# Patient Record
Sex: FEMALE | Race: WHITE | NOT HISPANIC OR LATINO | Employment: OTHER | URBAN - METROPOLITAN AREA
[De-identification: names, ages, dates, MRNs, and addresses within clinical notes are randomized per-mention and may not be internally consistent; named-entity substitution may affect disease eponyms.]

---

## 2017-04-05 ENCOUNTER — CONVERSION ENCOUNTER (OUTPATIENT)
Dept: MAMMOGRAPHY | Facility: HOSPITAL | Age: 82
End: 2017-04-05

## 2017-11-07 ENCOUNTER — CONVERSION ENCOUNTER (OUTPATIENT)
Dept: MAMMOGRAPHY | Facility: HOSPITAL | Age: 82
End: 2017-11-07

## 2018-01-09 ENCOUNTER — CONVERSION ENCOUNTER (OUTPATIENT)
Dept: SURGERY | Facility: CLINIC | Age: 83
End: 2018-01-09

## 2018-01-09 ENCOUNTER — OFFICE VISIT CONVERTED (OUTPATIENT)
Dept: SURGERY | Facility: CLINIC | Age: 83
End: 2018-01-09
Attending: NURSE PRACTITIONER

## 2018-04-11 ENCOUNTER — OFFICE VISIT CONVERTED (OUTPATIENT)
Dept: ORTHOPEDIC SURGERY | Facility: CLINIC | Age: 83
End: 2018-04-11
Attending: PHYSICIAN ASSISTANT

## 2018-05-09 ENCOUNTER — OFFICE VISIT CONVERTED (OUTPATIENT)
Dept: ORTHOPEDIC SURGERY | Facility: CLINIC | Age: 83
End: 2018-05-09
Attending: PHYSICIAN ASSISTANT

## 2018-06-13 ENCOUNTER — CONVERSION ENCOUNTER (OUTPATIENT)
Dept: ORTHOPEDIC SURGERY | Facility: CLINIC | Age: 83
End: 2018-06-13

## 2018-06-13 ENCOUNTER — OFFICE VISIT CONVERTED (OUTPATIENT)
Dept: ORTHOPEDIC SURGERY | Facility: CLINIC | Age: 83
End: 2018-06-13
Attending: PHYSICIAN ASSISTANT

## 2018-07-23 ENCOUNTER — CONVERSION ENCOUNTER (OUTPATIENT)
Dept: CARDIOLOGY | Facility: CLINIC | Age: 83
End: 2018-07-23
Attending: INTERNAL MEDICINE

## 2018-07-23 ENCOUNTER — CONVERSION ENCOUNTER (OUTPATIENT)
Dept: CARDIOLOGY | Facility: CLINIC | Age: 83
End: 2018-07-23

## 2018-07-24 ENCOUNTER — OFFICE VISIT CONVERTED (OUTPATIENT)
Dept: PULMONOLOGY | Facility: CLINIC | Age: 83
End: 2018-07-24
Attending: INTERNAL MEDICINE

## 2018-07-25 ENCOUNTER — OFFICE VISIT CONVERTED (OUTPATIENT)
Dept: ORTHOPEDIC SURGERY | Facility: CLINIC | Age: 83
End: 2018-07-25
Attending: PHYSICIAN ASSISTANT

## 2018-08-17 ENCOUNTER — OFFICE VISIT CONVERTED (OUTPATIENT)
Dept: ORTHOPEDIC SURGERY | Facility: CLINIC | Age: 83
End: 2018-08-17
Attending: ORTHOPAEDIC SURGERY

## 2018-09-28 ENCOUNTER — OFFICE VISIT CONVERTED (OUTPATIENT)
Dept: ORTHOPEDIC SURGERY | Facility: CLINIC | Age: 83
End: 2018-09-28
Attending: ORTHOPAEDIC SURGERY

## 2018-10-29 ENCOUNTER — OFFICE VISIT CONVERTED (OUTPATIENT)
Dept: ORTHOPEDIC SURGERY | Facility: CLINIC | Age: 83
End: 2018-10-29
Attending: ORTHOPAEDIC SURGERY

## 2018-11-29 ENCOUNTER — CONVERSION ENCOUNTER (OUTPATIENT)
Dept: MAMMOGRAPHY | Facility: HOSPITAL | Age: 83
End: 2018-11-29

## 2018-12-13 ENCOUNTER — OFFICE VISIT CONVERTED (OUTPATIENT)
Dept: PULMONOLOGY | Facility: CLINIC | Age: 83
End: 2018-12-13
Attending: INTERNAL MEDICINE

## 2018-12-19 ENCOUNTER — OFFICE VISIT CONVERTED (OUTPATIENT)
Dept: ORTHOPEDIC SURGERY | Facility: CLINIC | Age: 83
End: 2018-12-19
Attending: PHYSICIAN ASSISTANT

## 2019-01-15 ENCOUNTER — HOSPITAL ENCOUNTER (OUTPATIENT)
Dept: GENERAL RADIOLOGY | Facility: HOSPITAL | Age: 84
Discharge: HOME | End: 2019-01-15
Attending: FAMILY MEDICINE

## 2019-01-25 ENCOUNTER — OFFICE VISIT CONVERTED (OUTPATIENT)
Dept: CARDIOLOGY | Facility: CLINIC | Age: 84
End: 2019-01-25
Attending: INTERNAL MEDICINE

## 2019-02-14 ENCOUNTER — HOSPITAL ENCOUNTER (OUTPATIENT)
Dept: OTHER | Facility: HOSPITAL | Age: 84
Discharge: HOME | End: 2019-02-14
Attending: FAMILY MEDICINE

## 2019-02-14 LAB
25(OH)D3 SERPL-MCNC: 78.8 NG/ML (ref 30–100)
ALBUMIN SERPL-MCNC: 4.5 G/DL (ref 3.5–5)
ALP SERPL-CCNC: 70 U/L (ref 43–160)
ALT SERPL-CCNC: 12 U/L (ref 10–40)
ANION GAP SERPL CALC-SCNC: 19 MMOL/L (ref 8–19)
APPEARANCE UR: CLEAR
AST SERPL-CCNC: 17 U/L (ref 15–50)
BASOPHILS # BLD AUTO: 0.04 10*3/UL (ref 0–0.2)
BASOPHILS NFR BLD AUTO: 0.78 % (ref 0–3)
BILIRUB SERPL-MCNC: 0.16 MG/DL (ref 0.2–1.3)
BILIRUB UR QL: NEGATIVE
BUN SERPL-MCNC: 30 MG/DL (ref 5–25)
BUN/CREAT SERPL: 19 {RATIO} (ref 6–20)
CALCIUM SERPL-MCNC: 9.6 MG/DL (ref 8.7–10.4)
CHLORIDE SERPL-SCNC: 102 MMOL/L (ref 99–111)
CHOLEST SERPL-MCNC: 182 MG/DL (ref 107–200)
CHOLEST/HDLC SERPL: 3 {RATIO} (ref 3–6)
COLOR UR: YELLOW
CONV BACTERIA: NEGATIVE
CONV BILI, CONJUGATED: <0.2 MG/DL (ref 0–0.6)
CONV CO2: 24 MMOL/L (ref 22–32)
CONV COLLECTION SOURCE (UA): ABNORMAL
CONV TOTAL PROTEIN: 7.8 G/DL (ref 6.3–8.2)
CONV UNCONJUGATED BILIRUBIN: 0 MG/DL (ref 0–1.1)
CONV UROBILINOGEN IN URINE BY AUTOMATED TEST STRIP: 0.2 {EHRLICHU}/DL (ref 0.1–1)
CREAT UR-MCNC: 1.6 MG/DL (ref 0.5–0.9)
EOSINOPHIL # BLD AUTO: 0.1 10*3/UL (ref 0–0.7)
EOSINOPHIL # BLD AUTO: 1.86 % (ref 0–7)
ERYTHROCYTE [DISTWIDTH] IN BLOOD BY AUTOMATED COUNT: 13.1 % (ref 11.7–14.4)
EST. AVERAGE GLUCOSE BLD GHB EST-MCNC: 85 MG/DL
FERRITIN SERPL-MCNC: 79 NG/ML (ref 10–200)
FOLATE SERPL-MCNC: >20 NG/ML (ref 4.8–20)
GFR SERPLBLD BASED ON 1.73 SQ M-ARVRAT: 30 ML/MIN/{1.73_M2}
GLUCOSE SERPL-MCNC: 109 MG/DL (ref 65–99)
GLUCOSE UR QL: NEGATIVE MG/DL
HBA1C MFR BLD: 11.9 G/DL (ref 12–16)
HBA1C MFR BLD: 4.6 % (ref 3.5–5.7)
HCT VFR BLD AUTO: 35.9 % (ref 37–47)
HDLC SERPL-MCNC: 60 MG/DL (ref 40–60)
HGB UR QL STRIP: NEGATIVE
IRON SATN MFR SERPL: 11 % (ref 20–55)
IRON SERPL-MCNC: 44 UG/DL (ref 60–170)
KETONES UR QL STRIP: NEGATIVE MG/DL
LDLC SERPL CALC-MCNC: 93 MG/DL (ref 70–100)
LEUKOCYTE ESTERASE UR QL STRIP: NEGATIVE
LYMPHOCYTES # BLD AUTO: 0.87 10*3/UL (ref 1–5)
MAGNESIUM SERPL-MCNC: 2.34 MG/DL (ref 1.6–2.3)
MCH RBC QN AUTO: 31 PG (ref 27–31)
MCHC RBC AUTO-ENTMCNC: 33.1 G/DL (ref 33–37)
MCV RBC AUTO: 93.4 FL (ref 81–99)
MONOCYTES # BLD AUTO: 0.73 10*3/UL (ref 0.2–1.2)
MONOCYTES NFR BLD AUTO: 14.4 % (ref 3–10)
NEUTROPHILS # BLD AUTO: 3.35 10*3/UL (ref 2–8)
NEUTROPHILS NFR BLD AUTO: 65.9 % (ref 30–85)
NITRITE UR QL STRIP: NEGATIVE
NRBC BLD AUTO-RTO: 0 % (ref 0–0.01)
OSMOLALITY SERPL CALC.SUM OF ELEC: 299 MOSM/KG (ref 273–304)
PH UR STRIP.AUTO: 6.5 [PH] (ref 5–8)
PLATELET # BLD AUTO: 288 10*3/UL (ref 130–400)
PMV BLD AUTO: 8.1 FL (ref 9.4–12.3)
POTASSIUM SERPL-SCNC: 4.3 MMOL/L (ref 3.5–5.3)
PROT UR QL: 100 MG/DL
RBC # BLD AUTO: 3.84 10*6/UL (ref 4.2–5.4)
RBC #/AREA URNS HPF: ABNORMAL /[HPF]
SODIUM SERPL-SCNC: 141 MMOL/L (ref 135–147)
SP GR UR: 1.01 (ref 1–1.03)
T4 FREE SERPL-MCNC: 1.1 NG/DL (ref 0.9–1.8)
TIBC SERPL-MCNC: 399 UG/DL (ref 245–450)
TRANSFERRIN SERPL-MCNC: 279 MG/DL (ref 250–380)
TRIGL SERPL-MCNC: 143 MG/DL (ref 40–150)
TSH SERPL-ACNC: 4.07 M[IU]/L (ref 0.27–4.2)
VARIANT LYMPHS NFR BLD MANUAL: 17 % (ref 20–45)
VIT B12 SERPL-MCNC: 860 PG/ML (ref 211–911)
VLDLC SERPL-MCNC: 29 MG/DL (ref 5–37)
WBC # BLD AUTO: 5.08 10*3/UL (ref 4.8–10.8)
WBC #/AREA URNS HPF: ABNORMAL /[HPF]

## 2019-02-27 ENCOUNTER — OFFICE VISIT CONVERTED (OUTPATIENT)
Dept: ORTHOPEDIC SURGERY | Facility: CLINIC | Age: 84
End: 2019-02-27
Attending: PHYSICIAN ASSISTANT

## 2019-02-28 ENCOUNTER — HOSPITAL ENCOUNTER (OUTPATIENT)
Dept: CT IMAGING | Facility: HOSPITAL | Age: 84
Discharge: HOME | End: 2019-02-28
Attending: PHYSICIAN ASSISTANT

## 2019-03-06 ENCOUNTER — OFFICE VISIT CONVERTED (OUTPATIENT)
Dept: ORTHOPEDIC SURGERY | Facility: CLINIC | Age: 84
End: 2019-03-06
Attending: PHYSICIAN ASSISTANT

## 2019-04-01 ENCOUNTER — OFFICE VISIT CONVERTED (OUTPATIENT)
Dept: ORTHOPEDIC SURGERY | Facility: CLINIC | Age: 84
End: 2019-04-01
Attending: PHYSICIAN ASSISTANT

## 2019-04-04 ENCOUNTER — HOSPITAL ENCOUNTER (OUTPATIENT)
Dept: CARDIOLOGY | Facility: HOSPITAL | Age: 84
Discharge: HOME | End: 2019-04-04
Attending: INTERNAL MEDICINE

## 2019-04-26 ENCOUNTER — OFFICE VISIT CONVERTED (OUTPATIENT)
Dept: PULMONOLOGY | Facility: CLINIC | Age: 84
End: 2019-04-26
Attending: INTERNAL MEDICINE

## 2019-05-16 ENCOUNTER — HOSPITAL ENCOUNTER (OUTPATIENT)
Dept: OTHER | Facility: HOSPITAL | Age: 84
Discharge: HOME | End: 2019-05-16
Attending: FAMILY MEDICINE

## 2019-05-16 LAB
25(OH)D3 SERPL-MCNC: 75.4 NG/ML (ref 30–100)
ALBUMIN SERPL-MCNC: 3.9 G/DL (ref 3.5–5)
ALP SERPL-CCNC: 70 U/L (ref 43–160)
ALT SERPL-CCNC: 11 U/L (ref 10–40)
ANION GAP SERPL CALC-SCNC: 15 MMOL/L (ref 8–19)
APPEARANCE UR: CLEAR
AST SERPL-CCNC: 14 U/L (ref 15–50)
BASOPHILS # BLD AUTO: 0.04 10*3/UL (ref 0–0.2)
BASOPHILS NFR BLD AUTO: 0.5 % (ref 0–3)
BILIRUB SERPL-MCNC: 0.3 MG/DL (ref 0.2–1.3)
BILIRUB UR QL: NEGATIVE
BUN SERPL-MCNC: 26 MG/DL (ref 5–25)
BUN/CREAT SERPL: 17 {RATIO} (ref 6–20)
CALCIUM SERPL-MCNC: 9 MG/DL (ref 8.7–10.4)
CHLORIDE SERPL-SCNC: 96 MMOL/L (ref 99–111)
CHOLEST SERPL-MCNC: 197 MG/DL (ref 107–200)
CHOLEST/HDLC SERPL: 3.5 {RATIO} (ref 3–6)
COLOR UR: YELLOW
CONV ABS IMM GRAN: 0.04 10*3/UL (ref 0–0.2)
CONV BILI, CONJUGATED: <0.2 MG/DL (ref 0–0.6)
CONV CO2: 28 MMOL/L (ref 22–32)
CONV COLLECTION SOURCE (UA): NORMAL
CONV CREATININE URINE, RANDOM: 53.5 MG/DL (ref 10–300)
CONV IMMATURE GRAN: 0.5 % (ref 0–1.8)
CONV MICROALBUM.,U,RANDOM: 151.3 MG/L (ref 0–20)
CONV TOTAL PROTEIN: 7.4 G/DL (ref 6.3–8.2)
CONV UNCONJUGATED BILIRUBIN: 0.1 MG/DL (ref 0–1.1)
CONV UROBILINOGEN IN URINE BY AUTOMATED TEST STRIP: 0.2 {EHRLICHU}/DL (ref 0.1–1)
CREAT UR-MCNC: 1.53 MG/DL (ref 0.5–0.9)
DEPRECATED RDW RBC AUTO: 49.6 FL (ref 36.4–46.3)
EOSINOPHIL # BLD AUTO: 0.12 10*3/UL (ref 0–0.7)
EOSINOPHIL # BLD AUTO: 1.6 % (ref 0–7)
ERYTHROCYTE [DISTWIDTH] IN BLOOD BY AUTOMATED COUNT: 14.4 % (ref 11.7–14.4)
EST. AVERAGE GLUCOSE BLD GHB EST-MCNC: 103 MG/DL
FERRITIN SERPL-MCNC: 110 NG/ML (ref 10–200)
FOLATE SERPL-MCNC: >20 NG/ML (ref 4.8–20)
GFR SERPLBLD BASED ON 1.73 SQ M-ARVRAT: 31 ML/MIN/{1.73_M2}
GLUCOSE SERPL-MCNC: 93 MG/DL (ref 65–99)
GLUCOSE UR QL: NEGATIVE MG/DL
HBA1C MFR BLD: 11.5 G/DL (ref 12–16)
HBA1C MFR BLD: 5.2 % (ref 3.5–5.7)
HCT VFR BLD AUTO: 37 % (ref 37–47)
HDLC SERPL-MCNC: 56 MG/DL (ref 40–60)
HGB UR QL STRIP: NEGATIVE
IRON SATN MFR SERPL: 15 % (ref 20–55)
IRON SERPL-MCNC: 49 UG/DL (ref 60–170)
KETONES UR QL STRIP: NEGATIVE MG/DL
LDLC SERPL CALC-MCNC: 115 MG/DL (ref 70–100)
LEUKOCYTE ESTERASE UR QL STRIP: NEGATIVE
LYMPHOCYTES # BLD AUTO: 0.74 10*3/UL (ref 1–5)
MCH RBC QN AUTO: 29.5 PG (ref 27–31)
MCHC RBC AUTO-ENTMCNC: 31.1 G/DL (ref 33–37)
MCV RBC AUTO: 94.9 FL (ref 81–99)
MICROALBUMIN/CREAT UR: 282.8 MG/G{CRE} (ref 0–35)
MONOCYTES # BLD AUTO: 1.42 10*3/UL (ref 0.2–1.2)
MONOCYTES NFR BLD AUTO: 19.3 % (ref 3–10)
NEUTROPHILS # BLD AUTO: 5.01 10*3/UL (ref 2–8)
NEUTROPHILS NFR BLD AUTO: 68.1 % (ref 30–85)
NITRITE UR QL STRIP: NEGATIVE
NRBC CBCN: 0 % (ref 0–0.7)
OSMOLALITY SERPL CALC.SUM OF ELEC: 282 MOSM/KG (ref 273–304)
PH UR STRIP.AUTO: 6.5 [PH] (ref 5–8)
PLATELET # BLD AUTO: 323 10*3/UL (ref 130–400)
PMV BLD AUTO: 10.5 FL (ref 9.4–12.3)
POTASSIUM SERPL-SCNC: 4.5 MMOL/L (ref 3.5–5.3)
PROT UR QL: NORMAL MG/DL
RBC # BLD AUTO: 3.9 10*6/UL (ref 4.2–5.4)
SODIUM SERPL-SCNC: 134 MMOL/L (ref 135–147)
SP GR UR: 1.01 (ref 1–1.03)
T4 FREE SERPL-MCNC: 1.3 NG/DL (ref 0.9–1.8)
TIBC SERPL-MCNC: 326 UG/DL (ref 245–450)
TRANSFERRIN SERPL-MCNC: 228 MG/DL (ref 250–380)
TRIGL SERPL-MCNC: 129 MG/DL (ref 40–150)
TSH SERPL-ACNC: 7.85 M[IU]/L (ref 0.27–4.2)
VARIANT LYMPHS NFR BLD MANUAL: 10 % (ref 20–45)
VIT B12 SERPL-MCNC: 884 PG/ML (ref 211–911)
VLDLC SERPL-MCNC: 26 MG/DL (ref 5–37)
WBC # BLD AUTO: 7.37 10*3/UL (ref 4.8–10.8)

## 2019-05-30 ENCOUNTER — HOSPITAL ENCOUNTER (OUTPATIENT)
Dept: CT IMAGING | Facility: HOSPITAL | Age: 84
Discharge: HOME | End: 2019-05-30
Attending: FAMILY MEDICINE

## 2019-06-12 ENCOUNTER — OFFICE VISIT CONVERTED (OUTPATIENT)
Dept: ORTHOPEDIC SURGERY | Facility: CLINIC | Age: 84
End: 2019-06-12
Attending: PHYSICIAN ASSISTANT

## 2019-06-13 ENCOUNTER — HOSPITAL ENCOUNTER (OUTPATIENT)
Dept: CT IMAGING | Facility: HOSPITAL | Age: 84
Discharge: HOME | End: 2019-06-13
Attending: FAMILY MEDICINE

## 2019-07-03 ENCOUNTER — OFFICE VISIT CONVERTED (OUTPATIENT)
Dept: OTOLARYNGOLOGY | Facility: CLINIC | Age: 84
End: 2019-07-03
Attending: OTOLARYNGOLOGY

## 2019-07-03 ENCOUNTER — CONVERSION ENCOUNTER (OUTPATIENT)
Dept: OTOLARYNGOLOGY | Facility: CLINIC | Age: 84
End: 2019-07-03

## 2019-07-19 ENCOUNTER — OFFICE VISIT CONVERTED (OUTPATIENT)
Dept: OTOLARYNGOLOGY | Facility: CLINIC | Age: 84
End: 2019-07-19
Attending: OTOLARYNGOLOGY

## 2019-07-26 ENCOUNTER — CONVERSION ENCOUNTER (OUTPATIENT)
Dept: CARDIOLOGY | Facility: CLINIC | Age: 84
End: 2019-07-26
Attending: INTERNAL MEDICINE

## 2019-07-31 ENCOUNTER — OFFICE VISIT CONVERTED (OUTPATIENT)
Dept: ORTHOPEDIC SURGERY | Facility: CLINIC | Age: 84
End: 2019-07-31
Attending: PHYSICIAN ASSISTANT

## 2019-07-31 ENCOUNTER — CONVERSION ENCOUNTER (OUTPATIENT)
Dept: ORTHOPEDIC SURGERY | Facility: CLINIC | Age: 84
End: 2019-07-31

## 2019-08-07 ENCOUNTER — HOSPITAL ENCOUNTER (OUTPATIENT)
Dept: OTHER | Facility: HOSPITAL | Age: 84
Discharge: HOME | End: 2019-08-07
Attending: FAMILY MEDICINE

## 2019-08-07 LAB
25(OH)D3 SERPL-MCNC: 74.9 NG/ML (ref 30–100)
ALBUMIN SERPL-MCNC: 4.5 G/DL (ref 3.5–5)
ALBUMIN/GLOB SERPL: 1.4 {RATIO} (ref 1.4–2.6)
ALP SERPL-CCNC: 77 U/L (ref 43–160)
ALT SERPL-CCNC: 13 U/L (ref 10–40)
ANION GAP SERPL CALC-SCNC: 24 MMOL/L (ref 8–19)
APPEARANCE UR: CLEAR
AST SERPL-CCNC: 20 U/L (ref 15–50)
BASOPHILS # BLD AUTO: 0.05 10*3/UL (ref 0–0.2)
BASOPHILS NFR BLD AUTO: 1.1 % (ref 0–3)
BILIRUB SERPL-MCNC: 0.18 MG/DL (ref 0.2–1.3)
BILIRUB UR QL: NEGATIVE
BUN SERPL-MCNC: 28 MG/DL (ref 5–25)
BUN/CREAT SERPL: 18 {RATIO} (ref 6–20)
CALCIUM SERPL-MCNC: 9.8 MG/DL (ref 8.7–10.4)
CHLORIDE SERPL-SCNC: 101 MMOL/L (ref 99–111)
CHOLEST SERPL-MCNC: 168 MG/DL (ref 107–200)
CHOLEST/HDLC SERPL: 2.9 {RATIO} (ref 3–6)
COLOR UR: YELLOW
CONV ABS IMM GRAN: 0.01 10*3/UL (ref 0–0.2)
CONV BACTERIA: NEGATIVE
CONV BILI, CONJUGATED: <0.2 MG/DL (ref 0–0.6)
CONV CO2: 21 MMOL/L (ref 22–32)
CONV COLLECTION SOURCE (UA): ABNORMAL
CONV IMMATURE GRAN: 0.2 % (ref 0–1.8)
CONV TOTAL PROTEIN: 7.8 G/DL (ref 6.3–8.2)
CONV UNCONJUGATED BILIRUBIN: 0 MG/DL (ref 0–1.1)
CONV UROBILINOGEN IN URINE BY AUTOMATED TEST STRIP: 0.2 {EHRLICHU}/DL (ref 0.1–1)
CREAT UR-MCNC: 1.53 MG/DL (ref 0.5–0.9)
DEPRECATED RDW RBC AUTO: 53.3 FL (ref 36.4–46.3)
EOSINOPHIL # BLD AUTO: 0.08 10*3/UL (ref 0–0.7)
EOSINOPHIL # BLD AUTO: 1.8 % (ref 0–7)
ERYTHROCYTE [DISTWIDTH] IN BLOOD BY AUTOMATED COUNT: 15.2 % (ref 11.7–14.4)
FERRITIN SERPL-MCNC: 58 NG/ML (ref 10–200)
FOLATE SERPL-MCNC: >20 NG/ML (ref 4.8–20)
GFR SERPLBLD BASED ON 1.73 SQ M-ARVRAT: 31 ML/MIN/{1.73_M2}
GLOBULIN UR ELPH-MCNC: 3.3 G/DL (ref 2–3.5)
GLUCOSE SERPL-MCNC: 101 MG/DL (ref 65–99)
GLUCOSE UR QL: NEGATIVE MG/DL
HCT VFR BLD AUTO: 38.9 % (ref 37–47)
HDLC SERPL-MCNC: 57 MG/DL (ref 40–60)
HGB BLD-MCNC: 11.8 G/DL (ref 12–16)
HGB UR QL STRIP: NEGATIVE
IRON SATN MFR SERPL: 12 % (ref 20–55)
IRON SERPL-MCNC: 51 UG/DL (ref 60–170)
KETONES UR QL STRIP: NEGATIVE MG/DL
LDLC SERPL CALC-MCNC: 88 MG/DL (ref 70–100)
LEUKOCYTE ESTERASE UR QL STRIP: NEGATIVE
LYMPHOCYTES # BLD AUTO: 0.65 10*3/UL (ref 1–5)
LYMPHOCYTES NFR BLD AUTO: 14.7 % (ref 20–45)
MAGNESIUM SERPL-MCNC: 2.42 MG/DL (ref 1.6–2.3)
MCH RBC QN AUTO: 28.6 PG (ref 27–31)
MCHC RBC AUTO-ENTMCNC: 30.3 G/DL (ref 33–37)
MCV RBC AUTO: 94.4 FL (ref 81–99)
MONOCYTES # BLD AUTO: 0.65 10*3/UL (ref 0.2–1.2)
MONOCYTES NFR BLD AUTO: 14.7 % (ref 3–10)
NEUTROPHILS # BLD AUTO: 2.99 10*3/UL (ref 2–8)
NEUTROPHILS NFR BLD AUTO: 67.5 % (ref 30–85)
NITRITE UR QL STRIP: NEGATIVE
NRBC CBCN: 0 % (ref 0–0.7)
OSMOLALITY SERPL CALC.SUM OF ELEC: 298 MOSM/KG (ref 273–304)
PH UR STRIP.AUTO: 7 [PH] (ref 5–8)
PLATELET # BLD AUTO: 329 10*3/UL (ref 130–400)
PMV BLD AUTO: 10.6 FL (ref 9.4–12.3)
POTASSIUM SERPL-SCNC: 4.7 MMOL/L (ref 3.5–5.3)
PROT UR QL: 30 MG/DL
RBC # BLD AUTO: 4.12 10*6/UL (ref 4.2–5.4)
RBC #/AREA URNS HPF: ABNORMAL /[HPF]
SODIUM SERPL-SCNC: 141 MMOL/L (ref 135–147)
SP GR UR: 1.01 (ref 1–1.03)
T4 FREE SERPL-MCNC: 1.4 NG/DL (ref 0.9–1.8)
TIBC SERPL-MCNC: 420 UG/DL (ref 245–450)
TRANSFERRIN SERPL-MCNC: 294 MG/DL (ref 250–380)
TRIGL SERPL-MCNC: 116 MG/DL (ref 40–150)
TSH SERPL-ACNC: 3.02 M[IU]/L (ref 0.27–4.2)
VIT B12 SERPL-MCNC: 913 PG/ML (ref 211–911)
VLDLC SERPL-MCNC: 23 MG/DL (ref 5–37)
WBC # BLD AUTO: 4.43 10*3/UL (ref 4.8–10.8)
WBC #/AREA URNS HPF: ABNORMAL /[HPF]

## 2019-10-08 ENCOUNTER — HOSPITAL ENCOUNTER (OUTPATIENT)
Dept: CT IMAGING | Facility: HOSPITAL | Age: 84
Discharge: HOME | End: 2019-10-08
Attending: INTERNAL MEDICINE

## 2019-10-16 ENCOUNTER — OFFICE VISIT CONVERTED (OUTPATIENT)
Dept: PULMONOLOGY | Facility: CLINIC | Age: 84
End: 2019-10-16
Attending: INTERNAL MEDICINE

## 2019-10-18 ENCOUNTER — OFFICE VISIT CONVERTED (OUTPATIENT)
Dept: ORTHOPEDIC SURGERY | Facility: CLINIC | Age: 84
End: 2019-10-18
Attending: PHYSICIAN ASSISTANT

## 2019-11-06 ENCOUNTER — HOSPITAL ENCOUNTER (OUTPATIENT)
Dept: OTHER | Facility: HOSPITAL | Age: 84
Discharge: HOME | End: 2019-11-06
Attending: FAMILY MEDICINE

## 2019-11-06 LAB
25(OH)D3 SERPL-MCNC: 69.5 NG/ML (ref 30–100)
ALBUMIN SERPL-MCNC: 4.2 G/DL (ref 3.5–5)
ALP SERPL-CCNC: 78 U/L (ref 43–160)
ALT SERPL-CCNC: 9 U/L (ref 10–40)
ANION GAP SERPL CALC-SCNC: 19 MMOL/L (ref 8–19)
APPEARANCE UR: CLEAR
AST SERPL-CCNC: 14 U/L (ref 15–50)
BASOPHILS # BLD AUTO: 0.04 10*3/UL (ref 0–0.2)
BASOPHILS NFR BLD AUTO: 0.6 % (ref 0–3)
BILIRUB SERPL-MCNC: 0.2 MG/DL (ref 0.2–1.3)
BILIRUB UR QL: NEGATIVE
BUN SERPL-MCNC: 29 MG/DL (ref 5–25)
BUN/CREAT SERPL: 22 {RATIO} (ref 6–20)
CALCIUM SERPL-MCNC: 10.1 MG/DL (ref 8.7–10.4)
CHLORIDE SERPL-SCNC: 102 MMOL/L (ref 99–111)
CHOLEST SERPL-MCNC: 178 MG/DL (ref 107–200)
CHOLEST/HDLC SERPL: 3.2 {RATIO} (ref 3–6)
COLOR UR: YELLOW
CONV ABS IMM GRAN: 0.01 10*3/UL (ref 0–0.2)
CONV BACTERIA: NEGATIVE
CONV BILI, CONJUGATED: <0.2 MG/DL (ref 0–0.6)
CONV CO2: 22 MMOL/L (ref 22–32)
CONV COLLECTION SOURCE (UA): ABNORMAL
CONV HYALINE CASTS IN URINE MICRO: ABNORMAL /[LPF]
CONV IMMATURE GRAN: 0.2 % (ref 0–1.8)
CONV TOTAL PROTEIN: 7.2 G/DL (ref 6.3–8.2)
CONV UNCONJUGATED BILIRUBIN: 0 MG/DL (ref 0–1.1)
CONV UROBILINOGEN IN URINE BY AUTOMATED TEST STRIP: 0.2 {EHRLICHU}/DL (ref 0.1–1)
CREAT UR-MCNC: 1.32 MG/DL (ref 0.5–0.9)
DEPRECATED RDW RBC AUTO: 51.2 FL (ref 36.4–46.3)
EOSINOPHIL # BLD AUTO: 0.08 10*3/UL (ref 0–0.7)
EOSINOPHIL # BLD AUTO: 1.3 % (ref 0–7)
ERYTHROCYTE [DISTWIDTH] IN BLOOD BY AUTOMATED COUNT: 14.6 % (ref 11.7–14.4)
FERRITIN SERPL-MCNC: 76 NG/ML (ref 10–200)
FOLATE SERPL-MCNC: >20 NG/ML (ref 4.8–20)
GFR SERPLBLD BASED ON 1.73 SQ M-ARVRAT: 37 ML/MIN/{1.73_M2}
GLUCOSE SERPL-MCNC: 101 MG/DL (ref 65–99)
GLUCOSE UR QL: NEGATIVE MG/DL
HCT VFR BLD AUTO: 40.9 % (ref 37–47)
HDLC SERPL-MCNC: 56 MG/DL (ref 40–60)
HGB BLD-MCNC: 12.5 G/DL (ref 12–16)
HGB UR QL STRIP: NEGATIVE
IRON SATN MFR SERPL: 18 % (ref 20–55)
IRON SERPL-MCNC: 61 UG/DL (ref 60–170)
KETONES UR QL STRIP: NEGATIVE MG/DL
LDLC SERPL CALC-MCNC: 98 MG/DL (ref 70–100)
LEUKOCYTE ESTERASE UR QL STRIP: NEGATIVE
LYMPHOCYTES # BLD AUTO: 0.79 10*3/UL (ref 1–5)
LYMPHOCYTES NFR BLD AUTO: 12.4 % (ref 20–45)
MAGNESIUM SERPL-MCNC: 2.22 MG/DL (ref 1.6–2.3)
MCH RBC QN AUTO: 28.7 PG (ref 27–31)
MCHC RBC AUTO-ENTMCNC: 30.6 G/DL (ref 33–37)
MCV RBC AUTO: 94 FL (ref 81–99)
MONOCYTES # BLD AUTO: 0.72 10*3/UL (ref 0.2–1.2)
MONOCYTES NFR BLD AUTO: 11.3 % (ref 3–10)
NEUTROPHILS # BLD AUTO: 4.73 10*3/UL (ref 2–8)
NEUTROPHILS NFR BLD AUTO: 74.2 % (ref 30–85)
NITRITE UR QL STRIP: NEGATIVE
NRBC CBCN: 0 % (ref 0–0.7)
OSMOLALITY SERPL CALC.SUM OF ELEC: 294 MOSM/KG (ref 273–304)
PH UR STRIP.AUTO: 5.5 [PH] (ref 5–8)
PHOSPHATE SERPL-MCNC: 3.8 MG/DL (ref 2.4–4.5)
PLATELET # BLD AUTO: 268 10*3/UL (ref 130–400)
PMV BLD AUTO: 10.5 FL (ref 9.4–12.3)
POTASSIUM SERPL-SCNC: 4.4 MMOL/L (ref 3.5–5.3)
PROT UR QL: 300 MG/DL
RBC # BLD AUTO: 4.35 10*6/UL (ref 4.2–5.4)
RBC #/AREA URNS HPF: ABNORMAL /[HPF]
SODIUM SERPL-SCNC: 139 MMOL/L (ref 135–147)
SP GR UR: 1.02 (ref 1–1.03)
SQUAMOUS SPT QL MICRO: ABNORMAL /[HPF]
T4 FREE SERPL-MCNC: 1.2 NG/DL (ref 0.9–1.8)
TIBC SERPL-MCNC: 342 UG/DL (ref 245–450)
TRANSFERRIN SERPL-MCNC: 239 MG/DL (ref 250–380)
TRIGL SERPL-MCNC: 118 MG/DL (ref 40–150)
TSH SERPL-ACNC: 2.13 M[IU]/L (ref 0.27–4.2)
VIT B12 SERPL-MCNC: 786 PG/ML (ref 211–911)
VLDLC SERPL-MCNC: 24 MG/DL (ref 5–37)
WBC # BLD AUTO: 6.37 10*3/UL (ref 4.8–10.8)
WBC #/AREA URNS HPF: ABNORMAL /[HPF]

## 2019-12-03 ENCOUNTER — HOSPITAL ENCOUNTER (OUTPATIENT)
Dept: MAMMOGRAPHY | Facility: HOSPITAL | Age: 84
Discharge: HOME | End: 2019-12-03
Attending: INTERNAL MEDICINE

## 2020-01-27 ENCOUNTER — OFFICE VISIT CONVERTED (OUTPATIENT)
Dept: CARDIOLOGY | Facility: CLINIC | Age: 85
End: 2020-01-27
Attending: INTERNAL MEDICINE

## 2020-01-27 ENCOUNTER — CONVERSION ENCOUNTER (OUTPATIENT)
Dept: CARDIOLOGY | Facility: CLINIC | Age: 85
End: 2020-01-27

## 2020-02-05 ENCOUNTER — HOSPITAL ENCOUNTER (OUTPATIENT)
Dept: OTHER | Facility: HOSPITAL | Age: 85
Discharge: HOME | End: 2020-02-05
Attending: FAMILY MEDICINE

## 2020-02-05 LAB
25(OH)D3 SERPL-MCNC: 59.8 NG/ML (ref 30–100)
ALBUMIN SERPL-MCNC: 4.5 G/DL (ref 3.5–5)
ALP SERPL-CCNC: 75 U/L (ref 43–160)
ALT SERPL-CCNC: 12 U/L (ref 10–40)
ANION GAP SERPL CALC-SCNC: 19 MMOL/L (ref 8–19)
APPEARANCE UR: CLEAR
AST SERPL-CCNC: 17 U/L (ref 15–50)
BASOPHILS # BLD AUTO: 0.03 10*3/UL (ref 0–0.2)
BASOPHILS NFR BLD AUTO: 0.6 % (ref 0–3)
BILIRUB SERPL-MCNC: 0.16 MG/DL (ref 0.2–1.3)
BILIRUB UR QL: NEGATIVE
BUN SERPL-MCNC: 28 MG/DL (ref 5–25)
BUN/CREAT SERPL: 22 {RATIO} (ref 6–20)
CALCIUM SERPL-MCNC: 9.7 MG/DL (ref 8.7–10.4)
CHLORIDE SERPL-SCNC: 102 MMOL/L (ref 99–111)
CHOLEST SERPL-MCNC: 195 MG/DL (ref 107–200)
CHOLEST/HDLC SERPL: 2.9 {RATIO} (ref 3–6)
COLOR UR: YELLOW
CONV ABS IMM GRAN: 0.01 10*3/UL (ref 0–0.2)
CONV BACTERIA: NEGATIVE
CONV BILI, CONJUGATED: <0.2 MG/DL (ref 0–0.6)
CONV CO2: 25 MMOL/L (ref 22–32)
CONV COLLECTION SOURCE (UA): ABNORMAL
CONV IMMATURE GRAN: 0.2 % (ref 0–1.8)
CONV TOTAL PROTEIN: 7.6 G/DL (ref 6.3–8.2)
CONV UNCONJUGATED BILIRUBIN: 0 MG/DL (ref 0–1.1)
CONV UROBILINOGEN IN URINE BY AUTOMATED TEST STRIP: 0.2 {EHRLICHU}/DL (ref 0.1–1)
CREAT UR-MCNC: 1.28 MG/DL (ref 0.5–0.9)
DEPRECATED RDW RBC AUTO: 49.8 FL (ref 36.4–46.3)
EOSINOPHIL # BLD AUTO: 0.07 10*3/UL (ref 0–0.7)
EOSINOPHIL # BLD AUTO: 1.4 % (ref 0–7)
ERYTHROCYTE [DISTWIDTH] IN BLOOD BY AUTOMATED COUNT: 14 % (ref 11.7–14.4)
ERYTHROCYTE [SEDIMENTATION RATE] IN BLOOD: 30 MM/H (ref 0–30)
FERRITIN SERPL-MCNC: 80 NG/ML (ref 10–200)
FOLATE SERPL-MCNC: >20 NG/ML (ref 4.8–20)
GFR SERPLBLD BASED ON 1.73 SQ M-ARVRAT: 39 ML/MIN/{1.73_M2}
GLUCOSE SERPL-MCNC: 103 MG/DL (ref 65–99)
GLUCOSE UR QL: NEGATIVE MG/DL
HCT VFR BLD AUTO: 41.4 % (ref 37–47)
HDLC SERPL-MCNC: 68 MG/DL (ref 40–60)
HGB BLD-MCNC: 12.7 G/DL (ref 12–16)
HGB UR QL STRIP: NEGATIVE
IRON SATN MFR SERPL: 16 % (ref 20–55)
IRON SERPL-MCNC: 59 UG/DL (ref 60–170)
KETONES UR QL STRIP: NEGATIVE MG/DL
LDLC SERPL CALC-MCNC: 107 MG/DL (ref 70–100)
LEUKOCYTE ESTERASE UR QL STRIP: NEGATIVE
LYMPHOCYTES # BLD AUTO: 0.86 10*3/UL (ref 1–5)
LYMPHOCYTES NFR BLD AUTO: 17.2 % (ref 20–45)
MAGNESIUM SERPL-MCNC: 2.3 MG/DL (ref 1.6–2.3)
MCH RBC QN AUTO: 29.6 PG (ref 27–31)
MCHC RBC AUTO-ENTMCNC: 30.7 G/DL (ref 33–37)
MCV RBC AUTO: 96.5 FL (ref 81–99)
MONOCYTES # BLD AUTO: 0.65 10*3/UL (ref 0.2–1.2)
MONOCYTES NFR BLD AUTO: 13 % (ref 3–10)
NEUTROPHILS # BLD AUTO: 3.39 10*3/UL (ref 2–8)
NEUTROPHILS NFR BLD AUTO: 67.6 % (ref 30–85)
NITRITE UR QL STRIP: NEGATIVE
NRBC CBCN: 0 % (ref 0–0.7)
OSMOLALITY SERPL CALC.SUM OF ELEC: 300 MOSM/KG (ref 273–304)
PH UR STRIP.AUTO: 6 [PH] (ref 5–8)
PLATELET # BLD AUTO: 267 10*3/UL (ref 130–400)
PMV BLD AUTO: 10.8 FL (ref 9.4–12.3)
POTASSIUM SERPL-SCNC: 4.2 MMOL/L (ref 3.5–5.3)
PROT UR QL: 100 MG/DL
RBC # BLD AUTO: 4.29 10*6/UL (ref 4.2–5.4)
RBC #/AREA URNS HPF: ABNORMAL /[HPF]
SODIUM SERPL-SCNC: 142 MMOL/L (ref 135–147)
SP GR UR: 1.01 (ref 1–1.03)
T4 FREE SERPL-MCNC: 1.3 NG/DL (ref 0.9–1.8)
TIBC SERPL-MCNC: 362 UG/DL (ref 245–450)
TRANSFERRIN SERPL-MCNC: 253 MG/DL (ref 250–380)
TRIGL SERPL-MCNC: 99 MG/DL (ref 40–150)
TSH SERPL-ACNC: 2.51 M[IU]/L (ref 0.27–4.2)
VIT B12 SERPL-MCNC: 931 PG/ML (ref 211–911)
VLDLC SERPL-MCNC: 20 MG/DL (ref 5–37)
WBC # BLD AUTO: 5.01 10*3/UL (ref 4.8–10.8)
WBC #/AREA URNS HPF: ABNORMAL /[HPF]

## 2020-02-07 LAB
ASO AB SERPL-ACNC: 86 [IU]/ML (ref 0–200)
CONV RHEUMATOID FACTOR IGM: 10.9 [IU]/ML (ref 0–14)
CRP SERPL-MCNC: 10.3 MG/L (ref 0–5)
DSDNA AB SER-ACNC: NEGATIVE [IU]/ML
ENA AB SER IA-ACNC: POSITIVE {RATIO}
URATE SERPL-MCNC: 7.7 MG/DL (ref 2.5–7.5)

## 2020-02-08 LAB
CENTROMERE AB TITR SER IF: <0.4 [IU]/ML (ref 0–7)
ENA SCL70 AB SER QL IA: 1.5 [IU]/ML (ref 0–7)
JO-1 (WB)*: <0.3 [IU]/ML (ref 0–7)
RNP: 0.6 [IU]/ML (ref 0–5)
RNP: <0.3 [IU]/ML (ref 0–7)
SMI: <0.8 [IU]/ML (ref 0–7)
SSA (RO) (ENA) AB, IGG: 7.8 [IU]/ML (ref 0–7)
SSB (LA) ENA AB, IGG: <0.3 [IU]/ML (ref 0–7)

## 2020-04-22 ENCOUNTER — OFFICE VISIT CONVERTED (OUTPATIENT)
Dept: ORTHOPEDIC SURGERY | Facility: CLINIC | Age: 85
End: 2020-04-22
Attending: PHYSICIAN ASSISTANT

## 2020-05-04 ENCOUNTER — HOSPITAL ENCOUNTER (OUTPATIENT)
Dept: OTHER | Facility: HOSPITAL | Age: 85
Discharge: HOME | End: 2020-05-04
Attending: INTERNAL MEDICINE

## 2020-05-04 LAB
25(OH)D3 SERPL-MCNC: 48.8 NG/ML (ref 30–100)
ALBUMIN SERPL-MCNC: 4.2 G/DL (ref 3.5–5)
ALBUMIN/GLOB SERPL: 1.4 {RATIO} (ref 1.4–2.6)
ALP SERPL-CCNC: 68 U/L (ref 43–160)
ALT SERPL-CCNC: 10 U/L (ref 10–40)
ANION GAP SERPL CALC-SCNC: 15 MMOL/L (ref 8–19)
APPEARANCE UR: CLEAR
AST SERPL-CCNC: 17 U/L (ref 15–50)
BASOPHILS # BLD AUTO: 0.03 10*3/UL (ref 0–0.2)
BASOPHILS NFR BLD AUTO: 0.7 % (ref 0–3)
BILIRUB SERPL-MCNC: 0.24 MG/DL (ref 0.2–1.3)
BILIRUB UR QL: NEGATIVE
BUN SERPL-MCNC: 24 MG/DL (ref 5–25)
BUN/CREAT SERPL: 17 {RATIO} (ref 6–20)
CALCIUM SERPL-MCNC: 9.5 MG/DL (ref 8.7–10.4)
CHLORIDE SERPL-SCNC: 104 MMOL/L (ref 99–111)
CHOLEST SERPL-MCNC: 168 MG/DL (ref 107–200)
CHOLEST/HDLC SERPL: 2.9 {RATIO} (ref 3–6)
COLOR UR: YELLOW
CONV ABS IMM GRAN: 0.02 10*3/UL (ref 0–0.2)
CONV BACTERIA: NEGATIVE
CONV BILI, CONJUGATED: <0.2 MG/DL (ref 0–0.6)
CONV CO2: 26 MMOL/L (ref 22–32)
CONV COLLECTION SOURCE (UA): ABNORMAL
CONV IMMATURE GRAN: 0.5 % (ref 0–1.8)
CONV RHEUMATOID FACTOR IGM: <10 [IU]/ML (ref 0–14)
CONV TOTAL PROTEIN: 7.3 G/DL (ref 6.3–8.2)
CONV UNCONJUGATED BILIRUBIN: 0 MG/DL (ref 0–1.1)
CONV UROBILINOGEN IN URINE BY AUTOMATED TEST STRIP: 0.2 {EHRLICHU}/DL (ref 0.1–1)
CREAT UR-MCNC: 1.45 MG/DL (ref 0.5–0.9)
CRP SERPL-MCNC: 10.6 MG/L (ref 0–5)
DEPRECATED RDW RBC AUTO: 52.4 FL (ref 36.4–46.3)
EOSINOPHIL # BLD AUTO: 0.06 10*3/UL (ref 0–0.7)
EOSINOPHIL # BLD AUTO: 1.4 % (ref 0–7)
ERYTHROCYTE [DISTWIDTH] IN BLOOD BY AUTOMATED COUNT: 14.6 % (ref 11.7–14.4)
ERYTHROCYTE [SEDIMENTATION RATE] IN BLOOD: 23 MM/H (ref 0–30)
EST. AVERAGE GLUCOSE BLD GHB EST-MCNC: 103 MG/DL
FERRITIN SERPL-MCNC: 94 NG/ML (ref 10–200)
GFR SERPLBLD BASED ON 1.73 SQ M-ARVRAT: 33 ML/MIN/{1.73_M2}
GLOBULIN UR ELPH-MCNC: 3.1 G/DL (ref 2–3.5)
GLUCOSE SERPL-MCNC: 96 MG/DL (ref 65–99)
GLUCOSE UR QL: NEGATIVE MG/DL
HBA1C MFR BLD: 5.2 % (ref 3.5–5.7)
HCT VFR BLD AUTO: 39.7 % (ref 37–47)
HDLC SERPL-MCNC: 58 MG/DL (ref 40–60)
HGB BLD-MCNC: 11.9 G/DL (ref 12–16)
HGB UR QL STRIP: NEGATIVE
IRON SATN MFR SERPL: 22 % (ref 20–55)
IRON SERPL-MCNC: 83 UG/DL (ref 60–170)
KETONES UR QL STRIP: NEGATIVE MG/DL
LDLC SERPL CALC-MCNC: 91 MG/DL (ref 70–100)
LEUKOCYTE ESTERASE UR QL STRIP: ABNORMAL
LYMPHOCYTES # BLD AUTO: 0.56 10*3/UL (ref 1–5)
LYMPHOCYTES NFR BLD AUTO: 12.6 % (ref 20–45)
MAGNESIUM SERPL-MCNC: 2.33 MG/DL (ref 1.6–2.3)
MCH RBC QN AUTO: 29.2 PG (ref 27–31)
MCHC RBC AUTO-ENTMCNC: 30 G/DL (ref 33–37)
MCV RBC AUTO: 97.5 FL (ref 81–99)
MONOCYTES # BLD AUTO: 0.53 10*3/UL (ref 0.2–1.2)
MONOCYTES NFR BLD AUTO: 11.9 % (ref 3–10)
NEUTROPHILS # BLD AUTO: 3.24 10*3/UL (ref 2–8)
NEUTROPHILS NFR BLD AUTO: 72.9 % (ref 30–85)
NITRITE UR QL STRIP: NEGATIVE
NRBC CBCN: 0 % (ref 0–0.7)
OSMOLALITY SERPL CALC.SUM OF ELEC: 296 MOSM/KG (ref 273–304)
PH UR STRIP.AUTO: 7.5 [PH] (ref 5–8)
PHOSPHATE SERPL-MCNC: 3.3 MG/DL (ref 2.4–4.5)
PLATELET # BLD AUTO: 244 10*3/UL (ref 130–400)
PMV BLD AUTO: 10.3 FL (ref 9.4–12.3)
POTASSIUM SERPL-SCNC: 4.4 MMOL/L (ref 3.5–5.3)
PROT UR QL: 100 MG/DL
PTH-INTACT SERPL-MCNC: 87.4 PG/ML (ref 11.1–79.5)
RBC # BLD AUTO: 4.07 10*6/UL (ref 4.2–5.4)
RBC #/AREA URNS HPF: ABNORMAL /[HPF]
SODIUM SERPL-SCNC: 141 MMOL/L (ref 135–147)
SP GR UR: 1.01 (ref 1–1.03)
SQUAMOUS SPT QL MICRO: ABNORMAL /[HPF]
T4 FREE SERPL-MCNC: 1.2 NG/DL (ref 0.9–1.8)
TIBC SERPL-MCNC: 375 UG/DL (ref 245–450)
TRANSFERRIN SERPL-MCNC: 262 MG/DL (ref 250–380)
TRIGL SERPL-MCNC: 94 MG/DL (ref 40–150)
TSH SERPL-ACNC: 2.46 M[IU]/L (ref 0.27–4.2)
URATE SERPL-MCNC: 7.1 MG/DL (ref 2.5–7.5)
VLDLC SERPL-MCNC: 19 MG/DL (ref 5–37)
WBC # BLD AUTO: 4.44 10*3/UL (ref 4.8–10.8)
WBC #/AREA URNS HPF: ABNORMAL /[HPF]

## 2020-07-28 ENCOUNTER — OFFICE VISIT CONVERTED (OUTPATIENT)
Dept: CARDIOLOGY | Facility: CLINIC | Age: 85
End: 2020-07-28
Attending: INTERNAL MEDICINE

## 2020-07-29 ENCOUNTER — HOSPITAL ENCOUNTER (OUTPATIENT)
Dept: OTHER | Facility: HOSPITAL | Age: 85
Discharge: HOME | End: 2020-07-29
Attending: FAMILY MEDICINE

## 2020-07-29 LAB
25(OH)D3 SERPL-MCNC: 70.2 NG/ML (ref 30–100)
ALBUMIN SERPL-MCNC: 4.2 G/DL (ref 3.5–5)
ALP SERPL-CCNC: 67 U/L (ref 43–160)
ALT SERPL-CCNC: 15 U/L (ref 10–40)
ANION GAP SERPL CALC-SCNC: 20 MMOL/L (ref 8–19)
APPEARANCE UR: CLEAR
AST SERPL-CCNC: 21 U/L (ref 15–50)
BASOPHILS # BLD AUTO: 0.04 10*3/UL (ref 0–0.2)
BASOPHILS NFR BLD AUTO: 0.8 % (ref 0–3)
BILIRUB SERPL-MCNC: 0.16 MG/DL (ref 0.2–1.3)
BILIRUB UR QL: NEGATIVE
BUN SERPL-MCNC: 34 MG/DL (ref 5–25)
BUN/CREAT SERPL: 19 {RATIO} (ref 6–20)
CALCIUM SERPL-MCNC: 10.4 MG/DL (ref 8.7–10.4)
CHLORIDE SERPL-SCNC: 100 MMOL/L (ref 99–111)
CHOLEST SERPL-MCNC: 159 MG/DL (ref 107–200)
CHOLEST/HDLC SERPL: 3.2 {RATIO} (ref 3–6)
COLOR UR: YELLOW
CONV ABS IMM GRAN: 0.01 10*3/UL (ref 0–0.2)
CONV BACTERIA: NEGATIVE
CONV BILI, CONJUGATED: <0.2 MG/DL (ref 0–0.6)
CONV CO2: 22 MMOL/L (ref 22–32)
CONV COLLECTION SOURCE (UA): ABNORMAL
CONV IMMATURE GRAN: 0.2 % (ref 0–1.8)
CONV TOTAL PROTEIN: 7.8 G/DL (ref 6.3–8.2)
CONV UNCONJUGATED BILIRUBIN: 0 MG/DL (ref 0–1.1)
CONV UROBILINOGEN IN URINE BY AUTOMATED TEST STRIP: 0.2 {EHRLICHU}/DL (ref 0.1–1)
CREAT UR-MCNC: 1.76 MG/DL (ref 0.5–0.9)
CRP SERPL-MCNC: 7.6 MG/L (ref 0–5)
DEPRECATED RDW RBC AUTO: 49.1 FL (ref 36.4–46.3)
EOSINOPHIL # BLD AUTO: 0.1 10*3/UL (ref 0–0.7)
EOSINOPHIL # BLD AUTO: 1.9 % (ref 0–7)
ERYTHROCYTE [DISTWIDTH] IN BLOOD BY AUTOMATED COUNT: 13.7 % (ref 11.7–14.4)
ERYTHROCYTE [SEDIMENTATION RATE] IN BLOOD: 23 MM/H (ref 0–30)
FERRITIN SERPL-MCNC: 80 NG/ML (ref 10–200)
FOLATE SERPL-MCNC: >20 NG/ML (ref 4.8–20)
GFR SERPLBLD BASED ON 1.73 SQ M-ARVRAT: 26 ML/MIN/{1.73_M2}
GLUCOSE SERPL-MCNC: 98 MG/DL (ref 65–99)
GLUCOSE UR QL: NEGATIVE MG/DL
HCT VFR BLD AUTO: 39.2 % (ref 37–47)
HDLC SERPL-MCNC: 49 MG/DL (ref 40–60)
HGB BLD-MCNC: 12 G/DL (ref 12–16)
HGB UR QL STRIP: NEGATIVE
IRON SATN MFR SERPL: 16 % (ref 20–55)
IRON SERPL-MCNC: 68 UG/DL (ref 60–170)
KETONES UR QL STRIP: NEGATIVE MG/DL
LDLC SERPL CALC-MCNC: 76 MG/DL (ref 70–100)
LEUKOCYTE ESTERASE UR QL STRIP: ABNORMAL
LYMPHOCYTES # BLD AUTO: 0.75 10*3/UL (ref 1–5)
LYMPHOCYTES NFR BLD AUTO: 14.3 % (ref 20–45)
MAGNESIUM SERPL-MCNC: 2.35 MG/DL (ref 1.6–2.3)
MCH RBC QN AUTO: 29.9 PG (ref 27–31)
MCHC RBC AUTO-ENTMCNC: 30.6 G/DL (ref 33–37)
MCV RBC AUTO: 97.8 FL (ref 81–99)
MONOCYTES # BLD AUTO: 0.79 10*3/UL (ref 0.2–1.2)
MONOCYTES NFR BLD AUTO: 15 % (ref 3–10)
NEUTROPHILS # BLD AUTO: 3.57 10*3/UL (ref 2–8)
NEUTROPHILS NFR BLD AUTO: 67.8 % (ref 30–85)
NITRITE UR QL STRIP: NEGATIVE
NRBC CBCN: 0 % (ref 0–0.7)
OSMOLALITY SERPL CALC.SUM OF ELEC: 292 MOSM/KG (ref 273–304)
PH UR STRIP.AUTO: 6 [PH] (ref 5–8)
PLATELET # BLD AUTO: 269 10*3/UL (ref 130–400)
PMV BLD AUTO: 10.7 FL (ref 9.4–12.3)
POTASSIUM SERPL-SCNC: 4.7 MMOL/L (ref 3.5–5.3)
PROT UR QL: 100 MG/DL
RBC # BLD AUTO: 4.01 10*6/UL (ref 4.2–5.4)
RBC #/AREA URNS HPF: ABNORMAL /[HPF]
SODIUM SERPL-SCNC: 137 MMOL/L (ref 135–147)
SP GR UR: 1.01 (ref 1–1.03)
T4 FREE SERPL-MCNC: 1.2 NG/DL (ref 0.9–1.8)
TIBC SERPL-MCNC: 420 UG/DL (ref 245–450)
TRANSFERRIN SERPL-MCNC: 294 MG/DL (ref 250–380)
TRIGL SERPL-MCNC: 169 MG/DL (ref 40–150)
TSH SERPL-ACNC: 1.06 M[IU]/L (ref 0.27–4.2)
URATE SERPL-MCNC: 7.5 MG/DL (ref 2.5–7.5)
VIT B12 SERPL-MCNC: 1003 PG/ML (ref 211–911)
VLDLC SERPL-MCNC: 34 MG/DL (ref 5–37)
WBC # BLD AUTO: 5.26 10*3/UL (ref 4.8–10.8)
WBC #/AREA URNS HPF: ABNORMAL /[HPF]

## 2020-08-26 ENCOUNTER — OFFICE VISIT CONVERTED (OUTPATIENT)
Dept: ORTHOPEDIC SURGERY | Facility: CLINIC | Age: 85
End: 2020-08-26
Attending: PHYSICIAN ASSISTANT

## 2020-09-16 ENCOUNTER — OFFICE VISIT CONVERTED (OUTPATIENT)
Dept: PULMONOLOGY | Facility: CLINIC | Age: 85
End: 2020-09-16
Attending: NURSE PRACTITIONER

## 2020-10-28 ENCOUNTER — OFFICE VISIT CONVERTED (OUTPATIENT)
Dept: ORTHOPEDIC SURGERY | Facility: CLINIC | Age: 85
End: 2020-10-28
Attending: INTERNAL MEDICINE

## 2020-10-30 ENCOUNTER — HOSPITAL ENCOUNTER (OUTPATIENT)
Dept: OTHER | Facility: HOSPITAL | Age: 85
Discharge: HOME | End: 2020-10-30
Attending: INTERNAL MEDICINE

## 2020-10-30 LAB
25(OH)D3 SERPL-MCNC: 54.6 NG/ML (ref 30–100)
ALBUMIN SERPL-MCNC: 4.7 G/DL (ref 3.5–5)
ALP SERPL-CCNC: 66 U/L (ref 43–160)
ALT SERPL-CCNC: 12 U/L (ref 10–40)
ANION GAP SERPL CALC-SCNC: 18 MMOL/L (ref 8–19)
APPEARANCE UR: CLEAR
AST SERPL-CCNC: 16 U/L (ref 15–50)
BASOPHILS # BLD AUTO: 0.03 10*3/UL (ref 0–0.2)
BASOPHILS NFR BLD AUTO: 0.5 % (ref 0–3)
BILIRUB SERPL-MCNC: 0.22 MG/DL (ref 0.2–1.3)
BILIRUB UR QL: NEGATIVE
BUN SERPL-MCNC: 34 MG/DL (ref 5–25)
BUN/CREAT SERPL: 19 {RATIO} (ref 6–20)
CALCIUM SERPL-MCNC: 10.5 MG/DL (ref 8.7–10.4)
CHLORIDE SERPL-SCNC: 103 MMOL/L (ref 99–111)
CHOLEST SERPL-MCNC: 183 MG/DL (ref 107–200)
CHOLEST/HDLC SERPL: 3.2 {RATIO} (ref 3–6)
COLOR UR: YELLOW
CONV ABS IMM GRAN: 0.01 10*3/UL (ref 0–0.2)
CONV BACTERIA: NEGATIVE
CONV BILI, CONJUGATED: <0.2 MG/DL (ref 0–0.6)
CONV CO2: 25 MMOL/L (ref 22–32)
CONV COLLECTION SOURCE (UA): ABNORMAL
CONV HYALINE CASTS IN URINE MICRO: ABNORMAL /[LPF]
CONV IMMATURE GRAN: 0.2 % (ref 0–1.8)
CONV TOTAL PROTEIN: 8 G/DL (ref 6.3–8.2)
CONV UNCONJUGATED BILIRUBIN: 0 MG/DL (ref 0–1.1)
CONV UROBILINOGEN IN URINE BY AUTOMATED TEST STRIP: 0.2 {EHRLICHU}/DL (ref 0.1–1)
CREAT UR-MCNC: 1.77 MG/DL (ref 0.5–0.9)
CRP SERPL-MCNC: 3.5 MG/L (ref 0–5)
DEPRECATED RDW RBC AUTO: 51.4 FL (ref 36.4–46.3)
EOSINOPHIL # BLD AUTO: 0.11 10*3/UL (ref 0–0.7)
EOSINOPHIL # BLD AUTO: 1.7 % (ref 0–7)
ERYTHROCYTE [DISTWIDTH] IN BLOOD BY AUTOMATED COUNT: 14.5 % (ref 11.7–14.4)
ERYTHROCYTE [SEDIMENTATION RATE] IN BLOOD: 21 MM/H (ref 0–30)
FERRITIN SERPL-MCNC: 84 NG/ML (ref 10–200)
FOLATE SERPL-MCNC: >20 NG/ML (ref 4.8–20)
GFR SERPLBLD BASED ON 1.73 SQ M-ARVRAT: 26 ML/MIN/{1.73_M2}
GLUCOSE SERPL-MCNC: 99 MG/DL (ref 65–99)
GLUCOSE UR QL: NEGATIVE MG/DL
HCT VFR BLD AUTO: 38.7 % (ref 37–47)
HDLC SERPL-MCNC: 57 MG/DL (ref 40–60)
HGB BLD-MCNC: 11.9 G/DL (ref 12–16)
HGB UR QL STRIP: NEGATIVE
IRON SATN MFR SERPL: 13 % (ref 20–55)
IRON SERPL-MCNC: 60 UG/DL (ref 60–170)
KETONES UR QL STRIP: NEGATIVE MG/DL
LDLC SERPL CALC-MCNC: 98 MG/DL (ref 70–100)
LEUKOCYTE ESTERASE UR QL STRIP: ABNORMAL
LYMPHOCYTES # BLD AUTO: 0.94 10*3/UL (ref 1–5)
LYMPHOCYTES NFR BLD AUTO: 14.8 % (ref 20–45)
MAGNESIUM SERPL-MCNC: 2.53 MG/DL (ref 1.6–2.3)
MCH RBC QN AUTO: 29.8 PG (ref 27–31)
MCHC RBC AUTO-ENTMCNC: 30.7 G/DL (ref 33–37)
MCV RBC AUTO: 97 FL (ref 81–99)
MONOCYTES # BLD AUTO: 0.84 10*3/UL (ref 0.2–1.2)
MONOCYTES NFR BLD AUTO: 13.2 % (ref 3–10)
NEUTROPHILS # BLD AUTO: 4.44 10*3/UL (ref 2–8)
NEUTROPHILS NFR BLD AUTO: 69.6 % (ref 30–85)
NITRITE UR QL STRIP: NEGATIVE
NRBC CBCN: 0 % (ref 0–0.7)
OSMOLALITY SERPL CALC.SUM OF ELEC: 300 MOSM/KG (ref 273–304)
PH UR STRIP.AUTO: 6 [PH] (ref 5–8)
PHOSPHATE SERPL-MCNC: 3.4 MG/DL (ref 2.4–4.5)
PLATELET # BLD AUTO: 295 10*3/UL (ref 130–400)
PMV BLD AUTO: 10 FL (ref 9.4–12.3)
POTASSIUM SERPL-SCNC: 5.1 MMOL/L (ref 3.5–5.3)
PROT UR QL: 300 MG/DL
PTH-INTACT SERPL-MCNC: 87 PG/ML (ref 11.1–79.5)
RBC # BLD AUTO: 3.99 10*6/UL (ref 4.2–5.4)
RBC #/AREA URNS HPF: ABNORMAL /[HPF]
SODIUM SERPL-SCNC: 141 MMOL/L (ref 135–147)
SP GR UR: 1.02 (ref 1–1.03)
T4 FREE SERPL-MCNC: 1.4 NG/DL (ref 0.9–1.8)
TIBC SERPL-MCNC: 446 UG/DL (ref 245–450)
TRANSFERRIN SERPL-MCNC: 312 MG/DL (ref 250–380)
TRIGL SERPL-MCNC: 142 MG/DL (ref 40–150)
TSH SERPL-ACNC: 0.4 M[IU]/L (ref 0.27–4.2)
URATE SERPL-MCNC: 7.2 MG/DL (ref 2.5–7.5)
VIT B12 SERPL-MCNC: 1032 PG/ML (ref 211–911)
VLDLC SERPL-MCNC: 28 MG/DL (ref 5–37)
WBC # BLD AUTO: 6.37 10*3/UL (ref 4.8–10.8)
WBC #/AREA URNS HPF: ABNORMAL /[HPF]

## 2020-11-30 ENCOUNTER — OFFICE VISIT CONVERTED (OUTPATIENT)
Dept: ORTHOPEDIC SURGERY | Facility: CLINIC | Age: 85
End: 2020-11-30
Attending: PHYSICIAN ASSISTANT

## 2020-11-30 ENCOUNTER — CONVERSION ENCOUNTER (OUTPATIENT)
Dept: ORTHOPEDIC SURGERY | Facility: CLINIC | Age: 85
End: 2020-11-30

## 2020-12-04 ENCOUNTER — HOSPITAL ENCOUNTER (OUTPATIENT)
Dept: GENERAL RADIOLOGY | Facility: HOSPITAL | Age: 85
Discharge: HOME | End: 2020-12-04
Attending: PHYSICIAN ASSISTANT

## 2020-12-09 ENCOUNTER — OFFICE VISIT CONVERTED (OUTPATIENT)
Dept: ORTHOPEDIC SURGERY | Facility: CLINIC | Age: 85
End: 2020-12-09
Attending: PHYSICIAN ASSISTANT

## 2021-01-07 ENCOUNTER — HOSPITAL ENCOUNTER (OUTPATIENT)
Dept: CARDIOLOGY | Facility: HOSPITAL | Age: 86
Discharge: HOME | End: 2021-01-07
Attending: SURGERY

## 2021-01-27 ENCOUNTER — HOSPITAL ENCOUNTER (OUTPATIENT)
Dept: OTHER | Facility: HOSPITAL | Age: 86
Discharge: HOME | End: 2021-01-27
Attending: FAMILY MEDICINE

## 2021-01-27 LAB
25(OH)D3 SERPL-MCNC: 55.5 NG/ML (ref 30–100)
ALBUMIN SERPL-MCNC: 4.5 G/DL (ref 3.5–5)
ALP SERPL-CCNC: 70 U/L (ref 43–160)
ALT SERPL-CCNC: 16 U/L (ref 10–40)
ANION GAP SERPL CALC-SCNC: 17 MMOL/L (ref 8–19)
APPEARANCE UR: CLEAR
AST SERPL-CCNC: 17 U/L (ref 15–50)
BASOPHILS # BLD AUTO: 0.04 10*3/UL (ref 0–0.2)
BASOPHILS NFR BLD AUTO: 0.8 % (ref 0–3)
BILIRUB SERPL-MCNC: 0.16 MG/DL (ref 0.2–1.3)
BILIRUB UR QL: NEGATIVE
BUN SERPL-MCNC: 32 MG/DL (ref 5–25)
BUN/CREAT SERPL: 19 {RATIO} (ref 6–20)
CALCIUM SERPL-MCNC: 9.8 MG/DL (ref 8.7–10.4)
CHLORIDE SERPL-SCNC: 103 MMOL/L (ref 99–111)
CHOLEST SERPL-MCNC: 261 MG/DL (ref 107–200)
CHOLEST/HDLC SERPL: 4.4 {RATIO} (ref 3–6)
COLOR UR: YELLOW
CONV ABS IMM GRAN: 0.01 10*3/UL (ref 0–0.2)
CONV BACTERIA: NEGATIVE
CONV BILI, CONJUGATED: <0.2 MG/DL (ref 0–0.6)
CONV CO2: 25 MMOL/L (ref 22–32)
CONV COLLECTION SOURCE (UA): ABNORMAL
CONV IMMATURE GRAN: 0.2 % (ref 0–1.8)
CONV TOTAL PROTEIN: 7.5 G/DL (ref 6.3–8.2)
CONV UNCONJUGATED BILIRUBIN: 0 MG/DL (ref 0–1.1)
CONV UROBILINOGEN IN URINE BY AUTOMATED TEST STRIP: 0.2 {EHRLICHU}/DL (ref 0.1–1)
CREAT UR-MCNC: 1.67 MG/DL (ref 0.5–0.9)
CRP SERPL-MCNC: 5 MG/L (ref 0–5)
DEPRECATED RDW RBC AUTO: 51.9 FL (ref 36.4–46.3)
EOSINOPHIL # BLD AUTO: 0.06 10*3/UL (ref 0–0.7)
EOSINOPHIL # BLD AUTO: 1.1 % (ref 0–7)
ERYTHROCYTE [DISTWIDTH] IN BLOOD BY AUTOMATED COUNT: 14.4 % (ref 11.7–14.4)
ERYTHROCYTE [SEDIMENTATION RATE] IN BLOOD: 15 MM/H (ref 0–30)
FERRITIN SERPL-MCNC: 64 NG/ML (ref 10–200)
FOLATE SERPL-MCNC: >20 NG/ML (ref 4.8–20)
GFR SERPLBLD BASED ON 1.73 SQ M-ARVRAT: 28 ML/MIN/{1.73_M2}
GLUCOSE SERPL-MCNC: 89 MG/DL (ref 65–99)
GLUCOSE UR QL: NEGATIVE MG/DL
HCT VFR BLD AUTO: 40.1 % (ref 37–47)
HDLC SERPL-MCNC: 60 MG/DL (ref 40–60)
HGB BLD-MCNC: 12.5 G/DL (ref 12–16)
HGB UR QL STRIP: NEGATIVE
IRON SATN MFR SERPL: 22 % (ref 20–55)
IRON SERPL-MCNC: 93 UG/DL (ref 60–170)
KETONES UR QL STRIP: NEGATIVE MG/DL
LDLC SERPL CALC-MCNC: 173 MG/DL (ref 70–100)
LEUKOCYTE ESTERASE UR QL STRIP: ABNORMAL
LYMPHOCYTES # BLD AUTO: 0.9 10*3/UL (ref 1–5)
LYMPHOCYTES NFR BLD AUTO: 16.9 % (ref 20–45)
MAGNESIUM SERPL-MCNC: 2.35 MG/DL (ref 1.6–2.3)
MCH RBC QN AUTO: 30.6 PG (ref 27–31)
MCHC RBC AUTO-ENTMCNC: 31.2 G/DL (ref 33–37)
MCV RBC AUTO: 98 FL (ref 81–99)
MONOCYTES # BLD AUTO: 0.81 10*3/UL (ref 0.2–1.2)
MONOCYTES NFR BLD AUTO: 15.3 % (ref 3–10)
NEUTROPHILS # BLD AUTO: 3.49 10*3/UL (ref 2–8)
NEUTROPHILS NFR BLD AUTO: 65.7 % (ref 30–85)
NITRITE UR QL STRIP: NEGATIVE
NRBC CBCN: 0 % (ref 0–0.7)
OSMOLALITY SERPL CALC.SUM OF ELEC: 296 MOSM/KG (ref 273–304)
PH UR STRIP.AUTO: 5.5 [PH] (ref 5–8)
PLATELET # BLD AUTO: 268 10*3/UL (ref 130–400)
PMV BLD AUTO: 10.4 FL (ref 9.4–12.3)
POTASSIUM SERPL-SCNC: 4.6 MMOL/L (ref 3.5–5.3)
PROT UR QL: 300 MG/DL
RBC # BLD AUTO: 4.09 10*6/UL (ref 4.2–5.4)
RBC #/AREA URNS HPF: ABNORMAL /[HPF]
SODIUM SERPL-SCNC: 140 MMOL/L (ref 135–147)
SP GR UR: 1.02 (ref 1–1.03)
T4 FREE SERPL-MCNC: 1.4 NG/DL (ref 0.9–1.8)
TIBC SERPL-MCNC: 430 UG/DL (ref 245–450)
TRANSFERRIN SERPL-MCNC: 301 MG/DL (ref 250–380)
TRIGL SERPL-MCNC: 140 MG/DL (ref 40–150)
TSH SERPL-ACNC: 0.23 M[IU]/L (ref 0.27–4.2)
URATE SERPL-MCNC: 9.1 MG/DL (ref 2.5–7.5)
VIT B12 SERPL-MCNC: 1024 PG/ML (ref 211–911)
VLDLC SERPL-MCNC: 28 MG/DL (ref 5–37)
WBC # BLD AUTO: 5.31 10*3/UL (ref 4.8–10.8)
WBC #/AREA URNS HPF: ABNORMAL /[HPF]

## 2021-01-29 ENCOUNTER — OFFICE VISIT CONVERTED (OUTPATIENT)
Dept: OTOLARYNGOLOGY | Facility: CLINIC | Age: 86
End: 2021-01-29
Attending: OTOLARYNGOLOGY

## 2021-01-29 ENCOUNTER — CONVERSION ENCOUNTER (OUTPATIENT)
Dept: OTOLARYNGOLOGY | Facility: CLINIC | Age: 86
End: 2021-01-29

## 2021-03-29 ENCOUNTER — HOSPITAL ENCOUNTER (OUTPATIENT)
Dept: MAMMOGRAPHY | Facility: HOSPITAL | Age: 86
Discharge: HOME | End: 2021-03-29
Attending: INTERNAL MEDICINE

## 2021-04-16 ENCOUNTER — HOSPITAL ENCOUNTER (OUTPATIENT)
Dept: OTHER | Facility: HOSPITAL | Age: 86
Discharge: HOME | End: 2021-04-16
Attending: NURSE PRACTITIONER

## 2021-04-16 LAB
25(OH)D3 SERPL-MCNC: 47.3 NG/ML (ref 30–100)
ALBUMIN SERPL-MCNC: 4.3 G/DL (ref 3.5–5)
ALBUMIN/GLOB SERPL: 1.7 {RATIO} (ref 1.4–2.6)
ALP SERPL-CCNC: 63 U/L (ref 43–160)
ALT SERPL-CCNC: 10 U/L (ref 10–40)
ANION GAP SERPL CALC-SCNC: 15 MMOL/L (ref 8–19)
APPEARANCE UR: CLEAR
AST SERPL-CCNC: 15 U/L (ref 15–50)
BASOPHILS # BLD AUTO: 0.03 10*3/UL (ref 0–0.2)
BASOPHILS NFR BLD AUTO: 0.6 % (ref 0–3)
BILIRUB SERPL-MCNC: <0.15 MG/DL (ref 0.2–1.3)
BILIRUB SERPL-MCNC: <0.15 MG/DL (ref 0.2–1.3)
BILIRUB UR QL: NEGATIVE
BUN SERPL-MCNC: 21 MG/DL (ref 5–25)
BUN/CREAT SERPL: 14 {RATIO} (ref 6–20)
CALCIUM SERPL-MCNC: 9.3 MG/DL (ref 8.7–10.4)
CHLORIDE SERPL-SCNC: 105 MMOL/L (ref 99–111)
CHOLEST SERPL-MCNC: 116 MG/DL (ref 107–200)
CHOLEST/HDLC SERPL: 2.1 {RATIO} (ref 3–6)
COLOR UR: YELLOW
CONV ABS IMM GRAN: 0.01 10*3/UL (ref 0–0.2)
CONV BACTERIA: NEGATIVE
CONV BILI, CONJUGATED: <0.2 MG/DL (ref 0–0.6)
CONV CO2: 23 MMOL/L (ref 22–32)
CONV COLLECTION SOURCE (UA): ABNORMAL
CONV HYALINE CASTS IN URINE MICRO: ABNORMAL /[LPF]
CONV IMMATURE GRAN: 0.2 % (ref 0–1.8)
CONV TOTAL PROTEIN: 6.8 G/DL (ref 6.3–8.2)
CONV UNCONJUGATED BILIRUBIN: 0 MG/DL (ref 0–1.1)
CONV UROBILINOGEN IN URINE BY AUTOMATED TEST STRIP: 0.2 {EHRLICHU}/DL (ref 0.1–1)
CREAT UR-MCNC: 1.53 MG/DL (ref 0.5–0.9)
CRP SERPL-MCNC: 3.5 MG/L (ref 0–5)
DEPRECATED RDW RBC AUTO: 53.2 FL (ref 36.4–46.3)
EOSINOPHIL # BLD AUTO: 0.06 10*3/UL (ref 0–0.7)
EOSINOPHIL # BLD AUTO: 1.2 % (ref 0–7)
ERYTHROCYTE [DISTWIDTH] IN BLOOD BY AUTOMATED COUNT: 14.1 % (ref 11.7–14.4)
ERYTHROCYTE [SEDIMENTATION RATE] IN BLOOD: 28 MM/H (ref 0–30)
FERRITIN SERPL-MCNC: 44 NG/ML (ref 10–200)
FOLATE SERPL-MCNC: >20 NG/ML (ref 4.8–20)
GFR SERPLBLD BASED ON 1.73 SQ M-ARVRAT: 31 ML/MIN/{1.73_M2}
GLOBULIN UR ELPH-MCNC: 2.5 G/DL (ref 2–3.5)
GLUCOSE SERPL-MCNC: 94 MG/DL (ref 65–99)
GLUCOSE UR QL: NEGATIVE MG/DL
HCT VFR BLD AUTO: 30.5 % (ref 37–47)
HDLC SERPL-MCNC: 55 MG/DL (ref 40–60)
HGB BLD-MCNC: 9.2 G/DL (ref 12–16)
HGB UR QL STRIP: NEGATIVE
IRON SATN MFR SERPL: 15 % (ref 20–55)
IRON SERPL-MCNC: 64 UG/DL (ref 60–170)
KETONES UR QL STRIP: NEGATIVE MG/DL
LDLC SERPL CALC-MCNC: 41 MG/DL (ref 70–100)
LEUKOCYTE ESTERASE UR QL STRIP: ABNORMAL
LYMPHOCYTES # BLD AUTO: 0.63 10*3/UL (ref 1–5)
LYMPHOCYTES NFR BLD AUTO: 13.1 % (ref 20–45)
MAGNESIUM SERPL-MCNC: 2.4 MG/DL (ref 1.6–2.3)
MCH RBC QN AUTO: 30.9 PG (ref 27–31)
MCHC RBC AUTO-ENTMCNC: 30.2 G/DL (ref 33–37)
MCV RBC AUTO: 102.3 FL (ref 81–99)
MONOCYTES # BLD AUTO: 0.66 10*3/UL (ref 0.2–1.2)
MONOCYTES NFR BLD AUTO: 13.7 % (ref 3–10)
NEUTROPHILS # BLD AUTO: 3.42 10*3/UL (ref 2–8)
NEUTROPHILS NFR BLD AUTO: 71.2 % (ref 30–85)
NITRITE UR QL STRIP: NEGATIVE
NRBC CBCN: 0 % (ref 0–0.7)
OSMOLALITY SERPL CALC.SUM OF ELEC: 289 MOSM/KG (ref 273–304)
PH UR STRIP.AUTO: 6.5 [PH] (ref 5–8)
PLATELET # BLD AUTO: 260 10*3/UL (ref 130–400)
PMV BLD AUTO: 10.2 FL (ref 9.4–12.3)
POTASSIUM SERPL-SCNC: 4.9 MMOL/L (ref 3.5–5.3)
PROT UR QL: 100 MG/DL
RBC # BLD AUTO: 2.98 10*6/UL (ref 4.2–5.4)
RBC #/AREA URNS HPF: ABNORMAL /[HPF]
SODIUM SERPL-SCNC: 138 MMOL/L (ref 135–147)
SP GR UR: 1.02 (ref 1–1.03)
SQUAMOUS SPT QL MICRO: ABNORMAL /[HPF]
T4 FREE SERPL-MCNC: 1.1 NG/DL (ref 0.9–1.8)
TIBC SERPL-MCNC: 426 UG/DL (ref 245–450)
TRANSFERRIN SERPL-MCNC: 298 MG/DL (ref 250–380)
TRIGL SERPL-MCNC: 98 MG/DL (ref 40–150)
TSH SERPL-ACNC: 1.23 M[IU]/L (ref 0.27–4.2)
URATE SERPL-MCNC: 8.1 MG/DL (ref 2.5–7.5)
VIT B12 SERPL-MCNC: 897 PG/ML (ref 211–911)
VLDLC SERPL-MCNC: 20 MG/DL (ref 5–37)
WBC # BLD AUTO: 4.81 10*3/UL (ref 4.8–10.8)
WBC #/AREA URNS HPF: ABNORMAL /[HPF]

## 2021-04-19 ENCOUNTER — OFFICE VISIT CONVERTED (OUTPATIENT)
Dept: CARDIOLOGY | Facility: CLINIC | Age: 86
End: 2021-04-19
Attending: INTERNAL MEDICINE

## 2021-04-20 ENCOUNTER — OFFICE VISIT CONVERTED (OUTPATIENT)
Dept: PULMONOLOGY | Facility: CLINIC | Age: 86
End: 2021-04-20
Attending: INTERNAL MEDICINE

## 2021-04-26 ENCOUNTER — HOSPITAL ENCOUNTER (OUTPATIENT)
Dept: MAMMOGRAPHY | Facility: HOSPITAL | Age: 86
Discharge: HOME | End: 2021-04-26
Attending: INTERNAL MEDICINE

## 2021-04-28 ENCOUNTER — OFFICE VISIT CONVERTED (OUTPATIENT)
Dept: ORTHOPEDIC SURGERY | Facility: CLINIC | Age: 86
End: 2021-04-28
Attending: ORTHOPAEDIC SURGERY

## 2021-05-12 NOTE — PROGRESS NOTES
Progress Note      Patient Name: Yelena Sanchez   Patient ID: [id number]   Sex: Female   YOB: 1935    Primary Care Provider: Kaelyn Eugene MD   Referring Provider: Kaelyn Eugene MD    Visit Date: April 22, 2020    Provider: Luci Martin PA-C   Location: [facility]   Location Address: [address] [place] [address]  [id number]   Location Phone: [phone]          Chief Complaint  · Left knee pain      History Of Present Illness  Yelena Sanchez is a 84 year old /White female who presents today to [facility].      Patient is following up for left knee pain, left knee osteoarthritis. Patient is here to receive Synvisc injection.       Past Medical History  Arthritis; Asthma; Breast cancer; Chronic Obstructive Pulmonary Disease; Congestive heart failure; Diverticulosis; GERD; History of tobacco abuse; Hyperlipemia; Hypertension; Hyperthyroidism; Limb Swelling; Neck mass; Seasonal allergies; Shortness Of Air         Past Surgical History  Colonoscopy; endoscopy; Eye Implant; Joint Surgery; Lung Surgery; Mastectomy, Left; Metal implants; Pacemaker.         Medication List  amlodipine 5 mg oral tablet; Aspirin Low Dose 81 mg oral tablet,delayed release (DR/EC); Bystolic 10 mg oral tablet; Centrum Silver Women 8 mg iron-400 mcg-300 mcg oral tablet; chlorpheniramine maleate 4 mg oral tablet; clopidogrel 75 mg oral tablet; Dymista 137-50 mcg/spray nasal spray,non-aerosol; furosemide 20 mg oral tablet; furosemide 40 mg oral tablet; lansoprazole 30 mg oral capsule,delayed release(DR/EC); levothyroxine 100 mcg oral tablet; montelukast 10 mg oral tablet; rosuvastatin 20 mg oral tablet; spironolactone 25 mg oral tablet; Stiolto Respimat 2.5-2.5 mcg/actuation inhalation mist; Ultram 50 mg oral tablet; Vitamin D3 2,000 unit oral tablet         Allergy List  NO KNOWN DRUG ALLERGIES         Family Medical History  Lung cancer; Family history of colon cancer  "        Social History  Alcohol (Never); Caffeine (Current every day); lives with spouse; .; Recreational Drug Use (Never); Retired.; Second hand smoke exposure (Never); Tobacco (Former)         Review of Systems  · Constitutional  o Denies  o : fever, chills, weight loss  · Cardiovascular  o Denies  o : chest pain, shortness of breath  · Gastrointestinal  o Denies  o : liver disease, heartburn, nausea, blood in stools  · Genitourinary  o Denies  o : painful urination, blood in urine  · Integument  o Denies  o : rash, itching  · Neurologic  o Denies  o : headache, weakness, loss of consciousness  · Musculoskeletal  o Denies  o : painful, swollen joints  · Psychiatric  o Denies  o : drug/alcohol addiction, anxiety, depression      Vitals  Date Time BP Position Site L\R Cuff Size HR RR TEMP (F) WT  HT  BMI kg/m2 BSA m2 O2 Sat        04/22/2020 10:29 AM         160lbs 0oz 4'  11\" 32.32 1.74           Physical Examination  · Constitutional  o Appearance  o : well developed, well-nourished, no obvious deformities present  · Head and Face  o Head  o :   § Inspection  § : normocephalic  o Face  o :   § Inspection  § : no facial lesions  · Eyes  o Conjunctivae  o : conjunctivae normal  o Sclerae  o : sclerae white  · Ears, Nose, Mouth and Throat  o Ears  o :   § External Ears  § : appearance within normal limits  § Hearing  § : intact  o Nose  o :   § External Nose  § : appearance normal  · Neck  o Inspection/Palpation  o : normal appearance  o Range of Motion  o : full range of motion  · Respiratory  o Respiratory Effort  o : breathing unlabored  o Inspection of Chest  o : normal appearance  o Auscultation of Lungs  o : no audible wheezing or rales  · Cardiovascular  o Heart  o : regular rate  · Gastrointestinal  o Abdominal Examination  o : soft and non-tender  · Skin and Subcutaneous Tissue  o General Inspection  o : intact, no rashes  · Psychiatric  o General  o : Alert and oriented x3  o Judgement and " Insight  o : judgment and insight intact  o Mood and Affect  o : mood normal, affect appropriate  · Injection Note/Aspiration Note  o Site  o : left knee   o Procedure  o : Procedure: After educating the patient, patient gave consent for procedure. After using Chloraprep, the joint space was injected. The patient tolerated the procedure well.   o Medication  o : Synvisc One 48 mg   · Left Knee-Street  o Inspection  o : no limping gait, weight bearing, no swelling, no ecchymosis, no atrophy, neutral alignment  o Palpation  o : tenderness at medial joint line, tenderness at lateral joint line, no patellar tendon tenderness, no pain of MCL, no pain at LCL  o ROM  o : full extension, full flexion  o Strength  o : full extension, full flexion  o Special Tests  o : negative varus stress, negaitve valgus stress  o Neurovascular  o : Full sensation, Dorsal Pedal Pulse 2+, posteriror tibialis pulse 2+          Assessment  · Primary osteoarthritis of left knee     715.16/M17.12  · Left knee pain, unspecified chronicity     719.46/M25.562      Plan  · Orders  o Hyaluronan or derivative, Synvisc or Synvisc-One, for intra-jordan () - 715.16/M17.12 - 04/22/2020   Lot [id number] exp 08 2022 [employer] Administered by [address] PA C  o Knee Intra-articular Injection without US Guidance [facility] ([id number]) - 715.16/M17.12 - 04/22/2020  · Medications  o Medications have been Reconciled  o Transition of Care or Provider Policy  · Instructions  o Reviewed the patient's Past Medical, Social, and Family history as well as the ROS at today's visit, no changes.  o Call or return if worsening symptoms.  o Follow Up PRN.  o Electronically Identified Patient Education Materials Provided Electronically            Electronically Signed by: ROSY Jacinto-C -Author on April 22, 2020 11:16:25 AM  Electronically Co-signed by: Woo Howell MD -Reviewer on April 22, 2020 11:54:58 AM

## 2021-05-13 NOTE — PROGRESS NOTES
Progress Note      Patient Name: Yelena Sanchez   Patient ID: [id number]   Sex: Female   YOB: 1935    Primary Care Provider: Kaelyn Eugene MD   Referring Provider: Kaelyn Eugene MD    Visit Date: July 28, 2020    Provider: Simon Talbot MD   Location: [facility]   Location Address: [address] [place] [address], [facility]   [place], [place]  [id number]   Location Phone: [phone]          Chief Complaint     Followup visit for cardiomyopathy and coronary artery disease, pacemaker interrogation.       History Of Present Illness  REFERRING CARE PROVIDER: Kaelyn Eugene MD   Yelena Sanchez is an 84 year old /White female with dilated cardiomyopathy, nonobstructive coronary artery disease, hypertension, and hyperlipidemia who is here for a followup visit. Today, patient denies having any new symptoms, specifically, denies having chest pain, shortness of breath, or palpitations. No dizziness. No recent weight gain. Patient underwent ICD interrogation in the office.   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 Cardiac catheterization in 2014 showed 70% mid RCA lesion with 30% left circumflex lesion. No angioplasty was performed.   PSYCHOSOCIAL HISTORY: Previously smoked, but quit. Denies alcohol use. Denies mood changes or depression.   CURRENT MEDICATIONS: Aspirin 81 mg daily; clopidogrel 75 mg daily; furosemide 40 mg daily p.r.n.; amiodarone 5 mg daily; spironolactone 25 mg 1/2 daily; levothyroxine 100 mcg daily; rosuvastatin 20 mg daily; Bystolic 10 mg daily; Prevacid 30 mg daily; chlorpheniramine 4 mg b.i.d.; montelukast 10 mg daily; azelastine nasal spray p.r.n.;  "vitamin D3; Centrum Silver Multivitamin. No bottles.       Review of Systems  · Cardiovascular  o Denies  o : palpitations (fast, fluttering, or skipping beats), swelling (feet, ankles, hands), shortness of breath while walking or lying flat, chest pain or angina pectoris   · Respiratory  o Denies  o : chronic or frequent cough, asthma or wheezing      Vitals  Date Time BP Position Site L\R Cuff Size HR RR TEMP (F) WT  HT  BMI kg/m2 BSA m2 O2 Sat        07/28/2020 11:43 /60 Sitting    66 - R   164lbs 0oz 4'  11\" 33.12 1.76           Physical Examination  · Respiratory  o Auscultation of Lungs  o : Bilateral faint wheezing heard. No crackles.   · Cardiovascular  o Heart  o : S1, S2 normally heard. No S3. No murmur, rubs, or gallops.  · Gastrointestinal  o Abdominal Examination  o : Soft, nontender, nondistended. No free fluid. Bowel sounds heard in all four quadrants.  · Extremities  o Extremities  o : Trace pitting pedal edema, bilaterally. Distal pulses present.   · Labs  o Labs  o : On 05/04/2020, sodium 141, potassium 4.4, chloride 104, BUN 24, creatinine 1.45, calcium 9.5, bilirubin less than 0.2, AST 17, ALT 10, triglycerides 94, total cholesterol 168, LDL 91, HDL 58.  · Device Interrogation  o Device Interrogation  o : ICD interrogated in the office today. It is a [employer] device implanted on 10/07/2014. Battery longevity is 5 years. Atrial lead 45% pacing. Impedance of 707. P wave 5.2. Threshold 0.7. RV 96% pacing. Impedance 459. R wave of 6.8. LV lead pacing 96%. Impedance of 606. Threshold 1.9. There were no significant events. Few irregular short runs noted, which could be atrial fib. Programming changes include turning the bi-V trigger on for R to R regulation.           Assessment     ASSESSMENT & PLAN:    1.  Dilated cardiomyopathy, status post bi-V ICD placement.  Most recent echocardiogram showed an LV        ejection fraction of 25-30%.  No significant volume overload on physical " examination.  Continue Bystolic and        spironolactone, along with Lasix at the current dose.    2.  Hypertension, well controlled.  Continue current regimen.  3.  Single-vessel coronary artery disease, stable with no angina.  Continue aspirin, statin, and beta-blocker.  4.  Hyperlipidemia, LDL in the 90s, previously better controlled.  Counseled regarding dietary modifications and        exercise.  Will check a lipid panel in 6 months.  5.  Hypertension.  Blood pressure is well controlled.  Continue current regimen.  6.  Follow up in 6 months.        MD MARLEE Fox:vm             Electronically Signed by: Amarilis Martínez-, Other -Author on August 3, 2020 07:09:27 AM  Electronically Co-signed by: Simon Talbot MD -Reviewer on August 4, 2020 11:05:58 AM

## 2021-05-13 NOTE — PROGRESS NOTES
Progress Note      Patient Name: Yelena Sanchez   Patient ID: [id number]   Sex: Female   YOB: 1935    Primary Care Provider: Kaeyln Eugene MD   Referring Provider: Kaelyn Eugene MD    Visit Date: December 9, 2020    Provider: Luci Martin PA-C   Location: [facility]   Location Address: [address] [place] [address]  [id number]   Location Phone: [phone]          Chief Complaint  · Left hip pain   · Right shoulder pain   · Left knee pain       History Of Present Illness  Yelena Sanchez is a 85 year old /White female who presents today to [facility].      Patient is following up for left knee pain, left hip pain, right shoulder pain. Patient had a CT scan of left hip mild osteoarthritis. Patient states pain in left knee radiates to left shin. Patient states pain in right shoulder with range of motion.       Past Medical History  Arthritis; Asthma; Breast cancer; Cancer; Chronic Obstructive Pulmonary Disease; Congestive heart failure; Diverticulosis; GERD; History of tobacco abuse; Hyperlipemia; Hypertension; Hyperthyroidism; Limb Swelling; Neck mass; Seasonal allergies; Shortness Of Air; Shortness of Breath; Thyroid disorder         Past Surgical History  Colonoscopy; endoscopy; Eye Implant; Joint Surgery; Lung Surgery; Mastectomy, Left; Metal implants; Pacemaker.         Medication List  amlodipine 5 mg oral tablet; Aspirin Low Dose 81 mg oral tablet,delayed release (DR/EC); Bystolic 10 mg oral tablet; Centrum Silver Women 8 mg iron-400 mcg-300 mcg oral tablet; chlorpheniramine maleate 4 mg oral tablet; clopidogrel 75 mg oral tablet; Dymista 137-50 mcg/spray nasal spray,non-aerosol; furosemide 20 mg oral tablet; furosemide 40 mg oral tablet; lansoprazole 30 mg oral capsule,delayed release(DR/EC); levothyroxine 100 mcg oral tablet; montelukast 10 mg oral tablet; rosuvastatin 20 mg oral tablet; spironolactone 25 mg oral tablet; Stiolto  "Respimat 2.5-2.5 mcg/actuation inhalation mist; Ultram 50 mg oral tablet; Vitamin D3 2,000 unit oral tablet         Allergy List  NO KNOWN DRUG ALLERGIES         Family Medical History  Cancer, Unspecified; Lung cancer; Family history of colon cancer         Social History  Alcohol (Never); Alcohol Use (Never); Caffeine (Current every day); lives with spouse; .; Recreational Drug Use (Never); Retired.; Second hand smoke exposure (Never); Tobacco (Former)         Review of Systems  · Constitutional  o Denies  o : fever, chills, weight loss  · Cardiovascular  o Denies  o : chest pain, shortness of breath  · Gastrointestinal  o Denies  o : liver disease, heartburn, nausea, blood in stools  · Genitourinary  o Denies  o : painful urination, blood in urine  · Integument  o Denies  o : rash, itching  · Neurologic  o Denies  o : headache, weakness, loss of consciousness  · Musculoskeletal  o Denies  o : painful, swollen joints  · Psychiatric  o Denies  o : drug/alcohol addiction, anxiety, depression      Vitals  Date Time BP Position Site L\R Cuff Size HR RR TEMP (F) WT  HT  BMI kg/m2 BSA m2 O2 Sat FR L/min FiO2        12/09/2020 10:18 AM      74 - R   168lbs 0oz 4'  11\" 33.93 1.78 98 %            Physical Examination  · Constitutional  o Appearance  o : well developed, well-nourished, no obvious deformities present  · Head and Face  o Head  o :   § Inspection  § : normocephalic  o Face  o :   § Inspection  § : no facial lesions  · Eyes  o Conjunctivae  o : conjunctivae normal  o Sclerae  o : sclerae white  · Ears, Nose, Mouth and Throat  o Ears  o :   § External Ears  § : appearance within normal limits  § Hearing  § : intact  o Nose  o :   § External Nose  § : appearance normal  · Neck  o Inspection/Palpation  o : normal appearance  o Range of Motion  o : full range of motion  · Respiratory  o Respiratory Effort  o : breathing unlabored  o Inspection of Chest  o : normal appearance  o Auscultation of Lungs  o : " no audible wheezing or rales  · Cardiovascular  o Heart  o : regular rate  · Gastrointestinal  o Abdominal Examination  o : soft and non-tender  · Skin and Subcutaneous Tissue  o General Inspection  o : intact, no rashes  · Psychiatric  o General  o : Alert and oriented x3  o Judgement and Insight  o : judgment and insight intact  o Mood and Affect  o : mood normal, affect appropriate  · Injection Note/Aspiration Note  o Site  o : left knee, right shoulder  o Procedure  o : Procedure: After educating the patient, patient gave consent for procedure. After using Chloraprep, the joint space was injected. The patient tolerated the procedure well.  o Medication  o : 80 mg of DepoMedrol with 9cc of 1% Lidocaine  · Left Knee-Street  o Inspection  o : no limping gait, weight bearing, swelling present, no ecchymosis, no atrophy, varus alignment   o Palpation  o : tenderness at medial joint line, tenderness at lateral joint line, no patellar tendon tenderness, no pain of MCL, no pain at LCL  o ROM  o : full extension, full flexion  o Strength  o : full extension, full flexion  o Special Tests  o : negative varus stress, negaitve valgus stress  o Neurovascular  o : Full sensation, Dorsal Pedal Pulse 2+, posteriror tibialis pulse 2+  · Right Shoulder-Street  o Inspection  o : No swelling, no erythema, no ecchymosis, no atrophy  o Palpation  o : no tenderness at sternoclavicular joint, no tenderness at clavicle, tenderness of acromioclavicular joint, tenderness at greater tuberosity, tenderness at subacromial bursae, no tenderness at biciptal groove  o ROM  o : Full extension, full flexion, full abduction, full internal rotation, full external rotation, no winging of scapula, no scapular dyskinesis, full cervical ROM , full cross body adduction  o Strength  o : full extension, full flexion, full abduction, full external rotation, full internal rotation, weak empty can test, weak lift off test, full drop arm test  o Special  Tests  o : positive neer's test, positive Hawkin's test  o Neurovascular  o : Full sensation, radial pulse 2+, ulnar pulse 2+  · Left Hip-Street Exam  o Inspection  o : No scar  o Palpation  o : no tenderness of iliac crest, no tenderness of iliac tubercle, no tenderness of greater trochanter, no tenderness at pubic tubercle, no tenderness at PSIS, no tenderness at ASIS, no tenderness at ischial tuberosity, moderate tenderness at sacroiliac joint, no tenderness at femoral triangle nerve, no tenderness at sciatic nerve, no tenderness at hip and pelvic muscles  o ROM  o : normal extension (30), normal abduction (45-50), normal adduction, normal internal rotation, normal external rotation  o Special Tests  o : no pain with flexion, no pain with extension, no pain with rotation  o Neurologic Testing  o : Neurovascular and sensation intact          Assessment  · Primary osteoarthritis of hip     715.15/M16.10  · Primary osteoarthritis of left knee     715.16/M17.12  · Primary osteoarthritis of shoulder     715.11/M19.019  · Left Pain: Hip     719.45/M25.559  · Left knee pain, unspecified chronicity     719.46/M25.562  · Right shoulder pain, unspecified chronicity     719.41/M25.511      Plan  · Orders  o Depo-Medrol injection 80mg () - - 12/09/2020   Lot [id number] Exp 10 2021 [employer] Administered by [employer] JOSE   o Knee Intra-articular Injection without US Guidance [facility] ([id number]) - - 12/09/2020   Lot [id number] 08 01 2021 [employer] Administered by [address] JOSE   o Depo-Medrol injection 80mg () - - 12/09/2020   Lot [id number] Exp 10 2021 [employer] Administered by [address] PA-C   o Shoulder Intra-articular Injection without US Guidance [facility] ([id number]) - - 12/09/2020   Lot [id number] 08 01 2021 [employer] Administered by [address] PABuddyC   · Medications  o Medications have been Reconciled  o Transition of Care or Provider Policy  · Instructions  o Reviewed the patient's Past Medical,  Social, and Family history as well as the ROS at today's visit, no changes.  o Call or return if worsening symptoms.  o Follow Up PRN.  o Electronically Identified Patient Education Materials Provided Electronically            Electronically Signed by: ROSY Jacinto-JANETTE -Author on December 9, 2020 11:56:41 AM  Electronically Co-signed by: Woo Howell MD -Reviewer on December 10, 2020 02:00:55 PM

## 2021-05-13 NOTE — PROGRESS NOTES
Progress Note      Patient Name: Yelena Sanchez   Patient ID: [id number]   Sex: Female   YOB: 1935    Primary Care Provider: Kaelyn Eugene MD   Referring Provider: Kaelyn Eugene MD    Visit Date: August 26, 2020    Provider: Luci Martin PA-C   Location: [facility]   Location Address: [address] [place] [address]  [id number]   Location Phone: [phone]          Chief Complaint  · Left shoulder pain   · Right shoulder pain       History Of Present Illness  Yelena Sanchez is a 84 year old /White female who presents today to [facility].      Patient is following up for bilateral shoulder pain, bilateral shoulder osteoarthritis. Patient denies recent injury or trauma. Patient states increase of pain that started 1 week ago with limited range of motion in left shoulder.       Past Medical History  Arthritis; Asthma; Breast cancer; Chronic Obstructive Pulmonary Disease; Congestive heart failure; Diverticulosis; GERD; History of tobacco abuse; Hyperlipemia; Hypertension; Hyperthyroidism; Limb Swelling; Neck mass; Seasonal allergies; Shortness Of Air         Past Surgical History  Colonoscopy; endoscopy; Eye Implant; Joint Surgery; Lung Surgery; Mastectomy, Left; Metal implants; Pacemaker.         Medication List  amlodipine 5 mg oral tablet; Aspirin Low Dose 81 mg oral tablet,delayed release (DR/EC); Bystolic 10 mg oral tablet; Centrum Silver Women 8 mg iron-400 mcg-300 mcg oral tablet; chlorpheniramine maleate 4 mg oral tablet; clopidogrel 75 mg oral tablet; Dymista 137-50 mcg/spray nasal spray,non-aerosol; furosemide 20 mg oral tablet; furosemide 40 mg oral tablet; lansoprazole 30 mg oral capsule,delayed release(DR/EC); levothyroxine 100 mcg oral tablet; montelukast 10 mg oral tablet; rosuvastatin 20 mg oral tablet; spironolactone 25 mg oral tablet; Stiolto Respimat 2.5-2.5 mcg/actuation inhalation mist; Ultram 50 mg oral tablet; Vitamin D3 2,000  "unit oral tablet         Allergy List  NO KNOWN DRUG ALLERGIES         Family Medical History  Lung cancer; Family history of colon cancer         Social History  Alcohol (Never); Caffeine (Current every day); lives with spouse; .; Recreational Drug Use (Never); Retired.; Second hand smoke exposure (Never); Tobacco (Former)         Review of Systems  · Constitutional  o Denies  o : fever, chills, weight loss  · Cardiovascular  o Denies  o : chest pain, shortness of breath  · Gastrointestinal  o Denies  o : liver disease, heartburn, nausea, blood in stools  · Genitourinary  o Denies  o : painful urination, blood in urine  · Integument  o Denies  o : rash, itching  · Neurologic  o Denies  o : headache, weakness, loss of consciousness  · Musculoskeletal  o Denies  o : painful, swollen joints  · Psychiatric  o Denies  o : drug/alcohol addiction, anxiety, depression      Vitals  Date Time BP Position Site L\R Cuff Size HR RR TEMP (F) WT  HT  BMI kg/m2 BSA m2 O2 Sat        08/26/2020 10:41 AM      62 - R   164lbs 2oz 4'  11\" 33.15 1.76 95 %          Physical Examination  · Constitutional  o Appearance  o : well developed, well-nourished, no obvious deformities present  · Head and Face  o Head  o :   § Inspection  § : normocephalic  o Face  o :   § Inspection  § : no facial lesions  · Eyes  o Conjunctivae  o : conjunctivae normal  o Sclerae  o : sclerae white  · Ears, Nose, Mouth and Throat  o Ears  o :   § External Ears  § : appearance within normal limits  § Hearing  § : intact  o Nose  o :   § External Nose  § : appearance normal  · Neck  o Inspection/Palpation  o : normal appearance  o Range of Motion  o : full range of motion  · Respiratory  o Respiratory Effort  o : breathing unlabored  o Inspection of Chest  o : normal appearance  o Auscultation of Lungs  o : no audible wheezing or rales  · Cardiovascular  o Heart  o : regular rate  · Gastrointestinal  o Abdominal Examination  o : soft and " non-tender  · Skin and Subcutaneous Tissue  o General Inspection  o : intact, no rashes  · Psychiatric  o General  o : Alert and oriented x3  o Judgement and Insight  o : judgment and insight intact  o Mood and Affect  o : mood normal, affect appropriate  · Injection Note/Aspiration Note  o Site  o : bilateral shoulder  o Procedure  o : Procedure: After educating the patient, patient gave consent for procedure. After using Chloraprep, the joint space was injected. The patient tolerated the procedure well.  o Medication  o : 80 mg of DepoMedrol with 9cc of 1% Lidocaine  · In Office Procedures  o View  o : AP/LATERAL  o Site  o : Left shoulder   o Indication  o : Left shoulder pain   o Study  o : X-rays ordered, taken in the office, and reviewed today.  o Xray  o : advanced osteoarthritis  o Comparative Data  o : Comparative Data found and reviewed today   · Right Shoulder-Street  o Inspection  o : No swelling, no erythema, no ecchymosis, no atrophy  o Palpation  o : no tenderness at sternoclavicular joint, no tenderness at clavicle, tenderness of acromioclavicular joint, tenderness at greater tuberosity, tenderness at subacromial bursae, no tenderness at biciptal groove  o ROM  o : Full extension, full flexion, full abduction, full internal rotation, full external rotation, no winging of scapula, no scapular dyskinesis, full cervical ROM , full cross body adduction  o Strength  o : full extension, weak flexion, weak abduction, full external rotation, weak internal rotation  o Neurovascular  o : Full sensation, radial pulse 2+, ulnar pulse 2+  · Left Shoulder Street  o Inspection  o : No swelling, no erythema, no ecchymosis, no atrophy  o Palpation  o : no tenderness at sternoclavicular joint, no tenderness at clavicle, tenderness of acromioclavicular joint, tenderness at greater tuberosity, tenderness at subacromial bursae, no tenderness at biciptal groove  o ROM  o : Full extension, limited flexion, full abduction,  limited internal rotation , full external rotation, no winging of scapula, no scapular dyskinesis, full cervical ROM , full cross body adduction  o Strength  o : full extension, painful flexion, full abduction, full external rotation, painful internal rotation  o Neurovascular  o : Full sensation, radial pulse 2+, ulnar pulse 2+          Assessment  · Primary osteoarthritis of shoulder     715.11/M19.019  · Bilateral shoulder pain, unspecified chronicity       Pain in right shoulder     719.41/M25.511  Pain in left shoulder     719.41/M25.512      Plan  · Orders  o 2 - Depo-Medrol injection 80mg () - - 08/26/2020   Lot [id number] Exp 06 2021 [employer] Administered by EVELIN SERNA PA-C   o 2 - Shoulder Intra-articular Injection without US Guidance [facility] (27302) - - 08/26/2020   Lot [id number] Exp 08 01 2021 [employer] Administered by EVELIN SERNA PA-C   o Scapula (Left) [facility] Preferred View (33833-OF) - 719.41/M25.512 - 08/26/2020  · Medications  o Medications have been Reconciled  o Transition of Care or Provider Policy  · Instructions  o Reviewed the patient's Past Medical, Social, and Family history as well as the ROS at today's visit, no changes.  o Call or return if worsening symptoms.  o X-ray ordered, taken and reviewed at this visit.  o Follow Up PRN.  o Electronically Identified Patient Education Materials Provided Electronically     Prescribed Tramadol 50mg one po q 6 hours PRN #30             Electronically Signed by: Luci Serna PA-C -Author on August 26, 2020 11:44:05 AM  Electronically Co-signed by: Woo Hoewll MD -Reviewer on August 26, 2020 05:58:09 PM

## 2021-05-13 NOTE — PROGRESS NOTES
Progress Note      Patient Name: Yelena Sanchez   Patient ID: [id number]   Sex: Female   YOB: 1935    Primary Care Provider: Kaelyn Eugene MD   Referring Provider: Kaelyn Eugene MD    Visit Date: October 28, 2020    Provider: Luci Martin PA-C   Location: [facility]   Location Address: [address] [place] [address]  [id number]   Location Phone: [phone]          Chief Complaint  · Left knee pain      History Of Present Illness  Yelena Sanchez is a 85 year old /White female who presents today to [facility].      Patient is following up for bilateral knee pain, bilateral knee osteoarthritis. Patient is here to receive Synvisc injection for left knee, steroid injection for right knee.       Past Medical History  Arthritis; Asthma; Breast cancer; Cancer; Chronic Obstructive Pulmonary Disease; Congestive heart failure; Diverticulosis; GERD; History of tobacco abuse; Hyperlipemia; Hypertension; Hyperthyroidism; Limb Swelling; Neck mass; Seasonal allergies; Shortness Of Air; Shortness of Breath; Thyroid disorder         Past Surgical History  Colonoscopy; endoscopy; Eye Implant; Joint Surgery; Lung Surgery; Mastectomy, Left; Metal implants; Pacemaker.         Medication List  amlodipine 5 mg oral tablet; Aspirin Low Dose 81 mg oral tablet,delayed release (DR/EC); Bystolic 10 mg oral tablet; Centrum Silver Women 8 mg iron-400 mcg-300 mcg oral tablet; chlorpheniramine maleate 4 mg oral tablet; clopidogrel 75 mg oral tablet; Dymista 137-50 mcg/spray nasal spray,non-aerosol; furosemide 20 mg oral tablet; furosemide 40 mg oral tablet; lansoprazole 30 mg oral capsule,delayed release(DR/EC); levothyroxine 100 mcg oral tablet; montelukast 10 mg oral tablet; rosuvastatin 20 mg oral tablet; spironolactone 25 mg oral tablet; Stiolto Respimat 2.5-2.5 mcg/actuation inhalation mist; Ultram 50 mg oral tablet; Vitamin D3 2,000 unit oral tablet         Allergy  "List  NO KNOWN DRUG ALLERGIES         Family Medical History  Cancer, Unspecified; Lung cancer; Family history of colon cancer         Social History  Alcohol (Never); Alcohol Use (Never); Caffeine (Current every day); lives with spouse; .; Recreational Drug Use (Never); Retired.; Second hand smoke exposure (Never); Tobacco (Former)         Review of Systems  · Constitutional  o Denies  o : fever, chills, weight loss  · Cardiovascular  o Denies  o : chest pain, shortness of breath  · Gastrointestinal  o Denies  o : liver disease, heartburn, nausea, blood in stools  · Genitourinary  o Denies  o : painful urination, blood in urine  · Integument  o Denies  o : rash, itching  · Neurologic  o Denies  o : headache, weakness, loss of consciousness  · Musculoskeletal  o Denies  o : painful, swollen joints  · Psychiatric  o Denies  o : drug/alcohol addiction, anxiety, depression      Vitals  Date Time BP Position Site L\R Cuff Size HR RR TEMP (F) WT  HT  BMI kg/m2 BSA m2 O2 Sat FR L/min FiO2        10/28/2020 10:32 AM      67 - R   165lbs 0oz 5'  1\" 31.18 1.79 94 %            Physical Examination  · Constitutional  o Appearance  o : well developed, well-nourished, no obvious deformities present  · Head and Face  o Head  o :   § Inspection  § : normocephalic  o Face  o :   § Inspection  § : no facial lesions  · Eyes  o Conjunctivae  o : conjunctivae normal  o Sclerae  o : sclerae white  · Ears, Nose, Mouth and Throat  o Ears  o :   § External Ears  § : appearance within normal limits  § Hearing  § : intact  o Nose  o :   § External Nose  § : appearance normal  · Neck  o Inspection/Palpation  o : normal appearance  o Range of Motion  o : full range of motion  · Respiratory  o Respiratory Effort  o : breathing unlabored  o Inspection of Chest  o : normal appearance  o Auscultation of Lungs  o : no audible wheezing or rales  · Cardiovascular  o Heart  o : regular rate  · Gastrointestinal  o Abdominal Examination  o : " soft and non-tender  · Skin and Subcutaneous Tissue  o General Inspection  o : intact, no rashes  · Psychiatric  o General  o : Alert and oriented x3  o Judgement and Insight  o : judgment and insight intact  o Mood and Affect  o : mood normal, affect appropriate  · Injection Note/Aspiration Note  o Site  o : bilateral knees   o Procedure  o : Procedure: After educating the patient, patient gave consent for procedure. After using Chloraprep, the joint space was injected. The patient tolerated the procedure well.   o Medication  o : 80 mg of DepoMedrol with 9cc of 1% Lidocaine, Synvisc One 48 mg  · Right Knee-Street  o Inspection  o : no limping gait, weight bearing, no swelling, no ecchymosis, no atrophy, neutral alignment  o Palpation  o : tenderness at medial joint line, tenderness at lateral joint line, no patellar tendon tenderness, no pain of MCL, no pain at LCL  o ROM  o : full extension, full flexion  o Strength  o : full extension, full flexion  o Special Tests  o : negative varus stress, negaitve valgus stress  o Neurovascular  o : Full sensation, Dorsal Pedal Pulse 2+, posteriror tibialis pulse 2+  · Left Knee-Street  o Inspection  o : no limping gait, weight bearing, no swelling, no ecchymosis, no atrophy, neutral alignment  o Palpation  o : tenderness at medial joint line, tenderness at lateral joint line, no patellar tendon tenderness, no pain of MCL, no pain at LCL  o ROM  o : full extension, full flexion  o Strength  o : full extension, full flexion  o Special Tests  o : negative varus stress, negaitve valgus stress  o Neurovascular  o : Full sensation, Dorsal Pedal Pulse 2+, posteriror tibialis pulse 2+          Assessment  · Primary osteoarthritis of right knee     715.16/M17.11  · Primary osteoarthritis of left knee     715.16/M17.12  · Pain in both knees, unspecified chronicity       Pain in right knee     719.46/M25.561  Pain in left knee     719.46/M25.562      Plan  · Orders  o Depo-Medrol  injection 80mg () - 715.16/M17.11 - 10/28/2020   Lot [id number] exp 07 2021 [employer] Administered by J Street PA C  o Knee Intra-articular Injection without US Guidance [facility] (56168) - 715.16/M17.11 - 10/28/2020   Lot [id number] 08 01 2021 [employer] Administered by J Street PA C  o Hyaluronan or derivative, Synvisc or Synvisc-One, for intra-jordan () - 715.16/M17.12 - 10/28/2020   Lot [id number] exp 10 2022 Administered by J Street PA C  o Knee Intra-articular Injection without US Guidance [facility] 29055) - 715.16/M17.12 - 10/28/2020  · Medications  o Medications have been Reconciled  o Transition of Care or Provider Policy  · Instructions  o Reviewed the patient's Past Medical, Social, and Family history as well as the ROS at today's visit, no changes.  o Call or return if worsening symptoms.  o Follow Up PRN.  o Electronically Identified Patient Education Materials Provided Electronically            Electronically Signed by: ROSY Jacinto-C -Author on October 28, 2020 11:07:58 AM  Electronically Co-signed by: Woo Howell MD -Reviewer on October 28, 2020 10:27:00 PM

## 2021-05-13 NOTE — PROGRESS NOTES
Progress Note      Patient Name: Yelena Sanchez   Patient ID: [id number]   Sex: Female   YOB: 1935    Primary Care Provider: Kaelyn Eugene MD   Referring Provider: Kaelyn Eugene MD    Visit Date: November 30, 2020    Provider: Luci Martin PA-C   Location: [facility]   Location Address: [address] [place] [address]  [id number]   Location Phone: [phone]          Chief Complaint  · Left knee pain  · Left hip pain      History Of Present Illness  Yelena Sanchez is a 85 year old /White female who presents today to [facility].      Patient is here for left hip pain. Patient states pain radiates from left hip to left foot. Patient states mild pain in the groin with walking. Patient states pain on the lateral side of the hip. Patient has history of right hip fracture.       Past Medical History  Arthritis; Asthma; Breast cancer; Cancer; Chronic Obstructive Pulmonary Disease; Congestive heart failure; Diverticulosis; GERD; History of tobacco abuse; Hyperlipemia; Hypertension; Hyperthyroidism; Limb Swelling; Neck mass; Seasonal allergies; Shortness Of Air; Shortness of Breath; Thyroid disorder         Past Surgical History  Colonoscopy; endoscopy; Eye Implant; Joint Surgery; Lung Surgery; Mastectomy, Left; Metal implants; Pacemaker.         Medication List  amlodipine 5 mg oral tablet; Aspirin Low Dose 81 mg oral tablet,delayed release (DR/EC); Bystolic 10 mg oral tablet; Centrum Silver Women 8 mg iron-400 mcg-300 mcg oral tablet; chlorpheniramine maleate 4 mg oral tablet; clopidogrel 75 mg oral tablet; Dymista 137-50 mcg/spray nasal spray,non-aerosol; furosemide 20 mg oral tablet; furosemide 40 mg oral tablet; lansoprazole 30 mg oral capsule,delayed release(DR/EC); levothyroxine 100 mcg oral tablet; montelukast 10 mg oral tablet; rosuvastatin 20 mg oral tablet; spironolactone 25 mg oral tablet; Stiolto Respimat 2.5-2.5 mcg/actuation inhalation  "mist; Ultram 50 mg oral tablet; Vitamin D3 2,000 unit oral tablet         Allergy List  NO KNOWN DRUG ALLERGIES         Family Medical History  Cancer, Unspecified; Lung cancer; Family history of colon cancer         Social History  Alcohol (Never); Alcohol Use (Never); Caffeine (Current every day); lives with spouse; .; Recreational Drug Use (Never); Retired.; Second hand smoke exposure (Never); Tobacco (Former)         Review of Systems  · Constitutional  o Denies  o : fever, chills, weight loss  · Cardiovascular  o Denies  o : chest pain, shortness of breath  · Gastrointestinal  o Denies  o : liver disease, heartburn, nausea, blood in stools  · Genitourinary  o Denies  o : painful urination, blood in urine  · Integument  o Denies  o : rash, itching  · Neurologic  o Denies  o : headache, weakness, loss of consciousness  · Musculoskeletal  o Denies  o : painful, swollen joints  · Psychiatric  o Denies  o : drug/alcohol addiction, anxiety, depression      Vitals  Date Time BP Position Site L\R Cuff Size HR RR TEMP (F) WT  HT  BMI kg/m2 BSA m2 O2 Sat FR L/min FiO2        11/30/2020 01:06 PM      76 - R   168lbs 8oz 5'  1\" 31.84 1.81 92 %            Physical Examination  · Constitutional  o Appearance  o : well developed, well-nourished, no obvious deformities present  · Head and Face  o Head  o :   § Inspection  § : normocephalic  o Face  o :   § Inspection  § : no facial lesions  · Eyes  o Conjunctivae  o : conjunctivae normal  o Sclerae  o : sclerae white  · Ears, Nose, Mouth and Throat  o Ears  o :   § External Ears  § : appearance within normal limits  § Hearing  § : intact  o Nose  o :   § External Nose  § : appearance normal  · Neck  o Inspection/Palpation  o : normal appearance  o Range of Motion  o : full range of motion  · Respiratory  o Respiratory Effort  o : breathing unlabored  o Inspection of Chest  o : normal appearance  o Auscultation of Lungs  o : no audible wheezing or " rales  · Cardiovascular  o Heart  o : regular rate  · Gastrointestinal  o Abdominal Examination  o : soft and non-tender  · Skin and Subcutaneous Tissue  o General Inspection  o : intact, no rashes  · Psychiatric  o General  o : Alert and oriented x3  o Judgement and Insight  o : judgment and insight intact  o Mood and Affect  o : mood normal, affect appropriate  · In Office Procedures  o View  o : AP/LATERAL  o Site  o : left, hip  o Indication  o : Left hip pain  o Study  o : X-rays ordered, taken in the office, and reviewed today.  o Xray  o : minimal osteoarthritis  o Comparative Data  o : No comparative data found  · Left Hip-Street Exam  o Inspection  o : No scar  o Palpation  o : no tenderness of iliac crest, no tenderness of iliac tubercle, moderate tenderness of greater trochanter, no tenderness at pubic tubercle, no tenderness at PSIS, no tenderness at ASIS, no tenderness at ischial tuberosity, no tenderness at sacroiliac joint, no tenderness at femoral triangle nerve, mild tenderness at sciatic nerve, no tenderness at hip and pelvic muscles  o ROM  o : normal extension (30), normal abduction (45-50), normal adduction, normal internal rotation, normal external rotation  o Special Tests  o : abnormal heel/toe walk, no pain with flexion, no pain with extension, pain with rotation  o Neurologic Testing  o : Neurovascular and sensation intact          Assessment  · Pain: Hip     719.45/M25.559  · Left knee pain, unspecified chronicity     719.46/M25.562  · Hip pain     719.45/M25.559      Plan  · Orders  o Hip (Left) 2 or more views (includes AP Pelvis) [facility] Preferred View (08962) - 719.45/M25.559 - 11/30/2020  · Medications  o Medications have been Reconciled  o Transition of Care or Provider Policy  · Instructions  o Reviewed the patient's Past Medical, Social, and Family history as well as the ROS at today's visit, no changes.  o Call or return if worsening symptoms.  o Exercise handout given.  o X-ray  ordered, taken and reviewed at this visit.  o Electronically Identified Patient Education Materials Provided Electronically     Ordered CT Scan               Electronically Signed by: Luci Martin PA-C -Author on November 30, 2020 01:48:39 PM  Electronically Co-signed by: Woo Howell MD -Reviewer on November 30, 2020 01:56:22 PM

## 2021-05-14 VITALS
HEART RATE: 61 BPM | DIASTOLIC BLOOD PRESSURE: 58 MMHG | SYSTOLIC BLOOD PRESSURE: 158 MMHG | HEIGHT: 59 IN | BODY MASS INDEX: 33.87 KG/M2 | WEIGHT: 168 LBS

## 2021-05-14 VITALS — HEART RATE: 74 BPM | HEIGHT: 59 IN | WEIGHT: 168 LBS | BODY MASS INDEX: 33.87 KG/M2 | OXYGEN SATURATION: 98 %

## 2021-05-14 VITALS — OXYGEN SATURATION: 95 % | BODY MASS INDEX: 33.08 KG/M2 | HEART RATE: 62 BPM | WEIGHT: 164.12 LBS | HEIGHT: 59 IN

## 2021-05-14 VITALS — HEART RATE: 67 BPM | WEIGHT: 165 LBS | HEIGHT: 61 IN | OXYGEN SATURATION: 94 % | BODY MASS INDEX: 31.15 KG/M2

## 2021-05-14 VITALS — HEIGHT: 59 IN | BODY MASS INDEX: 34.92 KG/M2 | HEART RATE: 72 BPM | OXYGEN SATURATION: 93 % | WEIGHT: 173.25 LBS

## 2021-05-14 VITALS — WEIGHT: 168 LBS | HEIGHT: 59 IN | TEMPERATURE: 96 F | BODY MASS INDEX: 33.87 KG/M2

## 2021-05-14 VITALS — BODY MASS INDEX: 31.81 KG/M2 | OXYGEN SATURATION: 92 % | HEART RATE: 76 BPM | WEIGHT: 168.5 LBS | HEIGHT: 61 IN

## 2021-05-14 NOTE — PROGRESS NOTES
"   Progress Note      Patient Name: Yelena Sanchez   Patient ID: [id number]   Sex: Female   YOB: 1935    Primary Care Provider: Kaelyn Eugene MD   Referring Provider: Kaelyn Eugene MD    Visit Date: January 29, 2021    Provider: Lefty Acevedo MD   Location: [facility]   Location Address: [address] [place] [address], [address]  [id number]   Location Phone: [phone]          Chief Complaint     \"My ears itch and my nose runs all the time.\"       History Of Present Illness     Yelena Sanchez is a 85 year old /White female with past medical history significant for coronary artery disease, GERD, hypothyroidism, hyperlipidemia, and cerebrovascular disease on Plavix who returns today for follow-up of a left neck mass.  She was originally seen on 7/3/2019 at which time she reported a previous mass which was excised by Dr. Galindo in 2014. PET scan on 3/18/2014 revealed that the left neck mass had an SUV of 4.5. an image guided FNA at the time revealed scant cystic material, histiocytes, and a few inflammatory cells which was nondiagnostic. She is a former smoker who quit in 2017. A CT scan of the neck without contrast on 6/13/2019 revealed a 2.75 x 1.8 cm solid-appearing mass just inferior to the hyoid bone on the left involving the preepiglottic space and left false vocal fold. It appears to abut her left internal carotid artery.  Laryngoscopic examination that day revealed fullness to the left false vocal fold and aryepiglottic fold. Previous records from [facility] revealed that she underwent a [employer] laryngoscopy with excision of a left false vocal fold cystic lesion on 5/20/2014.  Subsequent pathology revealed a benign epithelial cyst without any evidence of malignancy.      She returns today for follow-up having last been seen on 7/19/2019.  She tells me that her bilateral ear canals itch \"all the time.\"  She has tried fluocinolone drops with " some improvement.  She does use Q-tips.  She denies any change in her hearing and is not having any issues with otorrhea, tinnitus, or vertigo.  She also reports frequent nasal drainage which is clear to white.  She has tried Dymista in the past without improvement.  She denies any nasal congestion or diminished sense of smell.  She has used her 's ipratropium bromide which seem to be helpful but she had difficulty using it consistently.  She again denies any issues with hoarseness, dysphagia, sore throat, noisy breathing, cough, hemoptysis, fever, chills, night sweats, or unintentional weight loss.                Past Medical History  Arthritis; Asthma; Breast cancer; Chronic Obstructive Pulmonary Disease; Congestive heart failure; Diverticulosis; GERD; History of tobacco abuse; Hyperlipemia; Hypertension; Hyperthyroidism; Neck mass; Seasonal allergies; Shortness Of Air         Past Surgical History  Colonoscopy; endoscopy; Eye Implant; Joint Surgery; Lung Surgery; Mastectomy, Left; Metal implants; Pacemaker.         Medication List  amlodipine 5 mg oral tablet; Aspirin Low Dose 81 mg oral tablet,delayed release (DR/EC); Bystolic 10 mg oral tablet; Centrum Silver Women 8 mg iron-400 mcg-300 mcg oral tablet; chlorpheniramine maleate 4 mg oral tablet; clopidogrel 75 mg oral tablet; Dymista 137-50 mcg/spray nasal spray,non-aerosol; furosemide 20 mg oral tablet; furosemide 40 mg oral tablet; lansoprazole 30 mg oral capsule,delayed release(DR/EC); levothyroxine 100 mcg oral tablet; montelukast 10 mg oral tablet; rosuvastatin 20 mg oral tablet; spironolactone 25 mg oral tablet; Stiolto Respimat 2.5-2.5 mcg/actuation inhalation mist; Ultram 50 mg oral tablet; Vitamin D3 2,000 unit oral tablet         Allergy List  NO KNOWN DRUG ALLERGIES       Allergies Reconciled  Family Medical History  Lung cancer; Family history of colon cancer         Social History  Alcohol (Never); Caffeine (Current every day); lives with  "spouse; .; Recreational Drug Use (Never); Retired.; Second hand smoke exposure (Never); Tobacco (Former)         Review of Systems  · Constitutional  o Denies  o : fever, night sweats, weight loss  · Eyes  o Denies  o : discharge from eye, impaired vision  · HENT  o Admits  o : *See HPI  · Cardiovascular  o Denies  o : chest pain, irregular heart beats  · Respiratory  o Admits  o : shortness of breath  o Denies  o : wheezing, coughing up blood  · Gastrointestinal  o Denies  o : heartburn, reflux, vomiting blood  · Genitourinary  o Denies  o : frequency  · Integument  o Denies  o : rash, skin dryness  · Neurologic  o Denies  o : seizures, loss of balance, loss of consciousness, dizziness  · Endocrine  o Denies  o : cold intolerance, heat intolerance  · Heme-Lymph  o Denies  o : easy bleeding, anemia      Vitals  Date Time BP Position Site L\R Cuff Size HR RR TEMP (F) WT  HT  BMI kg/m2 BSA m2 O2 Sat FR L/min FiO2 HC       01/29/2021 02:09 PM        96 168lbs 0oz 4'  11\" 33.93 1.78             Physical Examination  · Constitutional  o Appearance  o : well developed, well-nourished, alert and in no acute distress, voice clear and strong  · Head and Face  o Head  o :   § Inspection  § : no deformities or lesions  o Face  o :   § Inspection  § : No facial lesions; House-Brackmann I/VI bilaterally  § Palpation  § : No TMJ crepitus nor  muscle tenderness bilaterally  · Eyes  o Vision  o :   § Visual Fields  § : Extraocular movements are intact. No spontaneous or gaze-induced nystagmus.  o Conjunctivae  o : clear  o Sclerae  o : clear  o Pupils and Irises  o : pupils equal, round, and reactive to light.   · Ears, Nose, Mouth and Throat  o Ears  o :   § External Ears  § : appearance within normal limits, no lesions present  § Otoscopic Examination  § : Bilateral external auditory canals with dry flaky skin. Tympanic membrane appearance within normal limits bilaterally without perforations, well-aerated middle " ears  § Hearing  § : intact to conversational voice both ears  o Nose  o :   § External Nose  § : appearance normal  § Intranasal Exam  § : mucosa within normal limits, vestibules normal, no intranasal lesions present, septum midline, sinuses non tender to percussion  o Oral Cavity  o :   § Oral Mucosa  § : oral mucosa normal without pallor or cyanosis  § Lips  § : lip appearance normal  § Teeth  § : Edentulous  § Gums  § : gums pink, non-swollen, no bleeding present  § Tongue  § : tongue appearance normal; normal mobility  § Palate  § : hard palate normal, soft palate appearance normal with symmetric mobility  o Throat  o :   § Oropharynx  § : no inflammation or lesions present, tonsils within normal limits  § Hypopharynx  § : Deferred secondary to gag reflex  § Larynx  § : Deferred secondary to gag reflex  · Neck  o Inspection/Palpation  o : normal appearance, no masses or tenderness, trachea midline; thyroid size normal, nontender, no nodules or masses present on palpation  · Respiratory  o Respiratory Effort  o : breathing unlabored  o Inspection of Chest  o : normal appearance, no retractions  · Cardiovascular  o Heart  o : regular rate and rhythm  · Lymphatic  o Neck  o : no lymphadenopathy present  o Supraclavicular Nodes  o : no lymphadenopathy present  o Preauricular Nodes  o : no lymphadenopathy present  · Skin and Subcutaneous Tissue  o General Inspection  o : Regarding face and neck - there are no rashes present, no lesions present, and no areas of discoloration  · Neurologic  o Cranial Nerves  o : cranial nerves II-XII are grossly intact bilaterally  o Gait and Station  o : normal gait, able to stand without diffculty  · Psychiatric  o Judgement and Insight  o : judgment and insight intact  o Mood and Affect  o : mood normal, affect appropriate          Results     Procedure: Flexible fiberoptic nasolaryngoscopy.    Indications: Left false vocal fold/saccular cyst.    Summary: The patient's bilateral  nares were decongested and anesthetized with Afrin and lidocaine sprays respectively. After giving the medications ample time to take effect flexible fiberoptic scope was inserted in the patient's right naris revealing a normal-appearing nasal cavity and nasopharynx. The base of tongue, vallecula, epiglottis, aryepiglottic folds, and arytenoid cartilages are all normal-appearing aside from fullness to the left false vocal fold and aryepiglottic fold. The piriform sinuses were normal in appearance. The bilateral false and true vocal folds are normal in appearance and adducted and abducted normally. The subglottis was widely patent. The patient tolerated the procedure well.              Assessment  · Chronic rhinitis     472.0/J31.0  · Ear itching     698.9/L29.9  · Neck mass     784.2/R22.1      Plan  · Orders  o Laryngoscopy (Flex) [facility] ([id number]) - 784.2/R22.1 - 02/01/2021  · Medications  o hydrocortisone acetate 1 % topical cream   SIG: apply a thin layer to the affected area(s) by topical route 2 times per day for 14 days   DISP: (1) Tube with 3 refills  Prescribed on 01/29/2021     o ipratropium bromide 42 mcg (0.06 %) nasal spray,non-aerosol   SIG: spray 2 sprays in each nostril by intranasal route 4 times per day for 30 days   DISP: (1) Bottle with 11 refills  Prescribed on 01/29/2021     o Medications have been Reconciled  o Transition of Care or Provider Policy  · Instructions  o Impressions and findings were discussed Mrs. Sanchez and her  at great length. Currently, she is doing well from a saccular cyst standpoint as she is asymptomatic and her left false vocal fold fullness appears stable on examination today. In regards to her ear itching she will be placed on hydrocortisone cream and for the frequent rhinorrhea she will be prescribed ipratropium bromide as the Dymista did not seem to be helpful previously. She was given ample time to ask questions, all of which were answered to her satisfaction. She  will follow-up in 1 year or sooner if needed.            Electronically Signed by: Lefty Acevedo MD -Author on February 1, 2021 08:26:18 AM

## 2021-05-14 NOTE — PROGRESS NOTES
Progress Note      Patient Name: Yelena Sanchez   Patient ID: [id number]   Sex: Female   YOB: 1935    Primary Care Provider: Kaelyn Eugene MD   Referring Provider: Kaelyn Eugene MD    Visit Date: April 19, 2021    Provider: Simon Talbot MD   Location: [facility]   Location Address: [address] [place] [address], [facility]   [place], [place]  [id number]   Location Phone: [phone]          Chief Complaint     Followup visit for cardiomyopathy, coronary artery disease, and pacemaker interrogation.       History Of Present Illness  REFERRING CARE PROVIDER: Kaelyn Eugene MD   Yelena Sanchez is an 85 year old /White female with dilated cardiomyopathy, nonobstructive coronary artery disease, hypertension, and hyperlipidemia who is here for a followup visit. She was last seen in the office on 07/28/2020. She called our office in March because of increasing shortness of breath and swelling. She gained around 5 pounds in a month. Of note, she was not taking Lasix for a long time. We advised her to start taking Lasix 40 mg daily, which she took for a few days. After which, the swelling resolved and the shortness of breath also improved. Currently, she is not taking Lasix at all. She also reports some symptoms suggestive of diaphragmatic stimulation from the pacemaker. Overall better at this time. Denies any chest pain, tightness, or pressure. Currently, no palpitations. Today, patient underwent an ICD interrogation in the office.   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 Cardiac catheterization in 2014 showed 70% mid RCA lesion with 30% left circumflex lesion. No angioplasty was  "performed.   PSYCHOSOCIAL HISTORY: Previously smoked, but quit. Denies alcohol use.   CURRENT MEDICATIONS: Clopidogrel 75 mg daily; furosemide 40 mg daily and 20 mg in the evening as needed; amiodarone 5 mg q. a.m.; spironolactone 25 mg daily; aspirin 81 mg daily; levothyroxine 100 mcg q. a.m.; Bystolic 10 mg daily; chlorpheniramine 4 mg b.i.d.; montelukast 10 mg daily; Prevacid 30 mg; azelastine nasal spray; vitamin D; multivitamin.      ALLERGIES:  No known drug allergies.       Review of Systems  · Cardiovascular  o Admits  o : shortness of breath while walking or lying flat  o Denies  o : palpitations (fast, fluttering, or skipping beats), swelling (feet, ankles, hands), chest pain or angina pectoris   · Respiratory  o Denies  o : chronic or frequent cough      Vitals  Date Time BP Position Site L\R Cuff Size HR RR TEMP (F) WT  HT  BMI kg/m2 BSA m2 O2 Sat FR L/min FiO2 HC       04/19/2021 09:36 /58 Sitting    61 - R   168lbs 0oz 4'  11\" 33.93 1.78       04/19/2021 09:36 /60 Sitting                       Physical Examination  · Respiratory  o Auscultation of Lungs  o : Clear to auscultation bilaterally. No crackles or rhonchi.  · Cardiovascular  o Heart  o : S1, S2 is normally heard. No S3. No murmur, rubs, or gallops.  · Gastrointestinal  o Abdominal Examination  o : Soft, nontender, nondistended. No free fluid. Bowel sounds heard in all four quadrants.  · Extremities  o Extremities  o : Trace pitting pedal edema bilaterally. Distal pulses present.  · Labs  o Labs  o : Labs on 04/16/2021 show a BUN of 21, creatinine 1.53, sodium 138, potassium 4.9, chloride 105, bicarb 23, total protein 6.8, albumin 4.3, globulin 2.5, AST 15, ALT 10, triglycerides 98, total cholesterol 116, HDL 55, LDL 41, bilirubin less than 0.05.   · Device Interrogation  o Device Interrogation  o : ICD was interrogated in the office today. Battery longevity is 4 years. RV and LV leads pacing more than 90%. Some atrial tachycardia " episodes were noted of short duration. Changed the offset to -10 and increased the LV amplitude to 3 volts. Programming changes as mentioned above.           Assessment     ASSESSMENT & PLAN:    1.  Dilated cardiomyopathy.  LV EF 30% status post bi-V ICD placement.  Recently had episodes of rapid weight        gain, increasing shortness of breath, and pedal edema, likely related to dietary and medication        noncompliance.  Patient admits taking high-salt diet recently and was not taking Lasix.  Encouraged to take        Lasix 40 mg p.o. three times a week instead of taking it as needed.  Continue 25 mg of spironolactone.         Continue Bystolic at the current dose.  Not on an ARB because of renal failure.    2.  Hypertension.  Reasonably well controlled.  Continue current regimen.  3.  Hyperlipidemia.  LDL at goal.  4.  Nonobstructive coronary artery disease.  Stable with no angina.  Continue aspirin, statin, and beta-blocker.  5.  Follow up in 6 months.             Electronically Signed by: Amarilis Martínez-, Other -Author on April 24, 2021 06:33:22 AM  Electronically Co-signed by: Simon Talbot MD -Reviewer on April 25, 2021 06:21:42 PM

## 2021-05-14 NOTE — PROGRESS NOTES
Progress Note      Patient Name: Yelena Sanchez   Patient ID: [id number]   Sex: Female   YOB: 1935    Primary Care Provider: Kaelyn Eugene MD   Referring Provider: Kaelyn Eugene MD    Visit Date: April 28, 2021    Provider: Woo Howell MD   Location: [facility]   Location Address: [address] [place] [address]  [id number]   Location Phone: [phone]          Chief Complaint  · Left knee pain       History Of Present Illness  Yelena Sanchez is a 85 year old /White female who presents today to [facility].      Patient presents today for a follow-up of left knee. Patient has a history of left knee osteoarthritis that she has been treating conservatively. She presents today to receive a left knee Synvisc injection.       Past Medical History  Arthritis; Asthma; Breast cancer; Chronic Obstructive Pulmonary Disease; Congestive heart failure; Diverticulosis; GERD; History of tobacco abuse; Hyperlipemia; Hypertension; Hyperthyroidism; Neck mass; Seasonal allergies; Shortness Of Air         Past Surgical History  Colonoscopy; endoscopy; Eye Implant; Joint Surgery; Lung Surgery; Mastectomy, Left; Metal implants; Pacemaker.         Medication List  amlodipine 5 mg oral tablet; Aspirin Low Dose 81 mg oral tablet,delayed release (DR/EC); Bystolic 10 mg oral tablet; Centrum Silver Women 8 mg iron-400 mcg-300 mcg oral tablet; chlorpheniramine maleate 4 mg oral tablet; clopidogrel 75 mg oral tablet; Dymista 137-50 mcg/spray nasal spray,non-aerosol; furosemide 20 mg oral tablet; furosemide 40 mg oral tablet; hydrocortisone acetate 1 % topical cream; ipratropium bromide 42 mcg (0.06 %) nasal spray,non-aerosol; lansoprazole 30 mg oral capsule,delayed release(DR/EC); levothyroxine 100 mcg oral tablet; montelukast 10 mg oral tablet; rosuvastatin 20 mg oral tablet; spironolactone 25 mg oral tablet; Stiolto Respimat 2.5-2.5 mcg/actuation inhalation mist; Ultram 50  "mg oral tablet; Vitamin D3 2,000 unit oral tablet; Zetia 10 mg oral tablet         Allergy List  NO KNOWN DRUG ALLERGIES       Allergies Reconciled  Family Medical History  Lung cancer; Family history of colon cancer         Social History  Alcohol (Never); Caffeine (Current every day); lives with spouse; .; Recreational Drug Use (Never); Retired.; Second hand smoke exposure (Never); Tobacco (Former)         Review of Systems  · Constitutional  o Denies  o : fever, chills, weight loss  · Cardiovascular  o Denies  o : chest pain, shortness of breath  · Gastrointestinal  o Denies  o : liver disease, heartburn, nausea, blood in stools  · Genitourinary  o Denies  o : painful urination, blood in urine  · Integument  o Denies  o : rash, itching  · Neurologic  o Denies  o : headache, weakness, loss of consciousness  · Musculoskeletal  o Denies  o : painful, swollen joints  · Psychiatric  o Denies  o : drug/alcohol addiction, anxiety, depression      Vitals  Date Time BP Position Site L\R Cuff Size HR RR TEMP (F) WT  HT  BMI kg/m2 BSA m2 O2 Sat FR L/min FiO2        04/28/2021 10:43 AM      72 - R   173lbs 4oz 4'  11\" 34.99 1.81 93 %            Physical Examination  · Constitutional  o Appearance  o : well developed, well-nourished, no obvious deformities present  · Head and Face  o Head  o :   § Inspection  § : normocephalic  o Face  o :   § Inspection  § : no facial lesions  · Eyes  o Conjunctivae  o : conjunctivae normal  o Sclerae  o : sclerae white  · Ears, Nose, Mouth and Throat  o Ears  o :   § External Ears  § : appearance within normal limits  § Hearing  § : intact  o Nose  o :   § External Nose  § : appearance normal  · Neck  o Inspection/Palpation  o : normal appearance  o Range of Motion  o : full range of motion  · Respiratory  o Respiratory Effort  o : breathing unlabored  o Inspection of Chest  o : normal appearance  o Auscultation of Lungs  o : no audible wheezing or " rales  · Cardiovascular  o Heart  o : regular rate  · Gastrointestinal  o Abdominal Examination  o : soft and non-tender  · Skin and Subcutaneous Tissue  o General Inspection  o : intact, no rashes  · Psychiatric  o General  o : Alert and oriented x3  o Judgement and Insight  o : judgment and insight intact  o Mood and Affect  o : mood normal, affect appropriate  · Left Knee  o Inspection  o : Tender medial and lateral joint line. Moderate swelling. No skin discoloration. Weight bearing. Non-antalgic giat. Full flexion and extension. Skin intact. Sensation grossly intact. Negative Lachman. Negative posterior sag. Stable to valgus/varus stress. Good strength in quadriceps, hamstrings, dorsiflexors, and plantar flexors.  · Injection Note/Aspiration Note  o Site  o : left knee   o Procedure  o : Procedure: After educating the patient, patient gave consent for procedure. After using Chloraprep, the joint space was injected. The patient tolerated the procedure well.   o Medication  o : Synvisc One 48 mg           Assessment  · Primary osteoarthritis of left knee     715.16/M17.12      Plan  · Orders  o Hyaluronan or derivative, Synvisc or Synvisc-One, for intra-jordan () - 715.16/M17.12 - 04/28/2021   Lot [id number] Exp 12 31 2023 [employer] Administered by YASEMIN Howell MD  o Knee Intra-articular Injection without US Guidance [facility] ([address]) - 715.16/M17.12 - 04/28/2021  · Medications  o Medications have been Reconciled  o Transition of Care or Provider Policy  · Instructions  o Dr. Howell saw and examined the patient and agrees with plan.   o Reviewed the patient's Past Medical, Social, and Family history as well as the ROS at today's visit, no changes.  o Call or return if worsening symptoms.  o Follow Up PRN.  o Discussed treatment plans with the patient. Patient received a left knee Synvisc One injection and tolerated this well.  o The above service was scribed by Nohemi Coppola on my behalf and I attest to the accuracy of the  note. vonnie            Electronically Signed by: Nohemi Coppola-, Other -Author on April 29, 2021 09:27:25 AM  Electronically Co-signed by: Woo Howell MD -Reviewer on April 29, 2021 05:13:43 PM

## 2021-05-15 VITALS
HEIGHT: 59 IN | SYSTOLIC BLOOD PRESSURE: 130 MMHG | HEART RATE: 66 BPM | DIASTOLIC BLOOD PRESSURE: 60 MMHG | WEIGHT: 164 LBS | BODY MASS INDEX: 33.06 KG/M2

## 2021-05-15 VITALS
SYSTOLIC BLOOD PRESSURE: 158 MMHG | HEIGHT: 59 IN | DIASTOLIC BLOOD PRESSURE: 88 MMHG | WEIGHT: 163 LBS | BODY MASS INDEX: 32.86 KG/M2 | HEART RATE: 63 BPM

## 2021-05-15 VITALS — WEIGHT: 161.5 LBS | HEIGHT: 59 IN | BODY MASS INDEX: 32.56 KG/M2 | OXYGEN SATURATION: 95 % | HEART RATE: 68 BPM

## 2021-05-15 VITALS — OXYGEN SATURATION: 98 % | HEART RATE: 73 BPM | BODY MASS INDEX: 32.66 KG/M2 | HEIGHT: 59 IN | WEIGHT: 162 LBS

## 2021-05-15 VITALS
HEART RATE: 64 BPM | BODY MASS INDEX: 32.66 KG/M2 | SYSTOLIC BLOOD PRESSURE: 148 MMHG | HEIGHT: 59 IN | OXYGEN SATURATION: 95 % | WEIGHT: 162 LBS | DIASTOLIC BLOOD PRESSURE: 64 MMHG | RESPIRATION RATE: 15 BRPM | TEMPERATURE: 98.3 F

## 2021-05-15 VITALS
WEIGHT: 162 LBS | TEMPERATURE: 98.3 F | DIASTOLIC BLOOD PRESSURE: 71 MMHG | HEIGHT: 59 IN | BODY MASS INDEX: 32.66 KG/M2 | OXYGEN SATURATION: 96 % | RESPIRATION RATE: 16 BRPM | SYSTOLIC BLOOD PRESSURE: 133 MMHG | HEART RATE: 65 BPM

## 2021-05-15 VITALS
DIASTOLIC BLOOD PRESSURE: 86 MMHG | SYSTOLIC BLOOD PRESSURE: 174 MMHG | HEART RATE: 60 BPM | HEIGHT: 59 IN | WEIGHT: 163 LBS | BODY MASS INDEX: 32.86 KG/M2

## 2021-05-15 VITALS — HEIGHT: 59 IN | HEART RATE: 62 BPM | OXYGEN SATURATION: 95 % | BODY MASS INDEX: 32.93 KG/M2 | WEIGHT: 163.37 LBS

## 2021-05-15 VITALS — BODY MASS INDEX: 33.67 KG/M2 | WEIGHT: 167 LBS | OXYGEN SATURATION: 97 % | HEART RATE: 68 BPM | HEIGHT: 59 IN

## 2021-05-15 VITALS — BODY MASS INDEX: 32.25 KG/M2 | WEIGHT: 160 LBS | HEIGHT: 59 IN

## 2021-05-16 VITALS — BODY MASS INDEX: 33.67 KG/M2 | WEIGHT: 167 LBS | OXYGEN SATURATION: 97 % | HEIGHT: 59 IN | HEART RATE: 67 BPM

## 2021-05-16 VITALS — HEART RATE: 63 BPM | OXYGEN SATURATION: 94 % | HEIGHT: 59 IN | BODY MASS INDEX: 33.67 KG/M2 | WEIGHT: 167 LBS

## 2021-05-16 VITALS — BODY MASS INDEX: 33.06 KG/M2 | WEIGHT: 164 LBS | HEIGHT: 59 IN | HEART RATE: 63 BPM | OXYGEN SATURATION: 93 %

## 2021-05-16 VITALS — WEIGHT: 164 LBS | OXYGEN SATURATION: 97 % | HEART RATE: 66 BPM | HEIGHT: 59 IN | BODY MASS INDEX: 33.06 KG/M2

## 2021-05-16 VITALS — OXYGEN SATURATION: 96 % | BODY MASS INDEX: 28.53 KG/M2 | HEIGHT: 63 IN | HEART RATE: 59 BPM | WEIGHT: 161 LBS

## 2021-05-16 VITALS — OXYGEN SATURATION: 98 % | BODY MASS INDEX: 32.25 KG/M2 | HEART RATE: 67 BPM | WEIGHT: 160 LBS | HEIGHT: 59 IN

## 2021-05-16 VITALS
BODY MASS INDEX: 33.26 KG/M2 | DIASTOLIC BLOOD PRESSURE: 88 MMHG | WEIGHT: 165 LBS | SYSTOLIC BLOOD PRESSURE: 180 MMHG | HEART RATE: 60 BPM | HEIGHT: 59 IN

## 2021-05-16 VITALS — BODY MASS INDEX: 32.46 KG/M2 | OXYGEN SATURATION: 98 % | WEIGHT: 161 LBS | HEIGHT: 59 IN | HEART RATE: 67 BPM

## 2021-05-16 VITALS — BODY MASS INDEX: 33.06 KG/M2 | HEIGHT: 59 IN | WEIGHT: 164 LBS | HEART RATE: 63 BPM | OXYGEN SATURATION: 97 %

## 2021-05-16 VITALS
HEIGHT: 59 IN | HEART RATE: 62 BPM | DIASTOLIC BLOOD PRESSURE: 80 MMHG | WEIGHT: 163 LBS | BODY MASS INDEX: 32.86 KG/M2 | SYSTOLIC BLOOD PRESSURE: 144 MMHG

## 2021-05-16 VITALS — WEIGHT: 162 LBS | HEART RATE: 75 BPM | HEIGHT: 59 IN | OXYGEN SATURATION: 97 % | BODY MASS INDEX: 32.66 KG/M2

## 2021-05-16 VITALS — RESPIRATION RATE: 16 BRPM | HEIGHT: 59 IN | BODY MASS INDEX: 33.06 KG/M2 | WEIGHT: 164 LBS

## 2021-05-16 VITALS — WEIGHT: 16 LBS | OXYGEN SATURATION: 96 % | HEART RATE: 72 BPM | HEIGHT: 59 IN | BODY MASS INDEX: 3.23 KG/M2

## 2021-05-28 VITALS
HEART RATE: 63 BPM | WEIGHT: 162.19 LBS | TEMPERATURE: 98.2 F | DIASTOLIC BLOOD PRESSURE: 80 MMHG | HEIGHT: 59 IN | SYSTOLIC BLOOD PRESSURE: 150 MMHG | OXYGEN SATURATION: 98 % | SYSTOLIC BLOOD PRESSURE: 134 MMHG | BODY MASS INDEX: 19.85 KG/M2 | RESPIRATION RATE: 16 BRPM | DIASTOLIC BLOOD PRESSURE: 52 MMHG | OXYGEN SATURATION: 91 % | SYSTOLIC BLOOD PRESSURE: 114 MMHG | OXYGEN SATURATION: 98 % | RESPIRATION RATE: 16 BRPM | DIASTOLIC BLOOD PRESSURE: 74 MMHG | HEIGHT: 59 IN | SYSTOLIC BLOOD PRESSURE: 150 MMHG | RESPIRATION RATE: 16 BRPM | HEIGHT: 69 IN | BODY MASS INDEX: 32.7 KG/M2 | HEIGHT: 59 IN | WEIGHT: 160 LBS | WEIGHT: 134 LBS | HEART RATE: 96 BPM | DIASTOLIC BLOOD PRESSURE: 74 MMHG | BODY MASS INDEX: 32.25 KG/M2 | TEMPERATURE: 98.3 F | BODY MASS INDEX: 32.66 KG/M2 | HEART RATE: 66 BPM | OXYGEN SATURATION: 97 % | TEMPERATURE: 98.2 F | TEMPERATURE: 98.3 F | HEART RATE: 64 BPM | RESPIRATION RATE: 16 BRPM | WEIGHT: 162 LBS

## 2021-05-28 VITALS
HEIGHT: 59 IN | WEIGHT: 164 LBS | BODY MASS INDEX: 33.06 KG/M2 | HEART RATE: 65 BPM | TEMPERATURE: 97 F | SYSTOLIC BLOOD PRESSURE: 130 MMHG | DIASTOLIC BLOOD PRESSURE: 70 MMHG | OXYGEN SATURATION: 94 % | RESPIRATION RATE: 14 BRPM

## 2021-05-28 VITALS
OXYGEN SATURATION: 96 % | HEART RATE: 62 BPM | DIASTOLIC BLOOD PRESSURE: 61 MMHG | BODY MASS INDEX: 33.26 KG/M2 | HEIGHT: 59 IN | TEMPERATURE: 97.6 F | WEIGHT: 165 LBS | SYSTOLIC BLOOD PRESSURE: 140 MMHG | RESPIRATION RATE: 14 BRPM

## 2021-05-28 NOTE — PROGRESS NOTES
Patient: TANVI ARROYO     Acct: [id number]     Report: #[id number]  UNIT #: [id number]     : 1935    Encounter Date:2021  PRIMARY CARE: ORTIZ NGUYEN  ***Signed***  --------------------------------------------------------------------------------------------------------------------  Chief Complaint      Encounter Date      2021            Primary Care Provider      ORTIZ NGUYEN            Patient Complaint      Patient is complaining of      Patient is here today for a 6 month follow up            VITALS      Height 4 ft 11 in / 149.86 cm      Weight 165 lbs 0 oz / 74.057335 kg      BSA 1.70 m2      BMI 33.3 kg/m2      Temperature 97.6 F / 36.44 C - Tympanic      Pulse 62      Respirations 14      Blood Pressure 140/61 Sitting, Right Arm      Pulse Oximetry 96%, room air            HPI      The patient is a 85 year old female with history of lung cancer status post left    lower lobectomy in , breast cancer status post mastectomy in ,     moderately severe chronic obstructive pulmonary disease, nocturnal hypoxia,     seasonal allergies and allergic rhinitis, tobacco abuse of cigarettes in     remission. She is here for follow up today.             She is not using Singulair but is using  ipratropium nasal spray. She has no     significant changes in weight or appetite, no coughing up blood, no fever or     chills. She continues to see Dr. Noel for her breast and lung cancer and     overall her surveillance scans and imaging has been okay. She is not willing to     get a COVID-19 vaccine. She is not taking any maintenance inhalers at this time     and does not want it.            ROS      Constitutional:  Denies: Fatigue, Fever, Weight gain, Weight loss, Chills,     Insomnia, Other      Respiratory/Breathing:  Complains of: Cough; Denies: Shortness of air, Wheezing,    Hemoptysis, Pleuritic pain, Other      Endocrine:  Denies: Polydipsia, Polyuria, Heat/cold  intolerance, Abnorml     menstrual pattern, Diabetes, Other      Eyes:  Denies: Blurred vision, Vision Changes, Other      Ears, nose, mouth, throat:  Denies: Congestion, Dysphagia, Hearing Changes, Nose    Bleeding, Nasal Discharge, Throat pain, Tinnitus, Other      Cardiovascular:  Denies: Chest Pain, Exertional dyspnea, Peripheral Edema,     Palpitations, Syncope, Wake up Gasping for air, Orthopnea, Tachycardia, Other      Gastrointestinal:  Denies: Abdominal pain/cramping, Bloody stools, Constipation,    Diarrhea, Melena, Nausea, Vomiting, Other      Genitourinary:  Denies: Dysuria, Urinary frequency, Incontinence, Hematuria,     Urgency, Other      Musculoskeletal:  Denies: Joint Pain, Joint Stiffness, Joint Swelling, Myalgias,    Other      Hematologic/lymphatic:  DENIES: Lymphadenopathy, Bruising, Bleeding tendencies,     Other      Neurologic:  Denies: Headache, Numbness, Weakness, Seizures, Other      Psychiatric:  Denies: Anxiety, Appropriate Effect, Depression, Other      Sleep:  No: Excessive daytime sleep, Morning Headache?, Snoring, Insomnia?, Stop    breathing at sleep?, Other      Integumentary:  Denies: Rash, Dry skin, Skin Warm to Touch, Other            FAMILY/SOCIAL/MEDICAL HX      Surgical History:  Yes: Bowel Surgery (COLONOSCOPY; EGD), Head Surgery (CAROTID     STENT 4/14), Orthopedic Surgery (RIGHT KNEE SURG 2004), Other Surgeries (lower     lobe of left lung removed ); No: AAA Repair, Abdominal Surgery, Angioplasty,     Appendectomy, Back Surgery, Bladder Surgery, Breast Surgery, CABG, Carotid     Stenosis, Cholecystectomy, Ear Surgery, Eye Surgery, Hernia Surgery, Kidney     Surgery, Nose Surgery, Oral Surgery, Prostatectomy, Rectal Surgery, Spinal     Surgery, Testicular Surgery, Throat Surgery, Valve Replacement, Vascular Surgery       Family History:  Yes      Social History:  No Tobacco Use, No Alcohol Use, No Recreational Drug use      Smoking status:  Former smoker       Anticoagulation Therapy:  No      Antibiotic Prophylaxis:  No      Medical History:  Yes: Arthritis (HANDS), Blood Disease (ANEMIA),     Chemotherapy/Cancer (LEFT BREAST CA, LEFT LUNG CA.), Hemorrhoids/Rectal Prob     (HX. ULCERS , COLON POLYPS, GERD), High Blood Pressure (CONTROLLED WITH MEDS),     Kidney Stones (1970), Shortness Of Breath (LEFT LOWER LOBECTOMY DUE TO CA),     Thyroid Problem (THYROID OVERACTIVE AND REMOVED (NONCANCEROUS)....TAKES     SYNTHROID); No: Anemia, Asthma, Broken Bones, Cataracts, Chemical Dependency,     Chronic Bronchitis/COPD, Emphysema, Chronic Liver Disease, Colon Trouble,     Colitis, Diverticulitis, Congestive Heart Failu, Deafness or Ringing Ears,     Depression, Anxiety, Bipolar Disorder, Diabetes, Epilepsy, Seizures, Glaucoma,     Gall Stones, Gout, Head Injury, Heart Attack (DENIES CHEST PAIN), Heart Murmur,     Hepatitis, Hiatal Hernia, HIV (Do not ask - volu, Kidney or Bladder Disease,     Migrane Headaches, Mitral Valve Prolapse, Psychiatric Care, Reflux Disease,     Rheumatic Fever, Sexually Transmitted Dis, Sinus Trouble, Skin     Disease/Psoriais/Ecz, Stroke, Tuberculosis or Pos TB Te, Miscellaneous Medical/    oth      Psychiatric History      none            PREVENTION      Hx Influenza Vaccination:  Yes      Date Influenza Vaccine Given:  Sep 1, 2020      Influenza Vaccine Declined:  No      2 or More Falls in Past Year?:  No      Fall Past Year with Injury?:  No      Hx Pneumococcal Vaccination:  Yes      Encouraged to follow-up with:  PCP regarding preventative exams.      Chart initiated by      An Foreman MA            ALLERGIES/MEDICATIONS      Allergies:        Coded Allergies:             NO KNOWN ALLERGIES (Unverified , 9/16/20)      Medications    Last Reconciled on 4/20/21 14:16 by THEODORE MARQUEZ MD      Ipratropium Bromide Nasal (Atrovent 0.06% Nasal) 15 Ml [place]      2 PUFFS NARE EACH QID, #15 ML 3 Refills         Prov: Theodore Marquez         4/20/21        Azelastine Hcl (Azelastine Nasal) 137 Mcg/0.137 Ml Spray.pump      2 PUFFS NARE EACH QDAY, #1 BOTTLE 4 Refills         Prov: Theodore Marquez         10/16/19       Rosuvastatin Calcium (Crestor*) 20 Mg Tablet      20 MG PO HS, #30 TAB 0 Refills         Reported         7/24/18       Senna (Senokot) 8.6 Mg Tablet      8.6 MG PO BID for 30 Days, #60 TAB         Prov: ELIAS CARSON         4/4/18       guaiFENesin LA (Humibid La) 600 Mg Tab.er.12h      600 MG PO BID PRN for CONGESTION, TAB         Reported         2/23/18       Azelastine/Fluticasone (Dymista Nasal Spray) 23 Gm Spray.pump      1 SPRAYS NARE EACH BID, #3 BOTTLE 3 Refills         Prov: Theodore Marquez         9/13/17       Tiotropium Br/Olodaterol HCl (Stiolto Respimat Inhal Spray) 4 Gm Mist.inhal      2 PUFFS INH RTQDAY, #1 INH 0 Refills         Reported         7/19/17       Nebivolol HCl (BYSTOLIC) 10 Mg Tablet      10 MG PO QDAY, #30 TAB         Reported         7/19/17       Chlorpheniramine Maleate (CHLORPHENIRAMINE MALEATE) 4 Mg Tablet      4 MG PO Q4-6H PRN for ALLERGIES, TAB         Reported         7/19/17       Lansoprazole (Prevacid) 30 Mg Tab.rap.dr      30 MG PO QDAY, TAB         Reported         7/19/17       Anastrozole (Anastrozole) 1 Mg Tablet      1 MG PO QDAY, #30 TAB         Reported         7/19/17       Aspirin Chew (Aspirin Baby) 81 Mg Tab.chew      81 MG PO QDAY, TAB.CHEW 0 Refills         Reported         4/24/16       Multivitamin/Iron/Folic Acid (CENTRUM COMPLETE MULTIVIT TAB) 1 Tab Tablet      1 TAB PO QDAY, TAB 0 Refills         Reported         11/17/14       Cholecalciferol (Vitamin D3) (Vitamin D3) 2,000 U Tablet      2000 UNITS PO QDAY, TAB 0 Refills         Reported         11/17/14       Clopidogrel Bisulfate (Plavix) 75 Mg Tablet      75 MG PO QDAY, TAB 0 Refills         Reported         11/17/14       Levothyroxine (Synthroid) 0.088 Mg Tablet      100 MCG PO QAM, TAB         Reported         6/30/11      Current Medications       Current Medications Reviewed 4/20/21            EXAM      CONSTITUTIONAL: Pleasant female  in no acute distress,  normal conversant.       EYES : Pink conjunctive, no ptosis, PERRL.       ENMT :Mallampati classification III.  Nose and ears appear normal, normal     dentition, mild posterior pharyngeal wall erythema. left sided sinus tenderness     present, no oral thrush.        Neck: Nontender, no masses, no thyromegaly, no nodules.      Resp : Bilateral diminished breath sounds, resonant to percussion bilaterally,     no wheezing, crackles or rhonchi.      CVS  : No carotid bruits, s1s2 nl, RRR, no murmur, rubs or gallop, no peripheral    edema       Chest wall: Normal rise with inspiration, nontender on palpation. Increased AP     diameter.       GI   : Abdomen soft, with no masses, no hepatosplenomegaly, no hernias, BS+      MSK  : Normal gait and station, no digital cyanosis or clubbing       Skin : No rashes, ulcerations or lesions, normal turgor and temperature      Neuro: CN II - XII intact, no sensory deficits, DTRs intact and symmetrical, no     motor weakness      Psych: Appropriate affect, A   Vitals      Vitals:             Height 4 ft 11 in / 149.86 cm           Weight 165 lbs 0 oz / 74.373286 kg           BSA 1.70 m2           BMI 33.3 kg/m2           Temperature 97.6 F / 36.44 C - Tympanic           Pulse 62           Respirations 14           Blood Pressure 140/61 Sitting, Right Arm           Pulse Oximetry 96%, room air            REVIEW      Results Reviewed      PCCS Results Reviewed?:  Yes Prev Lab Results, Yes Prev Radiology Results, Yes     Previous Mecial Records      Radiographic Results               [facility]                PACS RADIOLOGY REPORT            Patient: TANVI ARROYO   Acct: #[id number]   Report: #[id number]            UNIT #: [id number]    DOS: 10/08/19 1646      INSURANCE:MEDICARE PART A   LOCATION:CT      : 1935            PROVIDERS      ADMITTING:     ATTENDING: Rachael Noel      FAMILY:  REBECABuddyFABIOLAORTIZ   ORDERING:  Rachael Noel         OTHER:    DICTATING:  MARISEL HINES MD            REQ #:[id number]   EXAM:CTAPCWOC - CT ABD PEL CHEST wo      REASON FOR EXAM:  LUNG CA      REASON FOR VISIT:  LUNG CA            *******Signed******         PROCEDURE:   CT ABDOMEN PELVIS CHEST WITHOUT             COMPARISON:   [facility], CT, CT CHEST ABD PEL WO CONTRAST,     10/05/2018, 17:52.             INDICATIONS:   LEFT LUNG CA             TECHNIQUE:   CT images were obtained without the administration of intravenous     contrast material.             PROTOCOL:     Standard imaging protocol performed                RADIATION:     DLP: 989mGy*cm          Automated exposure control was utilized to minimize radiation dose.              FINDINGS:         Chest:  Emphysema.  Previous left lower lobectomy.  No suspicious pulmonary     nodule.  Previously       described ground-glass nodule in the right apex is not seen.  No adenopathy in     the chest.  Previous       left mastectomy.  No aggressive appearing bone change.             Abdomen:  Liver, spleen, pancreas, and gallbladder have an unremarkable     unenhanced appearance.        Stable right renal cyst.  Stable low-attenuation nodularity in the left adrenal     gland, likely       benign adenomas.  Bowel loops are nondilated.             Pelvis:  No pelvic mass or fluid.  3 cm left adnexal cyst measures simple fluid     attenuation.  This       cyst previously measured 2.4 cm.             No aggressive appearing bone change.               CONCLUSION:   Previously demonstrated ground-glass nodule in the right apex is     not seen.             No convincing evidence for metastatic disease in the chest, abdomen, or pelvis.             Slight interval increase in size of a simple fluid attenuation cyst in the left     adnexa.  Recommend        attention on yearly pelvic ultrasound follow-up.              MARISEL HINES MD             Electronically Signed and Approved By: MARISEL HINES MD on 10/09/2019 at 8:16                        Until signed, this is an unconfirmed preliminary report that may contain      errors and is subject to change.                                              SONG:      D:10/09/19 0816      PFT Results      Patient: TANVI SANCHEZ   Acct: #[id number]   Report: #[id number]            UNIT #: [id number]    DOS:       LOCATION:[facility]     : 1935            PROVIDERS      ADMITTING:     FAMILY:  ORTIZ NGUYEN         OTHER:       DICTATING:  Theodore Marquez MD            REASON FOR VISIT:  COPD            *******Signed******                                    [facility]                          [employer] Information Management Services                            [place], [place]  [address]               __________________________________________________________________________             Patient Name:                   Attending Physician:      Tanvi Sanchez M.D.             Patient Visit # MR #            Admit Date  Disch Date     Location      [id number]    [id number]      2019                 [facility]- -             Date of Birth      1935      __________________________________________________________________________      0821 - DIAGNOSTIC REPORT             PULMONARY FUNCTION TEST:             DATE OF STUDY:      2019             SPIROMETRY:      Spirometry is normal. FEV1/FVC is 68.  FEV1 is 1.14 L, 81% of predicted.  FVC      is 1.68 L, 87% of predicted.             BRONCHODILATOR RESPONSE:      There is no significant response to bronchodilator administration.             LUNG VOLUMES:      Lung volumes show air trapping. Total lung capacity is 4.85 L, 112% of      predicted. Residual volume is 3.07 L, 140% of predicted.             DIFFUSION  CAPACITY:      Diffusion capacity is moderately decreased, 48% of predicted.             FLOW VOLUME LOOP:      Flow volume loop is compatible with obstructive process.             COMPARISON:      Compared to pulmonary function test from 11/04/2016, FEV1 has decreased from      1.37 L to 1.14 L, 17% decrease. FVC has decreased from 1.94 L to 1.68 L, 14%      decrease. Diffusion capacity has remained stable. Total lung capacity has      decreased from 8.72 L to 4.85 L.  Residual volume has decreased from 6.73 L      to 3.07 L, 55% decrease.             CONCLUSION:      Normal FEV1/FVC with borderline low FEV1, normal FVC, air trapping and low      diffusion capacity with flow volume loop parameter, as well.  It suggests      mild to moderate obstructive airway disease, likely emphysema.  Diffusion      capacity is disproportionately low.  Lung function parameters have some      decrease in FEV1 and FVC parameters over time.  Please correlate clinically.             To be electronically signed in [employer]      [id number] THEODORE MARQUEZ M.D.             NK:kristine      D:  04/09/2019 17:14      T:  04/09/2019 22:41      #[id number]             R:  04/10/2019 02:25 pm             Until signed, this is an unconfirmed preliminary report that may contain      errors and is subject to change.                   Until signed, this is an unconfirmed preliminary report that may contain      errors and is subject to change.                     <Electronically signed by Theodore Marquez MD>                04/15/19 6650               Theodore Marquez MD:RJT      D:04/09/19 9374            Assessment      Notes      New Medications      * Ipratropium Bromide Nasal (Atrovent 0.06% Nasal) 15 ML SPRAY: 2 PUFFS NARE       EACH QID #15      Discontinued Medications      * Montelukast Sodium (Singulair*) 10 MG TAB: 10 MG PO HS #90       PLAN:      The patient is a 85 year old female with  history of lung cancer status post left    lower lobectomy in 2004, history of breast cancer status post mastectomy  in     2010, moderately severe chronic obstructive pulmonary disease, nocturnal     hypoxia, seasonal allergies and allergic rhinitis and tobacco abuse of ciga    rettes in remission.             1. Overall her pulmonary status is stable. She has not needed a rescue inhaler     at all.       2. Persistent sinusitis. Continue with  ipratropium nasal spray 2-4 times a day     and keep herself as active as possible. She is not using Singulair anymore.       3. Surveillance scans and imaging per Dr. STEPHANIE Noel.       4. Follow up in 9-12 months, earlier if needed.            Patient Education      Education resources provided:  Yes      Patient Education Provided:  Acute Bronchitis            Electronically signed by Theodore Marquez  05/01/2021 19:10       Disclaimer: Converted document may not contain table formatting or lab diagrams. Please see [employer] for the authenticated document.

## 2021-05-28 NOTE — PROGRESS NOTES
Patient: TANVI ARROYO     Acct: [id number]     Report: #[id number]  UNIT #: [id number]     : 1935    Encounter Date:2019  PRIMARY CARE: ORTIZ NGUYEN  ***Signed***  --------------------------------------------------------------------------------------------------------------------  Chief Complaint      Encounter Date      2019            Primary Care Provider      Guanako Zavala [facility]            Referring Provider      MERE BARNHART            Patient Complaint      Patient is complaining of      4 month follow up/soa            VITALS      Height 4 ft 11 in / 149.86 cm      Weight 162 lbs 0 oz / 73.349289 kg      BSA 1.69 m2      BMI 32.7 kg/m2      Temperature 98.2 F / 36.78 C - Oral      Pulse 63      Respirations 16      Blood Pressure 114/74 Sitting, Right Arm      Pulse Oximetry 97%, room air            HPI      The patient is an 83 year old female with a history of lung cancer, status post     left lower lobectomy in , history of breast cancer status post mastectomy in     and a history of chronic sinusitis and postnasal drip.  She is here for     follow up.  Since her last office visit, she was seen by Dr. Noel and she had    stopped her anastrozole.  Since she stopped it two months ago, her breathing is     better. She has Stiolto and albuterol at home, but does not use regularly.  She     has shortness of breath with activities and has had bronchitis at times.  She     did do the lung function test and six minute walk test which showed a stable     pulmonary function test.  Six minute walk test showed desaturation, but not to     the point where she would need oxygen.  She continues to follow up with Dr. Noel for her heart issues. She does not have significant weight gain or     weight loss. She has no fever, chills, nausea or vomiting.  She has cough     intermittently and has exertional dyspnea.  I discussed with her regarding     possible need of  oxygen with sleep. She says she likely does not drop oxygen.      We reviewed the pulmonary function test and six minute walk test today.            ROS      Constitutional:  Complains of: Fatigue; Denies: Fever, Weight gain, Weight loss,    Chills, Insomnia, Other      Respiratory/Breathing:  Complains of: Shortness of air, Wheezing; Denies: Cough,    Hemoptysis, Pleuritic pain, Other      Endocrine:  Denies: Polydipsia, Polyuria, Heat/cold intolerance, Abnorml     menstrual pattern, Diabetes, Other      Eyes:  Denies: Blurred vision, Vision Changes, Other      Ears, nose, mouth, throat:  Denies: Mouth lesions, Thrush, Throat pain,     Hoarseness, Allergies/Hay Fever, Post Nasal Drip, Headaches, Recent Head Injury,    Nose Bleeding, Neck Stiffness, Thyroid Mass, Hearing Loss, Ear Fullness, Dry     Mouth, Nasal or Sinus Pain, Dry Lips, Nasal discharge, Nasal congestion, Other      Cardiovascular:  Denies: Palpitations, Syncope, Claudication, Chest Pain, Wake     up Gasping for air, Leg Swelling, Irregular Heart Rate, Cyanosis, Dyspnea on     Exertion, Other      Gastrointestinal:  Denies: Nausea, Constipation, Diarrhea, Abdominal pain,     Vomiting, Difficulty Swallowing, Reflux/Heartburn, Dysphagia, Jaundice,     Bloating, Melena, Bloody stools, Other      Genitourinary:  Denies: Urinary frequency, Incontinence, Hematuria, Urgency,     Nocturia, Dysuria, Testicular problems, Other      Musculoskeletal:  Denies: Joint Pain, Joint Stiffness, Joint Swelling, Myalgias,    Other      Hematologic/lymphatic:  DENIES: Lymphadenopathy, Bruising, Bleeding tendencies,     Other      Neurological:  Denies: Headache, Numbness, Weakness, Seizures, Other      Psychiatric:  Denies: Anxiety, Appropriate Effect, Depression, Other      Sleep:  No: Excessive daytime sleep, Morning Headache?, Snoring, Insomnia?, Stop    breathing at sleep?, Other      Integumentary:  Denies: Rash, Dry skin, Skin Warm to Touch, Other       Immunologic/Allergic:  Denies: Latex allergy, Seasonal allergies, Asthma,     Urticaria, Eczema, Other      Immunization status:  No: Up to date            FAMILY/SOCIAL/MEDICAL HX      Surgical History:  Yes: Bowel Surgery (COLONOSCOPY; EGD), Head Surgery (CAROTID     STENT 4/14), Orthopedic Surgery (RIGHT KNEE SURG 2004), Other Surgeries (lower     lobe of left lung removed ); No: AAA Repair, Abdominal Surgery, Angioplasty,     Appendectomy, Back Surgery, Bladder Surgery, Breast Surgery, CABG, Carotid     Stenosis, Cholecystectomy, Ear Surgery, Eye Surgery, Hernia Surgery, Kidney     Surgery, Nose Surgery, Oral Surgery, Prostatectomy, Rectal Surgery, Spinal     Surgery, Testicular Surgery, Throat Surgery, Valve Replacement, Vascular Surgery      Is Father Still Living?:  No      Is Mother Still Living?:  No       Family History:  Yes      Social History:  No Tobacco Use, No Alcohol Use, No Recreational Drug use      Smoking status:  Former smoker (1 ppd for 40 years quit 2017)      Anticoagulation Therapy:  Yes      Antibiotic Prophylaxis:  No      Medical History:  Yes: Arthritis (HANDS), Blood Disease (ANEMIA),     Chemotherapy/Cancer (LEFT BREAST CA, LEFT LUNG CA.), Hemorrhoids/Rectal Prob     (HX. ULCERS , COLON POLYPS, GERD), High Blood Pressure (CONTROLLED WITH MEDS),     Kidney Stones (1970), Shortness Of Breath (LEFT LOWER LOBECTOMY DUE TO CA),     Thyroid Problem (THYROID OVERACTIVE AND REMOVED (NONCANCEROUS)....TAKES     SYNTHROID); No: Alcoholism, Allergies, Anemia, Asthma, Broken Bones, Cataracts,     Chemical Dependency, Chronic Bronchitis/COPD, Emphysema, Chronic Liver Disease,     Colon Trouble, Colitis, Diverticulitis, Congestive Heart Failu, Deafness or     Ringing Ears, Convulsions, Depression, Anxiety, Bipolar Disorder, Diabetes,     Epilepsy, Seizures, Forgetfullness, Glaucoma, Gall Stones, Gout, Head Injury,     Heart Attack (DENIES CHEST PAIN), Heart Murmur, Hepatitis, Hiatal Hernia, High      Cholesterol, HIV (Do not ask - volu, Jaundice, Kidney or Bladder Disease,     Migrane Headaches, Mitral Valve Prolapse, Night sweats, Phlebitis, Psychiatric     Care, Reflux Disease, Rheumatic Fever, Sexually Transmitted Dis, Sinus Trouble,     Skin Disease/Psoriais/Ecz, Stroke, Tuberculosis or Pos TB Te, Miscellaneous     Medical/oth      Psychiatric History      none            PREVENTION      Hx Influenza Vaccination:  Yes      Date Influenza Vaccine Given:  Sep 1, 2018      Influenza Vaccine Declined:  No      2 or More Falls Past Year?:  No      Fall Past Year with Injury?:  No      Hx Pneumococcal Vaccination:  Yes      Encouraged to follow-up with:  PCP regarding preventative exams.      Chart initiated by      gladys robledo ma            ALLERGIES/MEDICATIONS      Allergies:        Coded Allergies:             NO KNOWN ALLERGIES (Unverified , 4/26/19)      Medications    Last Reconciled on 4/26/19 12:06 by THEODORE MARQUEZ MD      Montelukast Sodium (Singulair*) 10 Mg Tab      10 MG PO HS, #90 TAB 3 Refills         Prov: Theodore Marquez         7/24/18       Rosuvastatin Calcium (Crestor*) 20 Mg Tablet      20 MG PO HS, #30 TAB 0 Refills         Reported         7/24/18       Polyethylene Glycol (Miralax*) 17 Gm Packet      17 GM PO QDAY for 30 Days, #30 PACKET         Prov: ELIAS CARSON         4/4/18       Senna (Senokot) 8.6 Mg Tablet      8.6 MG PO BID for 30 Days, #60 TAB         Prov: ELIAS CARSON         4/4/18       oxyCODONE HCl (oxyCODONE IR) 5 Mg Tablet      10 MG PO Q4H PRN for SEVERE PAIN (PAIN LEVEL 7-10), #10 TAB         Prov: ELIAS CARSON         4/4/18       Enoxaparin (Lovenox) 40 Mg/0.4 Ml Syringe      40 MG SUBQ QDAY for 21 Days, #21 SYRINGE         Prov: ELIAS CARSON         4/4/18       Guaifenesin (Humibid La*) 600 Mg Tab.er.12h      600 MG PO BID PRN for CONGESTION, TAB         Reported         2/23/18       Montelukast Sodium (Singulair*) 10 Mg Tab      10 MG PO HS, #90 TAB 3 Refills          Prov: Theodore Marquez         9/13/17       Azelastine/Fluticasone (Dymista Nasal Spray) 23 Gm Spray.pump      1 SPRAYS NARE EACH BID, #3 BOTTLE 3 Refills         Prov: Theodore Marquez         9/13/17       Tiotropium Br/Olodaterol HCl (Stiolto Respimat Inhal Spray) 4 Gm Mist.inhal      2 PUFFS INH RTQDAY, #1 INH 0 Refills         Reported         7/19/17       Nebivolol HCl (BYSTOLIC) 10 Mg Tablet      10 MG PO QDAY, #30 TAB         Reported         7/19/17       Chlorpheniramine Maleate (CHLORPHENIRAMINE MALEATE) 4 Mg Tablet      4 MG PO Q4-6H PRN for ALLERGIES, TAB         Reported         7/19/17       Lansoprazole (Prevacid) 30 Mg Tab.rap.dr      30 MG PO QDAY, TAB         Reported         7/19/17       Furosemide* (Lasix*) 40 Mg Tablet      40 MG PO QDAY, #30 TAB 0 Refills         Reported         7/19/17       Anastrozole (Anastrozole) 1 Mg Tablet      1 MG PO QDAY, #30 TAB         Reported         7/19/17       Spironolactone (Aldactone) 25 Mg Tablet      25 MG PO QDAY, #30 TAB 5 Refills         Prov: PIEDAD RODRIGUEZ         4/27/16       Aspirin Chew (Aspirin Baby) 81 Mg Tab.chew      81 MG PO QDAY, TAB.CHEW 0 Refills         Reported         4/24/16       Multivitamin/Iron/Folic Acid (CENTRUM COMPLETE MULTIVIT TAB) 1 Tab Tablet      1 TAB PO QDAY, TAB 0 Refills         Reported         11/17/14       Cholecalciferol (Vitamin D3*) 2,000 U Tablet      2000 UNITS PO QDAY, TAB 0 Refills         Reported         11/17/14       Clopidogrel Bisulfate (Plavix) 75 Mg Tablet      75 MG PO QDAY, TAB 0 Refills         Reported         11/17/14       Levothyroxine (Synthroid) 0.088 Mg Tablet      100 MCG PO QAM, TAB         Reported         6/30/11      Current Medications      Current Medications Reviewed 4/26/19            EXAM      CONSTITUTIONAL: Pleasant female  in no acute distress,  normal conversant.       EYES : Pink conjunctive, no ptosis, PERRL.       ENMT :Mallampati classification III.  Nose and ears appear normal,  normal     dentition, mild posterior pharyngeal wall erythema. left sided sinus tenderness     present, no oral thrush.        Neck: Nontender, no masses, no thyromegaly, no nodules.      Resp : Bilateral diminished breath sounds, resonant to percussion bilaterally,     no wheezing, crackles or rhonchi.      CVS  : No carotid bruits, s1s2 nl, RRR, no murmur, rubs or gallop, no peripheral    edema       Chest wall: Normal rise with inspiration, nontender on palpation. Increased AP     diameter.       GI   : Abdomen soft, with no masses, no hepatosplenomegaly, no hernias, BS+      MSK  : Normal gait and station, no digital cyanosis or clubbing       Skin : No rashes, ulcerations or lesions, normal turgor and temperature      Neuro: CN II - XII intact, no sensory deficits, DTRs intact and symmetrical, no     motor weakness      Psych: Appropriate affect, A   Vtials      Vitals:             Height 4 ft 11 in / 149.86 cm           Weight 162 lbs 0 oz / 73.229108 kg           BSA 1.69 m2           BMI 32.7 kg/m2           Temperature 98.2 F / 36.78 C - Oral           Pulse 63           Respirations 16           Blood Pressure 114/74 Sitting, Right Arm           Pulse Oximetry 97%, room air            REVIEW      Results Reviewed      PCCS Results Reviewed?:  Yes Prev Lab Results, Yes Prev Radiology Results, Yes     Previous [facility] Records      Lab Results      The patient's six minute walk distance was 960 feet with desaturation from 99     too 89% within four minutes, but it did drop below 99%.      Radiographic Results               [facility]                PACS RADIOLOGY REPORT            Patient: TANVI ARROYO   Acct: #[id number]   Report: #[id number]            UNIT #: [id number]    DOS: 18 1008      INSURANCE:MEDICARE PART A   LOCATION:[facility]     : 1935            PROVIDERS      ADMITTING:     ATTENDING: ORTIZ NGUYEN      FAMILY:   ORTIZ NGUYEN   ORDERING:  ORTIZ NGUYEN         OTHER:    DICTATING:  EVERARDO ZAVALETA MD            REQ #:[id number]   EXAM:CXR2 - CHEST 2V AP PA LAT      REASON FOR EXAM:  I50.9/PEDAL EDEMA/      REASON FOR VISIT:  I50.9/PEDAL EDEMA/            *******Signed******         PROCEDURE:   CHEST AP/PA AND LATERAL             COMPARISON:   [facility], CR, CHEST AP/PA 1 VIEW, 3/28/2018,     12:00.  [facility], CT, CT CHEST ABD PEL WO CONTRAST, 10/05/2018, 17:52.             INDICATIONS:   CHF; CHRONIC EPISODES OF SHORTNESS OF AIR; PEDAL EDEMA             FINDINGS:         There is a left-sided AICD noted.  Stable mild cardiomegaly.  No focal     consolidation, pneumothorax,       or large effusion.  Multilevel degenerative findings in the thoracic spine.      Thoracic aortic       atherosclerotic calcifications noted.             CONCLUSION:         1. Stable cardiomegaly.      2. Clear lungs.              EVERARDO ZAVALETA MD             Electronically Signed and Approved By: EVERARDO ZAVALETA MD on 2018 at 10:54                        Until signed, this is an unconfirmed preliminary report that may contain      errors and is subject to change.                                              EVERARDOM:      D:18 1055      PFT Results      Patient: TANVI ARROYO   Acct: #[id number]   Report: #[id number]            UNIT #: [id number]    DOS:       LOC[facility]     : 1935            PROVIDERS      ADMITTING:     FAMILY:  ADELETERRI PATRICK         OTHER:       DICTATING:  Theoodre Marquez MD            REASON FOR VISIT:  COPD            *******Signed******                                    [facility]                          [employer] Services                            [place], [place]  [address]               __________________________________________________________________________             Patient Name:                    Attending Physician:      Yelena Sanchez M.D.             Patient Visit # MR #            Admit Date  Disch Date     Location      [id number]    [id number]      04/04/2019                 [facility]- -             Date of Birth      1935      __________________________________________________________________________      0821 - DIAGNOSTIC REPORT             PULMONARY FUNCTION TEST:             DATE OF STUDY:      04/04/2019             SPIROMETRY:      Spirometry is normal. FEV1/FVC is 68.  FEV1 is 1.14 L, 81% of predicted.  FVC      is 1.68 L, 87% of predicted.             BRONCHODILATOR RESPONSE:      There is no significant response to bronchodilator administration.             LUNG VOLUMES:      Lung volumes show air trapping. Total lung capacity is 4.85 L, 112% of      predicted. Residual volume is 3.07 L, 140% of predicted.             DIFFUSION CAPACITY:      Diffusion capacity is moderately decreased, 48% of predicted.             FLOW VOLUME LOOP:      Flow volume loop is compatible with obstructive process.             COMPARISON:      Compared to pulmonary function test from 11/04/2016, FEV1 has decreased from      1.37 L to 1.14 L, 17% decrease. FVC has decreased from 1.94 L to 1.68 L, 14%      decrease. Diffusion capacity has remained stable. Total lung capacity has      decreased from 8.72 L to 4.85 L.  Residual volume has decreased from 6.73 L      to 3.07 L, 55% decrease.             CONCLUSION:      Normal FEV1/FVC with borderline low FEV1, normal FVC, air trapping and low      diffusion capacity with flow volume loop parameter, as well.  It suggests      mild to moderate obstructive airway disease, likely emphysema.  Diffusion      capacity is disproportionately low.  Lung function parameters have some      decrease in FEV1 and FVC parameters over time.  Please correlate clinically.             To be electronically signed in [employer]      [id number] CHUY WHITFIELD M.D.              NK:cor      D:  04/09/2019 17:14      T:  04/09/2019 22:41      #[id number]             R:  04/10/2019 02:25 pm             Until signed, this is an unconfirmed preliminary report that may contain      errors and is subject to change.                   Until signed, this is an unconfirmed preliminary report that may contain      errors and is subject to change.                     <Electronically signed by Theodore Marquez MD>                04/15/19 9030               Theodore Marquez MD:RJCECILIO      D:04/09/19 3054            Assessment      Chronic respiratory failure with hypoxia - J96.11            Notes      New Diagnostics      * Overnight Oximetry, Routine         Dx: Chronic respiratory failure with hypoxia - J96.11      PLAN:      The patient is a 83 year old female with history of lung cancer status post left    lower lobectomy in 2004, history of breast cancer status post mastectomy in     2010. She has chronic sinusitis and postnasal drip.             1. Moderately severe chronic obstructive pulmonary disease. Continue with     Stiolto and albuterol.  She is currently not using any.  I advised her to go     back on it if she starts having worsening shortness of breath.      2.  I will check overnight oximetry now.      3.  She is up-to-date on vaccinations.        4. Continue Lasix by Dr. Talbot.        5. Continue Dymista and Singulair if needed.  She is currently using any nasal     sprays.        6. We will stop Stiolto if she remains stable over the next six months.      7. I will check overnight oximetry follow up in six months, earlier if needed.            Patient Education      Education resources provided:  Yes      Patient Education Provided:  Acute Bronchitis                 Disclaimer: Converted document may not contain table formatting or lab diagrams. Please see [employer] for the authenticated document.

## 2021-05-28 NOTE — PROGRESS NOTES
Patient: TANVI ARROYO     Acct: [id number]     Report: #[id number]  UNIT #: [id number]     : 1935    Encounter Date:2018  PRIMARY CARE: ORTIZ NGUYEN  ***Signed***  --------------------------------------------------------------------------------------------------------------------  Chief Complaint      Encounter Date      Dec 13, 2018            Primary Care Provider      A            Referring Provider      MERE BARNHART            Patient Complaint      Patient is complaining of      5 month follow up            VITALS      Height 4 ft 11 in / 149.86 cm      Weight 162 lbs 3 oz / 73.704819 kg      BSA 1.69 m2      BMI 32.8 kg/m2      Temperature 98.2 F / 36.78 C - Oral      Pulse 96      Respirations 16      Blood Pressure 150/74 Sitting, Right Arm      Pulse Oximetry 98%, room air            HPI      The patient is a 83 year old female with history of lung cancer status post left    upper lobe in , history of breast cancer status post mastectomy in . She    has chronic sinusitis and postnasal drip.             Since her last office visit she has been on Stiolto and albuterol as needed. She    does not smoke any more. She has some shortness of breath with activities but     does not take Stiolto every day. She does say that when she uses it she feels     some improvement but does not know if it helps a lot. She is willing to stop it     if needed. She has kidney failure and sees Dr. Bryant and sees Dr. Talbot for her    heart and uses Lasix. She uses nasal spray at times but she does not like the     taste of Dymista in the back of her throat. She continues to take Singulair     daily and chlorpheniramine which does help.            ROS      Constitutional:  Complains of: Fatigue; Denies: Fever, Weight gain, Weight loss,    Chills, Insomnia, Other      Respiratory/Breathing:  Complains of: Shortness of air; Denies: Wheezing, Cough,    Hemoptysis, Pleuritic pain, Other       Endocrine:  Denies: Polydipsia, Polyuria, Heat/cold intolerance, Abnorml menstr    ual pattern, Diabetes, Other      Eyes:  Denies: Blurred vision, Vision Changes, Other      Ears, nose, mouth, throat:  Denies: Mouth lesions, Thrush, Throat pain,     Hoarseness, Allergies/Hay Fever, Post Nasal Drip, Headaches, Recent Head Injury,    Nose Bleeding, Neck Stiffness, Thyroid Mass, Hearing Loss, Ear Fullness, Dry     Mouth, Nasal or Sinus Pain, Dry Lips, Nasal discharge, Nasal congestion, Other      Cardiovascular:  Denies: Palpitations, Syncope, Claudication, Chest Pain, Wake     up Gasping for air, Leg Swelling, Irregular Heart Rate, Cyanosis, Dyspnea on     Exertion, Other      Gastrointestinal:  Denies: Nausea, Constipation, Diarrhea, Abdominal pain,     Vomiting, Difficulty Swallowing, Reflux/Heartburn, Dysphagia, Jaundice,     Bloating, Melena, Bloody stools, Other      Genitourinary:  Denies: Urinary frequency, Incontinence, Hematuria, Urgency,     Nocturia, Dysuria, Testicular problems, Other      Musculoskeletal:  Denies: Joint Pain, Joint Stiffness, Joint Swelling, Myalgias,    Other      Hematologic/lymphatic:  DENIES: Lymphadenopathy, Bruising, Bleeding tendencies,     Other      Neurological:  Denies: Headache, Numbness, Weakness, Seizures, Other      Psychiatric:  Denies: Anxiety, Appropriate Effect, Depression, Other      Sleep:  No: Excessive daytime sleep, Morning Headache?, Snoring, Insomnia?, Stop    breathing at sleep?, Other      Integumentary:  Denies: Rash, Dry skin, Skin Warm to Touch, Other      Immunologic/Allergic:  Denies: Latex allergy, Seasonal allergies, Asthma,     Urticaria, Eczema, Other      Immunization status:  No: Up to date            FAMILY/SOCIAL/MEDICAL HX      Surgical History:  Yes: Bowel Surgery (COLONOSCOPY; EGD), Head Surgery (CAROTID     STENT 4/14), Orthopedic Surgery (RIGHT KNEE SURG 2004), Other Surgeries (lower     lobe of left lung removed ); No: AAA Repair,  Abdominal Surgery, Angioplasty,     Appendectomy, Back Surgery, Bladder Surgery, Breast Surgery, CABG, Carotid     Stenosis, Cholecystectomy, Ear Surgery, Eye Surgery, Hernia Surgery, Kidney     Surgery, Nose Surgery, Oral Surgery, Prostatectomy, Rectal Surgery, Spinal     Surgery, Testicular Surgery, Throat Surgery, Valve Replacement, Vascular Surgery      Is Father Still Living?:  No      Is Mother Still Living?:  No       Family History:  Yes      Social History:  No Tobacco Use, No Alcohol Use, No Recreational Drug use      Smoking status:  Former smoker      Anticoagulation Therapy:  Yes      Antibiotic Prophylaxis:  No      Medical History:  Yes: Arthritis (HANDS), Blood Disease (ANEMIA),     Chemotherapy/Cancer (LEFT BREAST CA, LEFT LUNG CA.), Hemorrhoids/Rectal Prob     (HX. ULCERS , COLON POLYPS, GERD), High Blood Pressure (CONTROLLED WITH MEDS),     Kidney Stones (1970), Shortness Of Breath (LEFT LOWER LOBECTOMY DUE TO CA),     Thyroid Problem (THYROID OVERACTIVE AND REMOVED (NONCANCEROUS)....TAKES     SYNTHROID); No: Alcoholism, Allergies, Anemia, Asthma, Broken Bones, Cataracts,     Chemical Dependency, Chronic Bronchitis/COPD, Emphysema, Chronic Liver Disease,     Colon Trouble, Colitis, Diverticulitis, Congestive Heart Failu, Deafness or     Ringing Ears, Convulsions, Depression, Anxiety, Bipolar Disorder, Diabetes,     Epilepsy, Seizures, Forgetfullness, Glaucoma, Gall Stones, Gout, Head Injury,     Heart Attack (DENIES CHEST PAIN), Heart Murmur, Hepatitis, Hiatal Hernia, High     Cholesterol, HIV (Do not ask - volu, Jaundice, Kidney or Bladder Disease,     Migrane Headaches, Mitral Valve Prolapse, Night sweats, Phlebitis, Psychiatric     Care, Reflux Disease, Rheumatic Fever, Sexually Transmitted Dis, Sinus Trouble,     Skin Disease/Psoriais/Ecz, Stroke, Tuberculosis or Pos TB Te, Miscellaneous     Medical/oth      Psychiatric History      none            PREVENTION      Hx Influenza Vaccination:   Yes      Date Influenza Vaccine Given:  Sep 1, 2018      Influenza Vaccine Declined:  No      2 or More Falls Past Year?:  No      Fall Past Year with Injury?:  No      Hx Pneumococcal Vaccination:  Yes      Encouraged to follow-up with:  PCP regarding preventative exams.      Chart initiated by      Veronique Poe ma            ALLERGIES/MEDICATIONS      Allergies:        Coded Allergies:             NO KNOWN ALLERGIES (Unverified , 12/13/18)      Medications    Last Reconciled on 12/13/18 11:19 by THEODORE MARQUEZ MD      Montelukast Sodium (Singulair*) 10 Mg Tab      10 MG PO HS, #90 TAB 3 Refills         Prov: Theodore Marquez         7/24/18       Rosuvastatin Calcium (Crestor*) 20 Mg Tablet      20 MG PO HS, #30 TAB 0 Refills         Reported         7/24/18       Polyethylene Glycol (Miralax*) 17 Gm Packet      17 GM PO QDAY for 30 Days, #30 PACKET         Prov: ELIAS CARSON         4/4/18       Senna (Senokot) 8.6 Mg Tablet      8.6 MG PO BID for 30 Days, #60 TAB         Prov: ELIAS CARSON         4/4/18       oxyCODONE HCl (oxyCODONE IR) 5 Mg Tablet      10 MG PO Q4H PRN for SEVERE PAIN (PAIN LEVEL 7-10), #10 TAB         Prov: ELIAS CARSON         4/4/18       Enoxaparin (Lovenox) 40 Mg/0.4 Ml Syringe      40 MG SUBQ QDAY for 21 Days, #21 SYRINGE         Prov: ELIAS CARSON         4/4/18       Guaifenesin (Humibid La*) 600 Mg Tab.er.12h      600 MG PO BID PRN for CONGESTION, TAB         Reported         2/23/18       Montelukast Sodium (Singulair*) 10 Mg Tab      10 MG PO HS, #90 TAB 3 Refills         Prov: Theodore Marquez         9/13/17       Azelastine/Fluticasone (Dymista Nasal Spray) 23 Gm Spray.pump      1 SPRAYS NARE EACH BID, #3 BOTTLE 3 Refills         Prov: Theodore Marquez         9/13/17       Tiotropium Br/Olodaterol HCl (Stiolto Respimat Inhal Spray) 4 Gm Mist.inhal      2 PUFFS INH RTQDAY, #1 INH 0 Refills         Reported         7/19/17       Nebivolol HCl (BYSTOLIC) 10 Mg Tablet      10 MG PO QDAY, #30 TAB          Reported         7/19/17       Chlorpheniramine Maleate (CHLORPHENIRAMINE MALEATE) 4 Mg Tablet      4 MG PO Q4-6H PRN for ALLERGIES, TAB         Reported         7/19/17       Lansoprazole (Prevacid*) 30 Mg Tab.rap.dr      30 MG PO QDAY, TAB         Reported         7/19/17       Furosemide* (Lasix*) 40 Mg Tablet      40 MG PO QDAY, #30 TAB 0 Refills         Reported         7/19/17       Anastrozole (Anastrozole) 1 Mg Tablet      1 MG PO QDAY, #30 TAB         Reported         7/19/17       Spironolactone (Aldactone) 25 Mg Tablet      25 MG PO QDAY, #30 TAB 5 Refills         Prov: PIEDAD RODRIGUEZ         4/27/16       Aspirin Chew (Aspirin Baby) 81 Mg Tab.chew      81 MG PO QDAY, TAB.CHEW 0 Refills         Reported         4/24/16       Multivitamin/Iron/Folic Acid (CENTRUM COMPLETE MULTIVIT TAB) 1 Tab Tablet      1 TAB PO QDAY, TAB 0 Refills         Reported         11/17/14       Cholecalciferol (Vitamin D3*) 2,000 U Tablet      2000 UNITS PO QDAY, TAB 0 Refills         Reported         11/17/14       Clopidogrel Bisulfate (Plavix) 75 Mg Tablet      75 MG PO QDAY, TAB 0 Refills         Reported         11/17/14       Levothyroxine (Synthroid) 0.088 Mg Tablet      100 MCG PO QAM, TAB         Reported         6/30/11      Current Medications      Current Medications Reviewed 12/13/18            EXAM      CONSTITUTIONAL: Pleasant female  in no acute distress,  normal conversant.       EYES : Pink conjunctive, no ptosis, PERRL.       ENMT :Mallampati classification III.  Nose and ears appear normal, normal     dentition, mild posterior pharyngeal wall erythema. left sided sinus tenderness     present, no oral thrush.        Neck: Nontender, no masses, no thyromegaly, no nodules.      Resp : Bilateral diminished breath sounds, resonant to percussion bilaterally,     no wheezing, crackles or rhonchi.      CVS  : No carotid bruits, s1s2 nl, RRR, no murmur, rubs or gallop, no peripheral    edema       Chest wall: Normal rise  with inspiration, nontender on palpation. Increased AP     diameter.       GI   : Abdomen soft, with no masses, no hepatosplenomegaly, no hernias, BS+      MSK  : Normal gait and station, no digital cyanosis or clubbing       Skin : No rashes, ulcerations or lesions, normal turgor and temperature      Neuro: CN II - XII intact, no sensory deficits, DTRs intact and symmetrical, no     motor weakness      Psych: Appropriate affect, A   Vtials      Vitals:             Height 4 ft 11 in / 149.86 cm           Weight 162 lbs 3 oz / 73.396958 kg           BSA 1.69 m2           BMI 32.8 kg/m2           Temperature 98.2 F / 36.78 C - Oral           Pulse 96           Respirations 16           Blood Pressure 150/74 Sitting, Right Arm           Pulse Oximetry 98%, room air            REVIEW      Results Reviewed      PCCS Results Reviewed?:  Yes Prev Lab Results, Yes Prev Radiology Results, Yes     Previous Mecial Records      Lab Results      The patient's blood work from 10/29/18 showed anemia at 11.2, normal white blood    count and normal platelet count. Serum creatinine was 1.7, otherwise normal     electrolytes and normal renal function.      Radiographic Results               [facility]                PACS RADIOLOGY REPORT            Patient: TANVI ARROYO   Acct: #[id number]   Report: #[id number]            UNIT #: [id number]    DOS: 10/05/18       INSURANCE:MEDICARE PART A   LOCATION:CT     : 1935            PROVIDERS      ADMITTING:     ATTENDING: Rachael Noel      FAMILY:  ORTIZ NGUYEN   ORDERING:  Rachael Noel         OTHER:    DICTATING:  Harjit Booker MD, JR            REQ #:[id number]   EXAM:CTAPCWOC - CT ABD PEL CHEST wo      REASON FOR EXAM:  LUNG CA, BREAST CA      REASON FOR VISIT:  LUNG CA, BREAST CA            *******Signed******         PROCEDURE:   CT ABDOMEN PELVIS CHEST WITHOUT             COMPARISONS:   [facility]  [facility], PET, SKULL BASE TO MID THIGH SUBSEQ,     3/17/2014, 11:10.           [facility], CT, CHEST W/O CONTRAST, 12/08/2016, 10:01.           [facility], CT, CHEST W/O CONTRAST, 6/08/2017, 9:48.             INDICATIONS:   H/O LUNG CANCER AND BREAST CANCER; LLQ ABDOMINAL PAIN, LOWER     PELVIC PAIN.             TECHNIQUE:   Multiplanar CT images were obtained without the administration of     intravenous contrast       material.  The chest CT exam is limited in that coronal and sagittal 2D     reformations are not       provided for review at the time of interpretation.             PROTOCOL:     Standard imaging protocol performed                RADIATION:     DLP: 1174 mGy*cm          Automated exposure control was utilized to minimize radiation dose.              FINDINGS:                CHEST CT:  There is mild to moderate cardiomegaly.  Extensive atherosclerosis is    seen, including       involvement of the coronary arteries.  There is a left-sided cardiac implantable    electronic device       (CIED) in place, as before.  No pleural effusion is seen.  No pericardial     effusion is identified.        The pulmonary arteries are prominent.  Pulmonary arterial hypertension cannot be    excluded.  Again,       there are postoperative changes of the inferior left thorax with chronic volume     loss.  These       findings were seen previously.  Probably no nonenhanced CT evidence of enlarged     mediastinal or       hilar lymph nodes.  There is chronic pleural-parenchymal scarring in the     inferior left thorax.  No       acute infiltrate is seen.  There is a new vague nodular opacity within the     anterior right pulmonary       apex, as seen on image 17 of series 6, which measures approximately 8 mm in     size.  This finding is       new since the prior 6/8/2017 CT exam.  There may be other tiny subpleural     noncalcified pulmonary       nodules, as well, measuring 5 mm or less in  size.  No pneumothorax is     appreciated.  There are       degenerative changes of the imaged spine.  The patient has undergone left     mastectomy.  No enlarged       periclavicular or axillary lymph nodes are appreciated.             ABDOMEN CT:  There is stable nodularity of the bilateral adrenal glands.  Oral     contrast agent was       administered for the study with associated streak artifact.  A tiny hiatal     hernia is suspected.        Atherosclerotic change is noted.  No acute cholecystitis or gallstones are seen.     No acute       pancreatitis.  No renal stones or hydronephrosis or obstructive uropathy is     seen.    There are       suspected renal cysts, such as involving the posterior aspect of the upper pole     of the right       kidney, estimated at 2 cm in greatest size; it has a CT number of 4 Hounsfield     units or less.  It       was seen on the prior 12/8/2016 CT study and has not significantly changed.  No     bowel obstruction.        There are colonic diverticula without acute diverticulitis.  There is a small     umbilical hernia.  It       does not contain bowel.             PELVIS CT:  The appendix is not clearly identified.  Please correlate with the     surgical history.        There are scattered colonic diverticula without acute diverticulitis.  No acute     colitis is seen.        No urinary bladder calculi or urinary bladder wall thickening is seen.  No     definite uterine or       right adnexal mass is suggested.  There is a left adnexal cyst, which measures     about 2.2 cm in       greatest size.  It is new since the prior PET-CT scan from 3/17/2014 and is     nonspecific.  Consider       imaging followup of the finding if clinically warranted.  For instance, pelvis     ultrasound       examination may be helpful if clinically indicated.  There is internal fixation     hardware within the       right femur, which is new since the 2014 PET-CT study with associated streak      artifact.  There are       degenerative changes of the sacroiliac joints and the imaged spine.  No obvious     osseous metastatic       disease is suspected.  Enthesopathic changes also involve the bilateral pelvis.     There is a       prominent Schmorl's node involving the superior endplate at L3, which was seen     on the prior       3/17/2014 PET-CT scan.  Similar findings may be seen at L1 to a lesser degree.      There is chronic       anterolisthesis at L3-4, estimated at less than or equal to 4 mm.  No urinary     bladder wall       thickening or urinary bladder calculi are seen.  No ascites or pneumoperitoneum     is seen.  No       aneurysmal enlargement of the abdominal aorta is suspected.  Its maximum     infrarenal diameter is 2.5       cm.  There is extensive atherosclerosis.             CONCLUSION:     (CONCLUSION)             1. There is a new vague somewhat ground-glass attenuation noncalcified pulmonary    nodule identified       in the anterior apex of the right upper lobe, measuring 8 mm in greatest axial     dimension.  Consider       imaging followup of this finding to ensure a benign progression (such as with     chest CT examination       in 3 months or sooner) and to exclude an underlying malignant process.  It is     new since the       6/8/2017 CT study.               2. There is a new 2.2 cm left adnexal cyst since the 03/17/2014 PET-CT scan.      Ultrasound       examination of the pelvis may be helpful in further imaging assessment of this     finding if       clinically indicated.             3. There is no convincing nonenhanced CT evidence for residual or recurrent ma    lignancy in the       chest, abdomen, or pelvis or otherwise.              4. No acute findings are seen by nonenhanced CT.  Specifically, no acute colitis    or diverticulitis       is suspected.  No renal stones or hydronephrosis or obstructive uropathy is     seen.             5. The chest CT study is limited in  that sagittal and coronal two-dimensional     reformations are not       provided at the time of interpretation.             6.  Please see above comments for further detail.               ADDENDUM:  Note is made that upon request, the coronal and sagittal 2D     reformations were provided       for review.  In the differential diagnosis for the aforementioned nodular     ground-glass opacity in       the superior right upper lobe would be a small focal infiltrate.  Again,     consider imaging follow-up       of this finding to ensure a benign progression.             TIMMY HUMPHREY JR, MD             Electronically Signed and Approved By: TIMMY HUMPHREY JR, MD on 10/06/2018 at     2:44                        Until signed, this is an unconfirmed preliminary report that may contain      errors and is subject to change.                                              CARJI:      D:10/05/18 2313      PFT Results      [id number]  [id number]                                       [facility]                          [employer]                            [place], [place]  [address]               __________________________________________________________________________             Patient Name:                   Attending Physician:      Yelena Sanchez M.D.             Patient Visit # MR #            Admit Date  Disch Date     Location      [id number]    [id number]      11/04/2016                 [facility] -             Date of Birth      1935      __________________________________________________________________________      821 - DIAGNOSTIC REPORT             PULMONARY FUNCTION TEST             DATE OF SERVICE:      11/4/16.             PRIMARY CARE PHYSICIAN:      Kaelyn Eugene M.D.             SPIROMETRY:      Spirometry is normal.      FEV1/FVC ratio 71%.      FEV1 is 1.37 L, 91% of predicted.      FVC is 1.94 L, 96%  of predicted.      Bronchodilator response:  There is no significant response to bronchodilator      administration.             LUNG VOLUMES:      Lung volumes show hyperinflation and airtrapping.      Total lung capacity is 0.72 L, 202% of predicted.      Residual volume is 6.73 L, 314% of predicted.             DIFFUSION:      Diffusion capacity is moderately decreased, 46% of predicted.             FLOW VOLUME LOOP:      Flow volume loop is normal.             CONCLUSION:        1. Normal spirometry with airtrapping, hyperinflation and low diffusion           capacity.  It could suggest early obstructive airway disease, likely           emphysema, but there is significant decrease in diffusion capacity,           which is disproportionately low compared to other parameters in           pulmonary function test.        2. Please correlate clinically.             To be electronically signed in [employer]      [id number] THEODORE MARQUEZ M.D.             NK:nova      D:  11/18/2016 16:45      T:  11/18/2016 19:58      #[id number]             Until signed, this is an unconfirmed preliminary report that may contain      errors and is subject to change.                   11/20/16 2059  <Electronically signed by Theodore Marquez MD>            Assessment      COPD (chronic obstructive pulmonary disease)         Centrilobular emphysema - J43.2         Emphysema type: centrilobular            Notes      Changed Medications      * Levothyroxine (Synthroid) 0.088 MG TABLET: 100 MCG PO QAM         Instructions: 1 TAB      * Furosemide* (Lasix*) 40 MG TABLET: 40 MG PO QDAY #30         Instructions: extra 40 mg every other day      New Diagnostics      * PFT-Comp, PrePost,DLCO,BodyBox, 4 Months         Dx: COPD (chronic obstructive pulmonary disease) - J44.9      * 6 Min Walk With Pulse Ox, 3 Months         Dx: Centrilobular emphysema - J43.2      PLAN:      The patient is a 83 year old female with history of lung cancer status post left    lower  lobectomy in 2004, history of breast cancer status post mastectomy in     2010. She has chronic sinusitis and postnasal drip.             1. Moderately severe chronic obstructive pulmonary disease. Continue with     Stiolto daily and albuterol as needed.       2.  She is up to date on her vaccinations and had her flu vaccine this year.      3. I will repeat pulmonary function tests prior to her next office visit.       4. Congestive heart failure. Plan per Dr. Talbot.  Continue with Lasix.         5. Postnasal drip/sinusitis. Continue with Dymista and Singulair and continue     Chlorpheniramine.       6. She does not smoke anymore.       7. I will follow up with her in 4-5 months, earlier if needed.  If her lung     function test is stable and improved diffusion capacity we will consider     stopping her maintenance inhaler.            Patient Education      Education resources provided:  Yes      Patient Education Provided:  Acute Bronchitis            Patient Education:        Chronic Obstructive Pulmonary Disease                 Disclaimer: Converted document may not contain table formatting or lab diagrams. Please see "[employer] System for the authenticated document.

## 2021-05-28 NOTE — PROGRESS NOTES
Patient: TANVI ARROYO     Acct: [id number]     Report: #[id number]  UNIT #: [id number]     : 1935    Encounter Date:2018  PRIMARY CARE: ORTIZ NGUYEN  ***Signed***  --------------------------------------------------------------------------------------------------------------------  Chief Complaint      Encounter Date      2018            Referring Provider      MERE BARNHART            Patient Complaint      Patient is complaining of      missed shilo. copd            VITALS      Height 59 in / 149.86 cm      Weight 160 lbs 0 oz / 72.069038 kg      BSA 1.77 m2      BMI 32.3 kg/m2      Temperature 98.3 F / 36.83 C - Oral      Pulse 64      Respirations 16      Blood Pressure 134/52 Sitting, Right Arm      Pulse Oximetry 91%, room air            HPI      The patient is a 82 year old female with history of moderately severe chronic     obstructive pulmonary disease with some hypersensitivity component. She has     history of lung cancer status post left lower lobectomy in  and status post     breast cancer with mastectomy in .  She has chronic sinusitis and postnasal     drip. Since her last office visit she had a right hip fracture and was in rehab     for 3-4 months. She is here for follow up.             She currently takes Stiolto once daily regularly. She is on Dymista but does     not use it all the time. She takes Singulair once a day. She sees Dr. Talbot     for cardiology. She has no need of antibiotics or steroids since her last     office visit. She has no fevers or chills, no nausea and vomiting. She had     Prevnar vaccine in the past and she is willing to have Pneumovax today. She has     no changes in her weight or appetite.  She sees Dr. Rachael Noel for her     breast cancer and has had recent blood work but no recent imaging. Overall she     is doing well.            ROS      Constitutional:  Complains of: Fatigue      Respiratory/Breathing:   Complains of: Shortness of air, Cough, Denies: Wheezing    , Hemoptysis, Pleuritic pain, Other      Endocrine:  Denies: Polydipsia, Polyuria, Heat/cold intolerance, Abnorml     menstrual pattern, Diabetes, Other      Eyes:  Denies: Blurred vision, Vision Changes, Other      Ears, nose, mouth, throat:  Denies: Congestion, Dysphagia, Hearing Changes,     Nose Bleeding, Nasal Discharge, Throat pain, Tinnitus, Other      Cardiovascular:  Denies: Chest Pain, Exertional dyspnea, Peripheral Edema,     Palpitations, Syncope, Wake up Gasping for air, Orthopnea, Tachycardia, Other      Gastrointestinal:  Denies: Abdominal pain/cramping, Bloody stools, Constipation    , Diarrhea, Melena, Nausea, Vomiting, Other      Genitourinary:  Denies: Dysuria, Urinary frequency, Incontinence, Hematuria,     Urgency, Other      Musculoskeletal:  Denies: Joint Pain, Joint Stiffness, Joint Swelling, Myalgias    , Other      Hematologic/lymphatic:  DENIES: Lymphadenopathy, Bruising, Bleeding tendencies,     Other      Neurologic:  Denies: Headache, Numbness, Weakness, Seizures, Other      Psychiatric:  Denies: Anxiety, Appropriate Effect, Depression, Other      Sleep:  No: Excessive daytime sleep, Morning Headache?, Snoring, Insomnia?,     Stop breathing at sleep?, Other      Integumentary:  Denies: Rash, Dry skin, Skin Warm to Touch, Other            FAMILY/SOCIAL/MEDICAL HX      Surgical History:  Yes: Bowel Surgery (COLONOSCOPY; EGD), Head Surgery (CAROTID     STENT 4/14), Orthopedic Surgery (RIGHT KNEE SURG 2004), Other Surgeries (lower     lobe of left lung removed ), No: AAA Repair, Abdominal Surgery, Angioplasty,     Appendectomy, Back Surgery, Bladder Surgery, Breast Surgery, CABG, Carotid     Stenosis, Cholecystectomy, Ear Surgery, Eye Surgery, Hernia Surgery, Kidney     Surgery, Nose Surgery, Oral Surgery, Prostatectomy, Rectal Surgery, Spinal     Surgery, Testicular Surgery, Throat Surgery, Valve Replacement, Vascular Surgery       Is Father Still Living?:  No      Is Mother Still Living?:  No       Family History:  Yes      Social History:  No Tobacco Use, No Alcohol Use, No Recreational Drug use      Smoking status:  Former smoker      Anticoagulation Therapy:  Yes      Antibiotic Prophylaxis:  No      Medical History:  Yes: Arthritis (HANDS), Blood Disease (ANEMIA), Chemotherapy/    Cancer (LEFT BREAST CA, LEFT LUNG CA.), Hemorrhoids/Rectal Prob (HX. ULCERS ,     COLON POLYPS, GERD), High Blood Pressure (CONTROLLED WITH MEDS), Kidney Stones (    1970), Shortness Of Breath (LEFT LOWER LOBECTOMY DUE TO CA), Thyroid Problem (    THYROID OVERACTIVE AND REMOVED (NONCANCEROUS)....TAKES SYNTHROID), No:     Alcoholism, Allergies, Anemia, Asthma, Broken Bones, Cataracts, Chemical     Dependency, Chronic Bronchitis/COPD, Emphysema, Chronic Liver Disease, Colon     Trouble, Colitis, Diverticulitis, Congestive Heart Failu, Deafness or Ringing     Ears, Convulsions, Depression, Anxiety, Bipolar Disorder, Diabetes, Epilepsy,     Seizures, Forgetfullness, Glaucoma, Gall Stones, Gout, Head Injury, Heart     Attack (DENIES CHEST PAIN), Heart Murmur, Hepatitis, Hiatal Hernia, High     Cholesterol, HIV (Do not ask - volu, Jaundice, Kidney or Bladder Disease,     Migrane Headaches, Mitral Valve Prolapse, Night sweats, Phlebitis, Psychiatric     Care, Reflux Disease, Rheumatic Fever, Sexually Transmitted Dis, Sinus Trouble,     Skin Disease/Psoriais/Ecz, Stroke, Tuberculosis or Pos TB Te, Miscellaneous     Medical/oth      Psychiatric History      none            PREVENTION      Hx Influenza Vaccination:  Yes      Date Influenza Vaccine Given:  Sep 1, 2017      Influenza Vaccine Declined:  No      2 or More Falls Past Year?:  No      Fall Past Year with Injury?:  No      Hx Pneumococcal Vaccination:  Yes      Encouraged to follow-up with:  PCP regarding preventative exams.      Chart initiated by      Veronique Poe ma            ALLERGIES/MEDICATIONS       Allergies:        Coded Allergies:             NO KNOWN ALLERGIES (Unverified , 7/24/18)      Medications    Last Reconciled on 7/24/18 11:41 by THEODORE MARQUEZ MD      Montelukast Sodium (Singulair*) 10 Mg Tab      10 MG PO HS, #90 TAB 3 Refills         Prov: Theodore Marquez         7/24/18       Rosuvastatin Calcium (Crestor*) 20 Mg Tablet      20 MG PO HS, #30 TAB 0 Refills         Reported         7/24/18       Polyethylene Glycol (Miralax*) 17 Gm Packet      17 GM PO QDAY for 30 Days, #30 PACKET         Prov: ELIAS CARSON         4/4/18       Senna (Senokot) 8.6 Mg Tablet      8.6 MG PO BID for 30 Days, #60 TAB         Prov: ELIAS CARSON         4/4/18       oxyCODONE HCl (oxyCODONE IR) 5 Mg Tablet      10 MG PO Q4H Y for SEVERE PAIN (PAIN LEVEL 7-10), #10 TAB         Prov: ELIAS CARSON         4/4/18       Enoxaparin (Lovenox) 40 Mg/0.4 Ml Syringe      40 MG SUBQ QDAY for 21 Days, #21 SYRINGE         Prov: ELIAS CARSON         4/4/18       Guaifenesin (Humibid La*) 600 Mg Tab.er.12h      600 MG PO BID Y for CONGESTION, TAB         Reported         2/23/18       Montelukast Sodium (Singulair*) 10 Mg Tab      10 MG PO HS, #90 TAB 3 Refills         Prov: Theodore Marquez         9/13/17       Azelastine/Fluticasone (Dymista Nasal Spray) 23 Gm Spray.pump      1 SPRAYS NARE EACH BID, #3 BOTTLE 3 Refills         Prov: Theodore Marquez         9/13/17       Tiotropium Br/Olodaterol HCl (Stiolto Respimat Inhal Spray) 4 Gm Mist.inhal      2 PUFFS INH RTQDAY, #1 INH 0 Refills         Reported         7/19/17       Nebivolol HCl (Bystolic*) 10 Mg Tablet      10 MG PO QDAY, #30 TAB         Reported         7/19/17       Chlorpheniramine Maleate (Chlorpheniramine*) 4 Mg Tablet      4 MG PO Q4-6H Y for ALLERGIES, TAB         Reported         7/19/17       Lansoprazole (Prevacid*) 30 Mg Tab.rap.dr      30 MG PO QDAY, TAB         Reported         7/19/17       Furosemide* (Lasix*) 40 Mg Tablet      40 MG PO Q2D, #30 TAB 0 Refills         Reported          7/19/17       Anastrozole (Anastrozole) 1 Mg Tablet      1 MG PO QDAY, #30 TAB         Reported         7/19/17       Spironolactone (Aldactone) 25 Mg Tablet      25 MG PO QDAY, #30 TAB 5 Refills         Prov: PIEDAD RODRIGUEZ         4/27/16       Aspirin (Aspirin Baby *) 81 Mg Tab.chew      81 MG PO QDAY, TAB.CHEW 0 Refills         Reported         4/24/16       Multivitamin/Iron/Folic Acid (Centrum Complete Multivitamin*) 1 Tab Tablet      1 TAB PO QDAY, TAB 0 Refills         Reported         11/17/14       Cholecalciferol (Vitamin D3*) 2,000 U Tablet      2000 UNITS PO QDAY, TAB 0 Refills         Reported         11/17/14       Clopidogrel Bisulfate (Plavix) 75 Mg Tablet      75 MG PO QDAY, TAB 0 Refills         Reported         11/17/14       Levothyroxine (Synthroid) 0.088 Mg Tablet      88 MCG PO QAM, TAB         Reported         6/30/11      Current Medications      Current Medications Reviewed 7/24/18            EXAM      CONSTITUTIONAL: Pleasant female  in no acute distress,  normal conversant.       EYES : Pink conjunctive, no ptosis, PERRL.       ENMT :Mallampati classification III.  Nose and ears appear normal, normal     dentition, mild posterior pharyngeal wall erythema. left sided sinus tenderness     present, no oral thrush.        Neck: Nontender, no masses, no thyromegaly, no nodules.      Resp : Bilateral diminished breath sounds, resonant to percussion bilaterally,     no wheezing, crackles or rhonchi.      CVS  : No carotid bruits, s1s2 nl, RRR, no murmur, rubs or gallop, no     peripheral edema       Chest wall: Normal rise with inspiration, nontender on palpation. Increased AP     diameter.       GI   : Abdomen soft, with no masses, no hepatosplenomegaly, no hernias, BS+      MSK  : Normal gait and station, no digital cyanosis or clubbing       Skin : No rashes, ulcerations or lesions, normal turgor and temperature      Neuro: CN II - XII intact, no sensory deficits, DTRs intact and  symmetrical, no     motor weakness      Psych: Appropriate affect, A   Vitals      Vitals:             Height 59 in / 149.86 cm           Weight 160 lbs 0 oz / 72.551199 kg           BSA 1.77 m2           BMI 32.3 kg/m2           Temperature 98.3 F / 36.83 C - Oral           Pulse 64           Respirations 16           Blood Pressure 134/52 Sitting, Right Arm           Pulse Oximetry 91%, room air            REVIEW      Results Reviewed      PCCS Results Reviewed?:  Yes Prev Lab Results, Yes Prev Radiology Results, Yes     Previous Mecial Records      Lab Results      The patient's recent blood work from 18 showed normal CBC with normal     electrolytes and slightly high serum creatinine.      Radiographic Results               [facility]                PACS RADIOLOGY REPORT            Patient: TANVI ARROYO   Acct: #[id number]   Report: #[id number]            UNIT #: [id number]    DOS: 18 1204      INSURANCE:MEDICARE PART A   LOCATION:ER     : 1935            PROVIDERS      ADMITTING:     ATTENDING:       FAMILY:  ORTIZ NGUYEN   ORDERING:  LAILA KAUFMAN         OTHER:    DICTATING:  Christopher Zapien MD            REQ #:[id number]   EXAM:CXR1 - CHEST 1 View AP PA      REASON FOR EXAM:  Trauma Fall      REASON FOR VISIT:  POSS BROKEN HIP--MCCALL            *******Signed******         PROCEDURE:   CHEST AP/PA SINGLE VIEW             COMPARISON:   [facility], CR, CHEST PA/AP          INDICATIONS:   FALL TODAY.             FINDINGS:         3 lead AICD pacemaker is unchanged in position.  Cardiomegaly is unchanged.      Mediastinal contour is       also unchanged.  Prominent aortic vascular calcifications are identified.      Lungs are clear       bilaterally.  No evidence of pneumothorax.  No displaced rib fractures are     identified.             CONCLUSION:         1. Cardiomegaly with clear lungs.              CHRISTOPHER ZAPIEN MD              Electronically Signed and Approved By: ALISHA ALCANTARA MD on 3/28/2018 at 13:00                        Until signed, this is an unconfirmed preliminary report that may contain      errors and is subject to change.                                              DUNS1:      D:03/28/18 1300      PFT Results      [id number]  [id number]                                       [facility] [employer] [facility]                            [place], [place]  [address]               __________________________________________________________________________             Patient Name:                   Attending Physician:      Yelena Sanchez M.D.             Patient Visit # MR #            Admit Date  Disch Date     Location      [id number]    [id number]      11/04/2016                 [facility] -             Date of Birth      1935      __________________________________________________________________________      821 - DIAGNOSTIC REPORT             PULMONARY FUNCTION TEST             DATE OF SERVICE:      11/4/16.             PRIMARY CARE PHYSICIAN:      Kaelyn Eugene M.D.             SPIROMETRY:      Spirometry is normal.      FEV1/FVC ratio 71%.      FEV1 is 1.37 L, 91% of predicted.      FVC is 1.94 L, 96% of predicted.      Bronchodilator response:  There is no significant response to bronchodilator      administration.             LUNG VOLUMES:      Lung volumes show hyperinflation and airtrapping.      Total lung capacity is 0.72 L, 202% of predicted.      Residual volume is 6.73 L, 314% of predicted.             DIFFUSION:      Diffusion capacity is moderately decreased, 46% of predicted.             FLOW VOLUME LOOP:      Flow volume loop is normal.             CONCLUSION:        1. Normal spirometry with airtrapping, hyperinflation and low diffusion           capacity.  It could suggest early  obstructive airway disease, likely           emphysema, but there is significant decrease in diffusion capacity,           which is disproportionately low compared to other parameters in           pulmonary function test.        2. Please correlate clinically.             To be electronically signed in [employer]      [id number] THEODORE MARQUEZ M.D.             NK:nova      D:  11/18/2016 16:45      T:  11/18/2016 19:58      #[id number]             Until signed, this is an unconfirmed preliminary report that may contain      errors and is subject to change.                   11/20/16 2059  <Electronically signed by Theodore Marquez MD>            Assessment      Notes      New Medications      * Rosuvastatin Calcium (Crestor*) 20 MG TABLET: 20 MG PO HS #30      * Montelukast Sodium (Singulair*) 10 MG TAB: 10 MG PO HS #90      New Office Procedures      * Pneumo Polysaccharide Vaccine, As Soon As Possible       Pneumococcal Vaccine Polyvalen (Pneumovax-23) 25 MCG/0.5 ML VIAL: 25 MICROGRAM     INTRAMUSCULARLY Qty 25 MCG      PLAN:      The patient is a 82 year old female with history of lung cancer status post     left lower lobectomy in 2004, history of breast cancer status post mastectomy     in 2010. She has chronic sinusitis and postnasal drip.             1. Moderately severe chronic obstructive pulmonary disease. Continue with     Stiolto and as needed albuterol. She does not smoke anymore. Continue with     Xopenex as needed. She is up to date on Prevnar vaccine. We will give Pneumovax     today. I advised her to take the flu vaccine once it is available. She will     come get it in our clinic when it is available.             2. Congestive heart failure. Plan per Dr. Talbot.  Continue with Lasix per     him.             3. Postnasal drip/sinusitis. Continue with Dymista nasal spray twice daily, she     is currently using it as needed. Continue with Singulair daily as well.              4. She does not smoke anymore.              5. I will follow up with her in 4-6 months, earlier if needed.            Patient Education      Education resources provided:  Yes      Patient Education Provided:  Acute Bronchitis                 Disclaimer: Converted document may not contain table formatting or lab diagrams. Please see [employer] [facility] for the authenticated document.

## 2021-05-28 NOTE — PROGRESS NOTES
Patient: TANVI ARROYO     Acct: [id number]     Report: #[id number]  UNIT #: [id number]     : 1935    Encounter Date:10/16/2019  PRIMARY CARE: ORTZI NGUYEN  ***Signed***  --------------------------------------------------------------------------------------------------------------------  Chief Complaint      Encounter Date      Oct 16, 2019            Primary Care Provider      [facility]            Referring Provider      MERE BARNHART            Patient Complaint      Patient is complaining of      soa follow up            VITALS      Height 5 ft 9 in / 175.26 cm      Weight 134 lbs 0 oz / 60.361543 kg      BSA 1.74 m2      BMI 19.8 kg/m2      Temperature 98.3 F / 36.83 C - Oral      Pulse 66      Respirations 16      Blood Pressure 150/80 Sitting, Right Arm      Pulse Oximetry 98%, room air            HPI      The patient is an 84 year old female with a history of lung cancer, status post     left lower lobectomy in , history of breast cancer, status post mastectomy     in . She has chronic sinusitis and postnasal drip.  She also has nocturnal     hypoxia.  She has a history of coronary artery disease and sees Dr. Talbot.  He    had AICD placement done.  She has been doing okay overall.  She had overnight     oximetry since her last office visit which showed significant desaturation to     less than 88% oxygen for more than 5.5 hours.  I reviewed the result with her     today. She does not have feel any difference with her breathing and does not     feel like she has any issues with oxygen and does not want to use oxygen with     sleep at all.  She also had CT scan of the abdomen and pelvis through Dr. Rachael Noel which was reviewed with her today. She has no changes in weight or     appetite.  No coughing up blood, nausea, vomiting.  She still has significant     postnasal drip and has lots of drainage through the nose.            ROS      Constitutional:   Complains of: Fatigue; Denies: Fever, Weight gain, Weight loss,    Chills, Insomnia, Other      Respiratory/Breathing:  Complains of: Shortness of air, Wheezing, Cough; Denies:    Hemoptysis, Pleuritic pain, Other      Endocrine:  Denies: Polydipsia, Polyuria, Heat/cold intolerance, Abnorml     menstrual pattern, Diabetes, Other      Eyes:  Denies: Blurred vision, Vision Changes, Other      Ears, nose, mouth, throat:  Denies: Mouth lesions, Thrush, Throat pain,     Hoarseness, Allergies/Hay Fever, Post Nasal Drip, Headaches, Recent Head Injury,    Nose Bleeding, Neck Stiffness, Thyroid Mass, Hearing Loss, Ear Fullness, Dry     Mouth, Nasal or Sinus Pain, Dry Lips, Nasal discharge, Nasal congestion, Other      Cardiovascular:  Denies: Palpitations, Syncope, Claudication, Chest Pain, Wake     up Gasping for air, Leg Swelling, Irregular Heart Rate, Cyanosis, Dyspnea on     Exertion, Other      Gastrointestinal:  Denies: Nausea, Constipation, Diarrhea, Abdominal pain,     Vomiting, Difficulty Swallowing, Reflux/Heartburn, Dysphagia, Jaundice,     Bloating, Melena, Bloody stools, Other      Genitourinary:  Denies: Urinary frequency, Incontinence, Hematuria, Urgency,     Nocturia, Dysuria, Testicular problems, Other      Musculoskeletal:  Denies: Joint Pain, Joint Stiffness, Joint Swelling, Myalgias,    Other      Hematologic/lymphatic:  DENIES: Lymphadenopathy, Bruising, Bleeding tendencies,     Other      Neurological:  Denies: Headache, Numbness, Weakness, Seizures, Other      Psychiatric:  Denies: Anxiety, Appropriate Effect, Depression, Other      Sleep:  No: Excessive daytime sleep, Morning Headache?, Snoring, Insomnia?, Stop    breathing at sleep?, Other      Integumentary:  Denies: Rash, Dry skin, Skin Warm to Touch, Other      Immunologic/Allergic:  Denies: Latex allergy, Seasonal allergies, Asthma,     Urticaria, Eczema, Other      Immunization status:  No: Up to date            FAMILY/SOCIAL/MEDICAL HX       Surgical History:  Yes: Bowel Surgery (COLONOSCOPY; EGD), Head Surgery (CAROTID     STENT 4/14), Orthopedic Surgery (RIGHT KNEE SURG 2004), Other Surgeries (lower     lobe of left lung removed ); No: AAA Repair, Abdominal Surgery, Angioplasty,     Appendectomy, Back Surgery, Bladder Surgery, Breast Surgery, CABG, Carotid     Stenosis, Cholecystectomy, Ear Surgery, Eye Surgery, Hernia Surgery, Kidney     Surgery, Nose Surgery, Oral Surgery, Prostatectomy, Rectal Surgery, Spinal     Surgery, Testicular Surgery, Throat Surgery, Valve Replacement, Vascular Surgery      Is Father Still Living?:  No      Is Mother Still Living?:  No       Family History:  Yes      Social History:  No Tobacco Use, No Alcohol Use, No Recreational Drug use      Smoking status:  Former smoker (1 ppd for 40 years quit 2017)      Anticoagulation Therapy:  Yes      Antibiotic Prophylaxis:  No      Medical History:  Yes: Arthritis (HANDS), Blood Disease (ANEMIA),     Chemotherapy/Cancer (LEFT BREAST CA, LEFT LUNG CA.), Hemorrhoids/Rectal Prob     (HX. ULCERS , COLON POLYPS, GERD), High Blood Pressure (CONTROLLED WITH MEDS),     Kidney Stones (1970), Shortness Of Breath (LEFT LOWER LOBECTOMY DUE TO CA),     Thyroid Problem (THYROID OVERACTIVE AND REMOVED (NONCANCEROUS)....TAKES     SYNTHROID); No: Alcoholism, Allergies, Anemia, Asthma, Broken Bones, Cataracts,     Chemical Dependency, Chronic Bronchitis/COPD, Emphysema, Chronic Liver Disease,     Colon Trouble, Colitis, Diverticulitis, Congestive Heart Failu, Deafness or     Ringing Ears, Convulsions, Depression, Anxiety, Bipolar Disorder, Diabetes,     Epilepsy, Seizures, Forgetfullness, Glaucoma, Gall Stones, Gout, Head Injury,     Heart Attack (DENIES CHEST PAIN), Heart Murmur, Hepatitis, Hiatal Hernia, High     Cholesterol, HIV (Do not ask - volu, Jaundice, Kidney or Bladder Disease,     Migrane Headaches, Mitral Valve Prolapse, Night sweats, Phlebitis, Psychiatric     Care, Reflux Disease,  Rheumatic Fever, Sexually Transmitted Dis, Sinus Trouble,     Skin Disease/Psoriais/Ecz, Stroke, Tuberculosis or Pos TB Te, Miscellaneous     Medical/oth      Psychiatric History      none            PREVENTION      Hx Influenza Vaccination:  Yes      Date Influenza Vaccine Given:  Sep 1, 2019      Influenza Vaccine Declined:  No      2 or More Falls Past Year?:  No      Fall Past Year with Injury?:  No      Hx Pneumococcal Vaccination:  Yes      Encouraged to follow-up with:  PCP regarding preventative exams.      Chart initiated by      gladys robledo ma            ALLERGIES/MEDICATIONS      Allergies:        Coded Allergies:             NO KNOWN ALLERGIES (Unverified , 10/16/19)      Medications    Last Reconciled on 10/16/19 17:31 by THEODORE MARQUEZ MD      Azelastine Hcl (Azelastine Nasal) 137 Mcg/0.137 Ml Spray.pump      2 PUFFS NARE EACH QDAY, #1 BOTTLE 4 Refills         Prov: Theodore Marquez         10/16/19       Montelukast Sodium (Singulair*) 10 Mg Tab      10 MG PO HS, #90 TAB 3 Refills         Prov: Theodore Marquez         7/24/18       Rosuvastatin Calcium (Crestor*) 20 Mg Tablet      20 MG PO HS, #30 TAB 0 Refills         Reported         7/24/18       Polyethylene Glycol (Miralax*) 17 Gm Packet      17 GM PO QDAY for 30 Days, #30 PACKET         Prov: ELIAS CARSON         4/4/18       Senna (Senokot) 8.6 Mg Tablet      8.6 MG PO BID for 30 Days, #60 TAB         Prov: ELIAS CARSON         4/4/18       oxyCODONE HCl (oxyCODONE IR) 5 Mg Tablet      10 MG PO Q4H PRN for SEVERE PAIN (PAIN LEVEL 7-10), #10 TAB         Prov: ELIAS CARSON         4/4/18       Enoxaparin (Lovenox) 40 Mg/0.4 Ml Syringe      40 MG SUBQ QDAY for 21 Days, #21 SYRINGE         Prov: ELIAS CARSON         4/4/18       Guaifenesin (Humibid La*) 600 Mg Tab.er.12h      600 MG PO BID PRN for CONGESTION, TAB         Reported         2/23/18       Montelukast Sodium (Singulair*) 10 Mg Tab      10 MG PO HS, #90 TAB 3 Refills         Prov: Theodore Marquez          9/13/17       Azelastine/Fluticasone (Dymista Nasal Spray) 23 Gm Spray.pump      1 SPRAYS NARE EACH BID, #3 BOTTLE 3 Refills         Prov: Theodore Marquez         9/13/17       Tiotropium Br/Olodaterol HCl (Stiolto Respimat Inhal Spray) 4 Gm Mist.inhal      2 PUFFS INH RTQDAY, #1 INH 0 Refills         Reported         7/19/17       Nebivolol HCl (BYSTOLIC) 10 Mg Tablet      10 MG PO QDAY, #30 TAB         Reported         7/19/17       Chlorpheniramine Maleate (CHLORPHENIRAMINE MALEATE) 4 Mg Tablet      4 MG PO Q4-6H PRN for ALLERGIES, TAB         Reported         7/19/17       Lansoprazole (Prevacid) 30 Mg Tab.rap.dr      30 MG PO QDAY, TAB         Reported         7/19/17       Furosemide* (Lasix*) 40 Mg Tablet      40 MG PO QDAY, #30 TAB 0 Refills         Reported         7/19/17       Anastrozole (Anastrozole) 1 Mg Tablet      1 MG PO QDAY, #30 TAB         Reported         7/19/17       Spironolactone (Aldactone) 25 Mg Tablet      25 MG PO QDAY, #30 TAB 5 Refills         Prov: RODRIGUEZ,AMAR         4/27/16       Aspirin Chew (Aspirin Baby) 81 Mg Tab.chew      81 MG PO QDAY, TAB.CHEW 0 Refills         Reported         4/24/16       Multivitamin/Iron/Folic Acid (CENTRUM COMPLETE MULTIVIT TAB) 1 Tab Tablet      1 TAB PO QDAY, TAB 0 Refills         Reported         11/17/14       Cholecalciferol (Vitamin D3*) 2,000 U Tablet      2000 UNITS PO QDAY, TAB 0 Refills         Reported         11/17/14       Clopidogrel Bisulfate (Plavix) 75 Mg Tablet      75 MG PO QDAY, TAB 0 Refills         Reported         11/17/14       Levothyroxine (Synthroid) 0.088 Mg Tablet      100 MCG PO QAM, TAB         Reported         6/30/11      Current Medications      Current Medications Reviewed 10/16/19            EXAM      CONSTITUTIONAL: Pleasant female  in no acute distress,  normal conversant.       EYES : Pink conjunctive, no ptosis, PERRL.       ENMT :Mallampati classification III.  Nose and ears appear normal, normal     dentition, mild  posterior pharyngeal wall erythema. left sided sinus tenderness     present, no oral thrush.        Neck: Nontender, no masses, no thyromegaly, no nodules.      Resp : Bilateral diminished breath sounds, resonant to percussion bilaterally,     no wheezing, crackles or rhonchi.      CVS  : No carotid bruits, s1s2 nl, RRR, no murmur, rubs or gallop, no peripheral    edema       Chest wall: Normal rise with inspiration, nontender on palpation. Increased AP d    iameter.       GI   : Abdomen soft, with no masses, no hepatosplenomegaly, no hernias, BS+      MSK  : Normal gait and station, no digital cyanosis or clubbing       Skin : No rashes, ulcerations or lesions, normal turgor and temperature      Neuro: CN II - XII intact, no sensory deficits, DTRs intact and symmetrical, no     motor weakness      Psych: Appropriate affect, A   Vtials      Vitals:             Height 5 ft 9 in / 175.26 cm           Weight 134 lbs 0 oz / 60.847729 kg           BSA 1.74 m2           BMI 19.8 kg/m2           Temperature 98.3 F / 36.83 C - Oral           Pulse 66           Respirations 16           Blood Pressure 150/80 Sitting, Right Arm           Pulse Oximetry 98%, room air            REVIEW      Results Reviewed      PCCS Results Reviewed?:  Yes Prev Lab Results, Yes Prev Radiology Results, Yes     Previous Mecial Records      Radiographic Results               [facility]                PACS RADIOLOGY REPORT            Patient: TANVI ARROYO   Acct: #[id number]   Report: #[id number]            UNIT #: [id number]    DOS: 10/08/19 1646      INSURANCE:MEDICARE PART A   LOCATION:CT     : 1935            PROVIDERS      ADMITTING:     ATTENDING: Rachael Noel      FAMILY:  ORTIZ NGUYEN   ORDERING:  Rachael Noel         OTHER:    DICTATING:  MARISEL HINES MD            REQ #:[id number]   EXAM:CTAPCWOC - CT ABD PEL CHEST wo      REASON FOR EXAM:  LUNG CA       REASON FOR VISIT:  LUNG CA            *******Signed******         PROCEDURE:   CT ABDOMEN PELVIS CHEST WITHOUT             COMPARISON:   [facility], CT, CT CHEST ABD PEL WO CONTRAST,     10/05/2018, 17:52.             INDICATIONS:   LEFT LUNG CA             TECHNIQUE:   CT images were obtained without the administration of intravenous     contrast material.             PROTOCOL:     Standard imaging protocol performed                RADIATION:     DLP: 989mGy*cm          Automated exposure control was utilized to minimize radiation dose.              FINDINGS:         Chest:  Emphysema.  Previous left lower lobectomy.  No suspicious pulmonary     nodule.  Previously       described ground-glass nodule in the right apex is not seen.  No adenopathy in     the chest.  Previous       left mastectomy.  No aggressive appearing bone change.             Abdomen:  Liver, spleen, pancreas, and gallbladder have an unremarkable     unenhanced appearance.        Stable right renal cyst.  Stable low-attenuation nodularity in the left adrenal     gland, likely       benign adenomas.  Bowel loops are nondilated.             Pelvis:  No pelvic mass or fluid.  3 cm left adnexal cyst measures simple fluid     attenuation.  This       cyst previously measured 2.4 cm.             No aggressive appearing bone change.               CONCLUSION:   Previously demonstrated ground-glass nodule in the right apex is     not seen.             No convincing evidence for metastatic disease in the chest, abdomen, or pelvis.             Slight interval increase in size of a simple fluid attenuation cyst in the left     adnexa.  Recommend       attention on yearly pelvic ultrasound follow-up.              MARISEL HINES MD             Electronically Signed and Approved By: MARISEL HINES MD on 10/09/2019 at 8:16                        Until signed, this is an unconfirmed preliminary report that may contain      errors and is subject to  change.                                              DOWER:      D:10/09/19 0816      PFT Results      Patient: YELENA SANCHEZ   Acct: #[id number]   Report: #[id number]            UNIT #: [id number]    DOS:       LOCATION:[facility]     : 1935            PROVIDERS      ADMITTING:     FAMILY:  ORTIZ NGUYEN         OTHER:       DICTATING:  Theodore Marquez MD            REASON FOR VISIT:  COPD            *******Signed******                                    [facility]                          [employer]                            [place], [place]  [address]               __________________________________________________________________________             Patient Name:                   Attending Physician:      Yelena Sanchez M.D.             Patient Visit # MR #            Admit Date  Disch Date     Location      [id number]    [id number]      2019                 [facility]- -             Date of Birth      1935      __________________________________________________________________________      0821 - DIAGNOSTIC REPORT             PULMONARY FUNCTION TEST:             DATE OF STUDY:      2019             SPIROMETRY:      Spirometry is normal. FEV1/FVC is 68.  FEV1 is 1.14 L, 81% of predicted.  FVC      is 1.68 L, 87% of predicted.             BRONCHODILATOR RESPONSE:      There is no significant response to bronchodilator administration.             LUNG VOLUMES:      Lung volumes show air trapping. Total lung capacity is 4.85 L, 112% of      predicted. Residual volume is 3.07 L, 140% of predicted.             DIFFUSION CAPACITY:      Diffusion capacity is moderately decreased, 48% of predicted.             FLOW VOLUME LOOP:      Flow volume loop is compatible with obstructive process.             COMPARISON:      Compared to pulmonary function test from 2016, FEV1 has decreased from      1.37 L to 1.14 L,  17% decrease. FVC has decreased from 1.94 L to 1.68 L, 14%      decrease. Diffusion capacity has remained stable. Total lung capacity has      decreased from 8.72 L to 4.85 L.  Residual volume has decreased from 6.73 L      to 3.07 L, 55% decrease.             CONCLUSION:      Normal FEV1/FVC with borderline low FEV1, normal FVC, air trapping and low      diffusion capacity with flow volume loop parameter, as well.  It suggests      mild to moderate obstructive airway disease, likely emphysema.  Diffusion      capacity is disproportionately low.  Lung function parameters have some      decrease in FEV1 and FVC parameters over time.  Please correlate clinically.             To be electronically signed in [facility]      [id number] THEODORE MARQUEZ M.D.             NK:kristine      D:  04/09/2019 17:14      T:  04/09/2019 22:41      #[id number]             R:  04/10/2019 02:25 pm             Until signed, this is an unconfirmed preliminary report that may contain      errors and is subject to change.                   Until signed, this is an unconfirmed preliminary report that may contain      errors and is subject to change.                     <Electronically signed by Theodore Marquez MD>                04/15/19 1330               Theodore Marquez MD:RJCECILIO      D:04/09/19 1134            Assessment      Notes      New Medications      * AZELASTINE HCL (Azelastine Nasal) 137 MCG/0.137 ML SPRAY.PUMP: 2 PUFFS NARE       EACH QDAY #1      PLAN:      The patient is an 84 year old female with history of lung cancer status post     left lower lobectomy in 2004, history of breast cancer status post mastectomy in    2010. She has chronic sinusitis and postnasal drip.             1. Moderately severe chronic obstructive pulmonary disease. Continue with     Stiolto and albuterol as needed.  She is currently not using Stiolto and wants     to use only a rescue inhaler at this time.         2.  Lasix per Dr. Talbot.        3. I offered nocturnal oxygen, but she refused.        4.  Continue with Singulair.  She is currently using Nasonex that her      has.  I will put her on azelastine nasal spray two puffs each nose twice a day.     If she has issues with azelastine nasal spray, we will give her Atrovent nasal     spray.        5.  I will follow up with her in 6-9 months, earlier if needed.            Patient Education      Education resources provided:  Yes      Patient Education Provided:  Acute Bronchitis            Electronically signed by Theodore Marquez  10/30/2019 11:37       Disclaimer: Converted document may not contain table formatting or lab diagrams. Please see [employer] for the authenticated document.

## 2021-07-22 RX ORDER — NEBIVOLOL HYDROCHLORIDE 10 MG/1
TABLET ORAL
Qty: 90 TABLET | Refills: 3 | Status: SHIPPED | OUTPATIENT
Start: 2021-07-22 | End: 2022-06-14 | Stop reason: SDUPTHER

## 2021-07-30 ENCOUNTER — LAB (OUTPATIENT)
Dept: LAB | Facility: HOSPITAL | Age: 86
End: 2021-07-30

## 2021-07-30 ENCOUNTER — TRANSCRIBE ORDERS (OUTPATIENT)
Dept: LAB | Facility: HOSPITAL | Age: 86
End: 2021-07-30

## 2021-07-30 DIAGNOSIS — J44.9 CHRONIC OBSTRUCTIVE PULMONARY DISEASE, UNSPECIFIED COPD TYPE (HCC): ICD-10-CM

## 2021-07-30 DIAGNOSIS — E78.5 HYPERLIPIDEMIA, UNSPECIFIED HYPERLIPIDEMIA TYPE: ICD-10-CM

## 2021-07-30 DIAGNOSIS — Z79.899 ENCOUNTER FOR LONG-TERM (CURRENT) USE OF OTHER MEDICATIONS: ICD-10-CM

## 2021-07-30 DIAGNOSIS — D64.9 ANEMIA, UNSPECIFIED TYPE: ICD-10-CM

## 2021-07-30 DIAGNOSIS — D64.9 ANEMIA, UNSPECIFIED TYPE: Primary | ICD-10-CM

## 2021-07-30 DIAGNOSIS — E55.9 VITAMIN D DEFICIENCY: ICD-10-CM

## 2021-07-30 DIAGNOSIS — E61.1 IRON DEFICIENCY: ICD-10-CM

## 2021-07-30 DIAGNOSIS — E03.9 HYPOTHYROIDISM, UNSPECIFIED TYPE: ICD-10-CM

## 2021-07-30 DIAGNOSIS — I50.43 CHF (CONGESTIVE HEART FAILURE), NYHA CLASS I, ACUTE ON CHRONIC, COMBINED (HCC): Primary | ICD-10-CM

## 2021-07-30 DIAGNOSIS — I50.43 CHF (CONGESTIVE HEART FAILURE), NYHA CLASS I, ACUTE ON CHRONIC, COMBINED (HCC): ICD-10-CM

## 2021-07-30 LAB
25(OH)D3 SERPL-MCNC: 60.6 NG/ML
ALBUMIN SERPL-MCNC: 4.2 G/DL (ref 3.5–5.2)
ALBUMIN/GLOB SERPL: 1.8 G/DL
ALP SERPL-CCNC: 58 U/L (ref 39–117)
ALT SERPL W P-5'-P-CCNC: 13 U/L (ref 1–33)
ANION GAP SERPL CALCULATED.3IONS-SCNC: 12 MMOL/L (ref 5–15)
AST SERPL-CCNC: 16 U/L (ref 1–32)
BACTERIA UR QL AUTO: NORMAL /HPF
BASOPHILS # BLD AUTO: 0.03 10*3/MM3 (ref 0–0.2)
BASOPHILS NFR BLD AUTO: 0.6 % (ref 0–1.5)
BILIRUB CONJ SERPL-MCNC: <0.2 MG/DL (ref 0–0.3)
BILIRUB SERPL-MCNC: <0.2 MG/DL (ref 0–1.2)
BILIRUB UR QL STRIP: NEGATIVE
BUN SERPL-MCNC: 30 MG/DL (ref 8–23)
BUN/CREAT SERPL: 16 (ref 7–25)
CALCIUM SPEC-SCNC: 9.8 MG/DL (ref 8.6–10.5)
CHLORIDE SERPL-SCNC: 106 MMOL/L (ref 98–107)
CHOLEST SERPL-MCNC: 159 MG/DL (ref 0–200)
CLARITY UR: CLEAR
CO2 SERPL-SCNC: 24 MMOL/L (ref 22–29)
COLOR UR: YELLOW
CREAT SERPL-MCNC: 1.87 MG/DL (ref 0.57–1)
CRP SERPL-MCNC: 0.19 MG/DL (ref 0.01–0.5)
DEPRECATED RDW RBC AUTO: 48.5 FL (ref 37–54)
EOSINOPHIL # BLD AUTO: 0.1 10*3/MM3 (ref 0–0.4)
EOSINOPHIL NFR BLD AUTO: 2.1 % (ref 0.3–6.2)
ERYTHROCYTE [DISTWIDTH] IN BLOOD BY AUTOMATED COUNT: 12.7 % (ref 12.3–15.4)
ERYTHROCYTE [SEDIMENTATION RATE] IN BLOOD: 12 MM/HR (ref 0–30)
FERRITIN SERPL-MCNC: 45.4 NG/ML (ref 13–150)
FOLATE SERPL-MCNC: >20 NG/ML (ref 4.78–24.2)
GFR SERPL CREATININE-BSD FRML MDRD: 26 ML/MIN/1.73
GLOBULIN UR ELPH-MCNC: 2.4 GM/DL
GLUCOSE SERPL-MCNC: 91 MG/DL (ref 65–99)
GLUCOSE UR STRIP-MCNC: NEGATIVE MG/DL
HCT VFR BLD AUTO: 31.5 % (ref 34–46.6)
HDLC SERPL-MCNC: 53 MG/DL (ref 40–60)
HGB BLD-MCNC: 9.6 G/DL (ref 12–15.9)
HGB UR QL STRIP.AUTO: NEGATIVE
HYALINE CASTS UR QL AUTO: NORMAL /LPF
IMM GRANULOCYTES # BLD AUTO: 0.02 10*3/MM3 (ref 0–0.05)
IMM GRANULOCYTES NFR BLD AUTO: 0.4 % (ref 0–0.5)
IRON 24H UR-MRATE: 42 MCG/DL (ref 37–145)
IRON SATN MFR SERPL: 9 % (ref 20–50)
KETONES UR QL STRIP: NEGATIVE
LDLC SERPL CALC-MCNC: 83 MG/DL (ref 0–100)
LDLC/HDLC SERPL: 1.52 {RATIO}
LEUKOCYTE ESTERASE UR QL STRIP.AUTO: NEGATIVE
LYMPHOCYTES # BLD AUTO: 0.68 10*3/MM3 (ref 0.7–3.1)
LYMPHOCYTES NFR BLD AUTO: 14.3 % (ref 19.6–45.3)
MAGNESIUM SERPL-MCNC: 2.6 MG/DL (ref 1.6–2.4)
MCH RBC QN AUTO: 31.9 PG (ref 26.6–33)
MCHC RBC AUTO-ENTMCNC: 30.5 G/DL (ref 31.5–35.7)
MCV RBC AUTO: 104.7 FL (ref 79–97)
MONOCYTES # BLD AUTO: 0.72 10*3/MM3 (ref 0.1–0.9)
MONOCYTES NFR BLD AUTO: 15.2 % (ref 5–12)
NEUTROPHILS NFR BLD AUTO: 3.2 10*3/MM3 (ref 1.7–7)
NEUTROPHILS NFR BLD AUTO: 67.4 % (ref 42.7–76)
NITRITE UR QL STRIP: NEGATIVE
NRBC BLD AUTO-RTO: 0.2 /100 WBC (ref 0–0.2)
PH UR STRIP.AUTO: 7 [PH] (ref 5–8)
PLATELET # BLD AUTO: 261 10*3/MM3 (ref 140–450)
PMV BLD AUTO: 10.7 FL (ref 6–12)
POTASSIUM SERPL-SCNC: 4.7 MMOL/L (ref 3.5–5.2)
PROT SERPL-MCNC: 6.6 G/DL (ref 6–8.5)
PROT UR QL STRIP: ABNORMAL
RBC # BLD AUTO: 3.01 10*6/MM3 (ref 3.77–5.28)
RBC # UR: NORMAL /HPF
REF LAB TEST METHOD: NORMAL
SODIUM SERPL-SCNC: 142 MMOL/L (ref 136–145)
SP GR UR STRIP: 1.02 (ref 1–1.03)
SQUAMOUS #/AREA URNS HPF: NORMAL /HPF
T4 FREE SERPL-MCNC: 0.98 NG/DL (ref 0.93–1.7)
TIBC SERPL-MCNC: 468 MCG/DL (ref 298–536)
TRANSFERRIN SERPL-MCNC: 314 MG/DL (ref 200–360)
TRIGL SERPL-MCNC: 128 MG/DL (ref 0–150)
TSH SERPL DL<=0.05 MIU/L-ACNC: 6.79 UIU/ML (ref 0.27–4.2)
URATE SERPL-MCNC: 6.6 MG/DL (ref 2.4–5.7)
UROBILINOGEN UR QL STRIP: ABNORMAL
VIT B12 BLD-MCNC: 1004 PG/ML (ref 211–946)
VLDLC SERPL-MCNC: 23 MG/DL (ref 5–40)
WBC # BLD AUTO: 4.75 10*3/MM3 (ref 3.4–10.8)
WBC UR QL AUTO: NORMAL /HPF

## 2021-07-30 PROCEDURE — 86141 C-REACTIVE PROTEIN HS: CPT

## 2021-07-30 PROCEDURE — 81001 URINALYSIS AUTO W/SCOPE: CPT

## 2021-07-30 PROCEDURE — 80061 LIPID PANEL: CPT

## 2021-07-30 PROCEDURE — 84550 ASSAY OF BLOOD/URIC ACID: CPT

## 2021-07-30 PROCEDURE — 82607 VITAMIN B-12: CPT

## 2021-07-30 PROCEDURE — 80053 COMPREHEN METABOLIC PANEL: CPT

## 2021-07-30 PROCEDURE — 85652 RBC SED RATE AUTOMATED: CPT

## 2021-07-30 PROCEDURE — 82248 BILIRUBIN DIRECT: CPT

## 2021-07-30 PROCEDURE — 83735 ASSAY OF MAGNESIUM: CPT

## 2021-07-30 PROCEDURE — 85025 COMPLETE CBC W/AUTO DIFF WBC: CPT

## 2021-07-30 PROCEDURE — 82746 ASSAY OF FOLIC ACID SERUM: CPT

## 2021-07-30 PROCEDURE — 84439 ASSAY OF FREE THYROXINE: CPT

## 2021-07-30 PROCEDURE — 82306 VITAMIN D 25 HYDROXY: CPT

## 2021-07-30 PROCEDURE — 84443 ASSAY THYROID STIM HORMONE: CPT

## 2021-07-30 PROCEDURE — 84466 ASSAY OF TRANSFERRIN: CPT

## 2021-07-30 PROCEDURE — 36415 COLL VENOUS BLD VENIPUNCTURE: CPT

## 2021-07-30 PROCEDURE — 82728 ASSAY OF FERRITIN: CPT

## 2021-07-30 PROCEDURE — 83540 ASSAY OF IRON: CPT

## 2021-08-04 ENCOUNTER — TELEPHONE (OUTPATIENT)
Dept: CARDIOLOGY | Facility: CLINIC | Age: 86
End: 2021-08-04

## 2021-08-04 NOTE — TELEPHONE ENCOUNTER
----- Message from Simon Talbot MD sent at 8/4/2021  8:15 AM EDT -----  Labs reviewed.  There is mild worsening of kidney functions but otherwise labs are stable and unremarkable.    Recommend to continue current medications.

## 2021-08-19 ENCOUNTER — TRANSCRIBE ORDERS (OUTPATIENT)
Dept: ADMINISTRATIVE | Facility: HOSPITAL | Age: 86
End: 2021-08-19

## 2021-08-19 DIAGNOSIS — D50.9 IRON DEFICIENCY ANEMIA, UNSPECIFIED IRON DEFICIENCY ANEMIA TYPE: Primary | ICD-10-CM

## 2021-08-23 PROBLEM — D50.9 IRON DEFICIENCY ANEMIA: Status: ACTIVE | Noted: 2021-08-23

## 2021-08-23 RX ORDER — SODIUM CHLORIDE 9 MG/ML
250 INJECTION, SOLUTION INTRAVENOUS ONCE
Status: CANCELLED | OUTPATIENT
Start: 2021-08-24

## 2021-08-24 ENCOUNTER — HOSPITAL ENCOUNTER (OUTPATIENT)
Dept: INFUSION THERAPY | Facility: HOSPITAL | Age: 86
Discharge: HOME | End: 2021-08-24
Admitting: INTERNAL MEDICINE

## 2021-08-24 VITALS
HEART RATE: 62 BPM | WEIGHT: 170.86 LBS | DIASTOLIC BLOOD PRESSURE: 64 MMHG | BODY MASS INDEX: 34.44 KG/M2 | RESPIRATION RATE: 20 BRPM | TEMPERATURE: 98.7 F | SYSTOLIC BLOOD PRESSURE: 137 MMHG | OXYGEN SATURATION: 95 % | HEIGHT: 59 IN

## 2021-08-24 DIAGNOSIS — D50.9 IRON DEFICIENCY ANEMIA, UNSPECIFIED IRON DEFICIENCY ANEMIA TYPE: Primary | ICD-10-CM

## 2021-08-24 PROCEDURE — 96374 THER/PROPH/DIAG INJ IV PUSH: CPT

## 2021-08-24 PROCEDURE — 25010000002 FERUMOXYTOL 510 MG/17ML SOLUTION 510 MG VIAL: Performed by: INTERNAL MEDICINE

## 2021-08-24 RX ORDER — SODIUM CHLORIDE 9 MG/ML
250 INJECTION, SOLUTION INTRAVENOUS ONCE
Status: CANCELLED | OUTPATIENT
Start: 2021-08-31

## 2021-08-24 RX ADMIN — FERUMOXYTOL 510 MG: 510 INJECTION INTRAVENOUS at 10:45

## 2021-08-31 ENCOUNTER — HOSPITAL ENCOUNTER (OUTPATIENT)
Dept: INFUSION THERAPY | Facility: HOSPITAL | Age: 86
Setting detail: INFUSION SERIES
Discharge: HOME | End: 2021-08-31

## 2021-08-31 VITALS
HEIGHT: 59 IN | SYSTOLIC BLOOD PRESSURE: 163 MMHG | HEART RATE: 61 BPM | WEIGHT: 172.62 LBS | DIASTOLIC BLOOD PRESSURE: 64 MMHG | TEMPERATURE: 97.7 F | RESPIRATION RATE: 18 BRPM | BODY MASS INDEX: 34.8 KG/M2 | OXYGEN SATURATION: 98 %

## 2021-08-31 DIAGNOSIS — D50.9 IRON DEFICIENCY ANEMIA, UNSPECIFIED IRON DEFICIENCY ANEMIA TYPE: Primary | ICD-10-CM

## 2021-08-31 PROCEDURE — 25010000002 FERUMOXYTOL 510 MG/17ML SOLUTION 510 MG VIAL: Performed by: INTERNAL MEDICINE

## 2021-08-31 PROCEDURE — 96374 THER/PROPH/DIAG INJ IV PUSH: CPT

## 2021-08-31 RX ORDER — SODIUM CHLORIDE 9 MG/ML
250 INJECTION, SOLUTION INTRAVENOUS ONCE
OUTPATIENT
Start: 2021-09-07

## 2021-08-31 RX ADMIN — FERUMOXYTOL 510 MG: 510 INJECTION INTRAVENOUS at 10:25

## 2021-09-07 RX ORDER — FUROSEMIDE 40 MG/1
TABLET ORAL
Qty: 90 TABLET | Refills: 1 | Status: SHIPPED | OUTPATIENT
Start: 2021-09-07 | End: 2021-09-09 | Stop reason: SDUPTHER

## 2021-09-09 RX ORDER — FUROSEMIDE 40 MG/1
40 TABLET ORAL DAILY
Qty: 90 TABLET | Refills: 1 | Status: SHIPPED | OUTPATIENT
Start: 2021-09-09 | End: 2021-11-30 | Stop reason: SDUPTHER

## 2021-09-09 NOTE — TELEPHONE ENCOUNTER
called states that she takes the 40mg qd and 20 mg as needed. There was a mix up with the dictation. New rx sent to pharmacy with correct directions

## 2021-09-23 RX ORDER — ROSUVASTATIN CALCIUM 20 MG/1
20 TABLET, COATED ORAL NIGHTLY
Qty: 90 TABLET | Refills: 2 | Status: SHIPPED | OUTPATIENT
Start: 2021-09-23 | End: 2021-11-30 | Stop reason: SDUPTHER

## 2021-10-13 ENCOUNTER — OFFICE VISIT (OUTPATIENT)
Dept: OTOLARYNGOLOGY | Facility: CLINIC | Age: 86
End: 2021-10-13

## 2021-10-13 VITALS — HEIGHT: 59 IN | BODY MASS INDEX: 34.92 KG/M2 | TEMPERATURE: 97.8 F | WEIGHT: 173.2 LBS

## 2021-10-13 DIAGNOSIS — L08.9 SKIN PUSTULE: ICD-10-CM

## 2021-10-13 DIAGNOSIS — H10.31 ACUTE CONJUNCTIVITIS OF RIGHT EYE, UNSPECIFIED ACUTE CONJUNCTIVITIS TYPE: ICD-10-CM

## 2021-10-13 DIAGNOSIS — J31.0 CHRONIC RHINITIS: Primary | ICD-10-CM

## 2021-10-13 PROCEDURE — 99213 OFFICE O/P EST LOW 20 MIN: CPT | Performed by: OTOLARYNGOLOGY

## 2021-10-13 RX ORDER — OLOPATADINE HYDROCHLORIDE 1 MG/ML
1 SOLUTION/ DROPS OPHTHALMIC 2 TIMES DAILY
Qty: 5 ML | Refills: 3 | Status: ON HOLD | OUTPATIENT
Start: 2021-10-13 | End: 2023-01-02

## 2021-10-13 RX ORDER — CEPHALEXIN 500 MG/1
500 CAPSULE ORAL 3 TIMES DAILY
Qty: 21 CAPSULE | Refills: 0 | Status: SHIPPED | OUTPATIENT
Start: 2021-10-13 | End: 2021-10-20

## 2021-10-13 RX ORDER — CIPROFLOXACIN HYDROCHLORIDE 3.5 MG/ML
2 SOLUTION/ DROPS TOPICAL EVERY 6 HOURS
Qty: 2.5 ML | Refills: 1 | Status: SHIPPED | OUTPATIENT
Start: 2021-10-13 | End: 2021-10-20

## 2021-10-13 RX ORDER — CEPHALEXIN 500 MG/1
500 CAPSULE ORAL 3 TIMES DAILY
Qty: 21 CAPSULE | Refills: 0 | Status: SHIPPED | OUTPATIENT
Start: 2021-10-13 | End: 2021-10-13

## 2021-10-13 RX ORDER — OLOPATADINE HYDROCHLORIDE 665 UG/1
2 SPRAY NASAL 2 TIMES DAILY
Qty: 30.5 G | Refills: 6 | Status: SHIPPED | OUTPATIENT
Start: 2021-10-13 | End: 2021-11-30 | Stop reason: ALTCHOICE

## 2021-10-13 NOTE — PROGRESS NOTES
Patient Name: Yelena Sanchez   Visit Date: 10/13/2021   Patient ID: [id number]  Provider: Lefty Acevedo MD    Sex: female  [facility]: [facility]   YOB: 1935  Location Address: [address] [place] [address], [address],?[place]?[id number]    Primary Care Provider Kaelyn Eugene MD  Location Phone: [phone]    Referring Provider: Self Referring        Chief Complaint  Sinus Problem and Ear Problem (itching)    History of Present Illness  Yelena Sanchez is a 86 y.o. female  with past medical history significant for coronary artery disease, GERD, hypothyroidism, hyperlipidemia, and cerebrovascular disease on Plavix who returns today for follow-up of a left neck mass.  She was originally seen on 7/3/2019 at which time she reported a previous mass which was excised by Dr. Galindo in 2014. PET scan on 3/18/2014 revealed that the left neck mass had an SUV of 4.5. an image guided FNA at the time revealed scant cystic material, histiocytes, and a few inflammatory cells which was nondiagnostic. She is a former smoker who quit in 2017. A CT scan of the neck without contrast on 6/13/2019 revealed a 2.75 x 1.8 cm solid-appearing mass just inferior to the hyoid bone on the left involving the preepiglottic space and left false vocal fold. It appears to abut her left internal carotid artery.  Laryngoscopic examination that day revealed fullness to the left false vocal fold and aryepiglottic fold. Previous records from [facility] revealed that she underwent a [employer] laryngoscopy with excision of a left false vocal fold cystic lesion on 5/20/2014.  Subsequent pathology revealed a benign epithelial cyst without any evidence of malignancy.      She returns today for follow-up having last been seen on 1/29/2021 at which time her left vocal fold fullness appeared stable and she was asymptomatic from a saccular cyst standpoint.   Hoarseness or dysphagia.  Her right eye feels like there is a  foreign body in it over the past week and she reports epiphora. Ipratropium bromide did not help for her rhinorrhea.  She is currently off of all nasal sprays.  She also tells me she noticed a spot on the back of her scalp which has been painful and swollen.  .       Past Medical History:   Diagnosis Date   • Arthritis    • Asthma    • Breast cancer (HCC)    • Congestive heart failure (HCC)    • COPD (chronic obstructive pulmonary disease) (HCC)    • Diverticulosis 11/26/2014   • GERD (gastroesophageal reflux disease)    • History of tobacco abuse    • HTN (hypertension)    • Hyperlipemia    • Hyperthyroidism    • Neck mass    • Seasonal allergies    • SOB (shortness of breath)        Past Surgical History:   Procedure Laterality Date   • COLONOSCOPY  2014   • ENDOSCOPY  2014   • EYE SURGERY      Implant; yes    • LUNG SURGERY     • MASTECTOMY      Left    • OTHER SURGICAL HISTORY      Joint Surgery   • PACEMAKER IMPLANTATION           Current Outpatient Medications:   •  allopurinol (ZYLOPRIM) 100 MG tablet, , Disp: , Rfl:   •  amLODIPine (NORVASC) 5 MG tablet, amlodipine 5 mg oral tablet TAKE 1 TABLET DAILY 4/19/2021  Active, Disp: , Rfl:   •  aspirin (aspirin) 81 MG EC tablet, Aspirin Low Dose 81 mg oral tablet,delayed release (DR/EC) take 1 tablet (81 mg) by oral route once daily   Active, Disp: , Rfl:   •  Bystolic 10 MG tablet, TAKE 1 TABLET DAILY, Disp: 90 tablet, Rfl: 3  •  chlorpheniramine (CHLOR-TRIMETON) 4 MG tablet, chlorpheniramine maleate 4 mg oral tablet take 1 tablet by oral route 2 times a day   Active, Disp: , Rfl:   •  cholecalciferol (VITAMIN D3) 25 MCG (1000 UT) tablet, , Disp: , Rfl:   •  Cholecalciferol 50 MCG (2000 UT) tablet, Vitamin D3 2,000 unit oral tablet take 1 tablet by oral route daily   Active, Disp: , Rfl:   •  clopidogrel (PLAVIX) 75 MG tablet, clopidogrel 75 mg oral tablet take 1 tablet (75 mg) by oral route once daily 4/19/2021  Active, Disp: , Rfl:   •  furosemide (LASIX) 20 MG  tablet, furosemide 20 mg oral tablet take 1 tablet by oral route once a day (in the evening) as needed   Active, Disp: , Rfl:   •  furosemide (LASIX) 40 MG tablet, Take 1 tablet by mouth Daily., Disp: 90 tablet, Rfl: 1  •  HYDROcodone-acetaminophen (NORCO) 5-325 MG per tablet, , Disp: , Rfl:   •  ipratropium (ATROVENT) 0.02 % nebulizer solution, , Disp: , Rfl:   •  lansoprazole (PREVACID) 30 MG capsule, lansoprazole 30 mg oral capsule,delayed release(DR/EC) take 1 capsule (30 mg) by oral route once daily before a meal   Active, Disp: , Rfl:   •  levothyroxine (SYNTHROID, LEVOTHROID) 100 MCG tablet, levothyroxine 100 mcg oral tablet take 1 tablet (100 mcg) by oral route once daily   Active, Disp: , Rfl:   •  multivitamin with minerals (Centrum Silver 50+Women) tablet tablet, Take  by mouth., Disp: , Rfl:   •  PEG 3350 17 GM/SCOOP powder, , Disp: , Rfl:   •  rosuvastatin (Crestor) 20 MG tablet, Take 1 tablet by mouth Every Night., Disp: 90 tablet, Rfl: 2  •  spironolactone (ALDACTONE) 25 MG tablet, , Disp: , Rfl:   •  Synthroid 125 MCG tablet, , Disp: , Rfl:   •  anastrozole (ARIMIDEX) 1 MG tablet, anastrozole 1 mg oral tablet take 1 tablet (1 mg) by oral route once daily   Suspended, Disp: , Rfl:   •  cephalexin (Keflex) 500 MG capsule, Take 1 capsule by mouth 3 (Three) Times a Day for 7 days., Disp: 21 capsule, Rfl: 0  •  ciprofloxacin (CILOXAN) 0.3 % ophthalmic solution, Administer 2 drops to the right eye Every 6 (Six) Hours for 7 days., Disp: 2.5 mL, Rfl: 1  •  ezetimibe (Zetia) 10 MG tablet, Zetia 10 mg oral tablet take 1 tablet (10 mg) by oral route once daily 2/17/2021  Active, Disp: , Rfl:   •  Flaxseed, Linseed, (Flax Seed Oil) 1000 MG capsule, , Disp: , Rfl:   •  gabapentin (NEURONTIN) 300 MG capsule, Take 300 mg by mouth 3 (Three) Times a Day., Disp: , Rfl:   •  Glucosamine Sulfate 500 MG tablet, , Disp: , Rfl:   •  Glucosamine Sulfate--400 MG capsule, Take 1 capsule by mouth., Disp: , Rfl:   •   "montelukast (SINGULAIR) 10 MG tablet, montelukast 10 mg oral tablet take 1 tablet (10 mg) by oral route once daily in the evening   Active, Disp: , Rfl:   •  olopatadine (PATANASE) 0.6 % solution nasal solution, 2 sprays by Each Nare route 2 (Two) Times a Day. 2 puff in each nostril twice a day, Disp: 30.5 g, Rfl: 6  •  olopatadine (PATANOL) 0.1 % ophthalmic solution, Administer 1 drop to both eyes 2 (Two) Times a Day., Disp: 5 mL, Rfl: 3  •  tiotropium bromide-olodaterol (Stiolto Respimat) 2.5-2.5 MCG/ACT aerosol solution inhaler, Stiolto Respimat 2.5-2.5 mcg/actuation inhalation mist inhale 2 puffs by inhalation route once daily at the same time each day   Active, Disp: , Rfl:      No Known Allergies    Social History     Tobacco Use   • Smoking status: Former Smoker     Quit date: 2017     Years since quittin.7   • Smokeless tobacco: Not on file   Substance Use Topics   • Alcohol use: Never   • Drug use: Never        Objective     Vital Signs:   Temp 97.8 °F (36.6 °C) (Temporal)   Ht 149.9 cm (59\")   Wt 78.6 kg (173 lb 3.2 oz)   BMI 34.98 kg/m²       Physical Exam    General: Well developed, well nourished patient of stated age in no acute distress. Voice is strong and clear.   Head: Normocephalic and atraumatic.  Small pustule involving the occipital scalp.  Face: No lesions.  Bilateral parotid and submandibular glands are unremarkable.  Stensen's and Warthin's ducts are productive of clear saliva bilaterally.  House-Brackmann I/VI     bilaterally.   muscles and temporomandibular joint nontender to palpation.  No TMJ crepitus.  Eyes: PERRLA, sclerae anicteric, no conjunctival injection. Extra ocular movements are intact and full. No nystagmus.   Ears: Auricles are normal in appearance. Bilateral external auditory canals are unremarkable. Bilateral tympanic membranes are clear and without effusion. Hearing normal to conversational voice.   Nose: External nose is normal in appearance. Bilateral " nares are patent with normal appearing mucosa. Septum midline. Turbinates are unremarkable. No lesions.   Oral Cavity: Lips are normal in appearance. Oral mucosa is unremarkable. Gingiva is unremarkable.  Edentulous. Tongue is unremarkable with good movement. Hard palate is unremarkable.   Oropharynx: Soft palate is unremarkable with full movement. Uvula is unremarkable. Bilateral tonsils are unremarkable. Posterior oropharynx is unremarkable.    Larynx and hypopharynx: Deferred secondary to gag reflex.  Neck: Supple.  No mass.  Nontender to palpation.  Trachea midline. Thyroid normal size and without nodules to palpation.   Lymphatic: No lymphadenopathy upon palpation.  Respiratory: Clear to auscultation bilaterally, nonlabored respirations    Cardiovascular: RRR, no murmurs, rubs, or gallops,   Psychiatric: Appropriate affect, cooperative   Neurologic: Oriented x 3, strength symmetric in all extremities, Cranial Nerves II-XII are grossly intact to confrontation   Skin: Warm and dry. No rashes.    Procedures           Result Review :               Assessment and Plan    Diagnoses and all orders for this visit:    1. Chronic rhinitis (Primary)  -     olopatadine (PATANOL) 0.1 % ophthalmic solution; Administer 1 drop to both eyes 2 (Two) Times a Day.  Dispense: 5 mL; Refill: 3  -     olopatadine (PATANASE) 0.6 % solution nasal solution; 2 sprays by Each Nare route 2 (Two) Times a Day. 2 puff in each nostril twice a day  Dispense: 30.5 g; Refill: 6    2. Skin pustule  -     Discontinue: cephalexin (Keflex) 500 MG capsule; Take 1 capsule by mouth 3 (Three) Times a Day for 7 days.  Dispense: 21 capsule; Refill: 0  -     cephalexin (Keflex) 500 MG capsule; Take 1 capsule by mouth 3 (Three) Times a Day for 7 days.  Dispense: 21 capsule; Refill: 0    3. Acute conjunctivitis of right eye, unspecified acute conjunctivitis type  -     ciprofloxacin (CILOXAN) 0.3 % ophthalmic solution; Administer 2 drops to the right eye Every  6 (Six) Hours for 7 days.  Dispense: 2.5 mL; Refill: 1      Impressions and findings were discussed.  Currently, she continues to do well from a saccular cyst standpoint and we will defer laryngoscopic examination today.  In regards to her rhinorrhea we discussed that there may not be anything else worth pursuing but may try her on a different medication within the same category.  Therefore, she will be tried on a course of olopatadine.  She will be prescribed a short course of Keflex for her skin pustule and ciprofloxacin ophthalmic for her conjunctivitis.      Follow Up   No follow-ups on file.  Patient was given instructions and counseling regarding her condition or for health maintenance advice. Please see specific information pulled into the AVS if appropriate.

## 2021-10-27 ENCOUNTER — TRANSCRIBE ORDERS (OUTPATIENT)
Dept: LAB | Facility: HOSPITAL | Age: 86
End: 2021-10-27

## 2021-10-27 ENCOUNTER — LAB (OUTPATIENT)
Dept: LAB | Facility: HOSPITAL | Age: 86
End: 2021-10-27

## 2021-10-27 DIAGNOSIS — D64.9 ANEMIA, UNSPECIFIED TYPE: Primary | ICD-10-CM

## 2021-10-27 DIAGNOSIS — Z79.899 ENCOUNTER FOR LONG-TERM (CURRENT) USE OF OTHER MEDICATIONS: ICD-10-CM

## 2021-10-27 DIAGNOSIS — E03.9 HYPOTHYROIDISM, UNSPECIFIED TYPE: ICD-10-CM

## 2021-10-27 DIAGNOSIS — E78.5 HYPERLIPIDEMIA, UNSPECIFIED HYPERLIPIDEMIA TYPE: ICD-10-CM

## 2021-10-27 DIAGNOSIS — I10 ESSENTIAL HYPERTENSION, MALIGNANT: ICD-10-CM

## 2021-10-27 DIAGNOSIS — E61.1 IRON DEFICIENCY: ICD-10-CM

## 2021-10-27 DIAGNOSIS — D64.9 ANEMIA, UNSPECIFIED TYPE: ICD-10-CM

## 2021-10-27 LAB
25(OH)D3 SERPL-MCNC: 71.6 NG/ML
ALBUMIN SERPL-MCNC: 4.4 G/DL (ref 3.5–5.2)
ALP SERPL-CCNC: 74 U/L (ref 39–117)
ALT SERPL W P-5'-P-CCNC: 12 U/L (ref 1–33)
ANION GAP SERPL CALCULATED.3IONS-SCNC: 9.2 MMOL/L (ref 5–15)
AST SERPL-CCNC: 18 U/L (ref 1–32)
BACTERIA UR QL AUTO: NORMAL /HPF
BASOPHILS # BLD AUTO: 0.04 10*3/MM3 (ref 0–0.2)
BASOPHILS NFR BLD AUTO: 0.8 % (ref 0–1.5)
BILIRUB CONJ SERPL-MCNC: <0.2 MG/DL (ref 0–0.3)
BILIRUB INDIRECT SERPL-MCNC: NORMAL MG/DL
BILIRUB SERPL-MCNC: <0.2 MG/DL (ref 0–1.2)
BILIRUB UR QL STRIP: NEGATIVE
BUN SERPL-MCNC: 42 MG/DL (ref 8–23)
BUN/CREAT SERPL: 23.2 (ref 7–25)
CALCIUM SPEC-SCNC: 9.6 MG/DL (ref 8.6–10.5)
CHLORIDE SERPL-SCNC: 104 MMOL/L (ref 98–107)
CHOLEST SERPL-MCNC: 205 MG/DL (ref 0–200)
CLARITY UR: CLEAR
CO2 SERPL-SCNC: 25.8 MMOL/L (ref 22–29)
COLOR UR: YELLOW
CREAT SERPL-MCNC: 1.81 MG/DL (ref 0.57–1)
CRP SERPL-MCNC: 0.45 MG/DL (ref 0–0.5)
DEPRECATED RDW RBC AUTO: 46.5 FL (ref 37–54)
EOSINOPHIL # BLD AUTO: 0.07 10*3/MM3 (ref 0–0.4)
EOSINOPHIL NFR BLD AUTO: 1.5 % (ref 0.3–6.2)
ERYTHROCYTE [DISTWIDTH] IN BLOOD BY AUTOMATED COUNT: 12.9 % (ref 12.3–15.4)
ERYTHROCYTE [SEDIMENTATION RATE] IN BLOOD: 17 MM/HR (ref 0–30)
FOLATE SERPL-MCNC: >20 NG/ML (ref 4.78–24.2)
GFR SERPL CREATININE-BSD FRML MDRD: 27 ML/MIN/1.73
GLUCOSE SERPL-MCNC: 100 MG/DL (ref 65–99)
GLUCOSE UR STRIP-MCNC: NEGATIVE MG/DL
HCT VFR BLD AUTO: 34.9 % (ref 34–46.6)
HDLC SERPL-MCNC: 48 MG/DL (ref 40–60)
HGB BLD-MCNC: 11 G/DL (ref 12–15.9)
HGB UR QL STRIP.AUTO: NEGATIVE
HYALINE CASTS UR QL AUTO: NORMAL /LPF
IMM GRANULOCYTES # BLD AUTO: 0.01 10*3/MM3 (ref 0–0.05)
IMM GRANULOCYTES NFR BLD AUTO: 0.2 % (ref 0–0.5)
IRON 24H UR-MRATE: 118 MCG/DL (ref 37–145)
IRON SATN MFR SERPL: 29 % (ref 20–50)
KETONES UR QL STRIP: NEGATIVE
LDLC SERPL CALC-MCNC: 129 MG/DL (ref 0–100)
LDLC/HDLC SERPL: 2.61 {RATIO}
LEUKOCYTE ESTERASE UR QL STRIP.AUTO: NEGATIVE
LYMPHOCYTES # BLD AUTO: 0.73 10*3/MM3 (ref 0.7–3.1)
LYMPHOCYTES NFR BLD AUTO: 15.2 % (ref 19.6–45.3)
MAGNESIUM SERPL-MCNC: 2.6 MG/DL (ref 1.6–2.4)
MCH RBC QN AUTO: 31.3 PG (ref 26.6–33)
MCHC RBC AUTO-ENTMCNC: 31.5 G/DL (ref 31.5–35.7)
MCV RBC AUTO: 99.1 FL (ref 79–97)
MONOCYTES # BLD AUTO: 0.71 10*3/MM3 (ref 0.1–0.9)
MONOCYTES NFR BLD AUTO: 14.8 % (ref 5–12)
NEUTROPHILS NFR BLD AUTO: 3.23 10*3/MM3 (ref 1.7–7)
NEUTROPHILS NFR BLD AUTO: 67.5 % (ref 42.7–76)
NITRITE UR QL STRIP: NEGATIVE
NRBC BLD AUTO-RTO: 0 /100 WBC (ref 0–0.2)
PH UR STRIP.AUTO: 6.5 [PH] (ref 5–8)
PLATELET # BLD AUTO: 284 10*3/MM3 (ref 140–450)
PMV BLD AUTO: 11.2 FL (ref 6–12)
POTASSIUM SERPL-SCNC: 4.3 MMOL/L (ref 3.5–5.2)
PROT SERPL-MCNC: 7 G/DL (ref 6–8.5)
PROT UR QL STRIP: ABNORMAL
RBC # BLD AUTO: 3.52 10*6/MM3 (ref 3.77–5.28)
RBC # UR: NORMAL /HPF
REF LAB TEST METHOD: NORMAL
SODIUM SERPL-SCNC: 139 MMOL/L (ref 136–145)
SP GR UR STRIP: 1.02 (ref 1–1.03)
SQUAMOUS #/AREA URNS HPF: NORMAL /HPF
T4 FREE SERPL-MCNC: 1.05 NG/DL (ref 0.93–1.7)
TIBC SERPL-MCNC: 402 MCG/DL (ref 298–536)
TRANSFERRIN SERPL-MCNC: 270 MG/DL (ref 200–360)
TRIGL SERPL-MCNC: 158 MG/DL (ref 0–150)
TSH SERPL DL<=0.05 MIU/L-ACNC: 3.67 UIU/ML (ref 0.27–4.2)
UROBILINOGEN UR QL STRIP: ABNORMAL
VIT B12 BLD-MCNC: 995 PG/ML (ref 211–946)
VLDLC SERPL-MCNC: 28 MG/DL (ref 5–40)
WBC # BLD AUTO: 4.79 10*3/MM3 (ref 3.4–10.8)
WBC UR QL AUTO: NORMAL /HPF

## 2021-10-27 PROCEDURE — 82607 VITAMIN B-12: CPT

## 2021-10-27 PROCEDURE — 82746 ASSAY OF FOLIC ACID SERUM: CPT

## 2021-10-27 PROCEDURE — 80076 HEPATIC FUNCTION PANEL: CPT

## 2021-10-27 PROCEDURE — 83735 ASSAY OF MAGNESIUM: CPT

## 2021-10-27 PROCEDURE — 80061 LIPID PANEL: CPT

## 2021-10-27 PROCEDURE — 82306 VITAMIN D 25 HYDROXY: CPT

## 2021-10-27 PROCEDURE — 83540 ASSAY OF IRON: CPT

## 2021-10-27 PROCEDURE — 84466 ASSAY OF TRANSFERRIN: CPT

## 2021-10-27 PROCEDURE — 86140 C-REACTIVE PROTEIN: CPT

## 2021-10-27 PROCEDURE — 84443 ASSAY THYROID STIM HORMONE: CPT

## 2021-10-27 PROCEDURE — 36415 COLL VENOUS BLD VENIPUNCTURE: CPT

## 2021-10-27 PROCEDURE — 85025 COMPLETE CBC W/AUTO DIFF WBC: CPT

## 2021-10-27 PROCEDURE — 81001 URINALYSIS AUTO W/SCOPE: CPT

## 2021-10-27 PROCEDURE — 84439 ASSAY OF FREE THYROXINE: CPT

## 2021-10-27 PROCEDURE — 85652 RBC SED RATE AUTOMATED: CPT

## 2021-10-27 PROCEDURE — 80048 BASIC METABOLIC PNL TOTAL CA: CPT

## 2021-10-29 ENCOUNTER — OFFICE VISIT (OUTPATIENT)
Dept: ORTHOPEDIC SURGERY | Facility: CLINIC | Age: 86
End: 2021-10-29

## 2021-10-29 VITALS — BODY MASS INDEX: 34.88 KG/M2 | WEIGHT: 173 LBS | HEIGHT: 59 IN

## 2021-10-29 DIAGNOSIS — M17.12 PRIMARY OSTEOARTHRITIS OF LEFT KNEE: Primary | ICD-10-CM

## 2021-10-29 PROCEDURE — 20610 DRAIN/INJ JOINT/BURSA W/O US: CPT | Performed by: PHYSICIAN ASSISTANT

## 2021-10-29 NOTE — PROGRESS NOTES
"Chief Complaint  Follow-up of the Left Knee    Subjective          Yelena Sanchez presents to [facility] for follow-up of left knee pain, left knee osteoarthritis.  Patient conservatively manages her knee osteoarthritis with intermittent injections.  She has been receiving Synvisc injections in the past with good outcome.  She presents today to receive left knee Synvisc injection.    Objective   No Known Allergies    Vital Signs:   Ht 149.9 cm (59\")   Wt 78.5 kg (173 lb)   BMI 34.94 kg/m²       Physical Exam  Constitutional:       Appearance: Normal appearance. Patient is well-developed and normal weight.   HENT:      Head: Normocephalic.      Right Ear: Hearing and external ear normal.      Left Ear: Hearing and external ear normal.      Nose: Nose normal.   Eyes:      Conjunctiva/sclera: Conjunctivae normal.   Cardiovascular:      Rate and Rhythm: Normal rate.   Pulmonary:      Effort: Pulmonary effort is normal.      Breath sounds: No wheezing or rales.   Abdominal:      Palpations: Abdomen is soft.      Tenderness: There is no abdominal tenderness.   Musculoskeletal:      Cervical back: Normal range of motion.   Skin:     Findings: No rash.   Neurological:      Mental Status: Patient is alert and oriented to person, place, and time.   Psychiatric:         Mood and Affect: Mood and affect normal.         Judgment: Judgment normal.     Ortho Exam  Left knee: No discoloration, swelling, atrophy or deformity.  Tenderness of the medial joint line.  Sensation is intact, neurovascular intact, calf is soft and nontender, posterior tibialis pulse 2+.  AROM is 0 to 115 degrees of flexion.  Good muscle tone of the quadriceps and hamstrings muscles.  Stable to varus and valgus stress.  Full weightbearing, nonantalgic gait.    Result Review :   The following data was reviewed by: ROSY Longoria on 10/29/2021:         Imaging Results (Most Recent)     None         Large Joint " Arthrocentesis: L knee  Date/Time: 10/29/2021 2:00 PM  Consent given by: patient  Site marked: site marked  Timeout: Immediately prior to procedure a time out was called to verify the correct patient, procedure, equipment, support staff and site/side marked as required   Supporting Documentation  Indications: pain   Procedure Details  Location: knee - L knee  Preparation: Patient was prepped and draped in the usual sterile fashion  Needle gauge: 21g.  Medications administered: 48 mg hylan 48 MG/6ML  Patient tolerance: patient tolerated the procedure well with no immediate complications            Assessment and Plan    Problem List Items Addressed This Visit        Musculoskeletal and Injuries    Primary osteoarthritis of left knee - Primary    Relevant Orders    Large Joint Arthrocentesis: L knee          Follow Up   Return if symptoms worsen or fail to improve.  Patient Instructions   Left knee Synvisc injection given today.     Patient invited to follow up on an as needed basis.   Call with any changes or concerns.    Patient was given instructions and counseling regarding her condition or for health maintenance advice. Please see specific information pulled into the AVS if appropriate.

## 2021-10-29 NOTE — PATIENT INSTRUCTIONS
Left knee Synvisc injection given today.     Patient invited to follow up on an as needed basis.   Call with any changes or concerns.

## 2021-11-16 ENCOUNTER — TRANSCRIBE ORDERS (OUTPATIENT)
Dept: ADMINISTRATIVE | Facility: HOSPITAL | Age: 86
End: 2021-11-16

## 2021-11-16 DIAGNOSIS — R92.2 INCONCLUSIVE MAMMOGRAM: ICD-10-CM

## 2021-11-16 DIAGNOSIS — N63.10 BREAST MASS, RIGHT: Primary | ICD-10-CM

## 2021-11-30 ENCOUNTER — OFFICE VISIT (OUTPATIENT)
Dept: CARDIOLOGY | Facility: CLINIC | Age: 86
End: 2021-11-30

## 2021-11-30 VITALS
HEIGHT: 59 IN | SYSTOLIC BLOOD PRESSURE: 114 MMHG | BODY MASS INDEX: 33.67 KG/M2 | HEART RATE: 61 BPM | WEIGHT: 167 LBS | DIASTOLIC BLOOD PRESSURE: 82 MMHG

## 2021-11-30 DIAGNOSIS — I50.42 CHRONIC COMBINED SYSTOLIC AND DIASTOLIC CONGESTIVE HEART FAILURE (HCC): Primary | ICD-10-CM

## 2021-11-30 DIAGNOSIS — I77.9 CAROTID ARTERY DISEASE, UNSPECIFIED LATERALITY, UNSPECIFIED TYPE (HCC): ICD-10-CM

## 2021-11-30 DIAGNOSIS — I25.10 CORONARY ARTERY DISEASE INVOLVING NATIVE CORONARY ARTERY OF NATIVE HEART WITHOUT ANGINA PECTORIS: ICD-10-CM

## 2021-11-30 DIAGNOSIS — I10 ESSENTIAL HYPERTENSION: ICD-10-CM

## 2021-11-30 DIAGNOSIS — E78.2 MIXED HYPERLIPIDEMIA: ICD-10-CM

## 2021-11-30 DIAGNOSIS — Z95.810 PRESENCE OF BIVENTRICULAR IMPLANTABLE CARDIOVERTER-DEFIBRILLATOR (ICD): ICD-10-CM

## 2021-11-30 PROCEDURE — 99214 OFFICE O/P EST MOD 30 MIN: CPT | Performed by: INTERNAL MEDICINE

## 2021-11-30 RX ORDER — ROSUVASTATIN CALCIUM 20 MG/1
20 TABLET, COATED ORAL NIGHTLY
Qty: 90 TABLET | Refills: 2 | Status: SHIPPED | OUTPATIENT
Start: 2021-11-30 | End: 2022-06-14 | Stop reason: SDUPTHER

## 2021-11-30 RX ORDER — CARBOXYMETHYLCELLULOSE SODIUM 0.5 G/100ML
SOLUTION/ DROPS OPHTHALMIC
Status: ON HOLD | COMMUNITY
Start: 2021-10-29 | End: 2023-01-02

## 2021-11-30 RX ORDER — AMLODIPINE BESYLATE 5 MG/1
5 TABLET ORAL DAILY
Qty: 90 TABLET | Refills: 2 | Status: SHIPPED | OUTPATIENT
Start: 2021-11-30 | End: 2022-06-14 | Stop reason: SDUPTHER

## 2021-11-30 RX ORDER — FUROSEMIDE 40 MG/1
40 TABLET ORAL DAILY
Qty: 90 TABLET | Refills: 2 | Status: SHIPPED | OUTPATIENT
Start: 2021-11-30 | End: 2022-05-12 | Stop reason: SDUPTHER

## 2021-11-30 RX ORDER — NEBIVOLOL 10 MG/1
10 TABLET ORAL DAILY
Qty: 90 TABLET | Refills: 3 | Status: CANCELLED | OUTPATIENT
Start: 2021-11-30

## 2021-11-30 RX ORDER — SPIRONOLACTONE 25 MG/1
12.5 TABLET ORAL DAILY
Qty: 45 TABLET | Refills: 2 | Status: SHIPPED | OUTPATIENT
Start: 2021-11-30 | End: 2022-06-14 | Stop reason: SDUPTHER

## 2021-11-30 RX ORDER — AMLODIPINE BESYLATE 5 MG/1
5 TABLET ORAL DAILY
Qty: 90 TABLET | Refills: 2 | Status: SHIPPED | OUTPATIENT
Start: 2021-11-30 | End: 2021-11-30 | Stop reason: SDUPTHER

## 2021-11-30 RX ORDER — CLOPIDOGREL BISULFATE 75 MG/1
75 TABLET ORAL DAILY
Qty: 90 TABLET | Refills: 2 | Status: SHIPPED | OUTPATIENT
Start: 2021-11-30 | End: 2022-06-14 | Stop reason: SDUPTHER

## 2021-11-30 NOTE — PROGRESS NOTES
CARDIOLOGY FOLLOW-UP PROGRESS NOTE        Chief Complaint  Cardiomyopathy (Follow-up) and Coronary Artery Disease    Subjective            Yelena Sanchez presents to [facility]  History of Present Illness    This is an 86-year-old female with a dilated cardiomyopathy, status post BiV ICD placement, nonobstructive coronary disease, hypertension, hyperlipidemia is here for follow-up visit. During last visit did not April, Lasix was restarted every other day due to progressively worsening shortness of breath, pedal edema and weight gain. She is currently taking Lasix every day to keep the fluid off. She reports feeling better pedal edema has completely subsided. She does not have any orthopnea now. However she still has shortness of breath on mild-to-moderate exertion. She denies having any chest pain or palpitations. Recently she was noted to have anemia with low iron and received IV iron infusions.       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 Cardiac catheterization in 2014 showed 70% mid RCA lesion with 30% left circumflex lesion. No angioplasty was performed. 4) chronic kidney disease 6) iron deficiency anemia    Medical History:  Past Medical History:   Diagnosis Date   • Arthritis    • Asthma    • Breast cancer (HCC)    • Congestive heart failure (HCC)    • COPD (chronic obstructive pulmonary disease) (HCC)    • Diverticulosis 11/26/2014   • GERD (gastroesophageal reflux disease)    • History of tobacco abuse    • HTN (hypertension)    • Hyperlipemia    • Hyperthyroidism    • Neck mass    • Seasonal allergies    • SOB (shortness of breath)        Surgical History: has a past surgical history that  includes Colonoscopy (2014); Esophagogastroduodenoscopy (2014); Eye surgery; Other surgical history; Lung surgery; Mastectomy; and Pacemaker Implantation.     Family History: family history includes Colon cancer in her mother; Lung cancer in her sister.     Social History: reports that she quit smoking about 4 years ago. She has never used smokeless tobacco. She reports that she does not drink alcohol and does not use drugs.    Allergies: Patient has no known allergies.    Current Outpatient Medications on File Prior to Visit   Medication Sig   • allopurinol (ZYLOPRIM) 100 MG tablet    • aspirin (aspirin) 81 MG EC tablet Aspirin Low Dose 81 mg oral tablet,delayed release (DR/EC) take 1 tablet (81 mg) by oral route once daily   Active   • Bystolic 10 MG tablet TAKE 1 TABLET DAILY   • chlorpheniramine (CHLOR-TRIMETON) 4 MG tablet chlorpheniramine maleate 4 mg oral tablet take 1 tablet by oral route 2 times a day   Active   • cholecalciferol (VITAMIN D3) 25 MCG (1000 UT) tablet    • Cholecalciferol 50 MCG (2000 UT) tablet Vitamin D3 2,000 unit oral tablet take 1 tablet by oral route daily   Active   • HYDROcodone-acetaminophen (NORCO) 5-325 MG per tablet    • ipratropium (ATROVENT) 0.02 % nebulizer solution    • lansoprazole (PREVACID) 30 MG capsule lansoprazole 30 mg oral capsule,delayed release(DR/EC) take 1 capsule (30 mg) by oral route once daily before a meal   Active   • levothyroxine (SYNTHROID, LEVOTHROID) 100 MCG tablet levothyroxine 100 mcg oral tablet take 1 tablet (100 mcg) by oral route once daily   Active   • Lubricating Plus Eye Drops 0.5 % solution    • montelukast (SINGULAIR) 10 MG tablet montelukast 10 mg oral tablet take 1 tablet (10 mg) by oral route once daily in the evening   Active   • multivitamin with minerals (Centrum Silver 50+Women) tablet tablet Take  by mouth.   • olopatadine (PATANOL) 0.1 % ophthalmic solution Administer 1 drop to both eyes 2 (Two) Times a Day.   • tiotropium  "bromide-olodaterol (Stiolto Respimat) 2.5-2.5 MCG/ACT aerosol solution inhaler Stiolto Respimat 2.5-2.5 mcg/actuation inhalation mist inhale 2 puffs by inhalation route once daily at the same time each day   Active   • [DISCONTINUED] amLODIPine (NORVASC) 5 MG tablet amlodipine 5 mg oral tablet TAKE 1 TABLET DAILY 4/19/2021  Active   • [DISCONTINUED] clopidogrel (PLAVIX) 75 MG tablet clopidogrel 75 mg oral tablet take 1 tablet (75 mg) by oral route once daily 4/19/2021  Active   • [DISCONTINUED] furosemide (LASIX) 40 MG tablet Take 1 tablet by mouth Daily.   • [DISCONTINUED] rosuvastatin (Crestor) 20 MG tablet Take 1 tablet by mouth Every Night.   Spironolactone 25 mg half tablet p.o. daily         Review of Systems   Constitutional: Positive for fatigue.   Respiratory: Positive for shortness of breath. Negative for cough and wheezing.    Cardiovascular: Negative for chest pain, palpitations and leg swelling.   Gastrointestinal: Negative for nausea and vomiting.   Neurological: Negative for dizziness and syncope.        Objective     /82   Pulse 61   Ht 149.9 cm (59\")   Wt 75.8 kg (167 lb)   BMI 33.73 kg/m²       Physical Exam    General : Alert, awake, no acute distress  Neck : Supple, no carotid bruit, no jugular venous distention  CVS : Regular rate and rhythm, no murmur, rubs or gallops  Lungs: Clear to auscultation bilaterally, no crackles or rhonchi  Abdomen: Soft, nontender, bowel sounds heard in all 4 quadrants  Extremities: Warm, well-perfused, no pedal edema, engorged veins present bilaterally    Result Review :     The following data was reviewed by: Simon Talbot MD on 11/30/2021:    CMP    CMP 4/16/21 4/16/21 7/30/21 10/27/21 10/27/21    0804 0804  0720 0720   Glucose  94 91 100 (A)    BUN  21 30 (A) 42 (A)    Creatinine  1.53 (A) 1.87 (A) 1.81 (A)    eGFR Non African Am   26 (A) 27 (A)    Sodium  138 142 139    Potassium  4.9 4.7 4.3    Chloride  105 106 104    Calcium  9.3 9.8 9.6  "   Albumin  4.3 4.20  4.40   Total Bilirubin <0.15 (A) <0.15 (A) <0.2  <0.2   Alkaline Phosphatase  63 58  74   AST (SGOT)  15 16  18   ALT (SGPT)  10 13  12   (A) Abnormal value            CBC    CBC 4/16/21 7/30/21 10/27/21   WBC 4.81 4.75 4.79   RBC 2.98 (A) 3.01 (A) 3.52 (A)   Hemoglobin 9.2 (A) 9.6 (A) 11.0 (A)   Hematocrit 30.5 (A) 31.5 (A) 34.9   .3 (A) 104.7 (A) 99.1 (A)   MCH 30.9 31.9 31.3   MCHC 30.2 (A) 30.5 (A) 31.5   RDW 14.1 12.7 12.9   Platelets 260 261 284   (A) Abnormal value            TSH    TSH 4/16/21 7/30/21 10/27/21   TSH 1.230 6.790 (A) 3.670   (A) Abnormal value            Lipid Panel    Lipid Panel 4/16/21 7/30/21 10/27/21   Total Cholesterol  159 205 (A)   Total Cholesterol 116     Triglycerides 98 128 158 (A)   HDL Cholesterol 55 53 48   VLDL Cholesterol 20 23 28   LDL Cholesterol  41 (A) 83 129 (A)   LDL/HDL Ratio  1.52 2.61   (A) Abnormal value       Comments are available for some flowsheets but are not being displayed.                Data reviewed: Cardiology studies      Echocardiogram done on January 27, 2020 showed    1.  Severe global hypokinesis of the left ventricle with estimated LV ejection fraction of 25 to 30%.  2.  Mild concentric left ventricular hypertrophy.  3.  Mildly dilated left ventricular cavity.  4.  Grade 1 diastolic dysfunction of the left ventricle.   5.  Mild mitral regurgitation.  6.  Trace aortic insufficiency.  7.  Pacemaker lead noted.                   Assessment and Plan        Diagnoses and all orders for this visit:    1. Chronic combined systolic and diastolic congestive heart failure (HCC) (Primary)  Assessment & Plan:  She is clinically not volume overloaded. Currently taking Lasix every day. Recent labs showed creatinine 1.8 which is close to her baseline. Recommend to continue Lasix at the current dose along with spironolactone 12 point milligrams daily. She will continue Bystolic as well. Unable to add ARB/ACE inhibitor due to chronic  kidney disease. 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    Orders:  -     furosemide (LASIX) 40 MG tablet; Take 1 tablet by mouth Daily.  Dispense: 90 tablet; Refill: 2  -     spironolactone (ALDACTONE) 25 MG tablet; Take 0.5 tablets by mouth Daily.  Dispense: 45 tablet; Refill: 2    2. Presence of biventricular implantable cardioverter-defibrillator (ICD)  Assessment & Plan:  Home remote interrogation done in September of this year showed normally functioning device. Battery longevity is 3-1/2 years. Continue home remote monitoring and will do office interrogation during 6-month follow-up visit.      3. Coronary artery disease involving native coronary artery of native heart without angina pectoris  Assessment & Plan:  Currently stable with no angina. Continue statins, beta-blockers.    Orders:  -     rosuvastatin (Crestor) 20 MG tablet; Take 1 tablet by mouth Every Night.  Dispense: 90 tablet; Refill: 2    4. Mixed hyperlipidemia  Assessment & Plan:  Most recent LDL is 129, previously well controlled and was in forties. Counseled regarding compliance about rosuvastatin. New prescriptions given for refill today. Will check lipid panel before next visit.    Orders:  -     rosuvastatin (Crestor) 20 MG tablet; Take 1 tablet by mouth Every Night.  Dispense: 90 tablet; Refill: 2    5. Essential hypertension  Assessment & Plan:  Reasonably well-controlled. Continue current regimen.    Orders:  -     amLODIPine (NORVASC) 5 MG tablet; Take 1 tablet by mouth Daily.  Dispense: 90 tablet; Refill: 2    6. Carotid artery disease, unspecified laterality, unspecified type (HCC)  -     clopidogrel (PLAVIX) 75 MG tablet; Take 1 tablet by mouth Daily.  Dispense: 90 tablet; Refill: 2                Follow Up     Return in about 6 months (around 5/30/2022) for Next scheduled follow up, Recheck, Device check.    Patient was given instructions and counseling regarding her condition or for health maintenance advice. Please see specific  information pulled into the AVS if appropriate.

## 2021-11-30 NOTE — ASSESSMENT & PLAN NOTE
Home remote interrogation done in September of this year showed normally functioning device. Battery longevity is 3-1/2 years. Continue home remote monitoring and will do office interrogation during 6-month follow-up visit.

## 2021-11-30 NOTE — ASSESSMENT & PLAN NOTE
Most recent LDL is 129, previously well controlled and was in forties. Counseled regarding compliance about rosuvastatin. New prescriptions given for refill today. Will check lipid panel before next visit.

## 2021-11-30 NOTE — ASSESSMENT & PLAN NOTE
She is clinically not volume overloaded. Currently taking Lasix every day. Recent labs showed creatinine 1.8 which is close to her baseline. Recommend to continue Lasix at the current dose along with spironolactone 12 point milligrams daily. She will continue Bystolic as well. Unable to add ARB/ACE inhibitor due to chronic kidney disease. 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

## 2021-12-14 ENCOUNTER — HOSPITAL ENCOUNTER (OUTPATIENT)
Dept: MAMMOGRAPHY | Facility: HOSPITAL | Age: 86
Discharge: HOME | End: 2021-12-14

## 2021-12-14 ENCOUNTER — HOSPITAL ENCOUNTER (OUTPATIENT)
Dept: ULTRASOUND IMAGING | Facility: HOSPITAL | Age: 86
Discharge: HOME | End: 2021-12-14

## 2021-12-14 DIAGNOSIS — R92.2 INCONCLUSIVE MAMMOGRAM: ICD-10-CM

## 2021-12-14 DIAGNOSIS — N63.10 BREAST MASS, RIGHT: ICD-10-CM

## 2021-12-14 PROCEDURE — 76642 ULTRASOUND BREAST LIMITED: CPT

## 2021-12-17 ENCOUNTER — OFFICE VISIT (OUTPATIENT)
Dept: ORTHOPEDIC SURGERY | Facility: CLINIC | Age: 86
End: 2021-12-17

## 2021-12-17 VITALS — HEIGHT: 59 IN | BODY MASS INDEX: 33.06 KG/M2 | HEART RATE: 71 BPM | OXYGEN SATURATION: 95 % | WEIGHT: 164 LBS

## 2021-12-17 DIAGNOSIS — M25.562 LEFT KNEE PAIN, UNSPECIFIED CHRONICITY: Primary | ICD-10-CM

## 2021-12-17 DIAGNOSIS — M70.61 TROCHANTERIC BURSITIS OF RIGHT HIP: ICD-10-CM

## 2021-12-17 DIAGNOSIS — M17.12 PRIMARY OSTEOARTHRITIS OF LEFT KNEE: ICD-10-CM

## 2021-12-17 DIAGNOSIS — M25.551 RIGHT HIP PAIN: ICD-10-CM

## 2021-12-17 PROCEDURE — 20610 DRAIN/INJ JOINT/BURSA W/O US: CPT | Performed by: ORTHOPAEDIC SURGERY

## 2021-12-17 PROCEDURE — 99213 OFFICE O/P EST LOW 20 MIN: CPT | Performed by: ORTHOPAEDIC SURGERY

## 2021-12-17 RX ORDER — TRIAMCINOLONE ACETONIDE 40 MG/ML
40 INJECTION, SUSPENSION INTRA-ARTICULAR; INTRAMUSCULAR
Status: COMPLETED | OUTPATIENT
Start: 2021-12-17 | End: 2021-12-17

## 2021-12-17 RX ORDER — LIDOCAINE HYDROCHLORIDE 10 MG/ML
9 INJECTION, SOLUTION EPIDURAL; INFILTRATION; INTRACAUDAL; PERINEURAL
Status: COMPLETED | OUTPATIENT
Start: 2021-12-17 | End: 2021-12-17

## 2021-12-17 RX ADMIN — LIDOCAINE HYDROCHLORIDE 9 ML: 10 INJECTION, SOLUTION EPIDURAL; INFILTRATION; INTRACAUDAL; PERINEURAL at 15:40

## 2021-12-17 RX ADMIN — TRIAMCINOLONE ACETONIDE 40 MG: 40 INJECTION, SUSPENSION INTRA-ARTICULAR; INTRAMUSCULAR at 15:40

## 2021-12-17 NOTE — PROGRESS NOTES
"Chief Complaint  Pain of the Right Hip and Pain of the Left Knee     Subjective      Yelena Sanchez presents to [facility] for an evaluation of right hip and left knee. Patient has a history of left knee osteoarthritis that she treats conservatively with intermittent injections. She was last seen in office for 10/29/21 where she received a left knee Synvisc injection. She states she has been having lateral sided right hip pain, she denies any groin pain.     No Known Allergies     Social History     Socioeconomic History   • Marital status:    Tobacco Use   • Smoking status: Former Smoker     Quit date:      Years since quittin.9   • Smokeless tobacco: Never Used   Vaping Use   • Vaping Use: Never used   Substance and Sexual Activity   • Alcohol use: Never   • Drug use: Never   • Sexual activity: Defer        Review of Systems     Objective   Vital Signs:   Pulse 71   Ht 149.9 cm (59\")   Wt 74.4 kg (164 lb)   SpO2 95%   BMI 33.12 kg/m²       Physical Exam  Constitutional:       Appearance: Normal appearance. Patient is well-developed and normal weight.   HENT:      Head: Normocephalic.      Right Ear: Hearing and external ear normal.      Left Ear: Hearing and external ear normal.      Nose: Nose normal.   Eyes:      Conjunctiva/sclera: Conjunctivae normal.   Cardiovascular:      Rate and Rhythm: Normal rate.   Pulmonary:      Effort: Pulmonary effort is normal.      Breath sounds: No wheezing or rales.   Abdominal:      Palpations: Abdomen is soft.      Tenderness: There is no abdominal tenderness.   Musculoskeletal:      Cervical back: Normal range of motion.   Skin:     Findings: No rash.   Neurological:      Mental Status: Patient  is alert and oriented to person, place, and time.   Psychiatric:         Mood and Affect: Mood and affect normal.         Judgment: Judgment normal.       Ortho Exam      LEFT KNEE: Non-antalgic gait. Sensation grossly intact. Tender " medial joint line. Tender lateral joint line. ROM from 0-115 degrees. Calf supple, non-tender, no signs of DVT. Dorsal Pedal Pulse 2+, posterior tibialis pulse 2+. Sensation grossly intact. Neurovascular intact.  Negative Lachman. Antalgic gait. Proctor legged deformity.     RIGHT HIP: Good tone of hip flexors, hip extensors, hip adductor, hip abductors. Antalgic gait. Dorsal Pedal Pulse 2+, posterior tibialis pulse 2+. Calf supple, non-tender, no signs of DVT. Tender lateral hip. Non-tender hip and pelvic muscles. No groin pain with hip range of motion.       Large Joint Arthrocentesis: L knee  Date/Time: 12/17/2021 3:40 PM  Consent given by: patient  Site marked: site marked  Timeout: Immediately prior to procedure a time out was called to verify the correct patient, procedure, equipment, support staff and site/side marked as required   Supporting Documentation  Indications: pain   Procedure Details  Location: knee - L knee  Needle size: 22 G  Medications administered: 9 mL lidocaine PF 1% 1 %; 40 mg triamcinolone acetonide 40 MG/ML  Patient tolerance: patient tolerated the procedure well with no immediate complications              Imaging Results (Most Recent)     Procedure Component Value Units Date/Time    XR Knee 3 View Left [[id number]] Resulted: 12/17/21 1500     Updated: 12/17/21 1504           Result Review :     X-Ray Report:  Left knee(s) X-Ray  Indication: Evaluation of left knee pain   AP, Lateral and Standing view(s)  Findings: advanced degenerative changes of the left knee, consistent with bone on bone arthritis.   Prior studies available for comparison: no     X-Ray Report:  Right hip(s) X-Ray  Indication: Evaluation of right hip pain   AP and Lateral view(s)  Findings: No degenerative changes present. Intact fixation with no evidence of loosening.   Prior studies available for comparison: no        Assessment and Plan     DX: Left knee osteoarthritis  Right trochanteric bursitis     Patient given a  left knee steroid injection and tolerated this well. She has a limping gait causing right hip pain on the lateral aspect. She will do stretches for this.     Call or return if worsening symptoms.    Follow Up     PRN.       Patient was given instructions and counseling regarding her condition or for health maintenance advice. Please see specific information pulled into the AVS if appropriate.     Scribed for Woo Howell MD by Nohemi Coppola.  12/17/21   15:06 EST    I have personally performed the services described in this document as scribed by the above individual and it is both accurate and complete. Woo Howell MD 12/17/21

## 2021-12-20 ENCOUNTER — TELEPHONE (OUTPATIENT)
Dept: ORTHOPEDIC SURGERY | Facility: CLINIC | Age: 86
End: 2021-12-20

## 2021-12-20 NOTE — TELEPHONE ENCOUNTER
Caller: MICAELA CARMEN     Relationship to patient: [facility]SE     Best call back number:   [phone]    Chief complaint: RIGHT HIP PAIN     Type of visit: FOLLOW UP INJECTION

## 2021-12-27 ENCOUNTER — OFFICE VISIT (OUTPATIENT)
Dept: ORTHOPEDIC SURGERY | Facility: CLINIC | Age: 86
End: 2021-12-27

## 2021-12-27 VITALS — WEIGHT: 164 LBS | BODY MASS INDEX: 33.06 KG/M2 | HEIGHT: 59 IN

## 2021-12-27 DIAGNOSIS — M70.61 TROCHANTERIC BURSITIS OF RIGHT HIP: Primary | ICD-10-CM

## 2021-12-27 DIAGNOSIS — M54.31 SCIATICA OF RIGHT SIDE: ICD-10-CM

## 2021-12-27 PROCEDURE — 96372 THER/PROPH/DIAG INJ SC/IM: CPT | Performed by: PHYSICIAN ASSISTANT

## 2021-12-27 PROCEDURE — 99213 OFFICE O/P EST LOW 20 MIN: CPT | Performed by: PHYSICIAN ASSISTANT

## 2021-12-27 RX ORDER — DEXAMETHASONE SODIUM PHOSPHATE 4 MG/ML
8 INJECTION, SOLUTION INTRA-ARTICULAR; INTRALESIONAL; INTRAMUSCULAR; INTRAVENOUS; SOFT TISSUE ONCE
Status: COMPLETED | OUTPATIENT
Start: 2021-12-27 | End: 2021-12-27

## 2021-12-27 RX ADMIN — DEXAMETHASONE SODIUM PHOSPHATE 8 MG: 4 INJECTION, SOLUTION INTRA-ARTICULAR; INTRALESIONAL; INTRAMUSCULAR; INTRAVENOUS; SOFT TISSUE at 11:32

## 2021-12-27 NOTE — PROGRESS NOTES
"Chief Complaint  Pain of the Right Hip    Subjective          Yelena Sanchez presents to [facility] for follow-up of right hip pain.  Patient has history of right hip ORIF around 3 years ago by Dr. Campos.  Patient complains of pain along her right buttock.  She denies any radiation of the pain to her foot.  She denies any numbness or tingling.  Patient was given prescription for physical therapy from her PCP as she has not yet attended.  She would like to try this as well today.  She denies any recent falls or injuries.     Objective   No Known Allergies    Vital Signs:   Ht 149.9 cm (59\")   Wt 74.4 kg (164 lb)   BMI 33.12 kg/m²       Physical Exam  Constitutional:       Appearance: Normal appearance. Patient is well-developed and normal weight.   HENT:      Head: Normocephalic.      Right Ear: Hearing and external ear normal.      Left Ear: Hearing and external ear normal.      Nose: Nose normal.   Eyes:      Conjunctiva/sclera: Conjunctivae normal.   Cardiovascular:      Rate and Rhythm: Normal rate.   Pulmonary:      Effort: Pulmonary effort is normal.      Breath sounds: No wheezing or rales.   Abdominal:      Palpations: Abdomen is soft.      Tenderness: There is no abdominal tenderness.   Musculoskeletal:      Cervical back: Normal range of motion.   Skin:     Findings: No rash.   Neurological:      Mental Status: Patient is alert and oriented to person, place, and time.   Psychiatric:         Mood and Affect: Mood and affect normal.         Judgment: Judgment normal.     Ortho Exam  Right hip: Positive SLR.  Good muscle tone of the hip flexors, extensors, abductors and adductors.  Full passive motion of the hip.  Mild groin pain elicited with motion.  Full knee flexion and extension.  Full plantar dorsiflexion of the ankle.  Fully weightbearing, gait is mildly antalgic.  Sensation intact, neurovascular intact, calf is soft and nontender, posterior tibialis pulse 2+.    Result " Review :   The following data was reviewed by: ROSY Longoria on 12/27/2021:         Imaging Results (Most Recent)     None                Assessment and Plan    Problem List Items Addressed This Visit        Musculoskeletal and Injuries    Trochanteric bursitis of right hip - Primary    Relevant Medications    dexamethasone (DECADRON) injection 8 mg (Completed) (Start on 12/27/2021 12:30 PM)    Other Relevant Orders    Ambulatory Referral to Physical Therapy Evaluate and treat (Completed)    Sciatica of right side    Relevant Medications    dexamethasone (DECADRON) injection 8 mg (Completed) (Start on 12/27/2021 12:30 PM)    Other Relevant Orders    Ambulatory Referral to Physical Therapy Evaluate and treat (Completed)        Patient given a new prescription today for physical therapy to help with traction exercises for LBP.  She will continue with physical therapy at the beginning of the year for stretching to improve trochanteric bursitis as well.  Patient advised to follow-up on as-needed basis and to call with any changes or concerns.  She was given IM steroid injection today and tolerated well.      Follow Up   Return if symptoms worsen or fail to improve.  Patient Instructions   IM steroid injection given today.  Patient given new prescription for PT today for LBP and trochanteric bursitis.     Follow up as needed. Call with any changes or concerns.     Patient was given instructions and counseling regarding her condition or for health maintenance advice. Please see specific information pulled into the AVS if appropriate.

## 2021-12-27 NOTE — PATIENT INSTRUCTIONS
IM steroid injection given today.  Patient given new prescription for PT today for LBP and trochanteric bursitis.     Follow up as needed. Call with any changes or concerns.

## 2022-01-22 ENCOUNTER — LAB (OUTPATIENT)
Dept: LAB | Facility: HOSPITAL | Age: 87
End: 2022-01-22

## 2022-01-22 ENCOUNTER — TRANSCRIBE ORDERS (OUTPATIENT)
Dept: ADMINISTRATIVE | Facility: HOSPITAL | Age: 87
End: 2022-01-22

## 2022-01-22 DIAGNOSIS — E03.9 MYXEDEMA HEART DISEASE: ICD-10-CM

## 2022-01-22 DIAGNOSIS — E61.1 DIETARY IRON DEFICIENCY: ICD-10-CM

## 2022-01-22 DIAGNOSIS — I10 ESSENTIAL HYPERTENSION, MALIGNANT: ICD-10-CM

## 2022-01-22 DIAGNOSIS — E78.5 HYPERLIPIDEMIA, UNSPECIFIED HYPERLIPIDEMIA TYPE: ICD-10-CM

## 2022-01-22 DIAGNOSIS — I51.9 MYXEDEMA HEART DISEASE: ICD-10-CM

## 2022-01-22 DIAGNOSIS — D64.9 ANEMIA, UNSPECIFIED TYPE: Primary | ICD-10-CM

## 2022-01-22 DIAGNOSIS — D64.9 ANEMIA, UNSPECIFIED TYPE: ICD-10-CM

## 2022-01-22 DIAGNOSIS — Z79.899 ENCOUNTER FOR LONG-TERM (CURRENT) USE OF OTHER MEDICATIONS: ICD-10-CM

## 2022-01-22 LAB
25(OH)D3 SERPL-MCNC: 70 NG/ML (ref 30–100)
ALBUMIN SERPL-MCNC: 4.2 G/DL (ref 3.5–5.2)
ALBUMIN UR-MCNC: 26.3 MG/DL
ALP SERPL-CCNC: 60 U/L (ref 39–117)
ALT SERPL W P-5'-P-CCNC: 13 U/L (ref 1–33)
ANION GAP SERPL CALCULATED.3IONS-SCNC: 13.2 MMOL/L (ref 5–15)
AST SERPL-CCNC: 17 U/L (ref 1–32)
BACTERIA UR QL AUTO: ABNORMAL /HPF
BASOPHILS # BLD AUTO: 0.03 10*3/MM3 (ref 0–0.2)
BASOPHILS NFR BLD AUTO: 0.5 % (ref 0–1.5)
BILIRUB CONJ SERPL-MCNC: <0.2 MG/DL (ref 0–0.3)
BILIRUB INDIRECT SERPL-MCNC: NORMAL MG/DL
BILIRUB SERPL-MCNC: <0.2 MG/DL (ref 0–1.2)
BILIRUB UR QL STRIP: NEGATIVE
BUN SERPL-MCNC: 34 MG/DL (ref 8–23)
BUN/CREAT SERPL: 16.3 (ref 7–25)
CALCIUM SPEC-SCNC: 9.6 MG/DL (ref 8.6–10.5)
CHLORIDE SERPL-SCNC: 100 MMOL/L (ref 98–107)
CHOLEST SERPL-MCNC: 120 MG/DL (ref 0–200)
CLARITY UR: CLEAR
CO2 SERPL-SCNC: 21.8 MMOL/L (ref 22–29)
COLOR UR: YELLOW
CREAT SERPL-MCNC: 2.09 MG/DL (ref 0.57–1)
CREAT UR-MCNC: 104.4 MG/DL
CRP SERPL-MCNC: <0.3 MG/DL (ref 0–0.5)
DEPRECATED RDW RBC AUTO: 49.1 FL (ref 37–54)
EOSINOPHIL # BLD AUTO: 0.06 10*3/MM3 (ref 0–0.4)
EOSINOPHIL NFR BLD AUTO: 0.9 % (ref 0.3–6.2)
ERYTHROCYTE [DISTWIDTH] IN BLOOD BY AUTOMATED COUNT: 13.2 % (ref 12.3–15.4)
ERYTHROCYTE [SEDIMENTATION RATE] IN BLOOD: 9 MM/HR (ref 0–30)
FERRITIN SERPL-MCNC: 62.4 NG/ML (ref 13–150)
GFR SERPL CREATININE-BSD FRML MDRD: 23 ML/MIN/1.73
GLUCOSE SERPL-MCNC: 90 MG/DL (ref 65–99)
GLUCOSE UR STRIP-MCNC: NEGATIVE MG/DL
HBA1C MFR BLD: 4.4 % (ref 4.8–5.6)
HCT VFR BLD AUTO: 26.5 % (ref 34–46.6)
HDLC SERPL-MCNC: 53 MG/DL (ref 40–60)
HGB BLD-MCNC: 8.3 G/DL (ref 12–15.9)
HGB UR QL STRIP.AUTO: NEGATIVE
HYALINE CASTS UR QL AUTO: ABNORMAL /LPF
IMM GRANULOCYTES # BLD AUTO: 0.03 10*3/MM3 (ref 0–0.05)
IMM GRANULOCYTES NFR BLD AUTO: 0.5 % (ref 0–0.5)
IRON 24H UR-MRATE: 48 MCG/DL (ref 37–145)
IRON SATN MFR SERPL: 10 % (ref 20–50)
KETONES UR QL STRIP: NEGATIVE
LDLC SERPL CALC-MCNC: 48 MG/DL (ref 0–100)
LDLC/HDLC SERPL: 0.89 {RATIO}
LEUKOCYTE ESTERASE UR QL STRIP.AUTO: ABNORMAL
LYMPHOCYTES # BLD AUTO: 0.65 10*3/MM3 (ref 0.7–3.1)
LYMPHOCYTES NFR BLD AUTO: 9.8 % (ref 19.6–45.3)
MAGNESIUM SERPL-MCNC: 2.2 MG/DL (ref 1.6–2.4)
MCH RBC QN AUTO: 32 PG (ref 26.6–33)
MCHC RBC AUTO-ENTMCNC: 31.3 G/DL (ref 31.5–35.7)
MCV RBC AUTO: 102.3 FL (ref 79–97)
MICROALBUMIN/CREAT UR: 251.9 MG/G
MONOCYTES # BLD AUTO: 0.84 10*3/MM3 (ref 0.1–0.9)
MONOCYTES NFR BLD AUTO: 12.6 % (ref 5–12)
NEUTROPHILS NFR BLD AUTO: 5.05 10*3/MM3 (ref 1.7–7)
NEUTROPHILS NFR BLD AUTO: 75.7 % (ref 42.7–76)
NITRITE UR QL STRIP: NEGATIVE
NRBC BLD AUTO-RTO: 0.2 /100 WBC (ref 0–0.2)
PH UR STRIP.AUTO: 6 [PH] (ref 5–8)
PLATELET # BLD AUTO: 286 10*3/MM3 (ref 140–450)
PMV BLD AUTO: 10.6 FL (ref 6–12)
POTASSIUM SERPL-SCNC: 4.7 MMOL/L (ref 3.5–5.2)
PROT SERPL-MCNC: 6.4 G/DL (ref 6–8.5)
PROT UR QL STRIP: ABNORMAL
RBC # BLD AUTO: 2.59 10*6/MM3 (ref 3.77–5.28)
RBC # UR STRIP: ABNORMAL /HPF
REF LAB TEST METHOD: ABNORMAL
SODIUM SERPL-SCNC: 135 MMOL/L (ref 136–145)
SP GR UR STRIP: 1.01 (ref 1–1.03)
SQUAMOUS #/AREA URNS HPF: ABNORMAL /HPF
T4 FREE SERPL-MCNC: 1.37 NG/DL (ref 0.93–1.7)
TIBC SERPL-MCNC: 474 MCG/DL (ref 298–536)
TRANSFERRIN SERPL-MCNC: 318 MG/DL (ref 200–360)
TRIGL SERPL-MCNC: 100 MG/DL (ref 0–150)
TSH SERPL DL<=0.05 MIU/L-ACNC: 1.43 UIU/ML (ref 0.27–4.2)
UROBILINOGEN UR QL STRIP: ABNORMAL
VIT B12 BLD-MCNC: 1022 PG/ML (ref 211–946)
VLDLC SERPL-MCNC: 19 MG/DL (ref 5–40)
WBC # UR STRIP: ABNORMAL /HPF
WBC NRBC COR # BLD: 6.66 10*3/MM3 (ref 3.4–10.8)

## 2022-01-22 PROCEDURE — 36415 COLL VENOUS BLD VENIPUNCTURE: CPT

## 2022-01-22 PROCEDURE — 82570 ASSAY OF URINE CREATININE: CPT

## 2022-01-22 PROCEDURE — 82043 UR ALBUMIN QUANTITATIVE: CPT

## 2022-01-22 PROCEDURE — 80061 LIPID PANEL: CPT

## 2022-01-22 PROCEDURE — 86140 C-REACTIVE PROTEIN: CPT

## 2022-01-22 PROCEDURE — 82306 VITAMIN D 25 HYDROXY: CPT

## 2022-01-22 PROCEDURE — 84443 ASSAY THYROID STIM HORMONE: CPT

## 2022-01-22 PROCEDURE — 84466 ASSAY OF TRANSFERRIN: CPT

## 2022-01-22 PROCEDURE — 82728 ASSAY OF FERRITIN: CPT

## 2022-01-22 PROCEDURE — 84439 ASSAY OF FREE THYROXINE: CPT

## 2022-01-22 PROCEDURE — 83036 HEMOGLOBIN GLYCOSYLATED A1C: CPT

## 2022-01-22 PROCEDURE — 83540 ASSAY OF IRON: CPT

## 2022-01-22 PROCEDURE — 85652 RBC SED RATE AUTOMATED: CPT

## 2022-01-22 PROCEDURE — 80076 HEPATIC FUNCTION PANEL: CPT

## 2022-01-22 PROCEDURE — 85025 COMPLETE CBC W/AUTO DIFF WBC: CPT

## 2022-01-22 PROCEDURE — 81001 URINALYSIS AUTO W/SCOPE: CPT

## 2022-01-22 PROCEDURE — 83735 ASSAY OF MAGNESIUM: CPT

## 2022-01-22 PROCEDURE — 82607 VITAMIN B-12: CPT

## 2022-01-22 PROCEDURE — 80048 BASIC METABOLIC PNL TOTAL CA: CPT

## 2022-02-18 ENCOUNTER — TRANSCRIBE ORDERS (OUTPATIENT)
Dept: ADMINISTRATIVE | Facility: HOSPITAL | Age: 87
End: 2022-02-18

## 2022-02-18 DIAGNOSIS — I65.23 BILATERAL CAROTID ARTERY STENOSIS: Primary | ICD-10-CM

## 2022-02-28 ENCOUNTER — HOSPITAL ENCOUNTER (EMERGENCY)
Facility: HOSPITAL | Age: 87
Discharge: HOME | End: 2022-03-01
Attending: EMERGENCY MEDICINE | Admitting: EMERGENCY MEDICINE

## 2022-02-28 DIAGNOSIS — D64.9 CHRONIC ANEMIA: Primary | ICD-10-CM

## 2022-02-28 DIAGNOSIS — N39.0 URINARY TRACT INFECTION WITHOUT HEMATURIA, SITE UNSPECIFIED: ICD-10-CM

## 2022-02-28 LAB
ALBUMIN SERPL-MCNC: 4.2 G/DL (ref 3.5–5.2)
ALBUMIN/GLOB SERPL: 1.8 G/DL
ALP SERPL-CCNC: 69 U/L (ref 39–117)
ALT SERPL W P-5'-P-CCNC: 12 U/L (ref 1–33)
ANION GAP SERPL CALCULATED.3IONS-SCNC: 14.4 MMOL/L (ref 5–15)
ANISOCYTOSIS BLD QL: NORMAL
AST SERPL-CCNC: 18 U/L (ref 1–32)
BACTERIA UR QL AUTO: ABNORMAL /HPF
BASOPHILS # BLD AUTO: 0.03 10*3/MM3 (ref 0–0.2)
BASOPHILS NFR BLD AUTO: 0.3 % (ref 0–1.5)
BILIRUB SERPL-MCNC: <0.2 MG/DL (ref 0–1.2)
BILIRUB UR QL STRIP: NEGATIVE
BUN SERPL-MCNC: 39 MG/DL (ref 8–23)
BUN/CREAT SERPL: 22 (ref 7–25)
CALCIUM SPEC-SCNC: 9.5 MG/DL (ref 8.6–10.5)
CHLORIDE SERPL-SCNC: 98 MMOL/L (ref 98–107)
CLARITY UR: ABNORMAL
CO2 SERPL-SCNC: 24.6 MMOL/L (ref 22–29)
COLOR UR: YELLOW
CREAT SERPL-MCNC: 1.77 MG/DL (ref 0.57–1)
DEPRECATED RDW RBC AUTO: 71.5 FL (ref 37–54)
EGFRCR SERPLBLD CKD-EPI 2021: 27.9 ML/MIN/1.73
EOSINOPHIL # BLD AUTO: 0.02 10*3/MM3 (ref 0–0.4)
EOSINOPHIL NFR BLD AUTO: 0.2 % (ref 0.3–6.2)
ERYTHROCYTE [DISTWIDTH] IN BLOOD BY AUTOMATED COUNT: 16.8 % (ref 12.3–15.4)
GLOBULIN UR ELPH-MCNC: 2.3 GM/DL
GLUCOSE SERPL-MCNC: 104 MG/DL (ref 65–99)
GLUCOSE UR STRIP-MCNC: NEGATIVE MG/DL
HCT VFR BLD AUTO: 24.6 % (ref 34–46.6)
HGB BLD-MCNC: 7.5 G/DL (ref 12–15.9)
HGB UR QL STRIP.AUTO: NEGATIVE
HOLD SPECIMEN: NORMAL
HOLD SPECIMEN: NORMAL
HYALINE CASTS UR QL AUTO: ABNORMAL /LPF
IMM GRANULOCYTES # BLD AUTO: 0.03 10*3/MM3 (ref 0–0.05)
IMM GRANULOCYTES NFR BLD AUTO: 0.3 % (ref 0–0.5)
KETONES UR QL STRIP: NEGATIVE
LEUKOCYTE ESTERASE UR QL STRIP.AUTO: ABNORMAL
LIPASE SERPL-CCNC: 50 U/L (ref 13–60)
LYMPHOCYTES # BLD AUTO: 0.4 10*3/MM3 (ref 0.7–3.1)
LYMPHOCYTES NFR BLD AUTO: 3.8 % (ref 19.6–45.3)
MACROCYTES BLD QL SMEAR: NORMAL
MCH RBC QN AUTO: 35 PG (ref 26.6–33)
MCHC RBC AUTO-ENTMCNC: 30.5 G/DL (ref 31.5–35.7)
MCV RBC AUTO: 115 FL (ref 79–97)
MONOCYTES # BLD AUTO: 1.19 10*3/MM3 (ref 0.1–0.9)
MONOCYTES NFR BLD AUTO: 11.2 % (ref 5–12)
NEUTROPHILS NFR BLD AUTO: 8.99 10*3/MM3 (ref 1.7–7)
NEUTROPHILS NFR BLD AUTO: 84.2 % (ref 42.7–76)
NITRITE UR QL STRIP: NEGATIVE
NRBC BLD AUTO-RTO: 0 /100 WBC (ref 0–0.2)
PH UR STRIP.AUTO: <=5 [PH] (ref 5–8)
PLATELET # BLD AUTO: 282 10*3/MM3 (ref 140–450)
PMV BLD AUTO: 10.2 FL (ref 6–12)
POIKILOCYTOSIS BLD QL SMEAR: NORMAL
POLYCHROMASIA BLD QL SMEAR: NORMAL
POTASSIUM SERPL-SCNC: 4.9 MMOL/L (ref 3.5–5.2)
PROT SERPL-MCNC: 6.5 G/DL (ref 6–8.5)
PROT UR QL STRIP: ABNORMAL
RBC # BLD AUTO: 2.14 10*6/MM3 (ref 3.77–5.28)
RBC # UR STRIP: ABNORMAL /HPF
REF LAB TEST METHOD: ABNORMAL
SMALL PLATELETS BLD QL SMEAR: ADEQUATE
SODIUM SERPL-SCNC: 137 MMOL/L (ref 136–145)
SP GR UR STRIP: 1.01 (ref 1–1.03)
SQUAMOUS #/AREA URNS HPF: ABNORMAL /HPF
STOMATOCYTES BLD QL SMEAR: NORMAL
UROBILINOGEN UR QL STRIP: ABNORMAL
WBC # UR STRIP: ABNORMAL /HPF
WBC MORPH BLD: NORMAL
WBC NRBC COR # BLD: 10.66 10*3/MM3 (ref 3.4–10.8)
WHOLE BLOOD HOLD SPECIMEN: NORMAL
WHOLE BLOOD HOLD SPECIMEN: NORMAL

## 2022-02-28 PROCEDURE — 96365 THER/PROPH/DIAG IV INF INIT: CPT

## 2022-02-28 PROCEDURE — 99283 EMERGENCY DEPT VISIT LOW MDM: CPT

## 2022-02-28 PROCEDURE — 83690 ASSAY OF LIPASE: CPT

## 2022-02-28 PROCEDURE — 80053 COMPREHEN METABOLIC PANEL: CPT

## 2022-02-28 PROCEDURE — 85007 BL SMEAR W/DIFF WBC COUNT: CPT

## 2022-02-28 PROCEDURE — 85025 COMPLETE CBC W/AUTO DIFF WBC: CPT

## 2022-02-28 PROCEDURE — 36415 COLL VENOUS BLD VENIPUNCTURE: CPT

## 2022-02-28 PROCEDURE — 81001 URINALYSIS AUTO W/SCOPE: CPT

## 2022-02-28 RX ORDER — SODIUM CHLORIDE 0.9 % (FLUSH) 0.9 %
10 SYRINGE (ML) INJECTION AS NEEDED
Status: DISCONTINUED | OUTPATIENT
Start: 2022-02-28 | End: 2022-03-01 | Stop reason: HOSPADM

## 2022-02-28 RX ADMIN — SODIUM CHLORIDE 1 G: 900 INJECTION INTRAVENOUS at 23:51

## 2022-03-01 VITALS
OXYGEN SATURATION: 96 % | TEMPERATURE: 98.4 F | HEIGHT: 59 IN | WEIGHT: 168.87 LBS | RESPIRATION RATE: 18 BRPM | BODY MASS INDEX: 34.04 KG/M2 | HEART RATE: 68 BPM | SYSTOLIC BLOOD PRESSURE: 154 MMHG | DIASTOLIC BLOOD PRESSURE: 55 MMHG

## 2022-03-01 PROCEDURE — 96365 THER/PROPH/DIAG IV INF INIT: CPT

## 2022-03-01 PROCEDURE — 25010000002 CEFTRIAXONE PER 250 MG: Performed by: EMERGENCY MEDICINE

## 2022-03-01 RX ORDER — ONDANSETRON 4 MG/1
4 TABLET, ORALLY DISINTEGRATING ORAL EVERY 6 HOURS PRN
Qty: 15 TABLET | Refills: 0 | Status: SHIPPED | OUTPATIENT
Start: 2022-03-01 | End: 2022-06-14 | Stop reason: ALTCHOICE

## 2022-03-01 RX ORDER — CEPHALEXIN 500 MG/1
500 CAPSULE ORAL 2 TIMES DAILY
Qty: 20 CAPSULE | Refills: 0 | Status: SHIPPED | OUTPATIENT
Start: 2022-03-01 | End: 2022-03-11

## 2022-03-01 NOTE — ED PROVIDER NOTES
Time: 12:04 AM EST  Arrived by: private car  Chief Complaint: Dysuria, pelvic pain  History provided by: Patient and   History is limited by: N/A     History of Present Illness:  Patient is a 86 y.o. year old female who presents to the emergency department with 2-week history of dysuria and pelvic pain.  Patient presents thinking she likely has urinary tract infection.  Per  she has run a temperature in the last 2 days between 99.7 and 100.4.  Nausea but no vomiting.  Complains of chronic constipation.  Right back pain greater than left.  No aggravating or alleviating factors.  Dark urine.    HPI    Similar Symptoms Previously: Yes  Recently seen: No      Patient Care Team  Primary Care Provider: Rachael Noel MD    Past Medical History:     No Known Allergies  Past Medical History:   Diagnosis Date   • Arthritis    • Asthma    • Breast cancer (HCC)    • Congestive heart failure (HCC)    • COPD (chronic obstructive pulmonary disease) (HCC)    • Diverticulosis 11/26/2014   • GERD (gastroesophageal reflux disease)    • History of tobacco abuse    • HTN (hypertension)    • Hyperlipemia    • Hyperthyroidism    • Neck mass    • Seasonal allergies    • SOB (shortness of breath)      Past Surgical History:   Procedure Laterality Date   • COLONOSCOPY  2014   • ENDOSCOPY  2014   • EYE SURGERY      Implant; yes    • LUNG SURGERY     • MASTECTOMY      Left    • OTHER SURGICAL HISTORY      Joint Surgery   • PACEMAKER IMPLANTATION       Family History   Problem Relation Age of Onset   • Colon cancer Mother         60s   • Lung cancer Sister        Home Medications:  Prior to Admission medications    Medication Sig Start Date End Date Taking? Authorizing Provider   allopurinol (ZYLOPRIM) 100 MG tablet  8/4/21   Provider, MD Sorin   amLODIPine (NORVASC) 5 MG tablet Take 1 tablet by mouth Daily. 11/30/21   Simon Talbot MD   aspirin (aspirin) 81 MG EC tablet Aspirin Low Dose 81 mg oral tablet,delayed  release (/EC) take 1 tablet (81 mg) by oral route once daily   Active    Sorin Younger MD   Bystolic 10 MG tablet TAKE 1 TABLET DAILY 7/22/21   Kylie Medina APRN   cephalexin (KEFLEX) 500 MG capsule Take 1 capsule by mouth 2 (Two) Times a Day for 10 days. 3/1/22 3/11/22  Cma Cruz MD   chlorpheniramine (CHLOR-TRIMETON) 4 MG tablet chlorpheniramine maleate 4 mg oral tablet take 1 tablet by oral route 2 times a day   Active    Sorin Younger MD   cholecalciferol (VITAMIN D3) 25 MCG (1000 UT) tablet  8/4/21   Sorin Younger MD   Cholecalciferol 50 MCG (2000 UT) tablet Vitamin D3 2,000 unit oral tablet take 1 tablet by oral route daily   Active    Sorin Younger MD   clopidogrel (PLAVIX) 75 MG tablet Take 1 tablet by mouth Daily. 11/30/21   Smion Talbot MD   furosemide (LASIX) 40 MG tablet Take 1 tablet by mouth Daily. 11/30/21   Simon Talbot MD   HYDROcodone-acetaminophen (NORCO) 5-325 MG per tablet  8/4/21   Sorin Younger MD   ipratropium (ATROVENT) 0.02 % nebulizer solution     Sorin Younger MD   lansoprazole (PREVACID) 30 MG capsule lansoprazole 30 mg oral capsule,delayed release(DR/EC) take 1 capsule (30 mg) by oral route once daily before a meal   Active    Sorin Younger MD   levothyroxine (SYNTHROID, LEVOTHROID) 100 MCG tablet levothyroxine 100 mcg oral tablet take 1 tablet (100 mcg) by oral route once daily   Active    Sorin Younger MD   Lubricating Plus Eye Drops 0.5 % solution  10/29/21   Sorin Younger MD   magnesium citrate solution Take 296 mL by mouth 1 (One) Time for 1 dose. Take 1 full bottle daily for the next 3 days or until you have a normal bowel movement 3/1/22 3/1/22  Cam Cruz MD   montelukast (SINGULAIR) 10 MG tablet montelukast 10 mg oral tablet take 1 tablet (10 mg) by oral route once daily in the evening   Active    Sorin Younger MD   multivitamin with minerals (Centrum Silver  50+Women) tablet tablet Take  by mouth.    Provider, MD Sorin   olopatadine (PATANOL) 0.1 % ophthalmic solution Administer 1 drop to both eyes 2 (Two) Times a Day. 10/13/21   Lefty Acevedo MD   ondansetron ODT (ZOFRAN-ODT) 4 MG disintegrating tablet Place 1 tablet on the tongue Every 6 (Six) Hours As Needed for Nausea or Vomiting. 3/1/22   Cam Cruz MD   rosuvastatin (Crestor) 20 MG tablet Take 1 tablet by mouth Every Night. 21   Simon Talbot MD   spironolactone (ALDACTONE) 25 MG tablet Take 0.5 tablets by mouth Daily. 21   Simon Talbot MD   tiotropium bromide-olodaterol (Stiolto Respimat) 2.5-2.5 MCG/ACT aerosol solution inhaler Stiolto Respimat 2.5-2.5 mcg/actuation inhalation mist inhale 2 puffs by inhalation route once daily at the same time each day   Active    Provider, MD Sorin        Social History:   Social History     Tobacco Use   • Smoking status: Former Smoker     Quit date: 2017     Years since quittin.1   • Smokeless tobacco: Never Used   Vaping Use   • Vaping Use: Never used   Substance Use Topics   • Alcohol use: Never   • Drug use: Never     Recent travel: no     Review of Systems:  Review of Systems   Constitutional: Positive for fever. Negative for chills.   HENT: Negative for congestion, rhinorrhea and sore throat.    Eyes: Negative for pain and visual disturbance.   Respiratory: Negative for apnea, cough, chest tightness and shortness of breath.    Cardiovascular: Negative for chest pain and palpitations.   Gastrointestinal: Positive for abdominal pain, constipation and nausea. Negative for anal bleeding, blood in stool, diarrhea and vomiting.   Genitourinary: Positive for difficulty urinating, dysuria and flank pain.   Musculoskeletal: Negative for joint swelling and myalgias.   Skin: Negative for color change.   Neurological: Negative for seizures and headaches.   Psychiatric/Behavioral: Negative.    All other systems reviewed and  "are negative.       Physical Exam:  /55   Pulse 68   Temp 98.4 °F (36.9 °C) (Oral)   Resp 18   Ht 149.9 cm (59\")   Wt 76.6 kg (168 lb 14 oz)   SpO2 96%   BMI 34.11 kg/m²     Physical Exam  Vitals and nursing note reviewed.   Constitutional:       Appearance: Normal appearance.   HENT:      Head: Normocephalic and atraumatic.      Nose: Nose normal.      Mouth/Throat:      Mouth: Mucous membranes are dry.   Eyes:      Extraocular Movements: Extraocular movements intact.      Pupils: Pupils are equal, round, and reactive to light.   Cardiovascular:      Rate and Rhythm: Normal rate and regular rhythm.      Heart sounds: Normal heart sounds.   Pulmonary:      Effort: Pulmonary effort is normal.      Breath sounds: Normal breath sounds.   Abdominal:      General: Bowel sounds are normal.      Palpations: Abdomen is soft.      Tenderness: There is abdominal tenderness. There is right CVA tenderness.   Musculoskeletal:         General: No swelling. Normal range of motion.      Cervical back: Normal range of motion and neck supple.   Skin:     General: Skin is warm and dry.      Coloration: Skin is not jaundiced.   Neurological:      General: No focal deficit present.      Mental Status: She is alert and oriented to person, place, and time. Mental status is at baseline.   Psychiatric:         Mood and Affect: Mood normal.         Behavior: Behavior normal.         Judgment: Judgment normal.                Medications in the Emergency Department:  Medications   sodium chloride 0.9 % flush 10 mL (has no administration in time range)   cefTRIAXone (ROCEPHIN) 1 g/100 mL 0.9% NS (MBP) (0 g Intravenous Stopped 3/1/22 0025)        Labs  Lab Results (last 24 hours)     Procedure Component Value Units Date/Time    CBC & Differential [[id number]]  (Abnormal) Collected: 02/28/22 1932    Specimen: Blood Updated: 02/28/22 2029    Narrative:      The following orders were created for panel order CBC & " Differential.  Procedure                               Abnormality         Status                     ---------                               -----------         ------                     CBC Auto Differential[[id number]]        Abnormal            Final result               Scan Slide[[id number]]                                       Final result                 Please view results for these tests on the individual orders.    Comprehensive Metabolic Panel [[id number]]  (Abnormal) Collected: 02/28/22 1932    Specimen: Blood Updated: 02/28/22 2029     Glucose 104 mg/dL      BUN 39 mg/dL      Creatinine 1.77 mg/dL      Sodium 137 mmol/L      Potassium 4.9 mmol/L      Chloride 98 mmol/L      CO2 24.6 mmol/L      Calcium 9.5 mg/dL      Total Protein 6.5 g/dL      Albumin 4.20 g/dL      ALT (SGPT) 12 U/L      AST (SGOT) 18 U/L      Alkaline Phosphatase 69 U/L      Total Bilirubin <0.2 mg/dL      Globulin 2.3 gm/dL      A/G Ratio 1.8 g/dL      BUN/Creatinine Ratio 22.0     Anion Gap 14.4 mmol/L      eGFR 27.9 mL/min/1.73      Comment: National Kidney Foundation and American Society of Nephrology (ASN) Task Force recommended calculation based on the Chronic Kidney Disease Epidemiology Collaboration (CKD-EPI) equation refit without adjustment for race.       Narrative:      GFR Normal >60  Chronic Kidney Disease <60  Kidney Failure <15      Lipase [[id number]]  (Normal) Collected: 02/28/22 1932    Specimen: Blood Updated: 02/28/22 2029     Lipase 50 U/L     CBC Auto Differential [[id number]]  (Abnormal) Collected: 02/28/22 1932    Specimen: Blood Updated: 02/28/22 2029     WBC 10.66 10*3/mm3      RBC 2.14 10*6/mm3      Hemoglobin 7.5 g/dL      Hematocrit 24.6 %      .0 fL      MCH 35.0 pg      MCHC 30.5 g/dL      RDW 16.8 %      RDW-SD 71.5 fl      MPV 10.2 fL      Platelets 282 10*3/mm3      Neutrophil % 84.2 %      Lymphocyte % 3.8 %      Monocyte % 11.2 %      Eosinophil % 0.2 %      Basophil % 0.3 %      Immature  Grans % 0.3 %      Neutrophils, Absolute 8.99 10*3/mm3      Lymphocytes, Absolute 0.40 10*3/mm3      Monocytes, Absolute 1.19 10*3/mm3      Eosinophils, Absolute 0.02 10*3/mm3      Basophils, Absolute 0.03 10*3/mm3      Immature Grans, Absolute 0.03 10*3/mm3      nRBC 0.0 /100 WBC     Scan Slide [[id number]] Collected: 02/28/22 1932    Specimen: Blood Updated: 02/28/22 2029     Anisocytosis Slight/1+     Macrocytes Mod/2+     Poikilocytes Slight/1+     Polychromasia Slight/1+     Stomatocytes Slight/1+     WBC Morphology Normal     Platelet Estimate Adequate    Urinalysis With Microscopic If Indicated (No Culture) - Urine, Clean Catch [[id number]]  (Abnormal) Collected: 02/28/22 1952    Specimen: Urine, Clean Catch Updated: 02/28/22 2011     Color, UA Yellow     Appearance, UA Cloudy     pH, UA <=5.0     Specific Gravity, UA 1.015     Glucose, UA Negative     Ketones, UA Negative     Bilirubin, UA Negative     Blood, UA Negative     Protein, UA 30 mg/dL (1+)     Leuk Esterase, UA Large (3+)     Nitrite, UA Negative     Urobilinogen, UA 0.2 E.U./dL    Urinalysis, Microscopic Only - Urine, Clean Catch [[id number]]  (Abnormal) Collected: 02/28/22 1952    Specimen: Urine, Clean Catch Updated: 02/28/22 2011     RBC, UA 3-5 /HPF      WBC, UA Too Numerous to Count /HPF      Bacteria, UA None Seen /HPF      Squamous Epithelial Cells, UA 0-2 /HPF      Hyaline Casts, UA 7-12 /LPF      Methodology Automated Microscopy           Imaging:  No Radiology Exams Resulted Within Past 24 Hours    Procedures:  Procedures    Progress                            Medical Decision Making:  MDM  Number of Diagnoses or Management Options  Chronic anemia: established and worsening  Urinary tract infection without hematuria, site unspecified: new and requires workup     Amount and/or Complexity of Data Reviewed  Decide to obtain previous medical records or to obtain history from someone other than the patient: yes    Risk of Complications,  Morbidity, and/or Mortality  Presenting problems: moderate  Management options: low    Patient Progress  Patient progress: stable       Final diagnoses:   Chronic anemia   Urinary tract infection without hematuria, site unspecified        Disposition:  ED Disposition     ED Disposition Condition Comment    Discharge Stable           This medical record created using voice recognition software and may contain unintended errors.           Cam Cruz MD  03/01/22 0201

## 2022-03-01 NOTE — DISCHARGE INSTRUCTIONS
Stay well-hydrated.  Return to the emergency department immediately for dizziness, passing out, uncontrolled fever, uncontrolled vomiting, uncontrolled pain.

## 2022-03-03 ENCOUNTER — APPOINTMENT (OUTPATIENT)
Dept: CARDIOLOGY | Facility: HOSPITAL | Age: 87
End: 2022-03-03

## 2022-03-09 ENCOUNTER — TRANSCRIBE ORDERS (OUTPATIENT)
Dept: ADMINISTRATIVE | Facility: HOSPITAL | Age: 87
End: 2022-03-09

## 2022-03-09 DIAGNOSIS — D50.9 IRON DEFICIENCY ANEMIA, UNSPECIFIED IRON DEFICIENCY ANEMIA TYPE: Primary | ICD-10-CM

## 2022-03-10 ENCOUNTER — HOSPITAL ENCOUNTER (OUTPATIENT)
Dept: INFUSION THERAPY | Facility: HOSPITAL | Age: 87
Discharge: HOME | End: 2022-03-10
Attending: INTERNAL MEDICINE | Admitting: INTERNAL MEDICINE

## 2022-03-10 VITALS
TEMPERATURE: 98.2 F | RESPIRATION RATE: 18 BRPM | DIASTOLIC BLOOD PRESSURE: 70 MMHG | OXYGEN SATURATION: 94 % | SYSTOLIC BLOOD PRESSURE: 168 MMHG | HEART RATE: 66 BPM

## 2022-03-10 LAB
ABO GROUP BLD: NORMAL
ABO GROUP BLD: NORMAL
BLD GP AB SCN SERPL QL: NEGATIVE
HCT VFR BLD AUTO: 21.7 % (ref 34–46.6)
HGB BLD-MCNC: 6.5 G/DL (ref 12–15.9)
RH BLD: POSITIVE
RH BLD: POSITIVE
T&S EXPIRATION DATE: NORMAL

## 2022-03-10 PROCEDURE — 85014 HEMATOCRIT: CPT | Performed by: INTERNAL MEDICINE

## 2022-03-10 PROCEDURE — 86900 BLOOD TYPING SEROLOGIC ABO: CPT

## 2022-03-10 PROCEDURE — 86850 RBC ANTIBODY SCREEN: CPT | Performed by: INTERNAL MEDICINE

## 2022-03-10 PROCEDURE — 86900 BLOOD TYPING SEROLOGIC ABO: CPT | Performed by: INTERNAL MEDICINE

## 2022-03-10 PROCEDURE — 86923 COMPATIBILITY TEST ELECTRIC: CPT

## 2022-03-10 PROCEDURE — P9016 RBC LEUKOCYTES REDUCED: HCPCS

## 2022-03-10 PROCEDURE — 36430 TRANSFUSION BLD/BLD COMPNT: CPT

## 2022-03-10 PROCEDURE — 86901 BLOOD TYPING SEROLOGIC RH(D): CPT | Performed by: INTERNAL MEDICINE

## 2022-03-10 PROCEDURE — 85018 HEMOGLOBIN: CPT | Performed by: INTERNAL MEDICINE

## 2022-03-10 PROCEDURE — 86901 BLOOD TYPING SEROLOGIC RH(D): CPT

## 2022-03-10 RX ORDER — LEVOTHYROXINE SODIUM 125 MCG
1 TABLET ORAL DAILY
COMMUNITY
Start: 2022-01-27 | End: 2022-12-19 | Stop reason: SDUPTHER

## 2022-03-12 LAB
BH BB BLOOD EXPIRATION DATE: NORMAL
BH BB BLOOD EXPIRATION DATE: NORMAL
BH BB BLOOD TYPE BARCODE: 8400
BH BB BLOOD TYPE BARCODE: 8400
BH BB DISPENSE STATUS: NORMAL
BH BB DISPENSE STATUS: NORMAL
BH BB PRODUCT CODE: NORMAL
BH BB PRODUCT CODE: NORMAL
BH BB UNIT NUMBER: NORMAL
BH BB UNIT NUMBER: NORMAL
CROSSMATCH INTERPRETATION: NORMAL
CROSSMATCH INTERPRETATION: NORMAL
UNIT  ABO: NORMAL
UNIT  ABO: NORMAL
UNIT  RH: NORMAL
UNIT  RH: NORMAL

## 2022-03-15 ENCOUNTER — OFFICE VISIT (OUTPATIENT)
Dept: ORTHOPEDIC SURGERY | Facility: CLINIC | Age: 87
End: 2022-03-15

## 2022-03-15 VITALS — BODY MASS INDEX: 33.87 KG/M2 | HEIGHT: 59 IN | WEIGHT: 168 LBS

## 2022-03-15 DIAGNOSIS — M19.011 PRIMARY OSTEOARTHRITIS OF RIGHT SHOULDER: Primary | ICD-10-CM

## 2022-03-15 DIAGNOSIS — M25.511 RIGHT SHOULDER PAIN, UNSPECIFIED CHRONICITY: ICD-10-CM

## 2022-03-15 PROCEDURE — 20610 DRAIN/INJ JOINT/BURSA W/O US: CPT | Performed by: ORTHOPAEDIC SURGERY

## 2022-03-15 RX ORDER — TRIAMCINOLONE ACETONIDE 40 MG/ML
40 INJECTION, SUSPENSION INTRA-ARTICULAR; INTRAMUSCULAR
Status: COMPLETED | OUTPATIENT
Start: 2022-03-15 | End: 2022-03-15

## 2022-03-15 RX ORDER — BUPIVACAINE HYDROCHLORIDE 2.5 MG/ML
5 INJECTION, SOLUTION INFILTRATION; PERINEURAL
Status: COMPLETED | OUTPATIENT
Start: 2022-03-15 | End: 2022-03-15

## 2022-03-15 RX ADMIN — BUPIVACAINE HYDROCHLORIDE 5 ML: 2.5 INJECTION, SOLUTION INFILTRATION; PERINEURAL at 11:13

## 2022-03-15 RX ADMIN — TRIAMCINOLONE ACETONIDE 40 MG: 40 INJECTION, SUSPENSION INTRA-ARTICULAR; INTRAMUSCULAR at 11:13

## 2022-03-15 NOTE — PROGRESS NOTES
"Chief Complaint  Follow-up of the Right Shoulder     Subjective      Yelena Sanchez presents to [facility] for a follow-up of right shoulder. Patient has right shoulder osteoarthritis, she has treated this conservatively thus far. She has been having increasing pain in the right shoulder with no new injuries. She has clicking and popping in the right shoulder. She has had an injection in the past that gave her significant relief.     No Known Allergies     Social History     Socioeconomic History   • Marital status:    Tobacco Use   • Smoking status: Former Smoker     Quit date:      Years since quittin.2   • Smokeless tobacco: Never Used   Vaping Use   • Vaping Use: Never used   Substance and Sexual Activity   • Alcohol use: Never   • Drug use: Never   • Sexual activity: Defer        Review of Systems     Objective   Vital Signs:   Ht 149.9 cm (59\")   Wt 76.2 kg (168 lb)   BMI 33.93 kg/m²       Physical Exam  Constitutional:       Appearance: Normal appearance. Patient is well-developed and normal weight.   HENT:      Head: Normocephalic.      Right Ear: Hearing and external ear normal.      Left Ear: Hearing and external ear normal.      Nose: Nose normal.   Eyes:      Conjunctiva/sclera: Conjunctivae normal.   Cardiovascular:      Rate and Rhythm: Normal rate.   Pulmonary:      Effort: Pulmonary effort is normal.      Breath sounds: No wheezing or rales.   Abdominal:      Palpations: Abdomen is soft.      Tenderness: There is no abdominal tenderness.   Musculoskeletal:      Cervical back: Normal range of motion.   Skin:     Findings: No rash.   Neurological:      Mental Status: Patient is alert and oriented to person, place, and time.   Psychiatric:         Mood and Affect: Mood and affect normal.         Judgment: Judgment normal.       Ortho Exam      RIGHT SHOULDER: Crepitus with motion. Tender subacromial bursa. Tender greater tuberosity. Sensation grossly " intact. Neurovascular intact.  Radial pulse 2+, ulnar pulse 2+. No swelling. Forward elevation to 140 degrees. Abduction to 90 degrees.       Large Joint Arthrocentesis: R subacromial bursa  Date/Time: 3/15/2022 11:13 AM  Consent given by: patient  Site marked: site marked  Timeout: Immediately prior to procedure a time out was called to verify the correct patient, procedure, equipment, support staff and site/side marked as required   Supporting Documentation  Indications: pain   Procedure Details  Location: shoulder - R subacromial bursa  Needle gauge: 21G.  Medications administered: 5 mL bupivacaine 0.25 %; 40 mg triamcinolone acetonide 40 MG/ML  Patient tolerance: patient tolerated the procedure well with no immediate complications            Imaging Results (Most Recent)     Procedure Component Value Units Date/Time    XR Scapula Right [[id number]] Resulted: 03/15/22 1036     Updated: 03/15/22 1039           Result Review :     X-Ray Report:  Right shoulder(s) X-Ray  Indication: Evaluation of right shoulder pain   AP and Lateral view(s)  Findings: Advancing degenerative changes. No fracture.   Prior studies available for comparison: no     Assessment and Plan     DX: Right shoulder osteoarthritis     Discussed treatment plans and diagnosis with the patient. Patient opted for home therapy. She states she has difficulty getting out and going to therapy. Patient given a right shoulder injection, tolerated this well.     Call or return if worsening symptoms.    Follow Up     4-6 weeks.       Patient was given instructions and counseling regarding her condition or for health maintenance advice. Please see specific information pulled into the AVS if appropriate.     Scribed for August Sparrow MD by Nohemi Coppola.  03/15/22   10:49 EDT    I have personally performed the services described in this document as scribed by the above individual and it is both accurate and complete. August Sparrow MD 03/15/22

## 2022-04-19 ENCOUNTER — LAB (OUTPATIENT)
Dept: LAB | Facility: HOSPITAL | Age: 87
End: 2022-04-19

## 2022-04-19 ENCOUNTER — TRANSCRIBE ORDERS (OUTPATIENT)
Dept: LAB | Facility: HOSPITAL | Age: 87
End: 2022-04-19

## 2022-04-19 DIAGNOSIS — N18.32 STAGE 3B CHRONIC KIDNEY DISEASE: Primary | ICD-10-CM

## 2022-04-19 DIAGNOSIS — N18.32 STAGE 3B CHRONIC KIDNEY DISEASE: ICD-10-CM

## 2022-04-19 LAB
ANION GAP SERPL CALCULATED.3IONS-SCNC: 10.4 MMOL/L (ref 5–15)
BUN SERPL-MCNC: 33 MG/DL (ref 8–23)
BUN/CREAT SERPL: 20.1 (ref 7–25)
CALCIUM SPEC-SCNC: 10.4 MG/DL (ref 8.6–10.5)
CHLORIDE SERPL-SCNC: 105 MMOL/L (ref 98–107)
CO2 SERPL-SCNC: 25.6 MMOL/L (ref 22–29)
CREAT SERPL-MCNC: 1.64 MG/DL (ref 0.57–1)
EGFRCR SERPLBLD CKD-EPI 2021: 30.6 ML/MIN/1.73
GLUCOSE SERPL-MCNC: 92 MG/DL (ref 65–99)
POTASSIUM SERPL-SCNC: 5 MMOL/L (ref 3.5–5.2)
SODIUM SERPL-SCNC: 141 MMOL/L (ref 136–145)

## 2022-04-19 PROCEDURE — 36415 COLL VENOUS BLD VENIPUNCTURE: CPT

## 2022-04-19 PROCEDURE — 80048 BASIC METABOLIC PNL TOTAL CA: CPT

## 2022-04-27 ENCOUNTER — TRANSCRIBE ORDERS (OUTPATIENT)
Dept: ADMINISTRATIVE | Facility: HOSPITAL | Age: 87
End: 2022-04-27

## 2022-04-27 ENCOUNTER — LAB (OUTPATIENT)
Dept: LAB | Facility: HOSPITAL | Age: 87
End: 2022-04-27

## 2022-04-27 DIAGNOSIS — E03.9 PRIMARY HYPOTHYROIDISM: ICD-10-CM

## 2022-04-27 DIAGNOSIS — N18.9 CHRONIC KIDNEY DISEASE, UNSPECIFIED CKD STAGE: ICD-10-CM

## 2022-04-27 DIAGNOSIS — E55.9 VITAMIN D DEFICIENCY: ICD-10-CM

## 2022-04-27 DIAGNOSIS — I10 ESSENTIAL HYPERTENSION, MALIGNANT: ICD-10-CM

## 2022-04-27 DIAGNOSIS — E61.1 IRON DEFICIENCY: ICD-10-CM

## 2022-04-27 DIAGNOSIS — M06.9 RHEUMATOID ARTHRITIS, INVOLVING UNSPECIFIED SITE, UNSPECIFIED WHETHER RHEUMATOID FACTOR PRESENT: ICD-10-CM

## 2022-04-27 DIAGNOSIS — D64.9 ANEMIA, UNSPECIFIED TYPE: ICD-10-CM

## 2022-04-27 DIAGNOSIS — Z79.899 ENCOUNTER FOR LONG-TERM (CURRENT) USE OF OTHER MEDICATIONS: ICD-10-CM

## 2022-04-27 DIAGNOSIS — J43.9 PULMONARY EMPHYSEMA, UNSPECIFIED EMPHYSEMA TYPE: ICD-10-CM

## 2022-04-27 DIAGNOSIS — D64.9 ANEMIA, UNSPECIFIED TYPE: Primary | ICD-10-CM

## 2022-04-27 DIAGNOSIS — E78.5 HYPERLIPIDEMIA, UNSPECIFIED HYPERLIPIDEMIA TYPE: ICD-10-CM

## 2022-04-27 LAB
25(OH)D3 SERPL-MCNC: 73.6 NG/ML (ref 30–100)
ALBUMIN SERPL-MCNC: 4 G/DL (ref 3.5–5.2)
ALBUMIN UR-MCNC: 30.8 MG/DL
ALP SERPL-CCNC: 70 U/L (ref 39–117)
ALT SERPL W P-5'-P-CCNC: 13 U/L (ref 1–33)
ANION GAP SERPL CALCULATED.3IONS-SCNC: 12.9 MMOL/L (ref 5–15)
AST SERPL-CCNC: 20 U/L (ref 1–32)
BACTERIA UR QL AUTO: ABNORMAL /HPF
BASOPHILS # BLD AUTO: 0.03 10*3/MM3 (ref 0–0.2)
BASOPHILS NFR BLD AUTO: 0.6 % (ref 0–1.5)
BILIRUB CONJ SERPL-MCNC: <0.2 MG/DL (ref 0–0.3)
BILIRUB INDIRECT SERPL-MCNC: NORMAL MG/DL
BILIRUB SERPL-MCNC: 0.2 MG/DL (ref 0–1.2)
BILIRUB UR QL STRIP: NEGATIVE
BUN SERPL-MCNC: 32 MG/DL (ref 8–23)
BUN/CREAT SERPL: 19 (ref 7–25)
CALCIUM SPEC-SCNC: 9.6 MG/DL (ref 8.6–10.5)
CHLORIDE SERPL-SCNC: 107 MMOL/L (ref 98–107)
CHOLEST SERPL-MCNC: 128 MG/DL (ref 0–200)
CLARITY UR: CLEAR
CO2 SERPL-SCNC: 22.1 MMOL/L (ref 22–29)
COLOR UR: YELLOW
CREAT SERPL-MCNC: 1.68 MG/DL (ref 0.57–1)
CRP SERPL-MCNC: 0.46 MG/DL (ref 0–0.5)
DEPRECATED RDW RBC AUTO: 49.6 FL (ref 37–54)
EGFRCR SERPLBLD CKD-EPI 2021: 29.7 ML/MIN/1.73
EOSINOPHIL # BLD AUTO: 0.1 10*3/MM3 (ref 0–0.4)
EOSINOPHIL NFR BLD AUTO: 2 % (ref 0.3–6.2)
ERYTHROCYTE [DISTWIDTH] IN BLOOD BY AUTOMATED COUNT: 13.2 % (ref 12.3–15.4)
FERRITIN SERPL-MCNC: 73.4 NG/ML (ref 13–150)
FOLATE SERPL-MCNC: >20 NG/ML (ref 4.78–24.2)
GLUCOSE SERPL-MCNC: 93 MG/DL (ref 65–99)
GLUCOSE UR STRIP-MCNC: NEGATIVE MG/DL
HCT VFR BLD AUTO: 36.6 % (ref 34–46.6)
HDLC SERPL-MCNC: 46 MG/DL (ref 40–60)
HGB BLD-MCNC: 11.1 G/DL (ref 12–15.9)
HGB UR QL STRIP.AUTO: NEGATIVE
HYALINE CASTS UR QL AUTO: ABNORMAL /LPF
IMM GRANULOCYTES # BLD AUTO: 0.01 10*3/MM3 (ref 0–0.05)
IMM GRANULOCYTES NFR BLD AUTO: 0.2 % (ref 0–0.5)
IRON 24H UR-MRATE: 28 MCG/DL (ref 37–145)
IRON SATN MFR SERPL: 7 % (ref 20–50)
KETONES UR QL STRIP: NEGATIVE
LDLC SERPL CALC-MCNC: 67 MG/DL (ref 0–100)
LDLC/HDLC SERPL: 1.45 {RATIO}
LEUKOCYTE ESTERASE UR QL STRIP.AUTO: ABNORMAL
LYMPHOCYTES # BLD AUTO: 0.55 10*3/MM3 (ref 0.7–3.1)
LYMPHOCYTES NFR BLD AUTO: 11.1 % (ref 19.6–45.3)
MAGNESIUM SERPL-MCNC: 2.4 MG/DL (ref 1.6–2.4)
MCH RBC QN AUTO: 31.3 PG (ref 26.6–33)
MCHC RBC AUTO-ENTMCNC: 30.3 G/DL (ref 31.5–35.7)
MCV RBC AUTO: 103.1 FL (ref 79–97)
MONOCYTES # BLD AUTO: 0.96 10*3/MM3 (ref 0.1–0.9)
MONOCYTES NFR BLD AUTO: 19.3 % (ref 5–12)
NEUTROPHILS NFR BLD AUTO: 3.32 10*3/MM3 (ref 1.7–7)
NEUTROPHILS NFR BLD AUTO: 66.8 % (ref 42.7–76)
NITRITE UR QL STRIP: NEGATIVE
NRBC BLD AUTO-RTO: 0.4 /100 WBC (ref 0–0.2)
PH UR STRIP.AUTO: 5.5 [PH] (ref 5–8)
PLATELET # BLD AUTO: 299 10*3/MM3 (ref 140–450)
PMV BLD AUTO: 10.5 FL (ref 6–12)
POTASSIUM SERPL-SCNC: 4.1 MMOL/L (ref 3.5–5.2)
PROT SERPL-MCNC: 6.6 G/DL (ref 6–8.5)
PROT UR QL STRIP: ABNORMAL
RBC # BLD AUTO: 3.55 10*6/MM3 (ref 3.77–5.28)
RBC # UR STRIP: ABNORMAL /HPF
REF LAB TEST METHOD: ABNORMAL
SODIUM SERPL-SCNC: 142 MMOL/L (ref 136–145)
SP GR UR STRIP: 1.02 (ref 1–1.03)
SQUAMOUS #/AREA URNS HPF: ABNORMAL /HPF
T4 FREE SERPL-MCNC: 1.53 NG/DL (ref 0.93–1.7)
TIBC SERPL-MCNC: 411 MCG/DL (ref 298–536)
TRANSFERRIN SERPL-MCNC: 276 MG/DL (ref 200–360)
TRIGL SERPL-MCNC: 76 MG/DL (ref 0–150)
TSH SERPL DL<=0.05 MIU/L-ACNC: 0.66 UIU/ML (ref 0.27–4.2)
UROBILINOGEN UR QL STRIP: ABNORMAL
VIT B12 BLD-MCNC: 1181 PG/ML (ref 211–946)
VLDLC SERPL-MCNC: 15 MG/DL (ref 5–40)
WBC # UR STRIP: ABNORMAL /HPF
WBC NRBC COR # BLD: 4.97 10*3/MM3 (ref 3.4–10.8)

## 2022-04-27 PROCEDURE — 80076 HEPATIC FUNCTION PANEL: CPT

## 2022-04-27 PROCEDURE — 85025 COMPLETE CBC W/AUTO DIFF WBC: CPT

## 2022-04-27 PROCEDURE — 82607 VITAMIN B-12: CPT

## 2022-04-27 PROCEDURE — 82306 VITAMIN D 25 HYDROXY: CPT

## 2022-04-27 PROCEDURE — 84439 ASSAY OF FREE THYROXINE: CPT

## 2022-04-27 PROCEDURE — 86140 C-REACTIVE PROTEIN: CPT

## 2022-04-27 PROCEDURE — 82746 ASSAY OF FOLIC ACID SERUM: CPT

## 2022-04-27 PROCEDURE — 83540 ASSAY OF IRON: CPT

## 2022-04-27 PROCEDURE — 82043 UR ALBUMIN QUANTITATIVE: CPT

## 2022-04-27 PROCEDURE — 81001 URINALYSIS AUTO W/SCOPE: CPT

## 2022-04-27 PROCEDURE — 36415 COLL VENOUS BLD VENIPUNCTURE: CPT

## 2022-04-27 PROCEDURE — 82728 ASSAY OF FERRITIN: CPT

## 2022-04-27 PROCEDURE — 84466 ASSAY OF TRANSFERRIN: CPT

## 2022-04-27 PROCEDURE — 83036 HEMOGLOBIN GLYCOSYLATED A1C: CPT

## 2022-04-27 PROCEDURE — 83735 ASSAY OF MAGNESIUM: CPT

## 2022-04-27 PROCEDURE — 80048 BASIC METABOLIC PNL TOTAL CA: CPT

## 2022-04-27 PROCEDURE — 84443 ASSAY THYROID STIM HORMONE: CPT

## 2022-04-27 PROCEDURE — 80061 LIPID PANEL: CPT

## 2022-04-28 LAB — HBA1C MFR BLD: <4.3 % (ref 4.8–5.6)

## 2022-05-03 ENCOUNTER — TELEPHONE (OUTPATIENT)
Dept: ORTHOPEDIC SURGERY | Facility: CLINIC | Age: 87
End: 2022-05-03

## 2022-05-03 DIAGNOSIS — M19.011 PRIMARY OSTEOARTHRITIS OF RIGHT SHOULDER: Primary | ICD-10-CM

## 2022-05-03 NOTE — TELEPHONE ENCOUNTER
Caller: HUMERA     Relationship: SELF     Best call back number: [phone]     What orders are you requesting (i.e. lab or imaging): HOME HEALTH     In what timeframe would the patient need to come in: PT ORDER ASAP     Additional notes: HOME JANICE NEED VERBAL ORDERS FOR PATIENT PT

## 2022-05-11 ENCOUNTER — TELEPHONE (OUTPATIENT)
Dept: ORTHOPEDIC SURGERY | Facility: CLINIC | Age: 87
End: 2022-05-11

## 2022-05-11 NOTE — TELEPHONE ENCOUNTER
Caller: MICAELA ARROYO    Relationship to patient:     Best call back number: [phone]    Chief complaint: LEFT KNEE PAIN    Type of visit: GEL INJECTION    Requested date: AFTER 6.17.2022    If rescheduling, when is the original appointment: NA    Additional notes: PT LAST HAD 12.17.2021

## 2022-05-12 DIAGNOSIS — I50.42 CHRONIC COMBINED SYSTOLIC AND DIASTOLIC CONGESTIVE HEART FAILURE: ICD-10-CM

## 2022-05-12 RX ORDER — FUROSEMIDE 40 MG/1
40 TABLET ORAL DAILY
Qty: 90 TABLET | Refills: 2 | Status: SHIPPED | OUTPATIENT
Start: 2022-05-12 | End: 2022-06-14

## 2022-05-15 ENCOUNTER — HOSPITAL ENCOUNTER (EMERGENCY)
Facility: HOSPITAL | Age: 87
Discharge: HOME | End: 2022-05-15
Attending: EMERGENCY MEDICINE | Admitting: EMERGENCY MEDICINE

## 2022-05-15 ENCOUNTER — APPOINTMENT (OUTPATIENT)
Dept: GENERAL RADIOLOGY | Facility: HOSPITAL | Age: 87
End: 2022-05-15

## 2022-05-15 VITALS
BODY MASS INDEX: 33.56 KG/M2 | WEIGHT: 166.45 LBS | OXYGEN SATURATION: 97 % | SYSTOLIC BLOOD PRESSURE: 167 MMHG | DIASTOLIC BLOOD PRESSURE: 85 MMHG | TEMPERATURE: 98.5 F | RESPIRATION RATE: 20 BRPM | HEIGHT: 59 IN | HEART RATE: 78 BPM

## 2022-05-15 DIAGNOSIS — D64.9 ANEMIA, UNSPECIFIED TYPE: Primary | ICD-10-CM

## 2022-05-15 DIAGNOSIS — J81.0 ACUTE PULMONARY EDEMA: ICD-10-CM

## 2022-05-15 LAB
ABO GROUP BLD: NORMAL
ALBUMIN SERPL-MCNC: 4.1 G/DL (ref 3.5–5.2)
ALBUMIN/GLOB SERPL: 2 G/DL
ALP SERPL-CCNC: 63 U/L (ref 39–117)
ALT SERPL W P-5'-P-CCNC: 13 U/L (ref 1–33)
ANION GAP SERPL CALCULATED.3IONS-SCNC: 12.6 MMOL/L (ref 5–15)
ANISOCYTOSIS BLD QL: NORMAL
ARTERIAL PATENCY WRIST A: POSITIVE
AST SERPL-CCNC: 16 U/L (ref 1–32)
BASE EXCESS BLDA CALC-SCNC: -2.4 MMOL/L (ref -2–2)
BASOPHILS # BLD AUTO: 0.04 10*3/MM3 (ref 0–0.2)
BASOPHILS NFR BLD AUTO: 0.5 % (ref 0–1.5)
BDY SITE: ABNORMAL
BILIRUB SERPL-MCNC: <0.2 MG/DL (ref 0–1.2)
BLD GP AB SCN SERPL QL: NEGATIVE
BUN SERPL-MCNC: 51 MG/DL (ref 8–23)
BUN/CREAT SERPL: 31.5 (ref 7–25)
CALCIUM SPEC-SCNC: 10 MG/DL (ref 8.6–10.5)
CHLORIDE SERPL-SCNC: 106 MMOL/L (ref 98–107)
CO2 SERPL-SCNC: 26.4 MMOL/L (ref 22–29)
COHGB MFR BLD: 1.4 % (ref 0–1.5)
CREAT SERPL-MCNC: 1.62 MG/DL (ref 0.57–1)
DEPRECATED RDW RBC AUTO: 55.6 FL (ref 37–54)
EGFRCR SERPLBLD CKD-EPI 2021: 31 ML/MIN/1.73
EOSINOPHIL # BLD AUTO: 0.1 10*3/MM3 (ref 0–0.4)
EOSINOPHIL NFR BLD AUTO: 1.3 % (ref 0.3–6.2)
ERYTHROCYTE [DISTWIDTH] IN BLOOD BY AUTOMATED COUNT: 15 % (ref 12.3–15.4)
FHHB: 6.2 % (ref 0–5)
GAS FLOW AIRWAY: 2 LPM
GLOBULIN UR ELPH-MCNC: 2.1 GM/DL
GLUCOSE SERPL-MCNC: 141 MG/DL (ref 65–99)
HCO3 BLDA-SCNC: 23.1 MMOL/L (ref 22–26)
HCT VFR BLD AUTO: 25 % (ref 34–46.6)
HGB BLD-MCNC: 7.3 G/DL (ref 12–15.9)
HGB BLDA-MCNC: 7.8 G/DL (ref 11.7–14.6)
HOLD SPECIMEN: NORMAL
HOLD SPECIMEN: NORMAL
IMM GRANULOCYTES # BLD AUTO: 0.08 10*3/MM3 (ref 0–0.05)
IMM GRANULOCYTES NFR BLD AUTO: 1 % (ref 0–0.5)
INHALED O2 CONCENTRATION: 28 %
LACTATE BLDA-SCNC: ABNORMAL MMOL/L
LYMPHOCYTES # BLD AUTO: 0.64 10*3/MM3 (ref 0.7–3.1)
LYMPHOCYTES NFR BLD AUTO: 8.1 % (ref 19.6–45.3)
MACROCYTES BLD QL SMEAR: NORMAL
MCH RBC QN AUTO: 30.8 PG (ref 26.6–33)
MCHC RBC AUTO-ENTMCNC: 29.2 G/DL (ref 31.5–35.7)
MCV RBC AUTO: 105.5 FL (ref 79–97)
METHGB BLD QL: 0.4 % (ref 0–1.5)
MODALITY: ABNORMAL
MONOCYTES # BLD AUTO: 0.83 10*3/MM3 (ref 0.1–0.9)
MONOCYTES NFR BLD AUTO: 10.6 % (ref 5–12)
NEUTROPHILS NFR BLD AUTO: 6.17 10*3/MM3 (ref 1.7–7)
NEUTROPHILS NFR BLD AUTO: 78.5 % (ref 42.7–76)
NRBC BLD AUTO-RTO: 0.5 /100 WBC (ref 0–0.2)
NT-PROBNP SERPL-MCNC: 2157 PG/ML (ref 0–1800)
OVALOCYTES BLD QL SMEAR: NORMAL
OXYHGB MFR BLDV: 92 % (ref 94–99)
PCO2 BLDA: 42.9 MM HG (ref 35–45)
PH BLDA: 7.35 PH UNITS (ref 7.35–7.45)
PLAT MORPH BLD: NORMAL
PLATELET # BLD AUTO: 311 10*3/MM3 (ref 140–450)
PMV BLD AUTO: 10.4 FL (ref 6–12)
PO2 BLD: 269 MM[HG] (ref 0–500)
PO2 BLDA: 75.3 MM HG (ref 80–100)
POLYCHROMASIA BLD QL SMEAR: NORMAL
POTASSIUM SERPL-SCNC: 3.7 MMOL/L (ref 3.5–5.2)
PROT SERPL-MCNC: 6.2 G/DL (ref 6–8.5)
RBC # BLD AUTO: 2.37 10*6/MM3 (ref 3.77–5.28)
RH BLD: POSITIVE
SAO2 % BLDCOA: 93.7 % (ref 95–99)
SODIUM SERPL-SCNC: 145 MMOL/L (ref 136–145)
T&S EXPIRATION DATE: NORMAL
TROPONIN T SERPL-MCNC: 0.02 NG/ML (ref 0–0.03)
WBC MORPH BLD: NORMAL
WBC NRBC COR # BLD: 7.86 10*3/MM3 (ref 3.4–10.8)
WHOLE BLOOD HOLD COAG: NORMAL
WHOLE BLOOD HOLD SPECIMEN: NORMAL

## 2022-05-15 PROCEDURE — 25010000002 FUROSEMIDE PER 20 MG: Performed by: EMERGENCY MEDICINE

## 2022-05-15 PROCEDURE — P9016 RBC LEUKOCYTES REDUCED: HCPCS

## 2022-05-15 PROCEDURE — 25010000002 METHYLPREDNISOLONE PER 125 MG: Performed by: EMERGENCY MEDICINE

## 2022-05-15 PROCEDURE — 71045 X-RAY EXAM CHEST 1 VIEW: CPT

## 2022-05-15 PROCEDURE — 85025 COMPLETE CBC W/AUTO DIFF WBC: CPT | Performed by: EMERGENCY MEDICINE

## 2022-05-15 PROCEDURE — 83050 HGB METHEMOGLOBIN QUAN: CPT | Performed by: EMERGENCY MEDICINE

## 2022-05-15 PROCEDURE — 86900 BLOOD TYPING SEROLOGIC ABO: CPT | Performed by: EMERGENCY MEDICINE

## 2022-05-15 PROCEDURE — 86923 COMPATIBILITY TEST ELECTRIC: CPT

## 2022-05-15 PROCEDURE — 84484 ASSAY OF TROPONIN QUANT: CPT | Performed by: EMERGENCY MEDICINE

## 2022-05-15 PROCEDURE — 36600 WITHDRAWAL OF ARTERIAL BLOOD: CPT | Performed by: EMERGENCY MEDICINE

## 2022-05-15 PROCEDURE — 36430 TRANSFUSION BLD/BLD COMPNT: CPT

## 2022-05-15 PROCEDURE — 82805 BLOOD GASES W/O2 SATURATION: CPT | Performed by: EMERGENCY MEDICINE

## 2022-05-15 PROCEDURE — 86901 BLOOD TYPING SEROLOGIC RH(D): CPT | Performed by: EMERGENCY MEDICINE

## 2022-05-15 PROCEDURE — 86850 RBC ANTIBODY SCREEN: CPT | Performed by: EMERGENCY MEDICINE

## 2022-05-15 PROCEDURE — 82375 ASSAY CARBOXYHB QUANT: CPT | Performed by: EMERGENCY MEDICINE

## 2022-05-15 PROCEDURE — 93005 ELECTROCARDIOGRAM TRACING: CPT | Performed by: EMERGENCY MEDICINE

## 2022-05-15 PROCEDURE — 83880 ASSAY OF NATRIURETIC PEPTIDE: CPT | Performed by: EMERGENCY MEDICINE

## 2022-05-15 PROCEDURE — 86900 BLOOD TYPING SEROLOGIC ABO: CPT

## 2022-05-15 PROCEDURE — 99284 EMERGENCY DEPT VISIT MOD MDM: CPT

## 2022-05-15 PROCEDURE — 93010 ELECTROCARDIOGRAM REPORT: CPT | Performed by: INTERNAL MEDICINE

## 2022-05-15 PROCEDURE — 80053 COMPREHEN METABOLIC PANEL: CPT | Performed by: EMERGENCY MEDICINE

## 2022-05-15 PROCEDURE — 96375 TX/PRO/DX INJ NEW DRUG ADDON: CPT

## 2022-05-15 PROCEDURE — 85007 BL SMEAR W/DIFF WBC COUNT: CPT | Performed by: EMERGENCY MEDICINE

## 2022-05-15 PROCEDURE — 96374 THER/PROPH/DIAG INJ IV PUSH: CPT

## 2022-05-15 RX ORDER — LEVOTHYROXINE SODIUM 0.12 MG/1
125 TABLET ORAL ONCE
Status: COMPLETED | OUTPATIENT
Start: 2022-05-15 | End: 2022-05-15

## 2022-05-15 RX ORDER — SODIUM CHLORIDE 0.9 % (FLUSH) 0.9 %
10 SYRINGE (ML) INJECTION AS NEEDED
Status: DISCONTINUED | OUTPATIENT
Start: 2022-05-15 | End: 2022-05-15 | Stop reason: HOSPADM

## 2022-05-15 RX ORDER — FUROSEMIDE 10 MG/ML
40 INJECTION INTRAMUSCULAR; INTRAVENOUS ONCE
Status: COMPLETED | OUTPATIENT
Start: 2022-05-15 | End: 2022-05-15

## 2022-05-15 RX ORDER — METHYLPREDNISOLONE SODIUM SUCCINATE 125 MG/2ML
125 INJECTION, POWDER, LYOPHILIZED, FOR SOLUTION INTRAMUSCULAR; INTRAVENOUS ONCE
Status: COMPLETED | OUTPATIENT
Start: 2022-05-15 | End: 2022-05-15

## 2022-05-15 RX ADMIN — LEVOTHYROXINE SODIUM 125 MCG: 125 TABLET ORAL at 09:56

## 2022-05-15 RX ADMIN — FUROSEMIDE 40 MG: 10 INJECTION, SOLUTION INTRAMUSCULAR; INTRAVENOUS at 05:47

## 2022-05-15 RX ADMIN — METHYLPREDNISOLONE SODIUM SUCCINATE 125 MG: 125 INJECTION, POWDER, FOR SOLUTION INTRAMUSCULAR; INTRAVENOUS at 04:55

## 2022-05-15 NOTE — ED PROVIDER NOTES
Time: 4:05 AM EDT  Arrived by: ambulance  Chief Complaint: short of breath  History provided by: pt  History is limited by: N/A     History of Present Illness:  Patient is a 86 y.o. year old female that presents to the emergency department with shortness of breath.    HPI    Similar Symptoms Previously: yes  Recently seen: Inpatient on 3/10/22      Patient Care Team  Primary Care Provider: Rachael Noel MD    Past Medical History:     No Known Allergies  Past Medical History:   Diagnosis Date   • Arthritis    • Asthma    • Breast cancer (HCC)    • Congestive heart failure (HCC)    • COPD (chronic obstructive pulmonary disease) (HCC)    • Diverticulosis 11/26/2014   • GERD (gastroesophageal reflux disease)    • History of tobacco abuse    • HTN (hypertension)    • Hyperlipemia    • Hyperthyroidism    • Neck mass    • Seasonal allergies    • SOB (shortness of breath)      Past Surgical History:   Procedure Laterality Date   • COLONOSCOPY  2014   • ENDOSCOPY  2014   • EYE SURGERY      Implant; yes    • LUNG SURGERY     • MASTECTOMY      Left    • OTHER SURGICAL HISTORY      Joint Surgery   • PACEMAKER IMPLANTATION       Family History   Problem Relation Age of Onset   • Colon cancer Mother         60s   • Lung cancer Sister        Home Medications:  Prior to Admission medications    Medication Sig Start Date End Date Taking? Authorizing Provider   allopurinol (ZYLOPRIM) 100 MG tablet  8/4/21   Sorin Younger MD   amLODIPine (NORVASC) 5 MG tablet Take 1 tablet by mouth Daily. 11/30/21   Simon Talbot MD   aspirin 81 MG EC tablet Take 81 mg by mouth Daily.    Sorin Younger MD   Bystolic 10 MG tablet TAKE 1 TABLET DAILY 7/22/21   Kylie Medina APRN   chlorpheniramine (CHLOR-TRIMETON) 4 MG tablet Take 4 mg by mouth 3 (Three) Times a Day As Needed.    Sorin Younger MD   cholecalciferol (VITAMIN D3) 25 MCG (1000 UT) tablet Take 1,000 Units by mouth Daily. 8/4/21   Sorin Younger  MD   clopidogrel (PLAVIX) 75 MG tablet Take 1 tablet by mouth Daily. 21   Simon Talbot MD   furosemide (LASIX) 40 MG tablet Take 1 tablet by mouth Daily. 22   Simon Talbot MD   HYDROcodone-acetaminophen (NORCO) 5-325 MG per tablet  21   Sorin Younger MD   lansoprazole (PREVACID) 30 MG capsule Take 30 mg by mouth Daily.    Sorin Younger MD   Lubricating Plus Eye Drops 0.5 % solution  10/29/21   Sorin Younger MD   montelukast (SINGULAIR) 10 MG tablet Take 10 mg by mouth Every Night.    Sorin Younger MD   multivitamin with minerals tablet tablet Take  by mouth.    Sorin Younger MD   olopatadine (PATANOL) 0.1 % ophthalmic solution Administer 1 drop to both eyes 2 (Two) Times a Day. 10/13/21   Lefty Acevedo MD   ondansetron ODT (ZOFRAN-ODT) 4 MG disintegrating tablet Place 1 tablet on the tongue Every 6 (Six) Hours As Needed for Nausea or Vomiting. 3/1/22   Cam Cruz MD   rosuvastatin (Crestor) 20 MG tablet Take 1 tablet by mouth Every Night. 21   Simon Talbot MD   spironolactone (ALDACTONE) 25 MG tablet Take 0.5 tablets by mouth Daily. 21   Simon Talbot MD   Synthroid 125 MCG tablet Take 1 tablet by mouth Daily. 22   Sorin Younger MD   tiotropium bromide-olodaterol (Stiolto Respimat) 2.5-2.5 MCG/ACT aerosol solution inhaler Stiolto Respimat 2.5-2.5 mcg/actuation inhalation mist inhale 2 puffs by inhalation route once daily at the same time each day   Active    Sorin Younger MD        Social History:   Social History     Tobacco Use   • Smoking status: Former Smoker     Quit date: 2017     Years since quittin.3   • Smokeless tobacco: Never Used   Vaping Use   • Vaping Use: Never used   Substance Use Topics   • Alcohol use: Never   • Drug use: Never       Review of Systems:  Review of Systems   Constitutional: Negative.    HENT: Negative.    Eyes: Negative.    Respiratory: Positive for  "shortness of breath.    Cardiovascular: Negative.    Gastrointestinal: Negative.    Endocrine: Negative.    Genitourinary: Negative.    Musculoskeletal: Negative.    Skin: Negative.    Allergic/Immunologic: Negative.    Neurological: Negative.    Hematological: Negative.    Psychiatric/Behavioral: Negative.         Physical Exam:  /85   Pulse 78   Temp 98.5 °F (36.9 °C)   Resp 20   Ht 149.9 cm (59\")   Wt 75.5 kg (166 lb 7.2 oz)   SpO2 97%   BMI 33.62 kg/m²     Physical Exam  Vitals and nursing note reviewed.   Constitutional:       Appearance: Normal appearance.   HENT:      Head: Normocephalic.   Cardiovascular:      Rate and Rhythm: Normal rate and regular rhythm.      Heart sounds: Normal heart sounds.   Pulmonary:      Effort: Pulmonary effort is normal.      Comments: Decreased breath sounds bilaterally  Abdominal:      Palpations: Abdomen is soft.   Musculoskeletal:         General: Normal range of motion.      Cervical back: Normal range of motion and neck supple.   Skin:     General: Skin is warm and dry.   Neurological:      General: No focal deficit present.      Mental Status: She is alert and oriented to person, place, and time.   Psychiatric:         Mood and Affect: Mood normal.                Medications in the Emergency Department:  Medications   methylPREDNISolone sodium succinate (SOLU-Medrol) injection 125 mg (125 mg Intravenous Given 5/15/22 8453)   furosemide (LASIX) injection 40 mg (40 mg Intravenous Given 5/15/22 8898)   levothyroxine (SYNTHROID, LEVOTHROID) tablet 125 mcg (125 mcg Oral Given 5/15/22 1937)        Labs  Lab Results (last 24 hours)     ** No results found for the last 24 hours. **           Imaging:  No Radiology Exams Resulted Within Past 24 Hours    Procedures:  Procedures    Progress                            Medical Decision Making:  MDM    Patient is anemic, Hemoglobib 7.3. Discussed with Patient's physician, Dr. Collins. She would like the patient to be " transfused 2 units PRBC. Patient transfused and stable for discharge.      Final diagnoses:   Anemia, unspecified type   Acute pulmonary edema (HCC)        Disposition:  ED Disposition     ED Disposition   Discharge    Condition   Stable    Comment   --             Documentation assistance provided by Robert Fuller DO acting as scribe for Robert Fuller DO. Information recorded by the scribe was done at my direction and has been verified and validated by me.        Robert Fuller DO  05/21/22 5080

## 2022-05-17 LAB — QT INTERVAL: 478 MS

## 2022-05-18 LAB
BH BB BLOOD EXPIRATION DATE: NORMAL
BH BB BLOOD TYPE BARCODE: 6200
BH BB DISPENSE STATUS: NORMAL
BH BB PRODUCT CODE: NORMAL
BH BB UNIT NUMBER: NORMAL
CROSSMATCH INTERPRETATION: NORMAL
UNIT  ABO: NORMAL
UNIT  RH: NORMAL

## 2022-06-03 ENCOUNTER — TRANSCRIBE ORDERS (OUTPATIENT)
Dept: ADMINISTRATIVE | Facility: HOSPITAL | Age: 87
End: 2022-06-03

## 2022-06-03 DIAGNOSIS — I65.29 EXTERNAL CAROTID ARTERY STENOSIS: Primary | ICD-10-CM

## 2022-06-14 ENCOUNTER — OFFICE VISIT (OUTPATIENT)
Dept: CARDIOLOGY | Facility: CLINIC | Age: 87
End: 2022-06-14

## 2022-06-14 VITALS
WEIGHT: 163 LBS | HEIGHT: 59 IN | HEART RATE: 97 BPM | SYSTOLIC BLOOD PRESSURE: 150 MMHG | DIASTOLIC BLOOD PRESSURE: 62 MMHG | BODY MASS INDEX: 32.86 KG/M2

## 2022-06-14 DIAGNOSIS — E78.2 MIXED HYPERLIPIDEMIA: ICD-10-CM

## 2022-06-14 DIAGNOSIS — I25.10 CORONARY ARTERY DISEASE INVOLVING NATIVE CORONARY ARTERY OF NATIVE HEART WITHOUT ANGINA PECTORIS: ICD-10-CM

## 2022-06-14 DIAGNOSIS — Z95.810 PRESENCE OF BIVENTRICULAR IMPLANTABLE CARDIOVERTER-DEFIBRILLATOR (ICD): ICD-10-CM

## 2022-06-14 DIAGNOSIS — I10 ESSENTIAL HYPERTENSION: ICD-10-CM

## 2022-06-14 DIAGNOSIS — I50.42 CHRONIC COMBINED SYSTOLIC AND DIASTOLIC CONGESTIVE HEART FAILURE: Primary | ICD-10-CM

## 2022-06-14 DIAGNOSIS — I77.9 CAROTID ARTERY DISEASE, UNSPECIFIED LATERALITY, UNSPECIFIED TYPE: ICD-10-CM

## 2022-06-14 PROCEDURE — 99214 OFFICE O/P EST MOD 30 MIN: CPT | Performed by: INTERNAL MEDICINE

## 2022-06-14 RX ORDER — NEBIVOLOL 10 MG/1
10 TABLET ORAL DAILY
Qty: 90 TABLET | Refills: 3 | Status: SHIPPED | OUTPATIENT
Start: 2022-06-14 | End: 2022-07-25 | Stop reason: SDUPTHER

## 2022-06-14 RX ORDER — CLOPIDOGREL BISULFATE 75 MG/1
75 TABLET ORAL DAILY
Qty: 90 TABLET | Refills: 2 | Status: SHIPPED | OUTPATIENT
Start: 2022-06-14 | End: 2022-12-19 | Stop reason: SDUPTHER

## 2022-06-14 RX ORDER — FUROSEMIDE 40 MG/1
40 TABLET ORAL 2 TIMES DAILY
Qty: 180 TABLET | Refills: 2 | Status: SHIPPED | OUTPATIENT
Start: 2022-06-14 | End: 2022-06-14 | Stop reason: SDUPTHER

## 2022-06-14 RX ORDER — ROSUVASTATIN CALCIUM 20 MG/1
20 TABLET, COATED ORAL NIGHTLY
Qty: 90 TABLET | Refills: 2 | Status: SHIPPED | OUTPATIENT
Start: 2022-06-14 | End: 2022-12-19 | Stop reason: SDUPTHER

## 2022-06-14 RX ORDER — FUROSEMIDE 40 MG/1
40 TABLET ORAL 2 TIMES DAILY
Qty: 180 TABLET | Refills: 2 | Status: SHIPPED | OUTPATIENT
Start: 2022-06-14 | End: 2022-12-19 | Stop reason: SDUPTHER

## 2022-06-14 RX ORDER — AMLODIPINE BESYLATE 5 MG/1
5 TABLET ORAL DAILY
Qty: 90 TABLET | Refills: 2 | Status: SHIPPED | OUTPATIENT
Start: 2022-06-14 | End: 2022-12-19 | Stop reason: SDUPTHER

## 2022-06-14 RX ORDER — SPIRONOLACTONE 25 MG/1
12.5 TABLET ORAL DAILY
Qty: 45 TABLET | Refills: 2 | Status: SHIPPED | OUTPATIENT
Start: 2022-06-14

## 2022-06-15 PROBLEM — I77.9 CAROTID ARTERY DISEASE: Status: ACTIVE | Noted: 2022-06-15

## 2022-06-15 NOTE — ASSESSMENT & PLAN NOTE
She currently denies any angina-like symptoms.  Continue aspirin, statin beta-blocker at the current dose.

## 2022-06-15 NOTE — ASSESSMENT & PLAN NOTE
LVEF is 30% per most recent echocardiogram done in 2020.  She recently had increasing shortness of breath and swelling.  Currently taking Lasix 40 mg twice daily on most of the days.  Recent labs showed a creatinine of 1.6 which is at her baseline.  Continue Bystolic, spironolactone, and Lasix at the current dose.  Unable to add ARB due to chronic kidney disease

## 2022-06-15 NOTE — ASSESSMENT & PLAN NOTE
Battery longevity is 25 years per home remote interrogation done earlier this month.  99% LV pacing and 95% RV pacing.  We will continue to do remote monitoring.  We will check the device in the office during next office visit

## 2022-06-15 NOTE — ASSESSMENT & PLAN NOTE
She follows up with Dr. Nunez, recently noted to have some plaque buildup and needs a CT angiogram.  She is also dealing with significant anemia likely from GI source.  She is on Plavix for the past several years.  She was advised to continue Plavix for now by the vascular surgeons until the current testing is completed.  Recommend to cut down aspirin to every other day to minimize bleeding risk.

## 2022-06-28 ENCOUNTER — HOSPITAL ENCOUNTER (OUTPATIENT)
Dept: CT IMAGING | Facility: HOSPITAL | Age: 87
Discharge: HOME | End: 2022-06-28
Admitting: SURGERY

## 2022-06-28 DIAGNOSIS — I65.29 EXTERNAL CAROTID ARTERY STENOSIS: ICD-10-CM

## 2022-06-28 LAB
CREAT BLDA-MCNC: 1.6 MG/DL
EGFRCR SERPLBLD CKD-EPI 2021: 31.5 ML/MIN/1.73

## 2022-06-28 PROCEDURE — 70498 CT ANGIOGRAPHY NECK: CPT

## 2022-06-28 PROCEDURE — 0 IOPAMIDOL PER 1 ML: Performed by: SURGERY

## 2022-06-28 PROCEDURE — 82565 ASSAY OF CREATININE: CPT

## 2022-06-28 RX ADMIN — IOPAMIDOL 100 ML: 755 INJECTION, SOLUTION INTRAVENOUS at 08:55

## 2022-07-23 ENCOUNTER — LAB (OUTPATIENT)
Dept: LAB | Facility: HOSPITAL | Age: 87
End: 2022-07-23

## 2022-07-23 ENCOUNTER — TRANSCRIBE ORDERS (OUTPATIENT)
Dept: LAB | Facility: HOSPITAL | Age: 87
End: 2022-07-23

## 2022-07-23 DIAGNOSIS — N18.30 STAGE 3 CHRONIC KIDNEY DISEASE, UNSPECIFIED WHETHER STAGE 3A OR 3B CKD: ICD-10-CM

## 2022-07-23 DIAGNOSIS — Z79.899 ENCOUNTER FOR LONG-TERM (CURRENT) USE OF OTHER MEDICATIONS: ICD-10-CM

## 2022-07-23 DIAGNOSIS — J43.9 EMPHYSEMATOUS BLEB: ICD-10-CM

## 2022-07-23 DIAGNOSIS — E03.9 MYXEDEMA HEART DISEASE: ICD-10-CM

## 2022-07-23 DIAGNOSIS — E78.5 HYPERLIPIDEMIA, UNSPECIFIED HYPERLIPIDEMIA TYPE: ICD-10-CM

## 2022-07-23 DIAGNOSIS — N18.9 ANEMIA DUE TO CHRONIC KIDNEY DISEASE, UNSPECIFIED CKD STAGE: ICD-10-CM

## 2022-07-23 DIAGNOSIS — I51.9 MYXEDEMA HEART DISEASE: ICD-10-CM

## 2022-07-23 DIAGNOSIS — E61.1 IRON DEFICIENCY: ICD-10-CM

## 2022-07-23 DIAGNOSIS — M06.9 RHEUMATOID ARTHRITIS OF FOOT, UNSPECIFIED LATERALITY, UNSPECIFIED WHETHER RHEUMATOID FACTOR PRESENT: ICD-10-CM

## 2022-07-23 DIAGNOSIS — D63.1 ANEMIA DUE TO CHRONIC KIDNEY DISEASE, UNSPECIFIED CKD STAGE: ICD-10-CM

## 2022-07-23 DIAGNOSIS — I10 ESSENTIAL HYPERTENSION, MALIGNANT: ICD-10-CM

## 2022-07-23 DIAGNOSIS — E78.5 HYPERLIPIDEMIA, UNSPECIFIED HYPERLIPIDEMIA TYPE: Primary | ICD-10-CM

## 2022-07-23 LAB
25(OH)D3 SERPL-MCNC: 66.1 NG/ML (ref 30–100)
ALBUMIN SERPL-MCNC: 4.2 G/DL (ref 3.5–5.2)
ALP SERPL-CCNC: 79 U/L (ref 39–117)
ALT SERPL W P-5'-P-CCNC: 9 U/L (ref 1–33)
ANION GAP SERPL CALCULATED.3IONS-SCNC: 11 MMOL/L (ref 5–15)
AST SERPL-CCNC: 13 U/L (ref 1–32)
BACTERIA UR QL AUTO: ABNORMAL /HPF
BASOPHILS # BLD MANUAL: 0.04 10*3/MM3 (ref 0–0.2)
BASOPHILS NFR BLD MANUAL: 1 % (ref 0–1.5)
BILIRUB CONJ SERPL-MCNC: <0.2 MG/DL (ref 0–0.3)
BILIRUB INDIRECT SERPL-MCNC: NORMAL MG/DL
BILIRUB SERPL-MCNC: <0.2 MG/DL (ref 0–1.2)
BILIRUB UR QL STRIP: NEGATIVE
BUN SERPL-MCNC: 33 MG/DL (ref 8–23)
BUN/CREAT SERPL: 21.7 (ref 7–25)
CALCIUM SPEC-SCNC: 9.5 MG/DL (ref 8.6–10.5)
CHLORIDE SERPL-SCNC: 107 MMOL/L (ref 98–107)
CHOLEST SERPL-MCNC: 117 MG/DL (ref 0–200)
CLARITY UR: CLEAR
CO2 SERPL-SCNC: 23 MMOL/L (ref 22–29)
COLOR UR: YELLOW
CREAT SERPL-MCNC: 1.52 MG/DL (ref 0.57–1)
CRP SERPL-MCNC: 0.78 MG/DL (ref 0–0.5)
DEPRECATED RDW RBC AUTO: 48.4 FL (ref 37–54)
EGFRCR SERPLBLD CKD-EPI 2021: 33.5 ML/MIN/1.73
EOSINOPHIL # BLD MANUAL: 0.17 10*3/MM3 (ref 0–0.4)
EOSINOPHIL NFR BLD MANUAL: 4 % (ref 0.3–6.2)
ERYTHROCYTE [DISTWIDTH] IN BLOOD BY AUTOMATED COUNT: 13.8 % (ref 12.3–15.4)
FERRITIN SERPL-MCNC: 42.8 NG/ML (ref 13–150)
FOLATE SERPL-MCNC: >20 NG/ML (ref 4.78–24.2)
GLUCOSE SERPL-MCNC: 89 MG/DL (ref 65–99)
GLUCOSE UR STRIP-MCNC: NEGATIVE MG/DL
HCT VFR BLD AUTO: 31.3 % (ref 34–46.6)
HDLC SERPL-MCNC: 38 MG/DL (ref 40–60)
HGB BLD-MCNC: 9.6 G/DL (ref 12–15.9)
HGB UR QL STRIP.AUTO: NEGATIVE
HYALINE CASTS UR QL AUTO: ABNORMAL /LPF
IRON 24H UR-MRATE: 28 MCG/DL (ref 37–145)
IRON SATN MFR SERPL: 7 % (ref 20–50)
KETONES UR QL STRIP: NEGATIVE
LDLC SERPL CALC-MCNC: 58 MG/DL (ref 0–100)
LDLC/HDLC SERPL: 1.47 {RATIO}
LEUKOCYTE ESTERASE UR QL STRIP.AUTO: ABNORMAL
LYMPHOCYTES # BLD MANUAL: 0.43 10*3/MM3 (ref 0.7–3.1)
LYMPHOCYTES NFR BLD MANUAL: 14.1 % (ref 5–12)
MAGNESIUM SERPL-MCNC: 2.8 MG/DL (ref 1.6–2.4)
MCH RBC QN AUTO: 30 PG (ref 26.6–33)
MCHC RBC AUTO-ENTMCNC: 30.7 G/DL (ref 31.5–35.7)
MCV RBC AUTO: 97.8 FL (ref 79–97)
MONOCYTES # BLD: 0.61 10*3/MM3 (ref 0.1–0.9)
NEUTROPHILS # BLD AUTO: 3.04 10*3/MM3 (ref 1.7–7)
NEUTROPHILS NFR BLD MANUAL: 70.7 % (ref 42.7–76)
NITRITE UR QL STRIP: NEGATIVE
PH UR STRIP.AUTO: 6 [PH] (ref 5–8)
PLAT MORPH BLD: NORMAL
PLATELET # BLD AUTO: 262 10*3/MM3 (ref 140–450)
PMV BLD AUTO: 10.5 FL (ref 6–12)
POTASSIUM SERPL-SCNC: 4.2 MMOL/L (ref 3.5–5.2)
PROT SERPL-MCNC: 6.1 G/DL (ref 6–8.5)
PROT UR QL STRIP: ABNORMAL
RBC # BLD AUTO: 3.2 10*6/MM3 (ref 3.77–5.28)
RBC # UR STRIP: ABNORMAL /HPF
RBC MORPH BLD: NORMAL
REF LAB TEST METHOD: ABNORMAL
SODIUM SERPL-SCNC: 141 MMOL/L (ref 136–145)
SP GR UR STRIP: 1.02 (ref 1–1.03)
SQUAMOUS #/AREA URNS HPF: ABNORMAL /HPF
T4 SERPL-MCNC: 7.93 MCG/DL (ref 4.5–11.7)
TIBC SERPL-MCNC: 407 MCG/DL (ref 298–536)
TRANSFERRIN SERPL-MCNC: 273 MG/DL (ref 200–360)
TRIGL SERPL-MCNC: 115 MG/DL (ref 0–150)
TSH SERPL DL<=0.05 MIU/L-ACNC: 0.82 UIU/ML (ref 0.27–4.2)
URATE SERPL-MCNC: 1.5 MG/DL (ref 2.4–5.7)
UROBILINOGEN UR QL STRIP: ABNORMAL
VARIANT LYMPHS NFR BLD MANUAL: 10.1 % (ref 19.6–45.3)
VIT B12 BLD-MCNC: 726 PG/ML (ref 211–946)
VLDLC SERPL-MCNC: 21 MG/DL (ref 5–40)
WBC # UR STRIP: ABNORMAL /HPF
WBC MORPH BLD: NORMAL
WBC NRBC COR # BLD: 4.3 10*3/MM3 (ref 3.4–10.8)

## 2022-07-23 PROCEDURE — 84466 ASSAY OF TRANSFERRIN: CPT

## 2022-07-23 PROCEDURE — 86140 C-REACTIVE PROTEIN: CPT

## 2022-07-23 PROCEDURE — 85007 BL SMEAR W/DIFF WBC COUNT: CPT

## 2022-07-23 PROCEDURE — 81001 URINALYSIS AUTO W/SCOPE: CPT

## 2022-07-23 PROCEDURE — 80076 HEPATIC FUNCTION PANEL: CPT

## 2022-07-23 PROCEDURE — 83735 ASSAY OF MAGNESIUM: CPT

## 2022-07-23 PROCEDURE — 84436 ASSAY OF TOTAL THYROXINE: CPT

## 2022-07-23 PROCEDURE — 80061 LIPID PANEL: CPT

## 2022-07-23 PROCEDURE — 85025 COMPLETE CBC W/AUTO DIFF WBC: CPT

## 2022-07-23 PROCEDURE — 82746 ASSAY OF FOLIC ACID SERUM: CPT

## 2022-07-23 PROCEDURE — 36415 COLL VENOUS BLD VENIPUNCTURE: CPT

## 2022-07-23 PROCEDURE — 80048 BASIC METABOLIC PNL TOTAL CA: CPT

## 2022-07-23 PROCEDURE — 82306 VITAMIN D 25 HYDROXY: CPT

## 2022-07-23 PROCEDURE — 84443 ASSAY THYROID STIM HORMONE: CPT

## 2022-07-23 PROCEDURE — 84550 ASSAY OF BLOOD/URIC ACID: CPT

## 2022-07-23 PROCEDURE — 83540 ASSAY OF IRON: CPT

## 2022-07-23 PROCEDURE — 82728 ASSAY OF FERRITIN: CPT

## 2022-07-23 PROCEDURE — 82607 VITAMIN B-12: CPT

## 2022-07-25 DIAGNOSIS — I10 ESSENTIAL HYPERTENSION: ICD-10-CM

## 2022-07-25 DIAGNOSIS — I50.42 CHRONIC COMBINED SYSTOLIC AND DIASTOLIC CONGESTIVE HEART FAILURE: ICD-10-CM

## 2022-07-25 RX ORDER — NEBIVOLOL 10 MG/1
10 TABLET ORAL DAILY
Qty: 90 TABLET | Refills: 3 | Status: SHIPPED | OUTPATIENT
Start: 2022-07-25 | End: 2022-10-27 | Stop reason: SDUPTHER

## 2022-10-11 ENCOUNTER — OFFICE VISIT (OUTPATIENT)
Dept: ORTHOPEDIC SURGERY | Facility: CLINIC | Age: 87
End: 2022-10-11

## 2022-10-11 VITALS — BODY MASS INDEX: 32.86 KG/M2 | HEIGHT: 59 IN | WEIGHT: 163 LBS | HEART RATE: 65 BPM | OXYGEN SATURATION: 96 %

## 2022-10-11 DIAGNOSIS — M17.12 PRIMARY OSTEOARTHRITIS OF LEFT KNEE: ICD-10-CM

## 2022-10-11 DIAGNOSIS — M19.011 PRIMARY OSTEOARTHRITIS OF RIGHT SHOULDER: Primary | ICD-10-CM

## 2022-10-11 PROCEDURE — 20610 DRAIN/INJ JOINT/BURSA W/O US: CPT | Performed by: ORTHOPAEDIC SURGERY

## 2022-10-11 RX ORDER — TRIAMCINOLONE ACETONIDE 40 MG/ML
40 INJECTION, SUSPENSION INTRA-ARTICULAR; INTRAMUSCULAR
Status: COMPLETED | OUTPATIENT
Start: 2022-10-11 | End: 2022-10-11

## 2022-10-11 RX ORDER — LIDOCAINE HYDROCHLORIDE 10 MG/ML
5 INJECTION, SOLUTION INFILTRATION; PERINEURAL
Status: COMPLETED | OUTPATIENT
Start: 2022-10-11 | End: 2022-10-11

## 2022-10-11 RX ADMIN — LIDOCAINE HYDROCHLORIDE 5 ML: 10 INJECTION, SOLUTION INFILTRATION; PERINEURAL at 10:19

## 2022-10-11 RX ADMIN — TRIAMCINOLONE ACETONIDE 40 MG: 40 INJECTION, SUSPENSION INTRA-ARTICULAR; INTRAMUSCULAR at 10:19

## 2022-10-11 NOTE — PROGRESS NOTES
"Chief Complaint  Follow-up and Pain of the Right Shoulder     Subjective      Yelena Sanchez presents to [facility] for a follow-up of right shoulder. Patient has a history of right shoulder osteoarthritis. On 3/15/22, she was given a steroid injection and she opted for home therapy. She doesn't recall if the injection provided her much relief. She states that with certain motions, it feels like her shoulder will pop out of place. She denies instability but popping in the shoulder.       Patient has a history of left knee osteoarthritis, she has tried steroid and gel injections in the past. She states Synvisc one injection in the past gave relief.     No Known Allergies     Social History     Socioeconomic History   • Marital status:    Tobacco Use   • Smoking status: Former     Types: Cigarettes     Quit date:      Years since quittin.7   • Smokeless tobacco: Never   Vaping Use   • Vaping Use: Never used   Substance and Sexual Activity   • Alcohol use: Never   • Drug use: Never   • Sexual activity: Defer        Review of Systems     Objective   Vital Signs:   Pulse 65   Ht 149.9 cm (59\")   Wt 73.9 kg (163 lb)   SpO2 96%   BMI 32.92 kg/m²       Physical Exam  Constitutional:       Appearance: Normal appearance. Patient is well-developed and normal weight.   HENT:      Head: Normocephalic.      Right Ear: Hearing and external ear normal.      Left Ear: Hearing and external ear normal.      Nose: Nose normal.   Eyes:      Conjunctiva/sclera: Conjunctivae normal.   Cardiovascular:      Rate and Rhythm: Normal rate.   Pulmonary:      Effort: Pulmonary effort is normal.      Breath sounds: No wheezing or rales.   Abdominal:      Palpations: Abdomen is soft.      Tenderness: There is no abdominal tenderness.   Musculoskeletal:      Cervical back: Normal range of motion.   Skin:     Findings: No rash.   Neurological:      Mental Status: Patient is alert and oriented to " person, place, and time.   Psychiatric:         Mood and Affect: Mood and affect normal.         Judgment: Judgment normal.       Ortho Exam      RIGHT SHOULDER: Forward elevation to 100 degrees actively. Abduction to 80 degrees. Sensation grossly intact. Neurovascular intact.  Full elbow flexion and extension. Radial pulse 2+, ulnar pulse 2+.     LEFT KNEE: Tender medial and lateral joint line. Moderate swelling. No skin discoloration. Weight bearing. Non-antalgic giat. Full flexion and extension. Skin intact. Sensation grossly intact. Negative Lachman. Negative posterior sag.     Large Joint Arthrocentesis: Right Shoulder  Date/Time: 10/11/2022 10:19 AM  Consent given by: patient  Site marked: site marked  Timeout: Immediately prior to procedure a time out was called to verify the correct patient, procedure, equipment, support staff and site/side marked as required   Supporting Documentation  Indications: pain   Procedure Details  Location: shoulder (right shoulder ) -   Needle gauge: 21G.  Medications administered: 5 mL lidocaine 1 %; 40 mg triamcinolone acetonide 40 MG/ML  Patient tolerance: patient tolerated the procedure well with no immediate complications    Large Joint Arthrocentesis: L knee  Date/Time: 10/11/2022 10:19 AM  Consent given by: patient  Site marked: site marked  Timeout: Immediately prior to procedure a time out was called to verify the correct patient, procedure, equipment, support staff and site/side marked as required   Supporting Documentation  Indications: pain   Procedure Details  Location: knee - L knee  Needle gauge: 21G.  Medications administered: 48 mg hylan 48 MG/6ML  Patient tolerance: patient tolerated the procedure well with no immediate complications            Imaging Results (Most Recent)     None           Result Review :         No results found.           Assessment and Plan     Diagnoses and all orders for this visit:    1. Primary osteoarthritis of right shoulder  (Primary)    2. Primary osteoarthritis of left knee        Plan for right shoulder steroid injection.   Right shoulder steroid injection administered, she tolerated this well.   Left knee Synvisc One injection administered, she tolerated this well.    Call or return if worsening symptoms.    Follow Up     PRN.     Patient was given instructions and counseling regarding her condition or for health maintenance advice. Please see specific information pulled into the AVS if appropriate.     Scribed for August Sparrow MD by Nohemi Coppola.  10/11/22   10:05 EDT    I have personally performed the services described in this document as scribed by the above individual and it is both accurate and complete. August Sparrow MD 10/11/22

## 2022-10-18 ENCOUNTER — LAB (OUTPATIENT)
Dept: LAB | Facility: HOSPITAL | Age: 87
End: 2022-10-18

## 2022-10-18 ENCOUNTER — TRANSCRIBE ORDERS (OUTPATIENT)
Dept: ADMINISTRATIVE | Facility: HOSPITAL | Age: 87
End: 2022-10-18

## 2022-10-18 DIAGNOSIS — N18.9 CHRONIC KIDNEY DISEASE, UNSPECIFIED CKD STAGE: ICD-10-CM

## 2022-10-18 DIAGNOSIS — M10.00 IDIOPATHIC GOUT, UNSPECIFIED CHRONICITY, UNSPECIFIED SITE: ICD-10-CM

## 2022-10-18 DIAGNOSIS — N18.32 CHRONIC KIDNEY DISEASE (CKD) STAGE G3B/A1, MODERATELY DECREASED GLOMERULAR FILTRATION RATE (GFR) BETWEEN 30-44 ML/MIN/1.73 SQUARE METER AND ALBUMINURIA CREATININE RATIO LESS THAN 30 MG/G (CMS/H*: Primary | ICD-10-CM

## 2022-10-18 DIAGNOSIS — E61.1 IRON DEFICIENCY: ICD-10-CM

## 2022-10-18 DIAGNOSIS — E78.5 HYPERLIPIDEMIA, UNSPECIFIED HYPERLIPIDEMIA TYPE: ICD-10-CM

## 2022-10-18 DIAGNOSIS — Z79.899 ENCOUNTER FOR LONG-TERM (CURRENT) USE OF OTHER MEDICATIONS: ICD-10-CM

## 2022-10-18 DIAGNOSIS — E03.9 PRIMARY HYPOTHYROIDISM: ICD-10-CM

## 2022-10-18 DIAGNOSIS — I10 ESSENTIAL HYPERTENSION, MALIGNANT: ICD-10-CM

## 2022-10-18 DIAGNOSIS — M06.9 RHEUMATOID ARTHRITIS, INVOLVING UNSPECIFIED SITE, UNSPECIFIED WHETHER RHEUMATOID FACTOR PRESENT: ICD-10-CM

## 2022-10-18 DIAGNOSIS — E55.9 VITAMIN D DEFICIENCY: ICD-10-CM

## 2022-10-18 DIAGNOSIS — J43.9 PULMONARY EMPHYSEMA, UNSPECIFIED EMPHYSEMA TYPE: ICD-10-CM

## 2022-10-18 DIAGNOSIS — D64.9 ANEMIA, UNSPECIFIED TYPE: ICD-10-CM

## 2022-10-18 DIAGNOSIS — N18.32 CHRONIC KIDNEY DISEASE (CKD) STAGE G3B/A1, MODERATELY DECREASED GLOMERULAR FILTRATION RATE (GFR) BETWEEN 30-44 ML/MIN/1.73 SQUARE METER AND ALBUMINURIA CREATININE RATIO LESS THAN 30 MG/G (CMS/H*: ICD-10-CM

## 2022-10-18 DIAGNOSIS — E55.9 VITAMIN D DEFICIENCY: Primary | ICD-10-CM

## 2022-10-18 LAB
25(OH)D3 SERPL-MCNC: 54.4 NG/ML (ref 30–100)
ALBUMIN SERPL-MCNC: 4.5 G/DL (ref 3.5–5.2)
ALP SERPL-CCNC: 91 U/L (ref 39–117)
ALT SERPL W P-5'-P-CCNC: 14 U/L (ref 1–33)
ANION GAP SERPL CALCULATED.3IONS-SCNC: 10 MMOL/L (ref 5–15)
AST SERPL-CCNC: 18 U/L (ref 1–32)
BACTERIA UR QL AUTO: NORMAL /HPF
BASOPHILS # BLD AUTO: 0.02 10*3/MM3 (ref 0–0.2)
BASOPHILS NFR BLD AUTO: 0.3 % (ref 0–1.5)
BILIRUB CONJ SERPL-MCNC: <0.2 MG/DL (ref 0–0.3)
BILIRUB INDIRECT SERPL-MCNC: NORMAL MG/DL
BILIRUB SERPL-MCNC: <0.2 MG/DL (ref 0–1.2)
BILIRUB UR QL STRIP: NEGATIVE
BUN SERPL-MCNC: 35 MG/DL (ref 8–23)
BUN/CREAT SERPL: 20.2 (ref 7–25)
CALCIUM SPEC-SCNC: 9.6 MG/DL (ref 8.6–10.5)
CHLORIDE SERPL-SCNC: 101 MMOL/L (ref 98–107)
CHOLEST SERPL-MCNC: 272 MG/DL (ref 0–200)
CLARITY UR: CLEAR
CO2 SERPL-SCNC: 29 MMOL/L (ref 22–29)
COLOR UR: YELLOW
CREAT SERPL-MCNC: 1.73 MG/DL (ref 0.57–1)
CREAT UR-MCNC: 76.5 MG/DL
CRP SERPL-MCNC: 2.06 MG/DL (ref 0–0.5)
DEPRECATED RDW RBC AUTO: 48.6 FL (ref 37–54)
EGFRCR SERPLBLD CKD-EPI 2021: 28.5 ML/MIN/1.73
EOSINOPHIL # BLD AUTO: 0.08 10*3/MM3 (ref 0–0.4)
EOSINOPHIL NFR BLD AUTO: 1.2 % (ref 0.3–6.2)
ERYTHROCYTE [DISTWIDTH] IN BLOOD BY AUTOMATED COUNT: 14.2 % (ref 12.3–15.4)
FERRITIN SERPL-MCNC: 97.4 NG/ML (ref 13–150)
FOLATE SERPL-MCNC: 10.7 NG/ML (ref 4.78–24.2)
GLUCOSE SERPL-MCNC: 84 MG/DL (ref 65–99)
GLUCOSE UR STRIP-MCNC: NEGATIVE MG/DL
HCT VFR BLD AUTO: 35.9 % (ref 34–46.6)
HDLC SERPL-MCNC: 56 MG/DL (ref 40–60)
HGB BLD-MCNC: 11.6 G/DL (ref 12–15.9)
HGB UR QL STRIP.AUTO: NEGATIVE
HYALINE CASTS UR QL AUTO: NORMAL /LPF
IMM GRANULOCYTES # BLD AUTO: 0.03 10*3/MM3 (ref 0–0.05)
IMM GRANULOCYTES NFR BLD AUTO: 0.4 % (ref 0–0.5)
IRON 24H UR-MRATE: 30 MCG/DL (ref 37–145)
IRON SATN MFR SERPL: 9 % (ref 20–50)
KETONES UR QL STRIP: NEGATIVE
LDLC SERPL CALC-MCNC: 191 MG/DL (ref 0–100)
LDLC/HDLC SERPL: 3.37 {RATIO}
LEUKOCYTE ESTERASE UR QL STRIP.AUTO: ABNORMAL
LYMPHOCYTES # BLD AUTO: 0.54 10*3/MM3 (ref 0.7–3.1)
LYMPHOCYTES NFR BLD AUTO: 8.1 % (ref 19.6–45.3)
MAGNESIUM SERPL-MCNC: 2.7 MG/DL (ref 1.6–2.4)
MCH RBC QN AUTO: 30.4 PG (ref 26.6–33)
MCHC RBC AUTO-ENTMCNC: 32.3 G/DL (ref 31.5–35.7)
MCV RBC AUTO: 94.2 FL (ref 79–97)
MONOCYTES # BLD AUTO: 1.2 10*3/MM3 (ref 0.1–0.9)
MONOCYTES NFR BLD AUTO: 17.9 % (ref 5–12)
NEUTROPHILS NFR BLD AUTO: 4.83 10*3/MM3 (ref 1.7–7)
NEUTROPHILS NFR BLD AUTO: 72.1 % (ref 42.7–76)
NITRITE UR QL STRIP: NEGATIVE
NRBC BLD AUTO-RTO: 0.1 /100 WBC (ref 0–0.2)
PH UR STRIP.AUTO: 6 [PH] (ref 5–8)
PHOSPHATE SERPL-MCNC: 4.7 MG/DL (ref 2.5–4.5)
PLATELET # BLD AUTO: 294 10*3/MM3 (ref 140–450)
PMV BLD AUTO: 10.3 FL (ref 6–12)
POTASSIUM SERPL-SCNC: 4.3 MMOL/L (ref 3.5–5.2)
PROT ?TM UR-MCNC: 35.7 MG/DL
PROT SERPL-MCNC: 6.9 G/DL (ref 6–8.5)
PROT UR QL STRIP: ABNORMAL
PROT/CREAT UR: 0.47 MG/G{CREAT}
PTH-INTACT SERPL-MCNC: 105 PG/ML (ref 15–65)
RBC # BLD AUTO: 3.81 10*6/MM3 (ref 3.77–5.28)
RBC # UR STRIP: NORMAL /HPF
REF LAB TEST METHOD: NORMAL
SODIUM SERPL-SCNC: 140 MMOL/L (ref 136–145)
SP GR UR STRIP: 1.02 (ref 1–1.03)
SQUAMOUS #/AREA URNS HPF: NORMAL /HPF
T4 FREE SERPL-MCNC: 1.16 NG/DL (ref 0.93–1.7)
TIBC SERPL-MCNC: 347 MCG/DL (ref 298–536)
TRANSFERRIN SERPL-MCNC: 233 MG/DL (ref 200–360)
TRIGL SERPL-MCNC: 136 MG/DL (ref 0–150)
TSH SERPL DL<=0.05 MIU/L-ACNC: 3.6 UIU/ML (ref 0.27–4.2)
URATE SERPL-MCNC: 7 MG/DL (ref 2.4–5.7)
UROBILINOGEN UR QL STRIP: ABNORMAL
VIT B12 BLD-MCNC: 908 PG/ML (ref 211–946)
VLDLC SERPL-MCNC: 25 MG/DL (ref 5–40)
WBC # UR STRIP: NORMAL /HPF
WBC NRBC COR # BLD: 6.7 10*3/MM3 (ref 3.4–10.8)

## 2022-10-18 PROCEDURE — 84550 ASSAY OF BLOOD/URIC ACID: CPT

## 2022-10-18 PROCEDURE — 80076 HEPATIC FUNCTION PANEL: CPT

## 2022-10-18 PROCEDURE — 80061 LIPID PANEL: CPT

## 2022-10-18 PROCEDURE — 84443 ASSAY THYROID STIM HORMONE: CPT

## 2022-10-18 PROCEDURE — 82570 ASSAY OF URINE CREATININE: CPT

## 2022-10-18 PROCEDURE — 36415 COLL VENOUS BLD VENIPUNCTURE: CPT

## 2022-10-18 PROCEDURE — 83735 ASSAY OF MAGNESIUM: CPT

## 2022-10-18 PROCEDURE — 82728 ASSAY OF FERRITIN: CPT

## 2022-10-18 PROCEDURE — 83540 ASSAY OF IRON: CPT

## 2022-10-18 PROCEDURE — 84439 ASSAY OF FREE THYROXINE: CPT

## 2022-10-18 PROCEDURE — 83970 ASSAY OF PARATHORMONE: CPT

## 2022-10-18 PROCEDURE — 82306 VITAMIN D 25 HYDROXY: CPT

## 2022-10-18 PROCEDURE — 82607 VITAMIN B-12: CPT

## 2022-10-18 PROCEDURE — 80048 BASIC METABOLIC PNL TOTAL CA: CPT

## 2022-10-18 PROCEDURE — 82746 ASSAY OF FOLIC ACID SERUM: CPT

## 2022-10-18 PROCEDURE — 86140 C-REACTIVE PROTEIN: CPT

## 2022-10-18 PROCEDURE — 84100 ASSAY OF PHOSPHORUS: CPT

## 2022-10-18 PROCEDURE — 85025 COMPLETE CBC W/AUTO DIFF WBC: CPT

## 2022-10-18 PROCEDURE — 84466 ASSAY OF TRANSFERRIN: CPT

## 2022-10-18 PROCEDURE — 81001 URINALYSIS AUTO W/SCOPE: CPT

## 2022-10-18 PROCEDURE — 84156 ASSAY OF PROTEIN URINE: CPT

## 2022-10-19 ENCOUNTER — TELEPHONE (OUTPATIENT)
Dept: CARDIOLOGY | Facility: CLINIC | Age: 87
End: 2022-10-19

## 2022-10-19 NOTE — TELEPHONE ENCOUNTER
Attempted to call patient regarding LDL increased 191 vs 2 months ago 58. Left VM with call back number.

## 2022-10-19 NOTE — TELEPHONE ENCOUNTER
----- Message from LUZ MARIA Cerna sent at 10/19/2022 11:17 AM EDT -----  Please contact the patient, she has had a significant increase in her lipid levels, and see if she is still taking her cholesterol medication?

## 2022-10-21 NOTE — TELEPHONE ENCOUNTER
WALTER patient regarding her cholesterol levels. Patient states after her last test results she stopped taking her Crestor since the levels were good. Patient went to PCP yesterday and was informed to restart taking Crestor- patient restarted taking it last night 10/21/22

## 2022-10-24 NOTE — PROGRESS NOTES
Primary Care Provider  Kaelyn Eugene MD   Referring Provider  No ref. provider found    Patient Complaint  Follow-up, COPD, and Lung Cancer      SUBJECTIVE    History of Presenting Illness  Yelena Sanchez is a pleasant 87 y.o. female who presents to [facility] for yearly follow-up.  Patient is here with her spouse today.  Patient's last visit here was in 4/20/2021.  Patient has a diagnosis of COPD nocturnal hypoxia history of lung cancer with a left lower lobectomy 2004, coronary artery disease and anemia.  In addition patient did have breast cancer with mastectomy in 2010. She continues to be under care of Dr. Noel for her followup on breast and lung CA. At present time she does not have any dyspnea, coughing, wheezing, headaches, chest pain, weight loss or hemoptysis.  She continues to use Xopenex for shortness of air.  In addition she does have nocturnal oxygen for hypoxia.      She had an emergency room visit May 15, 2022 for shortness of air where it was discovered she was very anemic and required transfusion.  Patient takes Eliquis and her  states they think that she was losing blood secondary to the Eliquis.      Denies fevers, chills and night sweats. Yelena Sanchez is able to perform ADLs without difficulties and denies any swollen glands/lymph nodes in the head or neck.    I have personally reviewed the review of systems, past family, social, medical and surgical histories; and agree with their findings.    Review of Systems   Constitutional: Negative.  Negative for chills, diaphoresis, fatigue, fever and unexpected weight change.   HENT: Negative.    Eyes: Negative.    Respiratory: Negative.  Negative for cough, shortness of breath, wheezing and stridor.    Cardiovascular: Negative.  Negative for chest pain, palpitations and leg swelling.   Musculoskeletal: Negative.    Skin: Negative.         Family History   Problem Relation Age  of Onset   • Colon cancer Mother         60s   • Lung cancer Sister         Social History     Socioeconomic History   • Marital status:    Tobacco Use   • Smoking status: Former     Types: Cigarettes     Quit date:      Years since quittin.8   • Smokeless tobacco: Never   Vaping Use   • Vaping Use: Never used   Substance and Sexual Activity   • Alcohol use: Never   • Drug use: Never   • Sexual activity: Defer        Past Medical History:   Diagnosis Date   • Arthritis    • Asthma    • Breast cancer (HCC)    • Congestive heart failure (HCC)    • COPD (chronic obstructive pulmonary disease) (HCC)    • Diverticulosis 2014   • GERD (gastroesophageal reflux disease)    • History of tobacco abuse    • HTN (hypertension)    • Hyperlipemia    • Hyperthyroidism    • Neck mass    • Seasonal allergies    • SOB (shortness of breath)         Immunization History   Administered Date(s) Administered   • Fluzone High Dose =>65 Years ([employer] ONLY) 2020   • Influenza Split Preservative Free ID 2020       No Known Allergies       Current Outpatient Medications:   •  allopurinol (ZYLOPRIM) 100 MG tablet, , Disp: , Rfl:   •  amLODIPine (NORVASC) 5 MG tablet, Take 1 tablet by mouth Daily., Disp: 90 tablet, Rfl: 2  •  aspirin 81 MG EC tablet, Take 81 mg by mouth Daily., Disp: , Rfl:   •  cetirizine (zyrTEC) 10 MG tablet, Take 1 tablet by mouth Daily., Disp: , Rfl:   •  chlorpheniramine (CHLOR-TRIMETON) 4 MG tablet, Take 4 mg by mouth 3 (Three) Times a Day As Needed., Disp: , Rfl:   •  cholecalciferol (VITAMIN D3) 25 MCG (1000 UT) tablet, Take 1,000 Units by mouth Daily., Disp: , Rfl:   •  clobetasol (TEMOVATE) 0.05 % cream, Apply 1 application topically to the appropriate area as directed As Needed., Disp: , Rfl:   •  clopidogrel (PLAVIX) 75 MG tablet, Take 1 tablet by mouth Daily., Disp: 90 tablet, Rfl: 2  •  furosemide (LASIX) 40 MG tablet, Take 1 tablet by mouth 2 (Two) Times a Day., Disp: 180 tablet,  Rfl: 2  •  HYDROcodone-acetaminophen (NORCO) 5-325 MG per tablet, , Disp: , Rfl:   •  lansoprazole (PREVACID) 30 MG capsule, Take 30 mg by mouth Daily., Disp: , Rfl:   •  levothyroxine (SYNTHROID, LEVOTHROID) 100 MCG tablet, Take 1 tablet by mouth Daily., Disp: , Rfl:   •  Lotemax SM 0.38 % gel, Administer 0.38 Act to both eyes 2 (Two) Times a Day., Disp: , Rfl:   •  Lubricating Plus Eye Drops 0.5 % solution, , Disp: , Rfl:   •  multivitamin with minerals tablet tablet, Take  by mouth., Disp: , Rfl:   •  nebivolol (Bystolic) 10 MG tablet, Take 1 tablet by mouth Daily., Disp: 90 tablet, Rfl: 3  •  phenazopyridine (PYRIDIUM) 100 MG tablet, Take 1 tablet by mouth As Needed., Disp: , Rfl:   •  polyethylene glycol (MIRALAX) 17 GM/SCOOP powder, Take 3,350 bottles by mouth As Needed., Disp: , Rfl:   •  Procrit 66545 UNIT/ML injection, Inject 1 mL under the skin into the appropriate area as directed As Needed., Disp: , Rfl:   •  rosuvastatin (Crestor) 20 MG tablet, Take 1 tablet by mouth Every Night., Disp: 90 tablet, Rfl: 2  •  spironolactone (ALDACTONE) 25 MG tablet, Take 0.5 tablets by mouth Daily., Disp: 45 tablet, Rfl: 2  •  tiotropium bromide-olodaterol (Stiolto Respimat) 2.5-2.5 MCG/ACT aerosol solution inhaler, Stiolto Respimat 2.5-2.5 mcg/actuation inhalation mist inhale 2 puffs by inhalation route once daily at the same time each day   Active, Disp: , Rfl:   •  ketoconazole (NIZORAL) 2 % shampoo, Apply 2 application topically to the appropriate area as directed As Needed., Disp: , Rfl:   •  montelukast (SINGULAIR) 10 MG tablet, Take 10 mg by mouth Every Night., Disp: , Rfl:   •  olopatadine (PATANOL) 0.1 % ophthalmic solution, Administer 1 drop to both eyes 2 (Two) Times a Day., Disp: 5 mL, Rfl: 3  •  Synthroid 125 MCG tablet, Take 1 tablet by mouth Daily., Disp: , Rfl:        OBJECTIVE    Vital Signs   /63 (BP Location: Right arm, Patient Position: Sitting, Cuff Size: Adult)   Pulse 64   Temp 98 °F (36.7  "°C) (Tympanic)   Resp 18   Ht 149.9 cm (59\")   Wt 74.8 kg (165 lb)   SpO2 98% Comment: room air  BMI 33.33 kg/m²     Physical Exam  Vitals reviewed.   Constitutional:       General: She is not in acute distress.     Appearance: Normal appearance. She is obese. She is not ill-appearing.   HENT:      Head: Normocephalic and atraumatic.      Nose: Nose normal.      Mouth/Throat:      Mouth: Mucous membranes are moist.      Pharynx: Oropharynx is clear.   Eyes:      Extraocular Movements: Extraocular movements intact.      Conjunctiva/sclera: Conjunctivae normal.      Pupils: Pupils are equal, round, and reactive to light.   Cardiovascular:      Rate and Rhythm: Normal rate and regular rhythm.      Pulses: Normal pulses.      Heart sounds: Normal heart sounds.   Pulmonary:      Effort: Pulmonary effort is normal. No respiratory distress.      Breath sounds: Normal breath sounds. No stridor. No wheezing, rhonchi or rales.   Abdominal:      General: Bowel sounds are normal.   Musculoskeletal:         General: Normal range of motion.      Cervical back: Normal range of motion and neck supple.      Right lower leg: No edema.      Left lower leg: No edema.   Lymphadenopathy:      Cervical: No cervical adenopathy.   Skin:     General: Skin is warm and dry.   Neurological:      General: No focal deficit present.      Mental Status: She is alert and oriented to person, place, and time.   Psychiatric:         Mood and Affect: Mood normal.         Behavior: Behavior normal.          Results Review  I have personally reviewed the ED visit for 5/15/2022, Dr. Marquez's last office note from 4/20/2021.      ASSESSMENT & PLAN    Patient Active Problem List   Diagnosis   • Iron deficiency anemia   • Primary osteoarthritis of left knee   • Chronic combined systolic and diastolic congestive heart failure (HCC)   • Presence of biventricular implantable cardioverter-defibrillator (ICD)   • Coronary artery disease involving native " coronary artery of native heart without angina pectoris   • Mixed hyperlipidemia   • Essential hypertension   • Trochanteric bursitis of right hip   • Sciatica of right side   • Carotid artery disease (HCC)       Diagnoses and all orders for this visit:    1. Chronic obstructive pulmonary disease, unspecified COPD type (HCC) (Primary)    2. Nocturnal hypoxia           Plan:  Discussed with patient her shortness of air and improvement after having diagnosis of anemia with transfusion and iron supplement.  Discussed with patient she is not any diagnostics done on her lungs.  I discussed with patient that I would recommend a CT scan pulmonary function test and echo.  Patient wants to hold off on getting any diagnostics done at this time.  Recommend patient to follow-up in 6 months or sooner if needed.  Recommend to revisit plan for diagnostics at her follow-up.  Patient to continue with her Xopenex as needed for shortness of air or wheeze.  Patient also recommend to continue with her O2 at night for nocturnal hypoxia.    Smoking status:former  Vaccination status: Declines COVID-vaccine  Medications personally reviewed    Follow Up  Return in about 6 months (around 4/25/2023).    Patient was given instructions and counseling regarding her condition or for health maintenance advice. Please see specific information pulled into the AVS if appropriate.

## 2022-10-25 ENCOUNTER — OFFICE VISIT (OUTPATIENT)
Dept: PULMONOLOGY | Facility: CLINIC | Age: 87
End: 2022-10-25

## 2022-10-25 VITALS
RESPIRATION RATE: 18 BRPM | SYSTOLIC BLOOD PRESSURE: 150 MMHG | TEMPERATURE: 98 F | DIASTOLIC BLOOD PRESSURE: 63 MMHG | OXYGEN SATURATION: 98 % | BODY MASS INDEX: 33.26 KG/M2 | WEIGHT: 165 LBS | HEIGHT: 59 IN | HEART RATE: 64 BPM

## 2022-10-25 DIAGNOSIS — J44.9 CHRONIC OBSTRUCTIVE PULMONARY DISEASE, UNSPECIFIED COPD TYPE: Primary | ICD-10-CM

## 2022-10-25 DIAGNOSIS — G47.34 NOCTURNAL HYPOXIA: ICD-10-CM

## 2022-10-25 PROCEDURE — 99213 OFFICE O/P EST LOW 20 MIN: CPT | Performed by: NURSE PRACTITIONER

## 2022-10-25 RX ORDER — POLYETHYLENE GLYCOL 3350 17 G/17G
3350 POWDER, FOR SOLUTION ORAL AS NEEDED
Status: ON HOLD | COMMUNITY
Start: 2022-10-20 | End: 2023-01-02

## 2022-10-25 RX ORDER — LEVOTHYROXINE SODIUM 0.1 MG/1
1 TABLET ORAL DAILY
COMMUNITY
End: 2023-04-05 | Stop reason: DRUGHIGH

## 2022-10-25 RX ORDER — LOTEPREDNOL ETABONATE 3.8 MG/G
0.38 GEL OPHTHALMIC 2 TIMES DAILY
Status: ON HOLD | COMMUNITY
Start: 2022-08-23 | End: 2023-01-02

## 2022-10-25 RX ORDER — KETOCONAZOLE 20 MG/ML
2 SHAMPOO TOPICAL AS NEEDED
Status: ON HOLD | COMMUNITY
Start: 2022-08-15 | End: 2023-01-02

## 2022-10-25 RX ORDER — PHENAZOPYRIDINE HYDROCHLORIDE 100 MG/1
100 TABLET, FILM COATED ORAL AS NEEDED
COMMUNITY
Start: 2022-08-26 | End: 2022-12-07 | Stop reason: SDUPTHER

## 2022-10-25 RX ORDER — CLOBETASOL PROPIONATE 0.5 MG/G
0.05 CREAM TOPICAL AS NEEDED
Status: ON HOLD | COMMUNITY
Start: 2022-10-20 | End: 2023-01-02

## 2022-10-25 RX ORDER — ERYTHROPOIETIN 40000 [IU]/ML
40000 INJECTION, SOLUTION INTRAVENOUS; SUBCUTANEOUS AS NEEDED
Status: ON HOLD | COMMUNITY
Start: 2022-08-10 | End: 2023-01-02

## 2022-10-25 RX ORDER — CETIRIZINE HYDROCHLORIDE 10 MG/1
10 TABLET ORAL DAILY
Status: ON HOLD | COMMUNITY
End: 2023-01-02

## 2022-10-27 DIAGNOSIS — I50.42 CHRONIC COMBINED SYSTOLIC AND DIASTOLIC CONGESTIVE HEART FAILURE: ICD-10-CM

## 2022-10-27 DIAGNOSIS — I10 ESSENTIAL HYPERTENSION: ICD-10-CM

## 2022-10-27 RX ORDER — NEBIVOLOL 10 MG/1
10 TABLET ORAL DAILY
Qty: 90 TABLET | Refills: 3 | Status: SHIPPED | OUTPATIENT
Start: 2022-10-27

## 2022-12-07 ENCOUNTER — OFFICE VISIT (OUTPATIENT)
Dept: OTOLARYNGOLOGY | Facility: CLINIC | Age: 87
End: 2022-12-07

## 2022-12-07 DIAGNOSIS — H61.893 EAR CANAL DRYNESS, BILATERAL: ICD-10-CM

## 2022-12-07 DIAGNOSIS — J31.0 CHRONIC RHINITIS: Primary | ICD-10-CM

## 2022-12-07 PROCEDURE — 99214 OFFICE O/P EST MOD 30 MIN: CPT | Performed by: OTOLARYNGOLOGY

## 2022-12-07 RX ORDER — CIPROFLOXACIN 500 MG/1
500 TABLET, FILM COATED ORAL 2 TIMES DAILY
COMMUNITY
Start: 2022-12-02 | End: 2022-12-07 | Stop reason: SDUPTHER

## 2022-12-07 RX ORDER — MINERAL OIL, PETROLATUM 425; 568 MG/G; MG/G
OINTMENT OPHTHALMIC
Status: ON HOLD | COMMUNITY
Start: 2022-11-23 | End: 2023-01-02

## 2022-12-07 RX ORDER — IPRATROPIUM BROMIDE 42 UG/1
2 SPRAY, METERED NASAL 3 TIMES DAILY
Qty: 15 ML | Refills: 11 | Status: SHIPPED | OUTPATIENT
Start: 2022-12-07

## 2022-12-07 RX ORDER — CIPROFLOXACIN 500 MG/1
500 TABLET, FILM COATED ORAL 2 TIMES DAILY
Qty: 14 TABLET | Refills: 0 | Status: SHIPPED | OUTPATIENT
Start: 2022-12-07 | End: 2022-12-14

## 2022-12-07 RX ORDER — PHENAZOPYRIDINE HYDROCHLORIDE 200 MG/1
TABLET, FILM COATED ORAL
Status: ON HOLD | COMMUNITY
Start: 2022-12-02 | End: 2023-01-02

## 2022-12-07 NOTE — PROGRESS NOTES
Patient Name: Yelena Sanchez   Visit Date: 12/07/2022   Patient ID: [id number]  Provider: Lefty Acevedo MD    Sex: female  [place] [facility]   YOB: 1935  Location Address: [address] [place] [address], [address],?[place]?[id number]    Primary Care Provider Kaelyn Eugene MD  Location Phone: [phone]    Referring Provider: No ref. provider found        Chief Complaint  Constant sinus drainage    History of Present Illness  Yelena Sanchez is a 87 y.o. female  with past medical history significant for coronary artery disease, GERD, hypothyroidism, hyperlipidemia, and cerebrovascular disease on Plavix who returns today for follow-up.  She was originally seen on 7/3/2019 at which time she reported a previous mass which was excised by Dr. Galindo in 2014. PET scan on 3/18/2014 revealed that the left neck mass had an SUV of 4.5. an image guided FNA at the time revealed scant cystic material, histiocytes, and a few inflammatory cells which was nondiagnostic. She is a former smoker who quit in 2017. A CT scan of the neck without contrast on 6/13/2019 revealed a 2.75 x 1.8 cm solid-appearing mass just inferior to the hyoid bone on the left involving the preepiglottic space and left false vocal fold. It appears to abut her left internal carotid artery.  Laryngoscopic examination that day revealed fullness to the left false vocal fold and aryepiglottic fold. Previous records from [facility] revealed that she underwent a [employer] laryngoscopy with excision of a left false vocal fold cystic lesion on 5/20/2014.  Subsequent pathology revealed a benign epithelial cyst without any evidence of malignancy.      She returns today for follow-up having last been seen on 10/13/2021. She tells me that recently she has experienced some otalgia and it feels as though there is a foreign body in her your canals bilaterally. She reports continued clear, watery rhinorrhea which is quite  bothersome.  She has been on fluocinolone drops for her ears in the past with some improvement.  She has also tried Dymista and ipratropium bromide nasal spray in the past.  She does not think they were very helpful.  She reports that she was recently placed on a course of Cipro for bladder infection which seems to have helped her nasal symptoms but feels as though her urinary symptoms remain present.      Past Medical History:   Diagnosis Date   • Arthritis    • Asthma    • Breast cancer (HCC)    • Congestive heart failure (HCC)    • COPD (chronic obstructive pulmonary disease) (HCC)    • Diverticulosis 11/26/2014   • GERD (gastroesophageal reflux disease)    • History of tobacco abuse    • HTN (hypertension)    • Hyperlipemia    • Hyperthyroidism    • Neck mass    • Seasonal allergies    • SOB (shortness of breath)        Past Surgical History:   Procedure Laterality Date   • COLONOSCOPY  2014   • ENDOSCOPY  2014   • EYE SURGERY      Implant; yes    • LUNG SURGERY     • MASTECTOMY      Left    • OTHER SURGICAL HISTORY      Joint Surgery   • PACEMAKER IMPLANTATION           Current Outpatient Medications:   •  allopurinol (ZYLOPRIM) 100 MG tablet, , Disp: , Rfl:   •  amLODIPine (NORVASC) 5 MG tablet, Take 1 tablet by mouth Daily., Disp: 90 tablet, Rfl: 2  •  artificial tears (REFRESH LACRI-LUBE) ointment ophthalmic ointment, apply 0.25 INCH TO eye(s) daily AS NEEDED, Disp: , Rfl:   •  aspirin 81 MG EC tablet, Take 81 mg by mouth Daily., Disp: , Rfl:   •  cetirizine (zyrTEC) 10 MG tablet, Take 1 tablet by mouth Daily., Disp: , Rfl:   •  chlorpheniramine (CHLOR-TRIMETON) 4 MG tablet, Take 4 mg by mouth 3 (Three) Times a Day As Needed., Disp: , Rfl:   •  cholecalciferol (VITAMIN D3) 25 MCG (1000 UT) tablet, Take 1,000 Units by mouth Daily., Disp: , Rfl:   •  ciprofloxacin (CIPRO) 500 MG tablet, Take 1 tablet by mouth 2 (Two) Times a Day for 7 days. for 7 days, Disp: 14 tablet, Rfl: 0  •  clobetasol (TEMOVATE) 0.05 %  cream, Apply 1 application topically to the appropriate area as directed As Needed., Disp: , Rfl:   •  clopidogrel (PLAVIX) 75 MG tablet, Take 1 tablet by mouth Daily., Disp: 90 tablet, Rfl: 2  •  furosemide (LASIX) 40 MG tablet, Take 1 tablet by mouth 2 (Two) Times a Day., Disp: 180 tablet, Rfl: 2  •  HYDROcodone-acetaminophen (NORCO) 5-325 MG per tablet, , Disp: , Rfl:   •  ketoconazole (NIZORAL) 2 % shampoo, Apply 2 application topically to the appropriate area as directed As Needed., Disp: , Rfl:   •  lansoprazole (PREVACID) 30 MG capsule, Take 30 mg by mouth Daily., Disp: , Rfl:   •  levothyroxine (SYNTHROID, LEVOTHROID) 100 MCG tablet, Take 1 tablet by mouth Daily., Disp: , Rfl:   •  Lotemax SM 0.38 % gel, Administer 0.38 Act to both eyes 2 (Two) Times a Day., Disp: , Rfl:   •  Lubricating Plus Eye Drops 0.5 % solution, , Disp: , Rfl:   •  montelukast (SINGULAIR) 10 MG tablet, Take 10 mg by mouth Every Night., Disp: , Rfl:   •  multivitamin with minerals tablet tablet, Take  by mouth., Disp: , Rfl:   •  nebivolol (Bystolic) 10 MG tablet, Take 1 tablet by mouth Daily., Disp: 90 tablet, Rfl: 3  •  olopatadine (PATANOL) 0.1 % ophthalmic solution, Administer 1 drop to both eyes 2 (Two) Times a Day., Disp: 5 mL, Rfl: 3  •  phenazopyridine (PYRIDIUM) 200 MG tablet, TAKE 1 TABLET BY MOUTH THREE TIMES DAILY FOR 3 DAYS for burning WITH urination, Disp: , Rfl:   •  polyethylene glycol (MIRALAX) 17 GM/SCOOP powder, Take 3,350 bottles by mouth As Needed., Disp: , Rfl:   •  Procrit 34505 UNIT/ML injection, Inject 1 mL under the skin into the appropriate area as directed As Needed., Disp: , Rfl:   •  rosuvastatin (Crestor) 20 MG tablet, Take 1 tablet by mouth Every Night., Disp: 90 tablet, Rfl: 2  •  spironolactone (ALDACTONE) 25 MG tablet, Take 0.5 tablets by mouth Daily., Disp: 45 tablet, Rfl: 2  •  Synthroid 125 MCG tablet, Take 1 tablet by mouth Daily., Disp: , Rfl:   •  tiotropium bromide-olodaterol (Stiolto Respimat)  2.5-2.5 MCG/ACT aerosol solution inhaler, Stiolto Respimat 2.5-2.5 mcg/actuation inhalation mist inhale 2 puffs by inhalation route once daily at the same time each day   Active, Disp: , Rfl:   •  ipratropium (ATROVENT) 0.06 % nasal spray, 2 sprays into the nostril(s) as directed by provider 3 (Three) Times a Day., Disp: 15 mL, Rfl: 11     No Known Allergies    Social History     Tobacco Use   • Smoking status: Former     Types: Cigarettes     Quit date:      Years since quittin.9   • Smokeless tobacco: Never   Vaping Use   • Vaping Use: Never used   Substance Use Topics   • Alcohol use: Never   • Drug use: Never        Objective     Vital Signs:   There were no vitals taken for this visit.      Physical Exam    General: Well developed, well nourished patient of stated age in no acute distress. Voice is strong and clear.   Head: Normocephalic and atraumatic.  Small pustule involving the occipital scalp.  Face: No lesions.  Bilateral parotid and submandibular glands are unremarkable.  Stensen's and Warthin's ducts are productive of clear saliva bilaterally.  House-Brackmann I/VI     bilaterally.   muscles and temporomandibular joint nontender to palpation.  No TMJ crepitus.  Eyes: PERRLA, sclerae anicteric, no conjunctival injection. Extra ocular movements are intact and full. No nystagmus.   Ears: Auricles are normal in appearance. Bilateral external auditory canals demonstrate desiccated skin. Bilateral tympanic membranes are clear and without effusion. Hearing normal to conversational voice.   Nose: External nose is normal in appearance. Bilateral nares are patent with normal appearing mucosa. Septum midline. Turbinates are unremarkable. No lesions.   Oral Cavity: Lips are normal in appearance. Oral mucosa is unremarkable. Gingiva is unremarkable.  Edentulous. Tongue is unremarkable with good movement. Hard palate is unremarkable.   Oropharynx: Soft palate is unremarkable with full movement.  Uvula is unremarkable. Bilateral tonsils are unremarkable. Posterior oropharynx is unremarkable.    Larynx and hypopharynx: Deferred secondary to gag reflex.  Neck: Supple.  No mass.  Nontender to palpation.  Trachea midline. Thyroid normal size and without nodules to palpation.   Lymphatic: No lymphadenopathy upon palpation.  Respiratory: Clear to auscultation bilaterally, nonlabored respirations    Cardiovascular: RRR, no murmurs, rubs, or gallops,   Psychiatric: Appropriate affect, cooperative   Neurologic: Oriented x 3, strength symmetric in all extremities, Cranial Nerves II-XII are grossly intact to confrontation   Skin: Warm and dry. No rashes.    Procedures           Result Review :               Assessment and Plan    Diagnoses and all orders for this visit:    1. Chronic rhinitis (Primary)  -     ipratropium (ATROVENT) 0.06 % nasal spray; 2 sprays into the nostril(s) as directed by provider 3 (Three) Times a Day.  Dispense: 15 mL; Refill: 11    2. Ear canal dryness, bilateral    Other orders  -     ciprofloxacin (CIPRO) 500 MG tablet; Take 1 tablet by mouth 2 (Two) Times a Day for 7 days. for 7 days  Dispense: 14 tablet; Refill: 0      Impressions and findings were discussed.  Unfortunately, she continues to be bothered by frequent rhinorrhea and a foreign body sensation in her external auditory canals.  She has tried multiple medications for both issues in the past without any memorable improvement.  She is concerned about her urinary symptoms and I do not have any records regarding this issue but she has asked to be continued on the Cipro as she felt as though this has been helpful but not totally taken care of the problem.  We again discussed the differential for chronic rhinitis and ear canal foreign body sensation.  Options for management were discussed and she will again be tried on a course of ipratropium bromide nasal spray.    Follow Up   No follow-ups on file.  Patient was given instructions and  counseling regarding her condition or for health maintenance advice. Please see specific information pulled into the AVS if appropriate.

## 2022-12-19 ENCOUNTER — OFFICE VISIT (OUTPATIENT)
Dept: CARDIOLOGY | Facility: CLINIC | Age: 87
End: 2022-12-19

## 2022-12-19 ENCOUNTER — CLINICAL SUPPORT NO REQUIREMENTS (OUTPATIENT)
Dept: CARDIOLOGY | Facility: CLINIC | Age: 87
End: 2022-12-19

## 2022-12-19 VITALS
SYSTOLIC BLOOD PRESSURE: 148 MMHG | DIASTOLIC BLOOD PRESSURE: 68 MMHG | WEIGHT: 160 LBS | BODY MASS INDEX: 32.25 KG/M2 | HEART RATE: 60 BPM | HEIGHT: 59 IN

## 2022-12-19 DIAGNOSIS — E78.2 MIXED HYPERLIPIDEMIA: ICD-10-CM

## 2022-12-19 DIAGNOSIS — I25.10 CORONARY ARTERY DISEASE INVOLVING NATIVE CORONARY ARTERY OF NATIVE HEART WITHOUT ANGINA PECTORIS: ICD-10-CM

## 2022-12-19 DIAGNOSIS — I77.9 CAROTID ARTERY DISEASE, UNSPECIFIED LATERALITY, UNSPECIFIED TYPE: ICD-10-CM

## 2022-12-19 DIAGNOSIS — I50.42 CHRONIC COMBINED SYSTOLIC AND DIASTOLIC CONGESTIVE HEART FAILURE: ICD-10-CM

## 2022-12-19 DIAGNOSIS — I10 ESSENTIAL HYPERTENSION: ICD-10-CM

## 2022-12-19 DIAGNOSIS — Z95.810 PRESENCE OF BIVENTRICULAR IMPLANTABLE CARDIOVERTER-DEFIBRILLATOR (ICD): Primary | ICD-10-CM

## 2022-12-19 DIAGNOSIS — Z95.810 PRESENCE OF BIVENTRICULAR IMPLANTABLE CARDIOVERTER-DEFIBRILLATOR (ICD): ICD-10-CM

## 2022-12-19 DIAGNOSIS — I50.42 CHRONIC COMBINED SYSTOLIC AND DIASTOLIC CONGESTIVE HEART FAILURE: Primary | ICD-10-CM

## 2022-12-19 PROCEDURE — 99214 OFFICE O/P EST MOD 30 MIN: CPT | Performed by: INTERNAL MEDICINE

## 2022-12-19 PROCEDURE — 93284 PRGRMG EVAL IMPLANTABLE DFB: CPT | Performed by: INTERNAL MEDICINE

## 2022-12-19 RX ORDER — EZETIMIBE 10 MG/1
10 TABLET ORAL DAILY
Qty: 90 TABLET | Refills: 3 | Status: SHIPPED | OUTPATIENT
Start: 2022-12-19

## 2022-12-19 RX ORDER — FUROSEMIDE 40 MG/1
40 TABLET ORAL 2 TIMES DAILY
Qty: 180 TABLET | Refills: 3 | Status: SHIPPED | OUTPATIENT
Start: 2022-12-19

## 2022-12-19 RX ORDER — ROSUVASTATIN CALCIUM 20 MG/1
20 TABLET, COATED ORAL NIGHTLY
Qty: 90 TABLET | Refills: 3 | Status: SHIPPED | OUTPATIENT
Start: 2022-12-19

## 2022-12-19 RX ORDER — EZETIMIBE 10 MG/1
10 TABLET ORAL DAILY
COMMUNITY
End: 2022-12-19 | Stop reason: SDUPTHER

## 2022-12-19 RX ORDER — AMLODIPINE BESYLATE 5 MG/1
5 TABLET ORAL DAILY
Qty: 90 TABLET | Refills: 3 | Status: SHIPPED | OUTPATIENT
Start: 2022-12-19

## 2022-12-19 RX ORDER — CLOPIDOGREL BISULFATE 75 MG/1
75 TABLET ORAL DAILY
Qty: 90 TABLET | Refills: 3 | Status: SHIPPED | OUTPATIENT
Start: 2022-12-19

## 2022-12-19 NOTE — ASSESSMENT & PLAN NOTE
Stable with no angina.  Continue aspirin, statin and beta-blocker.  She is taking Plavix is advised to vascular surgeons.  Currently on aspirin every other day to minimize bleeding risk.  Recent labs with hemoglobin of 11.7, improved from before

## 2022-12-19 NOTE — PROGRESS NOTES
Normal BI-V ICD Device Interrogation and Device Testing.  Normal evaluation of device function and lead measurements.  No optimization was needed of parameters or maximization of device longevity.  Patient is on automated Home Remote Monitoring. Remaining battery is 2 years, the whole unit of her device is NOT MRI compatible.

## 2022-12-19 NOTE — ASSESSMENT & PLAN NOTE
BiV ICD was interrogated in the office today.  Normally functioning device with a battery longevity of 2-1/2 years.  There were no events or ICD shocks.  Lead parameters appropriate.  We will continue home remote monitoring.

## 2022-12-19 NOTE — ASSESSMENT & PLAN NOTE
We will renew the prescription for Plavix as advised by vascular surgeons.  She follows up with Dr. Nunez

## 2022-12-19 NOTE — ASSESSMENT & PLAN NOTE
She is clinically near euvolemic.  Recent labs showed a creatinine of 1.7 which is close to her baseline.  We will continue Lasix 40 mg once daily, along with spironolactone and Bystolic.  Unable to add ACE inhibitor/ARB due to chronic kidney disease.  She will need repeat echocardiogram next year.

## 2022-12-19 NOTE — ASSESSMENT & PLAN NOTE
Recent labs showed LDL of 195, significantly elevated from previous 58 !.  She stopped taking statins and Zetia for several weeks, since she thought that her cholesterol levels were within normal limits.  Counseled extensively regarding medication compliance and the need to have LDL as low as we can due to presence of coronary artery disease and also carotid artery disease.  Patient verbalized understanding.  She is already back on rosuvastatin and Zetia which will be continued.  Refills sent for both prescriptions.  Need a repeat lipid panel in the next 3 to 6 months.

## 2022-12-19 NOTE — PROGRESS NOTES
"  CARDIOLOGY FOLLOW-UP PROGRESS NOTE        Chief Complaint  Follow-up, Congestive Heart Failure, and Pacemaker Check    Subjective            Yelena Sanchez presents to [facility] CARDIOLOGY  History of Present Illness      Ms. Mcginnis is here for routine 6-month follow-up visit.  Overall, she feels fine at her baseline.  Pedal edema improved.  She is currently taking Lasix 40 mg once daily.  Denies any recent episodes of chest pain.  Denies any palpitations or dizziness.  She recently had a respiratory infection which was treated with antibiotics.  Patient reports some feeling of pacemaker \" acting up\".  Today, she underwent pacemaker interrogation in the office.      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 Cardiac catheterization in 2014 showed 70% mid RCA lesion with 30% left circumflex lesion. No angioplasty was performed. 4) chronic kidney disease 6) iron deficiency anemia    Medical History:  Past Medical History:   Diagnosis Date   • Arthritis    • Asthma    • Breast cancer (HCC)    • Congestive heart failure (HCC)    • COPD (chronic obstructive pulmonary disease) (HCC)    • Diverticulosis 11/26/2014   • GERD (gastroesophageal reflux disease)    • History of tobacco abuse    • HTN (hypertension)    • Hyperlipemia    • Hyperthyroidism    • Neck mass    • Seasonal allergies    • SOB (shortness of breath)        Surgical History: has a past surgical history that includes Colonoscopy (2014); Esophagogastroduodenoscopy (2014); Eye surgery; Other surgical history; Lung surgery; Mastectomy; and Pacemaker Implantation.     Family History: family history includes Colon cancer in her mother; Lung cancer in her sister.     Social History: reports that she quit smoking about 5 years ago. " Her smoking use included cigarettes. She has never used smokeless tobacco. She reports that she does not drink alcohol and does not use drugs.    Allergies: Patient has no known allergies.    Current Outpatient Medications on File Prior to Visit   Medication Sig   • allopurinol (ZYLOPRIM) 100 MG tablet    • artificial tears (REFRESH LACRI-LUBE) ointment ophthalmic ointment apply 0.25 INCH TO eye(s) daily AS NEEDED   • aspirin 81 MG EC tablet Take 81 mg by mouth Daily.   • cetirizine (zyrTEC) 10 MG tablet Take 1 tablet by mouth Daily.   • chlorpheniramine (CHLOR-TRIMETON) 4 MG tablet Take 4 mg by mouth 3 (Three) Times a Day As Needed.   • cholecalciferol (VITAMIN D3) 25 MCG (1000 UT) tablet Take 1,000 Units by mouth Daily.   • clobetasol (TEMOVATE) 0.05 % cream Apply 1 application topically to the appropriate area as directed As Needed.   • HYDROcodone-acetaminophen (NORCO) 5-325 MG per tablet    • ipratropium (ATROVENT) 0.06 % nasal spray 2 sprays into the nostril(s) as directed by provider 3 (Three) Times a Day.   • ketoconazole (NIZORAL) 2 % shampoo Apply 2 application topically to the appropriate area as directed As Needed.   • lansoprazole (PREVACID) 30 MG capsule Take 30 mg by mouth Daily.   • levothyroxine (SYNTHROID, LEVOTHROID) 100 MCG tablet Take 1 tablet by mouth Daily.   • Lotemax SM 0.38 % gel Administer 0.38 Act to both eyes 2 (Two) Times a Day.   • Lubricating Plus Eye Drops 0.5 % solution    • montelukast (SINGULAIR) 10 MG tablet Take 10 mg by mouth Every Night.   • multivitamin with minerals tablet tablet Take  by mouth.   • nebivolol (Bystolic) 10 MG tablet Take 1 tablet by mouth Daily.   • olopatadine (PATANOL) 0.1 % ophthalmic solution Administer 1 drop to both eyes 2 (Two) Times a Day.   • phenazopyridine (PYRIDIUM) 200 MG tablet TAKE 1 TABLET BY MOUTH THREE TIMES DAILY FOR 3 DAYS for burning WITH urination   • polyethylene glycol (MIRALAX) 17 GM/SCOOP powder Take 3,350 bottles by mouth As  "Needed.   • spironolactone (ALDACTONE) 25 MG tablet Take 0.5 tablets by mouth Daily.   • tiotropium bromide-olodaterol (Stiolto Respimat) 2.5-2.5 MCG/ACT aerosol solution inhaler Stiolto Respimat 2.5-2.5 mcg/actuation inhalation mist inhale 2 puffs by inhalation route once daily at the same time each day   Active   •  amLODIPine (NORVASC) 5 MG tablet Take 1 tablet by mouth Daily.   •  clopidogrel (PLAVIX) 75 MG tablet Take 1 tablet by mouth Daily.   • ezetimibe (ZETIA) 10 MG tablet Take 10 mg by mouth Daily.   •  furosemide (LASIX) 40 MG tablet Take 1 tablet by mouth 2 (Two) Times a Day.   •  rosuvastatin (Crestor) 20 MG tablet Take 1 tablet by mouth Every Night.   • Procrit 34328 UNIT/ML injection Inject 1 mL under the skin into the appropriate area as directed As Needed.       Review of Systems   Constitutional: Positive for fatigue.   Respiratory: Negative for cough, shortness of breath and wheezing.    Cardiovascular: Positive for palpitations. Negative for chest pain and leg swelling.   Gastrointestinal: Negative for nausea and vomiting.   Neurological: Negative for dizziness and syncope.        Objective     /68   Pulse 60   Ht 149.9 cm (59\")   Wt 72.6 kg (160 lb)   BMI 32.32 kg/m²       Physical Exam    General : Alert, awake, no acute distress  Neck : Supple, no carotid bruit, no jugular venous distention  CVS : Regular rate and rhythm, no murmur, rubs or gallops  Lungs: Clear to auscultation bilaterally, no crackles or rhonchi  Abdomen: Soft, nontender, bowel sounds heard in all 4 quadrants  Extremities: Warm, well-perfused, trace edema bilaterally    Result Review :     The following data was reviewed by: Simon Talbot MD on 12/19/2022:    CMP    CMP 6/28/22 7/23/22 7/23/22 10/18/22 10/18/22     0724 0724 0733 0733   Glucose  89   84   BUN  33 (A)   35 (A)   Creatinine 1.60 1.52 (A)   1.73 (A)   Sodium  141   140   Potassium  4.2   4.3   Chloride  107   101   Calcium  9.5   9.6   Albumin   " 4.20 4.50    Total Bilirubin   <0.2 <0.2    Alkaline Phosphatase   79 91    AST (SGOT)   13 18    ALT (SGPT)   9 14    (A) Abnormal value       Comments are available for some flowsheets but are not being displayed.           CBC    CBC 5/15/22 7/23/22 10/18/22   WBC 7.86 4.30 6.70   RBC 2.37 (A) 3.20 (A) 3.81   Hemoglobin 7.3 (A) 9.6 (A) 11.6 (A)   Hematocrit 25.0 (A) 31.3 (A) 35.9   .5 (A) 97.8 (A) 94.2   MCH 30.8 30.0 30.4   MCHC 29.2 (A) 30.7 (A) 32.3   RDW 15.0 13.8 14.2   Platelets 311 262 294   (A) Abnormal value            TSH    TSH 4/27/22 7/23/22 10/18/22   TSH 0.660 0.817 3.600           Lipid Panel    Lipid Panel 4/27/22 7/23/22 10/18/22   Total Cholesterol 128 117 272 (A)   Triglycerides 76 115 136   HDL Cholesterol 46 38 (A) 56   VLDL Cholesterol 15 21 25   LDL Cholesterol  67 58 191 (A)   LDL/HDL Ratio 1.45 1.47 3.37   (A) Abnormal value                 Data reviewed: Cardiology studies        Echocardiogram done on 1/27/2020 showed    1.  Severe global hypokinesis of the left ventricle with estimated LV ejection fraction of 25 to 30%.  2.  Mild concentric left ventricular hypertrophy.  3.  Mildly dilated left ventricular cavity.  4.  Grade 1 diastolic dysfunction of the left ventricle.   5.  Mild mitral regurgitation.  6.  Trace aortic insufficiency.  7.  Pacemaker lead noted.                  Assessment and Plan        Diagnoses and all orders for this visit:    1. Chronic combined systolic and diastolic congestive heart failure (HCC) (Primary)  Assessment & Plan:  She is clinically near euvolemic.  Recent labs showed a creatinine of 1.7 which is close to her baseline.  We will continue Lasix 40 mg once daily, along with spironolactone and Bystolic.  Unable to add ACE inhibitor/ARB due to chronic kidney disease.  She will need repeat echocardiogram next year.    Orders:  -     furosemide (LASIX) 40 MG tablet; Take 1 tablet by mouth 2 (Two) Times a Day.  Dispense: 180 tablet; Refill: 3    2.  Essential hypertension  Assessment & Plan:  Blood pressure reasonably well controlled.  Continue current regimen.    Orders:  -     amLODIPine (NORVASC) 5 MG tablet; Take 1 tablet by mouth Daily.  Dispense: 90 tablet; Refill: 3    3. Carotid artery disease, unspecified laterality, unspecified type (HCC)  Assessment & Plan:  We will renew the prescription for Plavix as advised by vascular surgeons.  She follows up with Dr. Nunez     Orders:  -     clopidogrel (PLAVIX) 75 MG tablet; Take 1 tablet by mouth Daily.  Dispense: 90 tablet; Refill: 3    4. Coronary artery disease involving native coronary artery of native heart without angina pectoris  Assessment & Plan:  Stable with no angina.  Continue aspirin, statin and beta-blocker.  She is taking Plavix is advised to vascular surgeons.  Currently on aspirin every other day to minimize bleeding risk.  Recent labs with hemoglobin of 11.7, improved from before    Orders:  -     rosuvastatin (Crestor) 20 MG tablet; Take 1 tablet by mouth Every Night.  Dispense: 90 tablet; Refill: 3    5. Mixed hyperlipidemia  Assessment & Plan:  Recent labs showed LDL of 195, significantly elevated from previous 58 !.  She stopped taking statins and Zetia for several weeks, since she thought that her cholesterol levels were within normal limits.  Counseled extensively regarding medication compliance and the need to have LDL as low as we can due to presence of coronary artery disease and also carotid artery disease.  Patient verbalized understanding.  She is already back on rosuvastatin and Zetia which will be continued.  Refills sent for both prescriptions.  Need a repeat lipid panel in the next 3 to 6 months.    Orders:  -     ezetimibe (ZETIA) 10 MG tablet; Take 1 tablet by mouth Daily.  Dispense: 90 tablet; Refill: 3  -     rosuvastatin (Crestor) 20 MG tablet; Take 1 tablet by mouth Every Night.  Dispense: 90 tablet; Refill: 3    6. Presence of biventricular implantable  cardioverter-defibrillator (ICD)  Assessment & Plan:  BiV ICD was interrogated in the office today.  Normally functioning device with a battery longevity of 2-1/2 years.  There were no events or ICD shocks.  Lead parameters appropriate.  We will continue home remote monitoring.              Follow Up     Return in about 6 months (around 6/19/2023) for Next scheduled follow up, Ok to double book at the top of the hour .    Patient was given instructions and counseling regarding her condition or for health maintenance advice. Please see specific information pulled into the AVS if appropriate.

## 2023-01-02 ENCOUNTER — APPOINTMENT (OUTPATIENT)
Dept: GENERAL RADIOLOGY | Facility: HOSPITAL | Age: 88
DRG: 291 | End: 2023-01-02
Payer: MEDICARE

## 2023-01-02 ENCOUNTER — HOSPITAL ENCOUNTER (INPATIENT)
Facility: HOSPITAL | Age: 88
LOS: 8 days | Discharge: HOME HEALTH | DRG: 291 | End: 2023-01-10
Attending: EMERGENCY MEDICINE | Admitting: INTERNAL MEDICINE
Payer: MEDICARE

## 2023-01-02 DIAGNOSIS — R06.00 DYSPNEA, UNSPECIFIED TYPE: ICD-10-CM

## 2023-01-02 DIAGNOSIS — I10 ESSENTIAL HYPERTENSION: ICD-10-CM

## 2023-01-02 DIAGNOSIS — I50.23 ACUTE ON CHRONIC SYSTOLIC HEART FAILURE: ICD-10-CM

## 2023-01-02 DIAGNOSIS — J96.01 ACUTE RESPIRATORY FAILURE WITH HYPOXIA: ICD-10-CM

## 2023-01-02 DIAGNOSIS — R26.2 DIFFICULTY WALKING: ICD-10-CM

## 2023-01-02 DIAGNOSIS — J81.0 ACUTE PULMONARY EDEMA: ICD-10-CM

## 2023-01-02 DIAGNOSIS — I50.9 ACUTE CONGESTIVE HEART FAILURE, UNSPECIFIED HEART FAILURE TYPE: Primary | ICD-10-CM

## 2023-01-02 DIAGNOSIS — J44.1 COPD EXACERBATION: ICD-10-CM

## 2023-01-02 DIAGNOSIS — I50.42 CHRONIC COMBINED SYSTOLIC AND DIASTOLIC CONGESTIVE HEART FAILURE: ICD-10-CM

## 2023-01-02 LAB
ALBUMIN SERPL-MCNC: 4.6 G/DL (ref 3.5–5.2)
ALBUMIN/GLOB SERPL: 1.5 G/DL
ALP SERPL-CCNC: 99 U/L (ref 39–117)
ALT SERPL W P-5'-P-CCNC: 11 U/L (ref 1–33)
ANION GAP SERPL CALCULATED.3IONS-SCNC: 13.4 MMOL/L (ref 5–15)
AST SERPL-CCNC: 23 U/L (ref 1–32)
BASOPHILS # BLD AUTO: 0.05 10*3/MM3 (ref 0–0.2)
BASOPHILS NFR BLD AUTO: 0.4 % (ref 0–1.5)
BILIRUB SERPL-MCNC: 0.2 MG/DL (ref 0–1.2)
BUN SERPL-MCNC: 33 MG/DL (ref 8–23)
BUN/CREAT SERPL: 19.9 (ref 7–25)
CALCIUM SPEC-SCNC: 9.5 MG/DL (ref 8.6–10.5)
CHLORIDE SERPL-SCNC: 103 MMOL/L (ref 98–107)
CO2 SERPL-SCNC: 24.6 MMOL/L (ref 22–29)
CREAT SERPL-MCNC: 1.66 MG/DL (ref 0.57–1)
D-LACTATE SERPL-SCNC: 1.3 MMOL/L (ref 0.5–2)
DEPRECATED RDW RBC AUTO: 57.4 FL (ref 37–54)
EGFRCR SERPLBLD CKD-EPI 2021: 29.9 ML/MIN/1.73
EOSINOPHIL # BLD AUTO: 0.15 10*3/MM3 (ref 0–0.4)
EOSINOPHIL NFR BLD AUTO: 1.1 % (ref 0.3–6.2)
ERYTHROCYTE [DISTWIDTH] IN BLOOD BY AUTOMATED COUNT: 15.8 % (ref 12.3–15.4)
GLOBULIN UR ELPH-MCNC: 3 GM/DL
GLUCOSE BLDC GLUCOMTR-MCNC: 108 MG/DL (ref 70–99)
GLUCOSE BLDC GLUCOMTR-MCNC: 138 MG/DL (ref 70–99)
GLUCOSE BLDC GLUCOMTR-MCNC: 80 MG/DL (ref 70–99)
GLUCOSE BLDC GLUCOMTR-MCNC: 96 MG/DL (ref 70–99)
GLUCOSE SERPL-MCNC: 206 MG/DL (ref 65–99)
HBA1C MFR BLD: 4.6 % (ref 4.8–5.6)
HCT VFR BLD AUTO: 38.6 % (ref 34–46.6)
HGB BLD-MCNC: 11.4 G/DL (ref 12–15.9)
HOLD SPECIMEN: NORMAL
HOLD SPECIMEN: NORMAL
IMM GRANULOCYTES # BLD AUTO: 0.06 10*3/MM3 (ref 0–0.05)
IMM GRANULOCYTES NFR BLD AUTO: 0.4 % (ref 0–0.5)
LYMPHOCYTES # BLD AUTO: 1.3 10*3/MM3 (ref 0.7–3.1)
LYMPHOCYTES NFR BLD AUTO: 9.4 % (ref 19.6–45.3)
MCH RBC QN AUTO: 29.5 PG (ref 26.6–33)
MCHC RBC AUTO-ENTMCNC: 29.5 G/DL (ref 31.5–35.7)
MCV RBC AUTO: 99.7 FL (ref 79–97)
MONOCYTES # BLD AUTO: 2.18 10*3/MM3 (ref 0.1–0.9)
MONOCYTES NFR BLD AUTO: 15.7 % (ref 5–12)
NEUTROPHILS NFR BLD AUTO: 10.16 10*3/MM3 (ref 1.7–7)
NEUTROPHILS NFR BLD AUTO: 73 % (ref 42.7–76)
NRBC BLD AUTO-RTO: 0 /100 WBC (ref 0–0.2)
NT-PROBNP SERPL-MCNC: 7868 PG/ML (ref 0–1800)
PLATELET # BLD AUTO: 337 10*3/MM3 (ref 140–450)
PMV BLD AUTO: 10.6 FL (ref 6–12)
POTASSIUM SERPL-SCNC: 4.6 MMOL/L (ref 3.5–5.2)
PROT SERPL-MCNC: 7.6 G/DL (ref 6–8.5)
QT INTERVAL: 369 MS
RBC # BLD AUTO: 3.87 10*6/MM3 (ref 3.77–5.28)
SODIUM SERPL-SCNC: 141 MMOL/L (ref 136–145)
TROPONIN T SERPL-MCNC: <0.01 NG/ML (ref 0–0.03)
WBC NRBC COR # BLD: 13.9 10*3/MM3 (ref 3.4–10.8)
WHOLE BLOOD HOLD COAG: NORMAL
WHOLE BLOOD HOLD SPECIMEN: NORMAL

## 2023-01-02 PROCEDURE — 87040 BLOOD CULTURE FOR BACTERIA: CPT | Performed by: EMERGENCY MEDICINE

## 2023-01-02 PROCEDURE — 25010000002 HEPARIN (PORCINE) PER 1000 UNITS: Performed by: INTERNAL MEDICINE

## 2023-01-02 PROCEDURE — 94799 UNLISTED PULMONARY SVC/PX: CPT

## 2023-01-02 PROCEDURE — 93010 ELECTROCARDIOGRAM REPORT: CPT | Performed by: SPECIALIST

## 2023-01-02 PROCEDURE — 83036 HEMOGLOBIN GLYCOSYLATED A1C: CPT | Performed by: INTERNAL MEDICINE

## 2023-01-02 PROCEDURE — 85025 COMPLETE CBC W/AUTO DIFF WBC: CPT | Performed by: EMERGENCY MEDICINE

## 2023-01-02 PROCEDURE — 71045 X-RAY EXAM CHEST 1 VIEW: CPT

## 2023-01-02 PROCEDURE — 80053 COMPREHEN METABOLIC PANEL: CPT | Performed by: EMERGENCY MEDICINE

## 2023-01-02 PROCEDURE — 94660 CPAP INITIATION&MGMT: CPT

## 2023-01-02 PROCEDURE — 99223 1ST HOSP IP/OBS HIGH 75: CPT | Performed by: INTERNAL MEDICINE

## 2023-01-02 PROCEDURE — 25010000002 FUROSEMIDE PER 20 MG: Performed by: EMERGENCY MEDICINE

## 2023-01-02 PROCEDURE — 93005 ELECTROCARDIOGRAM TRACING: CPT | Performed by: EMERGENCY MEDICINE

## 2023-01-02 PROCEDURE — 99285 EMERGENCY DEPT VISIT HI MDM: CPT

## 2023-01-02 PROCEDURE — 83880 ASSAY OF NATRIURETIC PEPTIDE: CPT | Performed by: EMERGENCY MEDICINE

## 2023-01-02 PROCEDURE — 87147 CULTURE TYPE IMMUNOLOGIC: CPT | Performed by: EMERGENCY MEDICINE

## 2023-01-02 PROCEDURE — 94760 N-INVAS EAR/PLS OXIMETRY 1: CPT

## 2023-01-02 PROCEDURE — 82962 GLUCOSE BLOOD TEST: CPT

## 2023-01-02 PROCEDURE — 94640 AIRWAY INHALATION TREATMENT: CPT

## 2023-01-02 PROCEDURE — 25010000002 FUROSEMIDE PER 20 MG: Performed by: INTERNAL MEDICINE

## 2023-01-02 PROCEDURE — 88312 SPECIAL STAINS GROUP 1: CPT | Performed by: EMERGENCY MEDICINE

## 2023-01-02 PROCEDURE — 87150 DNA/RNA AMPLIFIED PROBE: CPT | Performed by: EMERGENCY MEDICINE

## 2023-01-02 PROCEDURE — 94664 DEMO&/EVAL PT USE INHALER: CPT

## 2023-01-02 PROCEDURE — 83605 ASSAY OF LACTIC ACID: CPT | Performed by: EMERGENCY MEDICINE

## 2023-01-02 PROCEDURE — 84484 ASSAY OF TROPONIN QUANT: CPT | Performed by: EMERGENCY MEDICINE

## 2023-01-02 PROCEDURE — 87040 BLOOD CULTURE FOR BACTERIA: CPT | Performed by: PHYSICIAN ASSISTANT

## 2023-01-02 RX ORDER — IPRATROPIUM BROMIDE AND ALBUTEROL SULFATE 2.5; .5 MG/3ML; MG/3ML
SOLUTION RESPIRATORY (INHALATION)
Status: DISPENSED
Start: 2023-01-02 | End: 2023-01-02

## 2023-01-02 RX ORDER — DEXTROSE MONOHYDRATE 25 G/50ML
25 INJECTION, SOLUTION INTRAVENOUS
Status: DISCONTINUED | OUTPATIENT
Start: 2023-01-02 | End: 2023-01-10 | Stop reason: HOSPADM

## 2023-01-02 RX ORDER — DEXTROSE MONOHYDRATE 25 G/50ML
25 INJECTION, SOLUTION INTRAVENOUS
Status: DISCONTINUED | OUTPATIENT
Start: 2023-01-02 | End: 2023-01-02

## 2023-01-02 RX ORDER — ACETAMINOPHEN 325 MG/1
650 TABLET ORAL EVERY 4 HOURS PRN
Status: DISCONTINUED | OUTPATIENT
Start: 2023-01-02 | End: 2023-01-10 | Stop reason: HOSPADM

## 2023-01-02 RX ORDER — FUROSEMIDE 10 MG/ML
40 INJECTION INTRAMUSCULAR; INTRAVENOUS EVERY 12 HOURS
Status: DISCONTINUED | OUTPATIENT
Start: 2023-01-02 | End: 2023-01-06

## 2023-01-02 RX ORDER — LEVOTHYROXINE SODIUM 0.1 MG/1
100 TABLET ORAL ONCE
Status: DISCONTINUED | OUTPATIENT
Start: 2023-01-02 | End: 2023-01-10 | Stop reason: HOSPADM

## 2023-01-02 RX ORDER — INSULIN LISPRO 100 [IU]/ML
2-7 INJECTION, SOLUTION INTRAVENOUS; SUBCUTANEOUS
Status: DISCONTINUED | OUTPATIENT
Start: 2023-01-02 | End: 2023-01-10 | Stop reason: HOSPADM

## 2023-01-02 RX ORDER — SODIUM CHLORIDE 0.9 % (FLUSH) 0.9 %
10 SYRINGE (ML) INJECTION EVERY 12 HOURS SCHEDULED
Status: DISCONTINUED | OUTPATIENT
Start: 2023-01-02 | End: 2023-01-10 | Stop reason: HOSPADM

## 2023-01-02 RX ORDER — ROSUVASTATIN CALCIUM 20 MG/1
20 TABLET, COATED ORAL NIGHTLY
Status: DISCONTINUED | OUTPATIENT
Start: 2023-01-02 | End: 2023-01-10 | Stop reason: HOSPADM

## 2023-01-02 RX ORDER — SODIUM CHLORIDE 0.9 % (FLUSH) 0.9 %
10 SYRINGE (ML) INJECTION AS NEEDED
Status: DISCONTINUED | OUTPATIENT
Start: 2023-01-02 | End: 2023-01-10 | Stop reason: HOSPADM

## 2023-01-02 RX ORDER — FUROSEMIDE 10 MG/ML
80 INJECTION INTRAMUSCULAR; INTRAVENOUS ONCE
Status: COMPLETED | OUTPATIENT
Start: 2023-01-02 | End: 2023-01-02

## 2023-01-02 RX ORDER — NITROGLYCERIN 20 MG/100ML
5-400 INJECTION INTRAVENOUS
Status: DISCONTINUED | OUTPATIENT
Start: 2023-01-02 | End: 2023-01-10

## 2023-01-02 RX ORDER — NITROGLYCERIN 20 MG/100ML
INJECTION INTRAVENOUS
Status: COMPLETED
Start: 2023-01-02 | End: 2023-01-02

## 2023-01-02 RX ORDER — NITROGLYCERIN 0.4 MG/1
0.4 TABLET SUBLINGUAL
Status: DISCONTINUED | OUTPATIENT
Start: 2023-01-02 | End: 2023-01-10 | Stop reason: HOSPADM

## 2023-01-02 RX ORDER — IPRATROPIUM BROMIDE AND ALBUTEROL SULFATE 2.5; .5 MG/3ML; MG/3ML
3 SOLUTION RESPIRATORY (INHALATION)
Status: COMPLETED | OUTPATIENT
Start: 2023-01-02 | End: 2023-01-02

## 2023-01-02 RX ORDER — NICOTINE POLACRILEX 4 MG
15 LOZENGE BUCCAL
Status: DISCONTINUED | OUTPATIENT
Start: 2023-01-02 | End: 2023-01-02

## 2023-01-02 RX ORDER — SODIUM CHLORIDE 9 MG/ML
40 INJECTION, SOLUTION INTRAVENOUS AS NEEDED
Status: DISCONTINUED | OUTPATIENT
Start: 2023-01-02 | End: 2023-01-10 | Stop reason: HOSPADM

## 2023-01-02 RX ORDER — NICOTINE POLACRILEX 4 MG
15 LOZENGE BUCCAL
Status: DISCONTINUED | OUTPATIENT
Start: 2023-01-02 | End: 2023-01-10 | Stop reason: HOSPADM

## 2023-01-02 RX ORDER — LEVOTHYROXINE SODIUM 0.1 MG/1
100 TABLET ORAL
Status: DISCONTINUED | OUTPATIENT
Start: 2023-01-03 | End: 2023-01-10 | Stop reason: HOSPADM

## 2023-01-02 RX ORDER — AZELASTINE 1 MG/ML
2 SPRAY, METERED NASAL 2 TIMES DAILY
COMMUNITY

## 2023-01-02 RX ORDER — INSULIN LISPRO 100 [IU]/ML
2-7 INJECTION, SOLUTION INTRAVENOUS; SUBCUTANEOUS
Status: DISCONTINUED | OUTPATIENT
Start: 2023-01-02 | End: 2023-01-02

## 2023-01-02 RX ORDER — FUROSEMIDE 20 MG/1
20 TABLET ORAL EVERY EVENING
COMMUNITY
End: 2023-01-10 | Stop reason: HOSPADM

## 2023-01-02 RX ORDER — NEBIVOLOL 10 MG/1
10 TABLET ORAL DAILY
Status: DISCONTINUED | OUTPATIENT
Start: 2023-01-02 | End: 2023-01-10 | Stop reason: HOSPADM

## 2023-01-02 RX ORDER — AMLODIPINE BESYLATE 5 MG/1
5 TABLET ORAL DAILY
Status: DISCONTINUED | OUTPATIENT
Start: 2023-01-02 | End: 2023-01-10 | Stop reason: HOSPADM

## 2023-01-02 RX ORDER — HEPARIN SODIUM 5000 [USP'U]/ML
5000 INJECTION, SOLUTION INTRAVENOUS; SUBCUTANEOUS EVERY 12 HOURS SCHEDULED
Status: DISCONTINUED | OUTPATIENT
Start: 2023-01-02 | End: 2023-01-10 | Stop reason: HOSPADM

## 2023-01-02 RX ORDER — LEVOTHYROXINE SODIUM 0.1 MG/1
100 TABLET ORAL
Status: DISCONTINUED | OUTPATIENT
Start: 2023-01-02 | End: 2023-01-02

## 2023-01-02 RX ADMIN — IPRATROPIUM BROMIDE AND ALBUTEROL SULFATE 3 ML: 2.5; .5 SOLUTION RESPIRATORY (INHALATION) at 19:19

## 2023-01-02 RX ADMIN — LEVOTHYROXINE SODIUM 100 MCG: 0.1 TABLET ORAL at 15:26

## 2023-01-02 RX ADMIN — IPRATROPIUM BROMIDE AND ALBUTEROL SULFATE 3 ML: 2.5; .5 SOLUTION RESPIRATORY (INHALATION) at 02:25

## 2023-01-02 RX ADMIN — ROSUVASTATIN 20 MG: 20 TABLET, FILM COATED ORAL at 21:23

## 2023-01-02 RX ADMIN — IPRATROPIUM BROMIDE AND ALBUTEROL SULFATE 3 ML: 2.5; .5 SOLUTION RESPIRATORY (INHALATION) at 09:36

## 2023-01-02 RX ADMIN — ACETAMINOPHEN 650 MG: 325 TABLET ORAL at 12:44

## 2023-01-02 RX ADMIN — HEPARIN SODIUM 5000 UNITS: 5000 INJECTION INTRAVENOUS; SUBCUTANEOUS at 10:17

## 2023-01-02 RX ADMIN — IPRATROPIUM BROMIDE AND ALBUTEROL SULFATE 3 ML: 2.5; .5 SOLUTION RESPIRATORY (INHALATION) at 02:20

## 2023-01-02 RX ADMIN — NITROGLYCERIN 400 MCG/MIN: 20 INJECTION INTRAVENOUS at 02:16

## 2023-01-02 RX ADMIN — HEPARIN SODIUM 5000 UNITS: 5000 INJECTION INTRAVENOUS; SUBCUTANEOUS at 21:23

## 2023-01-02 RX ADMIN — Medication 10 ML: at 10:32

## 2023-01-02 RX ADMIN — FUROSEMIDE 80 MG: 10 INJECTION, SOLUTION INTRAMUSCULAR; INTRAVENOUS at 02:20

## 2023-01-02 RX ADMIN — FUROSEMIDE 40 MG: 10 INJECTION, SOLUTION INTRAMUSCULAR; INTRAVENOUS at 21:22

## 2023-01-02 RX ADMIN — IPRATROPIUM BROMIDE AND ALBUTEROL SULFATE 3 ML: 2.5; .5 SOLUTION RESPIRATORY (INHALATION) at 02:15

## 2023-01-02 RX ADMIN — Medication 10 ML: at 21:23

## 2023-01-02 RX ADMIN — IPRATROPIUM BROMIDE AND ALBUTEROL SULFATE 3 ML: 2.5; .5 SOLUTION RESPIRATORY (INHALATION) at 11:55

## 2023-01-02 NOTE — PROGRESS NOTES
Patient seen and evaluated, chart is reviewed.  Patient continues to complain of shortness of breath especially with exertion.   On exam patient has diffuse rhonchorous breath sounds with occasional expiratory wheezes noted.  We will continue IV diuresis with Lasix 40 every 12 hours.  Also initiate nebulizer treatments with duo nebs scheduled as well.  Follow-up BMP and magnesium in the a.m.  2D echo is ordered and will be followed up as well.

## 2023-01-02 NOTE — H&P
[facility]   HISTORY AND PHYSICAL    Patient Name: Yelena Sanchez  : 1935  MRN: [id number]  Primary Care Physician: Kaelyn uEgene MD  Date of admission: 2023    Subjective   Subjective     Chief Complaint: Shortness of breath    History of Present Illness  87-year-old obese female former smoker with history of breast cancer, CAD, CHF (PPM in place), COPD, GERD, HTN, HLD, and hypothyroidism presents with shortness of breath.  She reports having difficulty lying on her back.  Per report, when EMS arrived she was satting in the 70s.  Her O2 sats improved with oxygen support.    In the ED:  She was tachycardic and tachypneic with O2 sats needing BiPAP for support.    Notable labs:  BNP 7900    WBC 13.9    CXR:  1. There may be interval increase in the degree of opacification involving the right lung base.   The findings may represent pulmonary edema.  Pneumonia cannot be excluded.    2. A small right pleural effusion is possible.    3. Probably no acute infiltrate on the left.    4. There is mild gaseous distension of the stomach.     Review of Systems   Review of systems limited by respiratory distress  Personal History     Past Medical History:   Diagnosis Date   • Arthritis    • Asthma    • Breast cancer (HCC)    • Congestive heart failure (HCC)    • COPD (chronic obstructive pulmonary disease) (HCC)    • Diverticulosis 2014   • GERD (gastroesophageal reflux disease)    • History of tobacco abuse    • HTN (hypertension)    • Hyperlipemia    • Hyperthyroidism    • Neck mass    • Seasonal allergies    • SOB (shortness of breath)        Past Surgical History:   Procedure Laterality Date   • COLONOSCOPY     • ENDOSCOPY     • EYE SURGERY      Implant; yes    • LUNG LOBECTOMY Left     Left lower lobe   • LUNG SURGERY     • MASTECTOMY      Left    • OTHER SURGICAL HISTORY      Joint Surgery   • PACEMAKER IMPLANTATION         Family History: family history includes Colon cancer in  her mother; Lung cancer in her sister. Otherwise pertinent FHx was reviewed and not pertinent to current issue.    Social History:  reports that she quit smoking about 6 years ago. Her smoking use included cigarettes. She has never used smokeless tobacco. She reports that she does not drink alcohol and does not use drugs.    Home Medications:  HYDROcodone-acetaminophen, Loteprednol Etabonate, allopurinol, amLODIPine, aspirin, carboxymethylcellulose, chlorpheniramine, cholecalciferol, clopidogrel, epoetin jenise, ezetimibe, furosemide, ipratropium, ketoconazole, lansoprazole, levothyroxine, montelukast, multivitamin with minerals, nebivolol, olopatadine, phenazopyridine, polyethylene glycol, rosuvastatin, spironolactone, and tiotropium bromide-olodaterol      Allergies:  No Known Allergies    Objective    Objective     Vitals:  Temp:  [98.3 °F (36.8 °C)] 98.3 °F (36.8 °C)  Heart Rate:  [] 71  Resp:  [24-28] 24  BP: (124-178)/() 172/57    Physical Exam  Constitutional:       General: She is in distress from shortness of breath.     Appearance: Normal appearance. She is diaphoretic. She is not toxic-appearing.   HENT:      Head: Normocephalic and atraumatic.   Eyes:      General: Lids are normal.      Extraocular Movements: Extraocular movements intact.      Conjunctiva/sclera: Conjunctivae normal.      Pupils: Pupils are equal, round, and reactive to light.   Cardiovascular:      Rate and Rhythm: Regular rhythm. Tachycardia present.      Pulses: Normal pulses.      Heart sounds: Normal heart sounds.   Pulmonary:      Effort: Tachypnea, accessory muscle usage and respiratory distress present.      Breath sounds: Diffuse crackles     Comments: Decreased air entry BL  Abdominal:      General: Abdomen is flat.      Palpations: Abdomen is soft.      Tenderness: There is no abdominal tenderness. There is no guarding or rebound.   Musculoskeletal:         General: Normal range of motion.      Cervical back: Normal  range of motion and neck supple.      Right lower leg: Edema present.      Left lower leg: Edema present.   Skin:     General: Skin is warm.   Neurological:      Mental Status: She is alert and oriented to person, place, and time. Mental status is at baseline.   Psychiatric:         Mood and Affect: Mood normal.      Result Review    Result Review:  I have personally reviewed the results from the time of this admission to 1/2/2023 07:05 EST and agree with these findings:  [x]  Laboratory list / accordion  []  Microbiology  [x]  Radiology  [x]  EKG/Telemetry   []  Cardiology/Vascular   []  Pathology  []  Old records  []  Other:  Most notable findings include: BNP and CXR consistent with pulmonary edema        Assessment & Plan   Assessment / Plan     Brief Patient Summary:  87-year-old obese female former smoker with history of breast cancer, CAD, CHF (PPM in place), COPD, GERD, HTN, HLD, and hypothyroidism presents with shortness of breath.  She reports having difficulty lying on her back.  Appears to be in CHF exacerbation.    Active Hospital Problems:  Active Hospital Problems    Diagnosis    • **Acute congestive heart failure, unspecified heart failure type (HCC)        Plan:   Hypoxic respiratory failure  CHF exacerbation  -Received significant diuresis in ED  [] Reassess and dose diuresis as needed  [] Follow-up echocardiogram  [] Consider cardiology consult as needed    Chronic issues:  [] Once reconciled, reinstate home meds as appropriate      DVT prophylaxis: Subcutaneous heparin    CODE STATUS:    Code Status and Medical Interventions:   Ordered at: 01/02/23 0421     Code Status (Patient has no pulse and is not breathing):    CPR (Attempt to Resuscitate)     Medical Interventions (Patient has pulse or is breathing):    Full Support       Admission Status:  I believe this patient meets inpatient status.    Arvind Echeverria MD,

## 2023-01-02 NOTE — ED PROVIDER NOTES
Time: 2:01 AM EST  Date of encounter:  1/2/2023  Independent Historian/Clinical History and Information was obtained by:   EMS  Chief Complaint: shortness of breath    History is limited by: Severe respiratory distress    History of Present Illness:  Patient is a 87 y.o. year old female who presents to the emergency department for evaluation of shortness of breath. EMS reports the pt woke up tonight laying on her back gurgling with frothy sputum in her mouth. They state the pt's  attempted to give her an albuterol inhaler with no relief and then called EMS. They report the pt was struggling to breathe when they arrived and was sating in the 70's. They state the pt's O2 sats improved after CPAP.     reports the pt has been coughing up phlegm and having difficulty breathing prior to tonight.      HPI    Patient Care Team  Primary Care Provider: Kaelyn Eugene MD    Past Medical History:     No Known Allergies  Past Medical History:   Diagnosis Date   • Arthritis    • Asthma    • Breast cancer (HCC)    • Congestive heart failure (HCC)    • COPD (chronic obstructive pulmonary disease) (HCC)    • Diverticulosis 11/26/2014   • GERD (gastroesophageal reflux disease)    • History of tobacco abuse    • HTN (hypertension)    • Hyperlipemia    • Hyperthyroidism    • Neck mass    • Seasonal allergies    • SOB (shortness of breath)      Past Surgical History:   Procedure Laterality Date   • COLONOSCOPY  2014   • ENDOSCOPY  2014   • EYE SURGERY      Implant; yes    • LUNG LOBECTOMY Left     Left lower lobe   • LUNG SURGERY     • MASTECTOMY      Left    • OTHER SURGICAL HISTORY      Joint Surgery   • PACEMAKER IMPLANTATION       Family History   Problem Relation Age of Onset   • Colon cancer Mother         60s   • Lung cancer Sister        Home Medications:  Prior to Admission medications    Medication Sig Start Date End Date Taking? Authorizing Provider   allopurinol (ZYLOPRIM) 100 MG tablet  8/4/21    Sorin Younger MD   amLODIPine (NORVASC) 5 MG tablet Take 1 tablet by mouth Daily. 12/19/22   Simon Talbot MD   artificial tears (REFRESH LACRI-LUBE) ointment ophthalmic ointment apply 0.25 INCH TO eye(s) daily AS NEEDED 11/23/22   Sorin Younger MD   aspirin 81 MG EC tablet Take 81 mg by mouth Daily.    Sorin Younger MD   cetirizine (zyrTEC) 10 MG tablet Take 1 tablet by mouth Daily.    Sorin Younger MD   chlorpheniramine (CHLOR-TRIMETON) 4 MG tablet Take 4 mg by mouth 3 (Three) Times a Day As Needed.    Sorin Younger MD   cholecalciferol (VITAMIN D3) 25 MCG (1000 UT) tablet Take 1,000 Units by mouth Daily. 8/4/21   Sorin Younger MD   clobetasol (TEMOVATE) 0.05 % cream Apply 1 application topically to the appropriate area as directed As Needed. 10/20/22   Sorin Younger MD   clopidogrel (PLAVIX) 75 MG tablet Take 1 tablet by mouth Daily. 12/19/22   Simon Talbot MD   ezetimibe (ZETIA) 10 MG tablet Take 1 tablet by mouth Daily. 12/19/22   Simon Talbot MD   furosemide (LASIX) 40 MG tablet Take 1 tablet by mouth 2 (Two) Times a Day. 12/19/22   Simon Talbot MD   HYDROcodone-acetaminophen (NORCO) 5-325 MG per tablet  8/4/21   Sorin Younger MD   ipratropium (ATROVENT) 0.06 % nasal spray 2 sprays into the nostril(s) as directed by provider 3 (Three) Times a Day. 12/7/22   Lefty Acevedo MD   ketoconazole (NIZORAL) 2 % shampoo Apply 2 application topically to the appropriate area as directed As Needed. 8/15/22   Sorin Younger MD   lansoprazole (PREVACID) 30 MG capsule Take 30 mg by mouth Daily.    Sorin Younger MD   levothyroxine (SYNTHROID, LEVOTHROID) 100 MCG tablet Take 1 tablet by mouth Daily.    Sorin Younger MD   Lotemax SM 0.38 % gel Administer 0.38 Act to both eyes 2 (Two) Times a Day. 8/23/22   Carisa Sorin, MD   Lubricating Plus Eye Drops 0.5 % solution  10/29/21   Provider, Sorin,  "MD   montelukast (SINGULAIR) 10 MG tablet Take 10 mg by mouth Every Night.    Sorin Younger MD   multivitamin with minerals tablet tablet Take  by mouth.    Sorin Younger MD   nebivolol (Bystolic) 10 MG tablet Take 1 tablet by mouth Daily. 10/27/22   Simon Talbot MD   olopatadine (PATANOL) 0.1 % ophthalmic solution Administer 1 drop to both eyes 2 (Two) Times a Day. 10/13/21   Lefty Acevedo MD   phenazopyridine (PYRIDIUM) 200 MG tablet TAKE 1 TABLET BY MOUTH THREE TIMES DAILY FOR 3 DAYS for burning WITH urination 22   Sorin Younger MD   polyethylene glycol (MIRALAX) 17 GM/SCOOP powder Take 3,350 bottles by mouth As Needed. 10/20/22   Sorin Younger MD   Procrit 35376 UNIT/ML injection Inject 1 mL under the skin into the appropriate area as directed As Needed. 8/10/22   Sorin Younger MD   rosuvastatin (Crestor) 20 MG tablet Take 1 tablet by mouth Every Night. 22   Simon Talbot MD   spironolactone (ALDACTONE) 25 MG tablet Take 0.5 tablets by mouth Daily. 22   Simon Talbot MD   tiotropium bromide-olodaterol (Stiolto Respimat) 2.5-2.5 MCG/ACT aerosol solution inhaler Stiolto Respimat 2.5-2.5 mcg/actuation inhalation mist inhale 2 puffs by inhalation route once daily at the same time each day   Active    ProviderSorin MD        Social History:   Social History     Tobacco Use   • Smoking status: Former     Types: Cigarettes     Quit date: 2017     Years since quittin.0   • Smokeless tobacco: Never   Vaping Use   • Vaping Use: Never used   Substance Use Topics   • Alcohol use: Never   • Drug use: Never         Review of Systems:  Review of Systems   Unable to perform ROS: Severe respiratory distress        Physical Exam:  /75 (BP Location: Right arm, Patient Position: Lying)   Pulse 85   Temp 99.5 °F (37.5 °C) (Oral)   Resp 20   Ht 149.9 cm (59\")   Wt 76.2 kg (167 lb 15.9 oz)   SpO2 95%   BMI 33.93 kg/m² "     Physical Exam  Vitals and nursing note reviewed.   Constitutional:       General: She is not in acute distress.     Appearance: Normal appearance. She is diaphoretic. She is not toxic-appearing.   HENT:      Head: Normocephalic and atraumatic.      Jaw: There is normal jaw occlusion.   Eyes:      General: Lids are normal.      Extraocular Movements: Extraocular movements intact.      Conjunctiva/sclera: Conjunctivae normal.      Pupils: Pupils are equal, round, and reactive to light.   Cardiovascular:      Rate and Rhythm: Regular rhythm. Tachycardia present.      Pulses: Normal pulses.      Heart sounds: Normal heart sounds.   Pulmonary:      Effort: Tachypnea, accessory muscle usage and respiratory distress present.      Breath sounds: Examination of the right-upper field reveals rhonchi. Examination of the left-upper field reveals rhonchi. Examination of the right-middle field reveals rhonchi. Examination of the left-middle field reveals rhonchi. Examination of the right-lower field reveals rhonchi. Examination of the left-lower field reveals rhonchi. Rhonchi present. No wheezing.      Comments: Decreased air entry BL  Abdominal:      General: Abdomen is flat.      Palpations: Abdomen is soft.      Tenderness: There is no abdominal tenderness. There is no guarding or rebound.   Musculoskeletal:         General: Normal range of motion.      Cervical back: Normal range of motion and neck supple.      Right lower leg: Edema present.      Left lower leg: Edema present.   Skin:     General: Skin is warm.   Neurological:      Mental Status: She is alert and oriented to person, place, and time. Mental status is at baseline.   Psychiatric:         Mood and Affect: Mood normal.                  Procedures:  Procedures      Medical Decision Making:      Comorbidities that affect care:    Asthma, Cancer, Congestive Heart Failure, COPD, Hypertension, Smoking, Thyroid Disease    External Notes reviewed:    Previous ED  Note, Previous Radiological Studies, Previous EKG and Previous Labs      The following orders were placed and all results were independently analyzed by me:  Orders Placed This Encounter   Procedures   • Blood Culture - Blood,   • Blood Culture - Blood,   • Blood Culture ID, PCR - Blood,   • Blood Culture - Blood,   • Blood Culture - Blood,   • XR Chest 1 View   • Lamont Draw   • Comprehensive Metabolic Panel   • BNP   • Troponin   • CBC Auto Differential   • Lactic Acid, Plasma   • Potassium   • Magnesium   • Troponin   • Blood Gas, Arterial -With Co-Ox Panel: Yes   • Hemoglobin A1c   • Magnesium   • Basic Metabolic Panel   • CBC Auto Differential   • Diet: Cardiac Diets; Healthy Heart (2-3 Na+); Texture: Regular Texture (IDDSI 7); Fluid Consistency: Thin (IDDSI 0)   • Undress & Gown   • Vital Signs   • Undress & Gown   • Vital Signs   • Vital Signs   • Continuous Cardiac Monitoring   • Notify Provider if ACLS Protocol Activated   • Intake & Output   • Weigh Patient   • Oral Care   • Saline Lock & Maintain IV Access   • Pulse Oximetry, Continuous   • Strict Intake & Output   • Daily Weights   • Code Status and Medical Interventions:   • Hospitalist (on-call MD unless specified)   • Oxygen Therapy- Nasal Cannula; Titrate for SPO2: 90% - 95%   • POC Glucose 4x Daily AC & at Bedtime   • POC Glucose Once   • POC Glucose Once   • POC Glucose Once   • POC Glucose Once   • ECG 12 Lead ED Triage Standing Order; SOA   • ECG 12 Lead Chest Pain   • Adult Transthoracic Echo Complete w/ Color, Spectral and Contrast if necessary per protocol   • Insert Peripheral IV   • Insert Peripheral IV   • Insert Peripheral IV   • Inpatient Admission   • CBC & Differential   • Green Top (Gel)   • Lavender Top   • Gold Top - SST   • Light Blue Top   • CBC & Differential       Medications Given in the Emergency Department:  Medications   sodium chloride 0.9 % flush 10 mL (has no administration in time range)   nitroglycerin (TRIDIL) 200  mcg/ml infusion (100 mcg/min Intravenous Rate/Dose Change 1/2/23 0228)   sodium chloride 0.9 % flush 10 mL (has no administration in time range)   ipratropium-albuterol (DUO-NEB) 0.5-2.5 mg/3 ml nebulizer solution  - ADS Override Pull (has no administration in time range)   nitroglycerin (NITROSTAT) SL tablet 0.4 mg (has no administration in time range)   sodium chloride 0.9 % flush 10 mL (has no administration in time range)   sodium chloride 0.9 % flush 10 mL (10 mL Intravenous Given 1/2/23 2123)   sodium chloride 0.9 % infusion 40 mL (has no administration in time range)   heparin (porcine) 5000 UNIT/ML injection 5,000 Units (5,000 Units Subcutaneous Given 1/2/23 2123)   acetaminophen (TYLENOL) tablet 650 mg (650 mg Oral Incomplete 1/2/23 1828)   dextrose (GLUTOSE) oral gel 15 g (has no administration in time range)   dextrose (D50W) (25 g/50 mL) IV injection 25 g (has no administration in time range)   glucagon (GLUCAGEN) injection 1 mg (has no administration in time range)   Insulin Lispro (humaLOG) injection 2-7 Units (2 Units Subcutaneous Not Given 1/2/23 2124)   amLODIPine (NORVASC) tablet 5 mg (5 mg Oral Not Given 1/2/23 1647)   levothyroxine (SYNTHROID, LEVOTHROID) tablet 100 mcg (100 mcg Oral Given 1/3/23 0601)   nebivolol (BYSTOLIC) tablet 10 mg (10 mg Oral Not Given 1/2/23 1804)   rosuvastatin (CRESTOR) tablet 20 mg (20 mg Oral Given 1/2/23 2123)   levothyroxine (SYNTHROID, LEVOTHROID) tablet 100 mcg (0 mcg Oral Hold 1/2/23 1638)   furosemide (LASIX) injection 40 mg (40 mg Intravenous Given 1/2/23 2122)   furosemide (LASIX) injection 80 mg (80 mg Intravenous Given 1/2/23 0220)   ipratropium-albuterol (DUO-NEB) nebulizer solution 3 mL (3 mL Nebulization Given 1/2/23 0225)   ipratropium-albuterol (DUO-NEB) nebulizer solution 3 mL (3 mL Nebulization Given 1/2/23 1919)        ED Course:         Labs:    Lab Results (last 24 hours)     Procedure Component Value Units Date/Time    POC Glucose Once [[id number]]   (Abnormal) Collected: 01/02/23 0807    Specimen: Blood Updated: 01/02/23 0814     Glucose 108 mg/dL      Comment: Serial Number: [id number]:  [id number]       POC Glucose Once [[id number]]  (Normal) Collected: 01/02/23 1238    Specimen: Blood Updated: 01/02/23 1240     Glucose 96 mg/dL      Comment: Serial Number: [id number]:  [id number]       POC Glucose Once [[id number]]  (Normal) Collected: 01/02/23 1727    Specimen: Blood Updated: 01/02/23 1728     Glucose 80 mg/dL      Comment: Serial Number: [id number]:  [id number]       POC Glucose Once [[id number]]  (Abnormal) Collected: 01/02/23 2122    Specimen: Blood Updated: 01/02/23 2210     Glucose 138 mg/dL      Comment: Serial Number: [id number]:  [id number]       Blood Culture - Blood, Arm, Left [[id number]] Collected: 01/02/23 2305    Specimen: Blood from Arm, Left Updated: 01/02/23 2316    Blood Culture - Blood, Arm, Left [[id number]] Collected: 01/02/23 2305    Specimen: Blood from Arm, Left Updated: 01/02/23 2316    CBC & Differential [[id number]]  (Abnormal) Collected: 01/03/23 0440    Specimen: Blood Updated: 01/03/23 0600    Narrative:      The following orders were created for panel order CBC & Differential.  Procedure                               Abnormality         Status                     ---------                               -----------         ------                     CBC Auto Differential[[id number]]        Abnormal            Final result                 Please view results for these tests on the individual orders.    Magnesium [[id number]]  (Normal) Collected: 01/03/23 0440    Specimen: Blood Updated: 01/03/23 0622     Magnesium 2.1 mg/dL     Basic Metabolic Panel [[id number]]  (Abnormal) Collected: 01/03/23 0440    Specimen: Blood Updated: 01/03/23 0622     Glucose 98 mg/dL      BUN 38 mg/dL      Creatinine 1.55 mg/dL      Sodium 140 mmol/L      Potassium 3.6 mmol/L      Chloride 103 mmol/L      CO2 27.2 mmol/L      Calcium  9.4 mg/dL      BUN/Creatinine Ratio 24.5     Anion Gap 9.8 mmol/L      eGFR 32.5 mL/min/1.73      Comment: National Kidney Foundation and American Society of Nephrology (ASN) Task Force recommended calculation based on the Chronic Kidney Disease Epidemiology Collaboration (CKD-EPI) equation refit without adjustment for race.       Narrative:      GFR Normal >60  Chronic Kidney Disease <60  Kidney Failure <15    The GFR formula is only valid for adults with stable renal function between ages 18 and 70.    CBC Auto Differential [[id number]]  (Abnormal) Collected: 01/03/23 0440    Specimen: Blood Updated: 01/03/23 0600     WBC 11.65 10*3/mm3      RBC 3.25 10*6/mm3      Hemoglobin 9.6 g/dL      Hematocrit 31.7 %      MCV 97.5 fL      MCH 29.5 pg      MCHC 30.3 g/dL      RDW 15.9 %      RDW-SD 56.1 fl      MPV 10.6 fL      Platelets 223 10*3/mm3      Neutrophil % 78.7 %      Lymphocyte % 3.5 %      Monocyte % 17.1 %      Eosinophil % 0.3 %      Basophil % 0.1 %      Immature Grans % 0.3 %      Neutrophils, Absolute 9.18 10*3/mm3      Lymphocytes, Absolute 0.41 10*3/mm3      Monocytes, Absolute 1.99 10*3/mm3      Eosinophils, Absolute 0.03 10*3/mm3      Basophils, Absolute 0.01 10*3/mm3      Immature Grans, Absolute 0.03 10*3/mm3      nRBC 0.0 /100 WBC            Imaging:    No Radiology Exams Resulted Within Past 24 Hours      Differential Diagnosis and Discussion:    Chest Pain:  Based on the patient's signs and symptoms, I considered aortic dissection, myocardial infaction, pulmonary embolism, cardiac tamponade, pericarditis, pneumothorax, musculoskeletal chest pain and other differential diagnosis as an etiology of the patient's chest pain.   Dyspnea: Differential diagnosis includes but is not limited to metabolic acidosis, neurological disorders, psychogenic, asthma, pneumothorax, upper airway obstruction, COPD, pneumonia, noncardiogenic pulmonary edema, interstitial lung disease, anemia, congestive heart failure, and  pulmonary embolism    All labs were reviewed and analyzed by me.  All X-rays were independently reviewed by me.  EKG was interpreted by me.    [facility]     Critical Care Note: Total Critical Care time of 35 minutes. Total critical care time documented does not include time spent on separately billed procedures for services of nurses or physician assistants. I personally saw and examined the patient. I have reviewed all diagnostic interpretations and treatment plans as written. I was present for the key portions of any procedures performed and the inclusive time noted in any critical care statement. Critical care time includes patient management by me, time spent at the patients bedside,  time to review lab and imaging results, discussing patient care, documentation in the medical record, and time spent with family or caregiver.    Patient Care Considerations:    None      Consultants/Shared Management Plan:    Hospitalist: I have discussed the case with 35 who agrees to accept the patient for admission.    Social Determinants of Health:    Patient has presented with family members who are responsible, reliable and will ensure follow up care.      Disposition and Care Coordination:    Admit:   Through independent evaluation of the patient's history, physical, and imperical data, the patient meets criteria for observation/admission to the hospital.        Final diagnoses:   Acute congestive heart failure, unspecified heart failure type (HCC)   Acute pulmonary edema (HCC)   Dyspnea, unspecified type   Acute respiratory failure with hypoxia (HCC)        ED Disposition     ED Disposition   Decision to Admit    Condition   --    Comment   Level of Care: Telemetry [5]   Diagnosis: Acute congestive heart failure, unspecified heart failure type (HCC) [[id number]]   Admitting Physician: SEBASTIÁN KELLER [[id number]]   Attending Physician: SEBASTIÁN KELLER [[id number]]   Certification: I Certify That Inpatient Hospital Services  Are Medically Necessary For Greater Than 2 Midnights               This medical record created using voice recognition software.        Documentation assistance provided by Ilia Benites acting as scribe for Simon Colorado MD. Information recorded by the scribe was done at my direction and has been verified and validated by me.       Ilia Benites  01/02/23 0227       Simon Colorado MD  01/03/23 0649

## 2023-01-03 ENCOUNTER — APPOINTMENT (OUTPATIENT)
Dept: CARDIOLOGY | Facility: HOSPITAL | Age: 88
DRG: 291 | End: 2023-01-03
Payer: MEDICARE

## 2023-01-03 LAB
ANION GAP SERPL CALCULATED.3IONS-SCNC: 9.8 MMOL/L (ref 5–15)
BACTERIA BLD CULT: ABNORMAL
BASOPHILS # BLD AUTO: 0.01 10*3/MM3 (ref 0–0.2)
BASOPHILS NFR BLD AUTO: 0.1 % (ref 0–1.5)
BUN SERPL-MCNC: 38 MG/DL (ref 8–23)
BUN/CREAT SERPL: 24.5 (ref 7–25)
CALCIUM SPEC-SCNC: 9.4 MG/DL (ref 8.6–10.5)
CHLORIDE SERPL-SCNC: 103 MMOL/L (ref 98–107)
CO2 SERPL-SCNC: 27.2 MMOL/L (ref 22–29)
CREAT SERPL-MCNC: 1.55 MG/DL (ref 0.57–1)
DEPRECATED RDW RBC AUTO: 56.1 FL (ref 37–54)
EGFRCR SERPLBLD CKD-EPI 2021: 32.5 ML/MIN/1.73
EOSINOPHIL # BLD AUTO: 0.03 10*3/MM3 (ref 0–0.4)
EOSINOPHIL NFR BLD AUTO: 0.3 % (ref 0.3–6.2)
ERYTHROCYTE [DISTWIDTH] IN BLOOD BY AUTOMATED COUNT: 15.9 % (ref 12.3–15.4)
GLUCOSE BLDC GLUCOMTR-MCNC: 102 MG/DL (ref 70–99)
GLUCOSE BLDC GLUCOMTR-MCNC: 105 MG/DL (ref 70–99)
GLUCOSE BLDC GLUCOMTR-MCNC: 108 MG/DL (ref 70–99)
GLUCOSE BLDC GLUCOMTR-MCNC: 158 MG/DL (ref 70–99)
GLUCOSE SERPL-MCNC: 98 MG/DL (ref 65–99)
HCT VFR BLD AUTO: 31.7 % (ref 34–46.6)
HGB BLD-MCNC: 9.6 G/DL (ref 12–15.9)
IMM GRANULOCYTES # BLD AUTO: 0.03 10*3/MM3 (ref 0–0.05)
IMM GRANULOCYTES NFR BLD AUTO: 0.3 % (ref 0–0.5)
LYMPHOCYTES # BLD AUTO: 0.41 10*3/MM3 (ref 0.7–3.1)
LYMPHOCYTES NFR BLD AUTO: 3.5 % (ref 19.6–45.3)
MAGNESIUM SERPL-MCNC: 2.1 MG/DL (ref 1.6–2.4)
MCH RBC QN AUTO: 29.5 PG (ref 26.6–33)
MCHC RBC AUTO-ENTMCNC: 30.3 G/DL (ref 31.5–35.7)
MCV RBC AUTO: 97.5 FL (ref 79–97)
MONOCYTES # BLD AUTO: 1.99 10*3/MM3 (ref 0.1–0.9)
MONOCYTES NFR BLD AUTO: 17.1 % (ref 5–12)
NEUTROPHILS NFR BLD AUTO: 78.7 % (ref 42.7–76)
NEUTROPHILS NFR BLD AUTO: 9.18 10*3/MM3 (ref 1.7–7)
NRBC BLD AUTO-RTO: 0 /100 WBC (ref 0–0.2)
PLATELET # BLD AUTO: 223 10*3/MM3 (ref 140–450)
PMV BLD AUTO: 10.6 FL (ref 6–12)
POTASSIUM SERPL-SCNC: 3.6 MMOL/L (ref 3.5–5.2)
PROCALCITONIN SERPL-MCNC: 0.32 NG/ML (ref 0–0.25)
RBC # BLD AUTO: 3.25 10*6/MM3 (ref 3.77–5.28)
SODIUM SERPL-SCNC: 140 MMOL/L (ref 136–145)
WBC NRBC COR # BLD: 11.65 10*3/MM3 (ref 3.4–10.8)

## 2023-01-03 PROCEDURE — 80048 BASIC METABOLIC PNL TOTAL CA: CPT | Performed by: INTERNAL MEDICINE

## 2023-01-03 PROCEDURE — 94761 N-INVAS EAR/PLS OXIMETRY MLT: CPT

## 2023-01-03 PROCEDURE — 84145 PROCALCITONIN (PCT): CPT | Performed by: STUDENT IN AN ORGANIZED HEALTH CARE EDUCATION/TRAINING PROGRAM

## 2023-01-03 PROCEDURE — 93306 TTE W/DOPPLER COMPLETE: CPT | Performed by: INTERNAL MEDICINE

## 2023-01-03 PROCEDURE — 25010000002 FUROSEMIDE PER 20 MG: Performed by: INTERNAL MEDICINE

## 2023-01-03 PROCEDURE — 82962 GLUCOSE BLOOD TEST: CPT

## 2023-01-03 PROCEDURE — 25010000002 HEPARIN (PORCINE) PER 1000 UNITS: Performed by: INTERNAL MEDICINE

## 2023-01-03 PROCEDURE — 63710000001 INSULIN LISPRO (HUMAN) PER 5 UNITS: Performed by: INTERNAL MEDICINE

## 2023-01-03 PROCEDURE — 25010000002 NA FERRIC GLUC CPLX PER 12.5 MG: Performed by: STUDENT IN AN ORGANIZED HEALTH CARE EDUCATION/TRAINING PROGRAM

## 2023-01-03 PROCEDURE — 83735 ASSAY OF MAGNESIUM: CPT | Performed by: INTERNAL MEDICINE

## 2023-01-03 PROCEDURE — 25010000002 CEFTRIAXONE PER 250 MG: Performed by: STUDENT IN AN ORGANIZED HEALTH CARE EDUCATION/TRAINING PROGRAM

## 2023-01-03 PROCEDURE — 94799 UNLISTED PULMONARY SVC/PX: CPT

## 2023-01-03 PROCEDURE — 93306 TTE W/DOPPLER COMPLETE: CPT

## 2023-01-03 PROCEDURE — 25010000002 AZITHROMYCIN PER 500 MG: Performed by: STUDENT IN AN ORGANIZED HEALTH CARE EDUCATION/TRAINING PROGRAM

## 2023-01-03 PROCEDURE — 99233 SBSQ HOSP IP/OBS HIGH 50: CPT | Performed by: STUDENT IN AN ORGANIZED HEALTH CARE EDUCATION/TRAINING PROGRAM

## 2023-01-03 PROCEDURE — 25010000002 SULFUR HEXAFLUORIDE MICROSPH 60.7-25 MG RECONSTITUTED SUSPENSION: Performed by: STUDENT IN AN ORGANIZED HEALTH CARE EDUCATION/TRAINING PROGRAM

## 2023-01-03 PROCEDURE — 85025 COMPLETE CBC W/AUTO DIFF WBC: CPT | Performed by: INTERNAL MEDICINE

## 2023-01-03 RX ORDER — PANTOPRAZOLE SODIUM 40 MG/1
40 TABLET, DELAYED RELEASE ORAL EVERY MORNING
Status: DISCONTINUED | OUTPATIENT
Start: 2023-01-03 | End: 2023-01-10 | Stop reason: HOSPADM

## 2023-01-03 RX ORDER — CEFTRIAXONE SODIUM 1 G/50ML
1 INJECTION, SOLUTION INTRAVENOUS EVERY 24 HOURS
Status: DISCONTINUED | OUTPATIENT
Start: 2023-01-03 | End: 2023-01-05

## 2023-01-03 RX ORDER — SPIRONOLACTONE 25 MG/1
12.5 TABLET ORAL DAILY
Status: DISCONTINUED | OUTPATIENT
Start: 2023-01-03 | End: 2023-01-10 | Stop reason: HOSPADM

## 2023-01-03 RX ORDER — MONTELUKAST SODIUM 10 MG/1
10 TABLET ORAL NIGHTLY
Status: DISCONTINUED | OUTPATIENT
Start: 2023-01-03 | End: 2023-01-10 | Stop reason: HOSPADM

## 2023-01-03 RX ORDER — CLOPIDOGREL BISULFATE 75 MG/1
75 TABLET ORAL DAILY
Status: DISCONTINUED | OUTPATIENT
Start: 2023-01-03 | End: 2023-01-10 | Stop reason: HOSPADM

## 2023-01-03 RX ORDER — AZELASTINE 1 MG/ML
2 SPRAY, METERED NASAL 2 TIMES DAILY
Status: DISCONTINUED | OUTPATIENT
Start: 2023-01-03 | End: 2023-01-10 | Stop reason: HOSPADM

## 2023-01-03 RX ORDER — ALLOPURINOL 100 MG/1
100 TABLET ORAL DAILY
Status: DISCONTINUED | OUTPATIENT
Start: 2023-01-03 | End: 2023-01-10 | Stop reason: HOSPADM

## 2023-01-03 RX ORDER — ASPIRIN 81 MG/1
81 TABLET ORAL DAILY
Status: DISCONTINUED | OUTPATIENT
Start: 2023-01-03 | End: 2023-01-10 | Stop reason: HOSPADM

## 2023-01-03 RX ADMIN — AZELASTINE HYDROCHLORIDE 2 SPRAY: 137 SPRAY, METERED NASAL at 20:45

## 2023-01-03 RX ADMIN — AMLODIPINE BESYLATE 5 MG: 5 TABLET ORAL at 10:57

## 2023-01-03 RX ADMIN — ACETAMINOPHEN 650 MG: 325 TABLET ORAL at 17:29

## 2023-01-03 RX ADMIN — AZELASTINE HYDROCHLORIDE 2 SPRAY: 137 SPRAY, METERED NASAL at 10:56

## 2023-01-03 RX ADMIN — CLOPIDOGREL BISULFATE 75 MG: 75 TABLET ORAL at 10:57

## 2023-01-03 RX ADMIN — ASPIRIN 81 MG: 81 TABLET, COATED ORAL at 10:57

## 2023-01-03 RX ADMIN — LEVOTHYROXINE SODIUM 100 MCG: 0.1 TABLET ORAL at 06:01

## 2023-01-03 RX ADMIN — MONTELUKAST 10 MG: 10 TABLET, FILM COATED ORAL at 20:45

## 2023-01-03 RX ADMIN — INSULIN LISPRO 2 UNITS: 100 INJECTION, SOLUTION INTRAVENOUS; SUBCUTANEOUS at 22:46

## 2023-01-03 RX ADMIN — SULFUR HEXAFLUORIDE 2 ML: KIT at 19:48

## 2023-01-03 RX ADMIN — FUROSEMIDE 40 MG: 10 INJECTION, SOLUTION INTRAMUSCULAR; INTRAVENOUS at 20:44

## 2023-01-03 RX ADMIN — ACETAMINOPHEN 650 MG: 325 TABLET ORAL at 07:20

## 2023-01-03 RX ADMIN — SODIUM CHLORIDE 125 MG: 9 INJECTION, SOLUTION INTRAVENOUS at 21:42

## 2023-01-03 RX ADMIN — ROSUVASTATIN 20 MG: 20 TABLET, FILM COATED ORAL at 21:42

## 2023-01-03 RX ADMIN — PANTOPRAZOLE SODIUM 40 MG: 40 TABLET, DELAYED RELEASE ORAL at 10:57

## 2023-01-03 RX ADMIN — ALLOPURINOL 100 MG: 100 TABLET ORAL at 10:58

## 2023-01-03 RX ADMIN — FUROSEMIDE 40 MG: 10 INJECTION, SOLUTION INTRAMUSCULAR; INTRAVENOUS at 10:56

## 2023-01-03 RX ADMIN — HEPARIN SODIUM 5000 UNITS: 5000 INJECTION INTRAVENOUS; SUBCUTANEOUS at 20:44

## 2023-01-03 RX ADMIN — CEFTRIAXONE SODIUM 1 G: 1 INJECTION, SOLUTION INTRAVENOUS at 22:00

## 2023-01-03 RX ADMIN — Medication 10 ML: at 11:03

## 2023-01-03 RX ADMIN — SPIRONOLACTONE 12.5 MG: 25 TABLET ORAL at 11:02

## 2023-01-03 RX ADMIN — HEPARIN SODIUM 5000 UNITS: 5000 INJECTION INTRAVENOUS; SUBCUTANEOUS at 10:56

## 2023-01-03 RX ADMIN — NEBIVOLOL 10 MG: 10 TABLET ORAL at 10:58

## 2023-01-03 RX ADMIN — Medication 10 ML: at 20:45

## 2023-01-03 NOTE — PLAN OF CARE
Goal Outcome Evaluation:  POC Reviewed with: Patient  Status: Progressing    Patient continues with SOA with exertion, but is able to tolerate ambulating to the bathroom with extra time allowed for recovery. Productive cough noted, with clear sputum. No reports of pain or nausea this shift. Remains on continuous pulse ox and O2 at 2l per NC.

## 2023-01-03 NOTE — PLAN OF CARE
Problem: Adult Inpatient Plan of Care  Goal: Plan of Care Review  Outcome: Ongoing, Progressing  Goal: Patient-Specific Goal (Individualized)  Outcome: Ongoing, Progressing  Goal: Absence of Hospital-Acquired Illness or Injury  Outcome: Ongoing, Progressing  Intervention: Identify and Manage Fall Risk  Recent Flowsheet Documentation  Taken 1/3/2023 0830 by Binh Krishnan RN  Safety Promotion/Fall Prevention: safety round/check completed  Intervention: Prevent Skin Injury  Recent Flowsheet Documentation  Taken 1/3/2023 0830 by Binh Krishnan RN  Body Position: position changed independently  Intervention: Prevent and Manage VTE (Venous Thromboembolism) Risk  Recent Flowsheet Documentation  Taken 1/3/2023 0830 by Binh Krishnan RN  Activity Management:   activity encouraged   ambulated in room   ambulated to bathroom   back to bed  VTE Prevention/Management:   sequential compression devices off   sequential compression devices on  Range of Motion: active ROM (range of motion) encouraged  Intervention: Prevent Infection  Recent Flowsheet Documentation  Taken 1/3/2023 0830 by Binh Krishnan RN  Infection Prevention:   hand hygiene promoted   single patient room provided  Goal: Optimal Comfort and Wellbeing  Outcome: Ongoing, Progressing  Intervention: Provide Person-Centered Care  Recent Flowsheet Documentation  Taken 1/3/2023 0830 by Binh Krishnan RN  Trust Relationship/Rapport:   care explained   choices provided  Goal: Readiness for Transition of Care  Outcome: Ongoing, Progressing     Problem: Fall Injury Risk  Goal: Absence of Fall and Fall-Related Injury  Outcome: Ongoing, Progressing  Intervention: Promote Injury-Free Environment  Recent Flowsheet Documentation  Taken 1/3/2023 0830 by Binh Krishnan RN  Safety Promotion/Fall Prevention: safety round/check completed     Problem: Skin Injury Risk Increased  Goal: Skin Health and Integrity  Outcome: Ongoing,  Progressing  Intervention: Optimize Skin Protection  Recent Flowsheet Documentation  Taken 1/3/2023 0830 by Binh Krishnan, RN  Pressure Reduction Techniques: frequent weight shift encouraged  Pressure Reduction Devices: pressure-redistributing mattress utilized     Problem: Asthma Comorbidity  Goal: Maintenance of Asthma Control  Outcome: Ongoing, Progressing     Problem: COPD (Chronic Obstructive Pulmonary Disease) Comorbidity  Goal: Maintenance of COPD Symptom Control  Outcome: Ongoing, Progressing     Problem: Diabetes Comorbidity  Goal: Blood Glucose Level Within Targeted Range  Outcome: Ongoing, Progressing  Intervention: Monitor and Manage Glycemia  Recent Flowsheet Documentation  Taken 1/3/2023 0830 by Binh Krishnan, RN  Glycemic Management: blood glucose monitored     Problem: Heart Failure Comorbidity  Goal: Maintenance of Heart Failure Symptom Control  Outcome: Ongoing, Progressing     Problem: Hypertension Comorbidity  Goal: Blood Pressure in Desired Range  Outcome: Ongoing, Progressing   Goal Outcome Evaluation:

## 2023-01-03 NOTE — PLAN OF CARE
Problem: Adult Inpatient Plan of Care  Goal: Plan of Care Review  Outcome: Ongoing, Progressing  Goal: Patient-Specific Goal (Individualized)  Outcome: Ongoing, Progressing  Goal: Absence of Hospital-Acquired Illness or Injury  Outcome: Ongoing, Progressing  Goal: Optimal Comfort and Wellbeing  Outcome: Ongoing, Progressing  Goal: Readiness for Transition of Care  Outcome: Ongoing, Progressing     Problem: Fall Injury Risk  Goal: Absence of Fall and Fall-Related Injury  Outcome: Ongoing, Progressing     Problem: Skin Injury Risk Increased  Goal: Skin Health and Integrity  Outcome: Ongoing, Progressing   Goal Outcome Evaluation:

## 2023-01-03 NOTE — PROGRESS NOTES
[facility]   Hospitalist Progress Note  Date: 1/3/2023  Patient Name: Yelena Sanchez  : 1935  MRN: [id number]  Date of admission: 2023      Subjective   Subjective     Chief Complaint: Shortness of breath    Summary: 87-year-old female presented with shortness of breath [place] being treated for acute on chronic heart failure with preserved ejection fraction exacerbation.  Cardiology has been consulted.    Interval Followup: No events overnight.  Patient extremely hard of hearing.  She states that her swelling is improving.  She still remains short of breath.  She has not any fevers or chills.  No complaints of chest pain.    Review of Systems  All systems reviewed and are negative except as above in HPI.    Objective   Objective     Vitals:   Temp:  [99 °F (37.2 °C)-100.7 °F (38.2 °C)] 99.2 °F (37.3 °C)  Heart Rate:  [69-85] 75  Resp:  [18-20] 20  BP: (145-169)/() 165/52  Flow (L/min):  [2-4] 2  Physical Exam   Constitutional: Alert, awake, no acute distress  HEENT:  PERRLA, EOMI; No Scleral icterus  Neck:  Supple; No Mass, No Lymphadenopathy  Cardiovascular:  No Rubs, No Edema, No JVD  Respiratory: Clear bilaterally, no wheezing, bibasilar crackles, diminished breath sounds  Abdomen:  Normal BS all 4 Quadrants; No Guarding, No Tenderness  Musculoskeletal:  Pulses Positive all 4 Ext; No Cyanosis, lower extremity edema  Neurological:  Alert+Ox3, Mental status WNL; No Dysarthria  Skin:  No Rash, No Cellulitis, No Erythema    Result Review    Result Review:  I have personally reviewed the results from the time of this admission to 1/3/2023 09:12 EST and agree with these findings:  [x]  Laboratory  [x]  Microbiology  [x]  Radiology  [x]  EKG/Telemetry   []  Cardiology/Vascular   []  Pathology  [x]  Old records  []  Other:    Assessment & Plan   Assessment / Plan     Assessment:  Acute on chronic diastolic heart failure with preserved ejection fraction exacerbation  Chronic kidney disease stage  IIIb  Community-acquired pneumonia  Staph bacteremia, suspect this is a contaminant, only 1 of 2 bottles  Essential hypertension  Coronary artery disease  Status post ICD, interrogation on 12/19/2022    Plan:  Continue to monitor as inpatient  Patient diuresing well, continue with Lasix 40 mg IV twice daily  Monitor I's and O's, daily weights  Continue to monitor renal function, currently remained stable, recheck CMP in a.m.  Continue with aspirin, Plavix, statin, beta-blocker therapy given coronary disease  Continue with Protonix daily  Patient did have elevated temperature reading and procalcitonin was elevated today on labs, will add azithromycin Rocephin for pneumonia  PT/OT on board  Added Chloraseptic Spray  Check iron panel, ferritin in a.m.  PT added, appreciate assistance  Case management assistance appreciated  Labs reviewed, imaging reviewed, medications reviewed, vital signs reviewed  Recommendations pending clinical course        Discussed plan with RN.    DVT prophylaxis:  Medical DVT prophylaxis orders are present.    CODE STATUS:   Code Status (Patient has no pulse and is not breathing): CPR (Attempt to Resuscitate)  Medical Interventions (Patient has pulse or is breathing): Full Support        Electronically signed by Gumaro Cole DO, 01/03/23, 9:12 AM EST.

## 2023-01-04 ENCOUNTER — APPOINTMENT (OUTPATIENT)
Dept: CT IMAGING | Facility: HOSPITAL | Age: 88
DRG: 291 | End: 2023-01-04
Payer: MEDICARE

## 2023-01-04 LAB
ALBUMIN SERPL-MCNC: 3.5 G/DL (ref 3.5–5.2)
ALBUMIN/GLOB SERPL: 1.3 G/DL
ALP SERPL-CCNC: 82 U/L (ref 39–117)
ALT SERPL W P-5'-P-CCNC: 8 U/L (ref 1–33)
ANION GAP SERPL CALCULATED.3IONS-SCNC: 12.3 MMOL/L (ref 5–15)
AST SERPL-CCNC: 12 U/L (ref 1–32)
BACTERIA SPEC AEROBE CULT: ABNORMAL
BASOPHILS # BLD AUTO: 0.01 10*3/MM3 (ref 0–0.2)
BASOPHILS NFR BLD AUTO: 0.1 % (ref 0–1.5)
BH CV ECHO MEAS - AO ROOT DIAM: 2.9 CM
BH CV ECHO MEAS - EDV(MOD-SP2): 172 ML
BH CV ECHO MEAS - EDV(MOD-SP4): 138 ML
BH CV ECHO MEAS - EF(MOD-BP): 40 %
BH CV ECHO MEAS - ESV(MOD-SP2): 102 ML
BH CV ECHO MEAS - ESV(MOD-SP4): 82.4 ML
BH CV ECHO MEAS - IVSD: 1 CM
BH CV ECHO MEAS - LA A2CS (ATRIAL LENGTH): 6 CM
BH CV ECHO MEAS - LA A4C LENGTH: 6 CM
BH CV ECHO MEAS - LA DIMENSION: 2.9 CM
BH CV ECHO MEAS - LVIDD: 5.6 CM
BH CV ECHO MEAS - LVIDS: 4.5 CM
BH CV ECHO MEAS - LVPWD: 1 CM
BH CV ECHO MEAS - MV A MAX VEL: 91.8 CM/SEC
BH CV ECHO MEAS - MV DEC TIME: 255 MSEC
BH CV ECHO MEAS - MV E MAX VEL: 66.7 CM/SEC
BH CV ECHO MEAS - MV E/A: 0.7
BH CV ECHO MEAS - RAP SYSTOLE: 3 MMHG
BH CV ECHO MEAS - RVDD: 2.9 CM
BH CV ECHO MEAS - RVSP: 23 MMHG
BH CV ECHO MEAS - TR MAX PG: 20 MMHG
BH CV ECHO MEAS - TR MAX VEL: 224 CM/SEC
BILIRUB SERPL-MCNC: <0.2 MG/DL (ref 0–1.2)
BUN SERPL-MCNC: 40 MG/DL (ref 8–23)
BUN/CREAT SERPL: 24.7 (ref 7–25)
CALCIUM SPEC-SCNC: 8.8 MG/DL (ref 8.6–10.5)
CHLORIDE SERPL-SCNC: 102 MMOL/L (ref 98–107)
CO2 SERPL-SCNC: 26.7 MMOL/L (ref 22–29)
CREAT SERPL-MCNC: 1.62 MG/DL (ref 0.57–1)
DEPRECATED RDW RBC AUTO: 55.7 FL (ref 37–54)
EGFRCR SERPLBLD CKD-EPI 2021: 30.8 ML/MIN/1.73
EOSINOPHIL # BLD AUTO: 0.06 10*3/MM3 (ref 0–0.4)
EOSINOPHIL NFR BLD AUTO: 0.5 % (ref 0.3–6.2)
ERYTHROCYTE [DISTWIDTH] IN BLOOD BY AUTOMATED COUNT: 15.7 % (ref 12.3–15.4)
FERRITIN SERPL-MCNC: 80.63 NG/ML (ref 13–150)
GLOBULIN UR ELPH-MCNC: 2.6 GM/DL
GLUCOSE BLDC GLUCOMTR-MCNC: 108 MG/DL (ref 70–99)
GLUCOSE BLDC GLUCOMTR-MCNC: 116 MG/DL (ref 70–99)
GLUCOSE BLDC GLUCOMTR-MCNC: 91 MG/DL (ref 70–99)
GLUCOSE BLDC GLUCOMTR-MCNC: 94 MG/DL (ref 70–99)
GLUCOSE SERPL-MCNC: 97 MG/DL (ref 65–99)
GRAM STN SPEC: ABNORMAL
HCT VFR BLD AUTO: 31.1 % (ref 34–46.6)
HGB BLD-MCNC: 9.4 G/DL (ref 12–15.9)
IMM GRANULOCYTES # BLD AUTO: 0.03 10*3/MM3 (ref 0–0.05)
IMM GRANULOCYTES NFR BLD AUTO: 0.3 % (ref 0–0.5)
IRON 24H UR-MRATE: 114 MCG/DL (ref 37–145)
IRON SATN MFR SERPL: 37 % (ref 20–50)
ISOLATED FROM: ABNORMAL
IVRT: 56 MSEC
LEFT ATRIUM VOLUME INDEX: 53.3 ML/M2
LEFT ATRIUM VOLUME: 91.2 ML
LYMPHOCYTES # BLD AUTO: 0.39 10*3/MM3 (ref 0.7–3.1)
LYMPHOCYTES NFR BLD AUTO: 3.5 % (ref 19.6–45.3)
MAGNESIUM SERPL-MCNC: 2.1 MG/DL (ref 1.6–2.4)
MAXIMAL PREDICTED HEART RATE: 134 BPM
MCH RBC QN AUTO: 29.4 PG (ref 26.6–33)
MCHC RBC AUTO-ENTMCNC: 30.2 G/DL (ref 31.5–35.7)
MCV RBC AUTO: 97.2 FL (ref 79–97)
MONOCYTES # BLD AUTO: 1.76 10*3/MM3 (ref 0.1–0.9)
MONOCYTES NFR BLD AUTO: 15.6 % (ref 5–12)
NEUTROPHILS NFR BLD AUTO: 80 % (ref 42.7–76)
NEUTROPHILS NFR BLD AUTO: 9.05 10*3/MM3 (ref 1.7–7)
NRBC BLD AUTO-RTO: 0 /100 WBC (ref 0–0.2)
PHOSPHATE SERPL-MCNC: 3.1 MG/DL (ref 2.5–4.5)
PLATELET # BLD AUTO: 214 10*3/MM3 (ref 140–450)
PMV BLD AUTO: 11 FL (ref 6–12)
POTASSIUM SERPL-SCNC: 3.8 MMOL/L (ref 3.5–5.2)
PROT SERPL-MCNC: 6.1 G/DL (ref 6–8.5)
RBC # BLD AUTO: 3.2 10*6/MM3 (ref 3.77–5.28)
SODIUM SERPL-SCNC: 141 MMOL/L (ref 136–145)
STRESS TARGET HR: 114 BPM
TIBC SERPL-MCNC: 308 MCG/DL (ref 298–536)
TRANSFERRIN SERPL-MCNC: 207 MG/DL (ref 200–360)
WBC NRBC COR # BLD: 11.3 10*3/MM3 (ref 3.4–10.8)

## 2023-01-04 PROCEDURE — 80053 COMPREHEN METABOLIC PANEL: CPT | Performed by: STUDENT IN AN ORGANIZED HEALTH CARE EDUCATION/TRAINING PROGRAM

## 2023-01-04 PROCEDURE — 25010000002 HEPARIN (PORCINE) PER 1000 UNITS: Performed by: INTERNAL MEDICINE

## 2023-01-04 PROCEDURE — 94761 N-INVAS EAR/PLS OXIMETRY MLT: CPT

## 2023-01-04 PROCEDURE — 97161 PT EVAL LOW COMPLEX 20 MIN: CPT

## 2023-01-04 PROCEDURE — 25010000002 CEFTRIAXONE PER 250 MG: Performed by: STUDENT IN AN ORGANIZED HEALTH CARE EDUCATION/TRAINING PROGRAM

## 2023-01-04 PROCEDURE — 99233 SBSQ HOSP IP/OBS HIGH 50: CPT | Performed by: STUDENT IN AN ORGANIZED HEALTH CARE EDUCATION/TRAINING PROGRAM

## 2023-01-04 PROCEDURE — 83540 ASSAY OF IRON: CPT | Performed by: STUDENT IN AN ORGANIZED HEALTH CARE EDUCATION/TRAINING PROGRAM

## 2023-01-04 PROCEDURE — 82962 GLUCOSE BLOOD TEST: CPT

## 2023-01-04 PROCEDURE — 25010000002 FUROSEMIDE PER 20 MG: Performed by: INTERNAL MEDICINE

## 2023-01-04 PROCEDURE — 70490 CT SOFT TISSUE NECK W/O DYE: CPT

## 2023-01-04 PROCEDURE — 94664 DEMO&/EVAL PT USE INHALER: CPT

## 2023-01-04 PROCEDURE — 84100 ASSAY OF PHOSPHORUS: CPT | Performed by: STUDENT IN AN ORGANIZED HEALTH CARE EDUCATION/TRAINING PROGRAM

## 2023-01-04 PROCEDURE — 94799 UNLISTED PULMONARY SVC/PX: CPT

## 2023-01-04 PROCEDURE — 84466 ASSAY OF TRANSFERRIN: CPT | Performed by: STUDENT IN AN ORGANIZED HEALTH CARE EDUCATION/TRAINING PROGRAM

## 2023-01-04 PROCEDURE — 85025 COMPLETE CBC W/AUTO DIFF WBC: CPT | Performed by: STUDENT IN AN ORGANIZED HEALTH CARE EDUCATION/TRAINING PROGRAM

## 2023-01-04 PROCEDURE — 82728 ASSAY OF FERRITIN: CPT | Performed by: STUDENT IN AN ORGANIZED HEALTH CARE EDUCATION/TRAINING PROGRAM

## 2023-01-04 PROCEDURE — 83735 ASSAY OF MAGNESIUM: CPT | Performed by: STUDENT IN AN ORGANIZED HEALTH CARE EDUCATION/TRAINING PROGRAM

## 2023-01-04 RX ORDER — CHOLECALCIFEROL (VITAMIN D3) 125 MCG
5 CAPSULE ORAL NIGHTLY PRN
Status: DISCONTINUED | OUTPATIENT
Start: 2023-01-04 | End: 2023-01-10 | Stop reason: HOSPADM

## 2023-01-04 RX ORDER — AZITHROMYCIN 250 MG/1
500 TABLET, FILM COATED ORAL EVERY 24 HOURS
Status: DISPENSED | OUTPATIENT
Start: 2023-01-04 | End: 2023-01-06

## 2023-01-04 RX ORDER — IPRATROPIUM BROMIDE AND ALBUTEROL SULFATE 2.5; .5 MG/3ML; MG/3ML
3 SOLUTION RESPIRATORY (INHALATION) EVERY 4 HOURS PRN
Status: DISCONTINUED | OUTPATIENT
Start: 2023-01-04 | End: 2023-01-05

## 2023-01-04 RX ADMIN — HEPARIN SODIUM 5000 UNITS: 5000 INJECTION INTRAVENOUS; SUBCUTANEOUS at 21:45

## 2023-01-04 RX ADMIN — NEBIVOLOL 10 MG: 10 TABLET ORAL at 09:04

## 2023-01-04 RX ADMIN — FUROSEMIDE 40 MG: 10 INJECTION, SOLUTION INTRAMUSCULAR; INTRAVENOUS at 09:04

## 2023-01-04 RX ADMIN — AMLODIPINE BESYLATE 5 MG: 5 TABLET ORAL at 09:04

## 2023-01-04 RX ADMIN — MONTELUKAST 10 MG: 10 TABLET, FILM COATED ORAL at 21:45

## 2023-01-04 RX ADMIN — CEFTRIAXONE SODIUM 1 G: 1 INJECTION, SOLUTION INTRAVENOUS at 17:28

## 2023-01-04 RX ADMIN — SPIRONOLACTONE 12.5 MG: 25 TABLET ORAL at 09:04

## 2023-01-04 RX ADMIN — IPRATROPIUM BROMIDE AND ALBUTEROL SULFATE 3 ML: 2.5; .5 SOLUTION RESPIRATORY (INHALATION) at 07:02

## 2023-01-04 RX ADMIN — Medication 10 ML: at 21:47

## 2023-01-04 RX ADMIN — ASPIRIN 81 MG: 81 TABLET, COATED ORAL at 09:04

## 2023-01-04 RX ADMIN — LEVOTHYROXINE SODIUM 100 MCG: 0.1 TABLET ORAL at 06:12

## 2023-01-04 RX ADMIN — FUROSEMIDE 40 MG: 10 INJECTION, SOLUTION INTRAMUSCULAR; INTRAVENOUS at 21:45

## 2023-01-04 RX ADMIN — AZITHROMYCIN MONOHYDRATE 500 MG: 250 TABLET ORAL at 18:03

## 2023-01-04 RX ADMIN — Medication 10 ML: at 09:05

## 2023-01-04 RX ADMIN — CLOPIDOGREL BISULFATE 75 MG: 75 TABLET ORAL at 09:04

## 2023-01-04 RX ADMIN — AZELASTINE HYDROCHLORIDE 2 SPRAY: 137 SPRAY, METERED NASAL at 21:45

## 2023-01-04 RX ADMIN — HEPARIN SODIUM 5000 UNITS: 5000 INJECTION INTRAVENOUS; SUBCUTANEOUS at 09:03

## 2023-01-04 RX ADMIN — ALLOPURINOL 100 MG: 100 TABLET ORAL at 09:04

## 2023-01-04 RX ADMIN — ROSUVASTATIN 20 MG: 20 TABLET, FILM COATED ORAL at 21:45

## 2023-01-04 RX ADMIN — PANTOPRAZOLE SODIUM 40 MG: 40 TABLET, DELAYED RELEASE ORAL at 06:11

## 2023-01-04 RX ADMIN — AZELASTINE HYDROCHLORIDE 2 SPRAY: 137 SPRAY, METERED NASAL at 09:05

## 2023-01-04 NOTE — PLAN OF CARE
Goal Outcome Evaluation:  POC Reviewed with: Patient  Status: Progressing    Patient noted with frequent productive cough overnight, with clear thin sputum noted. Complained of sore throat, relieved with ordered Chloraseptic. Patient ambulated to bathroom without oxygen in place x 1 overnight, O2 sat noted as 72% and SOA. Oxygen reapplied at 2L,NC, and saturation recovered to 97% within 1 minute. Reminded patient to keep O2 in place and instructed patient on deep breathing and coughing to help with respiratory toilet. No complaints of pain or nausea overnight and remained afebrile.

## 2023-01-04 NOTE — PROGRESS NOTES
[facility]   Hospitalist Progress Note  Date: 2023  Patient Name: Yelena Sanchez  : 1935  MRN: [id number]  Date of admission: 2023      Subjective   Subjective     Chief Complaint: Shortness of breath    Summary: 87-year-old female presented with shortness of breath S[place] being treated for acute on chronic heart failure with preserved ejection fraction exacerbation.  Cardiology has been consulted.    Interval Followup: Patient remains on some oxygen.  She has no current chest pain or chest pressure.  She still is having some difficulty breathing.  She sounds like she has garbled speech today.  She does tell me that she is having some difficulty with swallowing and breathing.  On review of records patient did have a soft tissue mass previously noted back on imaging and 2022.  Discussed we will repeat imaging today.  Patient has been seen by ENT in the past.   at bedside updated on plan of care.      Review of Systems  All systems reviewed and are negative except as above in HPI.    Objective   Objective     Vitals:   Temp:  [97.4 °F (36.3 °C)-99.3 °F (37.4 °C)] 97.4 °F (36.3 °C)  Heart Rate:  [61-68] 61  Resp:  [16-20] 18  BP: (107-155)/(44-69) 145/46  Flow (L/min):  [2] 2  Physical Exam   Constitutional: Alert, awake, up in bedside chair  HEENT:  PERRLA, EOMI; No Scleral icterus  Neck:  Supple; No Mass, No Lymphadenopathy  Cardiovascular:  No Rubs, No Edema, No JVD  Respiratory: Still with wheezing, rhonchi, crackles, diminished breath sounds, on nasal cannula  Abdomen:  Normal BS all 4 Quadrants; No Guarding, No Tenderness  Musculoskeletal:  Pulses Positive all 4 Ext; No Cyanosis, lower extremity edema  Neurological:  Alert+Ox3, Mental status WNL; No Dysarthria  Skin:  No Rash, No Cellulitis, No Erythema    Result Review    Result Review:  I have personally reviewed the results from the time of this admission to 2023 09:08 EST and agree with these findings:  [x]   Laboratory  [x]  Microbiology  [x]  Radiology  [x]  EKG/Telemetry   []  Cardiology/Vascular   []  Pathology  [x]  Old records  []  Other:    Assessment & Plan   Assessment / Plan     Assessment:  Acute on chronic diastolic heart failure with preserved ejection fraction exacerbation  Chronic kidney disease stage IIIb  Community-acquired pneumonia  Staph bacteremia, suspect this is a contaminant, only 1 of 2 bottles  Essential hypertension  Coronary artery disease  Status post ICD, interrogation on 12/19/2022    Plan:  CT soft tissue head and neck today  Consider ENT consult pending results  Continue to monitor as inpatient  Continue with Lasix 40 mg IV twice daily  Monitor I's and O's, daily weights  Continue to monitor renal function, currently remained stable  Continue with aspirin, Plavix, statin, beta-blocker therapy given coronary artery disease  Continue with Protonix daily  Continue with azithromycin and Rocephin for community-acquired pneumonia  PT/OT on board  Continue with Chloraseptic Spray  That is post Ferrlecit on 1/3/2023  PT assistance appreciated  Case management assistance appreciated  Labs reviewed, imaging reviewed, medications reviewed, vital signs reviewed  Further recommendations pending clinical course        Discussed plan with RN.    DVT prophylaxis:  Medical DVT prophylaxis orders are present.    CODE STATUS:   Code Status (Patient has no pulse and is not breathing): CPR (Attempt to Resuscitate)  Medical Interventions (Patient has pulse or is breathing): Full Support        Electronically signed by Gumaro Cole DO, 01/04/23, 9:08 AM EST.

## 2023-01-04 NOTE — THERAPY EVALUATION
Acute Care - Physical Therapy Initial Evaluation   [facility]     Patient Name: Yelena Sanchez  : 1935  MRN: [id number]  Today's Date: 2023      Visit Dx:     ICD-10-CM ICD-9-CM   1. Acute congestive heart failure, unspecified heart failure type (HCC)  I50.9 428.0   2. Acute pulmonary edema (HCC)  J81.0 518.4   3. Dyspnea, unspecified type  R06.00 786.09   4. Acute respiratory failure with hypoxia (HCC)  J96.01 518.81   5. Difficulty walking  R26.2 719.7     Patient Active Problem List   Diagnosis   • Iron deficiency anemia   • Primary osteoarthritis of left knee   • Chronic combined systolic and diastolic congestive heart failure (HCC)   • Presence of biventricular implantable cardioverter-defibrillator (ICD)   • Coronary artery disease involving native coronary artery of native heart without angina pectoris   • Mixed hyperlipidemia   • Essential hypertension   • Trochanteric bursitis of right hip   • Sciatica of right side   • Carotid artery disease (HCC)   • Acute congestive heart failure, unspecified heart failure type (HCC)     Past Medical History:   Diagnosis Date   • Arthritis    • Asthma    • Breast cancer (HCC)    • Congestive heart failure (HCC)    • COPD (chronic obstructive pulmonary disease) (HCC)    • Diverticulosis 2014   • GERD (gastroesophageal reflux disease)    • History of tobacco abuse    • HTN (hypertension)    • Hyperlipemia    • Hyperthyroidism    • Neck mass    • Seasonal allergies    • SOB (shortness of breath)      Past Surgical History:   Procedure Laterality Date   • COLONOSCOPY     • ENDOSCOPY     • EYE SURGERY      Implant; yes    • LUNG LOBECTOMY Left     Left lower lobe   • LUNG SURGERY     • MASTECTOMY      Left    • OTHER SURGICAL HISTORY      Joint Surgery   • PACEMAKER IMPLANTATION       PT Assessment (last 12 hours)     PT Evaluation and Treatment     Row Name 23 1500          Physical Therapy Time and Intention    Document Type evaluation  -AV      Mode of Treatment individual therapy;physical therapy  -AV     Row Name 01/04/23 1500          General Information    Patient Profile Reviewed yes  -AV     Patient Observations alert;cooperative  -AV     Prior Level of Function independent:;all household mobility;gait;transfer;ADL's  Ambulated without an assistive device. No home O2.  -AV     Equipment Currently Used at Home none  -AV     Existing Precautions/Restrictions oxygen therapy device and L/min  -AV     Row Name 01/04/23 1500          Living Environment    Current Living Arrangements home  -AV     Home Accessibility stairs to enter home;stairs within home  -AV     People in Home spouse  -AV     Row Name 01/04/23 1500          Home Main Entrance    Number of Stairs, Main Entrance one;two  -AV     Row Name 01/04/23 1500          Stairs Within Home, Primary    Stairs, Within Home, Primary Flight to basement  -AV     Row Name 01/04/23 1500          Range of Motion (ROM)    Range of Motion bilateral lower extremities;ROM is WFL  -AV     Row Name 01/04/23 1500          Strength (Manual Muscle Testing)    Strength (Manual Muscle Testing) bilateral lower extremities;strength is WFL  -AV     Row Name 01/04/23 1500          Bed Mobility    Bed Mobility supine-sit  -AV     Supine-Sit [place] (Bed Mobility) standby assist  -AV     Row Name 01/04/23 1500          Transfers    Transfers sit-stand transfer;stand-sit transfer  -AV     Row Name 01/04/23 1500          Sit-Stand Transfer    Sit-Stand [place] (Transfers) contact guard  -AV     Assistive Device (Sit-Stand Transfers) --  Bed rail  -AV     Row Name 01/04/23 1500          Stand-Sit Transfer    Stand-Sit [place] (Transfers) contact guard  -AV     Row Name 01/04/23 1500          Gait/Stairs (Locomotion)    Gait/Stairs Locomotion gait/ambulation independence;gait/ambulation assistive device;distance ambulated  -AV     [place] Level (Gait) contact guard  -AV     Assistive Device (Gait) --  HHA   -AV     Distance in Feet (Gait) 20  -AV     Pattern (Gait) step-through  -AV     Row Name 01/04/23 1500          Safety Issues, Functional Mobility    Impairments Affecting Function (Mobility) balance;endurance/activity tolerance;shortness of breath  -AV     Row Name 01/04/23 1500          Balance    Balance Assessment standing dynamic balance  -AV     Dynamic Standing Balance contact guard  -AV     Position/Device Used, Standing Balance supported  HHA  -AV     Row Name 01/04/23 1500          Plan of Care Review    Plan of Care Reviewed With patient  -AV     Progress no change  -AV     Outcome Evaluation Patient presents with deficits in balance, endurance, transfers, and ambulation. Patient will benefit from skilled PT services to address these mobility deficits and decrease risk of falls. Recommend home health following hospital discharge.  -AV     Row Name 01/04/23 1500          Therapy Assessment/Plan (PT)    Rehab Potential (PT) good, to achieve stated therapy goals  -AV     Criteria for Skilled Interventions Met (PT) yes;meets criteria  -AV     Therapy Frequency (PT) daily  -AV     Problem List (PT) problems related to;balance;mobility  -AV     Activity Limitations Related to Problem List (PT) unable to ambulate safely;unable to transfer safely  -AV     Row Name 01/04/23 1500          PT Evaluation Complexity    History, PT Evaluation Complexity no personal factors and/or comorbidities  -AV     Examination of Body Systems (PT Eval Complexity) total of 4 or more elements  -AV     Clinical Presentation (PT Evaluation Complexity) stable  -AV     Clinical Decision Making (PT Evaluation Complexity) low complexity  -AV     Overall Complexity (PT Evaluation Complexity) low complexity  -AV     Row Name 01/04/23 1500          Therapy Plan Review/Discharge Plan (PT)    Therapy Plan Review (PT) evaluation/treatment results reviewed;patient  -AV     Row Name 01/04/23 1500          Physical Therapy Goals    Transfer Goal  Selection (PT) transfer, PT goal 1  -AV     Gait Training Goal Selection (PT) gait training, PT goal 1  -AV     Row Name 01/04/23 1500          Transfer Goal 1 (PT)    Activity/Assistive Device (Transfer Goal 1, PT) transfers, all  -AV     [place] Level/Cues Needed (Transfer Goal 1, PT) independent  -AV     Time Frame (Transfer Goal 1, PT) 10 days  -AV     Row Name 01/04/23 1500          Gait Training Goal 1 (PT)    Activity/Assistive Device (Gait Training Goal 1, PT) gait (walking locomotion)  -AV     [place] Level (Gait Training Goal 1, PT) independent  -AV     Distance (Gait Training Goal 1, PT) 200  -AV     Time Frame (Gait Training Goal 1, PT) 10 days  -AV           User Key  (r) = Recorded By, (t) = Taken By, (c) = Cosigned By    Initials Name Provider Type    AV Arun Lockwood, PT Physical Therapist                Physical Therapy Education     Title: PT OT SLP Therapies (In Progress)     Topic: Physical Therapy (In Progress)     Point: Mobility training (Done)     Learning Progress Summary           Patient Acceptance, E,TB, VU by AV at 1/4/2023 1546                   Point: Home exercise program (Not Started)     Learner Progress:  Not documented in this visit.          Point: Body mechanics (Done)     Learning Progress Summary           Patient Acceptance, E,TB, VU by AV at 1/4/2023 1546                   Point: Precautions (Done)     Learning Progress Summary           Patient Acceptance, E,TB, VU by AV at 1/4/2023 1546                               User Key     Initials Effective Dates Name Provider Type Discipline     06/11/21 -  Arun Lockwood, PT Physical Therapist PT              PT Recommendation and Plan  Anticipated Discharge Disposition (PT): home with home health  Planned Therapy Interventions (PT): balance training, bed mobility training, gait training, neuromuscular re-education, strengthening, transfer training  Therapy Frequency (PT): daily  Plan of Care Reviewed With:  patient  Progress: no change  Outcome Evaluation: Patient presents with deficits in balance, endurance, transfers, and ambulation. Patient will benefit from skilled PT services to address these mobility deficits and decrease risk of falls. Recommend home health following hospital discharge.   Outcome Measures     Row Name 01/04/23 1500             How much help from another person do you currently need...    Turning from your back to your side while in flat bed without using bedrails? 4  -AV      Moving from lying on back to sitting on the side of a flat bed without bedrails? 3  -AV      Moving to and from a bed to a chair (including a wheelchair)? 3  -AV      Standing up from a chair using your arms (e.g., wheelchair, bedside chair)? 3  -AV      Climbing 3-5 steps with a railing? 3  -AV      To walk in hospital room? 3  -AV      AM-PAC 6 Clicks Score (PT) 19  -AV         Functional Assessment    Outcome Measure Options AM-PAC 6 Clicks Basic Mobility (PT)  -AV            User Key  (r) = Recorded By, (t) = Taken By, (c) = Cosigned By    Initials Name Provider Type    Arun Pan, PT Physical Therapist                 Time Calculation:    PT Charges     Row Name 01/04/23 1545             Time Calculation    PT Received On 01/04/23  -AV      PT Goal Re-Cert Due Date 01/13/23  -AV         Untimed Charges    PT Eval/Re-eval Minutes 33  -AV         Total Minutes    Untimed Charges Total Minutes 33  -AV       Total Minutes 33  -AV            User Key  (r) = Recorded By, (t) = Taken By, (c) = Cosigned By    Initials Name Provider Type    Arun Pan, PT Physical Therapist              Therapy Charges for Today     Code Description Service Date Service Provider Modifiers Qty    [id number] HC PT EVAL LOW COMPLEXITY 3 1/4/2023 Arun Lockwood, PT GP 1          PT G-Codes  Outcome Measure Options: AM-PAC 6 Clicks Basic Mobility (PT)  AM-PAC 6 Clicks Score (PT): 19    Arun Lockwood PT  1/4/2023

## 2023-01-05 LAB
ALBUMIN SERPL-MCNC: 3.5 G/DL (ref 3.5–5.2)
ALBUMIN/GLOB SERPL: 1.2 G/DL
ALP SERPL-CCNC: 83 U/L (ref 39–117)
ALT SERPL W P-5'-P-CCNC: 9 U/L (ref 1–33)
ANION GAP SERPL CALCULATED.3IONS-SCNC: 10.2 MMOL/L (ref 5–15)
AST SERPL-CCNC: 13 U/L (ref 1–32)
BASOPHILS # BLD AUTO: 0.03 10*3/MM3 (ref 0–0.2)
BASOPHILS NFR BLD AUTO: 0.4 % (ref 0–1.5)
BILIRUB SERPL-MCNC: <0.2 MG/DL (ref 0–1.2)
BUN SERPL-MCNC: 43 MG/DL (ref 8–23)
BUN/CREAT SERPL: 27.7 (ref 7–25)
CALCIUM SPEC-SCNC: 9.3 MG/DL (ref 8.6–10.5)
CHLORIDE SERPL-SCNC: 99 MMOL/L (ref 98–107)
CO2 SERPL-SCNC: 27.8 MMOL/L (ref 22–29)
CREAT SERPL-MCNC: 1.55 MG/DL (ref 0.57–1)
DEPRECATED RDW RBC AUTO: 52.9 FL (ref 37–54)
EGFRCR SERPLBLD CKD-EPI 2021: 32.5 ML/MIN/1.73
EOSINOPHIL # BLD AUTO: 0.12 10*3/MM3 (ref 0–0.4)
EOSINOPHIL NFR BLD AUTO: 1.5 % (ref 0.3–6.2)
ERYTHROCYTE [DISTWIDTH] IN BLOOD BY AUTOMATED COUNT: 15 % (ref 12.3–15.4)
GLOBULIN UR ELPH-MCNC: 3 GM/DL
GLUCOSE BLDC GLUCOMTR-MCNC: 103 MG/DL (ref 70–99)
GLUCOSE BLDC GLUCOMTR-MCNC: 131 MG/DL (ref 70–99)
GLUCOSE BLDC GLUCOMTR-MCNC: 273 MG/DL (ref 70–99)
GLUCOSE SERPL-MCNC: 102 MG/DL (ref 65–99)
HCT VFR BLD AUTO: 30.7 % (ref 34–46.6)
HGB BLD-MCNC: 9.3 G/DL (ref 12–15.9)
IMM GRANULOCYTES # BLD AUTO: 0.02 10*3/MM3 (ref 0–0.05)
IMM GRANULOCYTES NFR BLD AUTO: 0.2 % (ref 0–0.5)
LYMPHOCYTES # BLD AUTO: 0.48 10*3/MM3 (ref 0.7–3.1)
LYMPHOCYTES NFR BLD AUTO: 5.8 % (ref 19.6–45.3)
MAGNESIUM SERPL-MCNC: 2.3 MG/DL (ref 1.6–2.4)
MCH RBC QN AUTO: 29.1 PG (ref 26.6–33)
MCHC RBC AUTO-ENTMCNC: 30.3 G/DL (ref 31.5–35.7)
MCV RBC AUTO: 95.9 FL (ref 79–97)
MONOCYTES # BLD AUTO: 1.22 10*3/MM3 (ref 0.1–0.9)
MONOCYTES NFR BLD AUTO: 14.8 % (ref 5–12)
NEUTROPHILS NFR BLD AUTO: 6.37 10*3/MM3 (ref 1.7–7)
NEUTROPHILS NFR BLD AUTO: 77.3 % (ref 42.7–76)
NRBC BLD AUTO-RTO: 0 /100 WBC (ref 0–0.2)
NT-PROBNP SERPL-MCNC: 3046 PG/ML (ref 0–1800)
PHOSPHATE SERPL-MCNC: 4.2 MG/DL (ref 2.5–4.5)
PLATELET # BLD AUTO: 225 10*3/MM3 (ref 140–450)
PMV BLD AUTO: 10.7 FL (ref 6–12)
POTASSIUM SERPL-SCNC: 3.7 MMOL/L (ref 3.5–5.2)
PROT SERPL-MCNC: 6.5 G/DL (ref 6–8.5)
RBC # BLD AUTO: 3.2 10*6/MM3 (ref 3.77–5.28)
SODIUM SERPL-SCNC: 137 MMOL/L (ref 136–145)
WBC NRBC COR # BLD: 8.24 10*3/MM3 (ref 3.4–10.8)

## 2023-01-05 PROCEDURE — 25010000002 HEPARIN (PORCINE) PER 1000 UNITS: Performed by: INTERNAL MEDICINE

## 2023-01-05 PROCEDURE — 83735 ASSAY OF MAGNESIUM: CPT | Performed by: STUDENT IN AN ORGANIZED HEALTH CARE EDUCATION/TRAINING PROGRAM

## 2023-01-05 PROCEDURE — 80053 COMPREHEN METABOLIC PANEL: CPT | Performed by: STUDENT IN AN ORGANIZED HEALTH CARE EDUCATION/TRAINING PROGRAM

## 2023-01-05 PROCEDURE — 94761 N-INVAS EAR/PLS OXIMETRY MLT: CPT

## 2023-01-05 PROCEDURE — 94799 UNLISTED PULMONARY SVC/PX: CPT

## 2023-01-05 PROCEDURE — 99233 SBSQ HOSP IP/OBS HIGH 50: CPT | Performed by: STUDENT IN AN ORGANIZED HEALTH CARE EDUCATION/TRAINING PROGRAM

## 2023-01-05 PROCEDURE — 25010000002 FUROSEMIDE PER 20 MG: Performed by: INTERNAL MEDICINE

## 2023-01-05 PROCEDURE — 84100 ASSAY OF PHOSPHORUS: CPT | Performed by: STUDENT IN AN ORGANIZED HEALTH CARE EDUCATION/TRAINING PROGRAM

## 2023-01-05 PROCEDURE — 83880 ASSAY OF NATRIURETIC PEPTIDE: CPT | Performed by: STUDENT IN AN ORGANIZED HEALTH CARE EDUCATION/TRAINING PROGRAM

## 2023-01-05 PROCEDURE — 25010000002 METHYLPREDNISOLONE PER 125 MG: Performed by: STUDENT IN AN ORGANIZED HEALTH CARE EDUCATION/TRAINING PROGRAM

## 2023-01-05 PROCEDURE — 63710000001 INSULIN LISPRO (HUMAN) PER 5 UNITS: Performed by: INTERNAL MEDICINE

## 2023-01-05 PROCEDURE — 82962 GLUCOSE BLOOD TEST: CPT

## 2023-01-05 PROCEDURE — 25010000002 AMPICILLIN-SULBACTAM PER 1.5 G: Performed by: STUDENT IN AN ORGANIZED HEALTH CARE EDUCATION/TRAINING PROGRAM

## 2023-01-05 PROCEDURE — 85025 COMPLETE CBC W/AUTO DIFF WBC: CPT | Performed by: STUDENT IN AN ORGANIZED HEALTH CARE EDUCATION/TRAINING PROGRAM

## 2023-01-05 RX ORDER — MULTIPLE VITAMINS W/ MINERALS TAB 9MG-400MCG
1 TAB ORAL DAILY
Status: DISCONTINUED | OUTPATIENT
Start: 2023-01-05 | End: 2023-01-10 | Stop reason: HOSPADM

## 2023-01-05 RX ORDER — PREDNISONE 20 MG/1
40 TABLET ORAL
Status: COMPLETED | OUTPATIENT
Start: 2023-01-06 | End: 2023-01-10

## 2023-01-05 RX ORDER — METHYLPREDNISOLONE SODIUM SUCCINATE 125 MG/2ML
125 INJECTION, POWDER, LYOPHILIZED, FOR SOLUTION INTRAMUSCULAR; INTRAVENOUS ONCE
Status: COMPLETED | OUTPATIENT
Start: 2023-01-05 | End: 2023-01-05

## 2023-01-05 RX ORDER — GUAIFENESIN 600 MG/1
600 TABLET, EXTENDED RELEASE ORAL EVERY 12 HOURS SCHEDULED
Status: DISCONTINUED | OUTPATIENT
Start: 2023-01-05 | End: 2023-01-10 | Stop reason: HOSPADM

## 2023-01-05 RX ORDER — MELATONIN
1000 DAILY
Status: DISCONTINUED | OUTPATIENT
Start: 2023-01-05 | End: 2023-01-10 | Stop reason: HOSPADM

## 2023-01-05 RX ORDER — ARFORMOTEROL TARTRATE 15 UG/2ML
15 SOLUTION RESPIRATORY (INHALATION)
Status: DISCONTINUED | OUTPATIENT
Start: 2023-01-05 | End: 2023-01-10 | Stop reason: HOSPADM

## 2023-01-05 RX ORDER — BUDESONIDE 0.5 MG/2ML
0.5 INHALANT ORAL
Status: DISCONTINUED | OUTPATIENT
Start: 2023-01-05 | End: 2023-01-10 | Stop reason: HOSPADM

## 2023-01-05 RX ORDER — FERROUS SULFATE 325(65) MG
325 TABLET ORAL
Status: DISCONTINUED | OUTPATIENT
Start: 2023-01-05 | End: 2023-01-10 | Stop reason: HOSPADM

## 2023-01-05 RX ORDER — IPRATROPIUM BROMIDE AND ALBUTEROL SULFATE 2.5; .5 MG/3ML; MG/3ML
3 SOLUTION RESPIRATORY (INHALATION)
Status: DISCONTINUED | OUTPATIENT
Start: 2023-01-05 | End: 2023-01-06

## 2023-01-05 RX ADMIN — AZELASTINE HYDROCHLORIDE 2 SPRAY: 137 SPRAY, METERED NASAL at 21:07

## 2023-01-05 RX ADMIN — CLOPIDOGREL BISULFATE 75 MG: 75 TABLET ORAL at 08:25

## 2023-01-05 RX ADMIN — AMPICILLIN SODIUM AND SULBACTAM SODIUM 3 G: 2; 1 INJECTION, POWDER, FOR SOLUTION INTRAMUSCULAR; INTRAVENOUS at 21:06

## 2023-01-05 RX ADMIN — LEVOTHYROXINE SODIUM 100 MCG: 0.1 TABLET ORAL at 07:51

## 2023-01-05 RX ADMIN — FUROSEMIDE 40 MG: 10 INJECTION, SOLUTION INTRAMUSCULAR; INTRAVENOUS at 22:32

## 2023-01-05 RX ADMIN — BUDESONIDE 0.5 MG: 0.5 INHALANT ORAL at 19:10

## 2023-01-05 RX ADMIN — Medication 1 TABLET: at 14:55

## 2023-01-05 RX ADMIN — IPRATROPIUM BROMIDE AND ALBUTEROL SULFATE 3 ML: 2.5; .5 SOLUTION RESPIRATORY (INHALATION) at 19:09

## 2023-01-05 RX ADMIN — IPRATROPIUM BROMIDE AND ALBUTEROL SULFATE 3 ML: 2.5; .5 SOLUTION RESPIRATORY (INHALATION) at 12:07

## 2023-01-05 RX ADMIN — ARFORMOTEROL TARTRATE INHALATION 15 MCG: 15 SOLUTION RESPIRATORY (INHALATION) at 12:07

## 2023-01-05 RX ADMIN — Medication 10 ML: at 08:26

## 2023-01-05 RX ADMIN — Medication 10 ML: at 21:06

## 2023-01-05 RX ADMIN — IPRATROPIUM BROMIDE AND ALBUTEROL SULFATE 3 ML: 2.5; .5 SOLUTION RESPIRATORY (INHALATION) at 16:24

## 2023-01-05 RX ADMIN — ASPIRIN 81 MG: 81 TABLET, COATED ORAL at 08:25

## 2023-01-05 RX ADMIN — Medication 1000 UNITS: at 14:55

## 2023-01-05 RX ADMIN — HEPARIN SODIUM 5000 UNITS: 5000 INJECTION INTRAVENOUS; SUBCUTANEOUS at 08:25

## 2023-01-05 RX ADMIN — ARFORMOTEROL TARTRATE INHALATION 15 MCG: 15 SOLUTION RESPIRATORY (INHALATION) at 19:10

## 2023-01-05 RX ADMIN — MONTELUKAST 10 MG: 10 TABLET, FILM COATED ORAL at 21:07

## 2023-01-05 RX ADMIN — GUAIFENESIN 600 MG: 600 TABLET ORAL at 12:18

## 2023-01-05 RX ADMIN — SPIRONOLACTONE 12.5 MG: 25 TABLET ORAL at 08:26

## 2023-01-05 RX ADMIN — GUAIFENESIN 600 MG: 600 TABLET ORAL at 21:07

## 2023-01-05 RX ADMIN — ACETAMINOPHEN 650 MG: 325 TABLET ORAL at 21:08

## 2023-01-05 RX ADMIN — ROSUVASTATIN 20 MG: 20 TABLET, FILM COATED ORAL at 21:08

## 2023-01-05 RX ADMIN — Medication 5 MG: at 21:07

## 2023-01-05 RX ADMIN — AZELASTINE HYDROCHLORIDE 2 SPRAY: 137 SPRAY, METERED NASAL at 08:26

## 2023-01-05 RX ADMIN — INSULIN LISPRO 4 UNITS: 100 INJECTION, SOLUTION INTRAVENOUS; SUBCUTANEOUS at 21:07

## 2023-01-05 RX ADMIN — HEPARIN SODIUM 5000 UNITS: 5000 INJECTION INTRAVENOUS; SUBCUTANEOUS at 21:07

## 2023-01-05 RX ADMIN — ALLOPURINOL 100 MG: 100 TABLET ORAL at 08:25

## 2023-01-05 RX ADMIN — Medication 5 MG: at 00:37

## 2023-01-05 RX ADMIN — METHYLPREDNISOLONE SODIUM SUCCINATE 125 MG: 125 INJECTION, POWDER, FOR SOLUTION INTRAMUSCULAR; INTRAVENOUS at 12:18

## 2023-01-05 RX ADMIN — PANTOPRAZOLE SODIUM 40 MG: 40 TABLET, DELAYED RELEASE ORAL at 08:25

## 2023-01-05 RX ADMIN — BUDESONIDE 0.5 MG: 0.5 INHALANT ORAL at 12:07

## 2023-01-05 RX ADMIN — AMLODIPINE BESYLATE 5 MG: 5 TABLET ORAL at 08:25

## 2023-01-05 RX ADMIN — NEBIVOLOL 10 MG: 10 TABLET ORAL at 08:26

## 2023-01-05 RX ADMIN — FERROUS SULFATE TAB 325 MG (65 MG ELEMENTAL FE) 325 MG: 325 (65 FE) TAB at 14:55

## 2023-01-05 RX ADMIN — FUROSEMIDE 40 MG: 10 INJECTION, SOLUTION INTRAMUSCULAR; INTRAVENOUS at 08:25

## 2023-01-05 NOTE — PLAN OF CARE
Goal Outcome Evaluation:  Plan of Care Reviewed With: patient        Progress: no change  Outcome Evaluation: Pt remained A&Ox4 this shift. Also, remained on 2L via nasal cannula maintaining stable oxygen saturation. Pt denied pain this shift. Pt underwent CT of shoft tissue of neck without contrast. Pt tolerated well. Blood glucose readinings were 94, 108, and 91. Sliding scale insulin not administed, as order parameters were not met. All other vital signs remained stable.

## 2023-01-05 NOTE — PLAN OF CARE
Goal Outcome Evaluation:           Progress: no change  Outcome Evaluation: Patient stable this shift since arriving on PCU2, no SOA noted, no concerns or complaints at this time, will continue to monitor.

## 2023-01-05 NOTE — CASE MANAGEMENT/SOCIAL WORK
HF cm reviewed chart.    Pt admitted for CHF. BNP >7800 EF 31-35% Pt sees Dr Talbot OP. Updated on AVS. Pt was last seen in the office 12/19. No upcoming appt noted in next month. Cardiology currently not consulted.     Pt prescribed norvasc, lasix, bystolic, aldactone for management of heart failure.     Per round md note, pt is having increased work of breathing today and having hard time engaging in conversation. Pt currently on 2L nc.     Will continue to follow for heart failure needs.

## 2023-01-05 NOTE — PROGRESS NOTES
[facility]   Hospitalist Progress Note  Date: 2023  Patient Name: Yelena Sanchez  : 1935  MRN: [id number]  Date of admission: 2023      Subjective   Subjective     Chief Complaint: Shortness of breath    Summary: 87-year-old female presented with shortness of breath [place] being treated for acute on chronic heart failure with preserved ejection fraction exacerbation.  Cardiology has been consulted.    Interval Followup: Patient remains dyspneic today.  She has increased work of breathing on my exam.  She has difficulty carrying on a conversation.  She does still complain of some discomfort in her neck.  I discussed with her that I talked to ENT and she will follow-up as outpatient for further evaluation.  She remains afebrile.      Review of Systems  All systems reviewed and are negative except as above in HPI.    Objective   Objective     Vitals:   Temp:  [97.6 °F (36.4 °C)-98.4 °F (36.9 °C)] 98.4 °F (36.9 °C)  Heart Rate:  [60-84] 84  Resp:  [16-20] 16  BP: (110-157)/(37-75) 138/53  Flow (L/min):  [2] 2  Physical Exam   Constitutional: Alert, awake, laying in bed  HEENT:  PERRLA, EOMI; No Scleral icterus, tongue appears edematous  Neck:  Supple; No Mass, No Lymphadenopathy  Cardiovascular:  No Rubs, No Edema, No JVD  Respiratory: Still with wheezing, rhonchi, crackles, diminished breath sounds, on nasal cannula  Abdomen:  Normal BS all 4 Quadrants; No Guarding, No Tenderness  Musculoskeletal:  Pulses Positive all 4 Ext; No Cyanosis, lower extremity edema  Neurological:  Alert+Ox3, Mental status WNL; No Dysarthria  Skin:  No Rash, No Cellulitis, No Erythema    Result Review    Result Review:  I have personally reviewed the results from the time of this admission to 2023 08:50 EST and agree with these findings:  [x]  Laboratory  [x]  Microbiology  [x]  Radiology  [x]  EKG/Telemetry   []  Cardiology/Vascular   []  Pathology  [x]  Old records  []  Other:    Assessment & Plan   Assessment /  Plan     Assessment:  Acute on chronic diastolic heart failure with preserved ejection fraction exacerbation  Chronic kidney disease stage IIIb  Community-acquired pneumonia  Suspect COPD exacerbation  Staph bacteremia, suspect this is a contaminant, only 1 of 2 bottles  Essential hypertension  Coronary artery disease  Status post ICD, interrogation on 12/19/2022    Plan:  Continue Sublinox and, wean as tolerated, maintain O2 saturation greater 90%  Continue Brovana, Pulmicort, DuoNebs, bronchodilator protocol  CT soft tissue head and neck showing no mass, discussed with ENT and no urgent need for evaluation, patient to follow-up outpatient for possible scope upon discharge  Continue to monitor as inpatient  Continue with Lasix 40 mg IV twice daily  Renal function elevated, will continue to monitor, recheck CMP in a.m.  Monitor I's and O's, daily weights  We will give methylprednisolone 125 mg once and start prednisone 40 mg daily for 5 days  Continue with aspirin, Plavix, statin, beta-blocker therapy given coronary artery disease  Continue with Protonix daily  Change azithromycin Rocephin to Unasyn for Community-acquired pneumonia  PT/OT following  Continue with Chloraseptic Spray  Status post Ferrlecit on 1/3/2023, restart ferrous sulfate 325 mg daily  PT assistance appreciated  Case management assistance appreciated  Labs reviewed, imaging reviewed, medications reviewed, vital signs reviewed  Further recommendations pending clinical course        Discussed plan with RN.    DVT prophylaxis:  Medical DVT prophylaxis orders are present.    CODE STATUS:   Code Status (Patient has no pulse and is not breathing): CPR (Attempt to Resuscitate)  Medical Interventions (Patient has pulse or is breathing): Full Support        Electronically signed by Gumaro Cole DO, 01/05/23, 8:50 AM EST.

## 2023-01-06 LAB
ALBUMIN SERPL-MCNC: 3.6 G/DL (ref 3.5–5.2)
ALBUMIN/GLOB SERPL: 1.2 G/DL
ALP SERPL-CCNC: 79 U/L (ref 39–117)
ALT SERPL W P-5'-P-CCNC: 10 U/L (ref 1–33)
ANION GAP SERPL CALCULATED.3IONS-SCNC: 11.7 MMOL/L (ref 5–15)
AST SERPL-CCNC: 14 U/L (ref 1–32)
BASOPHILS # BLD AUTO: 0 10*3/MM3 (ref 0–0.2)
BASOPHILS NFR BLD AUTO: 0 % (ref 0–1.5)
BILIRUB SERPL-MCNC: <0.2 MG/DL (ref 0–1.2)
BUN SERPL-MCNC: 53 MG/DL (ref 8–23)
BUN/CREAT SERPL: 28.5 (ref 7–25)
CALCIUM SPEC-SCNC: 9.6 MG/DL (ref 8.6–10.5)
CHLORIDE SERPL-SCNC: 101 MMOL/L (ref 98–107)
CO2 SERPL-SCNC: 28.3 MMOL/L (ref 22–29)
CREAT SERPL-MCNC: 1.86 MG/DL (ref 0.57–1)
DEPRECATED RDW RBC AUTO: 50.4 FL (ref 37–54)
EGFRCR SERPLBLD CKD-EPI 2021: 26.1 ML/MIN/1.73
EOSINOPHIL # BLD AUTO: 0 10*3/MM3 (ref 0–0.4)
EOSINOPHIL NFR BLD AUTO: 0 % (ref 0.3–6.2)
ERYTHROCYTE [DISTWIDTH] IN BLOOD BY AUTOMATED COUNT: 14.8 % (ref 12.3–15.4)
GLOBULIN UR ELPH-MCNC: 3.1 GM/DL
GLUCOSE BLDC GLUCOMTR-MCNC: 130 MG/DL (ref 70–99)
GLUCOSE BLDC GLUCOMTR-MCNC: 130 MG/DL (ref 70–99)
GLUCOSE BLDC GLUCOMTR-MCNC: 142 MG/DL (ref 70–99)
GLUCOSE BLDC GLUCOMTR-MCNC: 169 MG/DL (ref 70–99)
GLUCOSE SERPL-MCNC: 175 MG/DL (ref 65–99)
HCT VFR BLD AUTO: 28.6 % (ref 34–46.6)
HGB BLD-MCNC: 9 G/DL (ref 12–15.9)
IMM GRANULOCYTES # BLD AUTO: 0.02 10*3/MM3 (ref 0–0.05)
IMM GRANULOCYTES NFR BLD AUTO: 0.3 % (ref 0–0.5)
LYMPHOCYTES # BLD AUTO: 0.16 10*3/MM3 (ref 0.7–3.1)
LYMPHOCYTES NFR BLD AUTO: 2.5 % (ref 19.6–45.3)
MAGNESIUM SERPL-MCNC: 2.5 MG/DL (ref 1.6–2.4)
MCH RBC QN AUTO: 29.6 PG (ref 26.6–33)
MCHC RBC AUTO-ENTMCNC: 31.5 G/DL (ref 31.5–35.7)
MCV RBC AUTO: 94.1 FL (ref 79–97)
MONOCYTES # BLD AUTO: 0.16 10*3/MM3 (ref 0.1–0.9)
MONOCYTES NFR BLD AUTO: 2.5 % (ref 5–12)
NEUTROPHILS NFR BLD AUTO: 6.06 10*3/MM3 (ref 1.7–7)
NEUTROPHILS NFR BLD AUTO: 94.7 % (ref 42.7–76)
NRBC BLD AUTO-RTO: 0 /100 WBC (ref 0–0.2)
PHOSPHATE SERPL-MCNC: 4.9 MG/DL (ref 2.5–4.5)
PLATELET # BLD AUTO: 217 10*3/MM3 (ref 140–450)
PMV BLD AUTO: 10.4 FL (ref 6–12)
POTASSIUM SERPL-SCNC: 4.2 MMOL/L (ref 3.5–5.2)
PROT SERPL-MCNC: 6.7 G/DL (ref 6–8.5)
RBC # BLD AUTO: 3.04 10*6/MM3 (ref 3.77–5.28)
SODIUM SERPL-SCNC: 141 MMOL/L (ref 136–145)
WBC NRBC COR # BLD: 6.4 10*3/MM3 (ref 3.4–10.8)

## 2023-01-06 PROCEDURE — 63710000001 PREDNISONE PER 1 MG: Performed by: STUDENT IN AN ORGANIZED HEALTH CARE EDUCATION/TRAINING PROGRAM

## 2023-01-06 PROCEDURE — 85025 COMPLETE CBC W/AUTO DIFF WBC: CPT | Performed by: STUDENT IN AN ORGANIZED HEALTH CARE EDUCATION/TRAINING PROGRAM

## 2023-01-06 PROCEDURE — 99233 SBSQ HOSP IP/OBS HIGH 50: CPT | Performed by: STUDENT IN AN ORGANIZED HEALTH CARE EDUCATION/TRAINING PROGRAM

## 2023-01-06 PROCEDURE — 83735 ASSAY OF MAGNESIUM: CPT | Performed by: STUDENT IN AN ORGANIZED HEALTH CARE EDUCATION/TRAINING PROGRAM

## 2023-01-06 PROCEDURE — 25010000002 HEPARIN (PORCINE) PER 1000 UNITS: Performed by: INTERNAL MEDICINE

## 2023-01-06 PROCEDURE — 97116 GAIT TRAINING THERAPY: CPT

## 2023-01-06 PROCEDURE — 63710000001 INSULIN LISPRO (HUMAN) PER 5 UNITS: Performed by: INTERNAL MEDICINE

## 2023-01-06 PROCEDURE — 94799 UNLISTED PULMONARY SVC/PX: CPT

## 2023-01-06 PROCEDURE — 80053 COMPREHEN METABOLIC PANEL: CPT | Performed by: STUDENT IN AN ORGANIZED HEALTH CARE EDUCATION/TRAINING PROGRAM

## 2023-01-06 PROCEDURE — 97530 THERAPEUTIC ACTIVITIES: CPT

## 2023-01-06 PROCEDURE — 25010000002 AMPICILLIN-SULBACTAM PER 1.5 G: Performed by: STUDENT IN AN ORGANIZED HEALTH CARE EDUCATION/TRAINING PROGRAM

## 2023-01-06 PROCEDURE — 84100 ASSAY OF PHOSPHORUS: CPT | Performed by: STUDENT IN AN ORGANIZED HEALTH CARE EDUCATION/TRAINING PROGRAM

## 2023-01-06 PROCEDURE — 82962 GLUCOSE BLOOD TEST: CPT

## 2023-01-06 RX ORDER — IPRATROPIUM BROMIDE AND ALBUTEROL SULFATE 2.5; .5 MG/3ML; MG/3ML
3 SOLUTION RESPIRATORY (INHALATION) EVERY 4 HOURS PRN
Status: DISCONTINUED | OUTPATIENT
Start: 2023-01-06 | End: 2023-01-06

## 2023-01-06 RX ORDER — FUROSEMIDE 40 MG/1
40 TABLET ORAL DAILY
Status: DISCONTINUED | OUTPATIENT
Start: 2023-01-06 | End: 2023-01-10 | Stop reason: HOSPADM

## 2023-01-06 RX ORDER — CETIRIZINE HYDROCHLORIDE 10 MG/1
10 TABLET ORAL DAILY
Status: DISCONTINUED | OUTPATIENT
Start: 2023-01-06 | End: 2023-01-10 | Stop reason: HOSPADM

## 2023-01-06 RX ORDER — ALBUTEROL SULFATE 2.5 MG/3ML
2.5 SOLUTION RESPIRATORY (INHALATION) EVERY 4 HOURS PRN
Status: DISCONTINUED | OUTPATIENT
Start: 2023-01-06 | End: 2023-01-10 | Stop reason: HOSPADM

## 2023-01-06 RX ADMIN — BUDESONIDE 0.5 MG: 0.5 INHALANT ORAL at 20:19

## 2023-01-06 RX ADMIN — ACETAMINOPHEN 650 MG: 325 TABLET ORAL at 21:26

## 2023-01-06 RX ADMIN — ARFORMOTEROL TARTRATE INHALATION 15 MCG: 15 SOLUTION RESPIRATORY (INHALATION) at 06:41

## 2023-01-06 RX ADMIN — FERROUS SULFATE TAB 325 MG (65 MG ELEMENTAL FE) 325 MG: 325 (65 FE) TAB at 08:38

## 2023-01-06 RX ADMIN — AMLODIPINE BESYLATE 5 MG: 5 TABLET ORAL at 08:38

## 2023-01-06 RX ADMIN — MONTELUKAST 10 MG: 10 TABLET, FILM COATED ORAL at 20:48

## 2023-01-06 RX ADMIN — PREDNISONE 40 MG: 20 TABLET ORAL at 10:32

## 2023-01-06 RX ADMIN — FUROSEMIDE 40 MG: 40 TABLET ORAL at 08:38

## 2023-01-06 RX ADMIN — SPIRONOLACTONE 12.5 MG: 25 TABLET ORAL at 08:38

## 2023-01-06 RX ADMIN — Medication 1000 UNITS: at 08:38

## 2023-01-06 RX ADMIN — IPRATROPIUM BROMIDE AND ALBUTEROL SULFATE 3 ML: 2.5; .5 SOLUTION RESPIRATORY (INHALATION) at 03:59

## 2023-01-06 RX ADMIN — ACETAMINOPHEN 650 MG: 325 TABLET ORAL at 17:09

## 2023-01-06 RX ADMIN — BUDESONIDE 0.5 MG: 0.5 INHALANT ORAL at 06:41

## 2023-01-06 RX ADMIN — GUAIFENESIN 600 MG: 600 TABLET ORAL at 20:48

## 2023-01-06 RX ADMIN — ALLOPURINOL 100 MG: 100 TABLET ORAL at 10:32

## 2023-01-06 RX ADMIN — Medication 10 ML: at 20:49

## 2023-01-06 RX ADMIN — HEPARIN SODIUM 5000 UNITS: 5000 INJECTION INTRAVENOUS; SUBCUTANEOUS at 20:47

## 2023-01-06 RX ADMIN — NEBIVOLOL 10 MG: 10 TABLET ORAL at 10:33

## 2023-01-06 RX ADMIN — AMPICILLIN SODIUM AND SULBACTAM SODIUM 3 G: 2; 1 INJECTION, POWDER, FOR SOLUTION INTRAMUSCULAR; INTRAVENOUS at 20:49

## 2023-01-06 RX ADMIN — CLOPIDOGREL BISULFATE 75 MG: 75 TABLET ORAL at 08:38

## 2023-01-06 RX ADMIN — CETIRIZINE HYDROCHLORIDE 10 MG: 10 TABLET ORAL at 14:18

## 2023-01-06 RX ADMIN — IPRATROPIUM BROMIDE AND ALBUTEROL SULFATE 3 ML: 2.5; .5 SOLUTION RESPIRATORY (INHALATION) at 00:54

## 2023-01-06 RX ADMIN — Medication 1 TABLET: at 08:38

## 2023-01-06 RX ADMIN — HEPARIN SODIUM 5000 UNITS: 5000 INJECTION INTRAVENOUS; SUBCUTANEOUS at 08:38

## 2023-01-06 RX ADMIN — ASPIRIN 81 MG: 81 TABLET, COATED ORAL at 08:38

## 2023-01-06 RX ADMIN — IPRATROPIUM BROMIDE AND ALBUTEROL SULFATE 3 ML: 2.5; .5 SOLUTION RESPIRATORY (INHALATION) at 06:41

## 2023-01-06 RX ADMIN — PANTOPRAZOLE SODIUM 40 MG: 40 TABLET, DELAYED RELEASE ORAL at 08:38

## 2023-01-06 RX ADMIN — AMPICILLIN SODIUM AND SULBACTAM SODIUM 3 G: 2; 1 INJECTION, POWDER, FOR SOLUTION INTRAMUSCULAR; INTRAVENOUS at 08:48

## 2023-01-06 RX ADMIN — Medication 10 ML: at 08:40

## 2023-01-06 RX ADMIN — INSULIN LISPRO 2 UNITS: 100 INJECTION, SOLUTION INTRAVENOUS; SUBCUTANEOUS at 20:46

## 2023-01-06 RX ADMIN — LEVOTHYROXINE SODIUM 100 MCG: 0.1 TABLET ORAL at 06:50

## 2023-01-06 RX ADMIN — ARFORMOTEROL TARTRATE INHALATION 15 MCG: 15 SOLUTION RESPIRATORY (INHALATION) at 20:19

## 2023-01-06 RX ADMIN — AZELASTINE HYDROCHLORIDE 2 SPRAY: 137 SPRAY, METERED NASAL at 08:36

## 2023-01-06 RX ADMIN — GUAIFENESIN 600 MG: 600 TABLET ORAL at 08:38

## 2023-01-06 RX ADMIN — ROSUVASTATIN 20 MG: 20 TABLET, FILM COATED ORAL at 20:48

## 2023-01-06 RX ADMIN — Medication 5 MG: at 23:54

## 2023-01-06 NOTE — PROGRESS NOTES
[facility]   Hospitalist Progress Note  Date: 2023  Patient Name: Yelena Sanchez  : 1935  MRN: [id number]  Date of admission: 2023      Subjective   Subjective     Chief Complaint: Shortness of breath    Summary: 87-year-old female presented with shortness of breath [place] being treated for acute on chronic heart failure with preserved ejection fraction exacerbation.  Cardiology has been consulted.    Interval Followup: Patient tongue swelling improved today.  She is talking better.  She still complains of some coughing.  Nursing states that patient is having difficulty clearing her airways.  Added flutter valve and will also add Zyrtec as she is already on Mucinex.  She denies any chest pain, chest pressure.  She still becomes dyspneic with minimal exertion.  He has remained afebrile.      Review of Systems  All systems reviewed and are negative except as above in HPI.    Objective   Objective     Vitals:   Temp:  [97 °F (36.1 °C)-98.6 °F (37 °C)] 98.4 °F (36.9 °C)  Heart Rate:  [] 73  Resp:  [16-22] 20  BP: (115-151)/(47-71) 151/47  Flow (L/min):  [2] 2  Physical Exam   Constitutional: Alert, awake, still weak, laying in bed  HEENT:  PERRLA, EOMI; No Scleral icterus, tongue edema still present however improving  Neck:  Supple; No Mass, No Lymphadenopathy  Cardiovascular:  No Rubs, No Edema, No JVD  Respiratory: Still with wheezing, rhonchi, crackles, diminished breath sounds, on nasal cannula  Abdomen:  Normal BS all 4 Quadrants; No Guarding, No Tenderness  Musculoskeletal:  Pulses Positive all 4 Ext; No Cyanosis, lower extremity edema  Neurological:  Alert+Ox3, Mental status WNL; No Dysarthria  Skin:  No Rash, No Cellulitis, No Erythema    Result Review    Result Review:  I have personally reviewed the results from the time of this admission to 2023 06:37 EST and agree with these findings:  [x]  Laboratory  [x]  Microbiology  [x]  Radiology  [x]  EKG/Telemetry   []   Cardiology/Vascular   []  Pathology  [x]  Old records  []  Other:    Assessment & Plan   Assessment / Plan     Assessment:  Acute on chronic diastolic heart failure with preserved ejection fraction exacerbation  Chronic kidney disease stage IIIb  Community-acquired pneumonia  Suspect COPD exacerbation  Glossitis  Staph bacteremia, suspect this is a contaminant, only 1 of 2 bottles  Essential hypertension  Coronary artery disease  Status post ICD, interrogation on 12/19/2022    Plan:  Continue supplemental oxygen and, wean as tolerated, maintain O2 saturation greater 90%  Continue Brovana, Pulmicort, DuoNebs, bronchodilator protocol  CT soft tissue head and neck showing no mass, discussed with ENT and no urgent need for evaluation, patient to follow-up outpatient for possible scope upon discharge  Continue to monitor as inpatient   Renal function creeping up, discontinue IV Lasix and restart patient's 40 mg daily as she takes at home  Monitor I's and O's, daily weights, added fluid restriction  Continue with prednisone 40 mg daily for 5 days  Added flutter valve  Continue with aspirin, Plavix, statin, beta-blocker therapy given coronary artery disease  Continue with Protonix daily  Continue with Unasyn for Community-acquired pneumonia  PT/OT following  Continue with Chloraseptic Spray  Status post Ferrlecit on 1/3/2023, continue with ferrous sulfate 325 mg daily  PT assistance appreciated  Case management assistance appreciated  Continue with Mucinex, cetirizine  Labs reviewed, imaging reviewed, medications reviewed, vital signs reviewed  Further recommendations pending clinical course         Discussed plan with RN, social work.    DVT prophylaxis:  Medical DVT prophylaxis orders are present.    CODE STATUS:   Code Status (Patient has no pulse and is not breathing): CPR (Attempt to Resuscitate)  Medical Interventions (Patient has pulse or is breathing): Full Support        Electronically signed by Gumaro Cole  DO, 01/06/23, 6:37 AM EST.

## 2023-01-06 NOTE — PLAN OF CARE
Goal Outcome Evaluation:              Outcome Evaluation: Patient stable this shift, currently on 2 L oxygen per nasal cannula. Patient is able to verbalize all needs and concerns to staff as needed, will continue to monitor.

## 2023-01-06 NOTE — THERAPY TREATMENT NOTE
Acute Care - Physical Therapy Treatment Note  SHAYNA Brenner     Patient Name: Yelena Sanchez  : 1935  MRN: [id number]  Today's Date: 2023      Visit Dx:     ICD-10-CM ICD-9-CM   1. Acute congestive heart failure, unspecified heart failure type (HCC)  I50.9 428.0   2. Acute pulmonary edema (HCC)  J81.0 518.4   3. Dyspnea, unspecified type  R06.00 786.09   4. Acute respiratory failure with hypoxia (HCC)  J96.01 518.81   5. Difficulty walking  R26.2 719.7     Patient Active Problem List   Diagnosis   • Iron deficiency anemia   • Primary osteoarthritis of left knee   • Chronic combined systolic and diastolic congestive heart failure (HCC)   • Presence of biventricular implantable cardioverter-defibrillator (ICD)   • Coronary artery disease involving native coronary artery of native heart without angina pectoris   • Mixed hyperlipidemia   • Essential hypertension   • Trochanteric bursitis of right hip   • Sciatica of right side   • Carotid artery disease (HCC)   • Acute congestive heart failure, unspecified heart failure type (HCC)     Past Medical History:   Diagnosis Date   • Arthritis    • Asthma    • Breast cancer (HCC)    • Congestive heart failure (HCC)    • COPD (chronic obstructive pulmonary disease) (HCC)    • Diverticulosis 2014   • GERD (gastroesophageal reflux disease)    • History of tobacco abuse    • HTN (hypertension)    • Hyperlipemia    • Hyperthyroidism    • Neck mass    • Seasonal allergies    • SOB (shortness of breath)      Past Surgical History:   Procedure Laterality Date   • COLONOSCOPY     • ENDOSCOPY     • EYE SURGERY      Implant; yes    • LUNG LOBECTOMY Left     Left lower lobe   • LUNG SURGERY     • MASTECTOMY      Left    • OTHER SURGICAL HISTORY      Joint Surgery   • PACEMAKER IMPLANTATION       PT Assessment (last 12 hours)     PT Evaluation and Treatment     Row Name 23 1302          Physical Therapy Time and Intention    Subjective Information no complaints   -CW     Document Type therapy note (daily note)  -CW     Mode of Treatment individual therapy;physical therapy  -CW     Patient Effort good  -CW     Symptoms Noted During/After Treatment none  -CW     Row Name 01/06/23 1302          General Information    Patient Profile Reviewed yes  -CW     Patient Observations alert;cooperative;agree to therapy  -CW     Row Name 01/06/23 1302          Bed Mobility    Bed Mobility supine-sit;sit-supine  -CW     Supine-Sit [place] (Bed Mobility) standby assist  -CW     Sit-Supine [place] (Bed Mobility) standby assist  -CW     Row Name 01/06/23 1302          Transfers    Transfers sit-stand transfer;stand-sit transfer;toilet transfer  -CW     Row Name 01/06/23 1302          Sit-Stand Transfer    Sit-Stand [place] (Transfers) contact guard  -CW     Assistive Device (Sit-Stand Transfers) other (see comments)  no AD  -CW     Row Name 01/06/23 1302          Stand-Sit Transfer    Stand-Sit [place] (Transfers) contact guard  -CW     Assistive Device (Stand-Sit Transfers) other (see comments)  no AD  -CW     Row Name 01/06/23 1302          Toilet Transfer    Type (Toilet Transfer) sit-stand;stand-sit  -CW     [place] Level (Toilet Transfer) contact guard  -CW     Assistive Device (Toilet Transfer) grab bars/safety frame  -CW     Row Name 01/06/23 1302          Gait/Stairs (Locomotion)    Gait/Stairs Locomotion gait/ambulation independence;gait/ambulation assistive device;distance ambulated  -CW     [place] Level (Gait) contact guard  -CW     Assistive Device (Gait) walker, front-wheeled  -CW     Distance in Feet (Gait) 110'  -CW     Pattern (Gait) 4-point;step-through  -CW     Deviations/Abnormal Patterns (Gait) gait speed decreased;stride length decreased  -CW     Bilateral Gait Deviations forward flexed posture  -CW     Comment, (Gait/Stairs) room air  -CW     Row Name 01/06/23 1302          Balance    Balance Assessment standing dynamic balance  -CW      Dynamic Standing Balance contact guard  -CW     Position/Device Used, Standing Balance supported;walker, front-wheeled  -CW     Row Name 01/06/23 1302          Positioning and Restraints    Pre-Treatment Position in bed  -CW     Post Treatment Position bed  -CW     Row Name 01/06/23 1302          Progress Summary (PT)    Progress Toward Functional Goals (PT) progress toward functional goals is good  -CW     Daily Progress Summary (PT) Pt is progressing well. Pt was able to ambulate an increased distance this date without c/o of SOA. Continue with current POC.  -CW           User Key  (r) = Recorded By, (t) = Taken By, (c) = Cosigned By    Initials Name Provider Type    CW Jeremy Junior PT Physical Therapist                Physical Therapy Education     Title: PT OT SLP Therapies (In Progress)     Topic: Physical Therapy (In Progress)     Point: Mobility training (Done)     Learning Progress Summary           Patient Acceptance, E,TB, VU by AV at 1/4/2023 1546                   Point: Home exercise program (Not Started)     Learner Progress:  Not documented in this visit.          Point: Body mechanics (Done)     Learning Progress Summary           Patient Acceptance, E,TB, VU by AV at 1/4/2023 1546                   Point: Precautions (Done)     Learning Progress Summary           Patient Acceptance, E,TB, VU by AV at 1/4/2023 1546                               User Key     Initials Effective Dates Name Provider Type Discipline     06/11/21 -  Arun Lockwood, PT Physical Therapist PT              PT Recommendation and Plan     Progress Summary (PT)  Progress Toward Functional Goals (PT): progress toward functional goals is good  Daily Progress Summary (PT): Pt is progressing well. Pt was able to ambulate an increased distance this date without c/o of SOA. Continue with current POC.   Outcome Measures     Row Name 01/06/23 1305 01/04/23 1500          How much help from another person do you currently need...     Turning from your back to your side while in flat bed without using bedrails? 4  -CW 4  -AV     Moving from lying on back to sitting on the side of a flat bed without bedrails? 4  -CW 3  -AV     Moving to and from a bed to a chair (including a wheelchair)? 3  -CW 3  -AV     Standing up from a chair using your arms (e.g., wheelchair, bedside chair)? 3  -CW 3  -AV     Climbing 3-5 steps with a railing? 3  -CW 3  -AV     To walk in hospital room? 3  -CW 3  -AV     AM-PAC 6 Clicks Score (PT) 20  -CW 19  -AV        Functional Assessment    Outcome Measure Options AM-PAC 6 Clicks Basic Mobility (PT)  -CW AM-PAC 6 Clicks Basic Mobility (PT)  -AV           User Key  (r) = Recorded By, (t) = Taken By, (c) = Cosigned By    Initials Name Provider Type    AV Arun Lockwood, PT Physical Therapist    CW Jeremy Junior, PT Physical Therapist                 Time Calculation:    PT Charges     Row Name 01/06/23 1306             Time Calculation    PT Received On 01/06/23  -CW         Timed Charges    32572 - Gait Training Minutes  15  -CW      71752 - PT Therapeutic Activity Minutes 10  -CW         Total Minutes    Timed Charges Total Minutes 25  -CW       Total Minutes 25  -CW            User Key  (r) = Recorded By, (t) = Taken By, (c) = Cosigned By    Initials Name Provider Type    Jeremy Kirk, BIBI Physical Therapist              Therapy Charges for Today     Code Description Service Date Service Provider Modifiers Qty    40077829780 HC GAIT TRAINING EA 15 MIN 1/6/2023 Jeremy Junior, PT GP 1    09970782091 HC PT THERAPEUTIC ACT EA 15 MIN 1/6/2023 Jeremy Juinor, PT GP 1          PT G-Codes  Outcome Measure Options: AM-PAC 6 Clicks Basic Mobility (PT)  AM-PAC 6 Clicks Score (PT): 20    Jeremy Junior PT  1/6/2023

## 2023-01-07 LAB
ALBUMIN SERPL-MCNC: 3.7 G/DL (ref 3.5–5.2)
ALBUMIN/GLOB SERPL: 1.4 G/DL
ALP SERPL-CCNC: 73 U/L (ref 39–117)
ALT SERPL W P-5'-P-CCNC: 11 U/L (ref 1–33)
ANION GAP SERPL CALCULATED.3IONS-SCNC: 8.7 MMOL/L (ref 5–15)
AST SERPL-CCNC: 11 U/L (ref 1–32)
BACTERIA SPEC AEROBE CULT: NORMAL
BASOPHILS # BLD AUTO: 0.01 10*3/MM3 (ref 0–0.2)
BASOPHILS NFR BLD AUTO: 0.1 % (ref 0–1.5)
BILIRUB SERPL-MCNC: <0.2 MG/DL (ref 0–1.2)
BUN SERPL-MCNC: 55 MG/DL (ref 8–23)
BUN/CREAT SERPL: 35 (ref 7–25)
CALCIUM SPEC-SCNC: 9.7 MG/DL (ref 8.6–10.5)
CHLORIDE SERPL-SCNC: 102 MMOL/L (ref 98–107)
CO2 SERPL-SCNC: 29.3 MMOL/L (ref 22–29)
CREAT SERPL-MCNC: 1.57 MG/DL (ref 0.57–1)
DEPRECATED RDW RBC AUTO: 53 FL (ref 37–54)
EGFRCR SERPLBLD CKD-EPI 2021: 32 ML/MIN/1.73
EOSINOPHIL # BLD AUTO: 0 10*3/MM3 (ref 0–0.4)
EOSINOPHIL NFR BLD AUTO: 0 % (ref 0.3–6.2)
ERYTHROCYTE [DISTWIDTH] IN BLOOD BY AUTOMATED COUNT: 15.2 % (ref 12.3–15.4)
FOLATE SERPL-MCNC: 9.83 NG/ML (ref 4.78–24.2)
GLOBULIN UR ELPH-MCNC: 2.7 GM/DL
GLUCOSE BLDC GLUCOMTR-MCNC: 100 MG/DL (ref 70–99)
GLUCOSE BLDC GLUCOMTR-MCNC: 117 MG/DL (ref 70–99)
GLUCOSE BLDC GLUCOMTR-MCNC: 140 MG/DL (ref 70–99)
GLUCOSE BLDC GLUCOMTR-MCNC: 144 MG/DL (ref 70–99)
GLUCOSE SERPL-MCNC: 127 MG/DL (ref 65–99)
HCT VFR BLD AUTO: 27.5 % (ref 34–46.6)
HGB BLD-MCNC: 8.4 G/DL (ref 12–15.9)
IMM GRANULOCYTES # BLD AUTO: 0.04 10*3/MM3 (ref 0–0.05)
IMM GRANULOCYTES NFR BLD AUTO: 0.3 % (ref 0–0.5)
LYMPHOCYTES # BLD AUTO: 0.28 10*3/MM3 (ref 0.7–3.1)
LYMPHOCYTES NFR BLD AUTO: 2.4 % (ref 19.6–45.3)
MAGNESIUM SERPL-MCNC: 2.7 MG/DL (ref 1.6–2.4)
MCH RBC QN AUTO: 29.2 PG (ref 26.6–33)
MCHC RBC AUTO-ENTMCNC: 30.5 G/DL (ref 31.5–35.7)
MCV RBC AUTO: 95.5 FL (ref 79–97)
MONOCYTES # BLD AUTO: 0.6 10*3/MM3 (ref 0.1–0.9)
MONOCYTES NFR BLD AUTO: 5.2 % (ref 5–12)
NEUTROPHILS NFR BLD AUTO: 10.61 10*3/MM3 (ref 1.7–7)
NEUTROPHILS NFR BLD AUTO: 92 % (ref 42.7–76)
NRBC BLD AUTO-RTO: 0 /100 WBC (ref 0–0.2)
PHOSPHATE SERPL-MCNC: 4.2 MG/DL (ref 2.5–4.5)
PLATELET # BLD AUTO: 249 10*3/MM3 (ref 140–450)
PMV BLD AUTO: 10.7 FL (ref 6–12)
POTASSIUM SERPL-SCNC: 4.5 MMOL/L (ref 3.5–5.2)
PROCALCITONIN SERPL-MCNC: 0.12 NG/ML (ref 0–0.25)
PROT SERPL-MCNC: 6.4 G/DL (ref 6–8.5)
RBC # BLD AUTO: 2.88 10*6/MM3 (ref 3.77–5.28)
SODIUM SERPL-SCNC: 140 MMOL/L (ref 136–145)
VIT B12 BLD-MCNC: 823 PG/ML (ref 211–946)
WBC NRBC COR # BLD: 11.54 10*3/MM3 (ref 3.4–10.8)

## 2023-01-07 PROCEDURE — 99233 SBSQ HOSP IP/OBS HIGH 50: CPT | Performed by: STUDENT IN AN ORGANIZED HEALTH CARE EDUCATION/TRAINING PROGRAM

## 2023-01-07 PROCEDURE — 82746 ASSAY OF FOLIC ACID SERUM: CPT | Performed by: STUDENT IN AN ORGANIZED HEALTH CARE EDUCATION/TRAINING PROGRAM

## 2023-01-07 PROCEDURE — 84145 PROCALCITONIN (PCT): CPT | Performed by: STUDENT IN AN ORGANIZED HEALTH CARE EDUCATION/TRAINING PROGRAM

## 2023-01-07 PROCEDURE — 84100 ASSAY OF PHOSPHORUS: CPT | Performed by: STUDENT IN AN ORGANIZED HEALTH CARE EDUCATION/TRAINING PROGRAM

## 2023-01-07 PROCEDURE — 80053 COMPREHEN METABOLIC PANEL: CPT | Performed by: STUDENT IN AN ORGANIZED HEALTH CARE EDUCATION/TRAINING PROGRAM

## 2023-01-07 PROCEDURE — 25010000002 HEPARIN (PORCINE) PER 1000 UNITS: Performed by: INTERNAL MEDICINE

## 2023-01-07 PROCEDURE — 63710000001 PREDNISONE PER 1 MG: Performed by: STUDENT IN AN ORGANIZED HEALTH CARE EDUCATION/TRAINING PROGRAM

## 2023-01-07 PROCEDURE — 85025 COMPLETE CBC W/AUTO DIFF WBC: CPT | Performed by: STUDENT IN AN ORGANIZED HEALTH CARE EDUCATION/TRAINING PROGRAM

## 2023-01-07 PROCEDURE — 94799 UNLISTED PULMONARY SVC/PX: CPT

## 2023-01-07 PROCEDURE — 82962 GLUCOSE BLOOD TEST: CPT

## 2023-01-07 PROCEDURE — 82607 VITAMIN B-12: CPT | Performed by: STUDENT IN AN ORGANIZED HEALTH CARE EDUCATION/TRAINING PROGRAM

## 2023-01-07 PROCEDURE — 83735 ASSAY OF MAGNESIUM: CPT | Performed by: STUDENT IN AN ORGANIZED HEALTH CARE EDUCATION/TRAINING PROGRAM

## 2023-01-07 PROCEDURE — 25010000002 AMPICILLIN-SULBACTAM PER 1.5 G: Performed by: STUDENT IN AN ORGANIZED HEALTH CARE EDUCATION/TRAINING PROGRAM

## 2023-01-07 RX ORDER — BISACODYL 5 MG/1
5 TABLET, DELAYED RELEASE ORAL DAILY PRN
Status: DISCONTINUED | OUTPATIENT
Start: 2023-01-07 | End: 2023-01-10 | Stop reason: HOSPADM

## 2023-01-07 RX ORDER — BISACODYL 10 MG
10 SUPPOSITORY, RECTAL RECTAL DAILY PRN
Status: DISCONTINUED | OUTPATIENT
Start: 2023-01-07 | End: 2023-01-10 | Stop reason: HOSPADM

## 2023-01-07 RX ORDER — POLYETHYLENE GLYCOL 3350 17 G/17G
17 POWDER, FOR SOLUTION ORAL DAILY PRN
Status: DISCONTINUED | OUTPATIENT
Start: 2023-01-07 | End: 2023-01-10 | Stop reason: HOSPADM

## 2023-01-07 RX ORDER — AMOXICILLIN 250 MG
2 CAPSULE ORAL 2 TIMES DAILY
Status: DISCONTINUED | OUTPATIENT
Start: 2023-01-07 | End: 2023-01-10 | Stop reason: HOSPADM

## 2023-01-07 RX ADMIN — ASPIRIN 81 MG: 81 TABLET, COATED ORAL at 08:45

## 2023-01-07 RX ADMIN — FUROSEMIDE 40 MG: 40 TABLET ORAL at 08:43

## 2023-01-07 RX ADMIN — BUDESONIDE 0.5 MG: 0.5 INHALANT ORAL at 19:39

## 2023-01-07 RX ADMIN — AMPICILLIN SODIUM AND SULBACTAM SODIUM 3 G: 2; 1 INJECTION, POWDER, FOR SOLUTION INTRAMUSCULAR; INTRAVENOUS at 08:47

## 2023-01-07 RX ADMIN — PREDNISONE 40 MG: 20 TABLET ORAL at 08:46

## 2023-01-07 RX ADMIN — BUDESONIDE 0.5 MG: 0.5 INHALANT ORAL at 06:45

## 2023-01-07 RX ADMIN — AMLODIPINE BESYLATE 5 MG: 5 TABLET ORAL at 08:43

## 2023-01-07 RX ADMIN — HEPARIN SODIUM 5000 UNITS: 5000 INJECTION INTRAVENOUS; SUBCUTANEOUS at 20:06

## 2023-01-07 RX ADMIN — Medication 10 ML: at 08:49

## 2023-01-07 RX ADMIN — AZELASTINE HYDROCHLORIDE 2 SPRAY: 137 SPRAY, METERED NASAL at 08:47

## 2023-01-07 RX ADMIN — SPIRONOLACTONE 12.5 MG: 25 TABLET ORAL at 08:43

## 2023-01-07 RX ADMIN — ALLOPURINOL 100 MG: 100 TABLET ORAL at 08:45

## 2023-01-07 RX ADMIN — GUAIFENESIN 600 MG: 600 TABLET ORAL at 20:06

## 2023-01-07 RX ADMIN — FERROUS SULFATE TAB 325 MG (65 MG ELEMENTAL FE) 325 MG: 325 (65 FE) TAB at 08:43

## 2023-01-07 RX ADMIN — LEVOTHYROXINE SODIUM 100 MCG: 0.1 TABLET ORAL at 06:19

## 2023-01-07 RX ADMIN — ARFORMOTEROL TARTRATE INHALATION 15 MCG: 15 SOLUTION RESPIRATORY (INHALATION) at 06:45

## 2023-01-07 RX ADMIN — ARFORMOTEROL TARTRATE INHALATION 15 MCG: 15 SOLUTION RESPIRATORY (INHALATION) at 19:39

## 2023-01-07 RX ADMIN — AMPICILLIN SODIUM AND SULBACTAM SODIUM 3 G: 2; 1 INJECTION, POWDER, FOR SOLUTION INTRAMUSCULAR; INTRAVENOUS at 20:07

## 2023-01-07 RX ADMIN — ALBUTEROL SULFATE 2.5 MG: 2.5 SOLUTION RESPIRATORY (INHALATION) at 16:43

## 2023-01-07 RX ADMIN — Medication 10 ML: at 20:07

## 2023-01-07 RX ADMIN — GUAIFENESIN 600 MG: 600 TABLET ORAL at 08:45

## 2023-01-07 RX ADMIN — NEBIVOLOL 10 MG: 10 TABLET ORAL at 08:45

## 2023-01-07 RX ADMIN — MONTELUKAST 10 MG: 10 TABLET, FILM COATED ORAL at 20:06

## 2023-01-07 RX ADMIN — Medication 1000 UNITS: at 08:43

## 2023-01-07 RX ADMIN — AZELASTINE HYDROCHLORIDE 2 SPRAY: 137 SPRAY, METERED NASAL at 20:06

## 2023-01-07 RX ADMIN — CLOPIDOGREL BISULFATE 75 MG: 75 TABLET ORAL at 08:43

## 2023-01-07 RX ADMIN — CETIRIZINE HYDROCHLORIDE 10 MG: 10 TABLET ORAL at 08:43

## 2023-01-07 RX ADMIN — PANTOPRAZOLE SODIUM 40 MG: 40 TABLET, DELAYED RELEASE ORAL at 06:19

## 2023-01-07 RX ADMIN — ROSUVASTATIN 20 MG: 20 TABLET, FILM COATED ORAL at 20:06

## 2023-01-07 RX ADMIN — POLYETHYLENE GLYCOL 3350 17 G: 17 POWDER, FOR SOLUTION ORAL at 13:45

## 2023-01-07 RX ADMIN — Medication 1 TABLET: at 08:43

## 2023-01-07 RX ADMIN — HEPARIN SODIUM 5000 UNITS: 5000 INJECTION INTRAVENOUS; SUBCUTANEOUS at 08:46

## 2023-01-07 NOTE — PLAN OF CARE
Goal Outcome Evaluation:   Patient awake, alert, oriented. 2L nasal cannula. Will continue to monitor.

## 2023-01-07 NOTE — PLAN OF CARE
Goal Outcome Evaluation:  Plan of Care Reviewed With: patient        Progress: improving  Outcome Evaluation: Patient stable this shift, complained of mouth and jaw pain earlier this shift, tylenol given, no concerns or complaints at this time, will continue to monitor.

## 2023-01-07 NOTE — PROGRESS NOTES
[facility]   Hospitalist Progress Note  Date: 2023  Patient Name: Yelena Sanchez  : 1935  MRN: [id number]  Date of admission: 2023      Subjective   Subjective     Chief Complaint: Shortness of breath    Summary: 87-year-old female presented with shortness of breath [place] being treated for acute on chronic heart failure with preserved ejection fraction exacerbation.  Cardiology has been consulted.    Interval Followup: No events overnight.  Patient remains on nasal cannula.  Her tongue edema continues to improve.  She still complains of pain on the left side of her neck.  Bowel regimen in place as patient has not had a bowel movement.  No function stable.  No family at bedside      Review of Systems  All systems reviewed and are negative except as above in HPI.    Objective   Objective     Vitals:   Temp:  [97.2 °F (36.2 °C)-98.8 °F (37.1 °C)] 97.9 °F (36.6 °C)  Heart Rate:  [64-81] 64  Resp:  [18-20] 20  BP: (134-168)/(50-70) 151/53  Flow (L/min):  [2] 2  Physical Exam   Constitutional: Alert, awake, still weak, no acute distress  HEENT:  PERRLA, EOMI; No Scleral icterus, tongue edema still present however improving  Neck:  Supple; No Mass, No Lymphadenopathy  Cardiovascular:  No Rubs, No Edema, No JVD  Respiratory: Still with wheezing, rhonchi, crackles, diminished breath sounds, on nasal cannula  Abdomen:  Normal BS all 4 Quadrants; No Guarding, No Tenderness  Musculoskeletal:  Pulses Positive all 4 Ext; No Cyanosis, lower extremity edema  Neurological:  Alert+Ox3, Mental status WNL; No Dysarthria  Skin:  No Rash, No Cellulitis, No Erythema    Result Review    Result Review:  I have personally reviewed the results from the time of this admission to 2023 08:31 EST and agree with these findings:  [x]  Laboratory  [x]  Microbiology  [x]  Radiology  [x]  EKG/Telemetry   []  Cardiology/Vascular   []  Pathology  [x]  Old records  []  Other:    Assessment & Plan   Assessment / Plan      Assessment:  Acute on chronic diastolic heart failure with preserved ejection fraction exacerbation  Chronic kidney disease stage IIIb  Community-acquired pneumonia  Suspect COPD exacerbation  Glossitis  Staph bacteremia, suspect this is a contaminant, only 1 of 2 bottles  Essential hypertension  Coronary artery disease  Status post ICD, interrogation on 12/19/2022    Plan:  Continue supplemental oxygen and, wean as tolerated, maintain O2 saturation greater 90%  Continue Brovana, Pulmicort, DuoNebs, bronchodilator protocol  CT soft tissue head and neck showing no mass, discussed with ENT and no urgent need for evaluation, patient to follow-up outpatient for possible scope upon discharge  Continue to monitor as inpatient   Do Lasix 40 mg daily, renal function is stable  Monitor I's and O's, daily weights, continue with fluid restriction  Continue with prednisone 40 mg daily for 5 days  Encourage use of flutter valve  Continue with aspirin, Plavix, statin, beta-blocker therapy given coronary artery disease  Continue with Protonix daily  Continue with Unasyn for Community-acquired pneumonia  PT/OT following  Continue with Chloraseptic Spray  Status post Ferrlecit on 1/3/2023, continue with ferrous sulfate 325 mg daily  PT assistance appreciated  Case management assistance appreciated  Continue with Mucinex, cetirizine  Labs reviewed, imaging reviewed, medications reviewed, vital signs reviewed  Further recommendations pending clinical course         Discussed plan with RN, social work.    DVT prophylaxis:  Medical DVT prophylaxis orders are present.    CODE STATUS:   Code Status (Patient has no pulse and is not breathing): CPR (Attempt to Resuscitate)  Medical Interventions (Patient has pulse or is breathing): Full Support        Electronically signed by Gumaro Cole DO, 01/07/23, 8:31 AM EST.

## 2023-01-08 ENCOUNTER — APPOINTMENT (OUTPATIENT)
Dept: ULTRASOUND IMAGING | Facility: HOSPITAL | Age: 88
DRG: 291 | End: 2023-01-08
Payer: MEDICARE

## 2023-01-08 LAB
ALBUMIN SERPL-MCNC: 3.6 G/DL (ref 3.5–5.2)
ALBUMIN/GLOB SERPL: 1.5 G/DL
ALP SERPL-CCNC: 64 U/L (ref 39–117)
ALT SERPL W P-5'-P-CCNC: 19 U/L (ref 1–33)
ANION GAP SERPL CALCULATED.3IONS-SCNC: 7.4 MMOL/L (ref 5–15)
AST SERPL-CCNC: 18 U/L (ref 1–32)
BASOPHILS # BLD AUTO: 0 10*3/MM3 (ref 0–0.2)
BASOPHILS NFR BLD AUTO: 0 % (ref 0–1.5)
BILIRUB SERPL-MCNC: <0.2 MG/DL (ref 0–1.2)
BUN SERPL-MCNC: 61 MG/DL (ref 8–23)
BUN/CREAT SERPL: 47.7 (ref 7–25)
CALCIUM SPEC-SCNC: 9.6 MG/DL (ref 8.6–10.5)
CHLORIDE SERPL-SCNC: 104 MMOL/L (ref 98–107)
CO2 SERPL-SCNC: 28.6 MMOL/L (ref 22–29)
CREAT SERPL-MCNC: 1.28 MG/DL (ref 0.57–1)
DEPRECATED RDW RBC AUTO: 53.1 FL (ref 37–54)
EGFRCR SERPLBLD CKD-EPI 2021: 40.9 ML/MIN/1.73
EOSINOPHIL # BLD AUTO: 0.01 10*3/MM3 (ref 0–0.4)
EOSINOPHIL NFR BLD AUTO: 0.1 % (ref 0.3–6.2)
ERYTHROCYTE [DISTWIDTH] IN BLOOD BY AUTOMATED COUNT: 15 % (ref 12.3–15.4)
GLOBULIN UR ELPH-MCNC: 2.4 GM/DL
GLUCOSE BLDC GLUCOMTR-MCNC: 123 MG/DL (ref 70–99)
GLUCOSE BLDC GLUCOMTR-MCNC: 127 MG/DL (ref 70–99)
GLUCOSE BLDC GLUCOMTR-MCNC: 127 MG/DL (ref 70–99)
GLUCOSE BLDC GLUCOMTR-MCNC: 88 MG/DL (ref 70–99)
GLUCOSE SERPL-MCNC: 119 MG/DL (ref 65–99)
HCT VFR BLD AUTO: 27.2 % (ref 34–46.6)
HGB BLD-MCNC: 8.3 G/DL (ref 12–15.9)
IMM GRANULOCYTES # BLD AUTO: 0.16 10*3/MM3 (ref 0–0.05)
IMM GRANULOCYTES NFR BLD AUTO: 1.7 % (ref 0–0.5)
LYMPHOCYTES # BLD AUTO: 0.41 10*3/MM3 (ref 0.7–3.1)
LYMPHOCYTES NFR BLD AUTO: 4.4 % (ref 19.6–45.3)
MAGNESIUM SERPL-MCNC: 2.7 MG/DL (ref 1.6–2.4)
MCH RBC QN AUTO: 29.5 PG (ref 26.6–33)
MCHC RBC AUTO-ENTMCNC: 30.5 G/DL (ref 31.5–35.7)
MCV RBC AUTO: 96.8 FL (ref 79–97)
MONOCYTES # BLD AUTO: 0.67 10*3/MM3 (ref 0.1–0.9)
MONOCYTES NFR BLD AUTO: 7.3 % (ref 5–12)
NEUTROPHILS NFR BLD AUTO: 7.98 10*3/MM3 (ref 1.7–7)
NEUTROPHILS NFR BLD AUTO: 86.5 % (ref 42.7–76)
NRBC BLD AUTO-RTO: 0 /100 WBC (ref 0–0.2)
PHOSPHATE SERPL-MCNC: 3 MG/DL (ref 2.5–4.5)
PLATELET # BLD AUTO: 258 10*3/MM3 (ref 140–450)
PMV BLD AUTO: 10.7 FL (ref 6–12)
POTASSIUM SERPL-SCNC: 4.6 MMOL/L (ref 3.5–5.2)
PROT SERPL-MCNC: 6 G/DL (ref 6–8.5)
RBC # BLD AUTO: 2.81 10*6/MM3 (ref 3.77–5.28)
SODIUM SERPL-SCNC: 140 MMOL/L (ref 136–145)
WBC NRBC COR # BLD: 9.23 10*3/MM3 (ref 3.4–10.8)

## 2023-01-08 PROCEDURE — 85025 COMPLETE CBC W/AUTO DIFF WBC: CPT | Performed by: STUDENT IN AN ORGANIZED HEALTH CARE EDUCATION/TRAINING PROGRAM

## 2023-01-08 PROCEDURE — 25010000002 HEPARIN (PORCINE) PER 1000 UNITS: Performed by: INTERNAL MEDICINE

## 2023-01-08 PROCEDURE — 82962 GLUCOSE BLOOD TEST: CPT

## 2023-01-08 PROCEDURE — 99233 SBSQ HOSP IP/OBS HIGH 50: CPT | Performed by: STUDENT IN AN ORGANIZED HEALTH CARE EDUCATION/TRAINING PROGRAM

## 2023-01-08 PROCEDURE — 80053 COMPREHEN METABOLIC PANEL: CPT | Performed by: STUDENT IN AN ORGANIZED HEALTH CARE EDUCATION/TRAINING PROGRAM

## 2023-01-08 PROCEDURE — 94799 UNLISTED PULMONARY SVC/PX: CPT

## 2023-01-08 PROCEDURE — 83735 ASSAY OF MAGNESIUM: CPT | Performed by: STUDENT IN AN ORGANIZED HEALTH CARE EDUCATION/TRAINING PROGRAM

## 2023-01-08 PROCEDURE — 94760 N-INVAS EAR/PLS OXIMETRY 1: CPT

## 2023-01-08 PROCEDURE — 84100 ASSAY OF PHOSPHORUS: CPT | Performed by: STUDENT IN AN ORGANIZED HEALTH CARE EDUCATION/TRAINING PROGRAM

## 2023-01-08 PROCEDURE — 76536 US EXAM OF HEAD AND NECK: CPT

## 2023-01-08 PROCEDURE — 63710000001 PREDNISONE PER 1 MG: Performed by: STUDENT IN AN ORGANIZED HEALTH CARE EDUCATION/TRAINING PROGRAM

## 2023-01-08 PROCEDURE — 25010000002 AMPICILLIN-SULBACTAM PER 1.5 G: Performed by: STUDENT IN AN ORGANIZED HEALTH CARE EDUCATION/TRAINING PROGRAM

## 2023-01-08 RX ADMIN — CLOPIDOGREL BISULFATE 75 MG: 75 TABLET ORAL at 08:16

## 2023-01-08 RX ADMIN — FERROUS SULFATE TAB 325 MG (65 MG ELEMENTAL FE) 325 MG: 325 (65 FE) TAB at 08:16

## 2023-01-08 RX ADMIN — NEBIVOLOL 10 MG: 10 TABLET ORAL at 08:15

## 2023-01-08 RX ADMIN — ROSUVASTATIN 20 MG: 20 TABLET, FILM COATED ORAL at 21:19

## 2023-01-08 RX ADMIN — FUROSEMIDE 40 MG: 40 TABLET ORAL at 08:16

## 2023-01-08 RX ADMIN — Medication 10 ML: at 21:19

## 2023-01-08 RX ADMIN — AZELASTINE HYDROCHLORIDE 2 SPRAY: 137 SPRAY, METERED NASAL at 21:19

## 2023-01-08 RX ADMIN — SPIRONOLACTONE 12.5 MG: 25 TABLET ORAL at 08:15

## 2023-01-08 RX ADMIN — BUDESONIDE 0.5 MG: 0.5 INHALANT ORAL at 08:56

## 2023-01-08 RX ADMIN — CETIRIZINE HYDROCHLORIDE 10 MG: 10 TABLET ORAL at 08:17

## 2023-01-08 RX ADMIN — ACETAMINOPHEN 650 MG: 325 TABLET ORAL at 21:41

## 2023-01-08 RX ADMIN — ALLOPURINOL 100 MG: 100 TABLET ORAL at 08:17

## 2023-01-08 RX ADMIN — PREDNISONE 40 MG: 20 TABLET ORAL at 08:15

## 2023-01-08 RX ADMIN — GUAIFENESIN 600 MG: 600 TABLET ORAL at 08:16

## 2023-01-08 RX ADMIN — AMPICILLIN SODIUM AND SULBACTAM SODIUM 3 G: 2; 1 INJECTION, POWDER, FOR SOLUTION INTRAMUSCULAR; INTRAVENOUS at 08:15

## 2023-01-08 RX ADMIN — Medication 1 TABLET: at 08:15

## 2023-01-08 RX ADMIN — PANTOPRAZOLE SODIUM 40 MG: 40 TABLET, DELAYED RELEASE ORAL at 05:24

## 2023-01-08 RX ADMIN — ARFORMOTEROL TARTRATE INHALATION 15 MCG: 15 SOLUTION RESPIRATORY (INHALATION) at 20:22

## 2023-01-08 RX ADMIN — GUAIFENESIN 600 MG: 600 TABLET ORAL at 21:19

## 2023-01-08 RX ADMIN — Medication 5 MG: at 21:41

## 2023-01-08 RX ADMIN — Medication 1000 UNITS: at 08:16

## 2023-01-08 RX ADMIN — LEVOTHYROXINE SODIUM 100 MCG: 0.1 TABLET ORAL at 05:24

## 2023-01-08 RX ADMIN — HEPARIN SODIUM 5000 UNITS: 5000 INJECTION INTRAVENOUS; SUBCUTANEOUS at 08:15

## 2023-01-08 RX ADMIN — ARFORMOTEROL TARTRATE INHALATION 15 MCG: 15 SOLUTION RESPIRATORY (INHALATION) at 08:56

## 2023-01-08 RX ADMIN — BUDESONIDE 0.5 MG: 0.5 INHALANT ORAL at 20:22

## 2023-01-08 RX ADMIN — ASPIRIN 81 MG: 81 TABLET, COATED ORAL at 08:15

## 2023-01-08 RX ADMIN — HEPARIN SODIUM 5000 UNITS: 5000 INJECTION INTRAVENOUS; SUBCUTANEOUS at 21:19

## 2023-01-08 RX ADMIN — Medication 10 ML: at 08:15

## 2023-01-08 RX ADMIN — AMLODIPINE BESYLATE 5 MG: 5 TABLET ORAL at 08:18

## 2023-01-08 RX ADMIN — AZELASTINE HYDROCHLORIDE 2 SPRAY: 137 SPRAY, METERED NASAL at 08:20

## 2023-01-08 RX ADMIN — MONTELUKAST 10 MG: 10 TABLET, FILM COATED ORAL at 21:19

## 2023-01-08 NOTE — PROGRESS NOTES
[facility]   Hospitalist Progress Note  Date: 2023  Patient Name: Yelena Sanchez  : 1935  MRN: [id number]  Date of admission: 2023      Subjective   Subjective     Chief Complaint: Shortness of breath    Summary: 87-year-old female presented with shortness of breath [place] being treated for acute on chronic heart failure with preserved ejection fraction exacerbation.  Cardiology has been consulted.    Interval Followup: Patient still complaining of left-sided neck pain.  She states she feels like she is full in her cheeks.  She has not had any episodes of nausea or emesis.  She has not any fevers or chills.      Review of Systems  All systems reviewed and are negative except as above in HPI.    Objective   Objective     Vitals:   Temp:  [97.5 °F (36.4 °C)-98.8 °F (37.1 °C)] 98.2 °F (36.8 °C)  Heart Rate:  [59-89] 62  Resp:  [16-20] 18  BP: (144-171)/(56-65) 165/62  Flow (L/min):  [2] 2  Physical Exam   Constitutional: Alert, awake, still weak, no acute distress  HEENT:  PERRLA, EOMI; No Scleral icterus, tongue edema continues to improve  Neck:  Supple; No Mass, No Lymphadenopathy  Cardiovascular:  No Rubs, No Edema, No JVD  Respiratory: Improved respiratory effort, on nasal cannula, faint rhonchi, no wheezing  Abdomen:  Normal BS all 4 Quadrants; No Guarding, No Tenderness  Musculoskeletal:  Pulses Positive all 4 Ext; No Cyanosis, lower extremity edema  Neurological:  Alert+Ox3, Mental status WNL; No Dysarthria  Skin:  No Rash, No Cellulitis, No Erythema    Result Review    Result Review:  I have personally reviewed the results from the time of this admission to 2023 09:04 EST and agree with these findings:  [x]  Laboratory  [x]  Microbiology  [x]  Radiology  [x]  EKG/Telemetry   []  Cardiology/Vascular   []  Pathology  [x]  Old records  []  Other:    Assessment & Plan   Assessment / Plan     Assessment:  Acute on chronic diastolic heart failure with preserved ejection fraction  exacerbation  Chronic kidney disease stage IIIb  Community-acquired pneumonia  Suspect COPD exacerbation  Glossitis  Staph bacteremia, suspect this is a contaminant, only 1 of 2 bottles  Essential hypertension  Coronary artery disease  Status post ICD, interrogation on 12/19/2022    Plan:  Continue supplemental oxygen and, wean as tolerated, maintain O2 saturation greater 90%  Continue Brovana, Pulmicort, DuoNebs, bronchodilator protocol  CT soft tissue head and neck showing no mass, discussed with ENT and no urgent need for evaluation, patient to follow-up outpatient for possible scope upon discharge  Obtain ultrasound of the head neck given patient's persistent complaint of left-sided neck irritation  Continue to monitor as inpatient   Continue with Lasix 40 mg daily, renal function is stable  Monitor I's and O's, daily weights, continue with fluid restriction  Continue with prednisone 40 mg daily for 5 days  Encourage use of flutter valve  Continue with aspirin, Plavix, statin, beta-blocker therapy given coronary artery disease  Continue with Protonix daily  Continue with Unasyn for Community-acquired pneumonia  PT/OT following  Continue with Chloraseptic Spray  Status post Ferrlecit on 1/3/2023, continue with ferrous sulfate 325 mg daily  PT assistance appreciated  Case management assistance appreciated  Continue with Mucinex, cetirizine  Labs reviewed, imaging reviewed, medications reviewed, vital signs reviewed  Further recommendations pending clinical course         Discussed plan with RN, social work.    DVT prophylaxis:  Medical DVT prophylaxis orders are present.    CODE STATUS:   Code Status (Patient has no pulse and is not breathing): CPR (Attempt to Resuscitate)  Medical Interventions (Patient has pulse or is breathing): Full Support        Electronically signed by Gumaro Cole DO, 01/08/23, 9:04 AM EST.

## 2023-01-08 NOTE — PLAN OF CARE
Goal Outcome Evaluation:   VSS. 2LNC. Up to bedside for all meals. Spouse at bedside all day. No other changes. Will continue to monitor.

## 2023-01-09 LAB
ALBUMIN SERPL-MCNC: 3.3 G/DL (ref 3.5–5.2)
ALBUMIN/GLOB SERPL: 1.4 G/DL
ALP SERPL-CCNC: 61 U/L (ref 39–117)
ALT SERPL W P-5'-P-CCNC: 16 U/L (ref 1–33)
ANION GAP SERPL CALCULATED.3IONS-SCNC: 7.8 MMOL/L (ref 5–15)
AST SERPL-CCNC: 15 U/L (ref 1–32)
BASOPHILS # BLD AUTO: 0.01 10*3/MM3 (ref 0–0.2)
BASOPHILS NFR BLD AUTO: 0.1 % (ref 0–1.5)
BILIRUB SERPL-MCNC: <0.2 MG/DL (ref 0–1.2)
BUN SERPL-MCNC: 77 MG/DL (ref 8–23)
BUN/CREAT SERPL: 50.7 (ref 7–25)
CALCIUM SPEC-SCNC: 9.8 MG/DL (ref 8.6–10.5)
CHLORIDE SERPL-SCNC: 104 MMOL/L (ref 98–107)
CO2 SERPL-SCNC: 30.2 MMOL/L (ref 22–29)
CREAT SERPL-MCNC: 1.52 MG/DL (ref 0.57–1)
DEPRECATED RDW RBC AUTO: 52.5 FL (ref 37–54)
EGFRCR SERPLBLD CKD-EPI 2021: 33.3 ML/MIN/1.73
EOSINOPHIL # BLD AUTO: 0 10*3/MM3 (ref 0–0.4)
EOSINOPHIL NFR BLD AUTO: 0 % (ref 0.3–6.2)
ERYTHROCYTE [DISTWIDTH] IN BLOOD BY AUTOMATED COUNT: 15 % (ref 12.3–15.4)
GLOBULIN UR ELPH-MCNC: 2.4 GM/DL
GLUCOSE BLDC GLUCOMTR-MCNC: 125 MG/DL (ref 70–99)
GLUCOSE BLDC GLUCOMTR-MCNC: 157 MG/DL (ref 70–99)
GLUCOSE BLDC GLUCOMTR-MCNC: 218 MG/DL (ref 70–99)
GLUCOSE BLDC GLUCOMTR-MCNC: 99 MG/DL (ref 70–99)
GLUCOSE SERPL-MCNC: 117 MG/DL (ref 65–99)
HCT VFR BLD AUTO: 24.7 % (ref 34–46.6)
HGB BLD-MCNC: 7.7 G/DL (ref 12–15.9)
IMM GRANULOCYTES # BLD AUTO: 0.28 10*3/MM3 (ref 0–0.05)
IMM GRANULOCYTES NFR BLD AUTO: 2.6 % (ref 0–0.5)
LYMPHOCYTES # BLD AUTO: 0.88 10*3/MM3 (ref 0.7–3.1)
LYMPHOCYTES NFR BLD AUTO: 8.2 % (ref 19.6–45.3)
MAGNESIUM SERPL-MCNC: 2.7 MG/DL (ref 1.6–2.4)
MCH RBC QN AUTO: 29.7 PG (ref 26.6–33)
MCHC RBC AUTO-ENTMCNC: 31.2 G/DL (ref 31.5–35.7)
MCV RBC AUTO: 95.4 FL (ref 79–97)
MONOCYTES # BLD AUTO: 0.97 10*3/MM3 (ref 0.1–0.9)
MONOCYTES NFR BLD AUTO: 9.1 % (ref 5–12)
NEUTROPHILS NFR BLD AUTO: 8.54 10*3/MM3 (ref 1.7–7)
NEUTROPHILS NFR BLD AUTO: 80 % (ref 42.7–76)
NRBC BLD AUTO-RTO: 0.2 /100 WBC (ref 0–0.2)
PHOSPHATE SERPL-MCNC: 3.1 MG/DL (ref 2.5–4.5)
PLATELET # BLD AUTO: 251 10*3/MM3 (ref 140–450)
PMV BLD AUTO: 11 FL (ref 6–12)
POTASSIUM SERPL-SCNC: 4.6 MMOL/L (ref 3.5–5.2)
PROT SERPL-MCNC: 5.7 G/DL (ref 6–8.5)
RBC # BLD AUTO: 2.59 10*6/MM3 (ref 3.77–5.28)
SODIUM SERPL-SCNC: 142 MMOL/L (ref 136–145)
WBC NRBC COR # BLD: 10.68 10*3/MM3 (ref 3.4–10.8)

## 2023-01-09 PROCEDURE — 80053 COMPREHEN METABOLIC PANEL: CPT | Performed by: STUDENT IN AN ORGANIZED HEALTH CARE EDUCATION/TRAINING PROGRAM

## 2023-01-09 PROCEDURE — 99233 SBSQ HOSP IP/OBS HIGH 50: CPT | Performed by: STUDENT IN AN ORGANIZED HEALTH CARE EDUCATION/TRAINING PROGRAM

## 2023-01-09 PROCEDURE — 85025 COMPLETE CBC W/AUTO DIFF WBC: CPT | Performed by: STUDENT IN AN ORGANIZED HEALTH CARE EDUCATION/TRAINING PROGRAM

## 2023-01-09 PROCEDURE — 63710000001 INSULIN LISPRO (HUMAN) PER 5 UNITS: Performed by: INTERNAL MEDICINE

## 2023-01-09 PROCEDURE — 83735 ASSAY OF MAGNESIUM: CPT | Performed by: STUDENT IN AN ORGANIZED HEALTH CARE EDUCATION/TRAINING PROGRAM

## 2023-01-09 PROCEDURE — 25010000002 HEPARIN (PORCINE) PER 1000 UNITS: Performed by: INTERNAL MEDICINE

## 2023-01-09 PROCEDURE — 97116 GAIT TRAINING THERAPY: CPT

## 2023-01-09 PROCEDURE — 82962 GLUCOSE BLOOD TEST: CPT

## 2023-01-09 PROCEDURE — 63710000001 PREDNISONE PER 1 MG: Performed by: STUDENT IN AN ORGANIZED HEALTH CARE EDUCATION/TRAINING PROGRAM

## 2023-01-09 PROCEDURE — 94799 UNLISTED PULMONARY SVC/PX: CPT

## 2023-01-09 PROCEDURE — 84100 ASSAY OF PHOSPHORUS: CPT | Performed by: STUDENT IN AN ORGANIZED HEALTH CARE EDUCATION/TRAINING PROGRAM

## 2023-01-09 PROCEDURE — 97110 THERAPEUTIC EXERCISES: CPT

## 2023-01-09 RX ORDER — AMLODIPINE BESYLATE 2.5 MG/1
2.5 TABLET ORAL ONCE
Status: DISCONTINUED | OUTPATIENT
Start: 2023-01-10 | End: 2023-01-10 | Stop reason: HOSPADM

## 2023-01-09 RX ADMIN — CETIRIZINE HYDROCHLORIDE 10 MG: 10 TABLET ORAL at 09:03

## 2023-01-09 RX ADMIN — SPIRONOLACTONE 12.5 MG: 25 TABLET ORAL at 09:03

## 2023-01-09 RX ADMIN — Medication 10 ML: at 09:04

## 2023-01-09 RX ADMIN — Medication 10 ML: at 21:30

## 2023-01-09 RX ADMIN — NEBIVOLOL 10 MG: 10 TABLET ORAL at 09:03

## 2023-01-09 RX ADMIN — ASPIRIN 81 MG: 81 TABLET, COATED ORAL at 09:03

## 2023-01-09 RX ADMIN — GUAIFENESIN 600 MG: 600 TABLET ORAL at 21:30

## 2023-01-09 RX ADMIN — PREDNISONE 40 MG: 20 TABLET ORAL at 09:03

## 2023-01-09 RX ADMIN — SENNOSIDES AND DOCUSATE SODIUM 2 TABLET: 8.6; 5 TABLET ORAL at 09:03

## 2023-01-09 RX ADMIN — AMLODIPINE BESYLATE 5 MG: 5 TABLET ORAL at 09:03

## 2023-01-09 RX ADMIN — Medication 1000 UNITS: at 09:03

## 2023-01-09 RX ADMIN — MONTELUKAST 10 MG: 10 TABLET, FILM COATED ORAL at 21:30

## 2023-01-09 RX ADMIN — FERROUS SULFATE TAB 325 MG (65 MG ELEMENTAL FE) 325 MG: 325 (65 FE) TAB at 09:03

## 2023-01-09 RX ADMIN — GUAIFENESIN 600 MG: 600 TABLET ORAL at 09:03

## 2023-01-09 RX ADMIN — ARFORMOTEROL TARTRATE INHALATION 15 MCG: 15 SOLUTION RESPIRATORY (INHALATION) at 20:26

## 2023-01-09 RX ADMIN — CLOPIDOGREL BISULFATE 75 MG: 75 TABLET ORAL at 09:03

## 2023-01-09 RX ADMIN — FUROSEMIDE 40 MG: 40 TABLET ORAL at 09:03

## 2023-01-09 RX ADMIN — ALLOPURINOL 100 MG: 100 TABLET ORAL at 09:03

## 2023-01-09 RX ADMIN — ROSUVASTATIN 20 MG: 20 TABLET, FILM COATED ORAL at 21:30

## 2023-01-09 RX ADMIN — HEPARIN SODIUM 5000 UNITS: 5000 INJECTION INTRAVENOUS; SUBCUTANEOUS at 21:30

## 2023-01-09 RX ADMIN — INSULIN LISPRO 3 UNITS: 100 INJECTION, SOLUTION INTRAVENOUS; SUBCUTANEOUS at 21:44

## 2023-01-09 RX ADMIN — BUDESONIDE 0.5 MG: 0.5 INHALANT ORAL at 20:26

## 2023-01-09 RX ADMIN — HEPARIN SODIUM 5000 UNITS: 5000 INJECTION INTRAVENOUS; SUBCUTANEOUS at 09:04

## 2023-01-09 RX ADMIN — Medication 1 TABLET: at 09:03

## 2023-01-09 RX ADMIN — AZELASTINE HYDROCHLORIDE 2 SPRAY: 137 SPRAY, METERED NASAL at 09:05

## 2023-01-09 RX ADMIN — PANTOPRAZOLE SODIUM 40 MG: 40 TABLET, DELAYED RELEASE ORAL at 06:13

## 2023-01-09 RX ADMIN — AZELASTINE HYDROCHLORIDE 2 SPRAY: 137 SPRAY, METERED NASAL at 21:30

## 2023-01-09 RX ADMIN — BUDESONIDE 0.5 MG: 0.5 INHALANT ORAL at 07:52

## 2023-01-09 RX ADMIN — LEVOTHYROXINE SODIUM 100 MCG: 0.1 TABLET ORAL at 06:13

## 2023-01-09 RX ADMIN — ARFORMOTEROL TARTRATE INHALATION 15 MCG: 15 SOLUTION RESPIRATORY (INHALATION) at 07:52

## 2023-01-09 RX ADMIN — INSULIN LISPRO 2 UNITS: 100 INJECTION, SOLUTION INTRAVENOUS; SUBCUTANEOUS at 17:59

## 2023-01-09 NOTE — PROGRESS NOTES
[facility]   Hospitalist Progress Note  Date: 2023  Patient Name: Yelena Sanchez  : 1935  MRN: [id number]  Date of admission: 2023      Subjective   Subjective     Chief Complaint: Shortness of breath    Summary: 87-year-old female presented with shortness of breath subsequently being treated for acute on chronic heart failure with preserved ejection fraction exacerbation.  Currently patient treated for COPD exacerbation and community-acquired pneumonia.  She also had staph bacteremia 1 of 2 bottles that was considered a contaminant.  She also has glossitis and case was discussed with ENT Dr. Acevedo and patient will follow-up as outpatient.      Interval Followup: Patient currently being weaned down on oxygen.  She still is having productive cough with white phlegm.  She still complains of discomfort on her left side, however I told her that the ultrasound did not show any abnormality.  She has not any fevers or chills.  Her renal function remains stable.  She has no chest pain or chest pressure.      Review of Systems  All systems reviewed and are negative except as above in HPI.    Objective   Objective     Vitals:   Temp:  [97.5 °F (36.4 °C)-98.2 °F (36.8 °C)] 97.5 °F (36.4 °C)  Heart Rate:  [54-82] 54  Resp:  [16-18] 18  BP: (151-175)/(55-77) 175/62  Flow (L/min):  [2] 2  Physical Exam   Constitutional: Awake, laying in bed, appears weak, no acute distress  HEENT:  PERRLA, EOMI; No Scleral icterus, tongue edema continues to improve  Neck:  Supple; No Mass, No Lymphadenopathy  Cardiovascular:  No Rubs, No Edema, No JVD  Respiratory: Improved respiratory effort, on nasal cannula, faint rhonchi, no wheezing  Abdomen:  Normal BS all 4 Quadrants; No Guarding, No Tenderness  Musculoskeletal:  Pulses Positive all 4 Ext; No Cyanosis, lower extremity edema  Neurological:  Alert+Ox3, Mental status WNL; No Dysarthria  Skin:  No Rash, No Cellulitis, No Erythema    Result Review    Result Review:  I have  personally reviewed the results from the time of this admission to 1/9/2023 07:38 EST and agree with these findings:  [x]  Laboratory  [x]  Microbiology  [x]  Radiology  [x]  EKG/Telemetry   []  Cardiology/Vascular   []  Pathology  [x]  Old records  []  Other:    Assessment & Plan   Assessment / Plan     Assessment:  Acute on chronic diastolic heart failure with preserved ejection fraction exacerbation  Chronic kidney disease stage IIIb  Community-acquired pneumonia  Suspect COPD exacerbation  Glossitis  Staph bacteremia, suspect this is a contaminant, only 1 of 2 bottles  Essential hypertension  Coronary artery disease  Status post ICD, interrogation on 12/19/2022    Plan:  Continue supplemental oxygen and, wean as tolerated, maintain O2 saturation greater 90%  Continue Brovana, Pulmicort, DuoNebs, bronchodilator protocol  CT soft tissue head and neck showing no mass, discussed with ENT and no urgent need for evaluation, patient to follow-up outpatient for possible scope upon discharge  Ultrasound of the head and neck showed no abnormality  Continue to monitor as inpatient   Continue with Lasix 40 mg daily, renal function is stable  Monitor I's and O's, daily weights, continue with fluid restriction  Continue with prednisone 40 mg daily for 5 days  Encourage use of flutter valve  Continue with aspirin, Plavix, statin, beta-blocker therapy given coronary artery disease  Continue with Protonix daily  Patient completed course of antibiotic therapy for community-acquired pneumonia  PT/OT following   Continue with Chloraseptic Spray  Status post Ferrlecit on 1/3/2023, continue with ferrous sulfate 325 mg daily  PT assistance appreciated  Case management assistance appreciated  Continue with Mucinex, cetirizine  Labs reviewed, imaging reviewed, medications reviewed, vital signs reviewed  Further recommendations pending clinical course         Discussed plan with RN, social work.    DVT prophylaxis:  Medical DVT  prophylaxis orders are present.    CODE STATUS:   Code Status (Patient has no pulse and is not breathing): CPR (Attempt to Resuscitate)  Medical Interventions (Patient has pulse or is breathing): Full Support        Electronically signed by Gumaro Cole DO, 01/09/23, 7:38 AM EST.

## 2023-01-09 NOTE — CONSULTS
"Nutrition Services    Patient Name: Yelena Sanchez  YOB: 1935  MRN: [id number]  Admission date: 1/2/2023      CLINICAL NUTRITION ASSESSMENT      Reason for Assessment  LOS   H&P:    Past Medical History:   Diagnosis Date   • Arthritis    • Asthma    • Breast cancer (HCC)    • Congestive heart failure (HCC)    • COPD (chronic obstructive pulmonary disease) (HCC)    • Diverticulosis 11/26/2014   • GERD (gastroesophageal reflux disease)    • History of tobacco abuse    • HTN (hypertension)    • Hyperlipemia    • Hyperthyroidism    • Neck mass    • Seasonal allergies    • SOB (shortness of breath)         Current Problems:   Active Hospital Problems    Diagnosis    • **Acute congestive heart failure, unspecified heart failure type (HCC)         Nutrition/Diet History         Narrative     Pt followed by RD for LOS x 7 days. Pt is at low risk per nutrition risk screening. Pt eating % meals, no significant wt loss. No acute nutrition concerns or interventions at this time. RD will continue to follow and monitor per protocol.     Anthropometrics        Current Height, Weight Height: 149.9 cm (59\")  Weight: 72.4 kg (159 lb 9.8 oz)   Current BMI Body mass index is 32.24 kg/m².       Weight Hx  Wt Readings from Last 30 Encounters:   01/09/23 0305 72.4 kg (159 lb 9.8 oz)   01/07/23 0437 72.3 kg (159 lb 6.3 oz)   01/06/23 0630 72.7 kg (160 lb 4.4 oz)   01/05/23 1727 73.4 kg (161 lb 13.1 oz)   01/02/23 0640 76.2 kg (167 lb 15.9 oz)   12/19/22 1249 72.6 kg (160 lb)   10/25/22 1058 74.8 kg (165 lb)   10/11/22 1001 73.9 kg (163 lb)   06/14/22 1353 73.9 kg (163 lb)   05/15/22 0410 75.5 kg (166 lb 7.2 oz)   03/15/22 1032 76.2 kg (168 lb)   02/28/22 1922 76.6 kg (168 lb 14 oz)   12/27/21 1057 74.4 kg (164 lb)   12/17/21 1454 74.4 kg (164 lb)   11/30/21 1143 75.8 kg (167 lb)   10/29/21 1332 78.5 kg (173 lb)   10/13/21 1332 78.6 kg (173 lb 3.2 oz)   08/31/21 0950 78.3 kg (172 lb 9.9 oz)   08/24/21 1018 77.5 kg (170 " lb 13.7 oz)   04/28/21 0000 78.6 kg (173 lb 4 oz)   04/20/21 1337 74.8 kg (165 lb)   04/19/21 0000 76.2 kg (168 lb)   01/29/21 0000 76.2 kg (168 lb)   12/09/20 0000 76.2 kg (168 lb)   11/30/20 0000 76.4 kg (168 lb 8 oz)   10/28/20 0000 74.8 kg (165 lb)   09/16/20 1423 74.4 kg (164 lb)   08/26/20 0000 74.4 kg (164 lb 2 oz)   07/28/20 0000 74.4 kg (164 lb)   04/22/20 0000 72.6 kg (160 lb)   01/27/20 0000 73.9 kg (163 lb)   10/18/19 0000 73.3 kg (161 lb 8 oz)   10/16/19 1330 60.8 kg (134 lb)   07/31/19 0000 74.1 kg (163 lb 6 oz)            Wt Change Observation -2.4% x 1 year, no clinically significant     Estimated/Assessed Needs       Energy Requirements 25-35 kcal/kg/adj BW   EST Needs (kcal/day) 7514-6986 kcal       Protein Requirements 1-1.5 g/kg adj BW   EST Daily Needs (g/day) 55-83 g       Fluid Requirements 1 ml/kcal    Estimated Needs (mL/day) 2039-1689 ml     Labs/Medications         Pertinent Labs Reviewed.   Results from last 7 days   Lab Units 01/09/23 0438 01/08/23 0416 01/07/23  0408   SODIUM mmol/L 142 140 140   POTASSIUM mmol/L 4.6 4.6 4.5   CHLORIDE mmol/L 104 104 102   CO2 mmol/L 30.2* 28.6 29.3*   BUN mg/dL 77* 61* 55*   CREATININE mg/dL 1.52* 1.28* 1.57*   CALCIUM mg/dL 9.8 9.6 9.7   BILIRUBIN mg/dL <0.2 <0.2 <0.2   ALK PHOS U/L 61 64 73   ALT (SGPT) U/L 16 19 11   AST (SGOT) U/L 15 18 11   GLUCOSE mg/dL 117* 119* 127*     Results from last 7 days   Lab Units 01/09/23  0438 01/08/23  0416 01/07/23  0408   MAGNESIUM mg/dL 2.7* 2.7* 2.7*   PHOSPHORUS mg/dL 3.1 3.0 4.2   HEMOGLOBIN g/dL 7.7* 8.3* 8.4*   HEMATOCRIT % 24.7* 27.2* 27.5*     No results found for: COVID19  Lab Results   Component Value Date    HGBA1C 4.60 (L) 01/02/2023         Pertinent Medications Reviewed.     Current Nutrition Orders & Evaluation of Intake       Oral Nutrition     Current PO Diet Diet: Cardiac Diets, Fluid Restriction (240 mL/tray) Diets; Healthy Heart (2-3 Na+); 1500 mL/day; Texture: Regular Texture (IDDSI 7);  Fluid Consistency: Thin (IDDSI 0)   Supplement No active supplement orders       Malnutrition Severity Assessment                Nutrition Diagnosis         Nutrition Dx Problem 1 No nutrition diagnosis at this time.     Nutrition Intervention         No intervention indicated.      Medical Nutrition Therapy/Nutrition Education          Learner     Readiness N/A  N/A     Method     Response N/A  N/A     Monitor/Evaluation        Monitor Per protocol.     Nutrition Discharge Plan         No nutrition needs identified at this time.     Electronically signed by:  Luci Wilson RD  01/09/23 14:40 EST

## 2023-01-09 NOTE — PLAN OF CARE
Goal Outcome Evaluation:         Pt doing much better today, no issues noted. Will discharge home in the morning if no changes. Home health to follow. Will continue to monitor

## 2023-01-09 NOTE — CASE MANAGEMENT/SOCIAL WORK
"HF cm reviewed chart.     Followed up with pt. Pt is on room air, sitting on side of the bed.     Pt with known hx of heart failure. Heart failure handouts provided and discussed. Pt states she already eats low Na diet and \"does not drink that much fluid\". Discussed 2L fluid restriction. Pt currently on 1.5L fluid restriction. Pt states she does daily weights and knows when to call the dr. Encouraged pt to keep weight log that is provided. Discussed that she needs to call MD when she notices 2-3 lb weight gain in a day. Pt v/u.     Pt states she is mostly independent at home. Pts lives with her  who is able to assist if needed.     Per dc notes, pt to dc home with [facility]. Pt enrolled in heart failure home tele program with [facility].     Will continue to follow for heart failure needs.   "

## 2023-01-09 NOTE — THERAPY TREATMENT NOTE
Acute Care - Physical Therapy Treatment Note  SHAYNA Brenner     Patient Name: Yelena Sanchez  : 1935  MRN: [id number]  Today's Date: 2023      Visit Dx:     ICD-10-CM ICD-9-CM   1. Acute congestive heart failure, unspecified heart failure type (HCC)  I50.9 428.0   2. Acute pulmonary edema (HCC)  J81.0 518.4   3. Dyspnea, unspecified type  R06.00 786.09   4. Acute respiratory failure with hypoxia (HCC)  J96.01 518.81   5. Difficulty walking  R26.2 719.7     Patient Active Problem List   Diagnosis   • Iron deficiency anemia   • Primary osteoarthritis of left knee   • Chronic combined systolic and diastolic congestive heart failure (HCC)   • Presence of biventricular implantable cardioverter-defibrillator (ICD)   • Coronary artery disease involving native coronary artery of native heart without angina pectoris   • Mixed hyperlipidemia   • Essential hypertension   • Trochanteric bursitis of right hip   • Sciatica of right side   • Carotid artery disease (HCC)   • Acute congestive heart failure, unspecified heart failure type (HCC)     Past Medical History:   Diagnosis Date   • Arthritis    • Asthma    • Breast cancer (HCC)    • Congestive heart failure (HCC)    • COPD (chronic obstructive pulmonary disease) (HCC)    • Diverticulosis 2014   • GERD (gastroesophageal reflux disease)    • History of tobacco abuse    • HTN (hypertension)    • Hyperlipemia    • Hyperthyroidism    • Neck mass    • Seasonal allergies    • SOB (shortness of breath)      Past Surgical History:   Procedure Laterality Date   • COLONOSCOPY     • ENDOSCOPY     • EYE SURGERY      Implant; yes    • LUNG LOBECTOMY Left     Left lower lobe   • LUNG SURGERY     • MASTECTOMY      Left    • OTHER SURGICAL HISTORY      Joint Surgery   • PACEMAKER IMPLANTATION       PT Assessment (last 12 hours)     PT Evaluation and Treatment     Row Name 23 1444          Physical Therapy Time and Intention    Subjective Information no complaints   -DK     Document Type therapy note (daily note)  -DK     Mode of Treatment individual therapy;physical therapy  -DK     Patient Effort good  -DK     Symptoms Noted During/After Treatment fatigue  -DK     Row Name 01/09/23 1444          Pain    Pretreatment Pain Rating 0/10 - no pain  -DK     Posttreatment Pain Rating 0/10 - no pain  -DK     Row Name 01/09/23 1444          Cognition    Affect/Mental Status (Cognition) WFL  -DK     Orientation Status (Cognition) oriented x 4  -DK     Follows Commands (Cognition) WFL  -DK     Cognitive Function WFL  -DK     Personal Safety Interventions gait belt;nonskid shoes/slippers when out of bed;supervised activity  -DK     Row Name 01/09/23 1444          Bed Mobility    Bed Mobility sit-supine  -DK     Supine-Sit [place] (Bed Mobility) standby assist  -DK     Sit-Supine [place] (Bed Mobility) standby assist  -DK     Assistive Device (Bed Mobility) bed rails  -DK     Row Name 01/09/23 1444          Transfers    Transfers sit-stand transfer;stand-sit transfer  -DK     Row Name 01/09/23 1444          Sit-Stand Transfer    Sit-Stand [place] (Transfers) standby assist  -DK     Assistive Device (Sit-Stand Transfers) walker, front-wheeled  -DK     Row Name 01/09/23 1444          Stand-Sit Transfer    Stand-Sit [place] (Transfers) standby assist  -DK     Assistive Device (Stand-Sit Transfers) walker, front-wheeled  -DK     Row Name 01/09/23 1444          Gait/Stairs (Locomotion)    Gait/Stairs Locomotion gait/ambulation independence;gait/ambulation assistive device;distance ambulated;gait pattern  -DK     [place] Level (Gait) standby assist;contact guard;1 person assist  -DK     Assistive Device (Gait) walker, front-wheeled  -DK     Distance in Feet (Gait) 260  -DK     Pattern (Gait) step-through  -DK     Deviations/Abnormal Patterns (Gait) festinating/shuffling;stride length decreased  -DK     Comment, (Gait/Stairs) Pt ambulated on room air with a rolling  walker.  She returned to bed post treatment.  -     Row Name 01/09/23 1444          Safety Issues, Functional Mobility    Impairments Affecting Function (Mobility) endurance/activity tolerance;strength;shortness of breath  -     Row Name 01/09/23 1444          Balance    Balance Assessment sitting static balance;sitting dynamic balance;standing static balance;standing dynamic balance  -     Static Sitting Balance standby assist  -DK     Dynamic Sitting Balance standby assist  -DK     Position, Sitting Balance unsupported;sitting edge of bed  -     Static Standing Balance standby assist  -DK     Dynamic Standing Balance standby assist;contact guard;1-person assist  -DK     Position/Device Used, Standing Balance walker, front-wheeled  -     Balance Interventions standing;dynamic;tandem gait  -     Row Name 01/09/23 1444          Motor Skills    Motor Skills --  therapeutic exercises  -     Therapeutic Exercise hip;knee;ankle  -     Row Name 01/09/23 1444          Hip (Therapeutic Exercise)    Hip (Therapeutic Exercise) AROM (active range of motion)  -     Hip AROM (Therapeutic Exercise) bilateral;flexion;extension;aBduction;aDduction;sitting;20 repititions  -     Row Name 01/09/23 1444          Knee (Therapeutic Exercise)    Knee (Therapeutic Exercise) AROM (active range of motion)  -     Knee AROM (Therapeutic Exercise) bilateral;flexion;extension;LAQ (long arc quad);sitting;20 repititions  -     Row Name 01/09/23 1444          Ankle (Therapeutic Exercise)    Ankle (Therapeutic Exercise) AROM (active range of motion)  -     Ankle AROM (Therapeutic Exercise) bilateral;dorsiflexion;plantarflexion;sitting;20 repititions  -     Row Name 01/09/23 1444          Plan of Care Review    Plan of Care Reviewed With patient;spouse  -     Progress improving  -     Row Name 01/09/23 1444          Positioning and Restraints    Pre-Treatment Position in bed  -     Post Treatment Position bed  -DK      In Bed supine;call light within reach;encouraged to call for assist;with family/caregiver;side rails up x2  -DK     Row Name 01/09/23 1444          Therapy Assessment/Plan (PT)    Rehab Potential (PT) good, to achieve stated therapy goals  -     Criteria for Skilled Interventions Met (PT) skilled treatment is necessary  -DK     Therapy Frequency (PT) daily  -     Problem List (PT) problems related to;balance;mobility;strength;hearing  -DK     Row Name 01/09/23 1444          Progress Summary (PT)    Progress Toward Functional Goals (PT) progress toward functional goals is good  -           User Key  (r) = Recorded By, (t) = Taken By, (c) = Cosigned By    Initials Name Provider Type    Radha Barnett PTA Physical Therapist Assistant                Physical Therapy Education     Title: PT OT SLP Therapies (Done)     Topic: Physical Therapy (Done)     Point: Mobility training (Done)     Learning Progress Summary           Patient Acceptance, E,TB, VU,DU by  at 1/9/2023 1047    Acceptance, E,TB, VU by  at 1/4/2023 1546                   Point: Home exercise program (Done)     Learning Progress Summary           Patient Acceptance, E,TB, VU,DU by  at 1/9/2023 1047                   Point: Body mechanics (Done)     Learning Progress Summary           Patient Acceptance, E,TB, VU,DU by  at 1/9/2023 1047    Acceptance, E,TB, VU by  at 1/4/2023 1546                   Point: Precautions (Done)     Learning Progress Summary           Patient Acceptance, E,TB, VU,DU by  at 1/9/2023 1047    Acceptance, E,TB, VU by  at 1/4/2023 1546                               User Key     Initials Effective Dates Name Provider Type Discipline     06/16/21 -  Angelica Ivan, RN Registered Nurse Nurse    SUNNY 06/11/21 -  Arun Lockwood, PT Physical Therapist PT              PT Recommendation and Plan  Planned Therapy Interventions (PT): balance training, bed mobility training, gait training, home exercise program,  strengthening, transfer training  Therapy Frequency (PT): daily  Progress Summary (PT)  Progress Toward Functional Goals (PT): progress toward functional goals is good  Plan of Care Reviewed With: patient, spouse  Progress: improving   Outcome Measures     Row Name 01/09/23 1444             How much help from another person do you currently need...    Turning from your back to your side while in flat bed without using bedrails? 4  -DK      Moving from lying on back to sitting on the side of a flat bed without bedrails? 4  -DK      Moving to and from a bed to a chair (including a wheelchair)? 3  -DK      Standing up from a chair using your arms (e.g., wheelchair, bedside chair)? 3  -DK      Climbing 3-5 steps with a railing? 3  -DK      To walk in hospital room? 3  -DK      AM-PAC 6 Clicks Score (PT) 20  -DK         Functional Assessment    Outcome Measure Options AM-PAC 6 Clicks Basic Mobility (PT)  -DK            User Key  (r) = Recorded By, (t) = Taken By, (c) = Cosigned By    Initials Name Provider Type    Radha Barnett PTA Physical Therapist Assistant                 Time Calculation:    PT Charges     Row Name 01/09/23 1449             Time Calculation    PT Received On 01/09/23  -DK      PT Goal Re-Cert Due Date 01/13/23  -DK         Timed Charges    59083 - PT Therapeutic Exercise Minutes 12  -DK      68581 - Gait Training Minutes  9  -DK      60712 - PT Therapeutic Activity Minutes 7  -DK         Total Minutes    Timed Charges Total Minutes 28  -DK       Total Minutes 28  -DK            User Key  (r) = Recorded By, (t) = Taken By, (c) = Cosigned By    Initials Name Provider Type    Radha Barnett PTA Physical Therapist Assistant              Therapy Charges for Today     Code Description Service Date Service Provider Modifiers Qty    19267613930 HC PT THER PROC EA 15 MIN 1/9/2023 Radha Wilder PTA GP 1    40567316624 HC GAIT TRAINING EA 15 MIN 1/9/2023 Radha Wilder PTA GP 1          PT  G-Codes  Outcome Measure Options: AM-PAC 6 Clicks Basic Mobility (PT)  AM-PAC 6 Clicks Score (PT): 20    Radha Wilder, PTA  1/9/2023

## 2023-01-09 NOTE — PLAN OF CARE
Goal Outcome Evaluation:           Progress: improving  Outcome Evaluation: VSS, O2 stable on 2LNC. No acute changes.

## 2023-01-10 ENCOUNTER — READMISSION MANAGEMENT (OUTPATIENT)
Dept: CALL CENTER | Facility: HOSPITAL | Age: 88
End: 2023-01-10
Payer: MEDICARE

## 2023-01-10 VITALS
DIASTOLIC BLOOD PRESSURE: 44 MMHG | TEMPERATURE: 98 F | OXYGEN SATURATION: 100 % | HEIGHT: 59 IN | SYSTOLIC BLOOD PRESSURE: 147 MMHG | HEART RATE: 59 BPM | BODY MASS INDEX: 30.53 KG/M2 | WEIGHT: 151.46 LBS | RESPIRATION RATE: 18 BRPM

## 2023-01-10 PROBLEM — I50.33 DIASTOLIC CHF, ACUTE ON CHRONIC (HCC): Status: ACTIVE | Noted: 2023-01-10

## 2023-01-10 PROBLEM — I50.23 ACUTE ON CHRONIC SYSTOLIC HEART FAILURE: Status: ACTIVE | Noted: 2023-01-10

## 2023-01-10 LAB
ALBUMIN SERPL-MCNC: 3.6 G/DL (ref 3.5–5.2)
ALBUMIN/GLOB SERPL: 1.4 G/DL
ALP SERPL-CCNC: 66 U/L (ref 39–117)
ALT SERPL W P-5'-P-CCNC: 32 U/L (ref 1–33)
ANION GAP SERPL CALCULATED.3IONS-SCNC: 8.4 MMOL/L (ref 5–15)
AST SERPL-CCNC: 23 U/L (ref 1–32)
BASO STIPL COARSE BLD QL SMEAR: ABNORMAL
BILIRUB SERPL-MCNC: <0.2 MG/DL (ref 0–1.2)
BUN SERPL-MCNC: 72 MG/DL (ref 8–23)
BUN/CREAT SERPL: 40.7 (ref 7–25)
CALCIUM SPEC-SCNC: 9.9 MG/DL (ref 8.6–10.5)
CHLORIDE SERPL-SCNC: 103 MMOL/L (ref 98–107)
CO2 SERPL-SCNC: 29.6 MMOL/L (ref 22–29)
CREAT SERPL-MCNC: 1.77 MG/DL (ref 0.57–1)
DEPRECATED RDW RBC AUTO: 51.7 FL (ref 37–54)
EGFRCR SERPLBLD CKD-EPI 2021: 27.7 ML/MIN/1.73
ERYTHROCYTE [DISTWIDTH] IN BLOOD BY AUTOMATED COUNT: 15 % (ref 12.3–15.4)
GLOBULIN UR ELPH-MCNC: 2.5 GM/DL
GLUCOSE BLDC GLUCOMTR-MCNC: 111 MG/DL (ref 70–99)
GLUCOSE BLDC GLUCOMTR-MCNC: 97 MG/DL (ref 70–99)
GLUCOSE SERPL-MCNC: 123 MG/DL (ref 65–99)
HCT VFR BLD AUTO: 26 % (ref 34–46.6)
HGB BLD-MCNC: 8.2 G/DL (ref 12–15.9)
LARGE PLATELETS: ABNORMAL
LYMPHOCYTES # BLD MANUAL: 0.38 10*3/MM3 (ref 0.7–3.1)
LYMPHOCYTES NFR BLD MANUAL: 1 % (ref 5–12)
MAGNESIUM SERPL-MCNC: 2.8 MG/DL (ref 1.6–2.4)
MCH RBC QN AUTO: 30 PG (ref 26.6–33)
MCHC RBC AUTO-ENTMCNC: 31.5 G/DL (ref 31.5–35.7)
MCV RBC AUTO: 95.2 FL (ref 79–97)
MONOCYTES # BLD: 0.13 10*3/MM3 (ref 0.1–0.9)
MYELOCYTES NFR BLD MANUAL: 1 % (ref 0–0)
NEUTROPHILS # BLD AUTO: 12.1 10*3/MM3 (ref 1.7–7)
NEUTROPHILS NFR BLD MANUAL: 92 % (ref 42.7–76)
NEUTS BAND NFR BLD MANUAL: 3 % (ref 0–5)
NRBC SPEC MANUAL: 1 /100 WBC (ref 0–0.2)
PHOSPHATE SERPL-MCNC: 4.6 MG/DL (ref 2.5–4.5)
PLATELET # BLD AUTO: 290 10*3/MM3 (ref 140–450)
PMV BLD AUTO: 10.8 FL (ref 6–12)
POLYCHROMASIA BLD QL SMEAR: ABNORMAL
POTASSIUM SERPL-SCNC: 4.7 MMOL/L (ref 3.5–5.2)
PROT SERPL-MCNC: 6.1 G/DL (ref 6–8.5)
RBC # BLD AUTO: 2.73 10*6/MM3 (ref 3.77–5.28)
SCAN SLIDE: NORMAL
SMALL PLATELETS BLD QL SMEAR: ADEQUATE
SODIUM SERPL-SCNC: 141 MMOL/L (ref 136–145)
VARIANT LYMPHS NFR BLD MANUAL: 3 % (ref 19.6–45.3)
WBC MORPH BLD: NORMAL
WBC NRBC COR # BLD: 12.74 10*3/MM3 (ref 3.4–10.8)

## 2023-01-10 PROCEDURE — 83735 ASSAY OF MAGNESIUM: CPT | Performed by: STUDENT IN AN ORGANIZED HEALTH CARE EDUCATION/TRAINING PROGRAM

## 2023-01-10 PROCEDURE — 80053 COMPREHEN METABOLIC PANEL: CPT | Performed by: STUDENT IN AN ORGANIZED HEALTH CARE EDUCATION/TRAINING PROGRAM

## 2023-01-10 PROCEDURE — 63710000001 PREDNISONE PER 1 MG: Performed by: STUDENT IN AN ORGANIZED HEALTH CARE EDUCATION/TRAINING PROGRAM

## 2023-01-10 PROCEDURE — 94799 UNLISTED PULMONARY SVC/PX: CPT

## 2023-01-10 PROCEDURE — 84100 ASSAY OF PHOSPHORUS: CPT | Performed by: STUDENT IN AN ORGANIZED HEALTH CARE EDUCATION/TRAINING PROGRAM

## 2023-01-10 PROCEDURE — 94664 DEMO&/EVAL PT USE INHALER: CPT

## 2023-01-10 PROCEDURE — 25010000002 HEPARIN (PORCINE) PER 1000 UNITS: Performed by: INTERNAL MEDICINE

## 2023-01-10 PROCEDURE — 85025 COMPLETE CBC W/AUTO DIFF WBC: CPT | Performed by: STUDENT IN AN ORGANIZED HEALTH CARE EDUCATION/TRAINING PROGRAM

## 2023-01-10 PROCEDURE — 99239 HOSP IP/OBS DSCHRG MGMT >30: CPT | Performed by: INTERNAL MEDICINE

## 2023-01-10 PROCEDURE — 85007 BL SMEAR W/DIFF WBC COUNT: CPT | Performed by: STUDENT IN AN ORGANIZED HEALTH CARE EDUCATION/TRAINING PROGRAM

## 2023-01-10 PROCEDURE — 82962 GLUCOSE BLOOD TEST: CPT

## 2023-01-10 RX ORDER — HYDRALAZINE HYDROCHLORIDE 25 MG/1
25 TABLET, FILM COATED ORAL ONCE
Status: COMPLETED | OUTPATIENT
Start: 2023-01-10 | End: 2023-01-10

## 2023-01-10 RX ADMIN — GUAIFENESIN 600 MG: 600 TABLET ORAL at 08:52

## 2023-01-10 RX ADMIN — NEBIVOLOL 10 MG: 10 TABLET ORAL at 08:51

## 2023-01-10 RX ADMIN — Medication 1000 UNITS: at 08:51

## 2023-01-10 RX ADMIN — ARFORMOTEROL TARTRATE INHALATION 15 MCG: 15 SOLUTION RESPIRATORY (INHALATION) at 06:31

## 2023-01-10 RX ADMIN — PREDNISONE 40 MG: 20 TABLET ORAL at 08:57

## 2023-01-10 RX ADMIN — Medication 1 TABLET: at 08:51

## 2023-01-10 RX ADMIN — Medication 10 ML: at 08:52

## 2023-01-10 RX ADMIN — BUDESONIDE 0.5 MG: 0.5 INHALANT ORAL at 06:31

## 2023-01-10 RX ADMIN — SENNOSIDES AND DOCUSATE SODIUM 2 TABLET: 8.6; 5 TABLET ORAL at 08:51

## 2023-01-10 RX ADMIN — ALLOPURINOL 100 MG: 100 TABLET ORAL at 08:51

## 2023-01-10 RX ADMIN — CETIRIZINE HYDROCHLORIDE 10 MG: 10 TABLET ORAL at 08:52

## 2023-01-10 RX ADMIN — LEVOTHYROXINE SODIUM 100 MCG: 0.1 TABLET ORAL at 05:41

## 2023-01-10 RX ADMIN — FUROSEMIDE 40 MG: 40 TABLET ORAL at 08:52

## 2023-01-10 RX ADMIN — HYDRALAZINE HYDROCHLORIDE 25 MG: 25 TABLET, FILM COATED ORAL at 00:39

## 2023-01-10 RX ADMIN — PANTOPRAZOLE SODIUM 40 MG: 40 TABLET, DELAYED RELEASE ORAL at 05:40

## 2023-01-10 RX ADMIN — ASPIRIN 81 MG: 81 TABLET, COATED ORAL at 08:51

## 2023-01-10 RX ADMIN — SPIRONOLACTONE 12.5 MG: 25 TABLET ORAL at 08:51

## 2023-01-10 RX ADMIN — FERROUS SULFATE TAB 325 MG (65 MG ELEMENTAL FE) 325 MG: 325 (65 FE) TAB at 08:51

## 2023-01-10 RX ADMIN — AZELASTINE HYDROCHLORIDE 2 SPRAY: 137 SPRAY, METERED NASAL at 08:58

## 2023-01-10 RX ADMIN — AMLODIPINE BESYLATE 5 MG: 5 TABLET ORAL at 08:51

## 2023-01-10 RX ADMIN — CLOPIDOGREL BISULFATE 75 MG: 75 TABLET ORAL at 08:51

## 2023-01-10 RX ADMIN — HEPARIN SODIUM 5000 UNITS: 5000 INJECTION INTRAVENOUS; SUBCUTANEOUS at 08:52

## 2023-01-10 NOTE — DISCHARGE INSTR - APPOINTMENTS
January 19, 2023 at 3 pm  Hospital follow up with PCP  Practice will call patient when it is time to come inside the facility.

## 2023-01-10 NOTE — CASE MANAGEMENT/SOCIAL WORK
HF cm reviewed chart.     Pt discharged home with [facility]. Pt enrolled in heart failure program with [facility].     No new heart failure medications at [facility]. Lasix changed to 40mg BID.     Pt has appt with Mariana Mitchell APRN 1/25 @ 2:30

## 2023-01-10 NOTE — NURSING NOTE
Exercise Oximetry    Patient Name:Yelena Sanchez   MRN: [id number]   Date: 01/10/23             ROOM AIR BASELINE   SpO2%   Heart Rate 76   Blood Pressure      EXERCISE ON ROOM AIR SpO2% EXERCISE ON O2 @ 2 LPM SpO2%   1 MINUTE 94 1 MINUTE 100   2 MINUTES 94 2 MINUTES    3 MINUTES 97 3 MINUTES    4 MINUTES 95 4 MINUTES    5 MINUTES 94 5 MINUTES    6 MINUTES 94 6 MINUTES               Distance Walked  250ft  Distance Walked   Dyspnea (Nidia Scale)   Dyspnea (Nidia Scale)   Fatigue (Nidia Scale)   Fatigue (Nidia Scale)   SpO2% Post Exercise   SpO2% Post Exercise   HR Post Exercise   HR Post Exercise   Time to Recovery   Time to Recovery     Comments:

## 2023-01-10 NOTE — DISCHARGE SUMMARY
[facility]         HOSPITALIST  DISCHARGE SUMMARY    Patient Name: Yelena Sanchez  : 1935  MRN: [id number]    Date of Admission: 2023  Date of Discharge:  01/10/23  Primary Care Physician: Kaelyn Eugene MD    Consults     Date and Time Order Name Status Description    2023  3:24 AM Hospitalist (on-call MD unless specified)            Active and Resolved Hospital Problems:  Active Hospital Problems    Diagnosis POA   • Acute on chronic systolic heart failure (CMS/HCC) [I50.23] Unknown   Chronic kidney disease stage IIIb  Community-acquired pneumonia from unspecified organism  COPD with acute exacerbation  Staph bacteremia, suspect this is a contaminant, only 1 of 2 bottles  Essential hypertension  Hyperlipidemia  Hypothyroidism  Coronary artery disease  Status post ICD, interrogation on 2022  Acute respiratory failure with hypoxia  Glossitis - outpatient ENT follow-up planned    Hospital Course     Hospital Course:  Yelena Sanchez is a 87 y.o. female with coronary artery disease, hypertension, hyperlipidemia, hypothyroidism, heart failure with reduced ejection fraction and chronic kidney disease stage IIIb who presented to the hospital via EMS after experiencing severe shortness of breath the morning of presentation.  Per EMS her oxygen saturation was in the 70s s.  On presentation to the emergency room she was tachycardic, tachypneic and required NIPPV.  WBC 13.9.  Chest x-ray concerning for pneumonia versus edema with a small right pleural effusion.  BNP 7900.  Started on azithromycin/Rocephin, breathing treatment along with IV Lasix.  Transitioned to 2 L nasal cannula. Blood culture 1/2 positive for coagulase-negative staph with subsequent blood cultures show no growth; contaminant was suspected.  She developed increased wheezing with concern for COPD exacerbation and was treated with a 5-day course of prednisone.  Initial procalcitonin 0.32 and improved to  0.12; completed empiric 5-day course of antibiotics.  Ultimately did improve with medical management and was weaned to room air and passed 6-minute walk test.  Kidney function may near baseline and was transitioned to oral diuretic regimen.  Continued on aspirin, statin and beta-blocker.  At home care, medication changes, follow-up instructions provided.  She is already established with cardiology.  She will follow-up with PCP on 1/20/2023    Day of Discharge     Vital Signs:  Temp:  [97.4 °F (36.3 °C)-98.4 °F (36.9 °C)] 98 °F (36.7 °C)  Heart Rate:  [59-63] 59  Resp:  [18] 18  BP: (147-178)/(44-57) 147/44  Flow (L/min):  [2] 2  Physical Exam:   GENERAL: The patient is eldelry, conversant and nontoxic.  HEENT: PERRLA, Oropharynx clear  NECK: Supple, trachea midline  HEART: Regular rate and rhythm. No edema  LUNGS: Equal air entry, clear to auscultation bilaterally.  ABDOMEN: Soft, positive bowel sounds, nontender, nondistended  SKIN: No rash or open wounds   NEUROLOGIC: Alert, cranial nerves grossly intact, speech clear    Discharge Details        Discharge Medications      Changes to Medications      Instructions Start Date   furosemide 40 MG tablet  Commonly known as: LASIX  What changed:   · when to take this  · Another medication with the same name was removed. Continue taking this medication, and follow the directions you see here.   40 mg, Oral, 2 Times Daily         Continue These Medications      Instructions Start Date   allopurinol 100 MG tablet  Commonly known as: ZYLOPRIM   100 mg, Oral, Daily      amLODIPine 5 MG tablet  Commonly known as: NORVASC   5 mg, Oral, Daily      aspirin 81 MG EC tablet   81 mg, Oral, Daily      azelastine 0.1 % nasal spray  Commonly known as: ASTELIN   2 sprays, Nasal, 2 Times Daily, Use in each nostril as directed      chlorpheniramine 4 MG tablet  Commonly known as: CHLOR-TRIMETON   4 mg, Oral, 2 Times Daily      cholecalciferol 25 MCG (1000 UT) tablet  Commonly known as:  VITAMIN D3   1,000 Units, Oral, Daily      clopidogrel 75 MG tablet  Commonly known as: PLAVIX   75 mg, Oral, Daily      ezetimibe 10 MG tablet  Commonly known as: ZETIA   10 mg, Oral, Daily      ipratropium 0.06 % nasal spray  Commonly known as: ATROVENT   2 sprays, Nasal, 3 Times Daily      lansoprazole 30 MG capsule  Commonly known as: PREVACID   30 mg, Oral, Daily, Take 30 min prior to eating      levothyroxine 100 MCG tablet  Commonly known as: SYNTHROID, LEVOTHROID   1 tablet, Oral, Daily      montelukast 10 MG tablet  Commonly known as: SINGULAIR   10 mg, Oral, Nightly      multivitamin with minerals tablet tablet   1 tablet, Oral, Daily      nebivolol 10 MG tablet  Commonly known as: Bystolic   10 mg, Oral, Daily      rosuvastatin 20 MG tablet  Commonly known as: Crestor   20 mg, Oral, Nightly      spironolactone 25 MG tablet  Commonly known as: ALDACTONE   12.5 mg, Oral, Daily             No Known Allergies    Discharge Disposition:  [facility]    Diet:  Hospital:  Diet Order   Procedures   • Diet: Cardiac Diets, Fluid Restriction (240 mL/tray) Diets; Healthy Heart (2-3 Na+); 1500 mL/day; Texture: Regular Texture (IDDSI 7); Fluid Consistency: Thin (IDDSI 0)       Discharge Activity:   Activity Instructions     Gradually Increase Activity Until at Pre-Hospitalization Level      Up WIth Assist            CODE STATUS:  Code Status and Medical Interventions:   Ordered at: 01/02/23 0421     Code Status (Patient has no pulse and is not breathing):    CPR (Attempt to Resuscitate)     Medical Interventions (Patient has pulse or is breathing):    Full Support         Future Appointments   Date Time Provider Department Center   1/13/2023  1:30 PM Lefty Acevedo MD [facility] ENT [facility]   3/10/2023 11:45 AM Lefty Acevedo MD [facility] ENT [facility]   4/25/2023 11:00 AM Leslie Chapman APRN [facility] PCC [facility]   5/22/2023  1:15 PM Simon Talbot MD [facility] CD [facility]       Additional Instructions for  the Follow-ups that You Need to Schedule     Discharge Follow-up with PCP   As directed       Currently Documented PCP:    Kaelyn Eugene MD    PCP Phone Number:    [phone]     Follow Up Details: 1 week               Pertinent  and/or Most Recent Results     PROCEDURES: NONE    LAB RESULTS:      Lab 01/10/23  0443 01/09/23  0438 01/08/23  0416 01/07/23  0408 01/06/23  0420 01/05/23  0509   WBC 12.74* 10.68 9.23 11.54* 6.40 8.24   HEMOGLOBIN 8.2* 7.7* 8.3* 8.4* 9.0* 9.3*   HEMATOCRIT 26.0* 24.7* 27.2* 27.5* 28.6* 30.7*   PLATELETS 290 251 258 249 217 225   NEUTROS ABS 12.10* 8.54* 7.98* 10.61* 6.06 6.37   IMMATURE GRANS (ABS)  --  0.28* 0.16* 0.04 0.02 0.02   LYMPHS ABS  --  0.88 0.41* 0.28* 0.16* 0.48*   MONOS ABS  --  0.97* 0.67 0.60 0.16 1.22*   EOS ABS  --  0.00 0.01 0.00 0.00 0.12   MCV 95.2 95.4 96.8 95.5 94.1 95.9   PROCALCITONIN  --   --   --  0.12  --   --          Lab 01/10/23  0443 01/09/23  0438 01/08/23 0416 01/07/23  0408 01/06/23  0420   SODIUM 141 142 140 140 141   POTASSIUM 4.7 4.6 4.6 4.5 4.2   CHLORIDE 103 104 104 102 101   CO2 29.6* 30.2* 28.6 29.3* 28.3   ANION GAP 8.4 7.8 7.4 8.7 11.7   BUN 72* 77* 61* 55* 53*   CREATININE 1.77* 1.52* 1.28* 1.57* 1.86*   EGFR 27.7* 33.3* 40.9* 32.0* 26.1*   GLUCOSE 123* 117* 119* 127* 175*   CALCIUM 9.9 9.8 9.6 9.7 9.6   MAGNESIUM 2.8* 2.7* 2.7* 2.7* 2.5*   PHOSPHORUS 4.6* 3.1 3.0 4.2 4.9*         Lab 01/10/23  0443 01/09/23  0438 01/08/23  0416 01/07/23  0408 01/06/23  0420   TOTAL PROTEIN 6.1 5.7* 6.0 6.4 6.7   ALBUMIN 3.6 3.3* 3.6 3.7 3.6   GLOBULIN 2.5 2.4 2.4 2.7 3.1   ALT (SGPT) 32 16 19 11 10   AST (SGOT) 23 15 18 11 14   BILIRUBIN <0.2 <0.2 <0.2 <0.2 <0.2   ALK PHOS 66 61 64 73 79         Lab 01/05/23  0509   PROBNP 3,046.0*             Lab 01/07/23  0408 01/04/23  0417   IRON  --  114   IRON SATURATION  --  37   TIBC  --  308   TRANSFERRIN  --  207   FERRITIN  --  80.63   FOLATE 9.83  --    VITAMIN B 12 823  --          Brief Urine Lab  Results  (Last result in the past 365 days)      Color   Clarity   Blood   Leuk Est   Nitrite   Protein   CREAT   Urine HCG        10/18/22 0734 Yellow   Clear   Negative   Trace   Negative   30 mg/dL (1+)           10/18/22 0734             76.5             Microbiology Results (last 10 days)     Procedure Component Value - Date/Time    Blood Culture - Blood, Arm, Left [[id number]]  (Normal) Collected: 01/02/23 2305    Lab Status: Final result Specimen: Blood from Arm, Left Updated: 01/07/23 2330     Blood Culture No growth at 5 days    Blood Culture - Blood, Arm, Left [[id number]]  (Normal) Collected: 01/02/23 2305    Lab Status: Final result Specimen: Blood from Arm, Left Updated: 01/07/23 2330     Blood Culture No growth at 5 days    Blood Culture - Blood, Arm, Left [[id number]]  (Normal) Collected: 01/02/23 0217    Lab Status: Final result Specimen: Blood from Arm, Left Updated: 01/07/23 0245     Blood Culture No growth at 5 days    Blood Culture - Blood, Arm, Left [[id number]]  (Abnormal) Collected: 01/02/23 0217    Lab Status: Final result Specimen: Blood from Arm, Left Updated: 01/04/23 0706     Blood Culture Staphylococcus, coagulase negative     Isolated from Aerobic Bottle     Gram Stain Aerobic Bottle Gram positive cocci in clusters    Narrative:      Probable contaminant requires clinical correlation, susceptibility not performed unless requested by physician.      Blood Culture ID, PCR - Blood, Arm, Left [[id number]]  (Abnormal) Collected: 01/02/23 0217    Lab Status: Final result Specimen: Blood from Arm, Left Updated: 01/03/23 0039     BCID, PCR Staph spp, not aureus or lugdunensis. Identification by BCID2 PCR.          CT Soft Tissue Neck Without Contrast    Result Date: 1/4/2023  Impression:   1. No abnormal soft tissue mass, fluid collection, or lymphadenopathy.     ALISHA RED MD       Electronically Signed and Approved By: ALISHA RED MD on 1/04/2023 at 15:56             XR Chest 1  View    Result Date: 1/2/2023  Impression:    1. There may be interval increase in the degree of opacification involving the right lung base.  The findings may represent pulmonary edema.  Pneumonia cannot be excluded.   2. A small right pleural effusion is possible.   3. Probably no acute infiltrate on the left.   4. There is mild gaseous distension of the stomach.   5. Please see above comments for further detail.     Please note that portions of this note were completed with a voice recognition program.  TIMMY HUMPHREY JR, MD       Electronically Signed and Approved By: TIMMY HUMPHREY JR, MD on 1/02/2023 at 3:10                        Results for orders placed during the hospital encounter of 01/02/23    Adult Transthoracic Echo Complete w/ Color, Spectral and Contrast if necessary per protocol    Interpretation Summary  •  Left ventricular ejection fraction appears to be 31 - 35%.  Moderate global hypokinesis.  •  The left ventricular cavity is mild to moderately dilated.  •  Left ventricular diastolic function is consistent with (grade I) impaired relaxation and age.  •  Mildly reduced right ventricular systolic function noted.  •  Estimated right ventricular systolic pressure from tricuspid regurgitation is normal (<35 mmHg).  •  No significant valvular disease.  •  Electronic lead noted in right atrium and right ventricle.      Labs Pending at Discharge:        Time spent on Discharge including face to face service: >30 minutes    Electronically signed by Jose M Patterson DO, 01/10/23, 12:44 PM EST.

## 2023-01-11 NOTE — OUTREACH NOTE
Prep Survey    Flowsheet Row Responses   [facility] facility patient discharged from? [facility]   Is LACE score < 7 ? No   Eligibility Readm Mgmt   Discharge diagnosis Acute on chronic diastolic heart failure, Community-acquired pneumonia   Does the patient have one of the following disease processes/diagnoses(primary or secondary)? CHF   Does the patient have Home health ordered? Yes   What is the Home health agency?  [facility]   Is there a DME ordered? Yes   What DME was ordered? nebulizer- [employer]   Prep survey completed? Yes          LISBETH MCDANIEL - Registered Nurse

## 2023-01-12 ENCOUNTER — READMISSION MANAGEMENT (OUTPATIENT)
Dept: CALL CENTER | Facility: HOSPITAL | Age: 88
End: 2023-01-12
Payer: MEDICARE

## 2023-01-12 NOTE — OUTREACH NOTE
CHF Week 1 Survey    Flowsheet Row Responses   [facility] patient discharged from? [facility]   Does the patient have one of the following disease processes/diagnoses(primary or secondary)? CHF   CHF Week 1 attempt successful? Yes   Call start time 1512   Call end time 1517   Discharge diagnosis Acute on chronic diastolic heart failure, Community-acquired pneumonia   Meds reviewed with patient/caregiver? Yes   Is the patient having any side effects they believe may be caused by any medication additions or changes? No   Does the patient have all medications ordered at discharge? Yes   Is the patient taking all medications as directed (includes completed medication regime)? Yes   Does the patient have a primary care provider?  Yes   Does the patient have an appointment with their PCP within 7 days of discharge? No   Nursing Interventions Educated patient on importance of making appointment   Has the patient kept scheduled appointments due by today? N/A   What is the Home health agency?  [facility]   What DME was ordered? nebulizer- [employer]   Has all DME been delivered? Yes   Pulse Ox monitoring Intermittent   Pulse Ox device source Patient   O2 Sat comments 98% on RA.   O2 Sat: education provided Sat levels, Monitoring frequency, When to seek care   Psychosocial issues? No   Did the patient receive a copy of their discharge instructions? Yes   Nursing interventions Reviewed instructions with patient   What is the patient's perception of their health status since discharge? Improving   Nursing interventions Nurse provided patient education   Is the patient able to teach back signs and symptoms of worsening condition? (i.e. weight gain, shortness of air, etc.) Yes   If the patient is a current smoker, are they able to teach back resources for cessation? Not a smoker   Is the patient/caregiver able to teach back the hierarchy of who to call/visit for symptoms/problems? PCP, Specialist, Home health nurse, Urgent Care,  ED, 911 Yes   Is the patient able to teach back Heart Failure Zones? Yes   CHF Nursing Interventions Education provided on various zones   CHF Zone this Call Green Zone   Green Zone Patient reports doing well, No new or worsening shortness of breath, No chest pain   Green Zone Interventions Meds as directed, Low sodium diet, Follow up visits planned    CHF Week 1 call completed? Yes   Is the patient interested in additional calls from an ambulatory ?  NOTE:  applies to high risk patients requiring additional follow-up. No   Would this patient benefit from a Referral to [facility] Social Work? No   Wrap up additional comments Pt. reports losing voice/ENT appt. 1/13/23 @ 1:30pm. Pt. reports she did not qualify for O2. No questions at this time.    Call end time 1517          MEDARDO HERNÁNDEZ - Registered Nurse

## 2023-01-17 ENCOUNTER — LAB (OUTPATIENT)
Dept: LAB | Facility: HOSPITAL | Age: 88
End: 2023-01-17
Payer: MEDICARE

## 2023-01-17 ENCOUNTER — TRANSCRIBE ORDERS (OUTPATIENT)
Dept: LAB | Facility: HOSPITAL | Age: 88
End: 2023-01-17
Payer: MEDICARE

## 2023-01-17 DIAGNOSIS — E55.9 VITAMIN D DEFICIENCY: ICD-10-CM

## 2023-01-17 DIAGNOSIS — E61.1 IRON DEFICIENCY: ICD-10-CM

## 2023-01-17 DIAGNOSIS — E03.9 HYPOTHYROIDISM, UNSPECIFIED TYPE: ICD-10-CM

## 2023-01-17 DIAGNOSIS — N18.9 CHRONIC KIDNEY DISEASE, UNSPECIFIED CKD STAGE: ICD-10-CM

## 2023-01-17 DIAGNOSIS — D64.9 ANEMIA, UNSPECIFIED TYPE: ICD-10-CM

## 2023-01-17 DIAGNOSIS — Z79.899 ENCOUNTER FOR LONG-TERM (CURRENT) USE OF OTHER MEDICATIONS: ICD-10-CM

## 2023-01-17 DIAGNOSIS — E78.5 HYPERLIPIDEMIA, UNSPECIFIED HYPERLIPIDEMIA TYPE: ICD-10-CM

## 2023-01-17 DIAGNOSIS — J43.9 PULMONARY EMPHYSEMA, UNSPECIFIED EMPHYSEMA TYPE: ICD-10-CM

## 2023-01-17 DIAGNOSIS — I10 ESSENTIAL HYPERTENSION, MALIGNANT: ICD-10-CM

## 2023-01-17 DIAGNOSIS — D64.9 ANEMIA, UNSPECIFIED TYPE: Primary | ICD-10-CM

## 2023-01-17 LAB
25(OH)D3 SERPL-MCNC: 56.2 NG/ML (ref 30–100)
ALBUMIN SERPL-MCNC: 4.1 G/DL (ref 3.5–5.2)
ALP SERPL-CCNC: 77 U/L (ref 39–117)
ALT SERPL W P-5'-P-CCNC: 15 U/L (ref 1–33)
ANION GAP SERPL CALCULATED.3IONS-SCNC: 12 MMOL/L (ref 5–15)
AST SERPL-CCNC: 14 U/L (ref 1–32)
BACTERIA UR QL AUTO: ABNORMAL /HPF
BASOPHILS # BLD AUTO: 0.03 10*3/MM3 (ref 0–0.2)
BASOPHILS NFR BLD AUTO: 0.3 % (ref 0–1.5)
BILIRUB CONJ SERPL-MCNC: <0.2 MG/DL (ref 0–0.3)
BILIRUB INDIRECT SERPL-MCNC: NORMAL MG/DL
BILIRUB SERPL-MCNC: 0.2 MG/DL (ref 0–1.2)
BILIRUB UR QL STRIP: NEGATIVE
BUN SERPL-MCNC: 45 MG/DL (ref 8–23)
BUN/CREAT SERPL: 21.3 (ref 7–25)
CALCIUM SPEC-SCNC: 9.7 MG/DL (ref 8.6–10.5)
CHLORIDE SERPL-SCNC: 106 MMOL/L (ref 98–107)
CHOLEST SERPL-MCNC: 121 MG/DL (ref 0–200)
CLARITY UR: CLEAR
CO2 SERPL-SCNC: 25 MMOL/L (ref 22–29)
COLOR UR: YELLOW
CREAT SERPL-MCNC: 2.11 MG/DL (ref 0.57–1)
CRP SERPL-MCNC: 9.57 MG/DL (ref 0–0.5)
DEPRECATED RDW RBC AUTO: 53.4 FL (ref 37–54)
EGFRCR SERPLBLD CKD-EPI 2021: 22.4 ML/MIN/1.73
EOSINOPHIL # BLD AUTO: 0.07 10*3/MM3 (ref 0–0.4)
EOSINOPHIL NFR BLD AUTO: 0.6 % (ref 0.3–6.2)
ERYTHROCYTE [DISTWIDTH] IN BLOOD BY AUTOMATED COUNT: 15.3 % (ref 12.3–15.4)
FERRITIN SERPL-MCNC: 94.3 NG/ML (ref 13–150)
FOLATE SERPL-MCNC: >20 NG/ML (ref 4.78–24.2)
GLUCOSE SERPL-MCNC: 98 MG/DL (ref 65–99)
GLUCOSE UR STRIP-MCNC: NEGATIVE MG/DL
HCT VFR BLD AUTO: 27.6 % (ref 34–46.6)
HDLC SERPL-MCNC: 50 MG/DL (ref 40–60)
HGB BLD-MCNC: 8.5 G/DL (ref 12–15.9)
HGB UR QL STRIP.AUTO: NEGATIVE
HYALINE CASTS UR QL AUTO: ABNORMAL /LPF
IMM GRANULOCYTES # BLD AUTO: 0.05 10*3/MM3 (ref 0–0.05)
IMM GRANULOCYTES NFR BLD AUTO: 0.5 % (ref 0–0.5)
IRON 24H UR-MRATE: 20 MCG/DL (ref 37–145)
IRON SATN MFR SERPL: 5 % (ref 20–50)
KETONES UR QL STRIP: NEGATIVE
LDLC SERPL CALC-MCNC: 54 MG/DL (ref 0–100)
LDLC/HDLC SERPL: 1.06 {RATIO}
LEUKOCYTE ESTERASE UR QL STRIP.AUTO: ABNORMAL
LYMPHOCYTES # BLD AUTO: 0.7 10*3/MM3 (ref 0.7–3.1)
LYMPHOCYTES NFR BLD AUTO: 6.4 % (ref 19.6–45.3)
MAGNESIUM SERPL-MCNC: 2.6 MG/DL (ref 1.6–2.4)
MCH RBC QN AUTO: 29.7 PG (ref 26.6–33)
MCHC RBC AUTO-ENTMCNC: 30.8 G/DL (ref 31.5–35.7)
MCV RBC AUTO: 96.5 FL (ref 79–97)
MONOCYTES # BLD AUTO: 1.53 10*3/MM3 (ref 0.1–0.9)
MONOCYTES NFR BLD AUTO: 14 % (ref 5–12)
NEUTROPHILS NFR BLD AUTO: 78.2 % (ref 42.7–76)
NEUTROPHILS NFR BLD AUTO: 8.57 10*3/MM3 (ref 1.7–7)
NITRITE UR QL STRIP: NEGATIVE
NRBC BLD AUTO-RTO: 0 /100 WBC (ref 0–0.2)
PH UR STRIP.AUTO: 5.5 [PH] (ref 5–8)
PLATELET # BLD AUTO: 390 10*3/MM3 (ref 140–450)
PMV BLD AUTO: 10.4 FL (ref 6–12)
POTASSIUM SERPL-SCNC: 4.1 MMOL/L (ref 3.5–5.2)
PROT SERPL-MCNC: 6.8 G/DL (ref 6–8.5)
PROT UR QL STRIP: ABNORMAL
RBC # BLD AUTO: 2.86 10*6/MM3 (ref 3.77–5.28)
RBC # UR STRIP: ABNORMAL /HPF
REF LAB TEST METHOD: ABNORMAL
SODIUM SERPL-SCNC: 143 MMOL/L (ref 136–145)
SP GR UR STRIP: 1.02 (ref 1–1.03)
SQUAMOUS #/AREA URNS HPF: ABNORMAL /HPF
T4 FREE SERPL-MCNC: 1.43 NG/DL (ref 0.93–1.7)
TIBC SERPL-MCNC: 393 MCG/DL (ref 298–536)
TRANSFERRIN SERPL-MCNC: 264 MG/DL (ref 200–360)
TRIGL SERPL-MCNC: 89 MG/DL (ref 0–150)
TSH SERPL DL<=0.05 MIU/L-ACNC: 0.56 UIU/ML (ref 0.27–4.2)
URATE SERPL-MCNC: 9 MG/DL (ref 2.4–5.7)
UROBILINOGEN UR QL STRIP: ABNORMAL
VIT B12 BLD-MCNC: 1590 PG/ML (ref 211–946)
VLDLC SERPL-MCNC: 17 MG/DL (ref 5–40)
WBC # UR STRIP: ABNORMAL /HPF
WBC NRBC COR # BLD: 10.95 10*3/MM3 (ref 3.4–10.8)

## 2023-01-17 PROCEDURE — 85025 COMPLETE CBC W/AUTO DIFF WBC: CPT

## 2023-01-17 PROCEDURE — 81001 URINALYSIS AUTO W/SCOPE: CPT

## 2023-01-17 PROCEDURE — 80061 LIPID PANEL: CPT

## 2023-01-17 PROCEDURE — 84466 ASSAY OF TRANSFERRIN: CPT

## 2023-01-17 PROCEDURE — 80076 HEPATIC FUNCTION PANEL: CPT

## 2023-01-17 PROCEDURE — 84439 ASSAY OF FREE THYROXINE: CPT

## 2023-01-17 PROCEDURE — 82746 ASSAY OF FOLIC ACID SERUM: CPT

## 2023-01-17 PROCEDURE — 83540 ASSAY OF IRON: CPT

## 2023-01-17 PROCEDURE — 84550 ASSAY OF BLOOD/URIC ACID: CPT

## 2023-01-17 PROCEDURE — 82728 ASSAY OF FERRITIN: CPT

## 2023-01-17 PROCEDURE — 83735 ASSAY OF MAGNESIUM: CPT

## 2023-01-17 PROCEDURE — 82607 VITAMIN B-12: CPT

## 2023-01-17 PROCEDURE — 80048 BASIC METABOLIC PNL TOTAL CA: CPT

## 2023-01-17 PROCEDURE — 82306 VITAMIN D 25 HYDROXY: CPT

## 2023-01-17 PROCEDURE — 84443 ASSAY THYROID STIM HORMONE: CPT

## 2023-01-17 PROCEDURE — 36415 COLL VENOUS BLD VENIPUNCTURE: CPT

## 2023-01-17 PROCEDURE — 86140 C-REACTIVE PROTEIN: CPT

## 2023-01-20 ENCOUNTER — READMISSION MANAGEMENT (OUTPATIENT)
Dept: CALL CENTER | Facility: HOSPITAL | Age: 88
End: 2023-01-20
Payer: MEDICARE

## 2023-01-20 NOTE — OUTREACH NOTE
CHF Week 2 Survey    Flowsheet Row Responses   [facility] patient discharged from? [facility]   Does the patient have one of the following disease processes/diagnoses(primary or secondary)? CHF   Week 2 attempt successful? Yes   Call start time 1526   Call end time 1534   Discharge diagnosis Acute on chronic diastolic heart failure, Community-acquired pneumonia   Meds reviewed with patient/caregiver? Yes   Is the patient having any side effects they believe may be caused by any medication additions or changes? No   Does the patient have all medications ordered at discharge? Yes   Is the patient taking all medications as directed (includes completed medication regime)? Yes   Does the patient have a primary care provider?  Yes   Does the patient have an appointment with their PCP within 7 days of discharge? No   What is preventing the patient from scheduling follow up appointments within 7 days of discharge? Earlier appointment not available   Nursing Interventions Educated patient on importance of making appointment   Has the patient kept scheduled appointments due by today? Yes   What is the Home health agency?  [facility]   Has home health visited the patient within 72 hours of discharge? Yes   What DME was ordered? nebulizer- [employer]   Has all DME been delivered? Yes   Did the patient receive a copy of their discharge instructions? Yes   Nursing interventions Reviewed instructions with patient   What is the patient's perception of their health status since discharge? Improving   Nursing interventions Nurse provided patient education   Is the patient able to teach back signs and symptoms of worsening condition? (i.e. weight gain, shortness of air, etc.) Yes   If the patient is a current smoker, are they able to teach back resources for cessation? Not a smoker   Is the patient/caregiver able to teach back the hierarchy of who to call/visit for symptoms/problems? PCP, Specialist, Home health nurse, Urgent Care,  ED, 911 Yes   Additional teach back comments She tries to avoid sodium. She is worried about her throat and speech.    Is the patient able to teach back Heart Failure Zones? Yes   CHF Nursing Interventions Education provided on various zones   CHF Zone this Call Green Zone   Green Zone Patient reports doing well, No new or worsening shortness of breath, No chest pain, Weight check stable, No new swelling -  feet, ankles and legs look normal for you, Physical activity level is normal for you   Green Zone Interventions Daily weight check, Meds as directed, Low sodium diet, Follow up visits planned   CHF Week 2 call completed? Yes   Is the patient interested in additional calls from an ambulatory ?  NOTE:  applies to high risk patients requiring additional follow-up. No   Would this patient benefit from a Referral to Amb Social Work? No   Wrap up additional comments No questions at this time, HH will be in touch as she saw PCP yesterday.   Call end time 0181          GIULIA MATHIAS - Registered Nurse

## 2023-01-23 ENCOUNTER — OFFICE VISIT (OUTPATIENT)
Dept: OTOLARYNGOLOGY | Facility: CLINIC | Age: 88
End: 2023-01-23
Payer: MEDICARE

## 2023-01-23 VITALS — BODY MASS INDEX: 31.57 KG/M2 | TEMPERATURE: 96 F | WEIGHT: 156.6 LBS | HEIGHT: 59 IN

## 2023-01-23 DIAGNOSIS — H91.90 HEARING LOSS, UNSPECIFIED HEARING LOSS TYPE, UNSPECIFIED LATERALITY: ICD-10-CM

## 2023-01-23 DIAGNOSIS — M54.2 NECK PAIN: Primary | ICD-10-CM

## 2023-01-23 DIAGNOSIS — K21.9 GASTROESOPHAGEAL REFLUX DISEASE, UNSPECIFIED WHETHER ESOPHAGITIS PRESENT: ICD-10-CM

## 2023-01-23 DIAGNOSIS — J02.9 SORE THROAT: ICD-10-CM

## 2023-01-23 PROCEDURE — 31575 DIAGNOSTIC LARYNGOSCOPY: CPT | Performed by: OTOLARYNGOLOGY

## 2023-01-23 PROCEDURE — 99213 OFFICE O/P EST LOW 20 MIN: CPT | Performed by: OTOLARYNGOLOGY

## 2023-01-23 RX ORDER — CARBOXYMETHYLCELLULOSE SODIUM 0.5 G/100ML
SOLUTION/ DROPS OPHTHALMIC
COMMUNITY
Start: 2023-01-20

## 2023-01-23 RX ORDER — DOCUSATE CALCIUM 240 MG
CAPSULE ORAL
COMMUNITY
Start: 2023-01-20

## 2023-01-23 RX ORDER — POLYETHYLENE GLYCOL 3350 17 G/17G
POWDER, FOR SOLUTION ORAL
COMMUNITY
Start: 2023-01-20

## 2023-01-23 RX ORDER — LEVOTHYROXINE SODIUM 125 MCG
TABLET ORAL
COMMUNITY
Start: 2023-01-20

## 2023-01-23 RX ORDER — HYDROCODONE BITARTRATE AND ACETAMINOPHEN 5; 325 MG/1; MG/1
TABLET ORAL
COMMUNITY
Start: 2023-01-20

## 2023-01-23 RX ORDER — LEVALBUTEROL TARTRATE 45 MCG
HFA AEROSOL WITH ADAPTER (GRAM) INHALATION
COMMUNITY
Start: 2023-01-20

## 2023-01-23 RX ORDER — PREDNISONE 20 MG/1
40 TABLET ORAL DAILY
Qty: 8 TABLET | Refills: 0 | Status: SHIPPED | OUTPATIENT
Start: 2023-01-23 | End: 2023-01-27

## 2023-01-23 RX ORDER — ZOLPIDEM TARTRATE 5 MG/1
TABLET ORAL
COMMUNITY
Start: 2023-01-20

## 2023-01-23 RX ORDER — CETIRIZINE HYDROCHLORIDE 10 MG/1
TABLET ORAL
COMMUNITY
Start: 2023-01-20

## 2023-01-23 RX ORDER — LANSOPRAZOLE 30 MG/1
30 CAPSULE, DELAYED RELEASE ORAL 2 TIMES DAILY
Qty: 60 CAPSULE | Refills: 0 | Status: SHIPPED | OUTPATIENT
Start: 2023-01-23 | End: 2023-01-23

## 2023-01-23 NOTE — PROGRESS NOTES
Patient Name: Yelena Sanchez   Visit Date: 01/23/2022   Patient ID: [id number]  Provider: Lefty Acevedo MD    Sex: female  [facility] [facility]   YOB: 1935  Location Address: [address] [place] [address], [address],?[place]?[id number]    Primary Care Provider Kaelyn Eugene MD  Location Phone: [phone]    Referring Provider: No ref. provider found        Chief Complaint  Hospital follow up throat    History of Present Illness  Yelena Sanchez is a 87 y.o. female  with past medical history significant for coronary artery disease, GERD, hypothyroidism, hyperlipidemia, and cerebrovascular disease on Plavix who returns today for follow-up.  She was originally seen on 7/3/2019 at which time she reported a previous mass which was excised by Dr. Galindo in 2014. PET scan on 3/18/2014 revealed that the left neck mass had an SUV of 4.5. an image guided FNA at the time revealed scant cystic material, histiocytes, and a few inflammatory cells which was nondiagnostic. She is a former smoker who quit in 2017. A CT scan of the neck without contrast on 6/13/2019 revealed a 2.75 x 1.8 cm solid-appearing mass just inferior to the hyoid bone on the left involving the preepiglottic space and left false vocal fold. It appears to abut her left internal carotid artery.  Laryngoscopic examination that day revealed fullness to the left false vocal fold and aryepiglottic fold. Previous records from [facility] revealed that she underwent a [employer] laryngoscopy with excision of a left false vocal fold cystic lesion on 5/20/2014.  Subsequent pathology revealed a benign epithelial cyst without any evidence of malignancy.      She returns today for follow-up having last been seen on 12/7/2022.  She was discharged from the hospital on 1/10/2023 after being admitted on 1/2/2023 with a COPD exacerbation.  CT scan of the neck with contrast on 1/4/2023 was unremarkable. She tells me that she has some  pain with swallowing in the midline.  She feels significant fullness on the left side of her neck and continues to experience intermittent otalgia and rhinorrhea.  She does mention an occasional sour taste in her mouth.  She also mentions significant hearing loss for which she has not undergone an audiogram.      Past Medical History:   Diagnosis Date   • Arthritis    • Asthma    • Breast cancer (HCC)    • Congestive heart failure (HCC)    • COPD (chronic obstructive pulmonary disease) (HCC)    • Diverticulosis 11/26/2014   • GERD (gastroesophageal reflux disease)    • History of tobacco abuse    • HTN (hypertension)    • Hyperlipemia    • Hyperthyroidism    • Neck mass    • Seasonal allergies    • SOB (shortness of breath)        Past Surgical History:   Procedure Laterality Date   • COLONOSCOPY  2014   • ENDOSCOPY  2014   • EYE SURGERY      Implant; yes    • LUNG LOBECTOMY Left     Left lower lobe   • LUNG SURGERY     • MASTECTOMY      Left    • OTHER SURGICAL HISTORY      Joint Surgery   • PACEMAKER IMPLANTATION           Current Outpatient Medications:   •  allopurinol (ZYLOPRIM) 100 MG tablet, Take 100 mg by mouth Daily., Disp: , Rfl:   •  amLODIPine (NORVASC) 5 MG tablet, Take 1 tablet by mouth Daily., Disp: 90 tablet, Rfl: 3  •  aspirin 81 MG EC tablet, Take 81 mg by mouth Daily., Disp: , Rfl:   •  azelastine (ASTELIN) 0.1 % nasal spray, 2 sprays into the nostril(s) as directed by provider 2 (Two) Times a Day. Use in each nostril as directed, Disp: , Rfl:   •  cetirizine (zyrTEC) 10 MG tablet, , Disp: , Rfl:   •  chlorpheniramine (CHLOR-TRIMETON) 4 MG tablet, Take 4 mg by mouth 2 (Two) Times a Day., Disp: , Rfl:   •  cholecalciferol (VITAMIN D3) 25 MCG (1000 UT) tablet, Take 1,000 Units by mouth Daily., Disp: , Rfl:   •  clopidogrel (PLAVIX) 75 MG tablet, Take 1 tablet by mouth Daily., Disp: 90 tablet, Rfl: 3  •  docusate calcium (SURFAK) 240 MG capsule, , Disp: , Rfl:   •  ezetimibe (ZETIA) 10 MG tablet,  "Take 1 tablet by mouth Daily., Disp: 90 tablet, Rfl: 3  •  furosemide (LASIX) 40 MG tablet, Take 1 tablet by mouth 2 (Two) Times a Day. (Patient taking differently: Take 40 mg by mouth Daily.), Disp: 180 tablet, Rfl: 3  •  HYDROcodone-acetaminophen (NORCO) 5-325 MG per tablet, , Disp: , Rfl:   •  ipratropium (ATROVENT) 0.06 % nasal spray, 2 sprays into the nostril(s) as directed by provider 3 (Three) Times a Day., Disp: 15 mL, Rfl: 11  •  levothyroxine (SYNTHROID, LEVOTHROID) 100 MCG tablet, Take 1 tablet by mouth Daily., Disp: , Rfl:   •  Lubricating Plus Eye Drops 0.5 % solution, , Disp: , Rfl:   •  montelukast (SINGULAIR) 10 MG tablet, Take 10 mg by mouth Every Night., Disp: , Rfl:   •  multivitamin with minerals tablet tablet, Take 1 tablet by mouth Daily., Disp: , Rfl:   •  nebivolol (Bystolic) 10 MG tablet, Take 1 tablet by mouth Daily., Disp: 90 tablet, Rfl: 3  •  polyethylene glycol (MIRALAX) 17 GM/SCOOP powder, , Disp: , Rfl:   •  rosuvastatin (Crestor) 20 MG tablet, Take 1 tablet by mouth Every Night., Disp: 90 tablet, Rfl: 3  •  spironolactone (ALDACTONE) 25 MG tablet, Take 0.5 tablets by mouth Daily., Disp: 45 tablet, Rfl: 2  •  Synthroid 125 MCG tablet, , Disp: , Rfl:   •  Xopenex HFA 45 MCG/ACT inhaler, , Disp: , Rfl:   •  zolpidem (AMBIEN) 5 MG tablet, , Disp: , Rfl:   •  predniSONE (DELTASONE) 20 MG tablet, Take 2 tablets by mouth Daily for 4 days., Disp: 8 tablet, Rfl: 0     No Known Allergies    Social History     Tobacco Use   • Smoking status: Former     Types: Cigarettes     Quit date:      Years since quittin.0   • Smokeless tobacco: Never   Vaping Use   • Vaping Use: Never used   Substance Use Topics   • Alcohol use: Never   • Drug use: Never        Objective     Vital Signs:   Temp 96 °F (35.6 °C) (Temporal)   Ht 149.9 cm (59\")   Wt 71 kg (156 lb 9.6 oz)   BMI 31.63 kg/m²       Physical Exam    General: Well developed, well nourished patient of stated age in no acute distress. " Voice is strong and clear.   Head: Normocephalic and atraumatic.  Small pustule involving the occipital scalp.  Face: No lesions.  Bilateral parotid and submandibular glands are unremarkable.  Stensen's and Warthin's ducts are productive of clear saliva bilaterally.  House-Brackmann I/VI     bilaterally.   muscles and temporomandibular joint nontender to palpation.  No TMJ crepitus.  Eyes: PERRLA, sclerae anicteric, no conjunctival injection. Extra ocular movements are intact and full. No nystagmus.   Ears: Auricles are normal in appearance. Bilateral external auditory canals demonstrate desiccated skin. Bilateral tympanic membranes are clear and without effusion. Hearing normal to conversational voice.   Nose: External nose is normal in appearance. Bilateral nares are patent with normal appearing mucosa. Septum midline. Turbinates are unremarkable. No lesions.   Oral Cavity: Lips are normal in appearance. Oral mucosa is unremarkable. Gingiva is unremarkable.  Edentulous. Tongue is unremarkable with good movement. Hard palate is unremarkable.   Oropharynx: Soft palate is unremarkable with full movement. Uvula is unremarkable. Bilateral tonsils are unremarkable. Posterior oropharynx is unremarkable.    Larynx and hypopharynx: Deferred secondary to gag reflex.  Neck: Supple.  No mass.  Her left sternocleidomastoid muscle is tender to palpation.  Trachea midline. Thyroid normal size and without nodules to palpation.   Lymphatic: No lymphadenopathy upon palpation.  Respiratory: Clear to auscultation bilaterally, nonlabored respirations    Cardiovascular: RRR, no murmurs, rubs, or gallops,   Psychiatric: Appropriate affect, cooperative   Neurologic: Oriented x 3, strength symmetric in all extremities, Cranial Nerves II-XII are grossly intact to confrontation   Skin: Warm and dry. No rashes.    Procedures     Procedure: Flexible fiberoptic nasolaryngoscopy.    Indications: Sore throat and neck pain in a patient  with a history of a left false vocal fold cyst    Summary: The patient's bilateral nares were decongested and anesthetized with Afrin and lidocaine sprays respectively. After giving the medications ample time to take effect flexible fiberoptic scope was inserted in the patient's right naris revealing a normal-appearing nasal cavity and nasopharynx. The base of tongue, vallecula, epiglottis, aryepiglottic folds, and arytenoid cartilages are all normal-appearing aside from some edema and erythema of the arytenoids and posterior commissure.  There is cobblestoning the posterior pharynx.  The piriform sinuses were normal in appearance. The bilateral false and true vocal folds are normal in appearance and adducted and abducted normally. The subglottis was widely patent. The patient tolerated the procedure well.      Result Review :               Assessment and Plan    Diagnoses and all orders for this visit:    1. Neck pain (Primary)  -     predniSONE (DELTASONE) 20 MG tablet; Take 2 tablets by mouth Daily for 4 days.  Dispense: 8 tablet; Refill: 0  -     Ambulatory Referral to Audiology    2. Gastroesophageal reflux disease, unspecified whether esophagitis present  -     Ambulatory Referral to Audiology    3. Hearing loss, unspecified hearing loss type, unspecified laterality  -     Ambulatory Referral to Audiology    4. Sore throat    Other orders  -     Discontinue: lansoprazole (PREVACID) 30 MG capsule; Take 1 capsule by mouth 2 (Two) Times a Day for 30 days. Take 30 min prior to eating  Dispense: 60 capsule; Refill: 0      Impressions and findings were discussed.  Unfortunately, she continues to be bothered by neck pain and fullness both in the midline and on the left side.  Examination today is concerning for muscle inflammation and mucosal irritation secondary to reflux.  She was reassured there is no evidence of mass or lesion.  Options for management were discussed.  Unfortunately with her kidney function and  congestive heart failure anti-inflammatories and muscle relaxers are not great options for muscle inflammation.  We discussed heat, massage, and a short course of prednisone.  We also discussed doubling up on her lansoprazole given the likely irritation from reflux and we will pursue an audiogram for further evaluation of her hearing loss.  She will follow-up after audiogram.  Follow Up   No follow-ups on file.  Patient was given instructions and counseling regarding her condition or for health maintenance advice. Please see specific information pulled into the AVS if appropriate.

## 2023-01-30 ENCOUNTER — READMISSION MANAGEMENT (OUTPATIENT)
Dept: CALL CENTER | Facility: HOSPITAL | Age: 88
End: 2023-01-30
Payer: MEDICARE

## 2023-01-30 NOTE — OUTREACH NOTE
CHF Week 3 Survey    Flowsheet Row Responses   [facility] patient discharged from? [facility]   Does the patient have one of the following disease processes/diagnoses(primary or secondary)? CHF   Week 3 attempt successful? Yes   Call start time 1341   Call end time 1343   Discharge diagnosis Acute on chronic diastolic heart failure, Community-acquired pneumonia   Meds reviewed with patient/caregiver? Yes   Is the patient having any side effects they believe may be caused by any medication additions or changes? No   Does the patient have all medications ordered at discharge? Yes   Is the patient taking all medications as directed (includes completed medication regime)? Yes   Does the patient have a primary care provider?  Yes   Does the patient have an appointment with their PCP within 7 days of discharge? Yes   Has the patient kept scheduled appointments due by today? Yes   What is the Home health agency?  [facility]    Has home health visited the patient within 72 hours of discharge? Yes   Psychosocial issues? No   Did the patient receive a copy of their discharge instructions? Yes   Nursing interventions Reviewed instructions with patient   What is the patient's perception of their health status since discharge? Improving   Nursing interventions Nurse provided patient education   Is the patient able to teach back signs and symptoms of worsening condition? (i.e. weight gain, shortness of air, etc.) Yes   If the patient is a current smoker, are they able to teach back resources for cessation? Not a smoker   Is the patient/caregiver able to teach back the hierarchy of who to call/visit for symptoms/problems? PCP, Specialist, Home health nurse, Urgent Care, ED, 911 Yes   Additional teach back comments She avoids sodium, says her BP is good. She is ambulating more for strength.   Is the patient able to teach back Heart Failure Zones? Yes   CHF Nursing Interventions Education provided on various zones   CHF Zone this  Call Green Zone   Green Zone Patient reports doing well, No new or worsening shortness of breath, Physical activity level is normal for you, No new swelling -  feet, ankles and legs look normal for you, Weight check stable, No chest pain   Green Zone Interventions Daily weight check, Meds as directed, Low sodium diet, Follow up visits planned   CHF Week 3 call completed? Yes   Is the patient interested in additional calls from an ambulatory ?  NOTE:  applies to high risk patients requiring additional follow-up. No   Would this patient benefit from a Referral to [facility] Social Work? No   Wrap up additional comments  has called but she declined.   Call end time 4700          GIULIA MATHIAS - Registered Nurse

## 2023-02-03 ENCOUNTER — TELEPHONE (OUTPATIENT)
Dept: ORTHOPEDIC SURGERY | Facility: CLINIC | Age: 88
End: 2023-02-03

## 2023-02-03 NOTE — TELEPHONE ENCOUNTER
Caller: MICAELA ARROYO    Relationship to patient: Emergency Contact    Best call back number:   [phone]    Chief complaint: RIGHT SHOULDER     Type of visit: FUP INJECTION

## 2023-02-09 ENCOUNTER — OFFICE VISIT (OUTPATIENT)
Dept: ORTHOPEDIC SURGERY | Facility: CLINIC | Age: 88
End: 2023-02-09
Payer: MEDICARE

## 2023-02-09 VITALS — BODY MASS INDEX: 31.45 KG/M2 | HEIGHT: 59 IN | WEIGHT: 156 LBS

## 2023-02-09 DIAGNOSIS — M18.11 OSTEOARTHRITIS OF RIGHT THUMB: Primary | ICD-10-CM

## 2023-02-09 DIAGNOSIS — M19.011 PRIMARY OSTEOARTHRITIS OF RIGHT SHOULDER: ICD-10-CM

## 2023-02-09 PROCEDURE — 99213 OFFICE O/P EST LOW 20 MIN: CPT | Performed by: ORTHOPAEDIC SURGERY

## 2023-02-09 PROCEDURE — 20610 DRAIN/INJ JOINT/BURSA W/O US: CPT | Performed by: ORTHOPAEDIC SURGERY

## 2023-02-09 RX ORDER — TRIAMCINOLONE ACETONIDE 40 MG/ML
40 INJECTION, SUSPENSION INTRA-ARTICULAR; INTRAMUSCULAR
Status: COMPLETED | OUTPATIENT
Start: 2023-02-09 | End: 2023-02-09

## 2023-02-09 RX ORDER — LIDOCAINE HYDROCHLORIDE 10 MG/ML
5 INJECTION, SOLUTION INFILTRATION; PERINEURAL
Status: COMPLETED | OUTPATIENT
Start: 2023-02-09 | End: 2023-02-09

## 2023-02-09 RX ADMIN — LIDOCAINE HYDROCHLORIDE 5 ML: 10 INJECTION, SOLUTION INFILTRATION; PERINEURAL at 10:44

## 2023-02-09 RX ADMIN — TRIAMCINOLONE ACETONIDE 40 MG: 40 INJECTION, SUSPENSION INTRA-ARTICULAR; INTRAMUSCULAR at 10:44

## 2023-02-09 NOTE — PROGRESS NOTES
"Chief Complaint  Follow-up of the Right Shoulder and Initial Evaluation of the Right Hand     Subjective      Yelena Sanchez presents to [facility] for initial evaluation of the right hand and follow up of the right shoulder. She notes she cannot lift anything heavy and has decrease  strength from the right thumb. She has difficult even lifting a coffee cup. She has had a shoulder injection in the past and that was helpful. She has had no recent injury.     No Known Allergies     Social History     Socioeconomic History   • Marital status:    Tobacco Use   • Smoking status: Former     Types: Cigarettes     Quit date:      Years since quittin.1   • Smokeless tobacco: Never   Vaping Use   • Vaping Use: Never used   Substance and Sexual Activity   • Alcohol use: Never   • Drug use: Never   • Sexual activity: Defer        Review of Systems     Objective   Vital Signs:   Ht 149.9 cm (59\")   Wt 70.8 kg (156 lb)   BMI 31.51 kg/m²       Physical Exam  Constitutional:       Appearance: Normal appearance. Patient is well-developed and normal weight.   HENT:      Head: Normocephalic.      Right Ear: Hearing and external ear normal.      Left Ear: Hearing and external ear normal.      Nose: Nose normal.   Eyes:      Conjunctiva/sclera: Conjunctivae normal.   Cardiovascular:      Rate and Rhythm: Normal rate.   Pulmonary:      Effort: Pulmonary effort is normal.      Breath sounds: No wheezing or rales.   Abdominal:      Palpations: Abdomen is soft.      Tenderness: There is no abdominal tenderness.   Musculoskeletal:      Cervical back: Normal range of motion.   Skin:     Findings: No rash.   Neurological:      Mental Status: Patient is alert and oriented to person, place, and time.   Psychiatric:         Mood and Affect: Mood and affect normal.         Judgment: Judgment normal.       Ortho Exam      RIGHT WRIST Negative Compression testing/ Negative Tinels. " NegativeFinkelsteins. Negative Murphy's testing. Positive CMC grind testing. Negative Phalens. Full ROM of the hand, fingers, elbow and wrist. Negative Triggering of the digit. Sensation grossly intact to light touch, median, radial and ulnar nerve. Positive AIN, PIN and ulnar nerve motor function intact. Axillary nerve intact. Positive pulses.       RIGHT SHOULDER Forward flexion 125. Abduction 100. External rotation 40. Internal rotation to back pocket. Positive Cross body adduction. Supraspinatus strength 3/5. Infraspinatus Strength 3/5. Infrared subscap 3/5. Mildly Positive  Wright. Mildly Positive  Neer. Negative Apprehension. Negative Lift off. (Negative Obriens. Sensation intact to light touch, median, radial, ulnar nerve. Positive AIN, PIN, ulnar nerve motor. Positive pulses. Positive Impingement signs. Good strength in triceps, biceps, deltoid, wrist extensors and wrist flexors.         Large Joint Arthrocentesis: R subacromial bursa  Date/Time: 2/9/2023 10:44 AM  Consent given by: patient  Site marked: site marked  Timeout: Immediately prior to procedure a time out was called to verify the correct patient, procedure, equipment, support staff and site/side marked as required   Supporting Documentation  Indications: pain   Procedure Details  Location: shoulder - R subacromial bursa  Preparation: Patient was prepped and draped in the usual sterile fashion  Needle gauge: 21G.  Medications administered: 40 mg triamcinolone acetonide 40 MG/ML; 5 mL lidocaine 1 %  Patient tolerance: patient tolerated the procedure well with no immediate complications            Imaging Results (Most Recent)     None           Result Review :             Assessment and Plan     Diagnoses and all orders for this visit:    1. Osteoarthritis of right thumb (Primary)    2. Primary osteoarthritis of right shoulder        Discussed the treatment plan with the patient. Discussed the risks and benefits of conservative treatments.   Prescribed Voltaren gel. The patient expressed understanding and wished to proceed with a right shoulder steroid injection.  She tolerated the injection well. HEP exercises.Issued a right thumb brace.     Call or return if worsening symptoms.    Follow Up     PRN      Patient was given instructions and counseling regarding her condition or for health maintenance advice. Please see specific information pulled into the AVS if appropriate.     Scribed for August Sparrow MD by Ebony Parker MA.  02/09/23   10:23 EST    I have personally performed the services described in this document as scribed by the above individual and it is both accurate and complete. August Sparrow MD 02/09/23

## 2023-02-10 ENCOUNTER — OFFICE VISIT (OUTPATIENT)
Dept: CARDIOLOGY | Facility: CLINIC | Age: 88
End: 2023-02-10
Payer: MEDICARE

## 2023-02-10 VITALS
HEIGHT: 59 IN | DIASTOLIC BLOOD PRESSURE: 61 MMHG | WEIGHT: 155.6 LBS | SYSTOLIC BLOOD PRESSURE: 129 MMHG | HEART RATE: 60 BPM | BODY MASS INDEX: 31.37 KG/M2

## 2023-02-10 DIAGNOSIS — E78.2 MIXED HYPERLIPIDEMIA: ICD-10-CM

## 2023-02-10 DIAGNOSIS — Z95.810 PRESENCE OF BIVENTRICULAR IMPLANTABLE CARDIOVERTER-DEFIBRILLATOR (ICD): ICD-10-CM

## 2023-02-10 DIAGNOSIS — I10 ESSENTIAL HYPERTENSION: ICD-10-CM

## 2023-02-10 DIAGNOSIS — I25.10 CORONARY ARTERY DISEASE INVOLVING NATIVE CORONARY ARTERY OF NATIVE HEART WITHOUT ANGINA PECTORIS: ICD-10-CM

## 2023-02-10 DIAGNOSIS — I77.9 CAROTID ARTERY DISEASE, UNSPECIFIED LATERALITY, UNSPECIFIED TYPE: ICD-10-CM

## 2023-02-10 DIAGNOSIS — I50.42 CHRONIC COMBINED SYSTOLIC AND DIASTOLIC CONGESTIVE HEART FAILURE: Primary | ICD-10-CM

## 2023-02-10 PROCEDURE — 99214 OFFICE O/P EST MOD 30 MIN: CPT | Performed by: FAMILY MEDICINE

## 2023-02-10 NOTE — PROGRESS NOTES
Chief Complaint  Congestive Heart Failure, Coronary Artery Disease, Atrial Fibrillation, Hypertension, Hyperlipidemia, Follow-up, and Hospital Follow Up Visit     Subjective        History of Present Illness  Yelena Sanchez presents to [facility] CARDIOLOGY   Ms. Mcginnis is an 87-year-old female patient coming in today for hospital follow-up.  She was admitted to the hospital from January 2 through January 10, with initial presentation of shortness of breath.   She was treated for both COPD exacerbation and  acute on chronic heart failure.  She responded to IV diuretics, was transitioned back to oral, was able to pass a 6-minute walk test prior to discharging home.  She reports to me she is feeling much better, she does still have some mild exertional dyspnea, however it is chronic and stable in nature.  She denies any chest pain/pressure, palpitations,   or lower extremity swelling.      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 Cardiac catheterization in 2014 showed 70% mid RCA lesion with 30% left circumflex lesion. No angioplasty was performed. 4) chronic kidney disease 6) iron deficiency anemia    PMH  Past Medical History:   Diagnosis Date   • Arthritis    • Asthma    • Breast cancer (HCC)    • Congestive heart failure (HCC)    • COPD (chronic obstructive pulmonary disease) (HCC)    • Diverticulosis 11/26/2014   • GERD (gastroesophageal reflux disease)    • History of tobacco abuse    • HTN (hypertension)    • Hyperlipemia    • Hyperthyroidism    • Neck mass    • Seasonal allergies    • SOB (shortness of breath)          ALLERGY  No Known Allergies       SURGICALHX  Past Surgical History:   Procedure Laterality Date   • COLONOSCOPY  2014   • ENDOSCOPY  2014   • EYE SURGERY       Implant; yes    • LUNG LOBECTOMY Left     Left lower lobe   • LUNG SURGERY     • MASTECTOMY      Left    • OTHER SURGICAL HISTORY      Joint Surgery   • PACEMAKER IMPLANTATION            SOC  Social History     Socioeconomic History   • Marital status:    Tobacco Use   • Smoking status: Former     Types: Cigarettes     Quit date:      Years since quittin.1   • Smokeless tobacco: Never   Vaping Use   • Vaping Use: Never used   Substance and Sexual Activity   • Alcohol use: Never   • Drug use: Never   • Sexual activity: Defer         FAMHX  Family History   Problem Relation Age of Onset   • Colon cancer Mother         60s   • Lung cancer Sister           MEDSIGONLY  Current Outpatient Medications on File Prior to Visit   Medication Sig   • allopurinol (ZYLOPRIM) 100 MG tablet Take 100 mg by mouth Daily.   • amLODIPine (NORVASC) 5 MG tablet Take 1 tablet by mouth Daily.   • aspirin 81 MG EC tablet Take 81 mg by mouth Daily.   • azelastine (ASTELIN) 0.1 % nasal spray 2 sprays into the nostril(s) as directed by provider 2 (Two) Times a Day. Use in each nostril as directed   • cetirizine (zyrTEC) 10 MG tablet    • chlorpheniramine (CHLOR-TRIMETON) 4 MG tablet Take 4 mg by mouth 2 (Two) Times a Day.   • cholecalciferol (VITAMIN D3) 25 MCG (1000 UT) tablet Take 1,000 Units by mouth Daily.   • clopidogrel (PLAVIX) 75 MG tablet Take 1 tablet by mouth Daily.   • ezetimibe (ZETIA) 10 MG tablet Take 1 tablet by mouth Daily.   • furosemide (LASIX) 40 MG tablet Take 1 tablet by mouth 2 (Two) Times a Day. (Patient taking differently: Take 40 mg by mouth Daily.)   • HYDROcodone-acetaminophen (NORCO) 5-325 MG per tablet    • ipratropium (ATROVENT) 0.06 % nasal spray 2 sprays into the nostril(s) as directed by provider 3 (Three) Times a Day.   • Lubricating Plus Eye Drops 0.5 % solution    • montelukast (SINGULAIR) 10 MG tablet Take 10 mg by mouth Every Night.   • multivitamin with minerals tablet tablet Take 1 tablet  "by mouth Daily.   • nebivolol (Bystolic) 10 MG tablet Take 1 tablet by mouth Daily.   • polyethylene glycol (MIRALAX) 17 GM/SCOOP powder    • rosuvastatin (Crestor) 20 MG tablet Take 1 tablet by mouth Every Night.   • spironolactone (ALDACTONE) 25 MG tablet Take 0.5 tablets by mouth Daily.   • Synthroid 125 MCG tablet    • Xopenex HFA 45 MCG/ACT inhaler    • zolpidem (AMBIEN) 5 MG tablet    • docusate calcium (SURFAK) 240 MG capsule    • levothyroxine (SYNTHROID, LEVOTHROID) 100 MCG tablet Take 1 tablet by mouth Daily.     No current facility-administered medications on file prior to visit.       REVIEW OF SYSTEMS  Review of Systems   Constitutional: Positive for fatigue. Negative for activity change and chills.   Respiratory: Positive for cough and shortness of breath. Negative for chest tightness.    Cardiovascular: Negative for chest pain, palpitations and leg swelling.   Gastrointestinal: Negative for nausea and vomiting.   Musculoskeletal: Positive for arthralgias.   Skin: Negative for rash.   Neurological: Negative for dizziness, syncope and light-headedness.   Hematological: Does not bruise/bleed easily.        Objective   Vitals:    02/10/23 1136   BP: 129/61   Pulse: 60   Weight: 70.6 kg (155 lb 9.6 oz)   Height: 149.9 cm (59\")         Physical Exam  Constitutional:       General: She is awake. She is not in acute distress.     Appearance: Normal appearance. She is obese.   Eyes:      General: No scleral icterus.     Conjunctiva/sclera: Conjunctivae normal.   Neck:      Vascular: No carotid bruit or JVD.   Cardiovascular:      Rate and Rhythm: Normal rate and regular rhythm.      Heart sounds: Normal heart sounds, S1 normal and S2 normal. No murmur heard.    No friction rub. No gallop.   Pulmonary:      Effort: Pulmonary effort is normal. Prolonged expiration present.      Breath sounds: Normal breath sounds. No wheezing, rhonchi or rales.   Abdominal:      General: Bowel sounds are normal. There is no " distension.      Palpations: Abdomen is soft.   Musculoskeletal:         General: Normal range of motion.      Comments: Cast to left lower leg, bruising present on toes   Skin:     General: Skin is warm and dry.   Neurological:      Mental Status: She is alert and oriented to person, place, and time.         Result Review     The following data was reviewed by LUZ MARIA Cerna on 02/26/23.    proBNP   Date Value Ref Range Status   01/05/2023 3,046.0 (H) 0.0 - 1,800.0 pg/mL Final     CMP    CMP 1/9/23 1/10/23 1/17/23 1/17/23      0728 0728   Glucose 117 (A) 123 (A) 98    BUN 77 (A) 72 (A) 45 (A)    Creatinine 1.52 (A) 1.77 (A) 2.11 (A)    eGFR 33.3 (A) 27.7 (A) 22.4 (A)    Sodium 142 141 143    Potassium 4.6 4.7 4.1    Chloride 104 103 106    Calcium 9.8 9.9 9.7    Total Protein 5.7 (A) 6.1  6.8   Albumin 3.3 (A) 3.6  4.1   Globulin 2.4 2.5     Total Bilirubin <0.2 <0.2  0.2   Alkaline Phosphatase 61 66  77   AST (SGOT) 15 23  14   ALT (SGPT) 16 32  15   Albumin/Globulin Ratio 1.4 1.4     BUN/Creatinine Ratio 50.7 (A) 40.7 (A) 21.3    Anion Gap 7.8 8.4 12.0    (A) Abnormal value       Comments are available for some flowsheets but are not being displayed.           CBC w/diff    CBC w/Diff 1/9/23 1/10/23 1/17/23   WBC 10.68 12.74 (A) 10.95 (A)   RBC 2.59 (A) 2.73 (A) 2.86 (A)   Hemoglobin 7.7 (A) 8.2 (A) 8.5 (A)   Hematocrit 24.7 (A) 26.0 (A) 27.6 (A)   MCV 95.4 95.2 96.5   MCH 29.7 30.0 29.7   MCHC 31.2 (A) 31.5 30.8 (A)   RDW 15.0 15.0 15.3   Platelets 251 290 390   Neutrophil Rel % 80.0 (A)  78.2 (A)   Immature Granulocyte Rel % 2.6 (A)  0.5   Lymphocyte Rel % 8.2 (A)  6.4 (A)   Monocyte Rel % 9.1  14.0 (A)   Eosinophil Rel % 0.0 (A)  0.6   Basophil Rel % 0.1  0.3   (A) Abnormal value             Lab Results   Component Value Date    TSH 0.558 01/17/2023      Lab Results   Component Value Date    FREET4 1.43 01/17/2023      No results found for: DDIMERQUANT  Magnesium   Date Value Ref Range Status    01/17/2023 2.6 (H) 1.6 - 2.4 mg/dL Final      No results found for: DIGOXIN   Lab Results   Component Value Date    TROPONINT <0.010 01/02/2023           Lipid Panel    Lipid Panel 7/23/22 10/18/22 1/17/23   Total Cholesterol 117 272 (A) 121   Triglycerides 115 136 89   HDL Cholesterol 38 (A) 56 50   VLDL Cholesterol 21 25 17   LDL Cholesterol  58 191 (A) 54   LDL/HDL Ratio 1.47 3.37 1.06   (A) Abnormal value                  Results for orders placed during the hospital encounter of 01/02/23    Adult Transthoracic Echo Complete w/ Color, Spectral and Contrast if necessary per protocol    Interpretation Summary  •  Left ventricular ejection fraction appears to be 31 - 35%.  Moderate global hypokinesis.  •  The left ventricular cavity is mild to moderately dilated.  •  Left ventricular diastolic function is consistent with (grade I) impaired relaxation and age.  •  Mildly reduced right ventricular systolic function noted.  •  Estimated right ventricular systolic pressure from tricuspid regurgitation is normal (<35 mmHg).  •  No significant valvular disease.  •  Electronic lead noted in right atrium and right ventricle.             Assessment and Plan   Diagnoses and all orders for this visit:    1. Chronic combined systolic and diastolic congestive heart failure (HCC) (Primary)  Assessment & Plan:  Clinically she appears euvolemic.  Echocardiogram during the hospital stay of January 2023, with EF of 31 to 35%.  She has BiV ICD in place.  Continue current medication management to include furosemide, Aldactone, and Bystolic.  Continue to avoid ACE/ARB due to CKD.  Most recent creatinine slightly above her baseline, will have her draw a BMP to reevaluate.    Orders:  -     Basic Metabolic Panel; Future    2. Essential hypertension  Assessment & Plan:  Blood pressures well controlled, continue current medication management with Bystolic, amlodipine, Aldactone, and furosemide.      3. Presence of biventricular  implantable cardioverter-defibrillator (ICD)  Assessment & Plan:  Device interrogation in December 2022 was normal, she has home remote monitoring.  She has not had any shocks or events.  Continue remote monitoring, and we will schedule for routine device check at next follow-up.      4. Carotid artery disease, unspecified laterality, unspecified type (HCC)  Assessment & Plan:  Continue Plavix per vascular surgery management for her carotid artery disease.      5. Coronary artery disease involving native coronary artery of native heart without angina pectoris  Assessment & Plan:  Stable, no angina.  Continue current medication regiment with aspirin, statin, and beta-blocker.  She is also on antiplatelet therapy with Plavix per vascular surgery due to carotid disease.      6. Mixed hyperlipidemia  Assessment & Plan:  Significant improvement in lipids, she is once again at goal at 55.  Continue current dose of rosuvastatin and Zetia.                Follow Up   Return for with Dr. Talbot, As already scheduled.    Patient was given instructions and counseling regarding her condition or for health maintenance advice. Please see specific information pulled into the AVS if appropriate.     Mariana Mitchell, APRN  02/26/23  11:41 EST    Dictated Utilizing Dragon Dictation

## 2023-02-26 NOTE — ASSESSMENT & PLAN NOTE
Stable, no angina.  Continue current medication regiment with aspirin, statin, and beta-blocker.  She is also on antiplatelet therapy with Plavix per vascular surgery due to carotid disease.

## 2023-02-26 NOTE — ASSESSMENT & PLAN NOTE
Significant improvement in lipids, she is once again at goal at 55.  Continue current dose of rosuvastatin and Zetia.

## 2023-02-26 NOTE — ASSESSMENT & PLAN NOTE
Device interrogation in December 2022 was normal, she has home remote monitoring.  She has not had any shocks or events.  Continue remote monitoring, and we will schedule for routine device check at next follow-up.

## 2023-02-26 NOTE — ASSESSMENT & PLAN NOTE
Clinically she appears euvolemic.  Echocardiogram during the hospital stay of January 2023, with EF of 31 to 35%.  She has BiV ICD in place.  Continue current medication management to include furosemide, Aldactone, and Bystolic.  Continue to avoid ACE/ARB due to CKD.  Most recent creatinine slightly above her baseline, will have her draw a BMP to reevaluate.

## 2023-02-26 NOTE — ASSESSMENT & PLAN NOTE
Blood pressures well controlled, continue current medication management with Bystolic, amlodipine, Aldactone, and furosemide.

## 2023-04-05 ENCOUNTER — OFFICE VISIT (OUTPATIENT)
Dept: OTOLARYNGOLOGY | Facility: CLINIC | Age: 88
End: 2023-04-05
Payer: MEDICARE

## 2023-04-05 VITALS — DIASTOLIC BLOOD PRESSURE: 78 MMHG | SYSTOLIC BLOOD PRESSURE: 113 MMHG | HEART RATE: 63 BPM | TEMPERATURE: 97.6 F

## 2023-04-05 DIAGNOSIS — M54.2 NECK PAIN: Primary | ICD-10-CM

## 2023-04-05 DIAGNOSIS — E05.90 HYPERTHYROIDISM: ICD-10-CM

## 2023-04-05 DIAGNOSIS — J31.0 CHRONIC RHINITIS: ICD-10-CM

## 2023-04-05 PROCEDURE — 99212 OFFICE O/P EST SF 10 MIN: CPT | Performed by: OTOLARYNGOLOGY

## 2023-04-05 PROCEDURE — 1159F MED LIST DOCD IN RCRD: CPT | Performed by: OTOLARYNGOLOGY

## 2023-04-05 PROCEDURE — 1160F RVW MEDS BY RX/DR IN RCRD: CPT | Performed by: OTOLARYNGOLOGY

## 2023-04-05 RX ORDER — LOTEPREDNOL ETABONATE 3.8 MG/G
GEL OPHTHALMIC
COMMUNITY
Start: 2023-02-06

## 2023-04-05 NOTE — PROGRESS NOTES
Patient Name: Yelena Sanchez   Visit Date: 04/05/2023   Patient ID: [id number]  Provider: Lefty Acevedo MD    Sex: female  Location: [facility]   YOB: 1935  Location Address: [address] [place] [address], [address],?[place]?[id number]    Primary Care Provider Kaelyn Eugene MD  Location Phone: [phone]    Referring Provider: No ref. provider found        Chief Complaint  3 month follow up    History of Present Illness  Yelena Sanchez is a 87 y.o. female  with past medical history significant for coronary artery disease, GERD, hypothyroidism, hyperlipidemia, and cerebrovascular disease on Plavix who returns today for follow-up.  She was originally seen on 7/3/2019 at which time she reported a previous mass which was excised by Dr. Galindo in 2014. PET scan on 3/18/2014 revealed that the left neck mass had an SUV of 4.5. an image guided FNA at the time revealed scant cystic material, histiocytes, and a few inflammatory cells which was nondiagnostic. She is a former smoker who quit in 2017. A CT scan of the neck without contrast on 6/13/2019 revealed a 2.75 x 1.8 cm solid-appearing mass just inferior to the hyoid bone on the left involving the preepiglottic space and left false vocal fold. It appears to abut her left internal carotid artery.  Laryngoscopic examination that day revealed fullness to the left false vocal fold and aryepiglottic fold. Previous records from [facility] revealed that she underwent a [employer] laryngoscopy with excision of a left false vocal fold cystic lesion on 5/20/2014.  Subsequent pathology revealed a benign epithelial cyst without any evidence of malignancy.      She returns today for follow-up having last been seen on 1/23/2023 at which time she was placed on prednisone for neck pain and referred for an audiogram and tympanogram.  She had evidence of muscle inflammation and mucosal irritation secondary to reflux. CT scan of the neck with  contrast on 1/4/2023 was unremarkable aside from cervical spine degenration. She tells me that she has not had any further problems with mucus but does report that her left-sided neck pain is stable.  It does occasionally radiate down her arm and causes tingling.  She also mentions that her right eye often feels irritated.  She does see an ophthalmologist.  Thyroid function testing on 3/30/2023 revealed a low TSH of 0.052 and normal T4 of 10.4.  She does report heat intolerance.  She has not undergone an audiogram.    Past Medical History:   Diagnosis Date   • Arthritis    • Asthma    • Breast cancer    • Congestive heart failure    • COPD (chronic obstructive pulmonary disease)    • Diverticulosis 11/26/2014   • GERD (gastroesophageal reflux disease)    • History of tobacco abuse    • HTN (hypertension)    • Hyperlipemia    • Hyperthyroidism    • Neck mass    • Seasonal allergies    • SOB (shortness of breath)        Past Surgical History:   Procedure Laterality Date   • COLONOSCOPY  2014   • ENDOSCOPY  2014   • EYE SURGERY      Implant; yes    • LUNG LOBECTOMY Left     Left lower lobe   • LUNG SURGERY     • MASTECTOMY      Left    • OTHER SURGICAL HISTORY      Joint Surgery   • PACEMAKER IMPLANTATION           Current Outpatient Medications:   •  allopurinol (ZYLOPRIM) 100 MG tablet, Take 1 tablet by mouth Daily., Disp: , Rfl:   •  amLODIPine (NORVASC) 5 MG tablet, Take 1 tablet by mouth Daily., Disp: 90 tablet, Rfl: 3  •  aspirin 81 MG EC tablet, Take 1 tablet by mouth Daily., Disp: , Rfl:   •  azelastine (ASTELIN) 0.1 % nasal spray, 2 sprays into the nostril(s) as directed by provider 2 (Two) Times a Day. Use in each nostril as directed, Disp: , Rfl:   •  cetirizine (zyrTEC) 10 MG tablet, , Disp: , Rfl:   •  chlorpheniramine (CHLOR-TRIMETON) 4 MG tablet, Take 1 tablet by mouth 2 (Two) Times a Day., Disp: , Rfl:   •  cholecalciferol (VITAMIN D3) 25 MCG (1000 UT) tablet, Take 1 tablet by mouth Daily., Disp: , Rfl:    •  clopidogrel (PLAVIX) 75 MG tablet, Take 1 tablet by mouth Daily., Disp: 90 tablet, Rfl: 3  •  docusate calcium (SURFAK) 240 MG capsule, , Disp: , Rfl:   •  ezetimibe (ZETIA) 10 MG tablet, Take 1 tablet by mouth Daily., Disp: 90 tablet, Rfl: 3  •  furosemide (LASIX) 40 MG tablet, Take 1 tablet by mouth 2 (Two) Times a Day. (Patient taking differently: Take 1 tablet by mouth Daily.), Disp: 180 tablet, Rfl: 3  •  HYDROcodone-acetaminophen (NORCO) 5-325 MG per tablet, , Disp: , Rfl:   •  ipratropium (ATROVENT) 0.06 % nasal spray, 2 sprays into the nostril(s) as directed by provider 3 (Three) Times a Day., Disp: 15 mL, Rfl: 11  •  Lotemax SM 0.38 % gel, instill ONE drop IN EACH EYE TWICE DAILY, Disp: , Rfl:   •  Lubricating Plus Eye Drops 0.5 % solution, , Disp: , Rfl:   •  montelukast (SINGULAIR) 10 MG tablet, Take 1 tablet by mouth Every Night., Disp: , Rfl:   •  multivitamin with minerals tablet tablet, Take 1 tablet by mouth Daily., Disp: , Rfl:   •  nebivolol (Bystolic) 10 MG tablet, Take 1 tablet by mouth Daily., Disp: 90 tablet, Rfl: 3  •  polyethylene glycol (MIRALAX) 17 GM/SCOOP powder, , Disp: , Rfl:   •  rosuvastatin (Crestor) 20 MG tablet, Take 1 tablet by mouth Every Night., Disp: 90 tablet, Rfl: 3  •  spironolactone (ALDACTONE) 25 MG tablet, Take 0.5 tablets by mouth Daily., Disp: 45 tablet, Rfl: 2  •  Synthroid 125 MCG tablet, , Disp: , Rfl:   •  Xopenex HFA 45 MCG/ACT inhaler, , Disp: , Rfl:   •  zolpidem (AMBIEN) 5 MG tablet, , Disp: , Rfl:      No Known Allergies    Social History     Tobacco Use   • Smoking status: Former     Types: Cigarettes     Quit date: 2017     Years since quittin.2   • Smokeless tobacco: Never   Vaping Use   • Vaping Use: Never used   Substance Use Topics   • Alcohol use: Never   • Drug use: Never        Objective     Vital Signs:   /78   Pulse 63   Temp 97.6 °F (36.4 °C)       Physical Exam    General: Well developed, well nourished patient of stated age in no  acute distress. Voice is strong and clear.   Head: Normocephalic and atraumatic.  Small pustule involving the occipital scalp.  Face: No lesions.  Bilateral parotid and submandibular glands are unremarkable.  Stensen's and Warthin's ducts are productive of clear saliva bilaterally.  House-Brackmann I/VI     bilaterally.   muscles and temporomandibular joint nontender to palpation.  No TMJ crepitus.  Eyes: PERRLA, sclerae anicteric, no conjunctival injection. Extra ocular movements are intact and full. No nystagmus.   Ears: Auricles are normal in appearance. Bilateral external auditory canals demonstrate desiccated skin. Bilateral tympanic membranes are clear and without effusion. Hearing normal to conversational voice.   Nose: External nose is normal in appearance. Bilateral nares are patent with normal appearing mucosa. Septum midline. Turbinates are unremarkable. No lesions.   Oral Cavity: Lips are normal in appearance. Oral mucosa is unremarkable. Gingiva is unremarkable.  Edentulous. Tongue is unremarkable with good movement. Hard palate is unremarkable.   Oropharynx: Soft palate is unremarkable with full movement. Uvula is unremarkable. Bilateral tonsils are unremarkable. Posterior oropharynx is unremarkable.    Larynx and hypopharynx: Deferred secondary to gag reflex.  Neck: Supple.  No mass.  Her left sternocleidomastoid muscle is tender to palpation.  Trachea midline. Thyroid normal size and without nodules to palpation.   Lymphatic: No lymphadenopathy upon palpation.   Psychiatric: Appropriate affect, cooperative   Neurologic: Oriented x 3, strength symmetric in all extremities, Cranial Nerves II-XII are grossly intact to confrontation   Skin: Warm and dry. No rashes.    Procedures     Result Review :               Assessment and Plan    Diagnoses and all orders for this visit:    1. Neck pain (Primary)    2. Chronic rhinitis    3. Hyperthyroidism      Impressions and findings were discussed.   Unfortunately, she continues to be bothered by left neck pain and does mention that it often will radiate down her left arm.  We again discussed that her previous CT scan of the neck demonstrated degenerative changes of the cervical spine.  Examination today is again concerning for muscle inflammation.  Options for management were discussed.  Unfortunately with her kidney function and congestive heart failure anti-inflammatories and muscle relaxers are not great options for muscle inflammation.  We discussed conservative measures for management.  In regards to her iatrogenic hyperthyroidism we discussed that she may benefit from a decrease in her levothyroxine dose.  She was given ample time to ask questions, all of which were answered to her satisfaction.    Follow Up   Return if symptoms worsen or fail to improve.  Patient was given instructions and counseling regarding her condition or for health maintenance advice. Please see specific information pulled into the AVS if appropriate.

## 2023-04-13 ENCOUNTER — TRANSCRIBE ORDERS (OUTPATIENT)
Dept: ADMINISTRATIVE | Facility: HOSPITAL | Age: 88
End: 2023-04-13
Payer: MEDICARE

## 2023-04-13 ENCOUNTER — LAB (OUTPATIENT)
Dept: LAB | Facility: HOSPITAL | Age: 88
End: 2023-04-13
Payer: MEDICARE

## 2023-04-13 DIAGNOSIS — Z79.899 ENCOUNTER FOR LONG-TERM (CURRENT) USE OF OTHER MEDICATIONS: ICD-10-CM

## 2023-04-13 DIAGNOSIS — E03.9 HYPOTHYROIDISM, UNSPECIFIED TYPE: ICD-10-CM

## 2023-04-13 DIAGNOSIS — N18.9 CHRONIC KIDNEY DISEASE, UNSPECIFIED CKD STAGE: ICD-10-CM

## 2023-04-13 DIAGNOSIS — M06.9 RHEUMATOID ARTHRITIS, INVOLVING UNSPECIFIED SITE, UNSPECIFIED WHETHER RHEUMATOID FACTOR PRESENT: ICD-10-CM

## 2023-04-13 DIAGNOSIS — R73.9 ELEVATED BLOOD SUGAR LEVEL: ICD-10-CM

## 2023-04-13 DIAGNOSIS — J43.9 PULMONARY EMPHYSEMA, UNSPECIFIED EMPHYSEMA TYPE: ICD-10-CM

## 2023-04-13 DIAGNOSIS — E55.9 VITAMIN D DEFICIENCY: ICD-10-CM

## 2023-04-13 DIAGNOSIS — E78.5 HYPERLIPIDEMIA, UNSPECIFIED HYPERLIPIDEMIA TYPE: ICD-10-CM

## 2023-04-13 DIAGNOSIS — D64.9 ANEMIA, UNSPECIFIED TYPE: Primary | ICD-10-CM

## 2023-04-13 DIAGNOSIS — E61.1 IRON DEFICIENCY: ICD-10-CM

## 2023-04-13 DIAGNOSIS — I10 HYPERTENSION, UNSPECIFIED TYPE: ICD-10-CM

## 2023-04-13 DIAGNOSIS — I50.42 CHRONIC COMBINED SYSTOLIC AND DIASTOLIC CONGESTIVE HEART FAILURE: ICD-10-CM

## 2023-04-13 DIAGNOSIS — Z86.79 HISTORY OF CONGESTIVE HEART FAILURE: ICD-10-CM

## 2023-04-13 DIAGNOSIS — D64.9 ANEMIA, UNSPECIFIED TYPE: ICD-10-CM

## 2023-04-13 LAB
25(OH)D3 SERPL-MCNC: 62.7 NG/ML (ref 30–100)
ALBUMIN SERPL-MCNC: 4.2 G/DL (ref 3.5–5.2)
ALP SERPL-CCNC: 87 U/L (ref 39–117)
ALT SERPL W P-5'-P-CCNC: 14 U/L (ref 1–33)
ANION GAP SERPL CALCULATED.3IONS-SCNC: 9.2 MMOL/L (ref 5–15)
AST SERPL-CCNC: 17 U/L (ref 1–32)
BACTERIA UR QL AUTO: ABNORMAL /HPF
BASOPHILS # BLD AUTO: 0.03 10*3/MM3 (ref 0–0.2)
BASOPHILS NFR BLD AUTO: 0.6 % (ref 0–1.5)
BILIRUB CONJ SERPL-MCNC: <0.2 MG/DL (ref 0–0.3)
BILIRUB INDIRECT SERPL-MCNC: NORMAL MG/DL
BILIRUB SERPL-MCNC: <0.2 MG/DL (ref 0–1.2)
BILIRUB UR QL STRIP: NEGATIVE
BUN SERPL-MCNC: 40 MG/DL (ref 8–23)
BUN/CREAT SERPL: 24.1 (ref 7–25)
CALCIUM SPEC-SCNC: 10.1 MG/DL (ref 8.6–10.5)
CHLORIDE SERPL-SCNC: 105 MMOL/L (ref 98–107)
CHOLEST SERPL-MCNC: 136 MG/DL (ref 0–200)
CLARITY UR: CLEAR
CO2 SERPL-SCNC: 24.8 MMOL/L (ref 22–29)
COLOR UR: YELLOW
CREAT SERPL-MCNC: 1.66 MG/DL (ref 0.57–1)
CRP SERPL-MCNC: 1.08 MG/DL (ref 0–0.5)
DEPRECATED RDW RBC AUTO: 47.2 FL (ref 37–54)
EGFRCR SERPLBLD CKD-EPI 2021: 29.9 ML/MIN/1.73
EOSINOPHIL # BLD AUTO: 0.07 10*3/MM3 (ref 0–0.4)
EOSINOPHIL NFR BLD AUTO: 1.3 % (ref 0.3–6.2)
ERYTHROCYTE [DISTWIDTH] IN BLOOD BY AUTOMATED COUNT: 14.4 % (ref 12.3–15.4)
ERYTHROCYTE [SEDIMENTATION RATE] IN BLOOD: 42 MM/HR (ref 0–30)
FERRITIN SERPL-MCNC: 130 NG/ML (ref 13–150)
FOLATE SERPL-MCNC: >20 NG/ML (ref 4.78–24.2)
GLUCOSE SERPL-MCNC: 102 MG/DL (ref 65–99)
GLUCOSE UR STRIP-MCNC: NEGATIVE MG/DL
HCT VFR BLD AUTO: 40.5 % (ref 34–46.6)
HDLC SERPL-MCNC: 50 MG/DL (ref 40–60)
HGB BLD-MCNC: 13.3 G/DL (ref 12–15.9)
HGB UR QL STRIP.AUTO: NEGATIVE
HYALINE CASTS UR QL AUTO: ABNORMAL /LPF
IMM GRANULOCYTES # BLD AUTO: 0.01 10*3/MM3 (ref 0–0.05)
IMM GRANULOCYTES NFR BLD AUTO: 0.2 % (ref 0–0.5)
IRON 24H UR-MRATE: 81 MCG/DL (ref 37–145)
IRON SATN MFR SERPL: 26 % (ref 20–50)
KETONES UR QL STRIP: NEGATIVE
LDLC SERPL CALC-MCNC: 71 MG/DL (ref 0–100)
LDLC/HDLC SERPL: 1.4 {RATIO}
LEUKOCYTE ESTERASE UR QL STRIP.AUTO: ABNORMAL
LYMPHOCYTES # BLD AUTO: 0.66 10*3/MM3 (ref 0.7–3.1)
LYMPHOCYTES NFR BLD AUTO: 12.1 % (ref 19.6–45.3)
MAGNESIUM SERPL-MCNC: 2.8 MG/DL (ref 1.6–2.4)
MCH RBC QN AUTO: 29.6 PG (ref 26.6–33)
MCHC RBC AUTO-ENTMCNC: 32.8 G/DL (ref 31.5–35.7)
MCV RBC AUTO: 90 FL (ref 79–97)
MONOCYTES # BLD AUTO: 1.06 10*3/MM3 (ref 0.1–0.9)
MONOCYTES NFR BLD AUTO: 19.5 % (ref 5–12)
NEUTROPHILS NFR BLD AUTO: 3.61 10*3/MM3 (ref 1.7–7)
NEUTROPHILS NFR BLD AUTO: 66.3 % (ref 42.7–76)
NITRITE UR QL STRIP: NEGATIVE
NRBC BLD AUTO-RTO: 0 /100 WBC (ref 0–0.2)
NT-PROBNP SERPL-MCNC: 4163 PG/ML (ref 0–1800)
PH UR STRIP.AUTO: 6 [PH] (ref 5–8)
PLATELET # BLD AUTO: 196 10*3/MM3 (ref 140–450)
PMV BLD AUTO: 12 FL (ref 6–12)
POTASSIUM SERPL-SCNC: 4.1 MMOL/L (ref 3.5–5.2)
PROT SERPL-MCNC: 6.8 G/DL (ref 6–8.5)
PROT UR QL STRIP: ABNORMAL
RBC # BLD AUTO: 4.5 10*6/MM3 (ref 3.77–5.28)
RBC # UR STRIP: ABNORMAL /HPF
REF LAB TEST METHOD: ABNORMAL
SODIUM SERPL-SCNC: 139 MMOL/L (ref 136–145)
SP GR UR STRIP: 1.02 (ref 1–1.03)
SQUAMOUS #/AREA URNS HPF: ABNORMAL /HPF
T4 FREE SERPL-MCNC: 1.87 NG/DL (ref 0.93–1.7)
TIBC SERPL-MCNC: 310 MCG/DL (ref 298–536)
TRANSFERRIN SERPL-MCNC: 208 MG/DL (ref 200–360)
TRIGL SERPL-MCNC: 79 MG/DL (ref 0–150)
TSH SERPL DL<=0.05 MIU/L-ACNC: 0.04 UIU/ML (ref 0.27–4.2)
URATE SERPL-MCNC: 8.4 MG/DL (ref 2.4–5.7)
UROBILINOGEN UR QL STRIP: ABNORMAL
VIT B12 BLD-MCNC: >2000 PG/ML (ref 211–946)
VLDLC SERPL-MCNC: 15 MG/DL (ref 5–40)
WBC # UR STRIP: ABNORMAL /HPF
WBC NRBC COR # BLD: 5.44 10*3/MM3 (ref 3.4–10.8)

## 2023-04-13 PROCEDURE — 82306 VITAMIN D 25 HYDROXY: CPT

## 2023-04-13 PROCEDURE — 80061 LIPID PANEL: CPT

## 2023-04-13 PROCEDURE — 85652 RBC SED RATE AUTOMATED: CPT

## 2023-04-13 PROCEDURE — 82607 VITAMIN B-12: CPT

## 2023-04-13 PROCEDURE — 84466 ASSAY OF TRANSFERRIN: CPT

## 2023-04-13 PROCEDURE — 36415 COLL VENOUS BLD VENIPUNCTURE: CPT

## 2023-04-13 PROCEDURE — 83540 ASSAY OF IRON: CPT

## 2023-04-13 PROCEDURE — 86140 C-REACTIVE PROTEIN: CPT

## 2023-04-13 PROCEDURE — 84439 ASSAY OF FREE THYROXINE: CPT

## 2023-04-13 PROCEDURE — 81001 URINALYSIS AUTO W/SCOPE: CPT

## 2023-04-13 PROCEDURE — 80048 BASIC METABOLIC PNL TOTAL CA: CPT

## 2023-04-13 PROCEDURE — 80076 HEPATIC FUNCTION PANEL: CPT

## 2023-04-13 PROCEDURE — 84550 ASSAY OF BLOOD/URIC ACID: CPT

## 2023-04-13 PROCEDURE — 85025 COMPLETE CBC W/AUTO DIFF WBC: CPT

## 2023-04-13 PROCEDURE — 83880 ASSAY OF NATRIURETIC PEPTIDE: CPT

## 2023-04-13 PROCEDURE — 82728 ASSAY OF FERRITIN: CPT

## 2023-04-13 PROCEDURE — 83735 ASSAY OF MAGNESIUM: CPT

## 2023-04-13 PROCEDURE — 82746 ASSAY OF FOLIC ACID SERUM: CPT

## 2023-04-13 PROCEDURE — 84443 ASSAY THYROID STIM HORMONE: CPT

## 2023-04-17 ENCOUNTER — TELEPHONE (OUTPATIENT)
Dept: CARDIOLOGY | Facility: CLINIC | Age: 88
End: 2023-04-17
Payer: MEDICARE

## 2023-04-17 NOTE — TELEPHONE ENCOUNTER
----- Message from Simon Talbot MD sent at 4/16/2023  9:11 AM EDT -----  Recent labs reviewed.  It showed some improvement in kidney functions.  Hemoglobin significantly improved.    BNP, a marker of fluid retention is higher than before.    Is there any worsening of shortness of breath or swelling ?  Any recent weight gain ?       Electronically signed by Simon Talbot MD, 04/16/23, 9:11 AM EDT.

## 2023-04-17 NOTE — TELEPHONE ENCOUNTER
WALTER patient regarding results. Patient verbalized understanding and appreciation.  Patient denies increase in SOA and or swelling.

## 2023-04-27 ENCOUNTER — OFFICE VISIT (OUTPATIENT)
Dept: ORTHOPEDIC SURGERY | Facility: CLINIC | Age: 88
End: 2023-04-27
Payer: MEDICARE

## 2023-04-27 VITALS — WEIGHT: 155 LBS | HEART RATE: 69 BPM | OXYGEN SATURATION: 99 % | HEIGHT: 59 IN | BODY MASS INDEX: 31.25 KG/M2

## 2023-04-27 DIAGNOSIS — M25.512 LEFT SHOULDER PAIN, UNSPECIFIED CHRONICITY: Primary | ICD-10-CM

## 2023-04-27 DIAGNOSIS — M19.011 OSTEOARTHRITIS OF RIGHT SHOULDER, UNSPECIFIED OSTEOARTHRITIS TYPE: ICD-10-CM

## 2023-04-27 RX ORDER — LANSOPRAZOLE 30 MG/1
CAPSULE, DELAYED RELEASE ORAL
COMMUNITY
Start: 2023-04-17

## 2023-04-27 RX ORDER — LIDOCAINE HYDROCHLORIDE 10 MG/ML
5 INJECTION, SOLUTION EPIDURAL; INFILTRATION; INTRACAUDAL; PERINEURAL
Status: COMPLETED | OUTPATIENT
Start: 2023-04-27 | End: 2023-04-27

## 2023-04-27 RX ORDER — METHYLPREDNISOLONE ACETATE 80 MG/ML
80 INJECTION, SUSPENSION INTRA-ARTICULAR; INTRALESIONAL; INTRAMUSCULAR; SOFT TISSUE
Status: COMPLETED | OUTPATIENT
Start: 2023-04-27 | End: 2023-04-27

## 2023-04-27 RX ORDER — LEVOTHYROXINE SODIUM 112 MCG
TABLET ORAL
COMMUNITY
Start: 2023-04-18

## 2023-04-27 RX ADMIN — METHYLPREDNISOLONE ACETATE 80 MG: 80 INJECTION, SUSPENSION INTRA-ARTICULAR; INTRALESIONAL; INTRAMUSCULAR; SOFT TISSUE at 10:37

## 2023-04-27 RX ADMIN — LIDOCAINE HYDROCHLORIDE 5 ML: 10 INJECTION, SOLUTION EPIDURAL; INFILTRATION; INTRACAUDAL; PERINEURAL at 10:37

## 2023-04-27 NOTE — PROGRESS NOTES
"Chief Complaint  Pain and Initial Evaluation of the Left Shoulder     Subjective      Yelena Sanchez presents to [facility] for initial evaluation of the left shoulder. She is having pain in the left shoulder for awhile.  She has had no injury.  The pain radiates up her neck to her head.  She has pain with overhead movement and reaching tasks.      No Known Allergies     Social History     Socioeconomic History   • Marital status:    Tobacco Use   • Smoking status: Former     Types: Cigarettes     Quit date:      Years since quittin.3   • Smokeless tobacco: Never   Vaping Use   • Vaping Use: Never used   Substance and Sexual Activity   • Alcohol use: Never   • Drug use: Never   • Sexual activity: Defer        Review of Systems     Objective   Vital Signs:   Pulse 69   Ht 149.9 cm (59\")   Wt 70.3 kg (155 lb)   SpO2 99%   BMI 31.31 kg/m²       Physical Exam  Constitutional:       Appearance: Normal appearance. Patient is well-developed and normal weight.   HENT:      Head: Normocephalic.      Right Ear: Hearing and external ear normal.      Left Ear: Hearing and external ear normal.      Nose: Nose normal.   Eyes:      Conjunctiva/sclera: Conjunctivae normal.   Cardiovascular:      Rate and Rhythm: Normal rate.   Pulmonary:      Effort: Pulmonary effort is normal.      Breath sounds: No wheezing or rales.   Abdominal:      Palpations: Abdomen is soft.      Tenderness: There is no abdominal tenderness.   Musculoskeletal:      Cervical back: Normal range of motion.   Skin:     Findings: No rash.   Neurological:      Mental Status: Patient is alert and oriented to person, place, and time.   Psychiatric:         Mood and Affect: Mood and affect normal.         Judgment: Judgment normal.       Ortho Exam      LEFT SHOULDER Forward flexion 140. Abduction 50. External rotation 20. Internal rotation to side of hip. . Positive Cross body adduction. Supraspinatus strength 3/5. " Infraspinatus Strength 3/5. Infrared subscap 3/5. Positive Wright. Positive Neer. Negative Apprehension. Negative Lift off. (Negative Obriens. Sensation intact to light touch, median, radial, ulnar nerve. Positive AIN, PIN, ulnar nerve motor. Positive pulses. Positive Impingement signs. Good strength in triceps, biceps, deltoid, wrist extensors and wrist flexors.        Large Joint LEFT SHOULDER  Date/Time: 4/27/2023 10:37 AM  Consent given by: patient  Site marked: site marked  Timeout: Immediately prior to procedure a time out was called to verify the correct patient, procedure, equipment, support staff and site/side marked as required   Supporting Documentation  Indications: pain   Procedure Details  Location: shoulder (LEFT) -   Preparation: Patient was prepped and draped in the usual sterile fashion  Needle gauge: 21G.  Medications administered: 5 mL lidocaine PF 1% 1 %; 80 mg methylPREDNISolone acetate 80 MG/ML  Patient tolerance: patient tolerated the procedure well with no immediate complications            Imaging Results (Most Recent)     Procedure Component Value Units Date/Time    XR Scapula Left [[id number]] Resulted: 04/27/23 1050     Updated: 04/27/23 1051    Narrative:       X-Ray Report:  Left scapula X-Ray  Indication: Evaluation of the left scapula  AP/Lateral view(s)  Findings: Advanced degenerative arthritis.   Prior studies available for comparison: no            Result Review :      X-Ray Report:  Left scapula X-Ray  Indication: Evaluation of the left scapula  AP/Lateral view(s)  Findings: Advanced degenerative arthritis.   Prior studies available for comparison: no          Assessment and Plan     Diagnoses and all orders for this visit:    1. Left shoulder pain, unspecified chronicity (Primary)  -     XR Scapula Left    2. Osteoarthritis of right shoulder, unspecified osteoarthritis type    Other orders  -     Large Joint LEFT SHOULDER        Discussed the treatment plan with the patient. I  reviewed the X-rays that were obtained today with the patient. Discussed the risks and benefits of conservative measures.  The patient expressed understanding and wished to proceed with a left shoulder steroid injection. She tolerated the injection well.         Call or return if worsening symptoms.    Follow Up     PRN      Patient was given instructions and counseling regarding her condition or for health maintenance advice. Please see specific information pulled into the AVS if appropriate.     Scribed for August Sparrow MD by Ebony Parker MA.  04/27/23   10:08 EDT    I have personally performed the services described in this document as scribed by the above individual and it is both accurate and complete. August Sparrow MD 04/27/23

## 2023-05-04 ENCOUNTER — APPOINTMENT (OUTPATIENT)
Dept: GENERAL RADIOLOGY | Facility: HOSPITAL | Age: 88
End: 2023-05-04
Payer: MEDICARE

## 2023-05-04 ENCOUNTER — HOSPITAL ENCOUNTER (INPATIENT)
Facility: HOSPITAL | Age: 88
LOS: 5 days | End: 2023-05-09
Attending: EMERGENCY MEDICINE | Admitting: STUDENT IN AN ORGANIZED HEALTH CARE EDUCATION/TRAINING PROGRAM
Payer: MEDICARE

## 2023-05-04 DIAGNOSIS — S42.401A OCCULT CLOSED FRACTURE OF RIGHT ELBOW, INITIAL ENCOUNTER: ICD-10-CM

## 2023-05-04 DIAGNOSIS — S62.525A NONDISPLACED FRACTURE OF DISTAL PHALANX OF LEFT THUMB, INITIAL ENCOUNTER FOR CLOSED FRACTURE: ICD-10-CM

## 2023-05-04 DIAGNOSIS — Z78.9 DECREASED ACTIVITIES OF DAILY LIVING (ADL): ICD-10-CM

## 2023-05-04 DIAGNOSIS — T07.XXXA ABRASIONS OF MULTIPLE SITES: ICD-10-CM

## 2023-05-04 DIAGNOSIS — S80.02XA HEMATOMA OF LEFT KNEE REGION: ICD-10-CM

## 2023-05-04 DIAGNOSIS — S80.01XA CONTUSION OF RIGHT KNEE, INITIAL ENCOUNTER: ICD-10-CM
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
## 2023-05-04 DIAGNOSIS — R26.2 DIFFICULTY IN WALKING: ICD-10-CM

## 2023-05-04 DIAGNOSIS — S42.291A OTHER CLOSED DISPLACED FRACTURE OF PROXIMAL END OF RIGHT HUMERUS, INITIAL ENCOUNTER: Primary | ICD-10-CM

## 2023-05-04 LAB
ALBUMIN SERPL-MCNC: 3.9 G/DL (ref 3.5–5.2)
ALBUMIN/GLOB SERPL: 1.4 G/DL
ALP SERPL-CCNC: 79 U/L (ref 39–117)
ALT SERPL W P-5'-P-CCNC: 15 U/L (ref 1–33)
ANION GAP SERPL CALCULATED.3IONS-SCNC: 12.7 MMOL/L (ref 5–15)
AST SERPL-CCNC: 20 U/L (ref 1–32)
BASOPHILS # BLD AUTO: 0.03 10*3/MM3 (ref 0–0.2)
BASOPHILS NFR BLD AUTO: 0.2 % (ref 0–1.5)
BILIRUB SERPL-MCNC: <0.2 MG/DL (ref 0–1.2)
BUN SERPL-MCNC: 46 MG/DL (ref 8–23)
BUN/CREAT SERPL: 21.6 (ref 7–25)
CALCIUM SPEC-SCNC: 9.6 MG/DL (ref 8.6–10.5)
CHLORIDE SERPL-SCNC: 105 MMOL/L (ref 98–107)
CK SERPL-CCNC: 49 U/L (ref 20–180)
CO2 SERPL-SCNC: 23.3 MMOL/L (ref 22–29)
CREAT SERPL-MCNC: 2.13 MG/DL (ref 0.57–1)
DEPRECATED RDW RBC AUTO: 54.4 FL (ref 37–54)
EGFRCR SERPLBLD CKD-EPI 2021: 22.2 ML/MIN/1.73
EOSINOPHIL # BLD AUTO: 0.05 10*3/MM3 (ref 0–0.4)
EOSINOPHIL NFR BLD AUTO: 0.3 % (ref 0.3–6.2)
ERYTHROCYTE [DISTWIDTH] IN BLOOD BY AUTOMATED COUNT: 16.1 % (ref 12.3–15.4)
GLOBULIN UR ELPH-MCNC: 2.8 GM/DL
GLUCOSE SERPL-MCNC: 115 MG/DL (ref 65–99)
HCT VFR BLD AUTO: 38 % (ref 34–46.6)
HGB BLD-MCNC: 12 G/DL (ref 12–15.9)
IMM GRANULOCYTES # BLD AUTO: 0.05 10*3/MM3 (ref 0–0.05)
IMM GRANULOCYTES NFR BLD AUTO: 0.3 % (ref 0–0.5)
LYMPHOCYTES # BLD AUTO: 0.5 10*3/MM3 (ref 0.7–3.1)
LYMPHOCYTES NFR BLD AUTO: 3.4 % (ref 19.6–45.3)
MCH RBC QN AUTO: 29.3 PG (ref 26.6–33)
MCHC RBC AUTO-ENTMCNC: 31.6 G/DL (ref 31.5–35.7)
MCV RBC AUTO: 92.9 FL (ref 79–97)
MONOCYTES # BLD AUTO: 2.14 10*3/MM3 (ref 0.1–0.9)
MONOCYTES NFR BLD AUTO: 14.6 % (ref 5–12)
NEUTROPHILS NFR BLD AUTO: 11.84 10*3/MM3 (ref 1.7–7)
NEUTROPHILS NFR BLD AUTO: 81.2 % (ref 42.7–76)
NRBC BLD AUTO-RTO: 0 /100 WBC (ref 0–0.2)
PLATELET # BLD AUTO: 215 10*3/MM3 (ref 140–450)
PMV BLD AUTO: 11.2 FL (ref 6–12)
POTASSIUM SERPL-SCNC: 4.5 MMOL/L (ref 3.5–5.2)
PROT SERPL-MCNC: 6.7 G/DL (ref 6–8.5)
RBC # BLD AUTO: 4.09 10*6/MM3 (ref 3.77–5.28)
SODIUM SERPL-SCNC: 141 MMOL/L (ref 136–145)
WBC NRBC COR # BLD: 14.61 10*3/MM3 (ref 3.4–10.8)

## 2023-05-04 PROCEDURE — 25010000002 HEPARIN (PORCINE) PER 1000 UNITS: Performed by: STUDENT IN AN ORGANIZED HEALTH CARE EDUCATION/TRAINING PROGRAM

## 2023-05-04 PROCEDURE — 73560 X-RAY EXAM OF KNEE 1 OR 2: CPT

## 2023-05-04 PROCEDURE — 80053 COMPREHEN METABOLIC PANEL: CPT | Performed by: EMERGENCY MEDICINE

## 2023-05-04 PROCEDURE — 73080 X-RAY EXAM OF ELBOW: CPT

## 2023-05-04 PROCEDURE — 85025 COMPLETE CBC W/AUTO DIFF WBC: CPT | Performed by: EMERGENCY MEDICINE

## 2023-05-04 PROCEDURE — 82550 ASSAY OF CK (CPK): CPT | Performed by: STUDENT IN AN ORGANIZED HEALTH CARE EDUCATION/TRAINING PROGRAM

## 2023-05-04 PROCEDURE — 73060 X-RAY EXAM OF HUMERUS: CPT

## 2023-05-04 PROCEDURE — 25010000002 MORPHINE PER 10 MG: Performed by: EMERGENCY MEDICINE

## 2023-05-04 PROCEDURE — 99284 EMERGENCY DEPT VISIT MOD MDM: CPT

## 2023-05-04 PROCEDURE — 73130 X-RAY EXAM OF HAND: CPT

## 2023-05-04 PROCEDURE — 73030 X-RAY EXAM OF SHOULDER: CPT

## 2023-05-04 PROCEDURE — 99223 1ST HOSP IP/OBS HIGH 75: CPT | Performed by: STUDENT IN AN ORGANIZED HEALTH CARE EDUCATION/TRAINING PROGRAM

## 2023-05-04 RX ORDER — HEPARIN SODIUM 5000 [USP'U]/ML
5000 INJECTION, SOLUTION INTRAVENOUS; SUBCUTANEOUS EVERY 8 HOURS SCHEDULED
Status: DISCONTINUED | OUTPATIENT
Start: 2023-05-04 | End: 2023-05-09 | Stop reason: HOSPADM

## 2023-05-04 RX ORDER — ALLOPURINOL 100 MG/1
100 TABLET ORAL DAILY
Status: DISCONTINUED | OUTPATIENT
Start: 2023-05-05 | End: 2023-05-09 | Stop reason: HOSPADM

## 2023-05-04 RX ORDER — NEBIVOLOL 10 MG/1
10 TABLET ORAL DAILY
COMMUNITY

## 2023-05-04 RX ORDER — MELATONIN
2000 DAILY
COMMUNITY

## 2023-05-04 RX ORDER — EZETIMIBE 10 MG/1
10 TABLET ORAL DAILY
COMMUNITY

## 2023-05-04 RX ORDER — TRAMADOL HYDROCHLORIDE 50 MG/1
50 TABLET ORAL EVERY 12 HOURS PRN
Status: DISCONTINUED | OUTPATIENT
Start: 2023-05-04 | End: 2023-05-05

## 2023-05-04 RX ORDER — SODIUM CHLORIDE 0.9 % (FLUSH) 0.9 %
10 SYRINGE (ML) INJECTION EVERY 12 HOURS SCHEDULED
Status: DISCONTINUED | OUTPATIENT
Start: 2023-05-04 | End: 2023-05-09 | Stop reason: HOSPADM

## 2023-05-04 RX ORDER — AZELASTINE 1 MG/ML
2 SPRAY, METERED NASAL DAILY
COMMUNITY

## 2023-05-04 RX ORDER — MORPHINE SULFATE 2 MG/ML
1 INJECTION, SOLUTION INTRAMUSCULAR; INTRAVENOUS EVERY 4 HOURS PRN
Status: DISCONTINUED | OUTPATIENT
Start: 2023-05-04 | End: 2023-05-07

## 2023-05-04 RX ORDER — FUROSEMIDE 40 MG/1
40 TABLET ORAL 2 TIMES DAILY
COMMUNITY

## 2023-05-04 RX ORDER — LIDOCAINE HYDROCHLORIDE AND EPINEPHRINE 10; 10 MG/ML; UG/ML
10 INJECTION, SOLUTION INFILTRATION; PERINEURAL ONCE
Status: DISCONTINUED | OUTPATIENT
Start: 2023-05-04 | End: 2023-05-06

## 2023-05-04 RX ORDER — SPIRONOLACTONE 25 MG/1
12.5 TABLET ORAL DAILY
COMMUNITY

## 2023-05-04 RX ORDER — MELATONIN
2000 DAILY
Status: DISCONTINUED | OUTPATIENT
Start: 2023-05-05 | End: 2023-05-09 | Stop reason: HOSPADM

## 2023-05-04 RX ORDER — ROSUVASTATIN CALCIUM 20 MG/1
20 TABLET, COATED ORAL DAILY
Status: DISCONTINUED | OUTPATIENT
Start: 2023-05-05 | End: 2023-05-09 | Stop reason: HOSPADM

## 2023-05-04 RX ORDER — SODIUM CHLORIDE 0.9 % (FLUSH) 0.9 %
10 SYRINGE (ML) INJECTION AS NEEDED
Status: DISCONTINUED | OUTPATIENT
Start: 2023-05-04 | End: 2023-05-09 | Stop reason: HOSPADM

## 2023-05-04 RX ORDER — ACETAMINOPHEN 325 MG/1
650 TABLET ORAL EVERY 4 HOURS PRN
Status: DISCONTINUED | OUTPATIENT
Start: 2023-05-04 | End: 2023-05-09 | Stop reason: HOSPADM

## 2023-05-04 RX ORDER — MONTELUKAST SODIUM 10 MG/1
10 TABLET ORAL NIGHTLY
Status: DISCONTINUED | OUTPATIENT
Start: 2023-05-04 | End: 2023-05-09 | Stop reason: HOSPADM

## 2023-05-04 RX ORDER — MORPHINE SULFATE 2 MG/ML
2 INJECTION, SOLUTION INTRAMUSCULAR; INTRAVENOUS ONCE
Status: COMPLETED | OUTPATIENT
Start: 2023-05-04 | End: 2023-05-04

## 2023-05-04 RX ORDER — ALLOPURINOL 100 MG/1
100 TABLET ORAL DAILY
COMMUNITY

## 2023-05-04 RX ORDER — MULTIPLE VITAMINS W/ MINERALS TAB 9MG-400MCG
1 TAB ORAL DAILY
COMMUNITY

## 2023-05-04 RX ORDER — MONTELUKAST SODIUM 10 MG/1
10 TABLET ORAL NIGHTLY
COMMUNITY

## 2023-05-04 RX ORDER — NALOXONE HCL 0.4 MG/ML
0.4 VIAL (ML) INJECTION
Status: DISCONTINUED | OUTPATIENT
Start: 2023-05-04 | End: 2023-05-07

## 2023-05-04 RX ORDER — LEVOTHYROXINE SODIUM 112 UG/1
112 TABLET ORAL
Status: DISCONTINUED | OUTPATIENT
Start: 2023-05-05 | End: 2023-05-09 | Stop reason: HOSPADM

## 2023-05-04 RX ORDER — LANSOPRAZOLE 30 MG/1
30 CAPSULE, DELAYED RELEASE ORAL DAILY
COMMUNITY

## 2023-05-04 RX ORDER — PANTOPRAZOLE SODIUM 40 MG/1
40 TABLET, DELAYED RELEASE ORAL
Status: DISCONTINUED | OUTPATIENT
Start: 2023-05-05 | End: 2023-05-09 | Stop reason: HOSPADM

## 2023-05-04 RX ORDER — CHLORPHENIRAMINE MALEATE 4 MG/1
4 TABLET ORAL EVERY 6 HOURS PRN
COMMUNITY

## 2023-05-04 RX ORDER — CLOPIDOGREL BISULFATE 75 MG/1
75 TABLET ORAL DAILY
COMMUNITY

## 2023-05-04 RX ORDER — AMLODIPINE BESYLATE 5 MG/1
5 TABLET ORAL DAILY
Status: DISCONTINUED | OUTPATIENT
Start: 2023-05-05 | End: 2023-05-09

## 2023-05-04 RX ORDER — AMLODIPINE BESYLATE 5 MG/1
5 TABLET ORAL DAILY
COMMUNITY

## 2023-05-04 RX ORDER — ROSUVASTATIN CALCIUM 20 MG/1
20 TABLET, COATED ORAL DAILY
COMMUNITY

## 2023-05-04 RX ORDER — ASPIRIN 81 MG/1
81 TABLET ORAL DAILY
COMMUNITY

## 2023-05-04 RX ORDER — SODIUM CHLORIDE 9 MG/ML
40 INJECTION, SOLUTION INTRAVENOUS AS NEEDED
Status: DISCONTINUED | OUTPATIENT
Start: 2023-05-04 | End: 2023-05-09 | Stop reason: HOSPADM

## 2023-05-04 RX ORDER — LEVOTHYROXINE SODIUM 112 UG/1
112 TABLET ORAL DAILY
COMMUNITY

## 2023-05-04 RX ORDER — MULTIPLE VITAMINS W/ MINERALS TAB 9MG-400MCG
1 TAB ORAL DAILY
Status: DISCONTINUED | OUTPATIENT
Start: 2023-05-05 | End: 2023-05-09 | Stop reason: HOSPADM

## 2023-05-04 RX ORDER — NEBIVOLOL 10 MG/1
10 TABLET ORAL DAILY
Status: DISCONTINUED | OUTPATIENT
Start: 2023-05-05 | End: 2023-05-09 | Stop reason: HOSPADM

## 2023-05-04 RX ADMIN — SODIUM CHLORIDE, POTASSIUM CHLORIDE, SODIUM LACTATE AND CALCIUM CHLORIDE 500 ML: 600; 310; 30; 20 INJECTION, SOLUTION INTRAVENOUS at 23:47

## 2023-05-04 RX ADMIN — TRAMADOL HYDROCHLORIDE 50 MG: 50 TABLET, COATED ORAL at 23:44

## 2023-05-04 RX ADMIN — MORPHINE SULFATE 2 MG: 2 INJECTION, SOLUTION INTRAMUSCULAR; INTRAVENOUS at 18:56

## 2023-05-04 RX ADMIN — HEPARIN SODIUM 5000 UNITS: 5000 INJECTION INTRAVENOUS; SUBCUTANEOUS at 23:46

## 2023-05-04 RX ADMIN — Medication 10 ML: at 23:46

## 2023-05-04 NOTE — ED PROVIDER NOTES
Time: 4:36 PM EDT  Date of encounter:  5/4/2023  Independent Historian/Clinical History and Information was obtained by:   Patient and Family  Chief Complaint: Fall with injuries    History is limited by: N/A    History of Present Illness:  Patient is a 87 y.o. year old female who presents to the emergency department for evaluation of injury sustained after a fall today.  Patient reports that she fell at a parking lot when she went to go get her hair done.  Patient reports there was a hump in the parking that she did not see resulting in her tripping and falling.  Patient complains of severe pain in her right shoulder, right elbow, left hand, and left knee.  Patient denies hitting her head and she also denies any headache.  She denies any loss of consciousness.  She denies any neck pain.  Patient denies any chest pain or shortness of breath.  Patient was brought into the ER by her .    HPI    Patient Care Team  Primary Care Provider: Kaelyn Eugene MD    Past Medical History:     No Known Allergies  Past Medical History:   Diagnosis Date   • Anemia    • Breast cancer    • CHF (congestive heart failure)      Past Surgical History:   Procedure Laterality Date   • LUNG SURGERY Left     LEFT UPPER LOBE (2004)   • MASTECTOMY Left      History reviewed. No pertinent family history.    Home Medications:  Prior to Admission medications    Not on File        Social History:   Social History     Tobacco Use   • Smoking status: Never   Substance Use Topics   • Alcohol use: Never   • Drug use: Never         Review of Systems:  Review of Systems   Constitutional: Negative for chills and fever.   HENT: Negative for congestion, ear pain and sore throat.    Eyes: Negative for pain.   Respiratory: Negative for cough, chest tightness and shortness of breath.    Cardiovascular: Negative for chest pain.   Gastrointestinal: Negative for abdominal pain, diarrhea, nausea and vomiting.   Genitourinary: Negative for flank  "pain and hematuria.   Musculoskeletal: Positive for arthralgias and joint swelling.   Skin: Negative for pallor.   Neurological: Negative for seizures and headaches.   All other systems reviewed and are negative.         Physical Exam:  /62 (BP Location: Left arm, Patient Position: Sitting)   Pulse 81   Temp 97.8 °F (36.6 °C) (Oral)   Resp 18   Ht 149.9 cm (59\")   Wt 70.5 kg (155 lb 6.8 oz)   SpO2 97%   BMI 31.39 kg/m²     Physical Exam       Vital signs were reviewed under triage note.  General appearance - Patient appears well-developed and well-nourished.  Patient is in no acute distress.  Head - Normocephalic, atraumatic.  Pupils - Equal, round, reactive to light.  Extraocular muscles are intact.  Conjunctive is clear.  Nasal - Normal inspection.  No evidence of trauma or epistaxis.  Tympanic membranes - Gray, intact without erythema or retractions.  Oral mucosa - Pink and moist without lesions or erythema.  Uvula is midline.  Chest wall - Atraumatic.  Chest wall is nontender.  There is no vesicular rashes noted.  Neck - Supple.  Trachea was midline.  There is no palpable lymphadenopathy or thyromegaly.  There are no meningeal signs  Lungs - Clear to auscultation and percussion bilaterally.  Heart - Regular rate and rhythm without any murmurs, clicks, or gallops.  Abdomen - Soft.  Bowel sounds are present.  There is no palpable tenderness.  There is no rebound, guarding, or rigidity.  There are no palpable masses.  There are no pulsatile masses.  Back - Spine is straight and midline.  There is no CVA tenderness.  Extremities - Intact x4.  Patient has moderate tenderness at her proximal humerus/right shoulder.  Patient has limited range of motion due to pain.  Patient also has moderate tenderness with palpation over the posterior elbow.  Patient does have a skin wound noted.  There is full range of motion actively.  Patient has moderate tenderness in the posterior aspect of the elbow.  Left hand " reveals a laceration involving the left thumb pad.  Lesions controlled.  Patient has moderate tenderness with palpation of the left thumb itself.  The remaining hand inspection is unremarkable.  The right knee has a small contusion and only minimal tenderness.  The left knee has a moderate to large contusion as well as superficial abrasions.  There is no active bleeding or laceration noted.  There is 1+ palpable edema.  Pulses are intact x4 and equal.  Neurologic - Patient is awake, alert, and oriented x3.  Cranial nerves II through XII are grossly intact.  Motor and sensory functions grossly intact.  Cerebellar function was normal.  Integument - There are no rashes.  There are no petechia or purpura lesions noted.  There are no vesicular lesions noted.      Procedures:  Procedures      Medical Decision Making:      Comorbidities that affect care:    Anemia, CHF, breast cancer    External Notes reviewed:    None      The following orders were placed and all results were independently analyzed by me:  Orders Placed This Encounter   Procedures   • Splint Application   • [facility] 02.  Shoulder Immobilizer/Sling   • XR Knee 1 or 2 View Left   • XR Hand 3+ View Left   • XR Humerus Right   • XR Elbow 3+ View Right   • XR Shoulder 2+ View Right   • Comprehensive Metabolic Panel   • CBC Auto Differential   • Cleanse and dress all wounds.  Nursing Communication   • Hospitalist (on-call MD unless specified)   • Insert Peripheral IV   • CBC & Differential       Medications Given in the Emergency Department:  Medications   lidocaine 1% - EPINEPHrine 1:322230 (XYLOCAINE W/EPI) 1 %-1:397537 injection 10 mL (has no administration in time range)   sodium chloride 0.9 % flush 10 mL (has no administration in time range)   morphine injection 2 mg (has no administration in time range)        ED Course:     The patient was seen and evaluated in the ED by me.  The above history and physical examination was performed as  documented.  Diagnostic data was obtained.  Results reviewed.  Patient has several fractures as noted.  Patient will be placed in a shoulder immobilizer.  Patient's left thumb will be splinted.  The left thumb had more of a avulsion to the tip of the finger.  The wound was cleansed and dressed.  Patient also noted to have a skin tear on her right elbow.  This also was cleansed and dressed.  The patient was then placed in a long-arm posterior splint followed with a shoulder immobilizer.  Due to the right upper extremity injury, the left thumb injury/fracture,, and combined with the bilateral knee injuries the patient is not able to safely stand nor ambulate.  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  Subsequently patient is not safe to be discharged home.  I will consult the hospitalist service for admission.  Dr. Rahman will see the patient in consultation.  The patient is agreeable to this treatment plan.  Social work will also be notified of the patient's planned admission.      Labs:    Lab Results (last 24 hours)     ** No results found for the last 24 hours. **           Imaging:    XR Shoulder 2+ View Right    Result Date: 5/4/2023  PROCEDURE: XR SHOULDER 2+ VW RIGHT  COMPARISON: [facility], CR, XR HUMERUS RIGHT, 5/04/2023, 15:51.  INDICATIONS: Shoulder pain with limited range of motion after fall  FINDINGS:   The right glenohumeral and AC joints are intact.  There is a nondisplaced fracture of the neck in the right humerus.  Degenerative changes are present in the glenohumeral and AC joints.  There is a left-sided cardiac pacemaker.  There is a left carotid artery stent.  IMPRESSION: Nondisplaced fracture of the neck of the right humerus.   KARY TORRES MD       Electronically Signed and Approved By: KARY TORRES MD on 5/04/2023 at 17:20             XR Humerus Right    Result Date: 5/4/2023  PROCEDURE: XR HUMERUS RIGHT  COMPARISON: None  INDICATIONS: RIGHT SHOULDER INJURY//FALL   FINDINGS:  There is a nondisplaced fracture of the surgical neck of the humerus.  There is also suggestion of a elbow effusion and irregularity of the radial head which may represent a nondisplaced radial head fracture.        1. Nondisplaced fracture of the surgical neck of the humerus, recommend correlation with dedicated shoulder films 2. There appears to be an elbow effusion and mild irregularity of the radial head.  This may rec present a radial head fracture.  Recommend dedicated elbow films.      ALISHA ALCANTARA MD       Electronically Signed and Approved By: ALISHA ALCANTARA MD on 5/04/2023 at 16:29             XR Elbow 3+ View Right    Result Date: 5/4/2023  PROCEDURE: XR ELBOW 3+ VW RIGHT  COMPARISON: [facility], CR, XR HUMERUS RIGHT, 5/04/2023, 15:51.  INDICATIONS: RIGHT ELBOW PAIN AND LIMITED ROM/ FALL  FINDINGS:  BONES: No fractures or dislocations are identified.  Degenerative changes are present in the right elbow. SOFT TISSUES: Soft tissue swelling is present in the right elbow. EFFUSION: There is a right elbow joint effusion. OTHER: Negative.        Soft tissue swelling.  Right elbow joint effusion.  The findings may be secondary to occult fracture.  No discrete fractures are identified.      KARY TORRES MD       Electronically Signed and Approved By: KARY TORRES MD on 5/04/2023 at 17:18             XR Hand 3+ View Left    Result Date: 5/4/2023  PROCEDURE: XR HAND 3+ VW LEFT  COMPARISON: None  INDICATIONS: FALL, INJURY TO LEFT HAND  FINDINGS:  There is a minimally displaced fracture of the mid distal phalanx of the thumb.  There is diffuse osteopenia.  Severe degenerative changes of the index finger DIP joint.  There is also degenerative changes of the IP joints of the other joints.  Evidence of prior distal radial fracture and ulnar styloid avulsion fracture.        1. Minimally displaced fracture of the mid distal phalanx of the thumb 2. Prior, healed fractures of the distal  radius and ulnar styloid avulsion injuries. 3. Osteopenia      ALISHA ALCANTARA MD       Electronically Signed and Approved By: ALISHA ALCANTARA MD on 5/04/2023 at 16:24             XR Knee 1 or 2 View Left    Result Date: 5/4/2023  PROCEDURE: XR KNEE 1 OR 2 VW LEFT  COMPARISON: None  INDICATIONS: FALL//INJURY TO LEFT KNEE  FINDINGS:  No acute fracture or dislocation is identified.  There is severe tricompartmental degenerative changes with bone-on-bone in the medial compartment.  There is a small suprapatellar joint effusion.  There is diffuse osteopenia.  There are prominent vascular calcifications.  There is diffuse soft tissue swelling overlying the patella and anterior knee.        1. Extensive soft tissue swelling overlying the anterior knee 2. Small suprapatellar knee effusion but no evidence of a lipohemarthrosis 3. Severe degenerative changes with bone-on-bone within the medial compartment.      ALISHA ALCANTARA MD       Electronically Signed and Approved By: ALISHA ALCANTARA MD on 5/04/2023 at 16:22                 Differential Diagnosis and Discussion:    Extremity Pain: Differential diagnosis includes but is not limited to soft tissue sprain, tendonitis, tendon injury, dislocation, fracture, deep vein thrombosis, arterial insufficiency, osteoarthritis, bursitis, and ligamentous damage.    All labs were reviewed and interpreted by me.  All X-rays impressions were independently interpreted by me.    [facility]         Patient Care Considerations:    CT HEAD: I considered ordering a noncontrast CT of the head, however Patient has a GCS of 15.  She denies hitting her head and denies any loss of consciousness.  She also denies a headache.      Consultants/Shared Management Plan:    Hospitalist: I have discussed the case with Hospitalist who agrees to accept the patient for admission.  Consultant: I have discussed the case with Dr. Rahman who agrees to consult on the patient.    Social Determinants of Health:    Patient has  presented with family members who are responsible, reliable and will ensure follow up care.      Disposition and Care Coordination:    Admit:   Through independent evaluation of the patient's history, physical, and imperical data, the patient meets criteria for observation/admission to the hospital.         Medication List      No changes were made to your prescriptions during this visit.      No follow-up provider specified.     Final diagnoses:   Other closed displaced fracture of proximal end of right humerus, initial encounter   Occult closed fracture of right elbow, initial encounter   Nondisplaced fracture of distal phalanx of left thumb, initial encounter for closed fracture   Abrasions of multiple sites   Hematoma of left knee region   Contusion of right knee, initial encounter   Fall on same level from slipping, tripping or stumbling, initial encounter        ED Disposition     ED Disposition   Decision to Admit    Condition   --    Comment   --             This medical record created using voice recognition software.           Chaparro Nguyen DO  05/04/23 [id number]

## 2023-05-04 NOTE — ED NOTES
Left knee laceration. Left hand lacerations (digits). Right shoulder pain, right elbow laceration, cannot move R shoulder.

## 2023-05-04 NOTE — H&P
[facility]  Date: 2023   Patient Name: Marinne Hasen  : 1935  MRN: [id number]  Primary Care Physician:  Kaelyn Eugene MD  Date of admission: 2023    Subjective   Subjective     Chief Complaint: Fall, right shoulder pain, right elbow pain and left knee pain    HPI:    Marinne Hasen is a 87 y.o. female with a past medical history of hypertension, hypothyroidism, CHF presented to the ED for evaluation of injury after fall today.  Patient didnt notice there was a hump in the parking while walking  resulting in her falling onto the floor.  Complains of severe pain in her right shoulder, right elbow and left hand and left knee.  Did not hit her head, did not lose consciousness.  Denies any lightheadedness, palpitations prior to the fall.    Upon arrival to the ED, vital signs temperature 97.8, pulse 81, respirate rate 18, blood pressure 159/62 on room air saturating around 97%.  Labs showed WBC 14.61, platelets 215, hemoglobin 12, creatinine elevated 2.12, baseline around 1.6, rest of the CMP is nonsignificant, x-ray shoulder showed nondisplaced fracture of the neck of the right humerus, x-ray of the elbow right showed soft tissue swelling, right elbow joint effusion.  The findings may be secondary to occult fracture, no discrete fractures are identified.  X-ray of the hand left showed minimally displaced fracture of the mid distal phalanx of the thumb also seen.  Healed fractures of the distal radius and ulnar styloid avulsion injuries.  X-ray of the humerus right showed nondisplaced fracture of the surgical neck of the humerus.  Case has been discussed by the ED physician with the orthopedic surgeon on-call, agreed to consult on the patient in the morning.  Long-arm posterior splint with a shoulder immobilizer has been placed on the right shoulder joint by ED physician.  Patient has severe joint effusion to the left knee joint and will be able to  ambulate and unsafe to be discharged home so patient has been admitted for further evaluation and management.    Personal History     Past Medical History:   Diagnosis Date   • Anemia    • Breast cancer    • CHF (congestive heart failure)          Past Surgical History:   Procedure Laterality Date   • LUNG SURGERY Left     LEFT UPPER LOBE (2004)   • MASTECTOMY Left          History reviewed. No pertinent family history.      Social History     Socioeconomic History   • Marital status:    Tobacco Use   • Smoking status: Never   Substance and Sexual Activity   • Alcohol use: Never   • Drug use: Never         Home Medications:  allopurinol, amLODIPine, aspirin, azelastine, chlorpheniramine, cholecalciferol, clopidogrel, ezetimibe, furosemide, lansoprazole, levothyroxine, montelukast, multivitamin with minerals, nebivolol, rosuvastatin, and spironolactone    Allergies:  No Known Allergies    Review of Systems   , Other systems reviewed and negative except as mentioned above in the HPI    Objective   Objective     Vitals:   Temp:  [97.8 °F (36.6 °C)] 97.8 °F (36.6 °C)  Heart Rate:  [81] 81  Resp:  [18] 18  BP: (159)/(62) 159/62    Physical Exam    Constitutional: Awake, alert, mild distress due to the pain   Eyes: Pupils equal, sclerae anicteric, no conjunctival injection   HENT: NCAT, mucous membranes moist   Respiratory: Clear to auscultation bilaterally, nonlabored respirations    Cardiovascular: RRR, no murmurs, rubs, or gallops, palpable pedal pulses bilaterally   Gastrointestinal: Positive bowel sounds, soft, nontender, nondistended   Musculoskeletal: Right shoulder splint and placed in sling, splint to the left thumb, significant swelling of the left knee joint, no clubbing or cyanosis to extremities   Psychiatric: Appropriate affect, cooperative   Neurologic: Oriented x 3, speech clear    Result Review    Result Review:  I have personally reviewed the results from the time of this admission to 5/4/2023  21:30 EDT and agree with these findings:  [x]  Laboratory  []  Microbiology  [x]  Radiology  []  EKG/Telemetry   []  Cardiology/Vascular   []  Pathology  []  Old records  []  Other:        Imaging Results (Last 24 Hours)     Procedure Component Value Units Date/Time    XR Shoulder 2+ View Right [[id number]] Collected: 05/04/23 1720     Updated: 05/04/23 1723    Narrative:      PROCEDURE: XR SHOULDER 2+ VW RIGHT     COMPARISON: [facility], , XR HUMERUS RIGHT, 5/04/2023, 15:51.     INDICATIONS: Shoulder pain with limited range of motion after fall     FINDINGS:      The right glenohumeral and AC joints are intact.  There is a nondisplaced fracture of the neck in   the right humerus.  Degenerative changes are present in the glenohumeral and AC joints.  There is a   left-sided cardiac pacemaker.  There is a left carotid artery stent.     IMPRESSION:  Nondisplaced fracture of the neck of the right humerus.       KARY TORRES MD         Electronically Signed and Approved By: KARY TORRES MD on 5/04/2023 at 17:20                     XR Elbow 3+ View Right [[id number]] Collected: 05/04/23 1718     Updated: 05/04/23 1721    Narrative:      PROCEDURE: XR ELBOW 3+ VW RIGHT     COMPARISON: [facility], , XR HUMERUS RIGHT, 5/04/2023, 15:51.     INDICATIONS: RIGHT ELBOW PAIN AND LIMITED ROM/ FALL     FINDINGS:   BONES: No fractures or dislocations are identified.  Degenerative changes are present in the right   elbow.  SOFT TISSUES: Soft tissue swelling is present in the right elbow.  EFFUSION: There is a right elbow joint effusion.  OTHER: Negative.         Impression:       Soft tissue swelling.  Right elbow joint effusion.  The findings may be secondary to   occult fracture.  No discrete fractures are identified.               KARY TORRES MD         Electronically Signed and Approved By: KARY TORRES MD on 5/04/2023 at 17:18                     XR Humerus Right [[id number]]  Collected: 05/04/23 1629     Updated: 05/04/23 1632    Narrative:      PROCEDURE: XR HUMERUS RIGHT     COMPARISON: None     INDICATIONS: RIGHT SHOULDER INJURY//FALL     FINDINGS:   There is a nondisplaced fracture of the surgical neck of the humerus.  There is also suggestion of   a elbow effusion and irregularity of the radial head which may represent a nondisplaced radial head   fracture.       Impression:         1. Nondisplaced fracture of the surgical neck of the humerus, recommend correlation with dedicated   shoulder films  2. There appears to be an elbow effusion and mild irregularity of the radial head.  This may rec   present a radial head fracture.  Recommend dedicated elbow films.               ALISHA ALCANTARA MD         Electronically Signed and Approved By: ALISHA ALCANTARA MD on 5/04/2023 at 16:29                     XR Hand 3+ View Left [[id number]] Collected: 05/04/23 1624     Updated: 05/04/23 1627    Narrative:      PROCEDURE: XR HAND 3+ VW LEFT     COMPARISON: None     INDICATIONS: FALL, INJURY TO LEFT HAND     FINDINGS:   There is a minimally displaced fracture of the mid distal phalanx of the thumb.  There is diffuse   osteopenia.  Severe degenerative changes of the index finger DIP joint.  There is also degenerative   changes of the IP joints of the other joints.  Evidence of prior distal radial fracture and ulnar   styloid avulsion fracture.       Impression:         1. Minimally displaced fracture of the mid distal phalanx of the thumb  2. Prior, healed fractures of the distal radius and ulnar styloid avulsion injuries.  3. Osteopenia               ALISHA ALCANTARA MD         Electronically Signed and Approved By: ALISHA ALCANTARA MD on 5/04/2023 at 16:24                     XR Knee 1 or 2 View Left [[id number]] Collected: 05/04/23 1622     Updated: 05/04/23 1625    Narrative:      PROCEDURE: XR KNEE 1 OR 2 VW LEFT     COMPARISON: None     INDICATIONS: FALL//INJURY TO LEFT KNEE     FINDINGS:   No acute  fracture or dislocation is identified.  There is severe tricompartmental degenerative   changes with bone-on-bone in the medial compartment.  There is a small suprapatellar joint   effusion.  There is diffuse osteopenia.  There are prominent vascular calcifications.  There is   diffuse soft tissue swelling overlying the patella and anterior knee.       Impression:         1. Extensive soft tissue swelling overlying the anterior knee  2. Small suprapatellar knee effusion but no evidence of a lipohemarthrosis  3. Severe degenerative changes with bone-on-bone within the medial compartment.               ALISHA ALCANTARA MD         Electronically Signed and Approved By: ALISHA ALCANTARA MD on 5/04/2023 at 16:22                            lidocaine 1% - EPINEPHrine 1:[id number], 10 mL, Injection, Once         Assessment & Plan   Assessment / Plan     Assessment/Plan:   Mechanical fall  Nondisplaced fracture of the neck of the right humerus  Minimally displaced fracture of the mid distal phalanx of the left thumb  JOSIE  HFrEF, LVEF 31 to 35%, moderate global hypokinesis on echo 01/02/2023  Hypertension  Hypothyroidism    Plan  Admit to inpatient  Case has been discussed by the ED physician with the orthopedic surgeon on-call Dr. Rahman, will be consulting in the morning  Pain control with p.o. tramadol and IV morphine  Fall precautions  Strict bedrest  PT OT consult  Ordered 500 cc bolus  Encourage p.o. intake  Monitor urine output  External catheter placed, bladder scan every 4 hours if the patient does not urinate  Monitor creatinine with a.m. labs  Holding spironolactone, Lasix in the setting of JOSIE, resume when appropriate  Resumed other appropriate home medication as ordered    DVT prophylaxis:  No DVT prophylaxis order currently exists.    CODE STATUS:    Level Of Support Discussed With: Patient  Code Status (Patient has no pulse and is not breathing): CPR (Attempt to Resuscitate)  Medical Interventions (Patient has pulse  or is breathing): Full Support      Admission Status:  I believe this patient meets inpatient status.    Part of this note may be an electronic transcription/translation of spoken language to printed text using the Dragon Dictation System    Harley Macias MD

## 2023-05-05 PROBLEM — M79.18 MUSCULOSKELETAL PAIN: Status: ACTIVE | Noted: 2023-05-05

## 2023-05-05 PROBLEM — E03.9 HYPOTHYROIDISM: Status: ACTIVE | Noted: 2023-05-05

## 2023-05-05 PROBLEM — M25.421 EFFUSION OF RIGHT ELBOW: Status: ACTIVE | Noted: 2023-05-05

## 2023-05-05 PROBLEM — I10 HTN (HYPERTENSION): Chronic | Status: ACTIVE | Noted: 2023-05-05

## 2023-05-05 PROBLEM — N28.9 RENAL INSUFFICIENCY: Status: ACTIVE | Noted: 2023-05-05

## 2023-05-05 PROBLEM — E78.5 HLD (HYPERLIPIDEMIA): Chronic | Status: ACTIVE | Noted: 2023-05-05

## 2023-05-05 PROBLEM — N17.9 AKI (ACUTE KIDNEY INJURY): Status: ACTIVE | Noted: 2023-05-05

## 2023-05-05 LAB
ALBUMIN SERPL-MCNC: 3.5 G/DL (ref 3.5–5.2)
ALBUMIN/GLOB SERPL: 1.6 G/DL
ALP SERPL-CCNC: 62 U/L (ref 39–117)
ALT SERPL W P-5'-P-CCNC: 12 U/L (ref 1–33)
ANION GAP SERPL CALCULATED.3IONS-SCNC: 8.7 MMOL/L (ref 5–15)
AST SERPL-CCNC: 14 U/L (ref 1–32)
BASOPHILS # BLD AUTO: 0.03 10*3/MM3 (ref 0–0.2)
BASOPHILS NFR BLD AUTO: 0.3 % (ref 0–1.5)
BILIRUB SERPL-MCNC: 0.2 MG/DL (ref 0–1.2)
BUN SERPL-MCNC: 42 MG/DL (ref 8–23)
BUN/CREAT SERPL: 24.9 (ref 7–25)
CALCIUM SPEC-SCNC: 9.2 MG/DL (ref 8.6–10.5)
CHLORIDE SERPL-SCNC: 106 MMOL/L (ref 98–107)
CO2 SERPL-SCNC: 25.3 MMOL/L (ref 22–29)
CREAT SERPL-MCNC: 1.69 MG/DL (ref 0.57–1)
DEPRECATED RDW RBC AUTO: 52.8 FL (ref 37–54)
EGFRCR SERPLBLD CKD-EPI 2021: 29.3 ML/MIN/1.73
EOSINOPHIL # BLD AUTO: 0.01 10*3/MM3 (ref 0–0.4)
EOSINOPHIL NFR BLD AUTO: 0.1 % (ref 0.3–6.2)
ERYTHROCYTE [DISTWIDTH] IN BLOOD BY AUTOMATED COUNT: 16.4 % (ref 12.3–15.4)
GLOBULIN UR ELPH-MCNC: 2.2 GM/DL
GLUCOSE SERPL-MCNC: 122 MG/DL (ref 65–99)
HCT VFR BLD AUTO: 29.5 % (ref 34–46.6)
HGB BLD-MCNC: 9.7 G/DL (ref 12–15.9)
IMM GRANULOCYTES # BLD AUTO: 0.07 10*3/MM3 (ref 0–0.05)
IMM GRANULOCYTES NFR BLD AUTO: 0.8 % (ref 0–0.5)
LYMPHOCYTES # BLD AUTO: 0.48 10*3/MM3 (ref 0.7–3.1)
LYMPHOCYTES NFR BLD AUTO: 5.6 % (ref 19.6–45.3)
MAGNESIUM SERPL-MCNC: 2.3 MG/DL (ref 1.6–2.4)
MCH RBC QN AUTO: 29.2 PG (ref 26.6–33)
MCHC RBC AUTO-ENTMCNC: 32.9 G/DL (ref 31.5–35.7)
MCV RBC AUTO: 88.9 FL (ref 79–97)
MONOCYTES # BLD AUTO: 1.62 10*3/MM3 (ref 0.1–0.9)
MONOCYTES NFR BLD AUTO: 18.8 % (ref 5–12)
NEUTROPHILS NFR BLD AUTO: 6.43 10*3/MM3 (ref 1.7–7)
NEUTROPHILS NFR BLD AUTO: 74.4 % (ref 42.7–76)
NRBC BLD AUTO-RTO: 0 /100 WBC (ref 0–0.2)
PHOSPHATE SERPL-MCNC: 4.3 MG/DL (ref 2.5–4.5)
PLATELET # BLD AUTO: 167 10*3/MM3 (ref 140–450)
PMV BLD AUTO: 11.4 FL (ref 6–12)
POTASSIUM SERPL-SCNC: 4.3 MMOL/L (ref 3.5–5.2)
PROT SERPL-MCNC: 5.7 G/DL (ref 6–8.5)
RBC # BLD AUTO: 3.32 10*6/MM3 (ref 3.77–5.28)
SODIUM SERPL-SCNC: 140 MMOL/L (ref 136–145)
WBC NRBC COR # BLD: 8.64 10*3/MM3 (ref 3.4–10.8)

## 2023-05-05 PROCEDURE — 80053 COMPREHEN METABOLIC PANEL: CPT | Performed by: STUDENT IN AN ORGANIZED HEALTH CARE EDUCATION/TRAINING PROGRAM

## 2023-05-05 PROCEDURE — 83735 ASSAY OF MAGNESIUM: CPT | Performed by: STUDENT IN AN ORGANIZED HEALTH CARE EDUCATION/TRAINING PROGRAM

## 2023-05-05 PROCEDURE — 97161 PT EVAL LOW COMPLEX 20 MIN: CPT

## 2023-05-05 PROCEDURE — 99233 SBSQ HOSP IP/OBS HIGH 50: CPT | Performed by: INTERNAL MEDICINE

## 2023-05-05 PROCEDURE — 99221 1ST HOSP IP/OBS SF/LOW 40: CPT | Performed by: ORTHOPAEDIC SURGERY

## 2023-05-05 PROCEDURE — 84100 ASSAY OF PHOSPHORUS: CPT | Performed by: STUDENT IN AN ORGANIZED HEALTH CARE EDUCATION/TRAINING PROGRAM

## 2023-05-05 PROCEDURE — 94799 UNLISTED PULMONARY SVC/PX: CPT

## 2023-05-05 PROCEDURE — 85025 COMPLETE CBC W/AUTO DIFF WBC: CPT | Performed by: STUDENT IN AN ORGANIZED HEALTH CARE EDUCATION/TRAINING PROGRAM

## 2023-05-05 PROCEDURE — 25010000002 HEPARIN (PORCINE) PER 1000 UNITS: Performed by: STUDENT IN AN ORGANIZED HEALTH CARE EDUCATION/TRAINING PROGRAM

## 2023-05-05 RX ORDER — BISACODYL 5 MG/1
5 TABLET, DELAYED RELEASE ORAL DAILY PRN
Status: DISCONTINUED | OUTPATIENT
Start: 2023-05-05 | End: 2023-05-09 | Stop reason: HOSPADM

## 2023-05-05 RX ORDER — ASPIRIN 81 MG/1
81 TABLET ORAL DAILY
Status: DISCONTINUED | OUTPATIENT
Start: 2023-05-06 | End: 2023-05-09 | Stop reason: HOSPADM

## 2023-05-05 RX ORDER — FUROSEMIDE 40 MG/1
40 TABLET ORAL
Status: DISCONTINUED | OUTPATIENT
Start: 2023-05-05 | End: 2023-05-09 | Stop reason: HOSPADM

## 2023-05-05 RX ORDER — HYDROCODONE BITARTRATE AND ACETAMINOPHEN 5; 325 MG/1; MG/1
1 TABLET ORAL EVERY 6 HOURS PRN
Status: DISCONTINUED | OUTPATIENT
Start: 2023-05-05 | End: 2023-05-09 | Stop reason: HOSPADM

## 2023-05-05 RX ORDER — BISACODYL 10 MG
10 SUPPOSITORY, RECTAL RECTAL DAILY PRN
Status: DISCONTINUED | OUTPATIENT
Start: 2023-05-05 | End: 2023-05-09 | Stop reason: HOSPADM

## 2023-05-05 RX ORDER — ASCORBIC ACID 500 MG
500 TABLET ORAL DAILY
Status: DISCONTINUED | OUTPATIENT
Start: 2023-05-05 | End: 2023-05-09 | Stop reason: HOSPADM

## 2023-05-05 RX ORDER — FERROUS SULFATE 325(65) MG
325 TABLET ORAL
Status: DISCONTINUED | OUTPATIENT
Start: 2023-05-05 | End: 2023-05-09 | Stop reason: HOSPADM

## 2023-05-05 RX ORDER — HYDROCODONE BITARTRATE AND ACETAMINOPHEN 7.5; 325 MG/1; MG/1
1 TABLET ORAL EVERY 6 HOURS PRN
Status: DISCONTINUED | OUTPATIENT
Start: 2023-05-05 | End: 2023-05-09 | Stop reason: HOSPADM

## 2023-05-05 RX ORDER — CLOPIDOGREL BISULFATE 75 MG/1
75 TABLET ORAL DAILY
Status: DISCONTINUED | OUTPATIENT
Start: 2023-05-06 | End: 2023-05-09 | Stop reason: HOSPADM

## 2023-05-05 RX ORDER — HYDROCODONE BITARTRATE AND ACETAMINOPHEN 5; 325 MG/1; MG/1
1 TABLET ORAL ONCE AS NEEDED
Status: COMPLETED | OUTPATIENT
Start: 2023-05-05 | End: 2023-05-05

## 2023-05-05 RX ORDER — AMOXICILLIN 250 MG
2 CAPSULE ORAL NIGHTLY PRN
Status: DISCONTINUED | OUTPATIENT
Start: 2023-05-05 | End: 2023-05-09 | Stop reason: HOSPADM

## 2023-05-05 RX ORDER — POLYETHYLENE GLYCOL 3350 17 G/17G
17 POWDER, FOR SOLUTION ORAL DAILY PRN
Status: DISCONTINUED | OUTPATIENT
Start: 2023-05-05 | End: 2023-05-09 | Stop reason: HOSPADM

## 2023-05-05 RX ADMIN — HYDROCODONE BITARTRATE AND ACETAMINOPHEN 1 TABLET: 5; 325 TABLET ORAL at 02:42

## 2023-05-05 RX ADMIN — Medication 2000 UNITS: at 08:39

## 2023-05-05 RX ADMIN — PANTOPRAZOLE SODIUM 40 MG: 40 TABLET, DELAYED RELEASE ORAL at 06:39

## 2023-05-05 RX ADMIN — HEPARIN SODIUM 5000 UNITS: 5000 INJECTION INTRAVENOUS; SUBCUTANEOUS at 21:00

## 2023-05-05 RX ADMIN — NEBIVOLOL 10 MG: 10 TABLET ORAL at 08:39

## 2023-05-05 RX ADMIN — FUROSEMIDE 40 MG: 40 TABLET ORAL at 14:08

## 2023-05-05 RX ADMIN — OXYCODONE HYDROCHLORIDE AND ACETAMINOPHEN 500 MG: 500 TABLET ORAL at 23:31

## 2023-05-05 RX ADMIN — ROSUVASTATIN 20 MG: 20 TABLET, FILM COATED ORAL at 08:39

## 2023-05-05 RX ADMIN — FUROSEMIDE 40 MG: 40 TABLET ORAL at 17:03

## 2023-05-05 RX ADMIN — MULTIPLE VITAMINS W/ MINERALS TAB 1 TABLET: TAB at 08:39

## 2023-05-05 RX ADMIN — Medication 10 ML: at 21:00

## 2023-05-05 RX ADMIN — MONTELUKAST 10 MG: 10 TABLET, FILM COATED ORAL at 20:36

## 2023-05-05 RX ADMIN — AMLODIPINE BESYLATE 5 MG: 5 TABLET ORAL at 08:39

## 2023-05-05 RX ADMIN — FERROUS SULFATE TAB 325 MG (65 MG ELEMENTAL FE) 325 MG: 325 (65 FE) TAB at 23:30

## 2023-05-05 RX ADMIN — Medication 10 ML: at 08:40

## 2023-05-05 RX ADMIN — HEPARIN SODIUM 5000 UNITS: 5000 INJECTION INTRAVENOUS; SUBCUTANEOUS at 14:07

## 2023-05-05 RX ADMIN — LEVOTHYROXINE SODIUM 112 MCG: 0.11 TABLET ORAL at 06:39

## 2023-05-05 RX ADMIN — HEPARIN SODIUM 5000 UNITS: 5000 INJECTION INTRAVENOUS; SUBCUTANEOUS at 06:39

## 2023-05-05 RX ADMIN — TRAMADOL HYDROCHLORIDE 50 MG: 50 TABLET, COATED ORAL at 11:55

## 2023-05-05 RX ADMIN — ALLOPURINOL 100 MG: 100 TABLET ORAL at 08:39

## 2023-05-05 RX ADMIN — HYDROCODONE BITARTRATE AND ACETAMINOPHEN 1 TABLET: 7.5; 325 TABLET ORAL at 20:37

## 2023-05-05 NOTE — PLAN OF CARE
Problem: Fall Injury Risk  Goal: Absence of Fall and Fall-Related Injury  Outcome: Ongoing, Progressing  Intervention: Identify and Manage Contributors  Recent Flowsheet Documentation  Taken 5/5/2023 1207 by Gema Mccrary RN  Medication Review/Management: medications reviewed  Taken 5/5/2023 1010 by Gema Mccrary RN  Medication Review/Management: medications reviewed  Taken 5/5/2023 0810 by Gema Mccrary RN  Medication Review/Management: medications reviewed  Taken 5/5/2023 0710 by Gema Mccrary RN  Medication Review/Management: medications reviewed  Self-Care Promotion: independence encouraged  Intervention: Promote Injury-Free Environment  Recent Flowsheet Documentation  Taken 5/5/2023 1207 by Gema Mccrary RN  Safety Promotion/Fall Prevention: safety round/check completed  Taken 5/5/2023 1010 by Gema Mccrary RN  Safety Promotion/Fall Prevention: safety round/check completed  Taken 5/5/2023 0810 by Gema Mccrary RN  Safety Promotion/Fall Prevention: safety round/check completed  Taken 5/5/2023 0710 by Gema Mccrary RN  Safety Promotion/Fall Prevention: safety round/check completed     Problem: Skin Injury Risk Increased  Goal: Skin Health and Integrity  Outcome: Ongoing, Progressing  Intervention: Optimize Skin Protection  Recent Flowsheet Documentation  Taken 5/5/2023 0710 by Gema Mccrary RN  Pressure Reduction Techniques:   frequent weight shift encouraged   heels elevated off bed   positioned off wounds  Head of Bed (HOB) Positioning: HOB elevated  Pressure Reduction Devices:   pressure-redistributing mattress utilized   positioning supports utilized   heel offloading device utilized  Skin Protection:   adhesive use limited   tubing/devices free from skin contact     Problem: Bleeding (Orthopaedic Fracture)  Goal: Absence of Bleeding  Outcome: Ongoing, Progressing  Intervention: Monitor and Manage Fracture Bleeding  Recent Flowsheet Documentation  Taken 5/5/2023 0710 by  Gema Mccrary RN  Fracture Immobilization:   immobilization device maintained   supported during position changes   supported with pillows     Problem: Embolism (Orthopaedic Fracture)  Goal: Absence of Embolism Signs and Symptoms  Outcome: Ongoing, Progressing  Intervention: Prevent or Manage Embolism Risk  Recent Flowsheet Documentation  Taken 5/5/2023 0710 by Gema Mccrary RN  VTE Prevention/Management:   bilateral   compression stockings off   sequential compression devices off     Problem: Fracture Stabilization and Management (Orthopaedic Fracture)  Goal: Fracture Stability  Outcome: Ongoing, Progressing  Intervention: Promote Fracture Stability and Healing  Recent Flowsheet Documentation  Taken 5/5/2023 0710 by Gema Mccrary RN  Fracture Immobilization:   immobilization device maintained   supported during position changes   supported with pillows     Problem: Functional Ability Impaired (Orthopaedic Fracture)  Goal: Optimal Functional Ability  Outcome: Ongoing, Progressing  Intervention: Optimize Functional Ability  Recent Flowsheet Documentation  Taken 5/5/2023 0710 by Gema Mccrary RN  Activity Management:   ambulated in room   up in chair  Activity Assistance Provided: assistance, 1 person  Positioning/Transfer Devices:   in use   pillows  Self-Care Promotion: independence encouraged  Range of Motion: ROM (range of motion) performed     Problem: Infection (Orthopaedic Fracture)  Goal: Absence of Infection Signs and Symptoms  Outcome: Ongoing, Progressing  Intervention: Prevent or Manage Infection  Recent Flowsheet Documentation  Taken 5/5/2023 0710 by Gema Mccrary RN  Infection Prevention:   environmental surveillance performed   hand hygiene promoted   rest/sleep promoted   single patient room provided   visitors restricted/screened     Problem: Neurovascular Compromise (Orthopaedic Fracture)  Goal: Effective Tissue Perfusion  Outcome: Ongoing, Progressing  Intervention: Prevent or  Manage Neurovascular Compromise  Recent Flowsheet Documentation  Taken 5/5/2023 0710 by Gema Mccrary RN  Compartment Syndrome Surveillance: no pain with passive muscle stretch     Problem: Pain (Orthopaedic Fracture)  Goal: Acceptable Pain Control  Outcome: Ongoing, Progressing  Intervention: Manage Acute Orthopaedic-Related Pain  Recent Flowsheet Documentation  Taken 5/5/2023 1225 by Gema Mccrary RN  Pain Management Interventions:   see MAR   quiet environment facilitated   care clustered  Taken 5/5/2023 0710 by Gema Mccrary RN  Pain Management Interventions:   see MAR   quiet environment facilitated   cold applied   care clustered  Diversional Activities:   television   smartphone     Problem: Respiratory Compromise (Orthopaedic Fracture)  Goal: Effective Oxygenation and Ventilation  Outcome: Ongoing, Progressing  Intervention: Promote Airway Secretion Clearance  Recent Flowsheet Documentation  Taken 5/5/2023 0710 by Gema Mccrary RN  Cough And Deep Breathing: done with encouragement   Goal Outcome Evaluation:   Pt has had no new changes throughout shift and continues to remain stable. Pt came through ED yesterday after having a fall. Pt suffers from right nonoperable humerus fracture and left nonoperable thumb fracture. Pt has had little complaints of pain throughout shift. Pt has been medicated x1. Pt is x1 assist with walker to the BSC. Pt has purewick in place. Pt is q4hrs bladder scan. Pt has been emptying without issue. Pt has been approved for rehab at [facility] but the facility does not currently have an open bed.

## 2023-05-05 NOTE — PROGRESS NOTES
[facility]   Hospitalist Progress Note  Date: 2023  Patient Name: Marinne Hasen  : 1935  MRN: [id number]  Date of admission: 2023      Subjective   Subjective     Chief Complaint: Follow up for right shoulder pain, right elbow pain and left knee pain    Summary: 88 y/o F with HTN, hypothyroidism who presented after mechanical fall with pain involving the right shoulder, right elbow left hand/knee.  No head trauma or loss of consciousness.  Presentation little hypertensive otherwise vital stable.  Labs notable for WBC 14.61, platelets 215, hemoglobin 12, creatinine elevated 2.12, baseline around 1.6, rest of the CMP is nonsignificant.  Ridging with nondisplaced fracture of the right humerus neck, right elbow with joint effusion, left hand minimally displaced fracture of the thumb.  Right shoulder in splint.  Orthopedic surgery consulted, nonoperative management    Interval Followup: Blood pressure trend improved.  On 2 L of oxygen.  Up in chair.  Complaining of mostly pain in the left knee.  Splint has been placed on the right upper extremity and left thumb.    Review of Systems  Respiratory:  No Cough, No Dyspnea  Gastrointestinal:  No Nausea, No Vomiting      Objective   Objective     Vitals:   Temp:  [97.8 °F (36.6 °C)-99.2 °F (37.3 °C)] 98.2 °F (36.8 °C)  Heart Rate:  [] 68  Resp:  [18] 18  BP: (159-164)/(49-80) 159/54  Flow (L/min):  [2-3] 2  Physical Exam    Constitutional: Elderly female, conversant, NAD   Eyes: Pupils equal and reactive, no conjunctival injections   Respiratory: Clear to auscultation bilaterally, nonlabored respirations    Cardiovascular: RRR, no murmurs, no edema   Gastrointestinal: Positive bowel sounds, soft, nontender, nondistended   MSK: Right shoulder in splint.  Left thumb in splint.  Gross motor function intact.  Neurovascular intact   Neurologic: Alert, Cranial Nerves grossly intact, speech clear   Skin: Extremities warm, no rashes    Result Review     Result Review:  I have personally reviewed the following over the last 24 hours (07:00 to 07:00) and agree with the following findings  [x]  Laboratory   CBC        5/4/2023    18:51 5/5/2023    04:07   CBC   WBC 14.61   8.64     RBC 4.09   3.32     Hemoglobin 12.0   9.7     Hematocrit 38.0   29.5     MCV 92.9   88.9     MCH 29.3   29.2     MCHC 31.6   32.9     RDW 16.1   16.4     Platelets 215   167       BMP        5/4/2023    18:51 5/5/2023    04:07   BMP   BUN 46   42     Creatinine 2.13   1.69     Sodium 141   140     Potassium 4.5   4.3     Chloride 105   106     CO2 23.3   25.3     Calcium 9.6   9.2       []  Microbiology  [x]  Radiology   [x]  EKG/Telemetry monitor personally reviewed: A paced rhythm  []  Cardiology/Vascular   []  Pathology  []  Old records  [x]  Other:     Intake/Output Summary (Last 24 hours) at 5/5/2023 1258  Last data filed at 5/5/2023 0500  Gross per 24 hour   Intake 500 ml   Output 600 ml   Net -100 ml         Assessment & Plan   Assessment / Plan     Assessment/Plan:  Active Hospital Problems    Diagnosis  POA   • **Other closed displaced fracture of proximal end of right humerus, initial encounter [S42291A]  Yes   • HTN (hypertension) [I10]  Yes   • HLD (hyperlipidemia) [E78.5]  Yes   • Hypothyroidism [E03.9]  Unknown   • Effusion of right elbow [M25.421]  Yes   • Renal insufficiency [N28.9]  Yes   • Musculoskeletal pain [M79.18]  Yes         · Appreciate orthopedic surgery assistance; nonoperative management recommended  · Add Norco 5 mg q6 hours as needed for moderate pain and 7.5 mg q6 hours as needed for severe pain. Continue IV morphine 1 mg every 4 hours as needed for severe breakthrough  · Add bowel regimen  · BP controlled -> continue amlodipine 5 mg daily and Bystolic 10 mg daily  · Restart baby aspirin and Plavix  · Continue Crestor  · Restart p.o. Lasix 40 mg twice daily.  Daily BMP to monitor electrolytes and creatinine  · Wean supplemental oxygen, SpO2 goal >90%.   Encourage I-S.  · Continue Synthroid  · Continue PT/OT.  Fall precaution  · Discontinue telemetry  · AM labs    Disposition: Inpatient rehab pending bed availability    Discussed plan with RN.    DVT prophylaxis: Subcutaneous heparin 5000 units every 8 hours  Medical and mechanical DVT prophylaxis orders are present.    CODE STATUS:   Level Of Support Discussed With: Patient  Code Status (Patient has no pulse and is not breathing): CPR (Attempt to Resuscitate)  Medical Interventions (Patient has pulse or is breathing): Full Support        Electronically signed by Jose M Patterson DO, 05/05/23, 7:17 AM EDT.

## 2023-05-05 NOTE — THERAPY EVALUATION
Acute Care - Physical Therapy Initial Evaluation   Jordin     Patient Name: Marinne Hasen  : 1935  MRN: [id number]  Today's Date: 2023     Admit date: 2023     Referring Physician: Jose M Patterson DO     Surgery Date:* No surgery found *               Visit Dx:     ICD-10-CM ICD-9-CM   1. Other closed displaced fracture of proximal end of right humerus, initial encounter  S42.291A 812.09   2. Occult closed fracture of right elbow, initial encounter  S42.401A 812.40   3. Nondisplaced fracture of distal phalanx of left thumb, initial encounter for closed fracture  S62.525A 816.02   4. Abrasions of multiple sites  T07.XXXA 919.0   5. Hematoma of left knee region  S80.02XA 924.11   6. Contusion of right knee, initial encounter  S80.01XA 924.11   7. Fall on same level from slipping, tripping or stumbling, initial encounter  W01.0XXA E885.9   8. Difficulty in walking  R26.2 719.7     Patient Active Problem List   Diagnosis   • Other closed displaced fracture of proximal end of right humerus, initial encounter   • HTN (hypertension)   • HLD (hyperlipidemia)   • Hypothyroidism   • JOSIE (acute kidney injury)   • Effusion of right elbow     Past Medical History:   Diagnosis Date   • Anemia    • Breast cancer    • CHF (congestive heart failure)      Past Surgical History:   Procedure Laterality Date   • LUNG SURGERY Left     LEFT UPPER LOBE ()   • MASTECTOMY Left      PT Assessment (last 12 hours)     PT Evaluation and Treatment     Row Name 23 1000          Physical Therapy Time and Intention    Subjective Information no complaints  -NATE     Document Type evaluation  -NATE     Mode of Treatment individual therapy;physical therapy  -NATE     Patient Effort good  -NATE     Row Name 23 1000          General Information    Patient Observations alert;cooperative;agree to therapy  -NATE     Prior Level of Function independent:;all household mobility;community mobility  -NATE     Existing Precautions/Restrictions  fall;brace on at all times  -NATE     Barriers to Rehab none identified  -NATE     Row Name 05/05/23 1000          Living Environment    Current Living Arrangements home  -NATE     People in Home spouse  -NATE     Row Name 05/05/23 1000          Home Use of Assistive/Adaptive Equipment    Equipment Currently Used at Home walker, rolling;cane, quad  -NATE     Row Name 05/05/23 1000          Range of Motion (ROM)    Range of Motion bilateral lower extremities;ROM is WFL  -NATE     [facility] Name 05/05/23 1000          Strength (Manual Muscle Testing)    Strength (Manual Muscle Testing) bilateral lower extremities  4-/5  -NATE     Row Name 05/05/23 1000          Mobility    Extremity Weight-bearing Status right upper extremity  -NATE     Right Upper Extremity (Weight-bearing Status) non weight-bearing (NWB)  -NATE     Row Name 05/05/23 1000          Bed Mobility    Bed Mobility bed mobility (all) activities;supine-sit  -NATE     All Activities, [place] (Bed Mobility) minimum assist (75% patient effort)  -NATE     Supine-Sit [place] (Bed Mobility) moderate assist (50% patient effort)  -NATE     Row Name 05/05/23 1000          Transfers    Transfers bed-chair transfer;sit-stand transfer  -NATE     Row Name 05/05/23 1000          Bed-Chair Transfer    Bed-Chair [place] (Transfers) minimum assist (75% patient effort)  -NATE     Assistive Device (Bed-Chair Transfers) walker, front-wheeled  -NATE     Row Name 05/05/23 1000          Sit-Stand Transfer    Sit-Stand [place] (Transfers) minimum assist (75% patient effort)  -NATE     Assistive Device (Sit-Stand Transfers) walker, front-wheeled  -NATE     Row Name 05/05/23 1000          Gait/Stairs (Locomotion)    Gait/Stairs Locomotion gait/ambulation assistive device  -NATE     [place] Level (Gait) minimum assist (75% patient effort)  -NATE     Distance in Feet (Gait) 10  -NATE     Row Name 05/05/23 1000          Safety Issues, Functional Mobility    Impairments Affecting Function (Mobility)  balance;pain;range of motion (ROM);strength  -NATE     Row Name 05/05/23 1000          Balance    Balance Assessment standing dynamic balance  -NATE     Dynamic Standing Balance minimal assist  -NATE     Row Name             Wound 05/04/23 2211 Left anterior knee Abrasion    Wound - Properties Group Placement Date: 05/04/23  -JN Placement Time: 2211 -JN Side: Left  -JN Orientation: anterior  -JN Location: knee  -JN Primary Wound Type: Abrasion  -JN    Retired Wound - Properties Group Placement Date: 05/04/23  -JN Placement Time: 2211 -JN Side: Left  -JN Orientation: anterior  -JN Location: knee  -JN Primary Wound Type: Abrasion  -JN    Retired Wound - Properties Group Date first assessed: 05/04/23 -JN Time first assessed: 2211 -JN Side: Left  -JN Location: knee  -JN Primary Wound Type: Abrasion  -JN    Row Name 05/05/23 1000          Plan of Care Review    Plan of Care Reviewed With patient  -NATE     Outcome Evaluation Patient presents with decreased strength, transfers and ambulation.  Skilled physical therapy services will be required to address these mobility deficits.  Recommend inpatient rehab placement upon discharge from the hospital to continue to progress therapy plan until patient is safe to return home  -NATE     Row Name 05/05/23 1000          Therapy Assessment/Plan (PT)    Rehab Potential (PT) good, to achieve stated therapy goals  -NATE     Criteria for Skilled Interventions Met (PT) skilled treatment is necessary  -NATE     Therapy Frequency (PT) daily  -NATE     Predicted Duration of Therapy Intervention (PT) 10 DAYS  -NATE     Problem List (PT) problems related to;balance;mobility;strength;range of motion (ROM);pain  -NATE     Activity Limitations Related to Problem List (PT) unable to ambulate safely;unable to transfer safely  -NATE     Row Name 05/05/23 1000          PT Evaluation Complexity    History, PT Evaluation Complexity no personal factors and/or comorbidities  -NATE     Examination of Body Systems (PT Eval  Complexity) total of 4 or more elements  -NATE     Clinical Presentation (PT Evaluation Complexity) stable  -NATE     Clinical Decision Making (PT Evaluation Complexity) low complexity  -NATE     Overall Complexity (PT Evaluation Complexity) low complexity  -NATE     Row Name 05/05/23 1000          Therapy Plan Review/Discharge Plan (PT)    Therapy Plan Review (PT) evaluation/treatment results reviewed;participants voiced agreement with care plan;participants included;patient  -NATE     Row Name 05/05/23 1000          Physical Therapy Goals    Transfer Goal Selection (PT) transfer, PT goal 1  -NATE     Gait Training Goal Selection (PT) gait training, PT goal 1  -NATE     Row Name 05/05/23 1000          Transfer Goal 1 (PT)    Activity/Assistive Device (Transfer Goal 1, PT) transfers, all  -NATE     [place] Level/Cues Needed (Transfer Goal 1, PT) independent  -NATE     Time Frame (Transfer Goal 1, PT) long term goal (LTG);10 days  -NATE     Row Name 05/05/23 1000          Gait Training Goal 1 (PT)    Activity/Assistive Device (Gait Training Goal 1, PT) gait (walking locomotion)  -NATE     [place] Level (Gait Training Goal 1, PT) independent  -NATE     Distance (Gait Training Goal 1, PT) 300  -NATE     Time Frame (Gait Training Goal 1, PT) long term goal (LTG);10 days  -NATE           User Key  (r) = Recorded By, (t) = Taken By, (c) = Cosigned By    Initials Name Provider Type    Lyly Williamson, RN Registered Nurse    Franck Sharif, PT Physical Therapist                Physical Therapy Education     Title: PT OT SLP Therapies (Done)     Topic: Physical Therapy (Done)     Point: Mobility training (Done)     Learning Progress Summary           Patient Acceptance, E,TB, VU by NATE at 5/5/2023 1102                   Point: Precautions (Done)     Learning Progress Summary           Patient Acceptance, E,TB, VU by NATE at 5/5/2023 1102                               User Key     Initials Effective Dates Name Provider Type Discipline     NATE 06/03/21 -  Franck Claire PT Physical Therapist PT              PT Recommendation and Plan  Anticipated Discharge Disposition (PT): inpatient rehabilitation facility  Planned Therapy Interventions (PT): balance training, bed mobility training, gait training, home exercise program, stair training, strengthening, stretching  Therapy Frequency (PT): daily  Plan of Care Reviewed With: patient  Outcome Evaluation: Patient presents with decreased strength, transfers and ambulation.  Skilled physical therapy services will be required to address these mobility deficits.  Recommend inpatient rehab placement upon discharge from the hospital to continue to progress therapy plan until patient is safe to return home   Outcome Measures     Row Name 05/05/23 1100             How much help from another person do you currently need...    Turning from your back to your side while in flat bed without using bedrails? 2  -NATE      Moving from lying on back to sitting on the side of a flat bed without bedrails? 2  -NATE      Moving to and from a bed to a chair (including a wheelchair)? 3  -NATE      Standing up from a chair using your arms (e.g., wheelchair, bedside chair)? 3  -NATE      Climbing 3-5 steps with a railing? 2  -NATE      To walk in hospital room? 3  -NTAE      AM-PAC 6 Clicks Score (PT) 15  -NATE         Functional Assessment    Outcome Measure Options AM-PAC 6 Clicks Basic Mobility (PT)  -NATE            User Key  (r) = Recorded By, (t) = Taken By, (c) = Cosigned By    Initials Name Provider Type    Franck Sharif PT Physical Therapist                 Time Calculation:    PT Charges     Row Name 05/05/23 1054             Time Calculation    PT Received On 05/05/23  -NATE      PT Goal Re-Cert Due Date 05/14/23  -NATE         Untimed Charges    PT Eval/Re-eval Minutes 25  -NATE         Total Minutes    Untimed Charges Total Minutes 25  -NATE       Total Minutes 25  -NATE            User Key  (r) = Recorded By, (t) = Taken By, (c) =  Cosigned By    Initials Name Provider Type    NATE Franck Claire, PT Physical Therapist              Therapy Charges for Today     Code Description Service Date Service Provider Modifiers Qty    47603586404 HC PT EVAL LOW COMPLEXITY 2 5/5/2023 Franck Claire, PT GP 1          PT G-Codes  Outcome Measure Options: AM-PAC 6 Clicks Basic Mobility (PT)  AM-PAC 6 Clicks Score (PT): 15    Franck Claire, PT  5/5/2023

## 2023-05-05 NOTE — PLAN OF CARE
Goal Outcome Evaluation:  Plan of Care Reviewed With: patient           Outcome Evaluation: Patient presents with decreased strength, transfers and ambulation.  Skilled physical therapy services will be required to address these mobility deficits.  Recommend inpatient rehab placement upon discharge from the hospital to continue to progress therapy plan until patient is safe to return home

## 2023-05-05 NOTE — SIGNIFICANT NOTE
Wound Eval / Progress Noted    SHAYNA Brenner     Patient Name: Yelena Sanchez  : 1935  MRN: [id number]  Today's Date: 2023                 Admit Date: 2023    Visit Dx:    ICD-10-CM ICD-9-CM   1. Other closed displaced fracture of proximal end of right humerus, initial encounter  S42.291A 812.09   2. Occult closed fracture of right elbow, initial encounter  S42.401A 812.40   3. Nondisplaced fracture of distal phalanx of left thumb, initial encounter for closed fracture  S62.525A 816.02   4. Abrasions of multiple sites  T07.XXXA 919.0   5. Hematoma of left knee region  S80.02XA 924.11   6. Contusion of right knee, initial encounter  S80.01XA 924.11   7. Fall on same level from slipping, tripping or stumbling, initial encounter  W01.0XXA E885.9   8. Difficulty in walking  R26.2 719.7       Patient Active Problem List   Diagnosis   • Other closed displaced fracture of proximal end of right humerus, initial encounter   • HTN (hypertension)   • HLD (hyperlipidemia)   • Hypothyroidism   • Effusion of right elbow   • Renal insufficiency   • Musculoskeletal pain        Past Medical History:   Diagnosis Date   • Anemia    • Breast cancer    • CHF (congestive heart failure)         Past Surgical History:   Procedure Laterality Date   • LUNG SURGERY Left     LEFT UPPER LOBE ()   • MASTECTOMY Left          Physical Assessment:  Wound 23 Left anterior knee Abrasion (Active)   Wound Image   23 2213   Dressing Appearance dry;intact 23   Closure None 23   Base moist;red;pink 23   Periwound ecchymotic;swelling;intact 23   Periwound Temperature warm 23   Periwound Skin Turgor soft 23   Edges open 23   Drainage Characteristics/Odor serosanguineous 23   Drainage Amount scant 23   Care, Wound cleansed with;sterile normal saline 23   Dressing Care dressing  applied;petroleum-based;non-adherent;gauze;silicone;border dressing 05/05/23 0900   Periwound Care absorptive dressing applied 05/05/23 0900      Wound Check / Follow-up:  Patient seen today for wound consult. Patient with bruising, swelling, and traumatic injury noted to left anterior knee. Patient reports that she had a fall prior to admission. There is superficial tissue loss/abrasions noted with moist red tissue present and some crusting. Cleansed with normal saline and gauze. Recommending daily dressing changes with non-adherent petroleum based gauze and silicone border dressing. Patient denies any other wounds and need for further assessment.      Impression: Traumatic injury to left knee.      Short term goals:  Regain skin integrity, skin protection, daily dressing changes.      Dhara Campos RN    5/5/2023    13:34 EDT

## 2023-05-05 NOTE — CONSULTS
[facility]   Consult Note    Patient Name: Marinne Hasen  : 1935  MRN: [id number]  Primary Care Physician:  Kaelyn Eugene MD  Referring Physician: No ref. provider found  Date of admission: 2023    Subjective   Subjective     Reason for Consult/ Chief Complaint: Right upper extremity pain, left knee pain, left thumb pain    HPI:  Marinne Hasen is a 87 y.o. female who sustained a mechanical fall yesterday.  She was then brought by EMS to the emergency department where she was evaluated.  She was found to have multiple fractures and was admitted to the hospital for further treatment.  She was unable to discharge home due to inability to do ADLs.  She reports pain in the right shoulder mostly.  Also some pain in the elbow, left knee and thumb.  She denies any chest pain or shortness of air.  No loss of consciousness reported.    Review of Systems   All systems were reviewed and negative except for those mentioned in HPI    Personal History     Past Medical History:   Diagnosis Date   • Anemia    • Breast cancer    • CHF (congestive heart failure)        Past Surgical History:   Procedure Laterality Date   • LUNG SURGERY Left     LEFT UPPER LOBE ()   • MASTECTOMY Left        Family History: family history is not on file. Otherwise pertinent FHx was reviewed and not pertinent to current issue.    Social History:  reports that she has never smoked. She does not have any smokeless tobacco history on file. She reports that she does not drink alcohol and does not use drugs.    Home Medications:  allopurinol, amLODIPine, aspirin, azelastine, chlorpheniramine, cholecalciferol, clopidogrel, ezetimibe, furosemide, lansoprazole, levothyroxine, montelukast, multivitamin with minerals, nebivolol, rosuvastatin, and spironolactone    Allergies:  No Known Allergies    Objective    Objective     Vitals:   Temp:  [97.8 °F (36.6 °C)-99.2 °F (37.3 °C)] 98.4 °F (36.9 °C)  Heart Rate:  [] 63  Resp:   [16-18] 16  BP: (152-164)/(48-80) 152/48  Flow (L/min):  [1-3] 2    Physical Exam:   Constitutional: Awake, alert   HEENT: Normocephalic/atraumatic   Neck: Supple, no thyromegaly, no lymphadenopathy, trachea midline   Respiratory: Clear to auscultation bilaterally, nonlabored respirations    Cardiovascular: Regular heart rate   Gastrointestinal: Nontender and nondistended   Musculoskeletal: Right upper extremity: Splint intact.  Patient can wiggle fingers.  Swelling to fingers.  Good capillary refill.  Tender to the shoulder.  Left thumb has splint intact.  Patient can move the left upper extremity and neurovascular intact.  Mild swelling to left knee with dressing intact and abrasion to the knee.   Psychiatric: Appropriate affect, cooperative   Neurologic: Oriented x 3, strength symmetric in all extremities, Cranial Nerves grossly intact to confrontation, speech clear   Skin: No rashes     Result Review    Result Review:  I have personally reviewed the results from the time of this admission to 5/5/2023 07:55 EDT and agree with these findings:  []  Laboratory  []  Microbiology  [x]  Radiology  []  EKG/Telemetry   []  Cardiology/Vascular   []  Pathology  []  Old records  []  Other:    Most notable findings include: Right nondisplaced proximal humerus fracture, right elbow effusion with no definite fracture, possible occult fracture.  Left knee advanced arthritis.  Left distal phalanx fracture thumb    Assessment & Plan   Assessment / Plan     Brief Patient Summary:  Marinne Hasen is a 87 y.o. female who has right proximal humerus fracture and left thumb fracture, right elbow effusion, possible occult fracture, left knee osteoarthritis     Active Hospital Problems:  Active Hospital Problems    Diagnosis    • **Other closed displaced fracture of proximal end of right humerus, initial encounter    • HTN (hypertension)    • HLD (hyperlipidemia)    • Hypothyroidism    • JOSIE (acute kidney injury)    • Effusion of right  elbow        Plan: I discussed treatment options with the patient.  I recommended nonoperative treatment for her injuries.  Plan for continued splint and sling for right upper extremity at this point.  After a few days the splint could likely be removed to reassess the elbow.  At this point there is no definite fracture but there could be an underlying occult radial head fracture.  Plan for splint to the left thumb and dressing changes as needed to the knee.  Plan for pain control, physical and Occupational Therapy evaluation and treatment and likely rehab placement.  Thank you for the consultation.        Electronically signed by Ang Rahman MD, 05/05/23, 7:55 AM EDT.

## 2023-05-06 PROBLEM — S42.211A CLOSED FRACTURE OF NECK OF RIGHT HUMERUS: Status: ACTIVE | Noted: 2023-05-04

## 2023-05-06 PROBLEM — M17.12 OSTEOARTHRITIS OF LEFT KNEE: Status: ACTIVE | Noted: 2023-05-06

## 2023-05-06 LAB
ANION GAP SERPL CALCULATED.3IONS-SCNC: 13.2 MMOL/L (ref 5–15)
BUN SERPL-MCNC: 30 MG/DL (ref 8–23)
BUN/CREAT SERPL: 21.6 (ref 7–25)
CALCIUM SPEC-SCNC: 9.2 MG/DL (ref 8.6–10.5)
CHLORIDE SERPL-SCNC: 106 MMOL/L (ref 98–107)
CO2 SERPL-SCNC: 21.8 MMOL/L (ref 22–29)
CREAT SERPL-MCNC: 1.39 MG/DL (ref 0.57–1)
DEPRECATED RDW RBC AUTO: 55.4 FL (ref 37–54)
EGFRCR SERPLBLD CKD-EPI 2021: 37 ML/MIN/1.73
ERYTHROCYTE [DISTWIDTH] IN BLOOD BY AUTOMATED COUNT: 16.3 % (ref 12.3–15.4)
GLUCOSE SERPL-MCNC: 104 MG/DL (ref 65–99)
HCT VFR BLD AUTO: 31.5 % (ref 34–46.6)
HGB BLD-MCNC: 10.2 G/DL (ref 12–15.9)
MCH RBC QN AUTO: 30 PG (ref 26.6–33)
MCHC RBC AUTO-ENTMCNC: 32.4 G/DL (ref 31.5–35.7)
MCV RBC AUTO: 92.6 FL (ref 79–97)
PLATELET # BLD AUTO: 172 10*3/MM3 (ref 140–450)
PMV BLD AUTO: 11.6 FL (ref 6–12)
POTASSIUM SERPL-SCNC: 5.1 MMOL/L (ref 3.5–5.2)
RBC # BLD AUTO: 3.4 10*6/MM3 (ref 3.77–5.28)
SODIUM SERPL-SCNC: 141 MMOL/L (ref 136–145)
WBC NRBC COR # BLD: 10.65 10*3/MM3 (ref 3.4–10.8)

## 2023-05-06 PROCEDURE — 25010000002 HEPARIN (PORCINE) PER 1000 UNITS: Performed by: STUDENT IN AN ORGANIZED HEALTH CARE EDUCATION/TRAINING PROGRAM

## 2023-05-06 PROCEDURE — 25010000002 MORPHINE PER 10 MG: Performed by: STUDENT IN AN ORGANIZED HEALTH CARE EDUCATION/TRAINING PROGRAM

## 2023-05-06 PROCEDURE — 99232 SBSQ HOSP IP/OBS MODERATE 35: CPT | Performed by: INTERNAL MEDICINE

## 2023-05-06 PROCEDURE — 80048 BASIC METABOLIC PNL TOTAL CA: CPT | Performed by: INTERNAL MEDICINE

## 2023-05-06 PROCEDURE — 85027 COMPLETE CBC AUTOMATED: CPT | Performed by: INTERNAL MEDICINE

## 2023-05-06 PROCEDURE — 94799 UNLISTED PULMONARY SVC/PX: CPT

## 2023-05-06 PROCEDURE — 97165 OT EVAL LOW COMPLEX 30 MIN: CPT

## 2023-05-06 RX ADMIN — NEBIVOLOL 10 MG: 10 TABLET ORAL at 08:01

## 2023-05-06 RX ADMIN — HEPARIN SODIUM 5000 UNITS: 5000 INJECTION INTRAVENOUS; SUBCUTANEOUS at 05:31

## 2023-05-06 RX ADMIN — HEPARIN SODIUM 5000 UNITS: 5000 INJECTION INTRAVENOUS; SUBCUTANEOUS at 13:13

## 2023-05-06 RX ADMIN — PANTOPRAZOLE SODIUM 40 MG: 40 TABLET, DELAYED RELEASE ORAL at 05:31

## 2023-05-06 RX ADMIN — CLOPIDOGREL BISULFATE 75 MG: 75 TABLET ORAL at 08:04

## 2023-05-06 RX ADMIN — ASPIRIN 81 MG: 81 TABLET, COATED ORAL at 08:04

## 2023-05-06 RX ADMIN — HEPARIN SODIUM 5000 UNITS: 5000 INJECTION INTRAVENOUS; SUBCUTANEOUS at 21:06

## 2023-05-06 RX ADMIN — NYSTATIN 500000 UNITS: 100000 SUSPENSION ORAL at 21:06

## 2023-05-06 RX ADMIN — NYSTATIN 500000 UNITS: 100000 SUSPENSION ORAL at 17:33

## 2023-05-06 RX ADMIN — HYDROCODONE BITARTRATE AND ACETAMINOPHEN 1 TABLET: 7.5; 325 TABLET ORAL at 10:08

## 2023-05-06 RX ADMIN — MONTELUKAST 10 MG: 10 TABLET, FILM COATED ORAL at 21:06

## 2023-05-06 RX ADMIN — FUROSEMIDE 40 MG: 40 TABLET ORAL at 08:01

## 2023-05-06 RX ADMIN — LEVOTHYROXINE SODIUM 112 MCG: 0.11 TABLET ORAL at 05:31

## 2023-05-06 RX ADMIN — Medication 2000 UNITS: at 08:01

## 2023-05-06 RX ADMIN — MORPHINE SULFATE 1 MG: 2 INJECTION, SOLUTION INTRAMUSCULAR; INTRAVENOUS at 07:44

## 2023-05-06 RX ADMIN — AMLODIPINE BESYLATE 5 MG: 5 TABLET ORAL at 08:01

## 2023-05-06 RX ADMIN — ALLOPURINOL 100 MG: 100 TABLET ORAL at 08:01

## 2023-05-06 RX ADMIN — ROSUVASTATIN 20 MG: 20 TABLET, FILM COATED ORAL at 08:01

## 2023-05-06 RX ADMIN — HYDROCODONE BITARTRATE AND ACETAMINOPHEN 1 TABLET: 7.5; 325 TABLET ORAL at 17:34

## 2023-05-06 RX ADMIN — FERROUS SULFATE TAB 325 MG (65 MG ELEMENTAL FE) 325 MG: 325 (65 FE) TAB at 08:01

## 2023-05-06 RX ADMIN — Medication 10 ML: at 08:01

## 2023-05-06 RX ADMIN — Medication 10 ML: at 21:06

## 2023-05-06 RX ADMIN — NYSTATIN 500000 UNITS: 100000 SUSPENSION ORAL at 09:42

## 2023-05-06 RX ADMIN — HYDROCODONE BITARTRATE AND ACETAMINOPHEN 1 TABLET: 7.5; 325 TABLET ORAL at 23:41

## 2023-05-06 RX ADMIN — MULTIPLE VITAMINS W/ MINERALS TAB 1 TABLET: TAB at 08:01

## 2023-05-06 RX ADMIN — FUROSEMIDE 40 MG: 40 TABLET ORAL at 17:33

## 2023-05-06 NOTE — PROGRESS NOTES
[facility]   Hospitalist Progress Note  Date: 2023  Patient Name: Yelena Sanchez  : 1935  MRN: [id number]  Date of admission: 2023      Subjective   Subjective     Chief Complaint: Follow up for right shoulder pain, right elbow pain and left knee pain    Summary: 88 y/o F with HTN, hypothyroidism who presented after mechanical fall with pain involving the right shoulder, right elbow left hand/knee.  No head trauma or loss of consciousness.  Presentation little hypertensive otherwise vital stable.  Labs notable for WBC 14.61, platelets 215, hemoglobin 12, creatinine elevated 2.12, baseline around 1.6, rest of the CMP is nonsignificant.  Imaging with nondisplaced fracture of the right humerus neck, right elbow with joint effusion, left hand minimally displaced fracture of the thumb.  Right shoulder in splint.  Orthopedic surgery consulted, nonoperative management, already on pain control, PT and rehab    Interval Followup: Blood pressure stable.  Weaned to room air.  Up in bed this morning.  Complaining of very dry mouth.  Has been eating and drinking.  No vomiting.  Reports ongoing pain, primarily in the left knee, just got a pain pill    Review of Systems  Respiratory:  No Cough, No Dyspnea  Gastrointestinal:  No Nausea, No Vomiting      Objective   Objective     Vitals:   Temp:  [97.8 °F (36.6 °C)-99.7 °F (37.6 °C)] 97.8 °F (36.6 °C)  Heart Rate:  [61-79] 62  Resp:  [16-18] 18  BP: (117-176)/(48-73) 132/48  Physical Exam    Constitutional: Elderly female, conversant, NAD   Eyes: Pupils equal and reactive, no conjunctival injections   HEENT: Dry mucous membranes, thrush   Respiratory: Clear to auscultation bilaterally, nonlabored respirations    Cardiovascular: RRR, no murmurs, no edema   Gastrointestinal: Positive bowel sounds, soft, nontender, nondistended   MSK: Right shoulder in splint.  Left thumb in splint.  Gross motor function intact.  Neurovascular intact   Neurologic: Alert, Cranial  Nerves grossly intact, speech clear   Skin: Extremities warm, no rashes    Result Review    Result Review:  I have personally reviewed the following over the last 24 hours (07:00 to 07:00) and agree with the following findings  [x]  Laboratory   CBC        5/4/2023    18:51 5/5/2023    04:07   CBC   WBC 14.61   8.64     RBC 4.09   3.32     Hemoglobin 12.0   9.7     Hematocrit 38.0   29.5     MCV 92.9   88.9     MCH 29.3   29.2     MCHC 31.6   32.9     RDW 16.1   16.4     Platelets 215   167       BMP        5/4/2023    18:51 5/5/2023    04:07 5/6/2023    05:37   BMP   BUN 46   42   30     Creatinine 2.13   1.69   1.39     Sodium 141   140   141     Potassium 4.5   4.3   5.1     Chloride 105   106   106     CO2 23.3   25.3   21.8     Calcium 9.6   9.2   9.2       []  Microbiology  []  Radiology   []  EKG/Telemetry   []  Cardiology/Vascular   []  Pathology  []  Old records  [x]  Other:     Intake/Output Summary (Last 24 hours) at 5/6/2023 1204  Last data filed at 5/6/2023 0705  Gross per 24 hour   Intake 240 ml   Output 1100 ml   Net -860 ml         Assessment & Plan   Assessment / Plan     Assessment/Plan:  Active Hospital Problems    Diagnosis  POA   • **Other closed displaced fracture of proximal end of right humerus, initial encounter [S42.291A]  Yes   • HTN (hypertension) [I10]  Yes   • HLD (hyperlipidemia) [E78.5]  Yes   • Hypothyroidism [E03.9]  Unknown   • Effusion of right elbow [M25.421]  Yes   • Renal insufficiency [N28.9]  Yes   • Musculoskeletal pain [M79.18]  Yes   Oral thrush      · Looks dry on exam, net -800 cc over last 24 hours, creatinine improved. Hold Lasix for today.  Encourage p.o. fluids  · BP controlled -> continue amlodipine 5 mg daily and Bystolic 10 mg daily  · Continue baby aspirin and Plavix  · Continue Crestor  · Daily BMP to monitor electrolytes and creatinine  · Weaned to room air. encourage I-S.  · Add nystatin swish and spit 4 times daily  · Continue Synthroid  · Appreciate  orthopedic surgery assistance; nonoperative management recommended  · Continue as needed p.o./IV narcotic regimen per order  · Continue bowel regimen  · Continue PT/OT.  Fall precaution  · AM labs    Disposition: Inpatient rehab pending bed availability    Discussed plan with RN.    DVT prophylaxis: Subcutaneous heparin 5000 units every 8 hours  Medical and mechanical DVT prophylaxis orders are present.    CODE STATUS:   Level Of Support Discussed With: Patient  Code Status (Patient has no pulse and is not breathing): CPR (Attempt to Resuscitate)  Medical Interventions (Patient has pulse or is breathing): Full Support    Electronically signed by Jose M Patterson DO, 05/06/23, 12:04 PM EDT.

## 2023-05-06 NOTE — NURSING NOTE
Spoke with JOSE Saha and advised pt requesting that Iron and Vitamin C home medication be restarted but pt unable to tell this nurse doses and requested that bladder scan be d/c'd. Orders placed and pt informed.

## 2023-05-06 NOTE — PLAN OF CARE
Goal Outcome Evaluation:  Plan of Care Reviewed With: patient, spouse        Progress: no change  Outcome Evaluation: Patient presents with limitations that impede his/her ability to perform ADLS. The skills of a therapist are necessary to maximize independence with ADLs.  Recommend inpatient rehab following hospital discharge

## 2023-05-06 NOTE — PLAN OF CARE
Goal Outcome Evaluation:   Pt vss. Pt medicated for pain with prn [place] and morphine. Purewick on. No other complaints. Continue plan of care.

## 2023-05-06 NOTE — PLAN OF CARE
Problem: Fracture Stabilization and Management (Orthopaedic Fracture)  Goal: Fracture Stability  Outcome: Ongoing, Progressing     Problem: Fall Injury Risk  Goal: Absence of Fall and Fall-Related Injury  Outcome: Ongoing, Progressing  Intervention: Promote Injury-Free Environment  Recent Flowsheet Documentation  Taken 5/6/2023 0443 by Francy García LPN  Safety Promotion/Fall Prevention: safety round/check completed  Taken 5/6/2023 0142 by Francy García LPN  Safety Promotion/Fall Prevention: safety round/check completed  Taken 5/5/2023 2340 by Francy García LPN  Safety Promotion/Fall Prevention: safety round/check completed  Taken 5/5/2023 2107 by Francy García LPN  Safety Promotion/Fall Prevention: safety round/check completed   Goal Outcome Evaluation:      Vitals stable. Complaint of pain once this shift treated well per eMAR. No significant changes to report this shift. Will continue to monitor progress.

## 2023-05-06 NOTE — THERAPY EVALUATION
Patient Name: Yelena Sanchez  : 1935    MRN: [id number]                              Today's Date: 2023       Admit Date: 2023    Visit Dx:     ICD-10-CM ICD-9-CM   1. Other closed displaced fracture of proximal end of right humerus, initial encounter  S42.291A 812.09   2. Occult closed fracture of right elbow, initial encounter  S42.401A 812.40   3. Nondisplaced fracture of distal phalanx of left thumb, initial encounter for closed fracture  S62.525A 816.02   4. Abrasions of multiple sites  T07.XXXA 919.0   5. Hematoma of left knee region  S80.02XA 924.11   6. Contusion of right knee, initial encounter  S80.01XA 924.11   7. Fall on same level from slipping, tripping or stumbling, initial encounter  W01.0XXA E885.9   8. Difficulty in walking  R26.2 719.7   9. Decreased activities of daily living (ADL)  Z78.9 V49.89     Patient Active Problem List   Diagnosis   • Other closed displaced fracture of proximal end of right humerus, initial encounter   • HTN (hypertension)   • HLD (hyperlipidemia)   • Hypothyroidism   • Effusion of right elbow   • Renal insufficiency   • Musculoskeletal pain     Past Medical History:   Diagnosis Date   • Anemia    • Breast cancer    • CHF (congestive heart failure)      Past Surgical History:   Procedure Laterality Date   • LUNG SURGERY Left     LEFT UPPER LOBE ()   • MASTECTOMY Left       General Information     Row Name 23 1223          OT Time and Intention    Document Type evaluation  -AC     Mode of Treatment individual therapy;occupational therapy  -     Row Name 23 1223          General Information    Patient Profile Reviewed yes  -AC     Prior Level of Function independent:;all household mobility;community mobility;ADL's  patient states she stands in a tub/shower for bathing with seat option, has regular toilet, no AD for functional mobility, (I) with adls and IADLS.  Patient fell on uneven ground going to her hair appointment.  -AC     Existing  Precautions/Restrictions fall;non-weight bearing;brace on at all times;weight bearing  -     Barriers to Rehab none identified  -     Row Name 05/06/23 1223          Occupational Profile    Reason for Services/Referral (Occupational Profile) Pt. is a 87year old female admitted for the above diagnosis. Pt. referred to OT services to assess independence with ADLs and adl transfers/fx'l mobility. No previous OT services for current condition.  -     Row Name 05/06/23 1223          Living Environment    People in Home spouse  -     Row Name 05/06/23 1223          Cognition    Orientation Status (Cognition) oriented x 3  -     Row Name 05/06/23 1223          Safety Issues, Functional Mobility    Impairments Affecting Function (Mobility) balance;pain;range of motion (ROM);strength;coordination;endurance/activity tolerance;grasp;shortness of breath  -           User Key  (r) = Recorded By, (t) = Taken By, (c) = Cosigned By    Initials Name Provider Type     An Topete, OT Occupational Therapist                 Mobility/ADL's     Row Name 05/06/23 1225          Bed Mobility    Supine-Sit [place] (Bed Mobility) moderate assist (50% patient effort)  -     Assistive Device (Bed Mobility) bed rails;head of bed elevated;draw sheet  -     Row Name 05/06/23 1225          Transfers    Transfers sit-stand transfer;stand-sit transfer  -     Row Name 05/06/23 1225          Sit-Stand Transfer    Sit-Stand [place] (Transfers) contact guard;1 person assist;verbal cues;minimum assist (75% patient effort)  -     Assistive Device (Sit-Stand Transfers) walker, front-wheeled  -     Row Name 05/06/23 1225          Stand-Sit Transfer    Stand-Sit [place] (Transfers) contact guard;minimum assist (75% patient effort);1 person assist;verbal cues  -     Assistive Device (Stand-Sit Transfers) walker, front-wheeled  -     Row Name 05/06/23 1225          Activities of Daily Living    BADL  Assessment/Intervention --  patient is max A for upper and lower body dressing, mod A for bathing, min A for grooming, min A for self-feeding, mod A for toileting.  -     Row Name 05/06/23 1225          Mobility    Extremity Weight-bearing Status right upper extremity  -     Right Upper Extremity (Weight-bearing Status) non weight-bearing (NWB)  -           User Key  (r) = Recorded By, (t) = Taken By, (c) = Cosigned By    Initials Name Provider Type     An Topete OT Occupational Therapist               Obj/Interventions     Row Name 05/06/23 1235          Sensory Assessment (Somatosensory)    Sensory Assessment (Somatosensory) sensation intact  -     Row Name 05/06/23 1235          Vision Assessment/Intervention    Visual Impairment/Limitations WFL  -[facility] Name 05/06/23 1235          Range of Motion Comprehensive    Comment, General Range of Motion RUE immobilized, LUE wfl except thumb  -     Row Name 05/06/23 1235          Strength Comprehensive (MMT)    Comment, General Manual Muscle Testing (MMT) Assessment LUE wfl for adls  -     Row Name 05/06/23 1235          Motor Skills    Motor Skills coordination;functional endurance  -     Coordination moderate impairment;bilateral;upper extremity  -     Functional Endurance fair minus  -[facility] Name 05/06/23 1235          Balance    Balance Assessment standing static balance  -     Static Standing Balance contact guard;minimal assist;verbal cues;1-person assist  -     Position/Device Used, Standing Balance supported;walker, front-wheeled  -     Balance Interventions standing;sit to stand;supported;static;minimal challenge;occupation based/functional task  -           User Key  (r) = Recorded By, (t) = Taken By, (c) = Cosigned By    Initials Name Provider Type     An Topete OT Occupational Therapist               Goals/Plan     Row Name 05/06/23 1238          Bed Mobility Goal 1 (OT)    Activity/Assistive Device (Bed Mobility  Goal 1, OT) bed mobility activities, all  -AC     [place] Level/Cues Needed (Bed Mobility Goal 1, OT) modified independence  -AC     Time Frame (Bed Mobility Goal 1, OT) long term goal (LTG);10 days  -AC     Row Name 05/06/23 1238          Transfer Goal 1 (OT)    Activity/Assistive Device (Transfer Goal 1, OT) transfers, all  -AC     [place] Level/Cues Needed (Transfer Goal 1, OT) modified independence  -AC     Time Frame (Transfer Goal 1, OT) long term goal (LTG);10 days  -AC     Row Name 05/06/23 1238          Bathing Goal 1 (OT)    Activity/Device (Bathing Goal 1, OT) bathing skills, all  -AC     [place] Level/Cues Needed (Bathing Goal 1, OT) modified independence  -AC     Time Frame (Bathing Goal 1, OT) long term goal (LTG);10 days  -AC     Row Name 05/06/23 1238          Dressing Goal 1 (OT)    Activity/Device (Dressing Goal 1, OT) dressing skills, all  -AC     [facility] [place]/Cues Needed (Dressing Goal 1, OT) modified independence  -AC     Time Frame (Dressing Goal 1, OT) long term goal (LTG);10 days  -AC     Row Name 05/06/23 1238          Toileting Goal 1 (OT)    Activity/Device (Toileting Goal 1, OT) toileting skills, all  -AC     [facility] [place] Level/Cues Needed (Toileting Goal 1, OT) modified independence  -AC     Time Frame (Toileting Goal 1, OT) long term goal (LTG);10 days  -AC     Row Name 05/06/23 1238          Grooming Goal 1 (OT)    Activity/Device (Grooming Goal 1, OT) grooming skills, all  -AC     [facility] [place] (Grooming Goal 1, OT) modified independence  -AC     Time Frame (Grooming Goal 1, OT) long term goal (LTG);10 days  -AC     Row Name 05/06/23 1238          ROM Goal 1 (OT)    ROM Goal 1 (OT) patient will improve RUE ROM, as permitted by ortho, to wfl.  -AC     Time Frame (ROM Goal 1, OT) long term goal (LTG);10 days  -AC     Row Name 05/06/23 1238          Strength Goal 1 (OT)    Strength Goal 1 (OT) patient will improve RUE strength to 4/5, as permitted by ortho, in order to  increase independence with adls.  -     Time Frame (Strength Goal 1, OT) long term goal (LTG);10 days  -     Row Name 05/06/23 1238          Problem Specific Goal 1 (OT)    Problem Specific Goal 1 (OT) patient will improve endurance to good for safe adls.  -     Time Frame (Problem Specific Goal 1, OT) long term goal (LTG);10 days  -     Row Name 05/06/23 1238          Therapy Assessment/Plan (OT)    Planned Therapy Interventions (OT) activity tolerance training;BADL retraining;functional balance retraining;occupation/activity based interventions;patient/caregiver education/training;transfer/mobility retraining;ROM/therapeutic exercise  -           User Key  (r) = Recorded By, (t) = Taken By, (c) = Cosigned By    Initials Name Provider Type    AC An Topete, LEAH Occupational Therapist               Clinical Impression     Row Name 05/06/23 1236          Pain Assessment    Pretreatment Pain Rating 2/10  -     Posttreatment Pain Rating 2/10  -     Pain Location - Side/Orientation Left  -     Pain Location - knee  -     Row Name 05/06/23 1236          Plan of Care Review    Plan of Care Reviewed With patient;spouse  -     Progress no change  -     Outcome Evaluation Patient presents with limitations that impede his/her ability to perform ADLS. The skills of a therapist are necessary to maximize independence with ADLs.  Recommend inpatient rehab following hospital discharge  -     Row Name 05/06/23 1236          Therapy Assessment/Plan (OT)    Patient/Family Therapy Goal Statement (OT) Patient would like to maximize independence with adls.  -     Rehab Potential (OT) good, to achieve stated therapy goals  -     Criteria for Skilled Therapeutic Interventions Met (OT) yes;meets criteria;skilled treatment is necessary  -     Therapy Frequency (OT) 5 times/wk  -     Row Name 05/06/23 1236          Therapy Plan Review/Discharge Plan (OT)    Equipment Needs Upon Discharge (OT) walker,  pawan;commode chair  -AC     Anticipated Discharge Disposition (OT) inpatient rehabilitation facility  -AC     Row Name 05/06/23 1236          Positioning and Restraints    Pre-Treatment Position in bed  -AC     Post Treatment Position bed  -AC     In Bed fowlers;call light within reach;encouraged to call for assist;exit alarm on  -AC           User Key  (r) = Recorded By, (t) = Taken By, (c) = Cosigned By    Initials Name Provider Type    AC An Topete OT Occupational Therapist               Outcome Measures     Row Name 05/06/23 1241          How much help from another is currently needed...    Putting on and taking off regular lower body clothing? 2  -AC     Bathing (including washing, rinsing, and drying) 2  -AC     Toileting (which includes using toilet bed pan or urinal) 2  -AC     Putting on and taking off regular upper body clothing 2  -AC     Taking care of personal grooming (such as brushing teeth) 3  -AC     Eating meals 3  -AC     AM-PAC 6 Clicks Score (OT) 14  -AC     Row Name 05/06/23 0801          How much help from another person do you currently need...    Turning from your back to your side while in flat bed without using bedrails? 2  -MG     Moving from lying on back to sitting on the side of a flat bed without bedrails? 2  -MG     Moving to and from a bed to a chair (including a wheelchair)? 3  -MG     Standing up from a chair using your arms (e.g., wheelchair, bedside chair)? 3  -MG     Climbing 3-5 steps with a railing? 2  -MG     To walk in hospital room? 2  -MG     AM-PAC 6 Clicks Score (PT) 14  -MG     Highest level of mobility 4 --> Transferred to chair/commode  -MG     Row Name 05/06/23 1241          Functional Assessment    Outcome Measure Options AM-PAC 6 Clicks Daily Activity (OT);Optimal Instrument  -AC     Row Name 05/06/23 1241          Optimal Instrument    Optimal Instrument Optimal - 3  -AC     Bending/Stooping 4  -AC     Standing 2  -AC     Reaching 3  -AC     From the list,  choose the 3 activities you would most like to be able to do without any difficulty Bending/stooping;Standing;Reaching  -AC     Total Score Optimal - 3 9  -AC           User Key  (r) = Recorded By, (t) = Taken By, (c) = Cosigned By    Initials Name Provider Type    An Montelongo OT Occupational Therapist    Dominga Birch RN Registered Nurse                  OT Recommendation and Plan  Planned Therapy Interventions (OT): activity tolerance training, BADL retraining, functional balance retraining, occupation/activity based interventions, patient/caregiver education/training, transfer/mobility retraining, ROM/therapeutic exercise  Therapy Frequency (OT): 5 times/wk  Plan of Care Review  Plan of Care Reviewed With: patient, spouse  Progress: no change  Outcome Evaluation: Patient presents with limitations that impede his/her ability to perform ADLS. The skills of a therapist are necessary to maximize independence with ADLs.  Recommend inpatient rehab following hospital discharge     Time Calculation:    Time Calculation- OT     Row Name 05/06/23 1245             Time Calculation- OT    OT Received On 05/06/23  -      OT Goal Re-Cert Due Date 05/15/23  -AC         Untimed Charges    OT Eval/Re-eval Minutes 35  -AC         Total Minutes    Untimed Charges Total Minutes 35  -AC       Total Minutes 35  -AC            User Key  (r) = Recorded By, (t) = Taken By, (c) = Cosigned By    Initials Name Provider Type    An Montelongo OT Occupational Therapist              Therapy Charges for Today     Code Description Service Date Service Provider Modifiers Qty    [id number] HC OT EVAL LOW COMPLEXITY 3 5/6/2023 An Topete OT GO 1               An Topete OT  5/6/2023

## 2023-05-07 LAB
ANION GAP SERPL CALCULATED.3IONS-SCNC: 11.7 MMOL/L (ref 5–15)
BUN SERPL-MCNC: 33 MG/DL (ref 8–23)
BUN/CREAT SERPL: 24.1 (ref 7–25)
CALCIUM SPEC-SCNC: 9 MG/DL (ref 8.6–10.5)
CHLORIDE SERPL-SCNC: 102 MMOL/L (ref 98–107)
CO2 SERPL-SCNC: 24.3 MMOL/L (ref 22–29)
CREAT SERPL-MCNC: 1.37 MG/DL (ref 0.57–1)
EGFRCR SERPLBLD CKD-EPI 2021: 37.7 ML/MIN/1.73
GLUCOSE SERPL-MCNC: 117 MG/DL (ref 65–99)
POTASSIUM SERPL-SCNC: 4 MMOL/L (ref 3.5–5.2)
SODIUM SERPL-SCNC: 138 MMOL/L (ref 136–145)

## 2023-05-07 PROCEDURE — 25010000002 HEPARIN (PORCINE) PER 1000 UNITS: Performed by: STUDENT IN AN ORGANIZED HEALTH CARE EDUCATION/TRAINING PROGRAM

## 2023-05-07 PROCEDURE — 97110 THERAPEUTIC EXERCISES: CPT

## 2023-05-07 PROCEDURE — 80048 BASIC METABOLIC PNL TOTAL CA: CPT | Performed by: INTERNAL MEDICINE

## 2023-05-07 PROCEDURE — 97530 THERAPEUTIC ACTIVITIES: CPT

## 2023-05-07 PROCEDURE — 99232 SBSQ HOSP IP/OBS MODERATE 35: CPT | Performed by: INTERNAL MEDICINE

## 2023-05-07 PROCEDURE — 97116 GAIT TRAINING THERAPY: CPT

## 2023-05-07 PROCEDURE — 94799 UNLISTED PULMONARY SVC/PX: CPT

## 2023-05-07 RX ORDER — DOCUSATE SODIUM 100 MG/1
100 CAPSULE, LIQUID FILLED ORAL DAILY
Status: DISCONTINUED | OUTPATIENT
Start: 2023-05-07 | End: 2023-05-09 | Stop reason: HOSPADM

## 2023-05-07 RX ADMIN — CLOPIDOGREL BISULFATE 75 MG: 75 TABLET ORAL at 09:22

## 2023-05-07 RX ADMIN — HEPARIN SODIUM 5000 UNITS: 5000 INJECTION INTRAVENOUS; SUBCUTANEOUS at 05:51

## 2023-05-07 RX ADMIN — ALLOPURINOL 100 MG: 100 TABLET ORAL at 09:22

## 2023-05-07 RX ADMIN — AMLODIPINE BESYLATE 5 MG: 5 TABLET ORAL at 09:22

## 2023-05-07 RX ADMIN — HYDROCODONE BITARTRATE AND ACETAMINOPHEN 1 TABLET: 7.5; 325 TABLET ORAL at 12:34

## 2023-05-07 RX ADMIN — HYDROCODONE BITARTRATE AND ACETAMINOPHEN 1 TABLET: 7.5; 325 TABLET ORAL at 20:31

## 2023-05-07 RX ADMIN — HEPARIN SODIUM 5000 UNITS: 5000 INJECTION INTRAVENOUS; SUBCUTANEOUS at 15:07

## 2023-05-07 RX ADMIN — DOCUSATE SODIUM 100 MG: 100 CAPSULE, LIQUID FILLED ORAL at 09:22

## 2023-05-07 RX ADMIN — MULTIPLE VITAMINS W/ MINERALS TAB 1 TABLET: TAB at 09:22

## 2023-05-07 RX ADMIN — LEVOTHYROXINE SODIUM 112 MCG: 0.11 TABLET ORAL at 05:51

## 2023-05-07 RX ADMIN — Medication 2000 UNITS: at 09:21

## 2023-05-07 RX ADMIN — Medication 10 ML: at 20:31

## 2023-05-07 RX ADMIN — NEBIVOLOL 10 MG: 10 TABLET ORAL at 09:21

## 2023-05-07 RX ADMIN — HEPARIN SODIUM 5000 UNITS: 5000 INJECTION INTRAVENOUS; SUBCUTANEOUS at 21:18

## 2023-05-07 RX ADMIN — HYDROCODONE BITARTRATE AND ACETAMINOPHEN 1 TABLET: 7.5; 325 TABLET ORAL at 05:51

## 2023-05-07 RX ADMIN — ASPIRIN 81 MG: 81 TABLET, COATED ORAL at 09:21

## 2023-05-07 RX ADMIN — POLYETHYLENE GLYCOL 3350 17 G: 17 POWDER, FOR SOLUTION ORAL at 16:14

## 2023-05-07 RX ADMIN — Medication 10 ML: at 09:28

## 2023-05-07 RX ADMIN — MONTELUKAST 10 MG: 10 TABLET, FILM COATED ORAL at 20:31

## 2023-05-07 RX ADMIN — FERROUS SULFATE TAB 325 MG (65 MG ELEMENTAL FE) 325 MG: 325 (65 FE) TAB at 09:22

## 2023-05-07 RX ADMIN — OXYCODONE HYDROCHLORIDE AND ACETAMINOPHEN 500 MG: 500 TABLET ORAL at 09:22

## 2023-05-07 RX ADMIN — PANTOPRAZOLE SODIUM 40 MG: 40 TABLET, DELAYED RELEASE ORAL at 05:51

## 2023-05-07 RX ADMIN — ROSUVASTATIN 20 MG: 20 TABLET, FILM COATED ORAL at 09:22

## 2023-05-07 NOTE — PROGRESS NOTES
[facility]     Progress Note    Patient Name: Yelena Sanchez  : 1935  MRN: [id number]  Primary Care Physician:  Kaelyn Eugene MD  Date of admission: 2023    Subjective   Subjective     HPI:  Patient Reports no new complaints today.  Still some pain in the shoulder.  The elbow does not have significant pain today.    Review of Systems   See HPI    Objective   Objective     Vitals:   Temp:  [97.8 °F (36.6 °C)-99.6 °F (37.6 °C)] 98.3 °F (36.8 °C)  Heart Rate:  [62-69] 65  Resp:  [16-18] 18  BP: (108-154)/(41-89) 141/49  Physical Exam    General: Alert, no acute distress   Chest: Unlabored breathing, cardiovascular: Regular heart rate   Musculoskeletal: Splint and sling intact right upper extremity.  Neurovascular intact to the hand.  Positive pulses.  Splint intact left thumb.    Result Review      WBC   Date Value Ref Range Status   2023 10.65 3.40 - 10.80 10*3/mm3 Final     RBC   Date Value Ref Range Status   2023 3.40 (L) 3.77 - 5.28 10*6/mm3 Final     Hemoglobin   Date Value Ref Range Status   2023 10.2 (L) 12.0 - 15.9 g/dL Final     Hematocrit   Date Value Ref Range Status   2023 31.5 (L) 34.0 - 46.6 % Final     MCV   Date Value Ref Range Status   2023 92.6 79.0 - 97.0 fL Final     MCH   Date Value Ref Range Status   2023 30.0 26.6 - 33.0 pg Final     MCHC   Date Value Ref Range Status   2023 32.4 31.5 - 35.7 g/dL Final     RDW   Date Value Ref Range Status   2023 16.3 (H) 12.3 - 15.4 % Final     RDW-SD   Date Value Ref Range Status   2023 55.4 (H) 37.0 - 54.0 fl Final     MPV   Date Value Ref Range Status   2023 11.6 6.0 - 12.0 fL Final     Platelets   Date Value Ref Range Status   2023 172 140 - 450 10*3/mm3 Final     Neutrophil %   Date Value Ref Range Status   2023 74.4 42.7 - 76.0 % Final     Lymphocyte %   Date Value Ref Range Status   2023 5.6 (L) 19.6 - 45.3 % Final     Monocyte %   Date Value Ref  Range Status   05/05/2023 18.8 (H) 5.0 - 12.0 % Final     Eosinophil %   Date Value Ref Range Status   05/05/2023 0.1 (L) 0.3 - 6.2 % Final     Basophil %   Date Value Ref Range Status   05/05/2023 0.3 0.0 - 1.5 % Final     Immature Grans %   Date Value Ref Range Status   05/05/2023 0.8 (H) 0.0 - 0.5 % Final     Neutrophils, Absolute   Date Value Ref Range Status   05/05/2023 6.43 1.70 - 7.00 10*3/mm3 Final     Lymphocytes, Absolute   Date Value Ref Range Status   05/05/2023 0.48 (L) 0.70 - 3.10 10*3/mm3 Final     Monocytes, Absolute   Date Value Ref Range Status   05/05/2023 1.62 (H) 0.10 - 0.90 10*3/mm3 Final     Eosinophils, Absolute   Date Value Ref Range Status   05/05/2023 0.01 0.00 - 0.40 10*3/mm3 Final     Basophils, Absolute   Date Value Ref Range Status   05/05/2023 0.03 0.00 - 0.20 10*3/mm3 Final     Immature Grans, Absolute   Date Value Ref Range Status   05/05/2023 0.07 (H) 0.00 - 0.05 10*3/mm3 Final     nRBC   Date Value Ref Range Status   05/05/2023 0.0 0.0 - 0.2 /100 WBC Final        Result Review:  I have personally reviewed the results from the time of this admission to 5/7/2023 08:09 EDT and agree with these findings:  [x]  Laboratory  []  Microbiology  [x]  Radiology  []  EKG/Telemetry   []  Cardiology/Vascular   []  Pathology  []  Old records  []  Other:      Assessment & Plan   Assessment / Plan     Brief Patient Summary:  Yelena Sanchez is a 87 y.o. female who has right proximal humerus fracture, right elbow swelling, possible occult fracture, left thumb fracture    Active Hospital Problems:  Active Hospital Problems    Diagnosis    • **Closed fracture of neck of right humerus    • Osteoarthritis of left knee    • HTN (hypertension)    • HLD (hyperlipidemia)    • Hypothyroidism    • Effusion of right elbow    • Renal insufficiency    • Musculoskeletal pain       Plan: Plan for nonoperative treatment of orthopedic injuries  We will plan to remove splint to right elbow today to see if she can  do some gentle range of motion and remain in shoulder sling immobilizer  Splint to left thumb  Pain control  DVT prophylaxis  Discharge planning: Inpatient rehab when bed available       DVT prophylaxis:  Medical and mechanical DVT prophylaxis orders are present.    CODE STATUS:   Level Of Support Discussed With: Patient  Code Status (Patient has no pulse and is not breathing): CPR (Attempt to Resuscitate)  Medical Interventions (Patient has pulse or is breathing): Full Support    Disposition:  I expect patient to be discharged to inpatient rehab when able.    Electronically signed by Ang Rahman MD, 05/07/23, 8:09 AM EDT.

## 2023-05-07 NOTE — PLAN OF CARE
Goal Outcome Evaluation:         Pt alert and able to make needs known. Pain controlled with PRN Norco and ice pack. Pt urinating without difficulty, utilizing purewick-tolerating well. Pt awaiting rehab placement.

## 2023-05-07 NOTE — PLAN OF CARE
Goal Outcome Evaluation:         Pt stable throughout the shift, vitals WNL. Pain has been well controlled with prn Norco. Right arm in splint and sling, has been elevated on pillows. Purewick in place, pt voiding without difficulty. Wound care performed per orders to left knee. Pt was able to ambulate with PT today 60 ft with walker and 1X assist. Medicated with prn bowel medications.

## 2023-05-07 NOTE — PROGRESS NOTES
[facility]   Hospitalist Progress Note  Date: 2023  Patient Name: Yelena Sanchez  : 1935  MRN: [id number]  Date of admission: 2023      Subjective   Subjective     Chief Complaint: Follow up for right shoulder pain, right elbow pain and left knee pain    Summary: 88 y/o F with HTN, hypothyroidism who presented after mechanical fall with pain involving the right shoulder, right elbow left hand/knee.  No head trauma or loss of consciousness.  Presentation little hypertensive otherwise vital stable.  Labs notable for WBC 14.61, platelets 215, hemoglobin 12, creatinine elevated 2.12, baseline around 1.6, rest of the CMP is nonsignificant.  Imaging with nondisplaced fracture of the right humerus neck, right elbow with joint effusion, left hand minimally displaced fracture of the thumb.  Right shoulder in splint.  Orthopedic surgery consulted, nonoperative management, already on pain control, PT and rehab    Interval Followup: Blood pressure well controlled.  Feeling fine this morning.  Tolerated oral intake.  Reports adequate pain control.  Creatinine improved.  Continue good urine output.  Blood sugars well controlled    Review of Systems  Respiratory:  No Cough, No Dyspnea  Gastrointestinal:  No Nausea, No Vomiting    Objective   Objective     Vitals:   Temp:  [97.8 °F (36.6 °C)-99.6 °F (37.6 °C)] 98.3 °F (36.8 °C)  Heart Rate:  [62-69] 65  Resp:  [16-18] 18  BP: (108-154)/(41-89) 141/49  Flow (L/min):  [2] 2  Physical Exam    Constitutional: Elderly female, conversant, NAD   Eyes: Pupils equal and reactive, no conjunctival injections   HEENT: Dry mucous membranes, thrush   Respiratory: Clear to auscultation bilaterally, nonlabored respirations    Cardiovascular: RRR, no murmurs, no edema   Gastrointestinal: Positive bowel sounds, soft, nontender, nondistended   MSK: Right shoulder in splint.  Left thumb in splint.  Gross motor function intact.  Neurovascular intact   Neurologic: Alert, Cranial  Nerves grossly intact, speech clear   Skin: Extremities warm, no rashes    Result Review    Result Review:  I have personally reviewed the following over the last 24 hours (07:00 to 07:00) and agree with the following findings  [x]  Laboratory   CBC        5/4/2023    18:51 5/5/2023    04:07 5/6/2023    13:20   CBC   WBC 14.61   8.64   10.65     RBC 4.09   3.32   3.40     Hemoglobin 12.0   9.7   10.2     Hematocrit 38.0   29.5   31.5     MCV 92.9   88.9   92.6     MCH 29.3   29.2   30.0     MCHC 31.6   32.9   32.4     RDW 16.1   16.4   16.3     Platelets 215   167   172       BMP        5/4/2023    18:51 5/5/2023    04:07 5/6/2023    05:37   BMP   BUN 46   42   30     Creatinine 2.13   1.69   1.39     Sodium 141   140   141     Potassium 4.5   4.3   5.1     Chloride 105   106   106     CO2 23.3   25.3   21.8     Calcium 9.6   9.2   9.2       []  Microbiology  []  Radiology   []  EKG/Telemetry   []  Cardiology/Vascular   []  Pathology  []  Old records  [x]  Other:     Intake/Output Summary (Last 24 hours) at 5/7/2023 1016  Last data filed at 5/7/2023 0800  Gross per 24 hour   Intake 360 ml   Output 1525 ml   Net -1165 ml         Assessment & Plan   Assessment / Plan     Assessment/Plan:  Active Hospital Problems    Diagnosis  POA   • **Closed fracture of neck of right humerus [S42.211A]  Unknown   • Osteoarthritis of left knee [M17.12]  Unknown   • HTN (hypertension) [I10]  Yes   • HLD (hyperlipidemia) [E78.5]  Yes   • Hypothyroidism [E03.9]  Unknown   • Effusion of right elbow [M25.421]  Yes   • Renal insufficiency [N28.9]  Yes   • Musculoskeletal pain [M79.18]  Yes   Oral thrush      · Improved p.o. intake.  Creatinine better.  Net -1.1 L last 24 hours.  Continue to hold Lasix given negative fluid balance.  Monitor daily BMP  · BP controlled -> continue amlodipine 5 mg daily and Bystolic 10 mg daily  · Continue baby aspirin and Plavix  · Continue Crestor  · Continue nystatin swish and spit 4 times daily  · Continue  Synthroid  · Back on 2 L.  Encourage I-S.  Wean oxygen to maintain SpO2 > 90%  · Appreciate orthopedic surgery assistance; nonoperative management recommended  · Pain controlled.  Stop IV morphine.  Continue as needed Norco regimen every 6 hours per orders  · Continue bowel regimen  · Continue PT/OT.  Fall precaution    Disposition: Inpatient rehab pending bed availability    Discussed plan with RN.    DVT prophylaxis: Subcutaneous heparin 5000 units every 8 hours  Medical and mechanical DVT prophylaxis orders are present.    CODE STATUS:   Level Of Support Discussed With: Patient  Code Status (Patient has no pulse and is not breathing): CPR (Attempt to Resuscitate)  Medical Interventions (Patient has pulse or is breathing): Full Support    Electronically signed by Jose M Patterson DO, 05/07/23, 10:16 AM EDT.

## 2023-05-07 NOTE — THERAPY TREATMENT NOTE
Acute Care - Physical Therapy Progress Note  SHAYNA Brenner     Patient Name: Yelena Sanchez  : 1935  MRN: [id number]  Today's Date: 2023      Visit Dx:     ICD-10-CM ICD-9-CM   1. Other closed displaced fracture of proximal end of right humerus, initial encounter  S42.291A 812.09   2. Occult closed fracture of right elbow, initial encounter  S42.401A 812.40   3. Nondisplaced fracture of distal phalanx of left thumb, initial encounter for closed fracture  S62.525A 816.02   4. Abrasions of multiple sites  T07.XXXA 919.0   5. Hematoma of left knee region  S80.02XA 924.11   6. Contusion of right knee, initial encounter  S80.01XA 924.11   7. Fall on same level from slipping, tripping or stumbling, initial encounter  W01.0XXA E885.9   8. Difficulty in walking  R26.2 719.7   9. Decreased activities of daily living (ADL)  Z78.9 V49.89     Patient Active Problem List   Diagnosis   • Closed fracture of neck of right humerus   • HTN (hypertension)   • HLD (hyperlipidemia)   • Hypothyroidism   • Effusion of right elbow   • Renal insufficiency   • Musculoskeletal pain   • Osteoarthritis of left knee     Past Medical History:   Diagnosis Date   • Anemia    • Breast cancer    • CHF (congestive heart failure)      Past Surgical History:   Procedure Laterality Date   • LUNG SURGERY Left     LEFT UPPER LOBE ()   • MASTECTOMY Left      PT Assessment (last 12 hours)     PT Evaluation and Treatment     Row Name 23 1600          Physical Therapy Time and Intention    Subjective Information complains of;weakness  -CS     Document Type therapy note (daily note)  -CS     Mode of Treatment individual therapy;physical therapy  -CS     Patient Effort good  -CS     Symptoms Noted During/After Treatment fatigue  -CS     Row Name 23 1600          Bed Mobility    Bed Mobility supine-sit-supine;scooting/bridging  -CS     Scooting/Bridging [place] (Bed Mobility) 1 person assist  -CS     Supine-Sit-Supine [place]  (Bed Mobility) verbal cues;moderate assist (50% patient effort);1 person assist  -CS     Bed Mobility, Safety Issues decreased use of arms for pushing/pulling;decreased use of legs for bridging/pushing  -CS     Assistive Device (Bed Mobility) bed rails;draw sheet;head of bed elevated  -CS     Row Name 05/07/23 1600          Transfers    Transfers sit-stand transfer;stand-sit transfer;toilet transfer  -     Row Name 05/07/23 1600          Sit-Stand Transfer    Sit-Stand [place] (Transfers) verbal cues;minimum assist (75% patient effort);1 person assist  -CS     Assistive Device (Sit-Stand Transfers) --  hands on assistance (REI)  -CS     Row Name 05/07/23 1600          Stand-Sit Transfer    Stand-Sit [place] (Transfers) verbal cues;minimum assist (75% patient effort);1 person assist  -CS     Assistive Device (Stand-Sit Transfers) --  REI  -     Row Name 05/07/23 1600          Toilet Transfer    Type (Toilet Transfer) sit-stand;stand-sit  -CS     [place] Level (Toilet Transfer) verbal cues;minimum assist (75% patient effort);1 person assist  -CS     Assistive Device (Toilet Transfer) raised toilet seat  REI  -     Row Name 05/07/23 1600          Gait/Stairs (Locomotion)    [place] Level (Gait) verbal cues;minimum assist (75% patient effort);1 person assist  -CS     Assistive Device (Gait) --  hand held assistance  -     Distance in Feet (Gait) 60  -CS     Pattern (Gait) 3-point;step-through  -CS     Deviations/Abnormal Patterns (Gait) nan decreased;gait speed decreased;stride length decreased  -CS     Bilateral Gait Deviations heel strike decreased  -     Row Name 05/07/23 1600          Motor Skills    Therapeutic Exercise hip;knee;ankle  -     Row Name 05/07/23 1600          Hip (Therapeutic Exercise)    Hip (Therapeutic Exercise) AAROM (active assistive range of motion)  -     Hip AAROM (Therapeutic Exercise) bilateral;flexion;extension;aBduction;aDduction;external  rotation;internal rotation;supine;10 repetitions;2 sets  -     Row Name 05/07/23 1600          Knee (Therapeutic Exercise)    Knee (Therapeutic Exercise) AAROM (active assistive range of motion)  -     Knee AAROM (Therapeutic Exercise) bilateral;supine;10 repetitions;2 sets  SAQ's, SLR's  -     Row Name 05/07/23 1600          Ankle (Therapeutic Exercise)    Ankle (Therapeutic Exercise) AROM (active range of motion)  -     Ankle AROM (Therapeutic Exercise) bilateral;dorsiflexion;plantarflexion;supine;20 repititions  -     Row Name             Wound 05/04/23 2211 Left anterior knee Abrasion    Wound - Properties Group Placement Date: 05/04/23 -JN Placement Time: 2211 -JN Side: Left  -JN Orientation: anterior  -JN Location: knee  -JN Primary Wound Type: Abrasion  -JN    Retired Wound - Properties Group Placement Date: 05/04/23 -JN Placement Time: 2211 -JN Side: Left  -JN Orientation: anterior  -JN Location: knee  -JN Primary Wound Type: Abrasion  -JN    Retired Wound - Properties Group Date first assessed: 05/04/23 -JN Time first assessed: 2211 -JN Side: Left  -JN Location: knee  -JN Primary Wound Type: Abrasion  -JN    Row Name 05/07/23 1600          Positioning and Restraints    Pre-Treatment Position in bed  -CS     Post Treatment Position bed  -CS     In Bed fowlers;call light within reach;encouraged to call for assist;exit alarm on  -     Row Name 05/07/23 1600          Progress Summary (PT)    Progress Toward Functional Goals (PT) progress toward functional goals is good  -           User Key  (r) = Recorded By, (t) = Taken By, (c) = Cosigned By    Initials Name Provider Type    Lyly Williamson RN Registered Nurse    Erik Maxwell PTA Physical Therapist Assistant                Physical Therapy Education     Title: PT OT SLP Therapies (Done)     Topic: Physical Therapy (Done)     Point: Mobility training (Done)     Learning Progress Summary           Patient Acceptance, E, VU,NR  by  at 5/6/2023 2306    Acceptance, E, VU by  at 5/6/2023 1242    Acceptance, E, VU,NR by  at 5/6/2023 0132    Acceptance, E,TB, VU by NATE at 5/5/2023 1102                   Point: Precautions (Done)     Learning Progress Summary           Patient Acceptance, E, VU,NR by  at 5/6/2023 2306    Acceptance, E, VU by  at 5/6/2023 1242    Acceptance, E, VU,NR by  at 5/6/2023 0132    Acceptance, E,TB, VU by NATE at 5/5/2023 1102                               User Key     Initials Effective Dates Name Provider Type Discipline     01/16/23 -  Gladys Cruz, RN Registered Nurse Nurse     06/16/21 -  An Topete OT Occupational Therapist OT    NATE 06/03/21 -  Franck Claire, PT Physical Therapist PT              PT Recommendation and Plan     Progress Summary (PT)  Progress Toward Functional Goals (PT): progress toward functional goals is good   Outcome Measures     Row Name 05/07/23 1600 05/05/23 1100          How much help from another person do you currently need...    Turning from your back to your side while in flat bed without using bedrails? 2  -CS 2  -NATE     Moving from lying on back to sitting on the side of a flat bed without bedrails? 2  -CS 2  -NATE     Moving to and from a bed to a chair (including a wheelchair)? 3  -CS 3  -NATE     Standing up from a chair using your arms (e.g., wheelchair, bedside chair)? 3  -CS 3  -NATE     Climbing 3-5 steps with a railing? 2  -CS 2  -NATE     To walk in hospital room? 3  -CS 3  -NATE     AM-PAC 6 Clicks Score (PT) 15  -CS 15  -NATE        Functional Assessment    Outcome Measure Options AM-PAC 6 Clicks Basic Mobility (PT)  -CS AM-PAC 6 Clicks Basic Mobility (PT)  -NATE           User Key  (r) = Recorded By, (t) = Taken By, (c) = Cosigned By    Initials Name Provider Type    NATE Franck Claire, PT Physical Therapist    Erik Maxwell PTA Physical Therapist Assistant                 Time Calculation:    PT Charges     Row Name 05/07/23 1642             Time  Calculation    Start Time 1330  -CS      PT Received On 05/07/23  -CS         Timed Charges    05469 - PT Therapeutic Exercise Minutes 23  -CS      50019 - Gait Training Minutes  16  -CS      11268 - PT Therapeutic Activity Minutes 10  -CS      84034 - PT Self Care/Mgmt Minutes 5  -CS         Total Minutes    Timed Charges Total Minutes 54  -CS       Total Minutes 54  -CS            User Key  (r) = Recorded By, (t) = Taken By, (c) = Cosigned By    Initials Name Provider Type     Erik Elizondo PTA Physical Therapist Assistant              Therapy Charges for Today     Code Description Service Date Service Provider Modifiers Qty    70873209398 HC PT THERAPEUTIC ACT EA 15 MIN 5/7/2023 Erik Elizondo PTA GP 1    69172250116 HC GAIT TRAINING EA 15 MIN 5/7/2023 Erik Elizondo PTA GP 1    54284664370 HC PT THER PROC EA 15 MIN 5/7/2023 Erik Elizondo PTA GP 2          PT G-Codes  Outcome Measure Options: AM-PAC 6 Clicks Basic Mobility (PT)  AM-PAC 6 Clicks Score (PT): 15  AM-PAC 6 Clicks Score (OT): 14    Erik Elizondo PTA  5/7/2023

## 2023-05-08 LAB
ANION GAP SERPL CALCULATED.3IONS-SCNC: 7.5 MMOL/L (ref 5–15)
BUN SERPL-MCNC: 34 MG/DL (ref 8–23)
BUN/CREAT SERPL: 28.6 (ref 7–25)
CALCIUM SPEC-SCNC: 9 MG/DL (ref 8.6–10.5)
CHLORIDE SERPL-SCNC: 103 MMOL/L (ref 98–107)
CO2 SERPL-SCNC: 26.5 MMOL/L (ref 22–29)
CREAT SERPL-MCNC: 1.19 MG/DL (ref 0.57–1)
EGFRCR SERPLBLD CKD-EPI 2021: 44.6 ML/MIN/1.73
GLUCOSE SERPL-MCNC: 146 MG/DL (ref 65–99)
POTASSIUM SERPL-SCNC: 4 MMOL/L (ref 3.5–5.2)
SARS-COV-2 RNA RESP QL NAA+PROBE: NOT DETECTED
SODIUM SERPL-SCNC: 137 MMOL/L (ref 136–145)
WHOLE BLOOD HOLD SPECIMEN: NORMAL

## 2023-05-08 PROCEDURE — 80048 BASIC METABOLIC PNL TOTAL CA: CPT | Performed by: INTERNAL MEDICINE

## 2023-05-08 PROCEDURE — U0005 INFEC AGEN DETEC AMPLI PROBE: HCPCS | Performed by: INTERNAL MEDICINE

## 2023-05-08 PROCEDURE — 94799 UNLISTED PULMONARY SVC/PX: CPT

## 2023-05-08 PROCEDURE — 97530 THERAPEUTIC ACTIVITIES: CPT

## 2023-05-08 PROCEDURE — U0004 COV-19 TEST NON-CDC HGH THRU: HCPCS | Performed by: INTERNAL MEDICINE

## 2023-05-08 PROCEDURE — 99232 SBSQ HOSP IP/OBS MODERATE 35: CPT | Performed by: INTERNAL MEDICINE

## 2023-05-08 PROCEDURE — 85025 COMPLETE CBC W/AUTO DIFF WBC: CPT | Performed by: INTERNAL MEDICINE

## 2023-05-08 PROCEDURE — 97110 THERAPEUTIC EXERCISES: CPT

## 2023-05-08 RX ADMIN — NEBIVOLOL 10 MG: 10 TABLET ORAL at 08:41

## 2023-05-08 RX ADMIN — DOCUSATE SODIUM 50MG AND SENNOSIDES 8.6MG 2 TABLET: 8.6; 5 TABLET, FILM COATED ORAL at 21:12

## 2023-05-08 RX ADMIN — Medication 10 ML: at 21:14

## 2023-05-08 RX ADMIN — DOCUSATE SODIUM 100 MG: 100 CAPSULE, LIQUID FILLED ORAL at 08:40

## 2023-05-08 RX ADMIN — Medication 10 ML: at 08:42

## 2023-05-08 RX ADMIN — FERROUS SULFATE TAB 325 MG (65 MG ELEMENTAL FE) 325 MG: 325 (65 FE) TAB at 08:41

## 2023-05-08 RX ADMIN — Medication 2000 UNITS: at 08:41

## 2023-05-08 RX ADMIN — AMLODIPINE BESYLATE 5 MG: 5 TABLET ORAL at 08:41

## 2023-05-08 RX ADMIN — HYDROCODONE BITARTRATE AND ACETAMINOPHEN 1 TABLET: 7.5; 325 TABLET ORAL at 03:50

## 2023-05-08 RX ADMIN — OXYCODONE HYDROCHLORIDE AND ACETAMINOPHEN 500 MG: 500 TABLET ORAL at 08:41

## 2023-05-08 RX ADMIN — ASPIRIN 81 MG: 81 TABLET, COATED ORAL at 08:40

## 2023-05-08 RX ADMIN — ROSUVASTATIN 20 MG: 20 TABLET, FILM COATED ORAL at 08:41

## 2023-05-08 RX ADMIN — FUROSEMIDE 40 MG: 40 TABLET ORAL at 18:37

## 2023-05-08 RX ADMIN — HYDROCODONE BITARTRATE AND ACETAMINOPHEN 1 TABLET: 5; 325 TABLET ORAL at 08:40

## 2023-05-08 RX ADMIN — LEVOTHYROXINE SODIUM 112 MCG: 0.11 TABLET ORAL at 05:40

## 2023-05-08 RX ADMIN — CLOPIDOGREL BISULFATE 75 MG: 75 TABLET ORAL at 08:41

## 2023-05-08 RX ADMIN — ALLOPURINOL 100 MG: 100 TABLET ORAL at 08:41

## 2023-05-08 RX ADMIN — MONTELUKAST 10 MG: 10 TABLET, FILM COATED ORAL at 21:12

## 2023-05-08 RX ADMIN — MULTIPLE VITAMINS W/ MINERALS TAB 1 TABLET: TAB at 08:41

## 2023-05-08 RX ADMIN — HYDROCODONE BITARTRATE AND ACETAMINOPHEN 1 TABLET: 7.5; 325 TABLET ORAL at 21:13

## 2023-05-08 RX ADMIN — PANTOPRAZOLE SODIUM 40 MG: 40 TABLET, DELAYED RELEASE ORAL at 08:41

## 2023-05-08 RX ADMIN — FUROSEMIDE 40 MG: 40 TABLET ORAL at 08:41

## 2023-05-08 NOTE — PROGRESS NOTES
[facility]   Hospitalist Progress Note  Date: 2023  Patient Name: Yelena Sanchez  : 1935  MRN: [id number]  Date of admission: 2023      Subjective   Subjective     Chief Complaint: Follow up for right shoulder pain, right elbow pain and left knee pain    Summary: 88 y/o F with HTN, hypothyroidism who presented after mechanical fall with pain involving the right shoulder, right elbow left hand/knee.  No head trauma or loss of consciousness.  Presentation little hypertensive otherwise vital stable.  Labs notable for WBC 14.61, platelets 215, hemoglobin 12, creatinine elevated 2.12, baseline around 1.6, rest of the CMP is nonsignificant.  Imaging with nondisplaced fracture of the right humerus neck, right elbow with joint effusion, left hand minimally displaced fracture of the thumb.  Right shoulder in splint.  Orthopedic surgery consulted, nonoperative management, pain control, PT and rehab.  Stable for discharge to SNF on  pending negative COVID    Interval Followup: No overnight events.  Blood pressure controlled.  Weaned to room air.  Feeling well this morning.  Denies uncontrolled pain.  Tolerating oral intake.  Did walk with PT which she is very happy about.  No new complaints    Review of Systems  Respiratory:  No Cough, No Dyspnea  Gastrointestinal:  No Nausea, No Vomiting    Objective   Objective     Vitals:   Temp:  [97.9 °F (36.6 °C)-99.5 °F (37.5 °C)] 98.1 °F (36.7 °C)  Heart Rate:  [60-66] 61  Resp:  [16-18] 16  BP: (124-160)/(44-91) 124/51  Flow (L/min):  [2] 2  Physical Exam    Constitutional: Elderly female, conversant, NAD   Eyes: Pupils equal and reactive, no conjunctival injections   HEENT: Dry mucous membranes, minimal thrush on the tongue   Respiratory: Clear to auscultation bilaterally, nonlabored respirations    Cardiovascular: RRR, no murmurs, no edema   Gastrointestinal: Positive bowel sounds, soft, nontender, nondistended   MSK: Right shoulder in sling.  Left thumb in  splint.  Gross motor function intact.  Neurovascular intact   Neurologic: Alert, Cranial Nerves grossly intact, speech clear   Skin: Extremities warm, no rashes    Result Review    Result Review:  I have personally reviewed the following over the last 24 hours (07:00 to 07:00) and agree with the following findings  [x]  Laboratory   CBC        5/4/2023    18:51 5/5/2023    04:07 5/6/2023    13:20   CBC   WBC 14.61   8.64   10.65     RBC 4.09   3.32   3.40     Hemoglobin 12.0   9.7   10.2     Hematocrit 38.0   29.5   31.5     MCV 92.9   88.9   92.6     MCH 29.3   29.2   30.0     MCHC 31.6   32.9   32.4     RDW 16.1   16.4   16.3     Platelets 215   167   172       BMP        5/6/2023    05:37 5/7/2023    11:47 5/8/2023    05:23   BMP   BUN 30   33   34     Creatinine 1.39   1.37   1.19     Sodium 141   138   137     Potassium 5.1   4.0   4.0     Chloride 106   102   103     CO2 21.8   24.3   26.5     Calcium 9.2   9.0   9.0       []  Microbiology  []  Radiology   []  EKG/Telemetry   []  Cardiology/Vascular   []  Pathology  []  Old records  [x]  Other:     Intake/Output Summary (Last 24 hours) at 5/8/2023 1459  Last data filed at 5/8/2023 1300  Gross per 24 hour   Intake 660 ml   Output 1000 ml   Net -340 ml         Assessment & Plan   Assessment / Plan     Assessment:  Active Hospital Problems    Diagnosis  POA   • **Closed fracture of neck of right humerus [S42.211A]  Unknown   • Osteoarthritis of left knee [M17.12]  Unknown   • HTN (hypertension) [I10]  Yes   • HLD (hyperlipidemia) [E78.5]  Yes   • Hypothyroidism [E03.9]  Unknown   • Effusion of right elbow [M25.421]  Yes   • Renal insufficiency [N28.9]  Yes   • Musculoskeletal pain [M79.18]  Yes   Oral thrush      Plan:  · BP controlled -> continue amlodipine 5 mg daily and Bystolic 10 mg daily  · Weaned to room air. Encourage I-S.  Nebs as needed  · Was ambulatory bed with PT today.  Continue right upper extremity sling. Continue splint to left thumb.  Activity  restrictions per orthopedic surgery. 2-week follow-up planned  · Continue baby aspirin and Plavix  · Continue Crestor  · Continue Synthroid  · Continue as needed Norco regimen q6 hours. Continue bowel regimen  · Continue additional home medications per orders  · Continue PT/OT.  Fall precaution     Disposition: Stable for discharge.  COVID test ordered for discharge planning.  If negative will discharge to SNF on 5/9    Discussed plan with RN ,SW    DVT prophylaxis: Subcutaneous heparin 5000 units every 8 hours  Medical and mechanical DVT prophylaxis orders are present.    CODE STATUS:   Level Of Support Discussed With: Patient  Code Status (Patient has no pulse and is not breathing): CPR (Attempt to Resuscitate)  Medical Interventions (Patient has pulse or is breathing): Full Support    Electronically signed by Jose M Patterson DO, 05/08/23, 2:59 PM EDT.

## 2023-05-08 NOTE — THERAPY TREATMENT NOTE
Acute Care - Physical Therapy Treatment Note   [facility]     Patient Name: Yelena Sanchez  : 1935  MRN: [id number]  Today's Date: 2023      Visit Dx:     ICD-10-CM ICD-9-CM   1. Other closed displaced fracture of proximal end of right humerus, initial encounter  S42.291A 812.09   2. Occult closed fracture of right elbow, initial encounter  S42.401A 812.40   3. Nondisplaced fracture of distal phalanx of left thumb, initial encounter for closed fracture  S62.525A 816.02   4. Abrasions of multiple sites  T07.XXXA 919.0   5. Hematoma of left knee region  S80.02XA 924.11   6. Contusion of right knee, initial encounter  S80.01XA 924.11   7. Fall on same level from slipping, tripping or stumbling, initial encounter  W01.0XXA E885.9   8. Difficulty in walking  R26.2 719.7   9. Decreased activities of daily living (ADL)  Z78.9 V49.89     Patient Active Problem List   Diagnosis   • Closed fracture of neck of right humerus   • HTN (hypertension)   • HLD (hyperlipidemia)   • Hypothyroidism   • Effusion of right elbow   • Renal insufficiency   • Musculoskeletal pain   • Osteoarthritis of left knee     Past Medical History:   Diagnosis Date   • Anemia    • Breast cancer    • CHF (congestive heart failure)      Past Surgical History:   Procedure Laterality Date   • LUNG SURGERY Left     LEFT UPPER LOBE ()   • MASTECTOMY Left      PT Assessment (last 12 hours)     PT Evaluation and Treatment     Row Name 23 1513          Physical Therapy Time and Intention    Subjective Information complains of;weakness;fatigue;pain  -RH     Document Type therapy note (daily note)  -     Mode of Treatment physical therapy;individual therapy  -     Patient Effort fair  -RH     Row Name 23 1513          Pain    Additional Documentation Pain Scale: FACES Pre/Post-Treatment (Group)  -     Row Name 23 1513          Pain Scale: FACES Pre/Post-Treatment    Pain: FACES Scale, Pretreatment 2-->hurts little bit  -      Posttreatment Pain Rating 2-->hurts little bit  -     Pain Location - Side/Orientation Left  -     Pain Location - knee  -     Row Name 05/08/23 1513          Mobility    Extremity Weight-bearing Status left lower extremity;right upper extremity  -     Right Upper Extremity (Weight-bearing Status) weight-bearing as tolerated (WBAT);non weight-bearing (NWB)  -     Row Name 05/08/23 1513          Bed Mobility    Bed Mobility scooting/bridging;supine-sit  -     All Activities, [place] (Bed Mobility) moderate assist (50% patient effort)  -RH     Scooting/Bridging [place] (Bed Mobility) moderate assist (50% patient effort)  -     Supine-Sit [place] (Bed Mobility) moderate assist (50% patient effort)  -     Bed Mobility, Safety Issues decreased use of arms for pushing/pulling  -     Assistive Device (Bed Mobility) bed rails  -     Comment, (Bed Mobility) Pt mainains sitting with CGA.  -[facility] Name 05/08/23 1513          Transfers    Transfers stand pivot/stand step transfer  -     Maintains Weight-bearing Status (Transfers) able to maintain  -     Comment, (Transfers) Pt with shuffling steps to assist with pivot transfer.  -     Row Name 05/08/23 1513          Stand Pivot/Stand Step Transfer    Stand Pivot/Stand Step [place] (Transfers) contact guard  -     Assistive Device (Stand Pivot Stand Step Transfer) --  Pt transfers without AD.  -     Comment, (Stand Pivot Transfer) Pt with R shoulder immobilizer in place.  -[facility] Name 05/08/23 1513          Balance    Dynamic Standing Balance contact guard  -[facility] Name 05/08/23 1513          Motor Skills    Therapeutic Exercise hip;knee;ankle  -[facility] Name 05/08/23 1513          Hip (Therapeutic Exercise)    Hip (Therapeutic Exercise) isometric exercises  -     Hip Isometrics (Therapeutic Exercise) bilateral;gluteal sets;10 repetitions;2 sets;supine  -     Row Name 05/08/23 1513          Knee (Therapeutic  Exercise)    Knee (Therapeutic Exercise) isometric exercises  -     Knee Isometrics (Therapeutic Exercise) bilateral;quad sets;10 repetitions;2 sets;supine  -     Row Name 05/08/23 1513          Ankle (Therapeutic Exercise)    Ankle (Therapeutic Exercise) AROM (active range of motion)  -     Ankle AROM (Therapeutic Exercise) bilateral;dorsiflexion;plantarflexion;supine;10 repetitions;2 sets  -     Row Name             Wound 05/04/23 2211 Left anterior knee Abrasion    Wound - Properties Group Placement Date: 05/04/23  -JN Placement Time: 2211 -JN Side: Left  -JN Orientation: anterior  -JN Location: knee  -JN Primary Wound Type: Abrasion  -JN    Retired Wound - Properties Group Placement Date: 05/04/23  -JN Placement Time: 2211 -JN Side: Left  -JN Orientation: anterior  -JN Location: knee  -JN Primary Wound Type: Abrasion  -JN    Retired Wound - Properties Group Date first assessed: 05/04/23  -JN Time first assessed: 2211 -JN Side: Left  -JN Location: knee  -JN Primary Wound Type: Abrasion  -    Row Name 05/08/23 1513          Vital Signs    O2 Delivery Intra Treatment room air  -     Row Name 05/08/23 1513          Progress Summary (PT)    Progress Toward Functional Goals (PT) progress toward functional goals is good  -           User Key  (r) = Recorded By, (t) = Taken By, (c) = Cosigned By    Initials Name Provider Type    Lyly Williamson RN Registered Nurse    Oliver Weinberg PTA Physical Therapist Assistant                Physical Therapy Education     Title: PT OT SLP Therapies (Done)     Topic: Physical Therapy (Done)     Point: Mobility training (Done)     Learning Progress Summary           Patient Acceptance, E,TB, VU by SHERITA at 5/7/2023 2040    Acceptance, E, VU,NR by BRENDEN at 5/6/2023 2306    Acceptance, E, VU by CELIA at 5/6/2023 1242    Acceptance, E, VU,NR by BRENDEN at 5/6/2023 0132    Acceptance, E,TB, VU by NATE at 5/5/2023 1102                   Point: Precautions (Done)     Learning  Progress Summary           Patient Acceptance, E,TB, VU by SHERITA at 5/7/2023 2040    Acceptance, E, VU,NR by BRENDEN at 5/6/2023 2306    Acceptance, E, VU by CELIA at 5/6/2023 1242    Acceptance, E, VU,NR by BRENDEN at 5/6/2023 0132    Acceptance, E,TB, VU by NATE at 5/5/2023 1102                               User Key     Initials Effective Dates Name Provider Type Discipline    SHERITA 06/16/21 -  Lyly Amaya, RN Registered Nurse Nurse    BRENDEN 01/16/23 -  Gladys Cruz RN Registered Nurse Nurse    CELIA 06/16/21 -  An Topete OT Occupational Therapist OT    NATE 06/03/21 -  Franck Claire PT Physical Therapist PT              PT Recommendation and Plan     Progress Summary (PT)  Progress Toward Functional Goals (PT): progress toward functional goals is good   Outcome Measures     Row Name 05/08/23 1500 05/07/23 1600          How much help from another person do you currently need...    Turning from your back to your side while in flat bed without using bedrails? 2  -RH 2  -CS     Moving from lying on back to sitting on the side of a flat bed without bedrails? 2  -RH 2  -CS     Moving to and from a bed to a chair (including a wheelchair)? 3  -RH 3  -CS     Standing up from a chair using your arms (e.g., wheelchair, bedside chair)? 3  -RH 3  -CS     Climbing 3-5 steps with a railing? 2  -RH 2  -CS     To walk in hospital room? 3  -RH 3  -CS     AM-PAC 6 Clicks Score (PT) 15  -RH 15  -CS        Functional Assessment    Outcome Measure Options -- AM-PAC 6 Clicks Basic Mobility (PT)  -CS           User Key  (r) = Recorded By, (t) = Taken By, (c) = Cosigned By    Initials Name Provider Type    RH Oliver Triana PTA Physical Therapist Assistant    Erik Maxwell PTA Physical Therapist Assistant                 Time Calculation:    PT Charges     Row Name 05/08/23 1512             Time Calculation    PT Received On 05/08/23  -         Timed Charges    76906 - PT Therapeutic Exercise Minutes 12  -RH      06688 - PT Therapeutic  Activity Minutes 11  -RH         Total Minutes    Timed Charges Total Minutes 23  -RH       Total Minutes 23  -RH            User Key  (r) = Recorded By, (t) = Taken By, (c) = Cosigned By    Initials Name Provider Type     Oliver Trinaa PTA Physical Therapist Assistant              Therapy Charges for Today     Code Description Service Date Service Provider Modifiers Qty    86567804772 HC PT THER PROC EA 15 MIN 5/8/2023 Oliver Triana PTA GP 1    20775798589 HC PT THERAPEUTIC ACT EA 15 MIN 5/8/2023 Oliver Triana PTA GP 1          PT G-Codes  Outcome Measure Options: AM-PAC 6 Clicks Basic Mobility (PT)  AM-PAC 6 Clicks Score (PT): 15  AM-PAC 6 Clicks Score (OT): 14    Oliver Triana PTA  5/8/2023

## 2023-05-08 NOTE — CONSULTS
Discharge Planning Assessment  [facility]     Patient Name: Yelena Sanchez  MRN: [id number]  Today's Date: 5/8/2023    Admit Date: 5/4/2023        Discharge Plan     Row Name 05/08/23 1035       Plan    Patient/Family in Agreement with Plan yes    Plan Comments [facility] does not have any beds available today. [facility] is able to offer pt a bed. SW discussed with pt's  who is agreeable to SNF. Pt will need COVID test- MD notified.              GARCIA Gabriel

## 2023-05-08 NOTE — PLAN OF CARE
Goal Outcome Evaluation:         Pt medicated for pain x2, sling to RUE maintained and supported with pillows at all times. Pt awaiting rehab placement.

## 2023-05-09 ENCOUNTER — APPOINTMENT (OUTPATIENT)
Dept: GENERAL RADIOLOGY | Facility: HOSPITAL | Age: 88
End: 2023-05-09
Payer: MEDICARE

## 2023-05-09 ENCOUNTER — HOSPITAL ENCOUNTER (INPATIENT)
Facility: HOSPITAL | Age: 88
DRG: 948 | End: 2023-05-09
Attending: INTERNAL MEDICINE | Admitting: INTERNAL MEDICINE
Payer: MEDICARE

## 2023-05-09 VITALS
BODY MASS INDEX: 31.02 KG/M2 | HEART RATE: 59 BPM | TEMPERATURE: 98.2 F | SYSTOLIC BLOOD PRESSURE: 97 MMHG | HEIGHT: 59 IN | RESPIRATION RATE: 16 BRPM | OXYGEN SATURATION: 92 % | WEIGHT: 153.88 LBS | DIASTOLIC BLOOD PRESSURE: 71 MMHG

## 2023-05-09 DIAGNOSIS — R26.2 DIFFICULTY WALKING: ICD-10-CM

## 2023-05-09 DIAGNOSIS — T14.8XXA FRACTURE: ICD-10-CM

## 2023-05-09 DIAGNOSIS — Z78.9 DECREASED ACTIVITIES OF DAILY LIVING (ADL): Primary | ICD-10-CM

## 2023-05-09 LAB
ANION GAP SERPL CALCULATED.3IONS-SCNC: 13 MMOL/L (ref 5–15)
BASOPHILS # BLD AUTO: 0.02 10*3/MM3 (ref 0–0.2)
BASOPHILS NFR BLD AUTO: 0.2 % (ref 0–1.5)
BUN SERPL-MCNC: 42 MG/DL (ref 8–23)
BUN/CREAT SERPL: 29.2 (ref 7–25)
CALCIUM SPEC-SCNC: 9.1 MG/DL (ref 8.6–10.5)
CHLORIDE SERPL-SCNC: 99 MMOL/L (ref 98–107)
CO2 SERPL-SCNC: 24 MMOL/L (ref 22–29)
CREAT SERPL-MCNC: 1.44 MG/DL (ref 0.57–1)
DEPRECATED RDW RBC AUTO: 55.7 FL (ref 37–54)
EGFRCR SERPLBLD CKD-EPI 2021: 35.5 ML/MIN/1.73
EOSINOPHIL # BLD AUTO: 0.1 10*3/MM3 (ref 0–0.4)
EOSINOPHIL NFR BLD AUTO: 1 % (ref 0.3–6.2)
ERYTHROCYTE [DISTWIDTH] IN BLOOD BY AUTOMATED COUNT: 16.6 % (ref 12.3–15.4)
GLUCOSE SERPL-MCNC: 104 MG/DL (ref 65–99)
HCT VFR BLD AUTO: 32.1 % (ref 34–46.6)
HGB BLD-MCNC: 10 G/DL (ref 12–15.9)
IMM GRANULOCYTES # BLD AUTO: 0.04 10*3/MM3 (ref 0–0.05)
IMM GRANULOCYTES NFR BLD AUTO: 0.4 % (ref 0–0.5)
LYMPHOCYTES # BLD AUTO: 0.55 10*3/MM3 (ref 0.7–3.1)
LYMPHOCYTES NFR BLD AUTO: 5.6 % (ref 19.6–45.3)
MCH RBC QN AUTO: 28.9 PG (ref 26.6–33)
MCHC RBC AUTO-ENTMCNC: 31.2 G/DL (ref 31.5–35.7)
MCV RBC AUTO: 92.8 FL (ref 79–97)
MONOCYTES # BLD AUTO: 1.6 10*3/MM3 (ref 0.1–0.9)
MONOCYTES NFR BLD AUTO: 16.3 % (ref 5–12)
NEUTROPHILS NFR BLD AUTO: 7.53 10*3/MM3 (ref 1.7–7)
NEUTROPHILS NFR BLD AUTO: 76.5 % (ref 42.7–76)
NRBC BLD AUTO-RTO: 0 /100 WBC (ref 0–0.2)
NT-PROBNP SERPL-MCNC: 4359 PG/ML (ref 0–1800)
PLATELET # BLD AUTO: 177 10*3/MM3 (ref 140–450)
PMV BLD AUTO: 11.8 FL (ref 6–12)
POTASSIUM SERPL-SCNC: 4.1 MMOL/L (ref 3.5–5.2)
PROCALCITONIN SERPL-MCNC: 0.17 NG/ML (ref 0–0.25)
RBC # BLD AUTO: 3.46 10*6/MM3 (ref 3.77–5.28)
SODIUM SERPL-SCNC: 136 MMOL/L (ref 136–145)
WBC NRBC COR # BLD: 9.84 10*3/MM3 (ref 3.4–10.8)

## 2023-05-09 PROCEDURE — 71045 X-RAY EXAM CHEST 1 VIEW: CPT

## 2023-05-09 PROCEDURE — 94799 UNLISTED PULMONARY SVC/PX: CPT

## 2023-05-09 PROCEDURE — 84145 PROCALCITONIN (PCT): CPT | Performed by: INTERNAL MEDICINE

## 2023-05-09 PROCEDURE — 97110 THERAPEUTIC EXERCISES: CPT

## 2023-05-09 PROCEDURE — 97116 GAIT TRAINING THERAPY: CPT

## 2023-05-09 PROCEDURE — 80048 BASIC METABOLIC PNL TOTAL CA: CPT | Performed by: INTERNAL MEDICINE

## 2023-05-09 PROCEDURE — 83880 ASSAY OF NATRIURETIC PEPTIDE: CPT | Performed by: INTERNAL MEDICINE

## 2023-05-09 PROCEDURE — 99239 HOSP IP/OBS DSCHRG MGMT >30: CPT | Performed by: INTERNAL MEDICINE

## 2023-05-09 RX ORDER — FUROSEMIDE 40 MG/1
40 TABLET ORAL
Status: CANCELLED | OUTPATIENT
Start: 2023-05-09

## 2023-05-09 RX ORDER — LEVOTHYROXINE SODIUM 112 UG/1
112 TABLET ORAL
Status: DISCONTINUED | OUTPATIENT
Start: 2023-05-10 | End: 2023-05-20 | Stop reason: HOSPADM

## 2023-05-09 RX ORDER — HEPARIN SODIUM 5000 [USP'U]/ML
5000 INJECTION, SOLUTION INTRAVENOUS; SUBCUTANEOUS EVERY 8 HOURS SCHEDULED
Status: DISCONTINUED | OUTPATIENT
Start: 2023-05-09 | End: 2023-05-20 | Stop reason: HOSPADM

## 2023-05-09 RX ORDER — HYDROCODONE BITARTRATE AND ACETAMINOPHEN 7.5; 325 MG/1; MG/1
1 TABLET ORAL EVERY 6 HOURS PRN
Status: CANCELLED | OUTPATIENT
Start: 2023-05-09 | End: 2023-05-12

## 2023-05-09 RX ORDER — MONTELUKAST SODIUM 10 MG/1
10 TABLET ORAL NIGHTLY
Status: CANCELLED | OUTPATIENT
Start: 2023-05-09

## 2023-05-09 RX ORDER — SPIRONOLACTONE 25 MG/1
12.5 TABLET ORAL DAILY
Status: DISCONTINUED | OUTPATIENT
Start: 2023-05-10 | End: 2023-05-16

## 2023-05-09 RX ORDER — CLOPIDOGREL BISULFATE 75 MG/1
75 TABLET ORAL DAILY
Status: DISCONTINUED | OUTPATIENT
Start: 2023-05-10 | End: 2023-05-20 | Stop reason: HOSPADM

## 2023-05-09 RX ORDER — DOCUSATE SODIUM 100 MG/1
100 CAPSULE, LIQUID FILLED ORAL DAILY
Status: CANCELLED | OUTPATIENT
Start: 2023-05-09

## 2023-05-09 RX ORDER — ASCORBIC ACID 500 MG
500 TABLET ORAL DAILY
Status: CANCELLED | OUTPATIENT
Start: 2023-05-09

## 2023-05-09 RX ORDER — NEBIVOLOL 10 MG/1
10 TABLET ORAL DAILY
Status: DISCONTINUED | OUTPATIENT
Start: 2023-05-10 | End: 2023-05-20 | Stop reason: HOSPADM

## 2023-05-09 RX ORDER — ALLOPURINOL 100 MG/1
100 TABLET ORAL DAILY
Status: CANCELLED | OUTPATIENT
Start: 2023-05-09

## 2023-05-09 RX ORDER — ACETAMINOPHEN 325 MG/1
650 TABLET ORAL EVERY 4 HOURS PRN
Status: CANCELLED | OUTPATIENT
Start: 2023-05-09

## 2023-05-09 RX ORDER — PANTOPRAZOLE SODIUM 40 MG/1
40 TABLET, DELAYED RELEASE ORAL
Status: CANCELLED | OUTPATIENT
Start: 2023-05-10

## 2023-05-09 RX ORDER — FERROUS SULFATE 325(65) MG
325 TABLET ORAL
Status: DISCONTINUED | OUTPATIENT
Start: 2023-05-10 | End: 2023-05-12

## 2023-05-09 RX ORDER — HYDROCODONE BITARTRATE AND ACETAMINOPHEN 5; 325 MG/1; MG/1
1 TABLET ORAL EVERY 6 HOURS PRN
Status: ACTIVE | OUTPATIENT
Start: 2023-05-09 | End: 2023-05-15

## 2023-05-09 RX ORDER — HYDROCODONE BITARTRATE AND ACETAMINOPHEN 7.5; 325 MG/1; MG/1
1 TABLET ORAL EVERY 6 HOURS PRN
Status: DISPENSED | OUTPATIENT
Start: 2023-05-09 | End: 2023-05-15

## 2023-05-09 RX ORDER — AMLODIPINE BESYLATE 5 MG/1
5 TABLET ORAL DAILY
Status: CANCELLED | OUTPATIENT
Start: 2023-05-09

## 2023-05-09 RX ORDER — MELATONIN
2000 DAILY
Status: DISCONTINUED | OUTPATIENT
Start: 2023-05-10 | End: 2023-05-20 | Stop reason: HOSPADM

## 2023-05-09 RX ORDER — DOCUSATE SODIUM 100 MG/1
100 CAPSULE, LIQUID FILLED ORAL DAILY
Status: DISCONTINUED | OUTPATIENT
Start: 2023-05-10 | End: 2023-05-16

## 2023-05-09 RX ORDER — ACETAMINOPHEN 325 MG/1
650 TABLET ORAL EVERY 4 HOURS PRN
Status: DISCONTINUED | OUTPATIENT
Start: 2023-05-09 | End: 2023-05-20 | Stop reason: HOSPADM

## 2023-05-09 RX ORDER — CLOPIDOGREL BISULFATE 75 MG/1
75 TABLET ORAL DAILY
Status: CANCELLED | OUTPATIENT
Start: 2023-05-09

## 2023-05-09 RX ORDER — ROSUVASTATIN CALCIUM 20 MG/1
20 TABLET, COATED ORAL DAILY
Status: CANCELLED | OUTPATIENT
Start: 2023-05-09

## 2023-05-09 RX ORDER — MELATONIN
2000 DAILY
Status: CANCELLED | OUTPATIENT
Start: 2023-05-09

## 2023-05-09 RX ORDER — LEVOTHYROXINE SODIUM 112 UG/1
112 TABLET ORAL
Status: CANCELLED | OUTPATIENT
Start: 2023-05-10

## 2023-05-09 RX ORDER — HEPARIN SODIUM 5000 [USP'U]/ML
5000 INJECTION, SOLUTION INTRAVENOUS; SUBCUTANEOUS EVERY 8 HOURS SCHEDULED
Status: CANCELLED | OUTPATIENT
Start: 2023-05-09

## 2023-05-09 RX ORDER — FUROSEMIDE 40 MG/1
40 TABLET ORAL
Status: DISCONTINUED | OUTPATIENT
Start: 2023-05-09 | End: 2023-05-16

## 2023-05-09 RX ORDER — HYDROCODONE BITARTRATE AND ACETAMINOPHEN 5; 325 MG/1; MG/1
1 TABLET ORAL EVERY 6 HOURS PRN
Status: CANCELLED | OUTPATIENT
Start: 2023-05-09 | End: 2023-05-12

## 2023-05-09 RX ORDER — ROSUVASTATIN CALCIUM 20 MG/1
20 TABLET, COATED ORAL NIGHTLY
Status: DISCONTINUED | OUTPATIENT
Start: 2023-05-09 | End: 2023-05-12

## 2023-05-09 RX ORDER — MONTELUKAST SODIUM 10 MG/1
10 TABLET ORAL NIGHTLY
Status: DISCONTINUED | OUTPATIENT
Start: 2023-05-09 | End: 2023-05-20 | Stop reason: HOSPADM

## 2023-05-09 RX ORDER — FERROUS SULFATE 325(65) MG
325 TABLET ORAL
Status: CANCELLED | OUTPATIENT
Start: 2023-05-09

## 2023-05-09 RX ORDER — MULTIPLE VITAMINS W/ MINERALS TAB 9MG-400MCG
1 TAB ORAL DAILY
Status: DISCONTINUED | OUTPATIENT
Start: 2023-05-10 | End: 2023-05-20 | Stop reason: HOSPADM

## 2023-05-09 RX ORDER — ASCORBIC ACID 500 MG
500 TABLET ORAL DAILY
Status: DISCONTINUED | OUTPATIENT
Start: 2023-05-10 | End: 2023-05-20 | Stop reason: HOSPADM

## 2023-05-09 RX ORDER — ALLOPURINOL 100 MG/1
100 TABLET ORAL DAILY
Status: DISCONTINUED | OUTPATIENT
Start: 2023-05-10 | End: 2023-05-20 | Stop reason: HOSPADM

## 2023-05-09 RX ORDER — PANTOPRAZOLE SODIUM 40 MG/1
40 TABLET, DELAYED RELEASE ORAL
Status: DISCONTINUED | OUTPATIENT
Start: 2023-05-10 | End: 2023-05-20 | Stop reason: HOSPADM

## 2023-05-09 RX ORDER — ASPIRIN 81 MG/1
81 TABLET ORAL DAILY
Status: CANCELLED | OUTPATIENT
Start: 2023-05-09

## 2023-05-09 RX ORDER — MULTIPLE VITAMINS W/ MINERALS TAB 9MG-400MCG
1 TAB ORAL DAILY
Status: CANCELLED | OUTPATIENT
Start: 2023-05-09

## 2023-05-09 RX ORDER — NEBIVOLOL 10 MG/1
10 TABLET ORAL DAILY
Status: CANCELLED | OUTPATIENT
Start: 2023-05-09

## 2023-05-09 RX ADMIN — FUROSEMIDE 40 MG: 40 TABLET ORAL at 09:17

## 2023-05-09 RX ADMIN — MULTIPLE VITAMINS W/ MINERALS TAB 1 TABLET: TAB at 09:17

## 2023-05-09 RX ADMIN — Medication 10 ML: at 09:18

## 2023-05-09 RX ADMIN — HYDROCODONE BITARTRATE AND ACETAMINOPHEN 1 TABLET: 7.5; 325 TABLET ORAL at 13:11

## 2023-05-09 RX ADMIN — CLOPIDOGREL BISULFATE 75 MG: 75 TABLET ORAL at 09:17

## 2023-05-09 RX ADMIN — OXYCODONE HYDROCHLORIDE AND ACETAMINOPHEN 500 MG: 500 TABLET ORAL at 09:16

## 2023-05-09 RX ADMIN — ROSUVASTATIN 20 MG: 20 TABLET, FILM COATED ORAL at 20:26

## 2023-05-09 RX ADMIN — LEVOTHYROXINE SODIUM 112 MCG: 0.11 TABLET ORAL at 05:01

## 2023-05-09 RX ADMIN — FUROSEMIDE 40 MG: 40 TABLET ORAL at 17:45

## 2023-05-09 RX ADMIN — NEBIVOLOL 10 MG: 10 TABLET ORAL at 09:16

## 2023-05-09 RX ADMIN — DOCUSATE SODIUM 100 MG: 100 CAPSULE, LIQUID FILLED ORAL at 09:17

## 2023-05-09 RX ADMIN — FERROUS SULFATE TAB 325 MG (65 MG ELEMENTAL FE) 325 MG: 325 (65 FE) TAB at 09:17

## 2023-05-09 RX ADMIN — ROSUVASTATIN 20 MG: 20 TABLET, FILM COATED ORAL at 09:16

## 2023-05-09 RX ADMIN — TUBERCULIN PURIFIED PROTEIN DERIVATIVE 5 UNITS: 5 INJECTION, SOLUTION INTRADERMAL at 20:26

## 2023-05-09 RX ADMIN — Medication 2000 UNITS: at 09:17

## 2023-05-09 RX ADMIN — HYDROCODONE BITARTRATE AND ACETAMINOPHEN 1 TABLET: 7.5; 325 TABLET ORAL at 20:27

## 2023-05-09 RX ADMIN — PANTOPRAZOLE SODIUM 40 MG: 40 TABLET, DELAYED RELEASE ORAL at 09:18

## 2023-05-09 RX ADMIN — HYDROCODONE BITARTRATE AND ACETAMINOPHEN 1 TABLET: 7.5; 325 TABLET ORAL at 04:58

## 2023-05-09 RX ADMIN — ASPIRIN 81 MG: 81 TABLET, COATED ORAL at 09:16

## 2023-05-09 RX ADMIN — ALLOPURINOL 100 MG: 100 TABLET ORAL at 09:17

## 2023-05-09 RX ADMIN — ACETAMINOPHEN 650 MG: 325 TABLET ORAL at 11:49

## 2023-05-09 NOTE — NURSING NOTE
Pt ref to take Protonix together with Synthroid. She wants to take it later with other morning meds. She also ref heparin SQ.

## 2023-05-09 NOTE — DISCHARGE SUMMARY
[facility]         HOSPITALIST  DISCHARGE SUMMARY    Patient Name: Yelena Sanchez    : 1935    MRN: [id number]    Date of Admission: 2023  Date of Discharge:  23  Primary Care Physician: Kaelyn Eugene MD    Consults     Date and Time Order Name Status Description    2023  9:23 PM Inpatient Orthopedic Surgery Consult Completed     2023  6:36 PM Hospitalist (on-call MD unless specified)            Final Diagnosis:  Close fracture of neck of right humerus  Systolic CHF  CKD 3B baseline 1.6-2.1  COPD  CAD  History of AICD  Left knee OA  Hypertension  Hyperlipidemia  Hypothyroidism  Effusion of right elbow      Hospital Course     Hospital Course:  88 y/o F with HTN, CAD, CHF hypothyroidism who presented after mechanical fall with pain involving the right shoulder, right elbow left hand/knee.  No head trauma or loss of consciousness.  Presentation little hypertensive otherwise vital stable.  Labs notable for WBC 14.61, platelets 215, hemoglobin 12, creatinine elevated 2.12, baseline around 1.6, rest of the CMP is nonsignificant.  Imaging with nondisplaced fracture of the right humerus neck, right elbow with joint effusion, left hand minimally displaced fracture of the thumb.  Right shoulder in splint.  Orthopedic surgery consulted, nonoperative management, pain control, PT and rehab.        COVID was obtained for placement versus an skilled nursing facility which resulted negative.  Patient did have 1 time fever episode, chest x-ray was unremarkable, white count normal limits, [place] unremarkable, going to the skilled nursing facility for rehab.      DISCHARGE Follow Up Recommendations for labs and diagnostics:   F/u o2 requirements, bnp is better than more recent evaluations but if develops hypoxia may warrant a few higher doses of lasix  F/u for any further fevers, suspect atelectasis component, pt afebrile without acute symptoms at time of discharge to  SNF    CODE STATUS:  Code Status and Medical Interventions:   Ordered at: 05/04/23 2129     Level Of Support Discussed With:    Patient     Code Status (Patient has no pulse and is not breathing):    CPR (Attempt to Resuscitate)     Medical Interventions (Patient has pulse or is breathing):    Full Support           Day of Discharge     Vital Signs:  Temp:  [98.1 °F (36.7 °C)-100.6 °F (38.1 °C)] 98.2 °F (36.8 °C)  Heart Rate:  [59-77] 59  Resp:  [16] 16  BP: ()/(37-71) 97/71    Physical Exam  Gen: awake, resting in bed, conversant nad   HENT: NCAT, mmm  Resp: CTAB, normal respiratory effort breathing comfortably on ra   CV: RRR, no LE pitting edema  GI: Abdomen soft, NT, ND, no guarding, +BS  Skin: warm, dry, bruising on skin, r shoulder support, left thumb splint in place       Discharge Details        Discharge Medications      ASK your doctor about these medications      Instructions Start Date   allopurinol 100 MG tablet  Commonly known as: ZYLOPRIM   100 mg, Oral, Daily      amLODIPine 5 MG tablet  Commonly known as: NORVASC   5 mg, Oral, Daily      aspirin 81 MG EC tablet   81 mg, Oral, Daily      azelastine 0.1 % nasal spray  Commonly known as: ASTELIN   2 sprays, Nasal, Daily, Use in each nostril as directed      Centrum Silver 50+Women tablet tablet   1 tablet, Oral, Daily      chlorpheniramine 4 MG tablet  Commonly known as: CHLOR-TRIMETON   4 mg, Oral, Every 6 Hours PRN      cholecalciferol 25 MCG (1000 UT) tablet  Commonly known as: VITAMIN D3   2,000 Units, Oral, Daily      clopidogrel 75 MG tablet  Commonly known as: PLAVIX   75 mg, Oral, Daily      ezetimibe 10 MG tablet  Commonly known as: ZETIA   10 mg, Oral, Daily      furosemide 40 MG tablet  Commonly known as: LASIX   40 mg, Oral, 2 Times Daily      lansoprazole 30 MG capsule  Commonly known as: PREVACID   30 mg, Oral, Daily      levothyroxine 112 MCG tablet  Commonly known as: SYNTHROID, LEVOTHROID   112 mcg, Oral, Daily      montelukast  10 MG tablet  Commonly known as: SINGULAIR   10 mg, Oral, Nightly      nebivolol 10 MG tablet  Commonly known as: BYSTOLIC   10 mg, Oral, Daily      rosuvastatin 20 MG tablet  Commonly known as: CRESTOR   20 mg, Oral, Daily      spironolactone 25 MG tablet  Commonly known as: ALDACTONE   12.5 mg, Oral, Daily               Discharge Disposition:  Skilled Nursing Facility ([facility])    Diet: patient counseled on dietary changes made during hospital and plans to  advance as tolerated     Discharge Activity: advance as tolerated          Pertinent  and/or Most Recent Results       LAB RESULTS:      Lab 05/06/23  1320 05/05/23  0407 05/04/23  1851   WBC 10.65 8.64 14.61*   HEMOGLOBIN 10.2* 9.7* 12.0   HEMATOCRIT 31.5* 29.5* 38.0   PLATELETS 172 167 215   NEUTROS ABS  --  6.43 11.84*   IMMATURE GRANS (ABS)  --  0.07* 0.05   LYMPHS ABS  --  0.48* 0.50*   MONOS ABS  --  1.62* 2.14*   EOS ABS  --  0.01 0.05   MCV 92.6 88.9 92.9         Lab 05/09/23  0407 05/08/23  0523 05/07/23  1147 05/06/23  0537 05/05/23  0407   SODIUM 136 137 138 141 140   POTASSIUM 4.1 4.0 4.0 5.1 4.3   CHLORIDE 99 103 102 106 106   CO2 24.0 26.5 24.3 21.8* 25.3   ANION GAP 13.0 7.5 11.7 13.2 8.7   BUN 42* 34* 33* 30* 42*   CREATININE 1.44* 1.19* 1.37* 1.39* 1.69*   EGFR 35.5* 44.6* 37.7* 37.0* 29.3*   GLUCOSE 104* 146* 117* 104* 122*   CALCIUM 9.1 9.0 9.0 9.2 9.2   MAGNESIUM  --   --   --   --  2.3   PHOSPHORUS  --   --   --   --  4.3         Lab 05/05/23  0407 05/04/23  1851   TOTAL PROTEIN 5.7* 6.7   ALBUMIN 3.5 3.9   GLOBULIN 2.2 2.8   ALT (SGPT) 12 15   AST (SGOT) 14 20   BILIRUBIN 0.2 <0.2   ALK PHOS 62 79                     Brief Urine Lab Results     None        Microbiology Results (last 10 days)     Procedure Component Value - Date/Time    COVID PRE-OP / PRE-PROCEDURE SCREENING ORDER (NO ISOLATION) - Swab, Nasopharynx [[id number]]  (Normal) Collected: 05/08/23 7502    Lab Status: Final result Specimen: Swab from Nasopharynx  Updated: 05/08/23 1949    Narrative:      The following orders were created for panel order COVID PRE-OP / PRE-PROCEDURE SCREENING ORDER (NO ISOLATION) - Swab, Nasopharynx.  Procedure                               Abnormality         Status                     ---------                               -----------         ------                     COVID-19,APTIMA PANTHER(...[[id number]]  Normal              Final result                 Please view results for these tests on the individual orders.    COVID-19,APTIMA PANTHER(RAIMUNDO),[facility], NP/OP SWAB IN UTM/VTM/SALINE TRANSPORT MEDIA,24 HR TAT - Swab, Nasopharynx [[id number]]  (Normal) Collected: 05/08/23 1352    Lab Status: Final result Specimen: Swab from Nasopharynx Updated: 05/08/23 1949     COVID19 Not Detected    Narrative:      Fact sheet for providers: https://www.fda.gov/media/950392/download     Fact sheet for patients: https://www.fda.gov/media/669116/download    Test performed by RT PCR.          RADIOLOGY:    XR Humerus Right    Result Date: 5/4/2023  Impression:   1. Nondisplaced fracture of the surgical neck of the humerus, recommend correlation with dedicated shoulder films 2. There appears to be an elbow effusion and mild irregularity of the radial head.  This may rec present a radial head fracture.  Recommend dedicated elbow films.      ALISHA ALCANTARA MD       Electronically Signed and Approved By: ALISHA ALCANTARA MD on 5/04/2023 at 16:29             XR Elbow 3+ View Right    Result Date: 5/4/2023  Impression:  Soft tissue swelling.  Right elbow joint effusion.  The findings may be secondary to occult fracture.  No discrete fractures are identified.      KARY TORRES MD       Electronically Signed and Approved By: KARY TORRES MD on 5/04/2023 at 17:18             XR Hand 3+ View Left    Result Date: 5/4/2023  Impression:   1. Minimally displaced fracture of the mid distal phalanx of the thumb 2. Prior, healed fractures of the distal radius and  ulnar styloid avulsion injuries. 3. Osteopenia      ALISHA ALCANTARA MD       Electronically Signed and Approved By: ALISHA ALCANTARA MD on 5/04/2023 at 16:24             XR Knee 1 or 2 View Left    Result Date: 5/4/2023  Impression:   1. Extensive soft tissue swelling overlying the anterior knee 2. Small suprapatellar knee effusion but no evidence of a lipohemarthrosis 3. Severe degenerative changes with bone-on-bone within the medial compartment.      ALISHA ALCANTARA MD       Electronically Signed and Approved By: ALISHA ALCANTARA MD on 5/04/2023 at 16:22                           Labs Pending at Discharge:        Time spent on Discharge including face to face service:  At least 35 minutes

## 2023-05-09 NOTE — PLAN OF CARE
Goal Outcome Evaluation:      Pt stable throughout shift. Medicated for pain x1. Covid swab negative. Possible discharge to [facility] today.

## 2023-05-09 NOTE — THERAPY TREATMENT NOTE
Acute Care - Physical Therapy Treatment Note  SHAYNA Brenner     Patient Name: Yelena Sanchez  : 1935  MRN: [id number]  Today's Date: 2023      Visit Dx:     ICD-10-CM ICD-9-CM   1. Other closed displaced fracture of proximal end of right humerus, initial encounter  S42.291A 812.09   2. Occult closed fracture of right elbow, initial encounter  S42.401A 812.40   3. Nondisplaced fracture of distal phalanx of left thumb, initial encounter for closed fracture  S62.525A 816.02   4. Abrasions of multiple sites  T07.XXXA 919.0   5. Hematoma of left knee region  S80.02XA 924.11   6. Contusion of right knee, initial encounter  S80.01XA 924.11   7. Fall on same level from slipping, tripping or stumbling, initial encounter  W01.0XXA E885.9   8. Difficulty in walking  R26.2 719.7   9. Decreased activities of daily living (ADL)  Z78.9 V49.89     Patient Active Problem List   Diagnosis   • Closed fracture of neck of right humerus   • HTN (hypertension)   • HLD (hyperlipidemia)   • Hypothyroidism   • Effusion of right elbow   • Renal insufficiency   • Musculoskeletal pain   • Osteoarthritis of left knee     Past Medical History:   Diagnosis Date   • Anemia    • Breast cancer    • CHF (congestive heart failure)      Past Surgical History:   Procedure Laterality Date   • LUNG SURGERY Left     LEFT UPPER LOBE ()   • MASTECTOMY Left      PT Assessment (last 12 hours)     PT Evaluation and Treatment     Row Name 23 0839          Physical Therapy Time and Intention    Subjective Information complains of;pain  -RH     Document Type therapy note (daily note)  -RH     Mode of Treatment physical therapy;individual therapy  -RH     Patient Effort fair  -RH     Row Name 23 0839          Pain Scale: FACES Pre/Post-Treatment    Pain: FACES Scale, Pretreatment 2-->hurts little bit  -RH     Posttreatment Pain Rating --  3/10  -RH     Pain Location - Side/Orientation Right  -RH     Pain Location - shoulder  -RH     Row Name  05/09/23 0839          Mobility    Right Upper Extremity (Weight-bearing Status) non weight-bearing (NWB)  -RH     Left Lower Extremity (Weight-bearing Status) weight-bearing as tolerated (WBAT)  -     Row Name 05/09/23 0839          Bed Mobility    Bed Mobility scooting/bridging;supine-sit  -RH     Scooting/Bridging [place] (Bed Mobility) minimum assist (75% patient effort)  -     Supine-Sit [place] (Bed Mobility) minimum assist (75% patient effort)  -     Bed Mobility, Safety Issues decreased use of arms for pushing/pulling;decreased use of legs for bridging/pushing  -     Assistive Device (Bed Mobility) bed rails  -     Comment, (Bed Mobility) Pt requesting that her RUE sling not be completely fastened.  -     Row Name 05/09/23 0839          Transfers    Transfers sit-stand transfer;stand-sit transfer  -RH     Maintains Weight-bearing Status (Transfers) able to maintain  -     Row Name 05/09/23 0839          Sit-Stand Transfer    Sit-Stand [place] (Transfers) contact guard;minimum assist (75% patient effort)  -     Assistive Device (Sit-Stand Transfers) --  Pt transfers without AD.  -     Row Name 05/09/23 0839          Stand-Sit Transfer    Stand-Sit [place] (Transfers) contact guard;minimum assist (75% patient effort)  -     Row Name 05/09/23 0839          Gait/Stairs (Locomotion)    Gait/Stairs Locomotion gait/ambulation independence;gait/ambulation assistive device;distance ambulated;gait pattern;gait deviations  -     [place] Level (Gait) contact guard  -     Assistive Device (Gait) --  Pt amb without AD.  -     Distance in Feet (Gait) 35  -     Pattern (Gait) 2-point;step-to  -RH     Deviations/Abnormal Patterns (Gait) base of support, narrow;festinating/shuffling;gait speed decreased;stride length decreased  -     Bilateral Gait Deviations heel strike decreased  -     Gait Assessment/Intervention Pt amb with CGA without AD.  Pt with shuffling gait  with decreased bilateral heel strike with decreased stride length and gait speed.  -     Row Name 05/09/23 0839          Balance    Dynamic Standing Balance contact guard  -     Row Name             Wound 05/04/23 2211 Left anterior knee Abrasion    Wound - Properties Group Placement Date: 05/04/23 -JN Placement Time: 2211 -JN Side: Left  -JN Orientation: anterior  -JN Location: knee  -JN Primary Wound Type: Abrasion  -JN    Retired Wound - Properties Group Placement Date: 05/04/23 -JN Placement Time: 2211 -JN Side: Left  -JN Orientation: anterior  -JN Location: knee  -JN Primary Wound Type: Abrasion  -JN    Retired Wound - Properties Group Date first assessed: 05/04/23 -JN Time first assessed: 2211 -JN Side: Left  -JN Location: knee  -JN Primary Wound Type: Abrasion  -JN    Row Name 05/09/23 0839          Vital Signs    O2 Delivery Intra Treatment room air  -     Row Name 05/09/23 0839          Progress Summary (PT)    Progress Toward Functional Goals (PT) progress toward functional goals is fair  -RH           User Key  (r) = Recorded By, (t) = Taken By, (c) = Cosigned By    Initials Name Provider Type    Lyly Williamson, RN Registered Nurse     Oliver Triana PTA Physical Therapist Assistant              .Left Lower Extremity   Exercise  Reps  Sets    Short arc quads   10 2   Heel slides  10 2   Ankle pumps  10 2   Quad sets  10 2   Straight leg raise  10 2   Hip ab/adduction 10 2    Gluteal sets                              10                                           1    Physical Therapy Education     Title: PT OT SLP Therapies (Done)     Topic: Physical Therapy (Done)     Point: Mobility training (Done)     Learning Progress Summary           Patient Acceptance, E,TB, VU by SHERITA at 5/8/2023 2020    Acceptance, E,TB, VU by SHERITA at 5/7/2023 2040    Acceptance, E, VU,NR by BRENDEN at 5/6/2023 2306    Acceptance, E, VU by CELIA at 5/6/2023 1242    Acceptance, E, VU,NR by BRENDEN at 5/6/2023 0132    Acceptance,  E,TB, VU by NATE at 5/5/2023 1102                   Point: Precautions (Done)     Learning Progress Summary           Patient Acceptance, E,TB, VU by SHERITA at 5/8/2023 2020    Acceptance, E,TB, VU by SHERITA at 5/7/2023 2040    Acceptance, E, VU,NR by BRENDEN at 5/6/2023 2306    Acceptance, E, VU by CELIA at 5/6/2023 1242    Acceptance, E, VU,NR by BRENDEN at 5/6/2023 0132    Acceptance, E,TB, VU by NATE at 5/5/2023 1102                               User Key     Initials Effective Dates Name Provider Type Discipline    SHERITA 06/16/21 -  Lyly Amaya, RN Registered Nurse Nurse    BRENDEN 01/16/23 -  Gladys Cruz RN Registered Nurse Nurse    CELIA 06/16/21 -  An Topete OT Occupational Therapist OT    NATE 06/03/21 -  Franck Claire, PT Physical Therapist PT              PT Recommendation and Plan     Progress Summary (PT)  Progress Toward Functional Goals (PT): progress toward functional goals is fair   Outcome Measures     Row Name 05/09/23 0800 05/08/23 1500 05/07/23 1600       How much help from another person do you currently need...    Turning from your back to your side while in flat bed without using bedrails? 2  -RH 2  -RH 2  -CS    Moving from lying on back to sitting on the side of a flat bed without bedrails? 2  -RH 2  -RH 2  -CS    Moving to and from a bed to a chair (including a wheelchair)? 3  -RH 3  -RH 3  -CS    Standing up from a chair using your arms (e.g., wheelchair, bedside chair)? 3  -RH 3  -RH 3  -CS    Climbing 3-5 steps with a railing? 2  -RH 2  -RH 2  -CS    To walk in hospital room? 3  -RH 3  -RH 3  -CS    AM-PAC 6 Clicks Score (PT) 15  -RH 15  -RH 15  -CS       Functional Assessment    Outcome Measure Options -- -- AM-PAC 6 Clicks Basic Mobility (PT)  -CS          User Key  (r) = Recorded By, (t) = Taken By, (c) = Cosigned By    Initials Name Provider Type    RH Oliver Triana PTA Physical Therapist Assistant    Erik Maxwell PTA Physical Therapist Assistant                 Time Calculation:    PT  Charges     Row Name 05/09/23 0839             Time Calculation    PT Received On 05/09/23  -RH         Timed Charges    33049 - PT Therapeutic Exercise Minutes 15  -RH      85378 - Gait Training Minutes  4  -RH      88376 - PT Therapeutic Activity Minutes 4  -RH         Total Minutes    Timed Charges Total Minutes 23  -RH       Total Minutes 23  -RH            User Key  (r) = Recorded By, (t) = Taken By, (c) = Cosigned By    Initials Name Provider Type     Oliver Triana PTA Physical Therapist Assistant              Therapy Charges for Today     Code Description Service Date Service Provider Modifiers Qty    48858217464 HC PT THER PROC EA 15 MIN 5/8/2023 Oliver Triana, SOCRATES GP 1    34278797923 HC PT THERAPEUTIC ACT EA 15 MIN 5/8/2023 Oliver Triana, SOCRATES GP 1    55867864939 HC PT THER PROC EA 15 MIN 5/9/2023 Oliver Triana PTA GP 1    46117995157 HC GAIT TRAINING EA 15 MIN 5/9/2023 Oliver Triana PTA GP 1          PT G-Codes  Outcome Measure Options: AM-PAC 6 Clicks Basic Mobility (PT)  AM-PAC 6 Clicks Score (PT): 15  AM-PAC 6 Clicks Score (OT): 14    Oliver Triana PTA  5/9/2023

## 2023-05-09 NOTE — SIGNIFICANT NOTE
05/09/23 0733   Plan   Final Discharge Disposition Code 03 - skilled nursing facility (SNF)   Final Note Patient can discharge to [facility] with a negative COVID test today.

## 2023-05-09 NOTE — PLAN OF CARE
Goal Outcome Evaluation:   Alert and oriented and pleasant with staff. X1 assist for transfers and ambulation. X1 admin PRN pain medication with relief of symptoms noted. Right arm presents in arm sling with restricted ROM. New to floor this shift. Sitting up in bed, call light in reach.

## 2023-05-10 LAB
ALBUMIN SERPL-MCNC: 3.1 G/DL (ref 3.5–5.2)
ALBUMIN SERPL-MCNC: 3.1 G/DL (ref 3.5–5.2)
ANION GAP SERPL CALCULATED.3IONS-SCNC: 10.5 MMOL/L (ref 5–15)
ANION GAP SERPL CALCULATED.3IONS-SCNC: 10.5 MMOL/L (ref 5–15)
BASOPHILS # BLD AUTO: 0.02 10*3/MM3 (ref 0–0.2)
BASOPHILS # BLD AUTO: 0.02 10*3/MM3 (ref 0–0.2)
BASOPHILS NFR BLD AUTO: 0.2 % (ref 0–1.5)
BASOPHILS NFR BLD AUTO: 0.2 % (ref 0–1.5)
BUN SERPL-MCNC: 47 MG/DL (ref 8–23)
BUN SERPL-MCNC: 47 MG/DL (ref 8–23)
BUN/CREAT SERPL: 31.3 (ref 7–25)
BUN/CREAT SERPL: 31.3 (ref 7–25)
CALCIUM SPEC-SCNC: 9.5 MG/DL (ref 8.6–10.5)
CALCIUM SPEC-SCNC: 9.5 MG/DL (ref 8.6–10.5)
CHLORIDE SERPL-SCNC: 100 MMOL/L (ref 98–107)
CHLORIDE SERPL-SCNC: 100 MMOL/L (ref 98–107)
CO2 SERPL-SCNC: 26.5 MMOL/L (ref 22–29)
CO2 SERPL-SCNC: 26.5 MMOL/L (ref 22–29)
CREAT SERPL-MCNC: 1.5 MG/DL (ref 0.57–1)
CREAT SERPL-MCNC: 1.5 MG/DL (ref 0.57–1)
DEPRECATED RDW RBC AUTO: 53.6 FL (ref 37–54)
DEPRECATED RDW RBC AUTO: 53.6 FL (ref 37–54)
EGFRCR SERPLBLD CKD-EPI 2021: 33.8 ML/MIN/1.73
EGFRCR SERPLBLD CKD-EPI 2021: 33.8 ML/MIN/1.73
EOSINOPHIL # BLD AUTO: 0.19 10*3/MM3 (ref 0–0.4)
EOSINOPHIL # BLD AUTO: 0.19 10*3/MM3 (ref 0–0.4)
EOSINOPHIL NFR BLD AUTO: 2.1 % (ref 0.3–6.2)
EOSINOPHIL NFR BLD AUTO: 2.1 % (ref 0.3–6.2)
ERYTHROCYTE [DISTWIDTH] IN BLOOD BY AUTOMATED COUNT: 16.3 % (ref 12.3–15.4)
ERYTHROCYTE [DISTWIDTH] IN BLOOD BY AUTOMATED COUNT: 16.3 % (ref 12.3–15.4)
GLUCOSE SERPL-MCNC: 117 MG/DL (ref 65–99)
GLUCOSE SERPL-MCNC: 117 MG/DL (ref 65–99)
HCT VFR BLD AUTO: 30.1 % (ref 34–46.6)
HCT VFR BLD AUTO: 30.1 % (ref 34–46.6)
HGB BLD-MCNC: 9.7 G/DL (ref 12–15.9)
HGB BLD-MCNC: 9.7 G/DL (ref 12–15.9)
IMM GRANULOCYTES # BLD AUTO: 0.03 10*3/MM3 (ref 0–0.05)
IMM GRANULOCYTES # BLD AUTO: 0.03 10*3/MM3 (ref 0–0.05)
IMM GRANULOCYTES NFR BLD AUTO: 0.3 % (ref 0–0.5)
IMM GRANULOCYTES NFR BLD AUTO: 0.3 % (ref 0–0.5)
LYMPHOCYTES # BLD AUTO: 0.43 10*3/MM3 (ref 0.7–3.1)
LYMPHOCYTES # BLD AUTO: 0.43 10*3/MM3 (ref 0.7–3.1)
LYMPHOCYTES NFR BLD AUTO: 4.7 % (ref 19.6–45.3)
LYMPHOCYTES NFR BLD AUTO: 4.7 % (ref 19.6–45.3)
MAGNESIUM SERPL-MCNC: 2.4 MG/DL (ref 1.6–2.4)
MAGNESIUM SERPL-MCNC: 2.4 MG/DL (ref 1.6–2.4)
MCH RBC QN AUTO: 29 PG (ref 26.6–33)
MCH RBC QN AUTO: 29 PG (ref 26.6–33)
MCHC RBC AUTO-ENTMCNC: 32.2 G/DL (ref 31.5–35.7)
MCHC RBC AUTO-ENTMCNC: 32.2 G/DL (ref 31.5–35.7)
MCV RBC AUTO: 89.9 FL (ref 79–97)
MCV RBC AUTO: 89.9 FL (ref 79–97)
MONOCYTES # BLD AUTO: 1.54 10*3/MM3 (ref 0.1–0.9)
MONOCYTES # BLD AUTO: 1.54 10*3/MM3 (ref 0.1–0.9)
MONOCYTES NFR BLD AUTO: 16.9 % (ref 5–12)
MONOCYTES NFR BLD AUTO: 16.9 % (ref 5–12)
NEUTROPHILS NFR BLD AUTO: 6.91 10*3/MM3 (ref 1.7–7)
NEUTROPHILS NFR BLD AUTO: 6.91 10*3/MM3 (ref 1.7–7)
NEUTROPHILS NFR BLD AUTO: 75.8 % (ref 42.7–76)
NEUTROPHILS NFR BLD AUTO: 75.8 % (ref 42.7–76)
NRBC BLD AUTO-RTO: 0 /100 WBC (ref 0–0.2)
NRBC BLD AUTO-RTO: 0 /100 WBC (ref 0–0.2)
PHOSPHATE SERPL-MCNC: 3.8 MG/DL (ref 2.5–4.5)
PHOSPHATE SERPL-MCNC: 3.8 MG/DL (ref 2.5–4.5)
PLATELET # BLD AUTO: 201 10*3/MM3 (ref 140–450)
PLATELET # BLD AUTO: 201 10*3/MM3 (ref 140–450)
PMV BLD AUTO: 10.5 FL (ref 6–12)
PMV BLD AUTO: 10.5 FL (ref 6–12)
POTASSIUM SERPL-SCNC: 4.1 MMOL/L (ref 3.5–5.2)
POTASSIUM SERPL-SCNC: 4.1 MMOL/L (ref 3.5–5.2)
RBC # BLD AUTO: 3.35 10*6/MM3 (ref 3.77–5.28)
RBC # BLD AUTO: 3.35 10*6/MM3 (ref 3.77–5.28)
SODIUM SERPL-SCNC: 137 MMOL/L (ref 136–145)
SODIUM SERPL-SCNC: 137 MMOL/L (ref 136–145)
WBC NRBC COR # BLD: 9.12 10*3/MM3 (ref 3.4–10.8)
WBC NRBC COR # BLD: 9.12 10*3/MM3 (ref 3.4–10.8)

## 2023-05-10 PROCEDURE — 97535 SELF CARE MNGMENT TRAINING: CPT

## 2023-05-10 PROCEDURE — 99306 1ST NF CARE HIGH MDM 50: CPT | Performed by: PHYSICIAN ASSISTANT

## 2023-05-10 PROCEDURE — 85025 COMPLETE CBC W/AUTO DIFF WBC: CPT | Performed by: INTERNAL MEDICINE

## 2023-05-10 PROCEDURE — 97530 THERAPEUTIC ACTIVITIES: CPT

## 2023-05-10 PROCEDURE — 97161 PT EVAL LOW COMPLEX 20 MIN: CPT

## 2023-05-10 PROCEDURE — 97110 THERAPEUTIC EXERCISES: CPT

## 2023-05-10 PROCEDURE — 83735 ASSAY OF MAGNESIUM: CPT | Performed by: INTERNAL MEDICINE

## 2023-05-10 PROCEDURE — 97166 OT EVAL MOD COMPLEX 45 MIN: CPT

## 2023-05-10 PROCEDURE — 97116 GAIT TRAINING THERAPY: CPT

## 2023-05-10 PROCEDURE — 80069 RENAL FUNCTION PANEL: CPT | Performed by: INTERNAL MEDICINE

## 2023-05-10 RX ORDER — BISACODYL 10 MG
10 SUPPOSITORY, RECTAL RECTAL DAILY PRN
Status: DISCONTINUED | OUTPATIENT
Start: 2023-05-10 | End: 2023-05-20 | Stop reason: HOSPADM

## 2023-05-10 RX ORDER — POLYETHYLENE GLYCOL 3350 17 G/17G
17 POWDER, FOR SOLUTION ORAL DAILY
Status: DISCONTINUED | OUTPATIENT
Start: 2023-05-10 | End: 2023-05-20 | Stop reason: HOSPADM

## 2023-05-10 RX ADMIN — MONTELUKAST 10 MG: 10 TABLET, FILM COATED ORAL at 21:46

## 2023-05-10 RX ADMIN — DOCUSATE SODIUM 100 MG: 100 CAPSULE, LIQUID FILLED ORAL at 08:31

## 2023-05-10 RX ADMIN — FUROSEMIDE 40 MG: 40 TABLET ORAL at 17:35

## 2023-05-10 RX ADMIN — PANTOPRAZOLE SODIUM 40 MG: 40 TABLET, DELAYED RELEASE ORAL at 05:55

## 2023-05-10 RX ADMIN — HYDROCODONE BITARTRATE AND ACETAMINOPHEN 1 TABLET: 7.5; 325 TABLET ORAL at 10:36

## 2023-05-10 RX ADMIN — HYDROCODONE BITARTRATE AND ACETAMINOPHEN 1 TABLET: 7.5; 325 TABLET ORAL at 04:09

## 2023-05-10 RX ADMIN — POLYETHYLENE GLYCOL 3350 17 G: 17 POWDER, FOR SOLUTION ORAL at 17:35

## 2023-05-10 RX ADMIN — HYDROCODONE BITARTRATE AND ACETAMINOPHEN 1 TABLET: 7.5; 325 TABLET ORAL at 16:53

## 2023-05-10 RX ADMIN — OXYCODONE HYDROCHLORIDE AND ACETAMINOPHEN 500 MG: 500 TABLET ORAL at 08:32

## 2023-05-10 RX ADMIN — ALLOPURINOL 100 MG: 100 TABLET ORAL at 08:32

## 2023-05-10 RX ADMIN — ASPIRIN 81 MG: 81 TABLET, COATED ORAL at 08:32

## 2023-05-10 RX ADMIN — FUROSEMIDE 40 MG: 40 TABLET ORAL at 08:31

## 2023-05-10 RX ADMIN — Medication 2000 UNITS: at 08:31

## 2023-05-10 RX ADMIN — MULTIPLE VITAMINS W/ MINERALS TAB 1 TABLET: TAB at 08:30

## 2023-05-10 RX ADMIN — NEBIVOLOL 10 MG: 10 TABLET ORAL at 08:29

## 2023-05-10 RX ADMIN — SPIRONOLACTONE 12.5 MG: 25 TABLET ORAL at 08:30

## 2023-05-10 RX ADMIN — CLOPIDOGREL BISULFATE 75 MG: 75 TABLET ORAL at 08:31

## 2023-05-10 RX ADMIN — FERROUS SULFATE TAB 325 MG (65 MG ELEMENTAL FE) 325 MG: 325 (65 FE) TAB at 07:50

## 2023-05-10 RX ADMIN — ACETAMINOPHEN 650 MG: 325 TABLET ORAL at 01:32

## 2023-05-10 RX ADMIN — ROSUVASTATIN 20 MG: 20 TABLET, FILM COATED ORAL at 21:46

## 2023-05-10 RX ADMIN — LEVOTHYROXINE SODIUM 112 MCG: 0.11 TABLET ORAL at 05:02

## 2023-05-10 NOTE — CONSULTS
"Nutrition Services    Patient Name: Yelena Sanchez  YOB: 1935  MRN: [id number]  Admission date: 5/9/2023      CLINICAL NUTRITION ASSESSMENT      Reason for Assessment  SNF Admission Assessment     H&P:    Past Medical History:   Diagnosis Date   • Anemia    • Breast cancer    • CHF (congestive heart failure)         Current Problems:   Active Hospital Problems    Diagnosis    • **Fracture         Nutrition/Diet History         Narrative     86 yo female with recent fall in a parking lot resulting in fx.  Intake averaged about 45% past 3 days however, today's intake is 100% breakfast and lunch.    Admitted for rehab 2' right shoulder in a sling for support due to fx.   NFPE conducted without significant findings.  Pt has Atkins shake at bedside.  Reports she is drinking them just to help heal herself.  RD supported need for healing.  Pt reports at home she does use them as meal replacements but is drinking them while here just when she feels like it.    Pt complains of no bowel movement since last Thursday. Indicates she likes prunes and would like to receive prunes or prune juice with breakfast.    Pt also reports she like cranberry sauce mixed with water.  Water pitcher at bedside empty, RD provided full pitcher of ice water.  Pt reports she likes to drink plenty of water.   Pt noted to be edentulous.  Pt reports she has full set of dentures but took them out because she did not feel like she would be able to follow her same oral hygiene regimen while at the SNF and she does not want to get \"sore gums\".  Pt reports no problem chewing or swallowing without the dentures.             Anthropometrics        Current Height, Weight    Weight: 70.7 kg (155 lb 13.8 oz)   Current BMI Body mass index is 31.46 kg/m².       Weight Hx  Wt Readings from Last 30 Encounters:   05/09/23 0955 70.7 kg (155 lb 13.8 oz)   05/04/23 2157 69.8 kg (153 lb 14.1 oz)   05/04/23 1500 70.5 kg (155 lb 6.8 oz)            Wt Change " Observation No significant change in wt can be verified.     Estimated/Assessed Needs       Energy Requirements    EST Needs (kcal/day) 143% of IBW, Adjusted Body Wt 122 pounds/55 kg    25 kcals/kg 1375 kcals    Protein Requirements    EST Daily Needs (g/day) 0.8-1.0 g/kg - 44-55 g protein       Fluid Requirements     Estimated Needs (mL/day) 25 ml/kg - 1375 ml (5-6 cups)     Labs/Medications         Pertinent Labs Reviewed.  Hbg and Hct are low. (could be associated with renal function) Currently taking an iron supplement and a vitamin C supplement.   Results from last 7 days   Lab Units 05/10/23  0508 05/09/23  0407 05/08/23  0523 05/06/23  0537 05/05/23  0407 05/04/23  1851   SODIUM mmol/L 137 136 137   < > 140 141   POTASSIUM mmol/L 4.1 4.1 4.0   < > 4.3 4.5   CHLORIDE mmol/L 100 99 103   < > 106 105   CO2 mmol/L 26.5 24.0 26.5   < > 25.3 23.3   BUN mg/dL 47* 42* 34*   < > 42* 46*   CREATININE mg/dL 1.50* 1.44* 1.19*   < > 1.69* 2.13*   CALCIUM mg/dL 9.5 9.1 9.0   < > 9.2 9.6   BILIRUBIN mg/dL  --   --   --   --  0.2 <0.2   ALK PHOS U/L  --   --   --   --  62 79   ALT (SGPT) U/L  --   --   --   --  12 15   AST (SGOT) U/L  --   --   --   --  14 20   GLUCOSE mg/dL 117* 104* 146*   < > 122* 115*    < > = values in this interval not displayed.     Results from last 7 days   Lab Units 05/10/23  0508 05/06/23  1320 05/05/23  0407   MAGNESIUM mg/dL 2.4  --  2.3   PHOSPHORUS mg/dL 3.8  --  4.3   HEMOGLOBIN g/dL 9.7*   < > 9.7*   HEMATOCRIT % 30.1*   < > 29.5*    < > = values in this interval not displayed.     COVID19   Date Value Ref Range Status   05/08/2023 Not Detected Not Detected - Ref. Range Final     No results found for: HGBA1C      Pertinent Medications Reviewed. Pt receives Lasix and Aldactone daily      Current Nutrition Orders & Evaluation of Intake       Oral Nutrition     Current PO Diet Diet: Regular/House Diet; Texture: Regular Texture (IDDSI 7); Fluid Consistency: Thin (IDDSI 0)   Supplement No active  supplement orders       Malnutrition Severity Assessment                Nutrition Diagnosis         Nutrition Dx Problem 1 Altered GI function related to Limited mobility  as evidenced by Pt reports constipation       Nutrition Intervention         Prune juice with breakfast meal     Medical Nutrition Therapy/Nutrition Education          Learner     Readiness Patient  Acceptance     Method     Response Explanation  Verbalizes understanding     Monitor/Evaluation        Monitor Per protocol       Nutrition Discharge Plan         No nutrition discharge needs identified at this time       Electronically signed by:  Mylene Whitt RD  05/10/23 15:27 EDT

## 2023-05-10 NOTE — PLAN OF CARE
Goal Outcome Evaluation:           Progress: no change  Outcome Evaluation: Resident is alert & oriented times 3; calm, cooperative, and pleasant with team members; transfers with assist of one to BR[facility] using walker: LBM noted on 5/7/23. Saline catherine intact in left hand, flushes easily. Pain medication administered per resident request (see emar); Arm sling in place; finger splint in place. Will continue with POC.

## 2023-05-10 NOTE — PLAN OF CARE
Goal Outcome Evaluation:  Plan of Care Reviewed With: patient        Progress: no change  Outcome Evaluation: Pt presents with limitations that impede their ability to safely and independently transfer and ambulate. The skills of a therapist will be required to safely and effectively implement the following treatment plan to restore maximal level of function.      PT Evaluation Complexity  History, PT Evaluation Complexity: 1-2 personal factors and/or comorbidities  Examination of Body Systems (PT Eval Complexity): total of 4 or more elements  Clinical Presentation (PT Evaluation Complexity): stable  Clinical Decision Making (PT Evaluation Complexity): low complexity  Overall Complexity (PT Evaluation Complexity): low complexity

## 2023-05-10 NOTE — THERAPY EVALUATION
SNF - Occupational Therapy Initial Evaluation & Treatment Note  [facility]    Patient Name: Yelena Sanchez  : 1935    MRN: [id number]                              Today's Date: 5/10/2023       Admit Date: 2023    Visit Dx:     ICD-10-CM ICD-9-CM   1. Decreased activities of daily living (ADL)  Z78.9 V49.89     Patient Active Problem List   Diagnosis   • Closed fracture of neck of right humerus   • HTN (hypertension)   • HLD (hyperlipidemia)   • Hypothyroidism   • Effusion of right elbow   • Renal insufficiency   • Musculoskeletal pain   • Osteoarthritis of left knee   • Fracture     Past Medical History:   Diagnosis Date   • Anemia    • Breast cancer    • CHF (congestive heart failure)      Past Surgical History:   Procedure Laterality Date   • LUNG SURGERY Left     LEFT UPPER LOBE ()   • MASTECTOMY Left       General Information     Row Name 05/10/23 0812 05/10/23 0650       OT Time and Intention    Document Type therapy note (daily note)  -EG evaluation  -EG    Mode of Treatment individual therapy;occupational therapy  -EG individual therapy;occupational therapy  -EG    Row Name 05/10/23 0812 05/10/23 0650       General Information    Patient Profile Reviewed -- yes  -EG    Prior Level of Function -- --  Patient reports being ind. at home with ADL's; standing in tubshower with seat option if needed; reports not using an AD for mobility; no AE/DME for other ADL use  -EG    Existing Precautions/Restrictions fall;non-weight bearing;brace on at all times;weight bearing  -EG fall;non-weight bearing;brace on at all times;weight bearing  -EG    Barriers to Rehab -- none identified  -EG    Row Name 05/10/23 0650          Occupational Profile    Reason for Services/Referral (Occupational Profile) Patient is a 87-year-old female admitted to [facility] on 2023.  OT was consulted due to R humeral fracture with nonoperative management chosen. OT to assess for new onset of deficits and limitations  "in ADL/Transfer performance.  No previous OT services for current condition.  -EG     Patient Goals (Occupational Profile) \"Noone told me anything about a gym, I need to call my  to go home\"  -EG     Row Name 05/10/23 0650          Living Environment    People in Home spouse  -EG     Row Name 05/10/23 0650          Home Main Entrance    Number of Stairs, Main Entrance none  -EG     Row Name 05/10/23 0812 05/10/23 0650       Cognition    Orientation Status (Cognition) oriented x 3  -EG oriented x 3  -EG    Row Name 05/10/23 0812 05/10/23 0650       Safety Issues, Functional Mobility    Safety Issues Affecting Function (Mobility) -- impulsivity;awareness of need for assistance;insight into deficits/self-awareness;judgment  -EG    Impairments Affecting Function (Mobility) balance;pain;range of motion (ROM);strength;coordination;endurance/activity tolerance;grasp;shortness of breath  -EG balance;pain;range of motion (ROM);strength;coordination;endurance/activity tolerance;grasp;shortness of breath  -EG          User Key  (r) = Recorded By, (t) = Taken By, (c) = Cosigned By    Initials Name Provider Type    EG Letitia Chinchilla, OT Occupational Therapist                 Mobility/ADL's     Row Name 05/10/23 0653          Bed Mobility    Bed Mobility bed mobility (all) activities  -EG     All Activities, [place] (Bed Mobility) minimum assist (75% patient effort);verbal cues  -EG     Bed Mobility, Safety Issues decreased use of arms for pushing/pulling;decreased use of legs for bridging/pushing  -EG     Assistive Device (Bed Mobility) bed rails  -EG     Row Name 05/10/23 0812 05/10/23 0653       Transfers    Transfers sit-stand transfer;stand-sit transfer;bed-chair transfer;toilet transfer  -EG --    Comment, (Transfers) -- CGA for all transfers with use of hemiwalker  -EG    Row Name 05/10/23 0812          Bed-Chair Transfer    Bed-Chair [place] (Transfers) contact guard;verbal cues  -EG     Assistive " Device (Bed-Chair Transfers) walker, pawan  -EG     Row Name 05/10/23 0812          Sit-Stand Transfer    Sit-Stand [place] (Transfers) contact guard;minimum assist (75% patient effort)  -EG     Assistive Device (Sit-Stand Transfers) walker, pawan  -EG     Row Name 05/10/23 0812          Stand-Sit Transfer    Stand-Sit [place] (Transfers) contact guard;verbal cues  -EG     Assistive Device (Stand-Sit Transfers) walker, pawan  -EG     Row Name 05/10/23 0812          Toilet Transfer    Type (Toilet Transfer) sit-stand;stand-sit  -EG     [place] Level (Toilet Transfer) verbal cues;1 person assist;contact guard  -EG     Assistive Device (Toilet Transfer) raised toilet seat;walker, pawan  -EG     Row Name 05/10/23 0812 05/10/23 0653       Functional Mobility    Functional Mobility- Ind. Level contact guard assist;minimum assist (75% patient effort)  -EG contact guard assist;minimum assist (75% patient effort)  -EG    Functional Mobility- Device walker, pawan  -EG --    Functional Mobility- Comment One LOB required Ananya during mobility to and from bathroom with OT using pawan-walker; OT provides education and cueing on safe and proper use of hemiwalker, needs reinforcement for carryover  -EG CGA/Ananya for mobility with use of hemiwalker; requires increased cues and education for safety and proper management of hemiwalker at this time  -EG    Row Name 05/10/23 0812 05/10/23 0653       Activities of Daily Living    BADL Assessment/Intervention bathing;upper body dressing;lower body dressing;grooming;toileting  -EG --  UB bathing modA; LB bathing modA; UB dressing modA; LB dressing modA; Toileting CGA; Hyg/grooming Ananya; Self-feeding set-up  -EG    Row Name 05/10/23 0812 05/10/23 0653       Mobility    Extremity Weight-bearing Status left lower extremity;right upper extremity  -EG left lower extremity;right upper extremity  -EG    Right Upper Extremity (Weight-bearing Status) non weight-bearing (NWB)  -EG non  weight-bearing (NWB)  -EG    Left Lower Extremity (Weight-bearing Status) weight-bearing as tolerated (WBAT)  -EG weight-bearing as tolerated (WBAT)  -EG    Row Name 05/10/23 0812          Bathing Assessment/Intervention    [place] Level (Bathing) bathing skills;upper body;modified independence;lower body;verbal cues  -EG     Position (Bathing) unsupported sitting  -EG     Comment, (Bathing) Increased assist and education on adaptive techniques for improved ind.; patient nonreceptive to cues at times  -EG     Row Name 05/10/23 0812          Upper Body Dressing Assessment/Training    [place] Level (Upper Body Dressing) upper body dressing skills;don;pull-over garment;moderate assist (50% patient effort)  -EG     Comment, (Upper Body Dressing) Education and cueing on pawan dressing techniques; Education and training for immobilizer on RUE (needs reinforcement)  -EG     Row Name 05/10/23 0812          Lower Body Dressing Assessment/Training    [place] Level (Lower Body Dressing) lower body dressing skills;don;pants/bottoms;moderate assist (50% patient effort)  -EG     Comment, (Lower Body Dressing) extended time required with cues and education on adaptive techniques  -EG     Row Name 05/10/23 0812          Grooming Assessment/Training    [place] Level (Grooming) grooming skills;set up;hair care, combing/brushing  -EG     Row Name 05/10/23 0812          Toileting Assessment/Training    [place] Level (Toileting) toileting skills;verbal cues;contact guard assist;perform perineal hygiene;adjust/manage clothing  -EG     Comment, (Toileting) extended time and encouragement for clothing mangement ind.  -EG           User Key  (r) = Recorded By, (t) = Taken By, (c) = Cosigned By    Initials Name Provider Type    EG Letitia Chinchilla OT Occupational Therapist               Obj/Interventions     Row Name 05/10/23 0655          Sensory Assessment (Somatosensory)    Sensory Assessment (Somatosensory)  sensation intact  -EG     Row Name 05/10/23 0655          Vision Assessment/Intervention    Visual Impairment/Limitations WFL  -EG     Row Name 05/10/23 0655          Range of Motion Comprehensive    Comment, General Range of Motion RUE immobilized; LUE WFL other then thumb currently in splint  -EG     Row Name 05/10/23 0655          Strength Comprehensive (MMT)    Comment, General Manual Muscle Testing (MMT) Assessment LUE WFL for ADL performance  -EG     Row Name 05/10/23 0815 05/10/23 0655       Motor Skills    Motor Skills coordination;functional endurance  -EG coordination;functional endurance  -EG    Coordination moderate impairment;right  -EG moderate impairment;right  NO ROM immobilized at all times per conservative nonoperative approach orders  -EG    Functional Endurance fair-  -EG fair-  -EG    Therapeutic Exercise --  Refusing further exercise; states noone told her about going to a therapy gym and wants to talk to social workers about leaving and going home.  -EG --    Row Name 05/10/23 0815 05/10/23 0655       Balance    Balance Assessment -- standing static balance;sit to stand dynamic balance  -EG    Sit to Stand Dynamic Balance -- minimal assist;contact guard  -EG    Static Standing Balance -- contact guard;minimal assist;verbal cues  -EG    Balance Interventions standing;sit to stand;supported;occupation based/functional task;weight shifting activity;UE activity with balance activity;static  -EG --          User Key  (r) = Recorded By, (t) = Taken By, (c) = Cosigned By    Initials Name Provider Type    EG Letitia Chinchilla OT Occupational Therapist               Goals/Plan     Row Name 05/10/23 0759          Bed Mobility Goal 1 (OT)    Activity/Assistive Device (Bed Mobility Goal 1, OT) bed mobility activities, all  -EG     [place] Level/Cues Needed (Bed Mobility Goal 1, OT) modified independence  -EG     Time Frame (Bed Mobility Goal 1, OT) long term goal (LTG);30 days  -EG     Row Name  05/10/23 0759          Transfer Goal 1 (OT)    Activity/Assistive Device (Transfer Goal 1, OT) transfers, all  -EG     [place] Level/Cues Needed (Transfer Goal 1, OT) modified independence  -EG     Time Frame (Transfer Goal 1, OT) long term goal (LTG);30 days  -EG     Row Name 05/10/23 0759          Bathing Goal 1 (OT)    Activity/Device (Bathing Goal 1, OT) bathing skills, all  -EG     [place] Level/Cues Needed (Bathing Goal 1, OT) modified independence  -EG     Time Frame (Bathing Goal 1, OT) long term goal (LTG);30 days  -EG     Row Name 05/10/23 0759          Dressing Goal 1 (OT)    Activity/Device (Dressing Goal 1, OT) dressing skills, all  -EG     [place]/Cues Needed (Dressing Goal 1, OT) modified independence  -EG     Time Frame (Dressing Goal 1, OT) long term goal (LTG);30 days  -EG     Row Name 05/10/23 0759          Toileting Goal 1 (OT)    Activity/Device (Toileting Goal 1, OT) toileting skills, all  -EG     [place] Level/Cues Needed (Toileting Goal 1, OT) modified independence  -EG     Time Frame (Toileting Goal 1, OT) long term goal (LTG);30 days  -EG     Row Name 05/10/23 0759          Grooming Goal 1 (OT)    Activity/Device (Grooming Goal 1, OT) grooming skills, all  -EG     [place] (Grooming Goal 1, OT) modified independence  -EG     Time Frame (Grooming Goal 1, OT) long term goal (LTG);30 days  -EG     Row Name 05/10/23 0759          ROM Goal 1 (OT)    ROM Goal 1 (OT) patient will improve RUE ROM, as permitted by ortho, to wfl.  -EG     Time Frame (ROM Goal 1, OT) long term goal (LTG);30 days  -EG     Row Name 05/10/23 0759          Strength Goal 1 (OT)    Strength Goal 1 (OT) patient will improve RUE strength to 4/5, as permitted by ortho, in order to increase independence with adls.  -EG     Time Frame (Strength Goal 1, OT) long term goal (LTG);30 days  -EG     Row Name 05/10/23 0759          Problem Specific Goal 1 (OT)    Problem Specific Goal 1 (OT) patient will improve  endurance to good for safe adls.  -EG     Time Frame (Problem Specific Goal 1, OT) long term goal (LTG);30 days  -EG     Row Name 05/10/23 0759          Therapy Assessment/Plan (OT)    Planned Therapy Interventions (OT) activity tolerance training;functional balance retraining;occupation/activity based interventions;adaptive equipment training;BADL retraining;neuromuscular control/coordination retraining;patient/caregiver education/training;transfer/mobility retraining;strengthening exercise  -EG           User Key  (r) = Recorded By, (t) = Taken By, (c) = Cosigned By    Initials Name Provider Type    EG Letitia Chinchilla, OT Occupational Therapist               Clinical Impression     Row Name 05/10/23 0656          Pain Assessment    Additional Documentation Pain Scale: FACES Pre/Post-Treatment (Group)  -EG     Row Name 05/10/23 0816 05/10/23 0656       Pain Scale: FACES Pre/Post-Treatment    Pain: FACES Scale, Pretreatment 2-->hurts little bit  -EG 2-->hurts little bit  -EG    Posttreatment Pain Rating 4-->hurts little more  -EG 4-->hurts little more  -EG    Pain Location - Side/Orientation Right  -EG Right  -EG    Pain Location - shoulder  -EG shoulder  -EG    Row Name 05/10/23 0816 05/10/23 0656       Plan of Care Review    Plan of Care Reviewed With patient  -EG patient  -EG    Progress no change  -EG no change  -EG    Outcome Evaluation Patient requires increased cues and encouragement for cooperation with OT session; Confusion is noted and limits carryover and understanding on benefits of therapy services at this time; Patient could benefit from OT services to facilitate improvements in safety, endurance, balance and strength while also addressing RUE deficits and need for compensatory technique education.  -EG Patient presents with limitations of decreased functional endurance, strength, safety/insight and limited balance requiring need for skilled OT services to facilitate return to prior level of function  with ADLs. Patient also is limited due to 24/7 immobilization of LUE and inability to use requiring education and training on compensatory techniques  -EG    Row Name 05/10/23 0816 05/10/23 0656       Therapy Assessment/Plan (OT)    Rehab Potential (OT) good, to achieve stated therapy goals  -EG good, to achieve stated therapy goals  -EG    Criteria for Skilled Therapeutic Interventions Met (OT) yes;meets criteria;skilled treatment is necessary  -EG yes;meets criteria;skilled treatment is necessary  -EG    Therapy Frequency (OT) 5 times/wk  -EG 5 times/wk  -EG    Row Name 05/10/23 0656          Therapy Plan Review/Discharge Plan (OT)    Equipment Needs Upon Discharge (OT) --  patient will need pawan walker and raised toilet seat and or BSC  -EG     Anticipated Discharge Disposition (OT) home with home health  -EG           User Key  (r) = Recorded By, (t) = Taken By, (c) = Cosigned By    Initials Name Provider Type    EG Letitia Chinchilla, LEAH Occupational Therapist               Outcome Measures     Row Name 05/10/23 0818 05/10/23 0759       How much help from another is currently needed...    Putting on and taking off regular lower body clothing? 2  -EG 2  -EG    Bathing (including washing, rinsing, and drying) 2  -EG 2  -EG    Toileting (which includes using toilet bed pan or urinal) 3  -EG 3  -EG    Putting on and taking off regular upper body clothing 2  -EG 2  -EG    Taking care of personal grooming (such as brushing teeth) 3  -EG 3  -EG    Eating meals 4  -EG 4  -EG    AM-PAC 6 Clicks Score (OT) 16  -EG 16  -EG    Row Name 05/10/23 0818 05/10/23 0759       Functional Assessment    Outcome Measure Options AM-PAC 6 Clicks Daily Activity (OT);Optimal Instrument  -EG AM-PAC 6 Clicks Daily Activity (OT);Optimal Instrument  -EG    Row Name 05/10/23 0818 05/10/23 0759       Optimal Instrument    Optimal Instrument Optimal - 3  -EG Optimal - 3  -EG    Bending/Stooping 4  -EG 4  -EG    Standing 2  -EG 2  -EG    Reaching  3  -EG 3  -EG          User Key  (r) = Recorded By, (t) = Taken By, (c) = Cosigned By    Initials Name Provider Type    Letitia Carter OT Occupational Therapist              Section G  Mobility  Bed mobility - self performance: limited assistance (staff provide guided maneuvering of limbs or other non-weight bearing assistance)  Bed mobility support/assistance: One person assist  Transfer - self performance: limited assistance (staff provide guided maneuvering of limbs or other non-weight bearing assistance)  Transfer support/assistance: One person assist  Walking in room - self performance: limited assistance (staff provide guided maneuvering of limbs or other non-weight bearing assistance)  Walking in room support/assistance: One person assist  Walking in corridors/hallway - self performance: limited assistance (staff provide guided maneuvering of limbs or other non-weight bearing assistance)  Walking in corridors/hallway support/assistance: One person assist  Locomotion on unit - self performance: limited assistance (staff provide guided maneuvering of limbs or other non-weight bearing assistance)  Locomotion on unit support/assistance: One person assist  Locomotion off unit - self performance: activity did not occur  Locomotion off unit support/assistance: Activity did not occur  Dressing - self performance: extensive assistance (provide any weight-bearing support, hold over while fabrice-care lift legs for transfer, etc.)  Dressing support/assistance: One person assist  Eating - self performance: independent  Eating support/assistance: Setup help only  Toileting - self performance: limited assistance (staff provide guided maneuvering of limbs or other non-weight bearing assistance)  Toileting support/assistance: One person assist  Personal hygiene - self performance: limited assistance (staff provide guided maneuvering of limbs or other non-weight bearing assistance)  Personal hygiene support/assistance: One  person assist  Bathing  Bathing - self performance: Physical help with bathing (exclude washing back and hair for patient)  Bathing support/assistance: One person assist  Balance  Balance during transitions & walking: Not steady, requires assist to steady  Moving from seated to standing position: Not steady, requires assist to steady  Walking: Not steady, requires assist to steady  Turning around while walking: Not steady, requires assist to steady  Moving on and off toilet: Not steady, requires assist to steady  Surface-to-surface transfer: Not steady, requires assist to steady  Mobility devices:  (pawan walker)  Range of Motion  Upper Extremity: Impairment on one side  Lower Extremity: No impairment  Section GG  SectionGG: Functional Ability/Goals, Adm  Self Care, Prior Functioning (VC3434K): 3. Independent  Functional Cognition, Prior Functioning (GS9635G): 3. Independent  Self Care, Admission (Section GG)  Eating: Self-Care Admission Performance ([id number]): setup or clean-up assistance (05)  Oral Hygiene: Self-Care Admission Performance ([id number]): setup or clean-up assistance (05)  Toileting Hygiene: Self-Care Admission Performance (WK6707Y2): supervision or touching assistance (04)  Shower/Bathe Self: Self-Care Admission Performance (PE9901P3): partial/moderate assistance (03)  Upper Body Dressing: Self-Care Admission Performance ([id number]): partial/moderate assistance (03)  Lower Body Dressing: Self-Care Admission Performance (QQ1207D0): partial/moderate assistance (03)  Putting On/Taking Off Footwear: Self-Care Admission Performance ([id number]): partial/moderate assistance (03)     Section GG: Functional Ability/Goals, DC  Eating: Self-Care Discharge Goal (GB1030F1): independent (06)  Oral Hygiene: Self-Care Discharge Goal (SO1785X0): independent (06)  Toileting Hygiene: Self-Care Discharge Goal (GP6008S1): independent (06)  Shower/Bathe Self: Self-Care Discharge Goal (IZ0712I2): independent (06)  Upper Body  Dressing: Self-Care Discharge Goal (MX3707G1): independent (06)  Lower Body Dressing: Self-Care Discharge Goal (IW5654I0): independent (06)  Putting On/Taking Off Footwear: Self-Care Discharge Goal (IF0537F1): independent (06)             Occupational Therapy Education     Title: PT OT SLP Therapies (In Progress)     Topic: Occupational Therapy (In Progress)     Point: ADL training (In Progress)     Description:   Instruct learner(s) on proper safety adaptation and remediation techniques during self care or transfers.   Instruct in proper use of assistive devices.              Learning Progress Summary           Patient Zhanna, E, NR by EG at 5/10/2023 0800    Comment: Education on compensatory techniques for UB ADL's  Educated on sling donning, ROM and WB status orders for RUE  Education on general safety techniques and fall risk prevention                   Point: Home exercise program (In Progress)     Description:   Instruct learner(s) on appropriate technique for monitoring, assisting and/or progressing therapeutic exercises/activities.              Learning Progress Summary           Patient Zhanna, E, NR by EG at 5/10/2023 0800    Comment: Education on compensatory techniques for UB ADL's  Educated on sling donning, ROM and WB status orders for RUE  Education on general safety techniques and fall risk prevention                   Point: Precautions (In Progress)     Description:   Instruct learner(s) on prescribed precautions during self-care and functional transfers.              Learning Progress Summary           Patient Zhanna, ALEJANDRO, NR by EG at 5/10/2023 0800    Comment: Education on compensatory techniques for UB ADL's  Educated on sling donning, ROM and WB status orders for RUE  Education on general safety techniques and fall risk prevention                   Point: Body mechanics (In Progress)     Description:   Instruct learner(s) on proper positioning and spine alignment during self-care, functional  mobility activities and/or exercises.              Learning Progress Summary           Patient Zhanna, E, NR by EG at 5/10/2023 0800    Comment: Education on compensatory techniques for UB ADL's  Educated on sling donning, ROM and WB status orders for RUE  Education on general safety techniques and fall risk prevention                               User Key     Initials Effective Dates Name Provider Type Discipline    EG 09/14/22 -  Letitia Chinchilla, OT Occupational Therapist OT              OT Recommendation and Plan  Planned Therapy Interventions (OT): activity tolerance training, functional balance retraining, occupation/activity based interventions, adaptive equipment training, BADL retraining, neuromuscular control/coordination retraining, patient/caregiver education/training, transfer/mobility retraining, strengthening exercise  Therapy Frequency (OT): 5 times/wk  Plan of Care Review  Plan of Care Reviewed With: patient  Progress: no change  Outcome Evaluation: Patient requires increased cues and encouragement for cooperation with OT session; Confusion is noted and limits carryover and understanding on benefits of therapy services at this time; Patient could benefit from OT services to facilitate improvements in safety, endurance, balance and strength while also addressing RUE deficits and need for compensatory technique education.     Time Calculation:    Time Calculation- OT     Row Name 05/10/23 0818 05/10/23 0802          Time Calculation- OT    OT Received On 05/10/23  -EG 05/10/23  -EG     OT Goal Re-Cert Due Date 06/08/23  -EG 06/08/23  -EG        Timed Charges    43363 - OT Therapeutic Activity Minutes 18  -EG --     36145 - OT Self Care/Mgmt Minutes 22  -EG --        Untimed Charges    OT Eval/Re-eval Minutes -- 34  -EG        SNF Occupational Therapy Minutes    Skilled Minutes- OT 40 min  -EG --        Total Minutes    Timed Charges Total Minutes 40  -EG --     Untimed Charges Total Minutes -- 34  -EG       Total Minutes 40  -EG 34  -EG           User Key  (r) = Recorded By, (t) = Taken By, (c) = Cosigned By    Initials Name Provider Type    Letitia Carter OT Occupational Therapist              Therapy Charges for Today     Code Description Service Date Service Provider Modifiers Qty    [id number]  OT EVAL MOD COMPLEXITY 3 5/10/2023 Letitia Chinchilla OT GO 1    [id number]  OT THERAPEUTIC ACT EA 15 MIN 5/10/2023 Letitia Chinchilla OT GO 1    [id number]  OT SELF CARE/MGMT/TRAIN EA 15 MIN 5/10/2023 Letitia Chinchilla OT GO 2               Letitia Chinchilla OT  5/10/2023

## 2023-05-10 NOTE — THERAPY EVALUATION
SNF - Physical Therapy Initial Evaluation and Treatment Note  SHAYNA Brenner     Patient Name: Yelena Sanchez  : 1935  MRN: [id number]  Today's Date: 5/10/2023      Visit Dx:    ICD-10-CM ICD-9-CM   1. Decreased activities of daily living (ADL)  Z78.9 V49.89   2. Difficulty walking  R26.2 719.7     Patient Active Problem List   Diagnosis   • Closed fracture of neck of right humerus   • HTN (hypertension)   • HLD (hyperlipidemia)   • Hypothyroidism   • Effusion of right elbow   • Renal insufficiency   • Musculoskeletal pain   • Osteoarthritis of left knee   • Fracture     Past Medical History:   Diagnosis Date   • Anemia    • Breast cancer    • CHF (congestive heart failure)      Past Surgical History:   Procedure Laterality Date   • LUNG SURGERY Left     LEFT UPPER LOBE ()   • MASTECTOMY Left        PT Assessment (last 12 hours)     PT Evaluation and Treatment     Row Name 05/10/23 1600          Physical Therapy Time and Intention    Subjective Information complains of;fatigue  -CS     Document Type evaluation  -CS     Mode of Treatment individual therapy;physical therapy  -CS     Patient Effort fair  -CS     Symptoms Noted During/After Treatment fatigue  -CS     Row Name 05/10/23 1600          General Information    Patient Profile Reviewed yes  -CS     Patient Observations alert;cooperative;agree to therapy  -CS     Prior Level of Function independent:;all household mobility;community mobility;gait;bed mobility;ADL's;driving  -CS     Equipment Currently Used at Home none  has a shower chair if needed  -CS     Existing Precautions/Restrictions fall;non-weight bearing;brace on at all times;weight bearing  -CS     Limitations/Impairments safety/cognitive  -CS     Barriers to Rehab none identified  -CS     Row Name 05/10/23 1600          Living Environment    Current Living Arrangements home  -CS     Home Accessibility stairs to enter home;stairs within home  -CS     People in Home spouse  -CS     Primary Care  Provided by self  -     Row Name 05/10/23 1600          Home Main Entrance    Number of Stairs, Main Entrance one  -CS     Stair Railings, Main Entrance none  -     Row Name 05/10/23 1600          Stairs Within Home, Primary    Stairs, Within Home, Primary Patient reports 12 steps to the basement where the laundry is located.  There are bilateral handrails  -     Row Name 05/10/23 1600          Pain    Pretreatment Pain Rating 3/10  -CS     Posttreatment Pain Rating 3/10  -CS     Pain Location - Side/Orientation Left  -CS     Pain Location - knee  -CS     Pain Intervention(s) Nursing Notified  -     Row Name 05/10/23 1600          Cognition    Affect/Mental Status (Cognition) other (see comments)  Intermittent confusion noted  -CS     Orientation Status (Cognition) oriented to;person;time  -CS     Cognitive Function safety deficit;memory deficit  -     Row Name 05/10/23 1600          Range of Motion Comprehensive    General Range of Motion bilateral lower extremity ROM WFL  -     Row Name 05/10/23 1600          Strength Comprehensive (MMT)    General Manual Muscle Testing (MMT) Assessment lower extremity strength deficits identified  -CS     Comment, General Manual Muscle Testing (MMT) Assessment BLEs assessed at 3+/5  -CS     Row Name 05/10/23 1600          Mobility    Extremity Weight-bearing Status left lower extremity;right upper extremity  -CS     Right Upper Extremity (Weight-bearing Status) non weight-bearing (NWB)  -CS     Left Lower Extremity (Weight-bearing Status) weight-bearing as tolerated (WBAT)  -     Row Name 05/10/23 1600          Bed Mobility    Bed Mobility bed mobility (all) activities  -     All Activities, [place] (Bed Mobility) verbal cues;minimum assist (75% patient effort)  -CS     Bed Mobility, Safety Issues decreased use of arms for pushing/pulling;decreased use of legs for bridging/pushing  -     Assistive Device (Bed Mobility) bed rails;head of bed elevated  -      Row Name 05/10/23 1600          Transfers    Transfers sit-stand transfer;stand-sit transfer  -CS     Row Name 05/10/23 1600          Sit-Stand Transfer    Sit-Stand [place] (Transfers) verbal cues;contact guard;minimum assist (75% patient effort)  -     Assistive Device (Sit-Stand Transfers) walker, pawan  -CS     Row Name 05/10/23 1600          Stand-Sit Transfer    Stand-Sit [place] (Transfers) verbal cues;minimum assist (75% patient effort);contact guard  -CS     Assistive Device (Stand-Sit Transfers) walker, pawan  -CS     Row Name 05/10/23 1600          Gait/Stairs (Locomotion)    Gait/Stairs Locomotion gait/ambulation assistive device  -     [place] Level (Gait) verbal cues;contact guard;minimum assist (75% patient effort)  -CS     Assistive Device (Gait) other (see comments)  hand-held assist  -     Distance in Feet (Gait) 80, 100  -CS     Deviations/Abnormal Patterns (Gait) base of support, narrow;festinating/shuffling;gait speed decreased;stride length decreased  -CS     Comment, (Gait/Stairs) Patient attempted ambulation with pawan-walker for 20' but patient not able to use appropriately or safely acutally causing an increased risk of falling. Hand-held assist with pt's LUE was utilized to add increased balance and safety for patient  -CS     Row Name 05/10/23 1600          Safety Issues, Functional Mobility    Safety Issues Affecting Function (Mobility) awareness of need for assistance;insight into deficits/self-awareness;problem-solving  -     Impairments Affecting Function (Mobility) balance;cognition;endurance/activity tolerance;strength;pain  -     Row Name 05/10/23 1600          Balance    Balance Assessment standing dynamic balance  -     Dynamic Standing Balance moderate assist  -CS     Position/Device Used, Standing Balance unsupported  -     Row Name 05/10/23 1600          Motor Skills    Therapeutic Exercise hip;knee;ankle  -     Row Name             Wound  05/04/23 2211 Left anterior knee Abrasion    Wound - Properties Group Placement Date: 05/04/23 -JN Placement Time: 2211 -JN Side: Left  -JN Orientation: anterior  -JN Location: knee  -JN Primary Wound Type: Abrasion  -JN    Retired Wound - Properties Group Placement Date: 05/04/23 -JN Placement Time: 2211 -JN Side: Left  -JN Orientation: anterior  -JN Location: knee  -JN Primary Wound Type: Abrasion  -JN    Retired Wound - Properties Group Date first assessed: 05/04/23 -JN Time first assessed: 2211 -JN Side: Left  -JN Location: knee  -JN Primary Wound Type: Abrasion  -JN    Row Name 05/10/23 1600          Plan of Care Review    Plan of Care Reviewed With patient  -CS     Progress no change  -CS     Outcome Evaluation Pt presents with limitations that impede their ability to safely and independently transfer and ambulate. The skills of a therapist will be required to safely and effectively implement the following treatment plan to restore maximal level of function.  -CS     Row Name 05/10/23 1600          Therapy Assessment/Plan (PT)    Rehab Potential (PT) good, to achieve stated therapy goals  -CS     Criteria for Skilled Interventions Met (PT) yes;skilled treatment is necessary  -CS     Therapy Frequency (PT) 6 times/wk  -CS     Predicted Duration of Therapy Intervention (PT) 30 days  -CS     Problem List (PT) problems related to;balance;cognition;mobility;strength;pain  -CS     Activity Limitations Related to Problem List (PT) unable to ambulate safely;unable to transfer safely  -CS     Row Name 05/10/23 1600          PT Evaluation Complexity    History, PT Evaluation Complexity 1-2 personal factors and/or comorbidities  -CS     Examination of Body Systems (PT Eval Complexity) total of 4 or more elements  -CS     Clinical Presentation (PT Evaluation Complexity) stable  -CS     Clinical Decision Making (PT Evaluation Complexity) low complexity  -CS     Overall Complexity (PT Evaluation Complexity) low  complexity  -     Row Name 05/10/23 1600          Therapy Plan Review/Discharge Plan (PT)    Therapy Plan Review (PT) evaluation/treatment results reviewed;patient  -     Row Name 05/10/23 1600          Physical Therapy Goals    Bed Mobility Goal Selection (PT) bed mobility, PT goal 1  -CS     Transfer Goal Selection (PT) transfer, PT goal 1  -CS     Gait Training Goal Selection (PT) gait training, PT goal 1  -CS     Stairs Goal Selection (PT) stairs, PT goal 1  -     Row Name 05/10/23 1600          Bed Mobility Goal 1 (PT)    Activity/Assistive Device (Bed Mobility Goal 1, PT) bed mobility activities, all  -CS     [place] Level/Cues Needed (Bed Mobility Goal 1, PT) modified independence  -CS     Time Frame (Bed Mobility Goal 1, PT) long term goal (LTG);30 days  -     Row Name 05/10/23 1600          Transfer Goal 1 (PT)    Activity/Assistive Device (Transfer Goal 1, PT) sit-to-stand/stand-to-sit;bed-to-chair/chair-to-bed  -CS     [place] Level/Cues Needed (Transfer Goal 1, PT) independent  -CS     Time Frame (Transfer Goal 1, PT) long term goal (LTG);30 days  -     Row Name 05/10/23 1600          Gait Training Goal 1 (PT)    Activity/Assistive Device (Gait Training Goal 1, PT) gait (walking locomotion)  -CS     [place] Level (Gait Training Goal 1, PT) independent  -CS     Distance (Gait Training Goal 1, PT) 300  -CS     Time Frame (Gait Training Goal 1, PT) long term goal (LTG);30 days  -     Row Name 05/10/23 1600          Stairs Goal 1 (PT)    Activity/Assistive Device (Stairs Goal 1, PT) ascending stairs;descending stairs;using handrail, left;using handrail, right  -CS     [place] Level/Cues Needed (Stairs Goal 1, PT) supervision required  -CS     Number of Stairs (Stairs Goal 1, PT) 12  -CS     Time Frame (Stairs Goal 1, PT) long term goal (LTG);30 days  -CS           User Key  (r) = Recorded By, (t) = Taken By, (c) = Cosigned By    Initials Name Provider Type    SHERITA Amaya  Lyly, PEBBLES Registered Nurse    Ruth Auguste PT Physical Therapist              Bilateral Lower Extremity   Exercise  Reps  Sets    Long arc quads  10 2   Hamstring curls 10 2   Ankle pumps  10 2   Glute sets  10 2   Marching in place seated 10 2   Hip ab/adduction 10 2        Section G        Balance  Balance during transitions & walking: Not steady, requires assist to steady  Moving from seated to standing position: Not steady, requires assist to steady  Walking: Not steady, requires assist to steady  Turning around while walking: Not steady, requires assist to steady  Moving on and off toilet: Not steady, requires assist to steady  Surface-to-surface transfer: Not steady, requires assist to steady  Mobility devices: None were used  Range of Motion  Lower Extremity: No impairment  Section GG  SectionGG: Functional Ability/Goals, Adm  Indoor Mobility - Ambulation, Prior Function (EE2541D): 3. Independent  Stairs, Prior Function (PR2195A): 3. Independent  Prior Device Use (PM0191): none of the above (Z)     Mobility, Admission Performance (PT2299)  Roll Left & Right: Mobility Admission Performance (XW4580X7): partial/moderate assistance (03)  Sit to Lying: Mobility Admission Performance (FE0742U1): partial/moderate assistance (03)  Lying to Sitting, Side of Bed: Mobility Admission Performance (AE0523B5): partial/moderate assistance (03)  Sit to Stand: Mobility Admission Performance (QE1250L8): partial/moderate assistance (03)  Chair/Bed-Chair Transfer: Mobility Admission Performance (VE2446G5): partial/moderate assistance (03)  Toilet Transfer: Mobility Admission Performance (LP5665N8): partial/moderate assistance (03)  Car Transfer: Mobility Admission Performance (VC1973I8): not attempted due to environmental limitations (10)  Walk 10 Feet: Mobility Admission Performance (AY6803G7): partial/moderate assistance (03)  Walk 50 Feet With Two Turns: Mobility Admission Performance (AC3334J7): partial/moderate  assistance (03)  Walk 150 Feet: Mobility Admission Performance (AW6239F7): not attempted, medical condition/safety concern (88)  Walk 10 Ft, Uneven Surfaces: Mobility Admission Performance (BO3340B1): not applicable (09)  1 Step/Curb: Mobility Admission Performance (IU0959G7): not attempted, medical condition/safety concern (88)  4 Steps: Mobility Admission Performance (SS6107K5): not attempted, medical condition/safety concern (88)  12 Steps: Mobility Admission Performance (GT1156N9): not attempted, medical condition/safety concern (88)  Picking up object: Mobility Admission Performance (EZ4999G0): not attempted, medical condition/safety concern (88)  Use a Wheelchair and/or Scooter: Mobility (VH2985W1): no (0)     Mobility (GG), Discharge Goal (DN3166)  Roll Left & Right: Mobility Discharge Goal (LC4753R8): independent (06)  Sit to Lying: Mobility Discharge Goal (OP2332D5): independent (06)  Lying to Sitting, Side of Bed: Mobility Discharge Goal (XK5494V1): independent (06)  Sit to Stand: Mobility Discharge Goal (VC3516X0): independent (06)  Chair/Bed-Chair Transfer: Mobility Discharge Goal (JH6452K5): independent (06)  Toilet Transfer: Mobility Discharge Goal (FL8795E8): independent (06)  Car Transfer: Mobility Discharge Goal (XG5857X7): not attempted due to environmental limitations (10)  Walk 10 Feet: Mobility Discharge Goal (WU4949O4): independent (06)  Walk 50 Feet With Two Turns: Mobility Discharge Goal (PP0328A4): independent (06)  Walk 150 Feet: Mobility Discharge Goal (CX0591Y0): independent (06)  Walk 10 Ft, Uneven Surfaces: Mobility Discharge Goal (AJ5006Z6): not applicable (09)  1 Step/Curb: Mobility Discharge Goal (DP8603E4): supervision or touching assistance (04)  4 Steps: Mobility Discharge Goal (CC9427J5): supervision or touching assistance (04)  12 Steps: Mobility Discharge Goal (LW6055I1): supervision or touching assistance (04)  Picking up object: Mobility Discharge Goal (UN3624W5): independent  (06)  Use a Wheelchair and/or Scooter: Mobility (YO6496V0): no (0)  Wheel 50 Ft Two Turns: Mobility Discharge Goal (ZA9623B0): not applicable (09)  Wheel 150 Feet: Mobility Discharge Goal (JN1440S4): not applicable (09)     Mobility, Discharge Performance (RA2915)  Use a Wheelchair and/or Scooter: Mobility (PL0330B1): no (0)  Physical Therapy Education     Title: PT OT SLP Therapies (In Progress)     Topic: Physical Therapy (In Progress)     Point: Mobility training (In Progress)     Learning Progress Summary           Patient Acceptance, E, NR by CS at 5/10/2023 1652                   Point: Precautions (In Progress)     Learning Progress Summary           Patient Acceptance, E, NR by CS at 5/10/2023 1652                               User Key     Initials Effective Dates Name Provider Type Discipline     04/25/21 -  Ruth Mina, PT Physical Therapist PT              PT Recommendation and Plan  Anticipated Discharge Disposition (PT): home with assist, home with home health  Planned Therapy Interventions (PT): balance training, bed mobility training, gait training, transfer training, strengthening, stair training  Therapy Frequency (PT): 6 times/wk  Plan of Care Reviewed With: patient  Progress: no change  Outcome Evaluation: Pt presents with limitations that impede their ability to safely and independently transfer and ambulate. The skills of a therapist will be required to safely and effectively implement the following treatment plan to restore maximal level of function.   Outcome Measures     Row Name 05/10/23 1500             How much help from another person do you currently need...    Turning from your back to your side while in flat bed without using bedrails? 2  -CS      Moving from lying on back to sitting on the side of a flat bed without bedrails? 2  -CS      Moving to and from a bed to a chair (including a wheelchair)? 3  -CS      Standing up from a chair using your arms (e.g., wheelchair, bedside  chair)? 3  -CS      Climbing 3-5 steps with a railing? 2  -CS      To walk in hospital room? 3  -CS      AM-PAC 6 Clicks Score (PT) 15  -CS         Functional Assessment    Outcome Measure Options AM-PAC 6 Clicks Basic Mobility (PT)  -CS            User Key  (r) = Recorded By, (t) = Taken By, (c) = Cosigned By    Initials Name Provider Type    CS Ruth Mina, PT Physical Therapist                 Time Calculation:    PT Charges     Row Name 05/10/23 1622             Time Calculation    PT Received On 05/10/23  -CS      PT Goal Re-Cert Due Date 06/08/23  -CS         Timed Charges    62783 - PT Therapeutic Exercise Minutes 16  -CS      43062 - Gait Training Minutes  9  -CS      66502 - PT Therapeutic Activity Minutes 13  -CS         Untimed Charges    PT Eval/Re-eval Minutes 35  -CS         SNF Physical Therapy Minutes    Skilled Minutes- PT 38 min  -CS         Total Minutes    Timed Charges Total Minutes 38  -CS      Untimed Charges Total Minutes 35  -CS       Total Minutes 73  -CS            User Key  (r) = Recorded By, (t) = Taken By, (c) = Cosigned By    Initials Name Provider Type    CS Ruth Mina, PT Physical Therapist              Therapy Charges for Today     Code Description Service Date Service Provider Modifiers Qty    50453032278 HC PT THER PROC EA 15 MIN 5/10/2023 Ruth Mina, PT GP 1    80756338354 HC GAIT TRAINING EA 15 MIN 5/10/2023 Ruth Mina, PT GP 1    51118941529 HC PT THERAPEUTIC ACT EA 15 MIN 5/10/2023 Ruth Mina, PT GP 1    15156085451 HC PT EVAL LOW COMPLEXITY 3 5/10/2023 Ruth Mina, PT GP 1          PT G-Codes  Outcome Measure Options: AM-PAC 6 Clicks Basic Mobility (PT)  AM-PAC 6 Clicks Score (PT): 15  AM-PAC 6 Clicks Score (OT): 16    Ruth Mina PT  5/10/2023

## 2023-05-10 NOTE — PLAN OF CARE
Goal Outcome Evaluation:   Alert and oriented and pleasant with staff. X1 assist for transfers and ambulation. X2 admin prn pain medication, with relief of symptoms noted. New order received for PRN Miralax for c/o constipation received this shift. Sitting up in bed, call light in reach.

## 2023-05-10 NOTE — PLAN OF CARE
Goal Outcome Evaluation:  Plan of Care Reviewed With: patient        Progress: no change  Outcome Evaluation: Patient presents with limitations of decreased functional endurance, strength, safety/insight and limited balance requiring need for skilled OT services to facilitate return to prior level of function with ADLs. Patient also is limited due to 24/7 immobilization of LUE and inability to use requiring education and training on compensatory techniques        Discharge recommendations: Home with assist from  and home health services

## 2023-05-10 NOTE — H&P
[facility]  Date[facility] 5/10/2023   Patient Name: Yelena Sanchez  : 1935  MRN: [id number]  Primary Care Physician:  Kaelyn Eugene MD  Date of admission: 2023    Subjective   Subjective     Chief Complaint: Weakness    HPI:    86 y/o F with HTN, CAD, CHF hypothyroidism who presented after mechanical fall with pain involving the right shoulder, right elbow left hand/knee.  No head trauma or loss of consciousness.  Presentation little hypertensive otherwise vital stable.  Labs notable for WBC 14.61, platelets 215, hemoglobin 12, creatinine elevated 2.12, baseline around 1.6, rest of the CMP is nonsignificant.  Imaging with nondisplaced fracture of the right humerus neck, right elbow with joint effusion, left hand minimally displaced fracture of the thumb.  Right shoulder in splint.  Orthopedic surgery consulted, nonoperative management, pain control, PT and rehab.     Seen by PT and OT inpatient rehab recommended patient accepted bed at [facility] skilled nursing facility.       Personal History     Past Medical History:  Past Medical History:   Diagnosis Date   • Anemia    • Breast cancer    • CHF (congestive heart failure)          Past Surgical History:  Past Surgical History:   Procedure Laterality Date   • LUNG SURGERY Left     LEFT UPPER LOBE ()   • MASTECTOMY Left          Family History:   History reviewed. No pertinent family history.      Social History:   Social History     Socioeconomic History   • Marital status:    Tobacco Use   • Smoking status: Never     Passive exposure: Never   • Smokeless tobacco: Never   Vaping Use   • Vaping Use: Never used   Substance and Sexual Activity   • Alcohol use: Never   • Drug use: Never   • Sexual activity: Defer         Home Medications:  allopurinol, amLODIPine, aspirin, azelastine, chlorpheniramine, cholecalciferol, clopidogrel, ezetimibe, furosemide, lansoprazole, levothyroxine, montelukast, multivitamin  with minerals, nebivolol, rosuvastatin, and spironolactone    Allergies:  No Known Allergies    Review of Systems   All systems were reviewed and negative except for: Constipation generalized weakness fatigue right arm pain    Objective   Objective     Vitals:   Temp:  [97.7 °F (36.5 °C)-97.9 °F (36.6 °C)] 97.7 °F (36.5 °C)  Heart Rate:  [59-60] 59  Resp:  [18-20] 18  BP: (112-149)/(45-54) 112/54    Physical Exam   Constitutional: Awake alert oriented no acute distress  Respiratory: Clear  Cardiovascular: RRR  Extremities: Right upper extremity neurovascularly intact in sling  GI: Abdomen soft nontender bowel sounds present    Result Review    Result Review:  I have personally reviewed the results from the time of this admission to 5/10/2023 17:27 EDT and agree with these findings:  [x]  Laboratory  []  Microbiology  []  Radiology  []  EKG/Telemetry   []  Cardiology/Vascular   []  Pathology  [x]  Old records  []  Other:      Assessment & Plan   Assessment / Plan   Assessment:    ·  weakness  · Close fracture of neck of right humerus  · Systolic CHF  · CKD 3B baseline 1.6-2.1  · COPD  · CAD  · History of AICD  · Left knee OA  · Hypertension  · Hyperlipidemia  · Hypothyroidism  · Effusion of right elbow    Plan:    · Continue with right upper extremity immobilization with right arm sling  · Continue weightbearing status per orthopedic surgery  · Continue with diuretics monitoring volume status renal function and weights  · Continue with PT and OT  · Continue with oral analgesics  · Bowel regimen to be started for constipation  · Continuing other home medications as ordered  · Following labs intermittently  · Further treatment contingent upon her hospital course  · Discussed with RN  ·   DVT prophylaxis:  Medical DVT prophylaxis orders are present.    CODE STATUS:    Level Of Support Discussed With: Patient  Code Status (Patient has no pulse and is not breathing): CPR (Attempt to Resuscitate)  Medical Interventions  (Patient has pulse or is breathing): Full Support      Admission Status:  I believe this patient meets inpatient status.    Electronically signed by ROSY Castro, 05/10/23, 5:27 PM EDT.

## 2023-05-11 LAB
ALBUMIN SERPL-MCNC: 3.4 G/DL (ref 3.5–5.2)
ALBUMIN SERPL-MCNC: 3.4 G/DL (ref 3.5–5.2)
ANION GAP SERPL CALCULATED.3IONS-SCNC: 15.3 MMOL/L (ref 5–15)
ANION GAP SERPL CALCULATED.3IONS-SCNC: 15.3 MMOL/L (ref 5–15)
BUN SERPL-MCNC: 54 MG/DL (ref 8–23)
BUN SERPL-MCNC: 54 MG/DL (ref 8–23)
BUN/CREAT SERPL: 34.2 (ref 7–25)
BUN/CREAT SERPL: 34.2 (ref 7–25)
CALCIUM SPEC-SCNC: 9.9 MG/DL (ref 8.6–10.5)
CALCIUM SPEC-SCNC: 9.9 MG/DL (ref 8.6–10.5)
CHLORIDE SERPL-SCNC: 98 MMOL/L (ref 98–107)
CHLORIDE SERPL-SCNC: 98 MMOL/L (ref 98–107)
CO2 SERPL-SCNC: 23.7 MMOL/L (ref 22–29)
CO2 SERPL-SCNC: 23.7 MMOL/L (ref 22–29)
CREAT SERPL-MCNC: 1.58 MG/DL (ref 0.57–1)
CREAT SERPL-MCNC: 1.58 MG/DL (ref 0.57–1)
EGFRCR SERPLBLD CKD-EPI 2021: 31.8 ML/MIN/1.73
EGFRCR SERPLBLD CKD-EPI 2021: 31.8 ML/MIN/1.73
GLUCOSE SERPL-MCNC: 93 MG/DL (ref 65–99)
GLUCOSE SERPL-MCNC: 93 MG/DL (ref 65–99)
PHOSPHATE SERPL-MCNC: 3.7 MG/DL (ref 2.5–4.5)
PHOSPHATE SERPL-MCNC: 3.7 MG/DL (ref 2.5–4.5)
POTASSIUM SERPL-SCNC: 4 MMOL/L (ref 3.5–5.2)
POTASSIUM SERPL-SCNC: 4 MMOL/L (ref 3.5–5.2)
SODIUM SERPL-SCNC: 137 MMOL/L (ref 136–145)
SODIUM SERPL-SCNC: 137 MMOL/L (ref 136–145)

## 2023-05-11 PROCEDURE — 97110 THERAPEUTIC EXERCISES: CPT

## 2023-05-11 PROCEDURE — 97116 GAIT TRAINING THERAPY: CPT

## 2023-05-11 PROCEDURE — 97535 SELF CARE MNGMENT TRAINING: CPT

## 2023-05-11 PROCEDURE — 97530 THERAPEUTIC ACTIVITIES: CPT

## 2023-05-11 PROCEDURE — 25010000002 HEPARIN (PORCINE) PER 1000 UNITS: Performed by: INTERNAL MEDICINE

## 2023-05-11 PROCEDURE — 80069 RENAL FUNCTION PANEL: CPT | Performed by: PHYSICIAN ASSISTANT

## 2023-05-11 RX ORDER — CHOLECALCIFEROL (VITAMIN D3) 125 MCG
10 CAPSULE ORAL NIGHTLY PRN
Status: DISCONTINUED | OUTPATIENT
Start: 2023-05-11 | End: 2023-05-20 | Stop reason: HOSPADM

## 2023-05-11 RX ORDER — MAG HYDROX/ALUMINUM HYD/SIMETH 400-400-40
0.5 SUSPENSION, ORAL (FINAL DOSE FORM) ORAL DAILY PRN
Status: DISCONTINUED | OUTPATIENT
Start: 2023-05-11 | End: 2023-05-12

## 2023-05-11 RX ADMIN — FERROUS SULFATE TAB 325 MG (65 MG ELEMENTAL FE) 325 MG: 325 (65 FE) TAB at 09:16

## 2023-05-11 RX ADMIN — ALLOPURINOL 100 MG: 100 TABLET ORAL at 09:16

## 2023-05-11 RX ADMIN — HYDROCODONE BITARTRATE AND ACETAMINOPHEN 1 TABLET: 7.5; 325 TABLET ORAL at 15:25

## 2023-05-11 RX ADMIN — DOCUSATE SODIUM 100 MG: 100 CAPSULE, LIQUID FILLED ORAL at 09:16

## 2023-05-11 RX ADMIN — FUROSEMIDE 40 MG: 40 TABLET ORAL at 19:49

## 2023-05-11 RX ADMIN — Medication 2000 UNITS: at 09:16

## 2023-05-11 RX ADMIN — LEVOTHYROXINE SODIUM 112 MCG: 0.11 TABLET ORAL at 05:11

## 2023-05-11 RX ADMIN — FUROSEMIDE 40 MG: 40 TABLET ORAL at 09:16

## 2023-05-11 RX ADMIN — SPIRONOLACTONE 12.5 MG: 25 TABLET ORAL at 09:16

## 2023-05-11 RX ADMIN — ASPIRIN 81 MG: 81 TABLET, COATED ORAL at 09:16

## 2023-05-11 RX ADMIN — PANTOPRAZOLE SODIUM 40 MG: 40 TABLET, DELAYED RELEASE ORAL at 05:11

## 2023-05-11 RX ADMIN — NEBIVOLOL 10 MG: 10 TABLET ORAL at 09:16

## 2023-05-11 RX ADMIN — HYDROCODONE BITARTRATE AND ACETAMINOPHEN 1 TABLET: 7.5; 325 TABLET ORAL at 21:31

## 2023-05-11 RX ADMIN — MONTELUKAST 10 MG: 10 TABLET, FILM COATED ORAL at 21:31

## 2023-05-11 RX ADMIN — CLOPIDOGREL BISULFATE 75 MG: 75 TABLET ORAL at 09:16

## 2023-05-11 RX ADMIN — Medication 10 MG: at 22:57

## 2023-05-11 RX ADMIN — OXYCODONE HYDROCHLORIDE AND ACETAMINOPHEN 500 MG: 500 TABLET ORAL at 09:17

## 2023-05-11 RX ADMIN — ROSUVASTATIN 20 MG: 20 TABLET, FILM COATED ORAL at 21:31

## 2023-05-11 RX ADMIN — HYDROCODONE BITARTRATE AND ACETAMINOPHEN 1 TABLET: 7.5; 325 TABLET ORAL at 09:16

## 2023-05-11 RX ADMIN — POLYETHYLENE GLYCOL 3350 17 G: 17 POWDER, FOR SOLUTION ORAL at 10:05

## 2023-05-11 RX ADMIN — MULTIPLE VITAMINS W/ MINERALS TAB 1 TABLET: TAB at 09:16

## 2023-05-11 NOTE — THERAPY TREATMENT NOTE
SNF - Physical Therapy Treatment Note  SHAYNA Brenner     Patient Name: Yelena Sanchez  : 1935  MRN: [id number]  Today's Date: 2023      Visit Dx:     ICD-10-CM ICD-9-CM   1. Decreased activities of daily living (ADL)  Z78.9 V49.89   2. Difficulty walking  R26.2 719.7     Patient Active Problem List   Diagnosis   • Closed fracture of neck of right humerus   • HTN (hypertension)   • HLD (hyperlipidemia)   • Hypothyroidism   • Effusion of right elbow   • Renal insufficiency   • Musculoskeletal pain   • Osteoarthritis of left knee   • Fracture     Past Medical History:   Diagnosis Date   • Anemia    • Breast cancer    • CHF (congestive heart failure)      Past Surgical History:   Procedure Laterality Date   • LUNG SURGERY Left     LEFT UPPER LOBE ()   • MASTECTOMY Left      PT Assessment (last 12 hours)     PT Evaluation and Treatment     Row Name 23 1403 23 1200       Physical Therapy Time and Intention    Document Type therapy note (daily note)  -WM therapy note (daily note)  -WM    Mode of Treatment individual therapy;physical therapy  -WM individual therapy;physical therapy  -WM    Patient Effort good  -WM good  -WM    Symptoms Noted During/After Treatment fatigue  -WM fatigue  -WM    Row Name 23 1403 23 1200       Pain Scale: FACES Pre/Post-Treatment    Pain: FACES Scale, Pretreatment 2-->hurts little bit  -WM 2-->hurts little bit  -WM    Posttreatment Pain Rating 2-->hurts little bit  -WM 2-->hurts little bit  -WM    Pain Location - Side/Orientation Right  -WM Right  -WM    Pain Location - shoulder  -WM shoulder  -WM    Row Name 23 1403 23 1200       Sit-Stand Transfer    Sit-Stand [place] (Transfers) contact guard;verbal cues  -WM contact guard;minimum assist (75% patient effort)  -WM    Assistive Device (Sit-Stand Transfers) cane, quad  -WM --  No AD  -WM    Row Name 23 1403 23 1200       Stand-Sit Transfer    Stand-Sit [place] (Transfers)  contact guard;verbal cues  - contact guard;minimum assist (75% patient effort)  -    Assistive Device (Stand-Sit Transfers) cane, quad  -WM --  No AD  -    Row Name 05/11/23 1403 05/11/23 1200       Gait/Stairs (Locomotion)    [place] Level (Gait) contact guard  - minimum assist (75% patient effort)  -    Assistive Device (Gait) cane, quad  -WM --  Hand held assist  -    Distance in Feet (Gait) 120  -  -    Pattern (Gait) 3-point;step-through  -WM --    Deviations/Abnormal Patterns (Gait) gait speed decreased;stride length decreased  - gait speed decreased;stride length decreased  -    Row Name 05/11/23 1403 05/11/23 1200       Safety Issues, Functional Mobility    Impairments Affecting Function (Mobility) balance;endurance/activity tolerance;pain;range of motion (ROM);strength  NWB RUE  - balance;endurance/activity tolerance;pain;range of motion (ROM);strength  -    Row Name 05/11/23 1200          Hip (Therapeutic Exercise)    Hip (Therapeutic Exercise) strengthening exercise;AROM (active range of motion)  -     Hip AROM (Therapeutic Exercise) bilateral;aBduction;aDduction;supine;15 repititions  2 sets  -     Hip Strengthening (Therapeutic Exercise) bilateral;heel slides;supine;15 repititions;2 sets  Manual resistance  -     Row Name 05/11/23 1200          Knee (Therapeutic Exercise)    Knee (Therapeutic Exercise) strengthening exercise  -     Knee Strengthening (Therapeutic Exercise) bilateral;SLR (straight leg raise);SAQ (short arc quad);supine;2 lb free weight;15 repititions;2 sets  -     Row Name 05/11/23 1200          Ankle (Therapeutic Exercise)    Ankle AROM (Therapeutic Exercise) bilateral;dorsiflexion;plantarflexion;30 repititions  -     Row Name             Wound 05/04/23 2211 Left anterior knee Abrasion    Wound - Properties Group Placement Date: 05/04/23  -JN Placement Time: 2211 -JN Side: Left  -JN Orientation: anterior  -JN Location: knee  -JN Primary  Wound Type: Abrasion  -JN    Retired Wound - Properties Group Placement Date: 05/04/23  -JN Placement Time: 2211 -JN Side: Left  -JN Orientation: anterior  -JN Location: knee  -JN Primary Wound Type: Abrasion  -JN    Retired Wound - Properties Group Date first assessed: 05/04/23  -JN Time first assessed: 2211 -JN Side: Left  -JN Location: knee  -JN Primary Wound Type: Abrasion  -JN    Row Name 05/11/23 1403 05/11/23 1200       Progress Summary (PT)    Progress Toward Functional Goals (PT) progress toward functional goals is good  -WM progress toward functional goals is good  -WM          User Key  (r) = Recorded By, (t) = Taken By, (c) = Cosigned By    Initials Name Provider Type    Lyly Williamson RN Registered Nurse    Cameron Mcgregor PTA Physical Therapist Assistant                Physical Therapy Education     Title: PT OT SLP Therapies (In Progress)     Topic: Physical Therapy (In Progress)     Point: Mobility training (In Progress)     Learning Progress Summary           Patient Acceptance, E, NR by CS at 5/10/2023 1652                   Point: Precautions (In Progress)     Learning Progress Summary           Patient Acceptance, E, NR by CS at 5/10/2023 1652                               User Key     Initials Effective Dates Name Provider Type Discipline    CS 04/25/21 -  Ruth Mina, PT Physical Therapist PT              PT Recommendation and Plan     Progress Summary (PT)  Progress Toward Functional Goals (PT): progress toward functional goals is good   Outcome Measures     Row Name 05/11/23 1406 05/11/23 1224 05/10/23 1500       How much help from another person do you currently need...    Turning from your back to your side while in flat bed without using bedrails? 2  -WM 2  -WM 2  -CS    Moving from lying on back to sitting on the side of a flat bed without bedrails? 2  -WM 2  -WM 2  -CS    Moving to and from a bed to a chair (including a wheelchair)? 3  -WM 3  -WM 3  -CS    Standing up  from a chair using your arms (e.g., wheelchair, bedside chair)? 3  -WM 3  -WM 3  -CS    Climbing 3-5 steps with a railing? 2  -WM 2  -WM 2  -CS    To walk in hospital room? 3  -WM 3  -WM 3  -CS    AM-PAC 6 Clicks Score (PT) 15  -WM 15  -WM 15  -CS       Functional Assessment    Outcome Measure Options -- -- AM-PAC 6 Clicks Basic Mobility (PT)  -CS          User Key  (r) = Recorded By, (t) = Taken By, (c) = Cosigned By    Initials Name Provider Type    Cameron Mcgregor PTA Physical Therapist Assistant    CS Ruth Mina, PT Physical Therapist                 Time Calculation:    PT Charges     Row Name 05/11/23 1402 05/11/23 1204          Time Calculation    PT Received On 05/11/23  -WM 05/11/23  -WM        Timed Charges    83230 - PT Therapeutic Exercise Minutes -- 17  -WM     32933 - Gait Training Minutes  7  -WM 7  -WM     32404 - PT Therapeutic Activity Minutes 4  -WM 3  -WM        SNF Physical Therapy Minutes    Skilled Minutes- PT 11 min  -WM 27 min  -WM        Total Minutes    Timed Charges Total Minutes 11  -WM 27  -WM      Total Minutes 11  -WM 27  -WM           User Key  (r) = Recorded By, (t) = Taken By, (c) = Cosigned By    Initials Name Provider Type    Cameron Mcgregor PTA Physical Therapist Assistant              Therapy Charges for Today     Code Description Service Date Service Provider Modifiers Qty    90811245297 HC PT THER PROC EA 15 MIN 5/11/2023 Cameron Mohamud, SOCRATES GP 1    02748593465 HC GAIT TRAINING EA 15 MIN 5/11/2023 Cameron Mohamud PTA GP 1    79766622535 HC GAIT TRAINING EA 15 MIN 5/11/2023 Cameron Mohamud PTA GP 1          PT G-Codes  Outcome Measure Options: AM-PAC 6 Clicks Basic Mobility (PT)  AM-PAC 6 Clicks Score (PT): 15  AM-PAC 6 Clicks Score (OT): 16    Cameron Mohamud PTA  5/11/2023

## 2023-05-11 NOTE — THERAPY TREATMENT NOTE
SNF - Physical Therapy Treatment Note   Jordin     Patient Name: Yelena Sanchez  : 1935  MRN: [id number]  Today's Date: 2023      Visit Dx:     ICD-10-CM ICD-9-CM   1. Decreased activities of daily living (ADL)  Z78.9 V49.89   2. Difficulty walking  R26.2 719.7     Patient Active Problem List   Diagnosis   • Closed fracture of neck of right humerus   • HTN (hypertension)   • HLD (hyperlipidemia)   • Hypothyroidism   • Effusion of right elbow   • Renal insufficiency   • Musculoskeletal pain   • Osteoarthritis of left knee   • Fracture     Past Medical History:   Diagnosis Date   • Anemia    • Breast cancer    • CHF (congestive heart failure)      Past Surgical History:   Procedure Laterality Date   • LUNG SURGERY Left     LEFT UPPER LOBE ()   • MASTECTOMY Left      PT Assessment (last 12 hours)     PT Evaluation and Treatment     Row Name 23 1200          Physical Therapy Time and Intention    Document Type therapy note (daily note)  -WM     Mode of Treatment individual therapy;physical therapy  -WM     Patient Effort good  -WM     Symptoms Noted During/After Treatment fatigue  -WM     Row Name 23 1200          Pain Scale: FACES Pre/Post-Treatment    Pain: FACES Scale, Pretreatment 2-->hurts little bit  -WM     Posttreatment Pain Rating 2-->hurts little bit  -WM     Pain Location - Side/Orientation Right  -WM     Pain Location - shoulder  -WM     Row Name 23 1200          Sit-Stand Transfer    Sit-Stand [place] (Transfers) contact guard;minimum assist (75% patient effort)  -     Assistive Device (Sit-Stand Transfers) --  No AD  -WM     Row Name 23 1200          Stand-Sit Transfer    Stand-Sit [place] (Transfers) contact guard;minimum assist (75% patient effort)  -     Assistive Device (Stand-Sit Transfers) --  No AD  -WM     Row Name 23 1200          Gait/Stairs (Locomotion)    [place] Level (Gait) minimum assist (75% patient effort)  -      Assistive Device (Gait) --  Hand held assist  -     Distance in Feet (Gait) 140  -     Deviations/Abnormal Patterns (Gait) gait speed decreased;stride length decreased  -     Row Name 05/11/23 1200          Safety Issues, Functional Mobility    Impairments Affecting Function (Mobility) balance;endurance/activity tolerance;pain;range of motion (ROM);strength  -     Row Name 05/11/23 1200          Hip (Therapeutic Exercise)    Hip (Therapeutic Exercise) strengthening exercise;AROM (active range of motion)  -     Hip AROM (Therapeutic Exercise) bilateral;aBduction;aDduction;supine;15 repititions  2 sets  -     Hip Strengthening (Therapeutic Exercise) bilateral;heel slides;supine;15 repititions;2 sets  Manual resistance  -     Row Name 05/11/23 1200          Knee (Therapeutic Exercise)    Knee (Therapeutic Exercise) strengthening exercise  -     Knee Strengthening (Therapeutic Exercise) bilateral;SLR (straight leg raise);SAQ (short arc quad);supine;2 lb free weight;15 repititions;2 sets  -     Row Name 05/11/23 1200          Ankle (Therapeutic Exercise)    Ankle AROM (Therapeutic Exercise) bilateral;dorsiflexion;plantarflexion;30 repititions  -     Row Name             Wound 05/04/23 2211 Left anterior knee Abrasion    Wound - Properties Group Placement Date: 05/04/23 -JN Placement Time: 2211 -JN Side: Left  -JN Orientation: anterior  -JN Location: knee  -JN Primary Wound Type: Abrasion  -JN    Retired Wound - Properties Group Placement Date: 05/04/23  -JN Placement Time: 2211 -JN Side: Left  -JN Orientation: anterior  -JN Location: knee  -JN Primary Wound Type: Abrasion  -JN    Retired Wound - Properties Group Date first assessed: 05/04/23  -JN Time first assessed: 2211 -JN Side: Left  -JN Location: knee  -JN Primary Wound Type: Abrasion  -JN    Row Name 05/11/23 1200          Progress Summary (PT)    Progress Toward Functional Goals (PT) progress toward functional goals is good  -            User Key  (r) = Recorded By, (t) = Taken By, (c) = Cosigned By    Initials Name Provider Type    Lyly Williamson, RN Registered Nurse    Cameron Mcgregor PTA Physical Therapist Assistant                Physical Therapy Education     Title: PT OT SLP Therapies (In Progress)     Topic: Physical Therapy (In Progress)     Point: Mobility training (In Progress)     Learning Progress Summary           Patient Acceptance, E, NR by  at 5/10/2023 1652                   Point: Precautions (In Progress)     Learning Progress Summary           Patient Acceptance, E, NR by CS at 5/10/2023 1652                               User Key     Initials Effective Dates Name Provider Type Discipline     04/25/21 -  Ruth Mina, BIBI Physical Therapist PT              PT Recommendation and Plan     Progress Summary (PT)  Progress Toward Functional Goals (PT): progress toward functional goals is good   Outcome Measures     Row Name 05/11/23 1224 05/10/23 1500          How much help from another person do you currently need...    Turning from your back to your side while in flat bed without using bedrails? 2  -WM 2  -CS     Moving from lying on back to sitting on the side of a flat bed without bedrails? 2  -WM 2  -CS     Moving to and from a bed to a chair (including a wheelchair)? 3  -WM 3  -CS     Standing up from a chair using your arms (e.g., wheelchair, bedside chair)? 3  -WM 3  -CS     Climbing 3-5 steps with a railing? 2  -WM 2  -CS     To walk in hospital room? 3  -WM 3  -CS     AM-PAC 6 Clicks Score (PT) 15  -WM 15  -CS        Functional Assessment    Outcome Measure Options -- AM-PAC 6 Clicks Basic Mobility (PT)  -CS           User Key  (r) = Recorded By, (t) = Taken By, (c) = Cosigned By    Initials Name Provider Type    Cameron Mcgregor PTA Physical Therapist Assistant    Ruth Auguste PT Physical Therapist                 Time Calculation:    PT Charges     Row Name 05/11/23 1204             Time  Calculation    PT Received On 05/11/23  -         Timed Charges    25265 - PT Therapeutic Exercise Minutes 17  -WM      45961 - Gait Training Minutes  7  -WM      34570 - PT Therapeutic Activity Minutes 3  -WM         SNF Physical Therapy Minutes    Skilled Minutes- PT 27 min  -WM         Total Minutes    Timed Charges Total Minutes 27  -WM       Total Minutes 27  -WM            User Key  (r) = Recorded By, (t) = Taken By, (c) = Cosigned By    Initials Name Provider Type     Cameron Mohamud PTA Physical Therapist Assistant              Therapy Charges for Today     Code Description Service Date Service Provider Modifiers Qty    37398369138 HC PT THER PROC EA 15 MIN 5/11/2023 Cameorn Mohamud, SOCRATES GP 1    79118243699 HC GAIT TRAINING EA 15 MIN 5/11/2023 Cameron Mohamud PTA GP 1          PT G-Codes  Outcome Measure Options: AM-PAC 6 Clicks Basic Mobility (PT)  AM-PAC 6 Clicks Score (PT): 15  AM-PAC 6 Clicks Score (OT): 16    Cameron Mohamud PTA  5/11/2023

## 2023-05-11 NOTE — PLAN OF CARE
Goal Outcome Evaluation:  Plan of Care Reviewed With: patient        Progress: no change  Outcome Evaluation: Resident alert, oriented, able to make needs known to staff. Transfers with assist of one to bathroom with straight tip cane. Medicated for prn pain x2 this shift, effective. dressing to lt knee changes. Abrasions to lt knee clean, woundbed red and moist, Dressing as per order. Resident tolerated well. Has trouble getting comfortable with rt humerus fx. not able to state if she wants arm on pillow, off pillow, hand elevated, not elevated, ice applied this afternoon.Dr. Rahman notified of resident's refusal to wear sling. MD stated may remove while in bed or when resitng in recliner. must wear for transrers, therapy and activities. Resident verbalized uhnderstanding.  Resident able to f/u instructions and cooperative.

## 2023-05-11 NOTE — THERAPY TREATMENT NOTE
SNF - Occupational Therapy Treatment Note   Jordin    Patient Name: Yelena Sanchez  : 1935    MRN: [id number]                              Today's Date: 2023       Admit Date: 2023    Visit Dx:     ICD-10-CM ICD-9-CM   1. Decreased activities of daily living (ADL)  Z78.9 V49.89   2. Difficulty walking  R26.2 719.7     Patient Active Problem List   Diagnosis   • Closed fracture of neck of right humerus   • HTN (hypertension)   • HLD (hyperlipidemia)   • Hypothyroidism   • Effusion of right elbow   • Renal insufficiency   • Musculoskeletal pain   • Osteoarthritis of left knee   • Fracture     Past Medical History:   Diagnosis Date   • Anemia    • Breast cancer    • CHF (congestive heart failure)      Past Surgical History:   Procedure Laterality Date   • LUNG SURGERY Left     LEFT UPPER LOBE ()   • MASTECTOMY Left       General Information     Row Name 23 1252          OT Time and Intention    Document Type therapy note (daily note)  -EG     Mode of Treatment individual therapy;occupational therapy  -EG     Row Name 23 1252          General Information    Existing Precautions/Restrictions fall;non-weight bearing;brace on at all times;weight bearing  -EG     Row Name 23 1252          Cognition    Orientation Status (Cognition) oriented to;person;time  -EG     Row Name 23 1252          Safety Issues, Functional Mobility    Impairments Affecting Function (Mobility) balance;endurance/activity tolerance;pain;range of motion (ROM);strength  -EG     Comment, Safety Issues/Impairments (Mobility) Patient is unaware of confusion; easily agitated and can become arguementative and emotional  -EG           User Key  (r) = Recorded By, (t) = Taken By, (c) = Cosigned By    Initials Name Provider Type    EG Letitia Chinchilla OT Occupational Therapist                 Mobility/ADL's     Row Name 23 1252          Bed Mobility    Bed Mobility supine-sit  -EG     Supine-Sit  Jenkins (Bed Mobility) minimum assist (75% patient effort);verbal cues  -EG     Bed Mobility, Safety Issues decreased use of arms for pushing/pulling;decreased use of legs for bridging/pushing  -EG     Assistive Device (Bed Mobility) bed rails;head of bed elevated  -EG     Comment, (Bed Mobility) Increased cues on sequencing to promote ind; education on WB status and need for sling to be donned even when in bed but patient nonreceptive  -EG     Row Name 05/11/23 1252          Transfers    Transfers sit-stand transfer;stand-sit transfer;bed-chair transfer;toilet transfer  -EG     Row Name 05/11/23 1252          Bed-Chair Transfer    Bed-Chair Jenkins (Transfers) contact guard;verbal cues  -EG     Comment, (Bed-Chair Transfer) HHA as needed; PT and OT collaborated and patient is unsafe and more of a fall risk currently when using hemiwalker  -EG     Row Name 05/11/23 1252          Sit-Stand Transfer    Sit-Stand Jenkins (Transfers) contact guard;minimum assist (75% patient effort);verbal cues  -EG     Comment, (Sit-Stand Transfer) HHA  -EG     Row Name 05/11/23 1252          Stand-Sit Transfer    Stand-Sit Jenkins (Transfers) contact guard;minimum assist (75% patient effort);verbal cues  -EG     Comment, (Stand-Sit Transfer) HHA  -EG     Row Name 05/11/23 1252          Toilet Transfer    Type (Toilet Transfer) sit-stand;stand-sit  -EG     Jenkins Level (Toilet Transfer) verbal cues;1 person assist;contact guard  -EG     Assistive Device (Toilet Transfer) raised toilet seat;walker, pawan  -EG     Comment, (Toilet Transfer) HHA provided as needed  -EG     Row Name 05/11/23 1252          Functional Mobility    Functional Mobility- Ind. Level contact guard assist;minimum assist (75% patient effort)  -EG     Functional Mobility- Device walker, pawan  -EG     Functional Mobility- Comment 2 LOB occuring while OT provided HHA for mobility to and from shower room; Patient with poor insight and limited  attention to task and surroundings during mobility performance causing need for cues to promote safety  -EG     Row Name 05/11/23 1252          Activities of Daily Living    BADL Assessment/Intervention bathing;upper body dressing;lower body dressing;grooming;toileting  -EG     Row Name 05/11/23 1252          Mobility    Extremity Weight-bearing Status left lower extremity;right upper extremity  -EG     Right Upper Extremity (Weight-bearing Status) non weight-bearing (NWB)  -EG     Left Lower Extremity (Weight-bearing Status) weight-bearing as tolerated (WBAT)  -EG     Row Name 05/11/23 1252          Bathing Assessment/Intervention    Friendly Level (Bathing) bathing skills;upper body;lower body;verbal cues;moderate assist (50% patient effort)  -EG     Comment, (Bathing) ModA overall for both UB/LB; Patient nonreceptive to cues and education on compensatory techniques; can become arguementative and needs redirection at times during ADL's  -EG     Row Name 05/11/23 1252          Upper Body Dressing Assessment/Training    Friendly Level (Upper Body Dressing) upper body dressing skills;don;pull-over garment;moderate assist (50% patient effort)  -EG     Comment, (Upper Body Dressing) Prolonged education on need for immobilizer to RUE, patient refusing to wear all night; Incresaed education on donning and how to don properly  -EG     Row Name 05/11/23 1252          Lower Body Dressing Assessment/Training    Friendly Level (Lower Body Dressing) lower body dressing skills;don;pants/bottoms;moderate assist (50% patient effort)  -EG     Comment, (Lower Body Dressing) extended time and education on compensatory techniques needed to promote ind. with performance  -EG     Row Name 05/11/23 1252          Grooming Assessment/Training    Friendly Level (Grooming) grooming skills;set up;hair care, combing/brushing  -EG     Row Name 05/11/23 1252          Toileting Assessment/Training    Friendly Level  (Toileting) toileting skills;verbal cues;contact guard assist;perform perineal hygiene;adjust/manage clothing  -EG           User Key  (r) = Recorded By, (t) = Taken By, (c) = Cosigned By    Initials Name Provider Type    EG Letitia Chinchilla, LEAH Occupational Therapist               Obj/Interventions     Row Name 05/11/23 1256          Motor Skills    Functional Endurance fair/fair-  -EG     Row Name 05/11/23 1256          Balance    Balance Interventions standing;sitting;sit to stand;occupation based/functional task;weight shifting activity;UE activity with balance activity  -EG     Comment, Balance Patient requires cues and education on base of support adjustments to make to improve balance during ADL's and decrease fall risk; nonreceptive to education provided  -EG           User Key  (r) = Recorded By, (t) = Taken By, (c) = Cosigned By    Initials Name Provider Type    EG Letitia Chinchilla, LEAH Occupational Therapist               Goals/Plan    No documentation.                Clinical Impression     Row Name 05/11/23 1259          Pain Scale: FACES Pre/Post-Treatment    Pain: FACES Scale, Pretreatment 6-->hurts even more  -EG     Posttreatment Pain Rating 8-->hurts whole lot  -EG     Pain Location - Side/Orientation Right  -EG     Pain Location - shoulder  -EG     Pre/Posttreatment Pain Comment Patient reports pain and continues to focus on pain but refuses to follow precautions set and educated on multiple times; patient refuses to don sling over night despite nursing reports of education; also per nurse whom entered during session patient declined pain medication all last night; nurse provided medicine to assist  -EG     Row Name 05/11/23 1254          Plan of Care Review    Plan of Care Reviewed With patient  -EG     Progress no change  -EG     Outcome Evaluation Patient requires increased cues and redirection to participate; Arguementative at times and nonreceptive to education and training provided; Patient  could benefit from skilled OT services to address limited strength, balance, endurance, safety/insight skills.  -EG     Row Name 05/11/23 1259          Therapy Assessment/Plan (OT)    Rehab Potential (OT) good, to achieve stated therapy goals  -EG     Criteria for Skilled Therapeutic Interventions Met (OT) yes;meets criteria;skilled treatment is necessary  -EG     Therapy Frequency (OT) 5 times/wk  -EG           User Key  (r) = Recorded By, (t) = Taken By, (c) = Cosigned By    Initials Name Provider Type    EG Letitia Chinchilla, OT Occupational Therapist               Outcome Measures     Row Name 05/11/23 1301          How much help from another is currently needed...    Putting on and taking off regular lower body clothing? 2  -EG     Bathing (including washing, rinsing, and drying) 2  -EG     Toileting (which includes using toilet bed pan or urinal) 3  -EG     Putting on and taking off regular upper body clothing 2  -EG     Taking care of personal grooming (such as brushing teeth) 3  -EG     Eating meals 4  -EG     AM-PAC 6 Clicks Score (OT) 16  -EG     Row Name 05/11/23 1224          How much help from another person do you currently need...    Turning from your back to your side while in flat bed without using bedrails? 2  -WM     Moving from lying on back to sitting on the side of a flat bed without bedrails? 2  -WM     Moving to and from a bed to a chair (including a wheelchair)? 3  -WM     Standing up from a chair using your arms (e.g., wheelchair, bedside chair)? 3  -WM     Climbing 3-5 steps with a railing? 2  -WM     To walk in hospital room? 3  -WM     AM-PAC 6 Clicks Score (PT) 15  -WM     Highest level of mobility 4 --> Transferred to chair/commode  -WM     Row Name 05/11/23 1301          Optimal Instrument    Optimal Instrument Optimal - 3  -EG     Bending/Stooping 4  -EG     Standing 2  -EG     Reaching 3  -EG           User Key  (r) = Recorded By, (t) = Taken By, (c) = Cosigned By    Initials Name  Provider Type    Cameron Mcgregor PTA Physical Therapist Assistant    Letitia Carter OT Occupational Therapist              Section G  Mobility  Bed mobility - self performance: limited assistance (staff provide guided maneuvering of limbs or other non-weight bearing assistance)  Bed mobility support/assistance: One person assist  Transfer - self performance: limited assistance (staff provide guided maneuvering of limbs or other non-weight bearing assistance)  Transfer support/assistance: One person assist  Walking in room - self performance: limited assistance (staff provide guided maneuvering of limbs or other non-weight bearing assistance)  Walking in room support/assistance: One person assist  Walking in corridors/hallway - self performance: limited assistance (staff provide guided maneuvering of limbs or other non-weight bearing assistance)  Walking in corridors/hallway support/assistance: One person assist  Locomotion on unit - self performance: limited assistance (staff provide guided maneuvering of limbs or other non-weight bearing assistance)  Locomotion on unit support/assistance: One person assist  Locomotion off unit - self performance: activity did not occur  Locomotion off unit support/assistance: Activity did not occur  Dressing - self performance: extensive assistance (provide any weight-bearing support, hold over while fabrice-care lift legs for transfer, etc.)  Dressing support/assistance: One person assist  Eating - self performance: independent  Eating support/assistance: Setup help only  Toileting - self performance: limited assistance (staff provide guided maneuvering of limbs or other non-weight bearing assistance)  Toileting support/assistance: One person assist  Personal hygiene - self performance: limited assistance (staff provide guided maneuvering of limbs or other non-weight bearing assistance)  Personal hygiene support/assistance: One person assist  Bathing  Bathing - self  performance: Physical help with bathing (exclude washing back and hair for patient)  Bathing support/assistance: One person assist  Balance  Balance during transitions & walking: Not steady, requires assist to steady  Moving from seated to standing position: Not steady, requires assist to steady  Walking: Not steady, requires assist to steady  Turning around while walking: Not steady, requires assist to steady  Moving on and off toilet: Not steady, requires assist to steady  Surface-to-surface transfer: Not steady, requires assist to steady  Mobility devices:  (pawan walker)  Range of Motion  Upper Extremity: Impairment on one side  Lower Extremity: No impairment  Section GG  SectionGG: Functional Ability/Goals, Adm  Self Care, Prior Functioning (PH3876L): 3. Independent  Functional Cognition, Prior Functioning (EC9845Y): 3. Independent  Self Care, Admission (Section GG)  Eating: Self-Care Admission Performance (BS3479L1): setup or clean-up assistance (05)  Oral Hygiene: Self-Care Admission Performance (FS3812T4): setup or clean-up assistance (05)  Toileting Hygiene: Self-Care Admission Performance (NN0187G7): supervision or touching assistance (04)  Shower/Bathe Self: Self-Care Admission Performance (EC7586Y5): partial/moderate assistance (03)  Upper Body Dressing: Self-Care Admission Performance (KI2046B4): partial/moderate assistance (03)  Lower Body Dressing: Self-Care Admission Performance (OP6150N0): partial/moderate assistance (03)  Putting On/Taking Off Footwear: Self-Care Admission Performance (RT7014O2): partial/moderate assistance (03)     Section GG: Functional Ability/Goals, DC  Eating: Self-Care Discharge Goal (SB5113O2): independent (06)  Oral Hygiene: Self-Care Discharge Goal (PL0954N1): independent (06)  Toileting Hygiene: Self-Care Discharge Goal (MB3162C2): independent (06)  Shower/Bathe Self: Self-Care Discharge Goal (RV0212W8): independent (06)  Upper Body Dressing: Self-Care Discharge Goal  (HL0257Q8): independent (06)  Lower Body Dressing: Self-Care Discharge Goal (YI9931F1): independent (06)  Putting On/Taking Off Footwear: Self-Care Discharge Goal (IN9516D8): independent (06)             Occupational Therapy Education     Title: PT OT SLP Therapies (In Progress)     Topic: Occupational Therapy (In Progress)     Point: ADL training (In Progress)     Description:   Instruct learner(s) on proper safety adaptation and remediation techniques during self care or transfers.   Instruct in proper use of assistive devices.              Learning Progress Summary           Patient Johanaer, E, NR by EG at 5/10/2023 0800    Comment: Education on compensatory techniques for UB ADL's  Educated on sling donning, ROM and WB status orders for RUE  Education on general safety techniques and fall risk prevention                   Point: Home exercise program (In Progress)     Description:   Instruct learner(s) on appropriate technique for monitoring, assisting and/or progressing therapeutic exercises/activities.              Learning Progress Summary           Patient Zhanna, E, NR by EG at 5/10/2023 0800    Comment: Education on compensatory techniques for UB ADL's  Educated on sling donning, ROM and WB status orders for RUE  Education on general safety techniques and fall risk prevention                   Point: Precautions (In Progress)     Description:   Instruct learner(s) on prescribed precautions during self-care and functional transfers.              Learning Progress Summary           Patient Zhanna, E, NR by EG at 5/10/2023 0800    Comment: Education on compensatory techniques for UB ADL's  Educated on sling donning, ROM and WB status orders for RUE  Education on general safety techniques and fall risk prevention                   Point: Body mechanics (In Progress)     Description:   Instruct learner(s) on proper positioning and spine alignment during self-care, functional mobility activities and/or exercises.               Learning Progress Summary           Patient ALEJANDRO Chao, NR by CARLO at 5/10/2023 0800    Comment: Education on compensatory techniques for UB ADL's  Educated on sling donning, ROM and WB status orders for RUE  Education on general safety techniques and fall risk prevention                               User Key     Initials Effective Dates Name Provider Type Discipline    EG 09/14/22 -  Letitia Chinchilla OT Occupational Therapist OT              OT Recommendation and Plan  Planned Therapy Interventions (OT): activity tolerance training, functional balance retraining, occupation/activity based interventions, adaptive equipment training, BADL retraining, neuromuscular control/coordination retraining, patient/caregiver education/training, transfer/mobility retraining, strengthening exercise  Therapy Frequency (OT): 5 times/wk  Plan of Care Review  Plan of Care Reviewed With: patient  Progress: no change  Outcome Evaluation: Patient requires increased cues and redirection to participate; Arguementative at times and nonreceptive to education and training provided; Patient could benefit from skilled OT services to address limited strength, balance, endurance, safety/insight skills.     Time Calculation:    Time Calculation- OT     Row Name 05/11/23 1302 05/11/23 1204          Time Calculation- OT    OT Received On 05/11/23  -EG --     OT Goal Re-Cert Due Date 06/08/23  -EG --        Timed Charges    68744 - Gait Training Minutes  -- 7  -WM     89230 - OT Therapeutic Activity Minutes 25  -EG --     61308 - OT Self Care/Mgmt Minutes 33  -EG --        SNF Occupational Therapy Minutes    Skilled Minutes- OT 58 min  -EG --        Total Minutes    Timed Charges Total Minutes 58  -EG 7  -WM      Total Minutes 58  -EG 7  -WM           User Key  (r) = Recorded By, (t) = Taken By, (c) = Cosigned By    Initials Name Provider Type    Cameron Mcgregor, SOCRATES Physical Therapist Assistant    Letitia Carter, OT Occupational  Therapist              Therapy Charges for Today     Code Description Service Date Service Provider Modifiers Qty    73653775504 HC OT EVAL MOD COMPLEXITY 3 5/10/2023 Letitia Chinchilla, OT GO 1    68109468354 HC OT THERAPEUTIC ACT EA 15 MIN 5/10/2023 Letitia Chinchilla, OT GO 1    91123900349 HC OT SELF CARE/MGMT/TRAIN EA 15 MIN 5/10/2023 Letitia Chinchilla, OT GO 2    49294462152 HC OT THERAPEUTIC ACT EA 15 MIN 5/11/2023 Letitia Chinchilla, OT GO 2    98149405806 HC OT SELF CARE/MGMT/TRAIN EA 15 MIN 5/11/2023 Letitia Chinchilla OT GO 2               Letitia Chinchilla OT  5/11/2023

## 2023-05-11 NOTE — PLAN OF CARE
Goal Outcome Evaluation:           Progress: no change  Outcome Evaluation: Resident is alert & oriented times 4; calm, cooperative with team members; occasional frustration noted; resident refused to wear arm sling on (right arm). Left index finger splint in place. Transfers with assist of one to BRM; LBM noted on 5/7/23. Will continue with POC. Can be [place].

## 2023-05-12 LAB
INDURATION: 0 MM (ref 0–10)
INDURATION: 0 MM (ref 0–10)
IRON 24H UR-MRATE: 25 MCG/DL (ref 37–145)
IRON 24H UR-MRATE: 25 MCG/DL (ref 37–145)
IRON SATN MFR SERPL: 9 % (ref 20–50)
IRON SATN MFR SERPL: 9 % (ref 20–50)
Lab: NORMAL
Lab: NORMAL
TB SKIN TEST: NEGATIVE
TB SKIN TEST: NEGATIVE
TIBC SERPL-MCNC: 270 MCG/DL (ref 298–536)
TIBC SERPL-MCNC: 270 MCG/DL (ref 298–536)
TRANSFERRIN SERPL-MCNC: 181 MG/DL (ref 200–360)
TRANSFERRIN SERPL-MCNC: 181 MG/DL (ref 200–360)
WHOLE BLOOD HOLD SPECIMEN: NORMAL
WHOLE BLOOD HOLD SPECIMEN: NORMAL

## 2023-05-12 PROCEDURE — 25010000002 HEPARIN (PORCINE) PER 1000 UNITS: Performed by: INTERNAL MEDICINE

## 2023-05-12 PROCEDURE — 97110 THERAPEUTIC EXERCISES: CPT

## 2023-05-12 PROCEDURE — 97112 NEUROMUSCULAR REEDUCATION: CPT

## 2023-05-12 PROCEDURE — 97535 SELF CARE MNGMENT TRAINING: CPT

## 2023-05-12 PROCEDURE — 97116 GAIT TRAINING THERAPY: CPT

## 2023-05-12 PROCEDURE — 97530 THERAPEUTIC ACTIVITIES: CPT

## 2023-05-12 PROCEDURE — 83540 ASSAY OF IRON: CPT | Performed by: INTERNAL MEDICINE

## 2023-05-12 PROCEDURE — 84466 ASSAY OF TRANSFERRIN: CPT | Performed by: INTERNAL MEDICINE

## 2023-05-12 RX ORDER — FERROUS SULFATE 325(65) MG
325 TABLET ORAL
Status: DISCONTINUED | OUTPATIENT
Start: 2023-05-12 | End: 2023-05-20 | Stop reason: HOSPADM

## 2023-05-12 RX ORDER — ROSUVASTATIN CALCIUM 5 MG/1
10 TABLET, COATED ORAL NIGHTLY
Status: DISCONTINUED | OUTPATIENT
Start: 2023-05-12 | End: 2023-05-20 | Stop reason: HOSPADM

## 2023-05-12 RX ORDER — MAG HYDROX/ALUMINUM HYD/SIMETH 400-400-40
1 SUSPENSION, ORAL (FINAL DOSE FORM) ORAL DAILY PRN
Status: DISCONTINUED | OUTPATIENT
Start: 2023-05-12 | End: 2023-05-20 | Stop reason: HOSPADM

## 2023-05-12 RX ADMIN — PANTOPRAZOLE SODIUM 40 MG: 40 TABLET, DELAYED RELEASE ORAL at 05:18

## 2023-05-12 RX ADMIN — ALLOPURINOL 100 MG: 100 TABLET ORAL at 09:46

## 2023-05-12 RX ADMIN — FUROSEMIDE 40 MG: 40 TABLET ORAL at 09:46

## 2023-05-12 RX ADMIN — NEBIVOLOL 10 MG: 10 TABLET ORAL at 09:46

## 2023-05-12 RX ADMIN — SPIRONOLACTONE 12.5 MG: 25 TABLET ORAL at 09:47

## 2023-05-12 RX ADMIN — FUROSEMIDE 40 MG: 40 TABLET ORAL at 18:30

## 2023-05-12 RX ADMIN — HYDROCODONE BITARTRATE AND ACETAMINOPHEN 1 TABLET: 7.5; 325 TABLET ORAL at 22:31

## 2023-05-12 RX ADMIN — ASPIRIN 81 MG: 81 TABLET, COATED ORAL at 09:46

## 2023-05-12 RX ADMIN — DOCUSATE SODIUM 100 MG: 100 CAPSULE, LIQUID FILLED ORAL at 09:46

## 2023-05-12 RX ADMIN — OXYCODONE HYDROCHLORIDE AND ACETAMINOPHEN 500 MG: 500 TABLET ORAL at 09:46

## 2023-05-12 RX ADMIN — GLYCERIN 0.5 SUPPOSITORY: 2 SUPPOSITORY RECTAL at 11:04

## 2023-05-12 RX ADMIN — HYDROCODONE BITARTRATE AND ACETAMINOPHEN 1 TABLET: 7.5; 325 TABLET ORAL at 03:54

## 2023-05-12 RX ADMIN — ROSUVASTATIN CALCIUM 10 MG: 5 TABLET, FILM COATED ORAL at 21:10

## 2023-05-12 RX ADMIN — CLOPIDOGREL BISULFATE 75 MG: 75 TABLET ORAL at 09:46

## 2023-05-12 RX ADMIN — HYDROCODONE BITARTRATE AND ACETAMINOPHEN 1 TABLET: 7.5; 325 TABLET ORAL at 16:16

## 2023-05-12 RX ADMIN — MONTELUKAST 10 MG: 10 TABLET, FILM COATED ORAL at 21:10

## 2023-05-12 RX ADMIN — HYDROCODONE BITARTRATE AND ACETAMINOPHEN 1 TABLET: 7.5; 325 TABLET ORAL at 09:46

## 2023-05-12 RX ADMIN — POLYETHYLENE GLYCOL 3350 17 G: 17 POWDER, FOR SOLUTION ORAL at 09:47

## 2023-05-12 RX ADMIN — FERROUS SULFATE TAB 325 MG (65 MG ELEMENTAL FE) 325 MG: 325 (65 FE) TAB at 13:42

## 2023-05-12 RX ADMIN — Medication 10 MG: at 21:10

## 2023-05-12 RX ADMIN — Medication 2000 UNITS: at 09:46

## 2023-05-12 RX ADMIN — MULTIPLE VITAMINS W/ MINERALS TAB 1 TABLET: TAB at 09:46

## 2023-05-12 RX ADMIN — LEVOTHYROXINE SODIUM 112 MCG: 0.11 TABLET ORAL at 05:18

## 2023-05-12 NOTE — THERAPY TREATMENT NOTE
SNF - Occupational Therapy Treatment Note   [facility]    Patient Name: Yelena Sanchez  : 1935    MRN: [id number]                              Today's Date: 2023       Admit Date: 2023    Visit Dx:     ICD-10-CM ICD-9-CM   1. Decreased activities of daily living (ADL)  Z78.9 V49.89   2. Difficulty walking  R26.2 719.7     Patient Active Problem List   Diagnosis   • Closed fracture of neck of right humerus   • HTN (hypertension)   • HLD (hyperlipidemia)   • Hypothyroidism   • Effusion of right elbow   • Renal insufficiency   • Musculoskeletal pain   • Osteoarthritis of left knee   • Fracture     Past Medical History:   Diagnosis Date   • Anemia    • Breast cancer    • CHF (congestive heart failure)      Past Surgical History:   Procedure Laterality Date   • LUNG SURGERY Left     LEFT UPPER LOBE ()   • MASTECTOMY Left       General Information     Row Name 23 0955          OT Time and Intention    Document Type therapy note (daily note)  -EG     Mode of Treatment individual therapy;occupational therapy  -     Row Name 2355          General Information    Existing Precautions/Restrictions fall;non-weight bearing;brace on at all times;weight bearing  -EG     Row Name 2355          Cognition    Orientation Status (Cognition) oriented to;person;time  -     Row Name 2355          Safety Issues, Functional Mobility    Impairments Affecting Function (Mobility) balance;endurance/activity tolerance;pain;range of motion (ROM);strength  -           User Key  (r) = Recorded By, (t) = Taken By, (c) = Cosigned By    Initials Name Provider Type    EG Letitia Chinchilla OT Occupational Therapist                 Mobility/ADL's     Row Name 2355          Bed Mobility    Comment, (Bed Mobility) Up in chair upon OT arrival  -     Row Name 2355          Transfers    Transfers sit-stand transfer;stand-sit transfer;bed-chair transfer;toilet transfer  -     Row  Name 05/12/23 0955          Bed-Chair Transfer    Bed-Chair [place] (Transfers) contact guard;verbal cues  -EG     Assistive Device (Bed-Chair Transfers) cane, quad  -EG     Row Name 05/12/23 0955          Sit-Stand Transfer    Sit-Stand [place] (Transfers) contact guard;verbal cues  -EG     Assistive Device (Sit-Stand Transfers) cane, quad  -EG     Row Name 05/12/23 0955          Stand-Sit Transfer    Stand-Sit [place] (Transfers) contact guard;verbal cues  -EG     Assistive Device (Stand-Sit Transfers) cane, quad  -EG     Row Name 05/12/23 0955          Toilet Transfer    Type (Toilet Transfer) sit-stand;stand-sit  -EG     [place] Level (Toilet Transfer) verbal cues;1 person assist;contact guard  -EG     Assistive Device (Toilet Transfer) cane, quad  -EG     Row Name 05/12/23 0955          Functional Mobility    Functional Mobility- Ind. Level contact guard assist  -EG     Functional Mobility- Device cane, quad  -EG     Functional Mobility- Comment Functional mobility performed to and from therapy gym using quad cane; still requires cueing for attention to task and safety to decrease fall risk  -EG     Row Name 05/12/23 0955          Activities of Daily Living    BADL Assessment/Intervention lower body dressing  -EG     Row Name 05/12/23 0955          Mobility    Extremity Weight-bearing Status left lower extremity;right upper extremity  -EG     Right Upper Extremity (Weight-bearing Status) non weight-bearing (NWB)  -EG     Left Lower Extremity (Weight-bearing Status) weight-bearing as tolerated (WBAT)  -EG     Row Name 05/12/23 0955          Lower Body Dressing Assessment/Training    [place] Level (Lower Body Dressing) lower body dressing skills;don;pants/bottoms;moderate assist (50% patient effort)  -EG     Comment, (Lower Body Dressing) Continuing to need cues and education on compensatory techniques for peformance  -EG     Row Name 05/12/23 0955          Toileting Assessment/Training     [place] Level (Toileting) toileting skills;verbal cues;contact guard assist;perform perineal hygiene;adjust/manage clothing  -EG           User Key  (r) = Recorded By, (t) = Taken By, (c) = Cosigned By    Initials Name Provider Type    Letitia Carter OT Occupational Therapist               Obj/Interventions     Row Name 05/12/23 0956          Shoulder (Therapeutic Exercise)    Shoulder (Therapeutic Exercise) strengthening exercise  -EG     Shoulder Strengthening (Therapeutic Exercise) left;scapular stabilization;horizontal aBduction/aDduction;external rotation;internal rotation;1 lb free weight;3 sets;10 repetitions;flexion;extension  Max encouragement refusing use of 2# dumb bell; supported sitting  -EG     Row Name 05/12/23 0956          Elbow/Forearm (Therapeutic Exercise)    Elbow/Forearm (Therapeutic Exercise) strengthening exercise  -EG     Elbow/Forearm Strengthening (Therapeutic Exercise) left;flexion;extension;supination;pronation;3 sets;10 repetitions  Max encouragement refusing use of 2# dumb bell; supported sitting  -EG     Row Name 05/12/23 0956          Motor Skills    Therapeutic Exercise elbow/forearm;shoulder  -EG     Row Name 05/12/23 0956          Balance    Balance Interventions standing;sit to stand;occupation based/functional task;weight shifting activity;UE activity with balance activity;static;minimal challenge  -EG     Comment, Balance Patient tolerates standing unsupported on airex balance pad 3x 45-60 seconds CGA; Patient upgraded and performed lateral weight shifting on airex balance pad 3x 30-45 seconds with need for unilateral UE support 75% of the time to improve balance and body awarenss for ADL's.  -EG           User Key  (r) = Recorded By, (t) = Taken By, (c) = Cosigned By    Initials Name Provider Type    Letitia Carter OT Occupational Therapist               Goals/Plan    No documentation.                Clinical Impression     Row Name 05/12/23 0959           Pain Scale: FACES Pre/Post-Treatment    Pain: FACES Scale, Pretreatment 4-->hurts little more  -EG     Posttreatment Pain Rating 4-->hurts little more  -EG     Pain Location - Side/Orientation Right  -EG     Pain Location - shoulder  -EG     Row Name 05/12/23 0959          Plan of Care Review    Outcome Evaluation Patient with improve participation and mood this date; Overall continues to require max cues for redirection and attention to task due to cognition; Patient can require extended time for participation and task initiation; OT services will continue to benefit patient to improve decreased safety, strength, balance and endurance skills for ADL task ind.  -EG     [facility] Name 05/12/23 0994          Therapy Assessment/Plan (OT)    Rehab Potential (OT) good, to achieve stated therapy goals  -EG     Criteria for Skilled Therapeutic Interventions Met (OT) yes;meets criteria;skilled treatment is necessary  -EG     Therapy Frequency (OT) 5 times/wk  -EG           User Key  (r) = Recorded By, (t) = Taken By, (c) = Cosigned By    Initials Name Provider Type    EG Letitia Chinchilla, LEAH Occupational Therapist               Outcome Measures     Row Name 05/12/23 1000          How much help from another is currently needed...    Putting on and taking off regular lower body clothing? 2  -EG     Bathing (including washing, rinsing, and drying) 2  -EG     Toileting (which includes using toilet bed pan or urinal) 3  -EG     Putting on and taking off regular upper body clothing 2  -EG     Taking care of personal grooming (such as brushing teeth) 3  -EG     Eating meals 4  -EG     AM-PAC 6 Clicks Score (OT) 16  -EG     Row Name 05/12/23 1000          Functional Assessment    Outcome Measure Options AM-PAC 6 Clicks Daily Activity (OT);Optimal Instrument  -EG     Row Name 05/12/23 1000          Optimal Instrument    Optimal Instrument Optimal - 3  -EG     Bending/Stooping 3  -EG     Standing 2  -EG     Reaching 3  -EG           User  Key  (r) = Recorded By, (t) = Taken By, (c) = Cosigned By    Initials Name Provider Type    EG Letitia Chinchilla OT Occupational Therapist              Section G  Mobility  Bed mobility - self performance: limited assistance (staff provide guided maneuvering of limbs or other non-weight bearing assistance)  Bed mobility support/assistance: One person assist  Transfer - self performance: limited assistance (staff provide guided maneuvering of limbs or other non-weight bearing assistance)  Transfer support/assistance: One person assist  Walking in room - self performance: limited assistance (staff provide guided maneuvering of limbs or other non-weight bearing assistance)  Walking in room support/assistance: One person assist  Walking in corridors/hallway - self performance: limited assistance (staff provide guided maneuvering of limbs or other non-weight bearing assistance)  Walking in corridors/hallway support/assistance: One person assist  Locomotion on unit - self performance: limited assistance (staff provide guided maneuvering of limbs or other non-weight bearing assistance)  Locomotion on unit support/assistance: One person assist  Locomotion off unit - self performance: activity did not occur  Locomotion off unit support/assistance: Activity did not occur  Dressing - self performance: extensive assistance (provide any weight-bearing support, hold over while fabrice-care lift legs for transfer, etc.)  Dressing support/assistance: One person assist  Eating - self performance: independent  Eating support/assistance: Setup help only  Toileting - self performance: limited assistance (staff provide guided maneuvering of limbs or other non-weight bearing assistance)  Toileting support/assistance: One person assist  Personal hygiene - self performance: limited assistance (staff provide guided maneuvering of limbs or other non-weight bearing assistance)  Personal hygiene support/assistance: One person  assist  Bathing  Bathing - self performance: Physical help with bathing (exclude washing back and hair for patient)  Bathing support/assistance: One person assist  Balance  Balance during transitions & walking: Not steady, requires assist to steady  Moving from seated to standing position: Not steady, requires assist to steady  Walking: Not steady, requires assist to steady  Turning around while walking: Not steady, requires assist to steady  Moving on and off toilet: Not steady, requires assist to steady  Surface-to-surface transfer: Not steady, requires assist to steady  Mobility devices:  (pawan walker)  Range of Motion  Upper Extremity: Impairment on one side  Lower Extremity: No impairment  Section GG  SectionGG: Functional Ability/Goals, Adm  Self Care, Prior Functioning ([id number]): 3. Independent  Functional Cognition, Prior Functioning ([id number]): 3. Independent  Self Care, Admission (Section GG)  Eating: Self-Care Admission Performance ([id number]): setup or clean-up assistance (05)  Oral Hygiene: Self-Care Admission Performance ([id number]): setup or clean-up assistance (05)  Toileting Hygiene: Self-Care Admission Performance (SF2492X9): supervision or touching assistance (04)  Shower/Bathe Self: Self-Care Admission Performance ([id number]): partial/moderate assistance (03)  Upper Body Dressing: Self-Care Admission Performance (KT8860F3): partial/moderate assistance (03)  Lower Body Dressing: Self-Care Admission Performance (CX1873R0): partial/moderate assistance (03)  Putting On/Taking Off Footwear: Self-Care Admission Performance ([id number]): partial/moderate assistance (03)     Section GG: Functional Ability/Goals, DC  Eating: Self-Care Discharge Goal ([id number]): independent (06)  Oral Hygiene: Self-Care Discharge Goal (SN4699W1): independent (06)  Toileting Hygiene: Self-Care Discharge Goal (RC9108M2): independent (06)  Shower/Bathe Self: Self-Care Discharge Goal (AG5378Z0): independent (06)  Upper Body  Dressing: Self-Care Discharge Goal (ZL1700H3): independent (06)  Lower Body Dressing: Self-Care Discharge Goal (BB1145U8): independent (06)  Putting On/Taking Off Footwear: Self-Care Discharge Goal (ZU2109L1): independent (06)             Occupational Therapy Education     Title: PT OT SLP Therapies (In Progress)     Topic: Occupational Therapy (In Progress)     Point: ADL training (In Progress)     Description:   Instruct learner(s) on proper safety adaptation and remediation techniques during self care or transfers.   Instruct in proper use of assistive devices.              Learning Progress Summary           Patient Zhanna, E, NR by EG at 5/10/2023 0800    Comment: Education on compensatory techniques for UB ADL's  Educated on sling donning, ROM and WB status orders for RUE  Education on general safety techniques and fall risk prevention                   Point: Home exercise program (In Progress)     Description:   Instruct learner(s) on appropriate technique for monitoring, assisting and/or progressing therapeutic exercises/activities.              Learning Progress Summary           Patient Zhanna, E, NR by EG at 5/10/2023 0800    Comment: Education on compensatory techniques for UB ADL's  Educated on sling donning, ROM and WB status orders for RUE  Education on general safety techniques and fall risk prevention                   Point: Precautions (In Progress)     Description:   Instruct learner(s) on prescribed precautions during self-care and functional transfers.              Learning Progress Summary           Patient Zhanna, ALEJANDRO, NR by EG at 5/10/2023 0800    Comment: Education on compensatory techniques for UB ADL's  Educated on sling donning, ROM and WB status orders for RUE  Education on general safety techniques and fall risk prevention                   Point: Body mechanics (In Progress)     Description:   Instruct learner(s) on proper positioning and spine alignment during self-care, functional  mobility activities and/or exercises.              Learning Progress Summary           Patient Zhanna, E, NR by EG at 5/10/2023 0800    Comment: Education on compensatory techniques for UB ADL's  Educated on sling donning, ROM and WB status orders for RUE  Education on general safety techniques and fall risk prevention                               User Key     Initials Effective Dates Name Provider Type Discipline    EG 09/14/22 -  Letitia Chinchilla, OT Occupational Therapist OT              OT Recommendation and Plan  Planned Therapy Interventions (OT): activity tolerance training, functional balance retraining, occupation/activity based interventions, adaptive equipment training, BADL retraining, neuromuscular control/coordination retraining, patient/caregiver education/training, transfer/mobility retraining, strengthening exercise  Therapy Frequency (OT): 5 times/wk  Plan of Care Review  Plan of Care Reviewed With: patient  Progress: no change  Outcome Evaluation: Patient with improve participation and mood this date; Overall continues to require max cues for redirection and attention to task due to cognition; Patient can require extended time for participation and task initiation; OT services will continue to benefit patient to improve decreased safety, strength, balance and endurance skills for ADL task ind.     Time Calculation:    Time Calculation- OT     Row Name 05/12/23 1001 05/12/23 0956          Time Calculation- OT    OT Received On 05/12/23  -EG --     OT Goal Re-Cert Due Date 06/08/23  -EG --        Timed Charges    50432 - OT Therapeutic Exercise Minutes 13 -EG --     09787 -  OT Neuromuscular Reeducation Minutes 13 -EG --     78934 - Gait Training Minutes  -- 10  -WM     60735 - OT Therapeutic Activity Minutes 16  -EG --     09893 - OT Self Care/Mgmt Minutes 11  -EG --        SNF Occupational Therapy Minutes    Skilled Minutes- OT 53 min  -EG --        Total Minutes    Timed Charges Total Minutes  53  -EG 10  -WM      Total Minutes 53  -EG 10  -WM           User Key  (r) = Recorded By, (t) = Taken By, (c) = Cosigned By    Initials Name Provider Type    Cameron Mcgregor PTA Physical Therapist Assistant    Letitia Carter OT Occupational Therapist              Therapy Charges for Today     Code Description Service Date Service Provider Modifiers Qty    59433717680 HC OT THERAPEUTIC ACT EA 15 MIN 5/11/2023 Letitia Chinchilla OT GO 2    24927946235 HC OT SELF CARE/MGMT/TRAIN EA 15 MIN 5/11/2023 Letitia Chinchilla OT GO 2    21816852271 HC OT NEUROMUSC RE EDUCATION EA 15 MIN 5/12/2023 Letitia Chinchilla, OT GO 1    [id number] HC OT THER PROC EA 15 MIN 5/12/2023 Letitia Chinchilla OT GO 1    74944310831 HC OT THERAPEUTIC ACT EA 15 MIN 5/12/2023 Letitia Chinchilla, OT GO 1    13596113562 HC OT SELF CARE/MGMT/TRAIN EA 15 MIN 5/12/2023 Letitia Chinchilla OT GO 1               Letitia Chinchilla OT  5/12/2023

## 2023-05-12 NOTE — THERAPY TREATMENT NOTE
SNF - Physical Therapy Treatment Note   Jordin     Patient Name: Yelena Sanchez  : 1935  MRN: [id number]  Today's Date: 2023      Visit Dx:     ICD-10-CM ICD-9-CM   1. Decreased activities of daily living (ADL)  Z78.9 V49.89   2. Difficulty walking  R26.2 719.7     Patient Active Problem List   Diagnosis   • Closed fracture of neck of right humerus   • HTN (hypertension)   • HLD (hyperlipidemia)   • Hypothyroidism   • Effusion of right elbow   • Renal insufficiency   • Musculoskeletal pain   • Osteoarthritis of left knee   • Fracture     Past Medical History:   Diagnosis Date   • Anemia    • Breast cancer    • CHF (congestive heart failure)      Past Surgical History:   Procedure Laterality Date   • LUNG SURGERY Left     LEFT UPPER LOBE ()   • MASTECTOMY Left      PT Assessment (last 12 hours)     PT Evaluation and Treatment     Row Name 23 0958          Physical Therapy Time and Intention    Document Type therapy note (daily note)  -WM     Mode of Treatment individual therapy;physical therapy  -WM     Patient Effort good  -WM     Symptoms Noted During/After Treatment fatigue  -WM     Row Name 23 0958          Pain Scale: FACES Pre/Post-Treatment    Pain: FACES Scale, Pretreatment 2-->hurts little bit  -WM     Posttreatment Pain Rating 4-->hurts little more  -WM     Pain Location - Side/Orientation Left  -WM     Pain Location - knee  -WM     Row Name 2358          Cognition    Affect/Mental Status (Cognition) WFL  -WM     Row Name 2358          Sit-Stand Transfer    Sit-Stand [place] (Transfers) contact guard;verbal cues  -WM     Assistive Device (Sit-Stand Transfers) cane, quad  -WM     Row Name 2358          Stand-Sit Transfer    Stand-Sit [place] (Transfers) contact guard;verbal cues  -WM     Assistive Device (Stand-Sit Transfers) cane, quad  -WM     Row Name 23 0958          Gait/Stairs (Locomotion)    [place] Level (Gait) contact  guard;verbal cues  -     Assistive Device (Gait) cane, quad  -WM     Distance in Feet (Gait) 140  -WM     Pattern (Gait) 3-point;step-through  -WM     Deviations/Abnormal Patterns (Gait) gait speed decreased;stride length decreased  -WM     [place] Level (Stairs) contact guard  -WM     Handrail Location (Stairs) left side (ascending);right side (descending)  -WM     Number of Steps (Stairs) 2 x 2  -WM     Ascending Technique (Stairs) step-to-step  -WM     Descending Technique (Stairs) step-to-step  -WM     Stairs, Impairments strength decreased;impaired balance  -WM     Comment, (Gait/Stairs) NWB RUE  -     Row Name 05/12/23 0958          Safety Issues, Functional Mobility    Impairments Affecting Function (Mobility) balance;endurance/activity tolerance;pain;range of motion (ROM);strength  -     Row Name 05/12/23 0958          Hip (Therapeutic Exercise)    Hip AROM (Therapeutic Exercise) bilateral;aBduction;aDduction;supine;30 repititions  -     Hip Strengthening (Therapeutic Exercise) bilateral;marching while seated;sitting;2 lb free weight;15 repititions;2 sets  -     Row Name 05/12/23 0958          Knee (Therapeutic Exercise)    Knee AAROM (Therapeutic Exercise) bilateral;supine;10 repetitions;5 repetitions;2 sets  SLR  -     Knee Strengthening (Therapeutic Exercise) bilateral;SAQ (short arc quad);LAQ (long arc quad);hamstring curls;sitting;supine;2 lb free weight;resistance band;yellow;15 repititions;2 sets  -     Row Name 05/12/23 0958          Ankle (Therapeutic Exercise)    Ankle AROM (Therapeutic Exercise) bilateral;dorsiflexion;plantarflexion;supine;30 repititions  -     Row Name             Wound 05/04/23 2211 Left anterior knee Abrasion    Wound - Properties Group Placement Date: 05/04/23 -JN Placement Time: 2211 -JN Side: Left  -JN Orientation: anterior  -JN Location: knee  -JN Primary Wound Type: Abrasion  -JN    Retired Wound - Properties Group Placement Date: 05/04/23  -JN  Placement Time: 2211 -JN Side: Left  -JN Orientation: anterior  -JN Location: knee  -JN Primary Wound Type: Abrasion  -JN    Retired Wound - Properties Group Date first assessed: 05/04/23  -JN Time first assessed: 2211 -JN Side: Left  -JN Location: knee  -JN Primary Wound Type: Abrasion  -JN    Row Name 05/12/23 0958          Progress Summary (PT)    Progress Toward Functional Goals (PT) progress toward functional goals is good  -WM           User Key  (r) = Recorded By, (t) = Taken By, (c) = Cosigned By    Initials Name Provider Type    Lyly Williamson RN Registered Nurse    Cameron Mcgregor PTA Physical Therapist Assistant                Physical Therapy Education     Title: PT OT SLP Therapies (In Progress)     Topic: Physical Therapy (In Progress)     Point: Mobility training (In Progress)     Learning Progress Summary           Patient Acceptance, E, NR by CS at 5/10/2023 1652                   Point: Precautions (In Progress)     Learning Progress Summary           Patient Acceptance, E, NR by CS at 5/10/2023 1652                               User Key     Initials Effective Dates Name Provider Type Discipline    CS 04/25/21 -  Ruth Mina, PT Physical Therapist PT              PT Recommendation and Plan     Progress Summary (PT)  Progress Toward Functional Goals (PT): progress toward functional goals is good   Outcome Measures     Row Name 05/12/23 1006 05/11/23 1406 05/11/23 1224       How much help from another person do you currently need...    Turning from your back to your side while in flat bed without using bedrails? 2  -WM 2  -WM 2  -WM    Moving from lying on back to sitting on the side of a flat bed without bedrails? 2  -WM 2  -WM 2  -WM    Moving to and from a bed to a chair (including a wheelchair)? 3  -WM 3  -WM 3  -WM    Standing up from a chair using your arms (e.g., wheelchair, bedside chair)? 3  -WM 3  -WM 3  -WM    Climbing 3-5 steps with a railing? 3  -WM 2  -WM 2  -WM    To  walk in hospital room? 3  -WM 3  -WM 3  -WM    AM-PAC 6 Clicks Score (PT) 16  -WM 15  -WM 15  -WM    Row Name 05/10/23 1500             How much help from another person do you currently need...    Turning from your back to your side while in flat bed without using bedrails? 2  -CS      Moving from lying on back to sitting on the side of a flat bed without bedrails? 2  -CS      Moving to and from a bed to a chair (including a wheelchair)? 3  -CS      Standing up from a chair using your arms (e.g., wheelchair, bedside chair)? 3  -CS      Climbing 3-5 steps with a railing? 2  -CS      To walk in hospital room? 3  -CS      AM-PAC 6 Clicks Score (PT) 15  -CS         Functional Assessment    Outcome Measure Options AM-PAC 6 Clicks Basic Mobility (PT)  -CS            User Key  (r) = Recorded By, (t) = Taken By, (c) = Cosigned By    Initials Name Provider Type     Cameron Mohamud PTA Physical Therapist Assistant    CS Ruth Mina PT Physical Therapist                 Time Calculation:    PT Charges     Row Name 05/12/23 0956             Time Calculation    PT Received On 05/12/23  -WM         Timed Charges    24475 - PT Therapeutic Exercise Minutes 22  -WM      23107 - Gait Training Minutes  10  -WM      56808 - PT Therapeutic Activity Minutes 7  -WM         SNF Physical Therapy Minutes    Skilled Minutes- PT 39 min  -WM         Total Minutes    Timed Charges Total Minutes 39  -WM       Total Minutes 39  -WM            User Key  (r) = Recorded By, (t) = Taken By, (c) = Cosigned By    Initials Name Provider Type     Cameron Mohamud PTA Physical Therapist Assistant              Therapy Charges for Today     Code Description Service Date Service Provider Modifiers Qty    04008894201 HC PT THER PROC EA 15 MIN 5/11/2023 Cameron Mohamud PTA GP 1    61779833295 HC GAIT TRAINING EA 15 MIN 5/11/2023 Cameron Mohamud PTA GP 1    71199518152 HC GAIT TRAINING EA 15 MIN 5/11/2023 Cameron Mohamud, SOCRATES GP 1    41230932594 HC  PT THER PROC EA 15 MIN 5/12/2023 Cameron Mohamud PTA GP 2    54614466189 HC GAIT TRAINING EA 15 MIN 5/12/2023 Cameron Mohamud PTA GP 1          PT G-Codes  Outcome Measure Options: AM-PAC 6 Clicks Daily Activity (OT), Optimal Instrument  AM-PAC 6 Clicks Score (PT): 16  AM-PAC 6 Clicks Score (OT): 16    Cameron Mohamud PTA  5/12/2023

## 2023-05-12 NOTE — PLAN OF CARE
Goal Outcome Evaluation:           Progress: no change  Outcome Evaluation: Rsd. is alert and oriented x4, able to make needs known to staff.  Rsd. has been medicated x2 this shift, and recieved a new order this shift for melatonin prn hs for sleep.  Rsd. stated medications effective, and has rested well this shift.  Transfers x1 assist to bathroom with rolling walker and cane.  R) arm sling in place when oob.  Bed/chair alarm remain in place.  Call light and personal items within reach, Rsd. reminded to use call light for assistance, verbalizes understanding.  Will continue to monitor and notify on-coming staff.  Current plan of care remains in place at this time.  Thumb splint to L) thumb remains in place.

## 2023-05-12 NOTE — THERAPY TREATMENT NOTE
SNF - Physical Therapy Treatment Note  SHAYNA Brenner     Patient Name: Yelena Sanchez  : 1935  MRN: [id number]  Today's Date: 2023      Visit Dx:     ICD-10-CM ICD-9-CM   1. Decreased activities of daily living (ADL)  Z78.9 V49.89   2. Difficulty walking  R26.2 719.7     Patient Active Problem List   Diagnosis   • Closed fracture of neck of right humerus   • HTN (hypertension)   • HLD (hyperlipidemia)   • Hypothyroidism   • Effusion of right elbow   • Renal insufficiency   • Musculoskeletal pain   • Osteoarthritis of left knee   • Fracture     Past Medical History:   Diagnosis Date   • Anemia    • Breast cancer    • CHF (congestive heart failure)      Past Surgical History:   Procedure Laterality Date   • LUNG SURGERY Left     LEFT UPPER LOBE ()   • MASTECTOMY Left      PT Assessment (last 12 hours)     PT Evaluation and Treatment     Row Name 23 1351 23 0958       Physical Therapy Time and Intention    Document Type therapy note (daily note)  -WM therapy note (daily note)  -WM    Mode of Treatment individual therapy;physical therapy  -WM individual therapy;physical therapy  -WM    Patient Effort good  -WM good  -WM    Symptoms Noted During/After Treatment fatigue  -WM fatigue  -WM    Row Name 23 1351 23 0958       Pain Scale: FACES Pre/Post-Treatment    Pain: FACES Scale, Pretreatment 4-->hurts little more  -WM 2-->hurts little bit  -WM    Posttreatment Pain Rating 4-->hurts little more  -WM 4-->hurts little more  -WM    Pain Location - Side/Orientation Right  -WM Left  -WM    Pain Location - shoulder  -WM knee  -WM    Row Name 23 0958          Cognition    Affect/Mental Status (Cognition) WFL  -WM     Row Name 23 1351          Bed Mobility    Bed Mobility sit-supine  -WM     Sit-Supine [place] (Bed Mobility) minimum assist (75% patient effort);1 person assist  -WM     Bed Mobility, Safety Issues decreased use of arms for pushing/pulling;decreased use of legs  for bridging/pushing  -     Assistive Device (Bed Mobility) bed rails  -     Row Name 05/12/23 1351 05/12/23 0958       Sit-Stand Transfer    Sit-Stand [place] (Transfers) contact guard;minimum assist (75% patient effort)  - contact guard;verbal cues  -    Assistive Device (Sit-Stand Transfers) cane, quad  -WM cane, quad  -WM    Row Name 05/12/23 1351 05/12/23 0958       Stand-Sit Transfer    Stand-Sit [place] (Transfers) contact guard;minimum assist (75% patient effort)  - contact guard;verbal cues  -    Assistive Device (Stand-Sit Transfers) cane, quad  -WM cane, quad  -WM    Row Name 05/12/23 1351 05/12/23 0958       Gait/Stairs (Locomotion)    [place] Level (Gait) contact guard  - contact guard;verbal cues  -    Assistive Device (Gait) cane, quad  -WM cane, quad  -WM    Distance in Feet (Gait) 140  -  -WM    Pattern (Gait) 3-point;step-through  -WM 3-point;step-through  -WM    Deviations/Abnormal Patterns (Gait) gait speed decreased;stride length decreased  -WM gait speed decreased;stride length decreased  -    [place] Level (Stairs) -- contact guard  -    Handrail Location (Stairs) -- left side (ascending);right side (descending)  -    Number of Steps (Stairs) -- 2 x 2  -WM    Ascending Technique (Stairs) -- step-to-step  -WM    Descending Technique (Stairs) -- step-to-step  -    Stairs, Impairments -- strength decreased;impaired balance  -    Comment, (Gait/Stairs) -- NWB RUE  -    Row Name 05/12/23 1351 05/12/23 0958       Safety Issues, Functional Mobility    Impairments Affecting Function (Mobility) balance;endurance/activity tolerance;pain;range of motion (ROM);strength  - balance;endurance/activity tolerance;pain;range of motion (ROM);strength  -    Row Name 05/12/23 0958          Hip (Therapeutic Exercise)    Hip AROM (Therapeutic Exercise) bilateral;aBduction;aDduction;supine;30 repititions  -     Hip Strengthening (Therapeutic Exercise)  bilateral;marching while seated;sitting;2 lb free weight;15 repititions;2 sets  -     Row Name 05/12/23 0958          Knee (Therapeutic Exercise)    Knee AAROM (Therapeutic Exercise) bilateral;supine;10 repetitions;5 repetitions;2 sets  SLR  -WM     Knee Strengthening (Therapeutic Exercise) bilateral;SAQ (short arc quad);LAQ (long arc quad);hamstring curls;sitting;supine;2 lb free weight;resistance band;yellow;15 repititions;2 sets  -     Row Name 05/12/23 0958          Ankle (Therapeutic Exercise)    Ankle AROM (Therapeutic Exercise) bilateral;dorsiflexion;plantarflexion;supine;30 repititions  -     Row Name             Wound 05/04/23 2211 Left anterior knee Abrasion    Wound - Properties Group Placement Date: 05/04/23  -JN Placement Time: 2211 -JN Side: Left  -JN Orientation: anterior  -JN Location: knee  -JN Primary Wound Type: Abrasion  -JN    Retired Wound - Properties Group Placement Date: 05/04/23  -JN Placement Time: 2211 -JN Side: Left  -JN Orientation: anterior  -JN Location: knee  -JN Primary Wound Type: Abrasion  -JN    Retired Wound - Properties Group Date first assessed: 05/04/23  -JN Time first assessed: 2211 -JN Side: Left  -JN Location: knee  -JN Primary Wound Type: Abrasion  -JN    Row Name 05/12/23 1351 05/12/23 0958       Progress Summary (PT)    Progress Toward Functional Goals (PT) progress toward functional goals is good  -WM progress toward functional goals is good  -          User Key  (r) = Recorded By, (t) = Taken By, (c) = Cosigned By    Initials Name Provider Type    Lyly Williamson RN Registered Nurse    Cameron Mcgregor PTA Physical Therapist Assistant                Physical Therapy Education     Title: PT OT SLP Therapies (In Progress)     Topic: Physical Therapy (In Progress)     Point: Mobility training (In Progress)     Learning Progress Summary           Patient Acceptance, E, NR by CS at 5/10/2023 4903                   Point: Precautions (In Progress)      Learning Progress Summary           Patient Acceptance, E, NR by  at 5/10/2023 1652                               User Key     Initials Effective Dates Name Provider Type Discipline     04/25/21 -  Ruth Mina, BIBI Physical Therapist PT              PT Recommendation and Plan     Progress Summary (PT)  Progress Toward Functional Goals (PT): progress toward functional goals is good   Outcome Measures     Row Name 05/12/23 1355 05/12/23 1006 05/11/23 1406       How much help from another person do you currently need...    Turning from your back to your side while in flat bed without using bedrails? 2  -WM 2  -WM 2  -WM    Moving from lying on back to sitting on the side of a flat bed without bedrails? 2  -WM 2  -WM 2  -WM    Moving to and from a bed to a chair (including a wheelchair)? 3  -WM 3  -WM 3  -WM    Standing up from a chair using your arms (e.g., wheelchair, bedside chair)? 3  -WM 3  -WM 3  -WM    Climbing 3-5 steps with a railing? 3  -WM 3  -WM 2  -WM    To walk in hospital room? 3  -WM 3  -WM 3  -WM    AM-PAC 6 Clicks Score (PT) 16  -WM 16  -WM 15  -WM    Row Name 05/11/23 1224 05/10/23 1500          How much help from another person do you currently need...    Turning from your back to your side while in flat bed without using bedrails? 2  -WM 2  -CS     Moving from lying on back to sitting on the side of a flat bed without bedrails? 2  -WM 2  -CS     Moving to and from a bed to a chair (including a wheelchair)? 3  -WM 3  -CS     Standing up from a chair using your arms (e.g., wheelchair, bedside chair)? 3  -WM 3  -CS     Climbing 3-5 steps with a railing? 2  -WM 2  -CS     To walk in hospital room? 3  -WM 3  -CS     AM-PAC 6 Clicks Score (PT) 15  -WM 15  -CS        Functional Assessment    Outcome Measure Options -- AM-PAC 6 Clicks Basic Mobility (PT)  -           User Key  (r) = Recorded By, (t) = Taken By, (c) = Cosigned By    Initials Name Provider Type    Cameron Mcgregor PTA Physical  Therapist Assistant    Ruth Auguste, PT Physical Therapist                 Time Calculation:    PT Charges     Row Name 05/12/23 1350 05/12/23 0956          Time Calculation    PT Received On 05/12/23  -WM 05/12/23  -WM        Timed Charges    65268 - PT Therapeutic Exercise Minutes -- 22  -WM     85755 - Gait Training Minutes  7  -WM 10  -WM     31346 - PT Therapeutic Activity Minutes 5  -WM 7  -WM        SNF Physical Therapy Minutes    Skilled Minutes- PT 12 min  -WM 39 min  -WM        Total Minutes    Timed Charges Total Minutes 12  -WM 39  -WM      Total Minutes 12  -WM 39  -WM           User Key  (r) = Recorded By, (t) = Taken By, (c) = Cosigned By    Initials Name Provider Type     Cameron Mohamud PTA Physical Therapist Assistant              Therapy Charges for Today     Code Description Service Date Service Provider Modifiers Qty    26381630804 HC PT THER PROC EA 15 MIN 5/11/2023 Cameron Mohamud, PTA GP 1    80846045591 HC GAIT TRAINING EA 15 MIN 5/11/2023 Cameron Mohamud, PTA GP 1    30084197184 HC GAIT TRAINING EA 15 MIN 5/11/2023 Cameron Mohamud, PTA GP 1    94442742992 HC PT THER PROC EA 15 MIN 5/12/2023 Cameron Mohamud, PTA GP 2    98131833771 HC GAIT TRAINING EA 15 MIN 5/12/2023 Cameron Mohamud, PTA GP 1    41751417964 HC GAIT TRAINING EA 15 MIN 5/12/2023 Cameron Mohamud, PTA GP 1          PT G-Codes  Outcome Measure Options: AM-PAC 6 Clicks Daily Activity (OT), Optimal Instrument  AM-PAC 6 Clicks Score (PT): 16  AM-PAC 6 Clicks Score (OT): 16    Cameron Mohamud PTA  5/12/2023

## 2023-05-12 NOTE — PROGRESS NOTES
"Nursing Facility Medication Regimen Review    Potentially Clinically Significant Medication Issues during this review: [x]Identified   []Not identified    Provider acknowledgement required?   [x]Yes   []No    Yelena Sanchez is a 87 y.o.female admitted by Ang Laurent MD , on 5/9/2023  9:52 AM , for Fracture [T14.8XXA]    Visit Vitals  /49 (BP Location: Left arm, Patient Position: Lying)   Pulse 59   Temp 98.1 °F (36.7 °C) (Oral)   Resp 18   Ht 149.9 cm (59\")   Wt 69 kg (152 lb 1.9 oz)   SpO2 96%   BMI 30.72 kg/m²        Lab Results   Component Value Date    GLUCOSE 93 05/11/2023    BUN 54 (H) 05/11/2023    CREATININE 1.58 (H) 05/11/2023    BCR 34.2 (H) 05/11/2023    K 4.0 05/11/2023    CO2 23.7 05/11/2023    CALCIUM 9.9 05/11/2023    ALBUMIN 3.4 (L) 05/11/2023    AST 14 05/05/2023    ALT 12 05/05/2023    WBC 9.12 05/10/2023    HGB 9.7 (L) 05/10/2023    HCT 30.1 (L) 05/10/2023    MCV 89.9 05/10/2023     05/10/2023        Active Ambulatory Problems     Diagnosis Date Noted    Closed fracture of neck of right humerus 05/04/2023    HTN (hypertension) 05/05/2023    HLD (hyperlipidemia) 05/05/2023    Hypothyroidism 05/05/2023    Effusion of right elbow 05/05/2023    Renal insufficiency 05/05/2023    Musculoskeletal pain 05/05/2023    Osteoarthritis of left knee 05/06/2023     Resolved Ambulatory Problems     Diagnosis Date Noted    No Resolved Ambulatory Problems     Past Medical History:   Diagnosis Date    Anemia     Breast cancer     CHF (congestive heart failure)         Hospital Medications (active)         Dose Frequency Start End Indication    acetaminophen (TYLENOL) tablet 650 mg 650 mg Every 4 Hours PRN 5/9/2023  PRN  pain     Admin Instructions: Based on patient request - if ordered for moderate or severe pain, provider allows for administration of a medication prescribed for a lower pain scale.  Do not exceed 4 grams of acetaminophen in a 24 hr period. Max dose of 2gm for AST/ALT greater than " 120 units/L    If given for fever, use fever parameter: fever greater than 100.4 °F.    If given for pain, use the following pain scale:   Mild Pain = Pain Score of 1-3, CPOT 1-2  Moderate Pain = Pain Score of 4-6, CPOT 3-4  Severe Pain = Pain Score of 7-10, CPOT 5-8     Route: Oral     allopurinol (ZYLOPRIM) tablet 100 mg 100 mg Daily 5/10/2023      Admin Instructions: ([facility]) Take with food if GI upset occurs.     Route: Oral     ascorbic acid (VITAMIN C) tablet 500 mg 500 mg Daily 5/10/2023  Supplement    Route: Oral     aspirin EC tablet 81 mg 81 mg Daily 5/10/2023  Antiplatelet     Admin Instructions: Herbal/drug interaction: Avoid use with ginkgo biloba. Do not crush or chew. Based on patient request - if ordered for moderate or severe pain, provider allows for administration of a medication prescribed for a lower pain scale.  Do not exceed 4 grams of aspirin in a 24 hr period.    If given for pain, use the following pain scale:   Mild Pain = Pain Score of 1-3, CPOT 1-2  Moderate Pain = Pain Score of 4-6, CPOT 3-4  Severe Pain = Pain Score of 7-10, CPOT 5-8     Route: Oral     bisacodyl (DULCOLAX) suppository 10 mg 10 mg Daily PRN 5/10/2023  PRN bowel regimen     Admin Instructions: Hold for diarrhea     Route: Rectal     Cosign for Ordering: Accepted by Ang Laurent MD on 5/11/2023  8:06 PM     cholecalciferol (VITAMIN D3) tablet 2,000 Units 2,000 Units Daily 5/10/2023  Supplement     Route: Oral     clopidogrel (PLAVIX) tablet 75 mg 75 mg Daily 5/10/2023  Antiplatelet     Route: Oral     docusate sodium (COLACE) capsule 100 mg 100 mg Daily 5/10/2023  PRN bowel regimen     Admin Instructions: Swallow whole.  Do not open, crush, or chew capsule.     Route: Oral     ferric gluconate (FERRLECIT) 250 MG in sodium chloride 0.9% 250 mL IVPB 250 mg Daily 5/12/2023 5/14/2023 Supplement    Admin Instructions: Not to exceed 2.1 mg/min     Route: Intravenous     Cosign for Ordering: Required by Ang Laurent MD      ferrous sulfate tablet 325 mg 325 mg Daily With Breakfast 5/12/2023  Supplement    Admin Instructions: Swallow whole. Do not crush, split, or chew. Take with food if GI upset occurs.     Route: Oral     furosemide (LASIX) tablet 40 mg 40 mg 2 Times Daily (Diuretics) 5/9/2023  Diuretic     Route: Oral     glycerin adult 1 suppository 1 suppository Daily PRN 5/12/2023  PRN bowel regimen    Route: Rectal     heparin (porcine) 5000 UNIT/ML injection 5,000 Units 5,000 Units Every 8 Hours Scheduled 5/9/2023  VTE prophylaxis     Route: Subcutaneous     HYDROcodone-acetaminophen (NORCO) 5-325 MG per tablet 1 tablet 1 tablet Every 6 Hours PRN 5/9/2023 5/15/2023 PRN Pain     Admin Instructions: Based on patient request - if ordered for moderate or severe pain, provider allows for administration of a medication prescribed for a lower pain scale.  [KIERSTEN]    Do not exceed 4 grams of acetaminophen in a 24 hr period. Max dose of 2gm for AST/ALT greater than 120 units/L        If given for pain, use the following pain scale:   Mild Pain = Pain Score of 1-3, CPOT 1-2  Moderate Pain = Pain Score of 4-6, CPOT 3-4  Severe Pain = Pain Score of 7-10, CPOT 5-8     Route: Oral     HYDROcodone-acetaminophen (NORCO) 7.5-325 MG per tablet 1 tablet 1 tablet Every 6 Hours PRN 5/9/2023 5/15/2023 PRN pain     Admin Instructions: Based on patient request - if ordered for moderate or severe pain, provider allows for administration of a medication prescribed for a lower pain scale.  [KIERSTEN]    Do not exceed 4 grams of acetaminophen in a 24 hr period. Max dose of 2gm for AST/ALT greater than 120 units/L        If given for pain, use the following pain scale:   Mild Pain = Pain Score of 1-3, CPOT 1-2  Moderate Pain = Pain Score of 4-6, CPOT 3-4  Severe Pain = Pain Score of 7-10, CPOT 5-8     Route: Oral     levothyroxine (SYNTHROID, LEVOTHROID) tablet 112 mcg 112 mcg Every Early Morning 5/10/2023  Hypothyroidism     Admin Instructions: Take on empty  stomach.     Route: Oral     melatonin tablet 10 mg 10 mg Nightly PRN 5/11/2023  PRN sleep     Route: Oral     Cosign for Ordering: Accepted by Ang Hunter DO on 5/12/2023  4:20 AM     montelukast (SINGULAIR) tablet 10 mg 10 mg Nightly 5/9/2023      Route: Oral     multivitamin with minerals 1 tablet 1 tablet Daily 5/10/2023  Supplement    Admin Instructions:      Route: Oral     nebivolol (BYSTOLIC) tablet 10 mg 10 mg Daily 5/10/2023  Hypertension    Route: Oral     pantoprazole (PROTONIX) EC tablet 40 mg 40 mg Every Early Morning 5/10/2023      Admin Instructions: Swallow whole; do not crush, split, or chew.     Route: Oral     polyethylene glycol (MIRALAX) packet 17 g 17 g Daily 5/10/2023  PRN bowel regimen    Admin Instructions: Use 4-8 ounces of water, tea, or juice for each 17 gram dose.     Route: Oral     Cosign for Ordering: Accepted by Ang Laurent MD on 5/11/2023  8:06 PM     rosuvastatin (CRESTOR) tablet 20 mg 20 mg Nightly 5/9/2023  Hyperlipidemia     Admin Instructions: Avoid grapefruit juice.     Route: Oral     spironolactone (ALDACTONE) tablet 12.5 mg 12.5 mg Daily 5/10/2023  Hypertension    Admin Instructions: Group 1 (Yellow) Hazardous Drug - See Handling Guide     Route: Oral     tuberculin injection 5 Units 5 Units Once 5/16/2023  Diagnostic     Admin Instructions: 2nd Step     Route: Intradermal                Psychotropic Medications  No medications upon review     Potentially Clinically Significant Medication Issues/PharmD Recommendations:   Psychotropic Medication: N/A  Opioid Use Review: Hydrocodone-acetaminophen 5 mg-325  mg and Hydrocodone-acetaminophen 7.5 mg-325 mg. Stop dates of 5/15/2023  Medication without an appropriate indication: allopurinol, montelukast, pantoprazole  Untreated indication:N/A  Missing Duration: N/A  Excessive or Inadequate Dose: Rosuvastatin adjusted for renal function   Ineffective Drug Therapy: N/A  Medication Adverse Reactions/Consequences:  N/A  Medication Monitoring: N/A  Drug Interactions:N/A  Duplicate Therapy: Ferrous gluconate stop date of 5/14/2023  PTA Medication Omissions (not currently ordered): N/A  Safety: N/A      Additional notes: Allopurinol, montelukast, pantoprazole without indications      In completing this Drug Regimen Review for MAX Santillan Arthur McMahan Jr., have reviewed the electronic medical chart contents, including but not limited to the following: Medication Administration Records (MAR), Prescriber's orders, Progress, nursing and consultants' notes, Laboratory and diagnostic test results, Other sources of information about documented expressions or indications of distress and/or changes in condition.

## 2023-05-12 NOTE — PLAN OF CARE
Goal Outcome Evaluation:  Plan of Care Reviewed With: patient        Progress: improving  Outcome Evaluation: Resident alert, oriented, able to make needs known to staff. Transfers with assit of one and cane to bathroom. . Received glycerin supp. today with positive results. Resident tolerated well. Refused IV iron infusion, wants Iron shot. States Dr. Noel gives her Iron shot. Dr. Noel called the unit and stated she does not get iron shot, she receives procrit at her office and she is seen as a oncology patient. Resident was updated. Participated in therapy as ordered, tolerated well. Up to chair for all meals today. Resident pleasant and cooperative.

## 2023-05-13 LAB
ALBUMIN SERPL-MCNC: 3.6 G/DL (ref 3.5–5.2)
ALBUMIN SERPL-MCNC: 3.6 G/DL (ref 3.5–5.2)
ANION GAP SERPL CALCULATED.3IONS-SCNC: 14.4 MMOL/L (ref 5–15)
ANION GAP SERPL CALCULATED.3IONS-SCNC: 14.4 MMOL/L (ref 5–15)
BUN SERPL-MCNC: 64 MG/DL (ref 8–23)
BUN SERPL-MCNC: 64 MG/DL (ref 8–23)
BUN/CREAT SERPL: 40.3 (ref 7–25)
BUN/CREAT SERPL: 40.3 (ref 7–25)
CALCIUM SPEC-SCNC: 10 MG/DL (ref 8.6–10.5)
CALCIUM SPEC-SCNC: 10 MG/DL (ref 8.6–10.5)
CHLORIDE SERPL-SCNC: 97 MMOL/L (ref 98–107)
CHLORIDE SERPL-SCNC: 97 MMOL/L (ref 98–107)
CO2 SERPL-SCNC: 24.6 MMOL/L (ref 22–29)
CO2 SERPL-SCNC: 24.6 MMOL/L (ref 22–29)
CREAT SERPL-MCNC: 1.59 MG/DL (ref 0.57–1)
CREAT SERPL-MCNC: 1.59 MG/DL (ref 0.57–1)
DEPRECATED RDW RBC AUTO: 52.9 FL (ref 37–54)
DEPRECATED RDW RBC AUTO: 52.9 FL (ref 37–54)
EGFRCR SERPLBLD CKD-EPI 2021: 31.5 ML/MIN/1.73
EGFRCR SERPLBLD CKD-EPI 2021: 31.5 ML/MIN/1.73
ERYTHROCYTE [DISTWIDTH] IN BLOOD BY AUTOMATED COUNT: 16.2 % (ref 12.3–15.4)
ERYTHROCYTE [DISTWIDTH] IN BLOOD BY AUTOMATED COUNT: 16.2 % (ref 12.3–15.4)
GLUCOSE SERPL-MCNC: 88 MG/DL (ref 65–99)
GLUCOSE SERPL-MCNC: 88 MG/DL (ref 65–99)
HCT VFR BLD AUTO: 31.9 % (ref 34–46.6)
HCT VFR BLD AUTO: 31.9 % (ref 34–46.6)
HGB BLD-MCNC: 10.2 G/DL (ref 12–15.9)
HGB BLD-MCNC: 10.2 G/DL (ref 12–15.9)
MCH RBC QN AUTO: 28.9 PG (ref 26.6–33)
MCH RBC QN AUTO: 28.9 PG (ref 26.6–33)
MCHC RBC AUTO-ENTMCNC: 32 G/DL (ref 31.5–35.7)
MCHC RBC AUTO-ENTMCNC: 32 G/DL (ref 31.5–35.7)
MCV RBC AUTO: 90.4 FL (ref 79–97)
MCV RBC AUTO: 90.4 FL (ref 79–97)
PHOSPHATE SERPL-MCNC: 4 MG/DL (ref 2.5–4.5)
PHOSPHATE SERPL-MCNC: 4 MG/DL (ref 2.5–4.5)
PLATELET # BLD AUTO: 314 10*3/MM3 (ref 140–450)
PLATELET # BLD AUTO: 314 10*3/MM3 (ref 140–450)
PMV BLD AUTO: 9.6 FL (ref 6–12)
PMV BLD AUTO: 9.6 FL (ref 6–12)
POTASSIUM SERPL-SCNC: 4.5 MMOL/L (ref 3.5–5.2)
POTASSIUM SERPL-SCNC: 4.5 MMOL/L (ref 3.5–5.2)
RBC # BLD AUTO: 3.53 10*6/MM3 (ref 3.77–5.28)
RBC # BLD AUTO: 3.53 10*6/MM3 (ref 3.77–5.28)
SODIUM SERPL-SCNC: 136 MMOL/L (ref 136–145)
SODIUM SERPL-SCNC: 136 MMOL/L (ref 136–145)
WBC NRBC COR # BLD: 8.6 10*3/MM3 (ref 3.4–10.8)
WBC NRBC COR # BLD: 8.6 10*3/MM3 (ref 3.4–10.8)

## 2023-05-13 PROCEDURE — 80069 RENAL FUNCTION PANEL: CPT | Performed by: PHYSICIAN ASSISTANT

## 2023-05-13 PROCEDURE — 99308 SBSQ NF CARE LOW MDM 20: CPT | Performed by: PHYSICIAN ASSISTANT

## 2023-05-13 PROCEDURE — 85027 COMPLETE CBC AUTOMATED: CPT | Performed by: PHYSICIAN ASSISTANT

## 2023-05-13 PROCEDURE — 97530 THERAPEUTIC ACTIVITIES: CPT

## 2023-05-13 PROCEDURE — 97116 GAIT TRAINING THERAPY: CPT

## 2023-05-13 PROCEDURE — 97110 THERAPEUTIC EXERCISES: CPT

## 2023-05-13 RX ORDER — DOXYCYCLINE 100 MG/1
100 CAPSULE ORAL EVERY 12 HOURS SCHEDULED
Status: COMPLETED | OUTPATIENT
Start: 2023-05-13 | End: 2023-05-19

## 2023-05-13 RX ADMIN — DOXYCYCLINE 100 MG: 100 CAPSULE ORAL at 13:23

## 2023-05-13 RX ADMIN — ALLOPURINOL 100 MG: 100 TABLET ORAL at 10:10

## 2023-05-13 RX ADMIN — SPIRONOLACTONE 12.5 MG: 25 TABLET ORAL at 10:10

## 2023-05-13 RX ADMIN — Medication 2000 UNITS: at 10:10

## 2023-05-13 RX ADMIN — HYDROCODONE BITARTRATE AND ACETAMINOPHEN 1 TABLET: 7.5; 325 TABLET ORAL at 16:11

## 2023-05-13 RX ADMIN — NEBIVOLOL 10 MG: 10 TABLET ORAL at 12:00

## 2023-05-13 RX ADMIN — ROSUVASTATIN CALCIUM 10 MG: 5 TABLET, FILM COATED ORAL at 20:07

## 2023-05-13 RX ADMIN — PANTOPRAZOLE SODIUM 40 MG: 40 TABLET, DELAYED RELEASE ORAL at 05:05

## 2023-05-13 RX ADMIN — HYDROCODONE BITARTRATE AND ACETAMINOPHEN 1 TABLET: 7.5; 325 TABLET ORAL at 22:11

## 2023-05-13 RX ADMIN — OXYCODONE HYDROCHLORIDE AND ACETAMINOPHEN 500 MG: 500 TABLET ORAL at 10:10

## 2023-05-13 RX ADMIN — DOCUSATE SODIUM 100 MG: 100 CAPSULE, LIQUID FILLED ORAL at 10:09

## 2023-05-13 RX ADMIN — Medication 10 MG: at 22:12

## 2023-05-13 RX ADMIN — FUROSEMIDE 40 MG: 40 TABLET ORAL at 10:10

## 2023-05-13 RX ADMIN — ASPIRIN 81 MG: 81 TABLET, COATED ORAL at 10:09

## 2023-05-13 RX ADMIN — CLOPIDOGREL BISULFATE 75 MG: 75 TABLET ORAL at 10:11

## 2023-05-13 RX ADMIN — FERROUS SULFATE TAB 325 MG (65 MG ELEMENTAL FE) 325 MG: 325 (65 FE) TAB at 10:09

## 2023-05-13 RX ADMIN — DOXYCYCLINE 100 MG: 100 CAPSULE ORAL at 20:09

## 2023-05-13 RX ADMIN — HYDROCODONE BITARTRATE AND ACETAMINOPHEN 1 TABLET: 7.5; 325 TABLET ORAL at 05:05

## 2023-05-13 RX ADMIN — MULTIPLE VITAMINS W/ MINERALS TAB 1 TABLET: TAB at 10:09

## 2023-05-13 RX ADMIN — LEVOTHYROXINE SODIUM 112 MCG: 0.11 TABLET ORAL at 05:05

## 2023-05-13 RX ADMIN — FUROSEMIDE 40 MG: 40 TABLET ORAL at 17:50

## 2023-05-13 RX ADMIN — POLYETHYLENE GLYCOL 3350 17 G: 17 POWDER, FOR SOLUTION ORAL at 10:14

## 2023-05-13 RX ADMIN — MONTELUKAST 10 MG: 10 TABLET, FILM COATED ORAL at 20:08

## 2023-05-13 NOTE — PLAN OF CARE
Problem: Adult Inpatient Plan of Care  Goal: Plan of Care Review  Outcome: Ongoing, Progressing  Flowsheets (Taken 5/13/2023 1658)  Progress: no change  Plan of Care Reviewed With: patient  Outcome Evaluation: Patient alert and oriented. Patient is x1 assist to bathroom. Patient medicated with norco x1 this shift for pain. Patient has had no other complaints this shift.  Goal: Patient-Specific Goal (Individualized)  Outcome: Ongoing, Progressing  Goal: Absence of Hospital-Acquired Illness or Injury  Outcome: Ongoing, Progressing  Intervention: Identify and Manage Fall Risk  Recent Flowsheet Documentation  Taken 5/13/2023 1611 by Myriam Lincoln RN  Safety Promotion/Fall Prevention: safety round/check completed  Taken 5/13/2023 1323 by Myriam Lincoln RN  Safety Promotion/Fall Prevention: safety round/check completed  Taken 5/13/2023 1200 by Myriam Lincoln RN  Safety Promotion/Fall Prevention: safety round/check completed  Taken 5/13/2023 1009 by Myriam Lincoln RN  Safety Promotion/Fall Prevention: safety round/check completed  Taken 5/13/2023 0800 by Myriam Lincoln RN  Safety Promotion/Fall Prevention: safety round/check completed  Taken 5/13/2023 0715 by Myriam Lincoln RN  Safety Promotion/Fall Prevention:   assistive device/personal items within reach   clutter free environment maintained   fall prevention program maintained   lighting adjusted   nonskid shoes/slippers when out of bed   room organization consistent   safety round/check completed  Intervention: Prevent and Manage VTE (Venous Thromboembolism) Risk  Recent Flowsheet Documentation  Taken 5/13/2023 1009 by Myriam Lincoln RN  Range of Motion: active ROM (range of motion) encouraged  Intervention: Prevent Infection  Recent Flowsheet Documentation  Taken 5/13/2023 0715 by Myriam Lincoln RN  Infection Prevention:   cohorting utilized   environmental surveillance performed   hand hygiene promoted   rest/sleep promoted   visitors  restricted/screened   personal protective equipment utilized  Goal: Optimal Comfort and Wellbeing  Outcome: Ongoing, Progressing  Intervention: Provide Person-Centered Care  Recent Flowsheet Documentation  Taken 5/13/2023 1009 by Myriam Lincoln RN  Trust Relationship/Rapport:   care explained   choices provided   emotional support provided   empathic listening provided   questions answered   questions encouraged   reassurance provided   thoughts/feelings acknowledged  Goal: Readiness for Transition of Care  Outcome: Ongoing, Progressing     Problem: Fall Injury Risk  Goal: Absence of Fall and Fall-Related Injury  Outcome: Ongoing, Progressing  Intervention: Identify and Manage Contributors  Recent Flowsheet Documentation  Taken 5/13/2023 0715 by Myriam Lincoln, RN  Medication Review/Management:   medications reviewed   high-risk medications identified  Intervention: Promote Injury-Free Environment  Recent Flowsheet Documentation  Taken 5/13/2023 1611 by Myriam Lincoln RN  Safety Promotion/Fall Prevention: safety round/check completed  Taken 5/13/2023 1323 by Myriam Lincoln RN  Safety Promotion/Fall Prevention: safety round/check completed  Taken 5/13/2023 1200 by Myriam Lincoln RN  Safety Promotion/Fall Prevention: safety round/check completed  Taken 5/13/2023 1009 by Myriam Lincoln RN  Safety Promotion/Fall Prevention: safety round/check completed  Taken 5/13/2023 0800 by Myriam Lincoln RN  Safety Promotion/Fall Prevention: safety round/check completed  Taken 5/13/2023 0715 by Myriam Lincoln, RN  Safety Promotion/Fall Prevention:   assistive device/personal items within reach   clutter free environment maintained   fall prevention program maintained   lighting adjusted   nonskid shoes/slippers when out of bed   room organization consistent   safety round/check completed     Problem: Skin Injury Risk Increased  Goal: Skin Health and Integrity  Outcome: Ongoing, Progressing     Problem: Heart Failure  Comorbidity  Goal: Maintenance of Heart Failure Symptom Control  Outcome: Ongoing, Progressing  Intervention: Maintain Heart Failure-Management  Recent Flowsheet Documentation  Taken 5/13/2023 0715 by Myriam Lincoln RN  Medication Review/Management:   medications reviewed   high-risk medications identified     Problem: Hypertension Comorbidity  Goal: Blood Pressure in Desired Range  Outcome: Ongoing, Progressing  Intervention: Maintain Blood Pressure Management  Recent Flowsheet Documentation  Taken 5/13/2023 0715 by Myriam Lincoln RN  Medication Review/Management:   medications reviewed   high-risk medications identified     Problem: Osteoarthritis Comorbidity  Goal: Maintenance of Osteoarthritis Symptom Control  Outcome: Ongoing, Progressing  Intervention: Maintain Osteoarthritis Symptom Control  Recent Flowsheet Documentation  Taken 5/13/2023 0715 by Myriam Lincoln RN  Medication Review/Management:   medications reviewed   high-risk medications identified     Problem: Pain Acute  Goal: Acceptable Pain Control and Functional Ability  Outcome: Ongoing, Progressing  Intervention: Prevent or Manage Pain  Recent Flowsheet Documentation  Taken 5/13/2023 0715 by Myriam Lincoln RN  Medication Review/Management:   medications reviewed   high-risk medications identified     Problem: Fluid and Electrolyte Imbalance (Acute Kidney Injury/Impairment)  Goal: Fluid and Electrolyte Balance  Outcome: Ongoing, Progressing     Problem: Oral Intake Inadequate (Acute Kidney Injury/Impairment)  Goal: Optimal Nutrition Intake  Outcome: Ongoing, Progressing     Problem: Renal Function Impairment (Acute Kidney Injury/Impairment)  Goal: Effective Renal Function  Outcome: Ongoing, Progressing  Intervention: Monitor and Support Renal Function  Recent Flowsheet Documentation  Taken 5/13/2023 0715 by Myriam Lincoln RN  Medication Review/Management:   medications reviewed   high-risk medications identified   Goal Outcome  Evaluation:  Plan of Care Reviewed With: patient        Progress: no change  Outcome Evaluation: Patient alert and oriented. Patient is x1 assist to bathroom. Patient medicated with norco x1 this shift for pain. Patient has had no other complaints this shift.

## 2023-05-13 NOTE — THERAPY TREATMENT NOTE
SNF - Physical Therapy Progress Note   Jordin     Patient Name: Yelena Sanchez  : 1935  MRN: [id number]  Today's Date: 2023      Visit Dx:    ICD-10-CM ICD-9-CM   1. Decreased activities of daily living (ADL)  Z78.9 V49.89   2. Difficulty walking  R26.2 719.7     Patient Active Problem List   Diagnosis   • Closed fracture of neck of right humerus   • HTN (hypertension)   • HLD (hyperlipidemia)   • Hypothyroidism   • Effusion of right elbow   • Renal insufficiency   • Musculoskeletal pain   • Osteoarthritis of left knee   • Fracture     Past Medical History:   Diagnosis Date   • Anemia    • Breast cancer    • CHF (congestive heart failure)      Past Surgical History:   Procedure Laterality Date   • LUNG SURGERY Left     LEFT UPPER LOBE ()   • MASTECTOMY Left        PT Assessment (last 12 hours)     PT Evaluation and Treatment     Row Name 23 1200          Physical Therapy Time and Intention    Subjective Information complains of;pain  -CS     Document Type therapy note (daily note)  -CS     Mode of Treatment individual therapy;physical therapy  -CS     Patient Effort good  -CS     Symptoms Noted During/After Treatment fatigue  -CS     Row Name 23 1200          Pain    Pretreatment Pain Rating 10/10  -CS     Posttreatment Pain Rating 10/10  -CS     Pain Location - Side/Orientation --  L knee and R shoulder  -CS     Pain Intervention(s) Repositioned;Distraction;Emotional support  -CS     Row Name 23 1200          Sit-Stand Transfer    Sit-Stand Josephine (Transfers) contact guard  -CS     Assistive Device (Sit-Stand Transfers) cane, quad  -CS     Row Name 23 1200          Stand-Sit Transfer    Stand-Sit Josephine (Transfers) contact guard  -CS     Assistive Device (Stand-Sit Transfers) cane, quad  -CS     Row Name 23 1200          Gait/Stairs (Locomotion)    Josephine Level (Gait) contact guard  -CS     Assistive Device (Gait) cane, quad  -CS     Distance in  Feet (Gait) 300  -     Pattern (Gait) 3-point;step-through  -     Deviations/Abnormal Patterns (Gait) gait speed decreased;stride length decreased  -     Row Name 05/13/23 1200          Hip (Therapeutic Exercise)    Hip AROM (Therapeutic Exercise) bilateral;sitting;supine;30 repititions  marches, hip abd/add  -     Row Name 05/13/23 1200          Knee (Therapeutic Exercise)    Knee (Therapeutic Exercise) AROM (active range of motion)  -     Knee AROM (Therapeutic Exercise) bilateral;LAQ (long arc quad);sitting;30 repititions  -     Knee AAROM (Therapeutic Exercise) bilateral;supine;10 repetitions;3 sets  SLR's  -     Row Name 05/13/23 1200          Ankle (Therapeutic Exercise)    Ankle AROM (Therapeutic Exercise) bilateral;dorsiflexion;plantarflexion;sitting;30 repititions  -     Row Name             Wound 05/04/23 2211 Left anterior knee Abrasion    Wound - Properties Group Placement Date: 05/04/23  -JN Placement Time: 2211 -JN Side: Left  -JN Orientation: anterior  -JN Location: knee  -JN Primary Wound Type: Abrasion  -JN    Retired Wound - Properties Group Placement Date: 05/04/23  -JN Placement Time: 2211 -JN Side: Left  -JN Orientation: anterior  -JN Location: knee  -JN Primary Wound Type: Abrasion  -JN    Retired Wound - Properties Group Date first assessed: 05/04/23 -JN Time first assessed: 2211 -JN Side: Left  -JN Location: knee  -JN Primary Wound Type: Abrasion  -JN    Row Name             Wound Left posterior thumb Abrasion    Wound - Properties Group Side: Left  -FH Orientation: posterior  -FH Location: thumb  -FH Primary Wound Type: Abrasion  -FH Additional Comments: resident had splint in place but removed per self exposing scab.  -FH    Retired Wound - Properties Group Side: Left  -FH Orientation: posterior  -FH Location: thumb  -FH Primary Wound Type: Abrasion  -FH Additional Comments: resident had splint in place but removed per self exposing scab.  -FH    Retired Wound -  Properties Group Side: Left  -FH Location: thumb  -FH Primary Wound Type: Abrasion  -FH Additional Comments: resident had splint in place but removed per self exposing scab.  -FH    Row Name 05/13/23 1200          Positioning and Restraints    Pre-Treatment Position sitting in chair/recliner  -CS     Post Treatment Position chair  -CS     In Chair reclined;encouraged to call for assist;call light within reach;exit alarm on;with family/caregiver  -CS     Row Name 05/13/23 1200          Progress Summary (PT)    Progress Toward Functional Goals (PT) progress toward functional goals is good  -           User Key  (r) = Recorded By, (t) = Taken By, (c) = Cosigned By    Initials Name Provider Type    JN Lyly Amaya, RN Registered Nurse     [place] RN Registered Nurse    Erik Maxwell PTA Physical Therapist Assistant              Section G              Section GG                       Physical Therapy Education     Title: PT OT SLP Therapies (In Progress)     Topic: Physical Therapy (In Progress)     Point: Mobility training (In Progress)     Learning Progress Summary           Patient Acceptance, E, NR by  at 5/10/2023 1652                   Point: Precautions (In Progress)     Learning Progress Summary           Patient Acceptance, E, NR by  at 5/10/2023 1652                               User Key     Initials Effective Dates Name Provider Type Discipline     04/25/21 -  Ruth Mina, PT Physical Therapist PT              PT Recommendation and Plan     Progress Summary (PT)  Progress Toward Functional Goals (PT): progress toward functional goals is good   Outcome Measures     Row Name 05/13/23 1200 05/12/23 1355 05/12/23 1006       How much help from another person do you currently need...    Turning from your back to your side while in flat bed without using bedrails? 2  -CS 2  -WM 2  -WM    Moving from lying on back to sitting on the side of a flat bed without bedrails? 2  -CS 2   -WM 2  -WM    Moving to and from a bed to a chair (including a wheelchair)? 3  -CS 3  -WM 3  -WM    Standing up from a chair using your arms (e.g., wheelchair, bedside chair)? 3  -CS 3  -WM 3  -WM    Climbing 3-5 steps with a railing? 3  -CS 3  -WM 3  -WM    To walk in hospital room? 3  -CS 3  -WM 3  -WM    AM-PAC 6 Clicks Score (PT) 16  -CS 16  -WM 16  -WM       Functional Assessment    Outcome Measure Options AM-PAC 6 Clicks Basic Mobility (PT)  -CS -- --    Row Name 05/11/23 1406 05/11/23 1224 05/10/23 1500       How much help from another person do you currently need...    Turning from your back to your side while in flat bed without using bedrails? 2  -WM 2  -WM 2  -CSA    Moving from lying on back to sitting on the side of a flat bed without bedrails? 2  -WM 2  -WM 2  -CSA    Moving to and from a bed to a chair (including a wheelchair)? 3  -WM 3  -WM 3  -CSA    Standing up from a chair using your arms (e.g., wheelchair, bedside chair)? 3  -WM 3  -WM 3  -CSA    Climbing 3-5 steps with a railing? 2  -WM 2  -WM 2  -CSA    To walk in hospital room? 3  -WM 3  -WM 3  -CSA    AM-PAC 6 Clicks Score (PT) 15  -WM 15  -WM 15  -CSA       Functional Assessment    Outcome Measure Options -- -- AM-PAC 6 Clicks Basic Mobility (PT)  -CSA          User Key  (r) = Recorded By, (t) = Taken By, (c) = Cosigned By    Initials Name Provider Type     Cameron Mohamud, PTA Physical Therapist Assistant    CSA Ruth Mina, PT Physical Therapist    CS Erik Elizondo PTA Physical Therapist Assistant                 Time Calculation:    PT Charges     Row Name 05/13/23 1253             Time Calculation    Start Time 1115  -CS      PT Received On 05/13/23  -         Timed Charges    75195 - PT Therapeutic Exercise Minutes 19  -CS      79300 - Gait Training Minutes  14  -CS      69655 - PT Therapeutic Activity Minutes 7  -CS         SNF Physical Therapy Minutes    Skilled Minutes- PT 40 min  -CS         Total Minutes    Timed  Charges Total Minutes 40  -CS       Total Minutes 40  -CS            User Key  (r) = Recorded By, (t) = Taken By, (c) = Cosigned By    Initials Name Provider Type    CS Erik Elizondo PTA Physical Therapist Assistant              Therapy Charges for Today     Code Description Service Date Service Provider Modifiers Qty    24309585203  PT THER PROC EA 15 MIN 5/13/2023 Erik Elizondo, SOCRATES GP 1    [id number] HC GAIT TRAINING EA 15 MIN 5/13/2023 Erik Elizondo PTA GP 1    [id number]  PT THERAPEUTIC ACT EA 15 MIN 5/13/2023 Erik Elizondo PTA GP 1          PT G-Codes  Outcome Measure Options: AM-PAC 6 Clicks Basic Mobility (PT)  AM-PAC 6 Clicks Score (PT): 16  AM-PAC 6 Clicks Score (OT): 16    Erik Elizondo PTA  5/13/2023

## 2023-05-13 NOTE — PROGRESS NOTES
[facility]   Hospitalist Progress Note  Date: 2023  Patient Name: Yelena Sanchez  : 1935  MRN: [id number]  Date of admission: 2023      Subjective   Subjective     Chief Complaint: Weakness    Summary: 86 y/o F with HTN, CAD, CHF hypothyroidism who presented after mechanical fall with pain involving the right shoulder, right elbow left hand/knee.  No head trauma or loss of consciousness.  Presentation little hypertensive otherwise vital stable.  Labs notable for WBC 14.61, platelets 215, hemoglobin 12, creatinine elevated 2.12, baseline around 1.6, rest of the CMP is nonsignificant.  Imaging with nondisplaced fracture of the right humerus neck, right elbow with joint effusion, left hand minimally displaced fracture of the thumb.  Right shoulder in splint.  Orthopedic surgery consulted, nonoperative management, pain control, PT and rehab.     Seen by PT and OT inpatient rehab recommended patient accepted bed at [facility] skilled nursing facility.     Interval Followup: Patient seen and examined resting comfortably complaining of continued right arm pain remains immobilized in sling per orthopedic surgery recommendations nonoperative treatment.  Complaining of some left knee pain x-rays reviewed no fracture significant bruising some redness we will start some empiric doxycycline continue topical wound care    Review of systems:  All systems reviewed and negative except the following: Patient with generalized weakness fatigue knee pain right arm pain    Objective   Objective     Vitals:   Temp:  [97.7 °F (36.5 °C)-97.9 °F (36.6 °C)] 97.7 °F (36.5 °C)  Heart Rate:  [59-61] 59  Resp:  [18] 18  BP: (136-139)/(51-60) 139/51        Physical Exam   Constitutional: Awake alert oriented no acute  Respiratory: Clear  Cardiovascular: RRR  GI: Abdomen soft nontender bowel sounds  Extremities: Right upper extremity neurovascular intact immobilized in left knee with extensive bruising some erythema noted  Skin:  Significant bruising    Result Review    Result Review:  I have personally reviewed the results from the time of this admission to 5/13/2023 14:54 EDT and agree with these findings:  []  Laboratory  []  Microbiology  []  Radiology  []  EKG/Telemetry   []  Cardiology/Vascular   []  Pathology  []  Old records  []  Other:    Assessment & Plan   Assessment / Plan   Assessment:    Assessment:     • Weakness status post recent hospitalization  • Close fracture of neck of right humerus nonoperative management  • Systolic CHF  • CKD 3B baseline 1.6-2.1  • COPD  • CAD  • History of AICD  • Left knee OA  • Hypertension  • Hyperlipidemia  • Hypothyroidism  • Effusion of right elbow     Plan:     • Started oral doxycycline 100 mg p.o. twice daily and continuing topical wound care for the left knee wound  • Continue with right upper extremity immobilization with right arm sling  • Continue with diuretics monitoring volume status renal function and weights  • Continue with oral analgesics  • Daily PT OT  • Monitoring labs  Discussed plan with RN.  And  at bedside    DVT prophylaxis:  Medical DVT prophylaxis orders are present.    CODE STATUS:   Level Of Support Discussed With: Patient  Code Status (Patient has no pulse and is not breathing): CPR (Attempt to Resuscitate)  Medical Interventions (Patient has pulse or is breathing): Full Support        Electronically signed by ROSY Castro, 05/13/23, 2:54 PM EDT.

## 2023-05-13 NOTE — PLAN OF CARE
Goal Outcome Evaluation:           Progress: improving  Outcome Evaluation: Rsd. alert and oriented, able to make needs known to staff.  Transfers x1 assist to BR with cane, and R) arm sling in place.  Medicated x2 with prn Norco per request, see emar.  Rsd. states medications effective.  Call light and personal items within reach, Rsd. reminded to use call light for assistance, verbalizes understanding.  Will continue to monitor and notify on-coming staff.  Current plan of care remains in place.

## 2023-05-14 PROCEDURE — 25010000002 HEPARIN (PORCINE) PER 1000 UNITS: Performed by: INTERNAL MEDICINE

## 2023-05-14 RX ADMIN — Medication 2000 UNITS: at 09:13

## 2023-05-14 RX ADMIN — MONTELUKAST 10 MG: 10 TABLET, FILM COATED ORAL at 21:03

## 2023-05-14 RX ADMIN — SPIRONOLACTONE 12.5 MG: 25 TABLET ORAL at 09:26

## 2023-05-14 RX ADMIN — HYDROCODONE BITARTRATE AND ACETAMINOPHEN 1 TABLET: 7.5; 325 TABLET ORAL at 18:02

## 2023-05-14 RX ADMIN — POLYETHYLENE GLYCOL 3350 17 G: 17 POWDER, FOR SOLUTION ORAL at 09:12

## 2023-05-14 RX ADMIN — FUROSEMIDE 40 MG: 40 TABLET ORAL at 09:13

## 2023-05-14 RX ADMIN — HYDROCODONE BITARTRATE AND ACETAMINOPHEN 1 TABLET: 7.5; 325 TABLET ORAL at 23:57

## 2023-05-14 RX ADMIN — CLOPIDOGREL BISULFATE 75 MG: 75 TABLET ORAL at 09:13

## 2023-05-14 RX ADMIN — FERROUS SULFATE TAB 325 MG (65 MG ELEMENTAL FE) 325 MG: 325 (65 FE) TAB at 09:13

## 2023-05-14 RX ADMIN — HYDROCODONE BITARTRATE AND ACETAMINOPHEN 1 TABLET: 7.5; 325 TABLET ORAL at 04:17

## 2023-05-14 RX ADMIN — MULTIPLE VITAMINS W/ MINERALS TAB 1 TABLET: TAB at 09:13

## 2023-05-14 RX ADMIN — ALLOPURINOL 100 MG: 100 TABLET ORAL at 09:13

## 2023-05-14 RX ADMIN — PANTOPRAZOLE SODIUM 40 MG: 40 TABLET, DELAYED RELEASE ORAL at 05:23

## 2023-05-14 RX ADMIN — Medication 10 MG: at 21:10

## 2023-05-14 RX ADMIN — DOXYCYCLINE 100 MG: 100 CAPSULE ORAL at 09:13

## 2023-05-14 RX ADMIN — NEBIVOLOL 10 MG: 10 TABLET ORAL at 09:13

## 2023-05-14 RX ADMIN — LEVOTHYROXINE SODIUM 112 MCG: 0.11 TABLET ORAL at 05:56

## 2023-05-14 RX ADMIN — DOXYCYCLINE 100 MG: 100 CAPSULE ORAL at 21:03

## 2023-05-14 RX ADMIN — ROSUVASTATIN CALCIUM 10 MG: 5 TABLET, FILM COATED ORAL at 21:03

## 2023-05-14 RX ADMIN — ASPIRIN 81 MG: 81 TABLET, COATED ORAL at 09:13

## 2023-05-14 RX ADMIN — HYDROCODONE BITARTRATE AND ACETAMINOPHEN 1 TABLET: 7.5; 325 TABLET ORAL at 12:07

## 2023-05-14 RX ADMIN — DOCUSATE SODIUM 100 MG: 100 CAPSULE, LIQUID FILLED ORAL at 09:14

## 2023-05-14 RX ADMIN — OXYCODONE HYDROCHLORIDE AND ACETAMINOPHEN 500 MG: 500 TABLET ORAL at 09:21

## 2023-05-14 RX ADMIN — FUROSEMIDE 20 MG: 40 TABLET ORAL at 18:00

## 2023-05-14 NOTE — PLAN OF CARE
Goal Outcome Evaluation:           Progress: no change  Outcome Evaluation: Rsd. alert and oriented, able to make needs known to staff.  Transfers x1 assist to BR with cane, and R) arm sling in place.  Medicated x2 with prn Norco per request, see emar.  Rsd. states medications effective.  Call light and personal items within reach, Rsd. reminded to use call light for assistance, verbalizes understanding.  Will continue to monitor and notify on-coming staff.  Current plan of care remains in place.

## 2023-05-15 VITALS
DIASTOLIC BLOOD PRESSURE: 56 MMHG | HEART RATE: 59 BPM | BODY MASS INDEX: 30.31 KG/M2 | HEIGHT: 59 IN | SYSTOLIC BLOOD PRESSURE: 134 MMHG | WEIGHT: 150.35 LBS | OXYGEN SATURATION: 94 % | RESPIRATION RATE: 16 BRPM | TEMPERATURE: 98.1 F

## 2023-05-15 PROCEDURE — 97530 THERAPEUTIC ACTIVITIES: CPT

## 2023-05-15 PROCEDURE — 97110 THERAPEUTIC EXERCISES: CPT

## 2023-05-15 PROCEDURE — 99308 SBSQ NF CARE LOW MDM 20: CPT | Performed by: PHYSICIAN ASSISTANT

## 2023-05-15 PROCEDURE — 97112 NEUROMUSCULAR REEDUCATION: CPT

## 2023-05-15 PROCEDURE — 97535 SELF CARE MNGMENT TRAINING: CPT

## 2023-05-15 RX ORDER — HYDROCODONE BITARTRATE AND ACETAMINOPHEN 5; 325 MG/1; MG/1
1 TABLET ORAL EVERY 6 HOURS PRN
Status: DISCONTINUED | OUTPATIENT
Start: 2023-05-15 | End: 2023-05-20 | Stop reason: HOSPADM

## 2023-05-15 RX ORDER — HYDROCODONE BITARTRATE AND ACETAMINOPHEN 7.5; 325 MG/1; MG/1
1 TABLET ORAL EVERY 6 HOURS PRN
Status: DISCONTINUED | OUTPATIENT
Start: 2023-05-15 | End: 2023-05-20 | Stop reason: HOSPADM

## 2023-05-15 RX ADMIN — ALLOPURINOL 100 MG: 100 TABLET ORAL at 08:14

## 2023-05-15 RX ADMIN — FERROUS SULFATE TAB 325 MG (65 MG ELEMENTAL FE) 325 MG: 325 (65 FE) TAB at 08:14

## 2023-05-15 RX ADMIN — CLOPIDOGREL BISULFATE 75 MG: 75 TABLET ORAL at 08:17

## 2023-05-15 RX ADMIN — HYDROCODONE BITARTRATE AND ACETAMINOPHEN 1 TABLET: 7.5; 325 TABLET ORAL at 12:32

## 2023-05-15 RX ADMIN — DOCUSATE SODIUM 100 MG: 100 CAPSULE, LIQUID FILLED ORAL at 08:17

## 2023-05-15 RX ADMIN — DOXYCYCLINE 100 MG: 100 CAPSULE ORAL at 21:04

## 2023-05-15 RX ADMIN — Medication 2000 UNITS: at 08:17

## 2023-05-15 RX ADMIN — HYDROCODONE BITARTRATE AND ACETAMINOPHEN 1 TABLET: 7.5; 325 TABLET ORAL at 05:51

## 2023-05-15 RX ADMIN — FUROSEMIDE 40 MG: 40 TABLET ORAL at 08:17

## 2023-05-15 RX ADMIN — SPIRONOLACTONE 12.5 MG: 25 TABLET ORAL at 08:15

## 2023-05-15 RX ADMIN — ASPIRIN 81 MG: 81 TABLET, COATED ORAL at 08:16

## 2023-05-15 RX ADMIN — HYDROCODONE BITARTRATE AND ACETAMINOPHEN 1 TABLET: 7.5; 325 TABLET ORAL at 18:31

## 2023-05-15 RX ADMIN — MULTIPLE VITAMINS W/ MINERALS TAB 1 TABLET: TAB at 08:17

## 2023-05-15 RX ADMIN — ACETAMINOPHEN 650 MG: 325 TABLET ORAL at 21:19

## 2023-05-15 RX ADMIN — Medication 10 MG: at 21:19

## 2023-05-15 RX ADMIN — MONTELUKAST 10 MG: 10 TABLET, FILM COATED ORAL at 21:04

## 2023-05-15 RX ADMIN — NEBIVOLOL 10 MG: 10 TABLET ORAL at 08:15

## 2023-05-15 RX ADMIN — ACETAMINOPHEN 650 MG: 325 TABLET ORAL at 04:39

## 2023-05-15 RX ADMIN — FAMOTIDINE 5 DROP: 20 TABLET, FILM COATED ORAL at 21:21

## 2023-05-15 RX ADMIN — PANTOPRAZOLE SODIUM 40 MG: 40 TABLET, DELAYED RELEASE ORAL at 05:12

## 2023-05-15 RX ADMIN — LEVOTHYROXINE SODIUM 112 MCG: 0.11 TABLET ORAL at 05:14

## 2023-05-15 RX ADMIN — OXYCODONE HYDROCHLORIDE AND ACETAMINOPHEN 500 MG: 500 TABLET ORAL at 08:16

## 2023-05-15 RX ADMIN — DOXYCYCLINE 100 MG: 100 CAPSULE ORAL at 08:17

## 2023-05-15 RX ADMIN — ROSUVASTATIN CALCIUM 10 MG: 5 TABLET, FILM COATED ORAL at 21:04

## 2023-05-15 RX ADMIN — FUROSEMIDE 40 MG: 40 TABLET ORAL at 18:31

## 2023-05-15 RX ADMIN — POLYETHYLENE GLYCOL 3350 17 G: 17 POWDER, FOR SOLUTION ORAL at 08:18

## 2023-05-15 NOTE — PLAN OF CARE
Goal Outcome Evaluation:   Resident is A&O and pleasant with staff.  Ambulates with 1 assist and cane.  Medicated X2 with prn pain meds with relief of symptoms noted.  New orders for skin and wound care this shift. No significant changes.  Sitting up in chair with call light in reach.

## 2023-05-15 NOTE — PROGRESS NOTES
[facility]   Hospitalist Progress Note  Date: 5/15/2023  Patient Name: Yelena Sanchez  : 1935  MRN: [id number]  Date of admission: 2023      Subjective   Subjective     Chief Complaint: Weakness    Summary: 86 y/o F with HTN, CAD, CHF hypothyroidism who presented after mechanical fall with pain involving the right shoulder, right elbow left hand/knee.  No head trauma or loss of consciousness.  Presentation little hypertensive otherwise vital stable.  Labs notable for WBC 14.61, platelets 215, hemoglobin 12, creatinine elevated 2.12, baseline around 1.6, rest of the CMP is nonsignificant.  Imaging with nondisplaced fracture of the right humerus neck, right elbow with joint effusion, left hand minimally displaced fracture of the thumb.  Right shoulder in splint.  Orthopedic surgery consulted, nonoperative management, pain control, PT and rehab.     Seen by PT and OT inpatient rehab recommended patient accepted bed at [facility] skilled nursing facility.     Interval Followup: Patient seen and examined resting comfortably doing well with therapy complaining of some right arm discomfort secondary to sling.  Left knee pain improving redness improving continue with oral doxycycline for patient with earwax buildup we will add Debrox drops.  Rechecking chemistries in the morning.    Review of systems:  All systems reviewed and negative except the following: Patient with generalized weakness fatigue knee pain right arm pain    Objective   Objective     Vitals:   Temp:  [97.5 °F (36.4 °C)-98.4 °F (36.9 °C)] 97.5 °F (36.4 °C)  Heart Rate:  [57-60] 60  Resp:  [14-16] 16  BP: (137-148)/(55-69) 148/55        Physical Exam   Constitutional: Awake alert oriented no acute  Respiratory: Clear  Cardiovascular: RRR  GI: Abdomen soft nontender bowel sounds  Extremities: Right upper extremity neurovascular intact immobilized in left knee with extensive bruising some erythema noted  Skin: Significant bruising    Result Review     Result Review:  I have personally reviewed the results from the time of this admission to 5/15/2023 16:20 EDT and agree with these findings:  []  Laboratory  []  Microbiology  []  Radiology  []  EKG/Telemetry   []  Cardiology/Vascular   []  Pathology  []  Old records  []  Other:    Assessment & Plan   Assessment / Plan   Assessment:    Assessment:     • Weakness status post recent hospitalization  • Close fracture of neck of right humerus nonoperative management  • Systolic CHF  • CKD 3B baseline 1.6-2.1  • COPD  • CAD  • History of AICD  • Left knee OA  • Hypertension  • Hyperlipidemia  • Hypothyroidism  • Effusion of right elbow     Plan:     • Continue with oral doxycycline 100 mg p.o. twice daily and continuing topical wound care for the left knee wound  • Continue with right upper extremity immobilization with right arm sling  • Continue with diuretics monitoring volume status renal function and weights  • Continue with oral analgesics  • Daily PT OT  • Checking a.m. labs  Discussed plan with RN.      DVT prophylaxis:  Medical DVT prophylaxis orders are present.    CODE STATUS:   Level Of Support Discussed With: Patient  Code Status (Patient has no pulse and is not breathing): CPR (Attempt to Resuscitate)  Medical Interventions (Patient has pulse or is breathing): Full Support    Electronically signed by ROSY Castro, 05/15/23, 4:22 PM EDT.

## 2023-05-15 NOTE — PLAN OF CARE
Goal Outcome Evaluation:  Plan of Care Reviewed With: patient           Outcome Evaluation: Pt is AO x 4 and VSS but HR is maty. She is a 1 assist to the bathroom with a walker. She has refused to wear her arm sling tonight and she has no splint for her thumb. She has been medicated for pain x 3. Will continue with her plan of care. Call light and personal items within reach.

## 2023-05-15 NOTE — THERAPY TREATMENT NOTE
SNF - Physical Therapy Treatment Note   Jordin     Patient Name: Yelena Sanchez  : 1935  MRN: [id number]  Today's Date: 5/15/2023      Visit Dx:     ICD-10-CM ICD-9-CM   1. Decreased activities of daily living (ADL)  Z78.9 V49.89   2. Difficulty walking  R26.2 719.7     Patient Active Problem List   Diagnosis   • Closed fracture of neck of right humerus   • HTN (hypertension)   • HLD (hyperlipidemia)   • Hypothyroidism   • Effusion of right elbow   • Renal insufficiency   • Musculoskeletal pain   • Osteoarthritis of left knee   • Fracture     Past Medical History:   Diagnosis Date   • Anemia    • Breast cancer    • CHF (congestive heart failure)      Past Surgical History:   Procedure Laterality Date   • LUNG SURGERY Left     LEFT UPPER LOBE ()   • MASTECTOMY Left      PT Assessment (last 12 hours)     PT Evaluation and Treatment     Row Name 05/15/23 1329          Physical Therapy Time and Intention    Document Type therapy note (daily note)  -WM     Mode of Treatment individual therapy;physical therapy  -WM     Patient Effort good  -WM     Symptoms Noted During/After Treatment fatigue  -WM     Row Name 05/15/23 1329          Pain Scale: FACES Pre/Post-Treatment    Pain: FACES Scale, Pretreatment 4-->hurts little more  -WM     Posttreatment Pain Rating 4-->hurts little more  -WM     Pain Location - Side/Orientation Right  -WM     Pain Location - shoulder  -WM     Row Name 05/15/23 1329          Hip (Therapeutic Exercise)    Hip AAROM (Therapeutic Exercise) bilateral;aBduction;aDduction;supine;10 repetitions;5 repetitions;2 sets  -WM     Hip Strengthening (Therapeutic Exercise) bilateral;heel slides;supine;15 repititions;2 sets  Manual resistance  -WM     Row Name 05/15/23 1329          Knee (Therapeutic Exercise)    Knee AROM (Therapeutic Exercise) bilateral;LAQ (long arc quad);sitting;15 repititions;2 sets  -WM     Knee Strengthening (Therapeutic Exercise) bilateral;SLR (straight leg  raise);hamstring curls;sitting;supine;resistance band;yellow;15 repititions;2 sets  SLR active-assisted  -     Row Name 05/15/23 1329          Ankle (Therapeutic Exercise)    Ankle AROM (Therapeutic Exercise) bilateral;dorsiflexion;plantarflexion;supine;30 repititions  -     Row Name             Wound 05/04/23 2211 Left anterior knee Abrasion    Wound - Properties Group Placement Date: 05/04/23 -JN Placement Time: 2211 -JN Side: Left  -JN Orientation: anterior  -JN Location: knee  -JN Primary Wound Type: Abrasion  -JN    Retired Wound - Properties Group Placement Date: 05/04/23 -JN Placement Time: 2211 -JN Side: Left  -JN Orientation: anterior  -JN Location: knee  -JN Primary Wound Type: Abrasion  -JN    Retired Wound - Properties Group Date first assessed: 05/04/23 -JN Time first assessed: 2211 -JN Side: Left  -JN Location: knee  -JN Primary Wound Type: Abrasion  -JN    Row Name             Wound Left posterior thumb Abrasion    Wound - Properties Group Side: Left  -FH Orientation: posterior  -FH Location: thumb  -FH Primary Wound Type: Abrasion  -FH Additional Comments: resident had splint in place but removed per self exposing scab.  -FH    Retired Wound - Properties Group Side: Left  -FH Orientation: posterior  -FH Location: thumb  -FH Primary Wound Type: Abrasion  -FH Additional Comments: resident had splint in place but removed per self exposing scab.  -FH    Retired Wound - Properties Group Side: Left  -FH Location: thumb  -FH Primary Wound Type: Abrasion  -FH Additional Comments: resident had splint in place but removed per self exposing scab.  -    Row Name 05/15/23 1329          Progress Summary (PT)    Progress Toward Functional Goals (PT) progress toward functional goals is good  -WM     Daily Progress Summary (PT) Pt participated in BLE exercises, but declined transfers and GT  -WM           User Key  (r) = Recorded By, (t) = Taken By, (c) = Cosigned By    Initials Name Provider Type    SHERITA  Lyly Amaya, RN Registered Nurse     [place] RN Registered Nurse    Cameron Mcgregor PTA Physical Therapist Assistant                Physical Therapy Education     Title: PT OT SLP Therapies (In Progress)     Topic: Physical Therapy (In Progress)     Point: Mobility training (In Progress)     Learning Progress Summary           Patient Acceptance, E, NR by  at 5/10/2023 1652                   Point: Precautions (In Progress)     Learning Progress Summary           Patient Acceptance, E, NR by  at 5/10/2023 1652                               User Key     Initials Effective Dates Name Provider Type Discipline     04/25/21 -  Ruth Mina, BIBI Physical Therapist PT              PT Recommendation and Plan     Progress Summary (PT)  Progress Toward Functional Goals (PT): progress toward functional goals is good  Daily Progress Summary (PT): Pt participated in BLE exercises, but declined transfers and GT   Outcome Measures     Row Name 05/15/23 1333 05/13/23 1200 05/12/23 1355       How much help from another person do you currently need...    Turning from your back to your side while in flat bed without using bedrails? 2  -WM 2  -CS 2  -WM    Moving from lying on back to sitting on the side of a flat bed without bedrails? 2  -WM 2  -CS 2  -WM    Moving to and from a bed to a chair (including a wheelchair)? 3  -WM 3  -CS 3  -WM    Standing up from a chair using your arms (e.g., wheelchair, bedside chair)? 3  -WM 3  -CS 3  -WM    Climbing 3-5 steps with a railing? 3  -WM 3  -CS 3  -WM    To walk in hospital room? 3  -WM 3  -CS 3  -WM    AM-PAC 6 Clicks Score (PT) 16  -WM 16  -CS 16  -WM       Functional Assessment    Outcome Measure Options -- AM-PAC 6 Clicks Basic Mobility (PT)  -CS --          User Key  (r) = Recorded By, (t) = Taken By, (c) = Cosigned By    Initials Name Provider Type    Cameron Mcgregor PTA Physical Therapist Assistant    Erik Maxwell PTA Physical Therapist  Assistant                 Time Calculation:    PT Charges     Row Name 05/15/23 1328             Time Calculation    PT Received On 05/15/23  -WM         Timed Charges    40019 - PT Therapeutic Exercise Minutes 16  -WM         SNF Physical Therapy Minutes    Skilled Minutes- PT 16 min  -WM         Total Minutes    Timed Charges Total Minutes 16  -WM       Total Minutes 16  -WM            User Key  (r) = Recorded By, (t) = Taken By, (c) = Cosigned By    Initials Name Provider Type     Cameron Mohamud PTA Physical Therapist Assistant              Therapy Charges for Today     Code Description Service Date Service Provider Modifiers Qty    01872261256 HC PT THER PROC EA 15 MIN 5/15/2023 Cameron Mohamud PTA GP 1          PT G-Codes  Outcome Measure Options: AM-PAC 6 Clicks Basic Mobility (PT)  AM-PAC 6 Clicks Score (PT): 16  AM-PAC 6 Clicks Score (OT): 17    Cameron Mohamud PTA  5/15/2023

## 2023-05-15 NOTE — THERAPY TREATMENT NOTE
SNF - Occupational Therapy Treatment Note   [place]    Patient Name: Yelena Sanchez  : 1935    MRN: [id number]                              Today's Date: 5/15/2023       Admit Date: 2023    Visit Dx:     ICD-10-CM ICD-9-CM   1. Decreased activities of daily living (ADL)  Z78.9 V49.89   2. Difficulty walking  R26.2 719.7     Patient Active Problem List   Diagnosis   • Closed fracture of neck of right humerus   • HTN (hypertension)   • HLD (hyperlipidemia)   • Hypothyroidism   • Effusion of right elbow   • Renal insufficiency   • Musculoskeletal pain   • Osteoarthritis of left knee   • Fracture     Past Medical History:   Diagnosis Date   • Anemia    • Breast cancer    • CHF (congestive heart failure)      Past Surgical History:   Procedure Laterality Date   • LUNG SURGERY Left     LEFT UPPER LOBE ()   • MASTECTOMY Left       General Information     Row Name 05/15/23 0756          OT Time and Intention    Document Type therapy note (daily note)  -EG     Mode of Treatment individual therapy;occupational therapy  -EG     Row Name 05/15/23 0756          General Information    Existing Precautions/Restrictions fall;non-weight bearing;brace on at all times;weight bearing  -EG     Row Name 05/15/23 0756          Cognition    Orientation Status (Cognition) oriented to;person;time  -EG     Row Name 05/15/23 0756          Safety Issues, Functional Mobility    Impairments Affecting Function (Mobility) balance;endurance/activity tolerance;pain;range of motion (ROM);strength  -EG           User Key  (r) = Recorded By, (t) = Taken By, (c) = Cosigned By    Initials Name Provider Type    EG Letitia Chinchilla OT Occupational Therapist                 Mobility/ADL's     Row Name 05/15/23 0756          Bed Mobility    Bed Mobility sit-supine  -EG     All Activities, [place] (Bed Mobility) verbal cues;minimum assist (75% patient effort)  -EG     Sit-Supine [place] (Bed Mobility) minimum assist (75% patient  effort);1 person assist  -EG     Bed Mobility, Safety Issues decreased use of arms for pushing/pulling;decreased use of legs for bridging/pushing  -EG     Assistive Device (Bed Mobility) bed rails;head of bed elevated  -EG     Comment, (Bed Mobility) Extended time and cueing to attend to task for performance  -EG     Row Name 05/15/23 0756          Transfers    Transfers sit-stand transfer;stand-sit transfer;bed-chair transfer;toilet transfer  -EG     Row Name 05/15/23 0756          Bed-Chair Transfer    Bed-Chair [place] (Transfers) contact guard;verbal cues  -EG     Assistive Device (Bed-Chair Transfers) cane, quad  -EG     Row Name 05/15/23 0756          Sit-Stand Transfer    Sit-Stand [place] (Transfers) contact guard  -EG     Assistive Device (Sit-Stand Transfers) cane, quad  -EG     Row Name 05/15/23 0756          Stand-Sit Transfer    Stand-Sit [place] (Transfers) contact guard  -EG     Assistive Device (Stand-Sit Transfers) cane, quad  -EG     Row Name 05/15/23 0756          Toilet Transfer    Type (Toilet Transfer) sit-stand;stand-sit  -EG     [place] Level (Toilet Transfer) verbal cues;1 person assist;contact guard  -EG     Assistive Device (Toilet Transfer) cane, quad  -EG     Row Name 05/15/23 0756          Functional Mobility    Functional Mobility- Ind. Level contact guard assist  -EG     Functional Mobility- Device cane, quad  -EG     Functional Mobility- Comment Functional mobility performed to and from therapy gym using quad cane; CGA continues to be required due to decreased attention to task, limited safety with quad cane requires cue for proper use; fall risk is present  -EG     Row Name 05/15/23 0756          Activities of Daily Living    BADL Assessment/Intervention toileting;grooming  -EG     Row Name 05/15/23 0756          Mobility    Extremity Weight-bearing Status left lower extremity;right upper extremity  -EG     Right Upper Extremity (Weight-bearing Status) non  weight-bearing (NWB)  -EG     Left Lower Extremity (Weight-bearing Status) weight-bearing as tolerated (WBAT)  -EG     Row Name 05/15/23 0756          Grooming Assessment/Training    [place] Level (Grooming) grooming skills;set up;hair care, combing/brushing;wash face, hands  -EG     Row Name 05/15/23 0756          Toileting Assessment/Training    [place] Level (Toileting) toileting skills;verbal cues;contact guard assist;perform perineal hygiene;adjust/manage clothing  -EG     Comment, (Toileting) continues to require occ. cues and education on benefits and purpose of OT to facilitate ADL ind.  -EG           User Key  (r) = Recorded By, (t) = Taken By, (c) = Cosigned By    Initials Name Provider Type    EG Letitia Chinchilla OT Occupational Therapist               Obj/Interventions     Row Name 05/15/23 0758          Shoulder (Therapeutic Exercise)    Shoulder Strengthening (Therapeutic Exercise) left;scapular stabilization;aBduction;aDduction;horizontal aBduction/aDduction;external rotation;internal rotation;1 lb free weight;sitting;15 repititions;3 sets  refuses attempts at shoulder flexion/extension exercises this date  -EG     Row Name 05/15/23 0758          Elbow/Forearm (Therapeutic Exercise)    Elbow/Forearm (Therapeutic Exercise) strengthening exercise  -EG     Elbow/Forearm Strengthening (Therapeutic Exercise) left;flexion;extension;pronation;supination;sitting;1 lb free weight;3 sets;15 repititions  -EG     Row Name 05/15/23 0758          Motor Skills    Motor Skills functional endurance  -EG     Functional Endurance fair  -EG     Therapeutic Exercise elbow/forearm;shoulder  Green theraband used for 3x15 tricep extension exercises while sitting unsupported EOM  -EG     Row Name 05/15/23 0758          Balance    Balance Interventions standing;sit to stand;occupation based/functional task;weight shifting activity;UE activity with balance activity;static;minimal challenge  -EG     Comment, Balance  Unsupported standing tasks with weight shifting, minimal gross motor stepping patterns performed unsupported to promote balance; education and training on base of support and postural adjustments to improve balance during ADL performance  -EG           User Key  (r) = Recorded By, (t) = Taken By, (c) = Cosigned By    Initials Name Provider Type    Letitia Carter OT Occupational Therapist               Goals/Plan    No documentation.                Clinical Impression     Row Name 05/15/23 0800          Pain Scale: FACES Pre/Post-Treatment    Pain: FACES Scale, Pretreatment 4-->hurts little more  -EG     Posttreatment Pain Rating 4-->hurts little more  -EG     Pain Location - Side/Orientation Right  -EG     Pain Location - shoulder  -EG     Row Name 05/15/23 0800          Plan of Care Review    Plan of Care Reviewed With patient  -EG     Progress improving  -EG     Outcome Evaluation Patient is participatory and cooperative this date; Continues to require extended time for functional tasks due to decreased attention and some cognitive deficits with memory noted; Overall functional gains are noted in all performance areas at this time; OT services can continue to benefit patient to improve limited balance, strength, safety and endurance skills.  -EG     Row Name 05/15/23 0800          Therapy Assessment/Plan (OT)    Rehab Potential (OT) good, to achieve stated therapy goals  -EG     Criteria for Skilled Therapeutic Interventions Met (OT) yes;meets criteria;skilled treatment is necessary  -EG     Therapy Frequency (OT) 5 times/wk  -EG           User Key  (r) = Recorded By, (t) = Taken By, (c) = Cosigned By    Initials Name Provider Type    Letitia Carter OT Occupational Therapist               Outcome Measures     Row Name 05/15/23 0801          How much help from another is currently needed...    Putting on and taking off regular lower body clothing? 2  -EG     Bathing (including washing, rinsing, and  drying) 2  -EG     Toileting (which includes using toilet bed pan or urinal) 3  -EG     Putting on and taking off regular upper body clothing 2  -EG     Taking care of personal grooming (such as brushing teeth) 4  -EG     Eating meals 4  -EG     AM-PAC 6 Clicks Score (OT) 17  -EG     Row Name 05/14/23 2200          How much help from another person do you currently need...    Turning from your back to your side while in flat bed without using bedrails? 2  -BR     Moving from lying on back to sitting on the side of a flat bed without bedrails? 2  -BR     Moving to and from a bed to a chair (including a wheelchair)? 3  -BR     Standing up from a chair using your arms (e.g., wheelchair, bedside chair)? 3  -BR     Climbing 3-5 steps with a railing? 3  -BR     To walk in hospital room? 3  -BR     AM-PAC 6 Clicks Score (PT) 16  -BR     Highest level of mobility 5 --> Static standing  -BR     Row Name 05/15/23 0801          Optimal Instrument    Optimal Instrument Optimal - 3  -EG     Bending/Stooping 3  -EG     Standing 2  -EG     Reaching 3  -EG           User Key  (r) = Recorded By, (t) = Taken By, (c) = Cosigned By    Initials Name Provider Type    Lurdes Crouch, RN Registered Nurse    Letitia Carter, LEAH Occupational Therapist              Section G  Mobility  Bed mobility - self performance: limited assistance (staff provide guided maneuvering of limbs or other non-weight bearing assistance)  Bed mobility support/assistance: One person assist  Transfer - self performance: limited assistance (staff provide guided maneuvering of limbs or other non-weight bearing assistance)  Transfer support/assistance: One person assist  Walking in room - self performance: limited assistance (staff provide guided maneuvering of limbs or other non-weight bearing assistance)  Walking in room support/assistance: One person assist  Walking in corridors/hallway - self performance: limited assistance (staff provide guided  maneuvering of limbs or other non-weight bearing assistance)  Walking in corridors/hallway support/assistance: One person assist  Locomotion on unit - self performance: limited assistance (staff provide guided maneuvering of limbs or other non-weight bearing assistance)  Locomotion on unit support/assistance: One person assist  Locomotion off unit - self performance: activity did not occur  Locomotion off unit support/assistance: Activity did not occur  Dressing - self performance: extensive assistance (provide any weight-bearing support, hold over while fabrice-care lift legs for transfer, etc.)  Dressing support/assistance: One person assist  Eating - self performance: independent  Eating support/assistance: Setup help only  Toileting - self performance: limited assistance (staff provide guided maneuvering of limbs or other non-weight bearing assistance)  Toileting support/assistance: One person assist  Personal hygiene - self performance: limited assistance (staff provide guided maneuvering of limbs or other non-weight bearing assistance)  Personal hygiene support/assistance: One person assist  Bathing  Bathing - self performance: Physical help with bathing (exclude washing back and hair for patient)  Bathing support/assistance: One person assist  Balance  Balance during transitions & walking: Not steady, requires assist to steady  Moving from seated to standing position: Not steady, requires assist to steady  Walking: Not steady, requires assist to steady  Turning around while walking: Not steady, requires assist to steady  Moving on and off toilet: Not steady, requires assist to steady  Surface-to-surface transfer: Not steady, requires assist to steady  Mobility devices:  (pawan walker)  Range of Motion  Upper Extremity: Impairment on one side  Lower Extremity: No impairment  Section GG  SectionGG: Functional Ability/Goals, Adm  Self Care, Prior Functioning (VD0547L): 3. Independent  Functional Cognition, Prior  Functioning (KR1106H): 3. Independent  Self Care, Admission (Section GG)  Eating: Self-Care Admission Performance (LE3544K6): setup or clean-up assistance (05)  Oral Hygiene: Self-Care Admission Performance ([id number]): setup or clean-up assistance (05)  Toileting Hygiene: Self-Care Admission Performance (MP0521E0): supervision or touching assistance (04)  Shower/Bathe Self: Self-Care Admission Performance (HL4441G8): partial/moderate assistance (03)  Upper Body Dressing: Self-Care Admission Performance (FS3165X9): partial/moderate assistance (03)  Lower Body Dressing: Self-Care Admission Performance (OY3408H6): partial/moderate assistance (03)  Putting On/Taking Off Footwear: Self-Care Admission Performance (QE5200E4): partial/moderate assistance (03)     Section GG: Functional Ability/Goals, DC  Eating: Self-Care Discharge Goal (DP8459K0): independent (06)  Oral Hygiene: Self-Care Discharge Goal (XM9595L4): independent (06)  Toileting Hygiene: Self-Care Discharge Goal (PR0374B8): independent (06)  Shower/Bathe Self: Self-Care Discharge Goal (GB7766G7): independent (06)  Upper Body Dressing: Self-Care Discharge Goal (HO2126Z2): independent (06)  Lower Body Dressing: Self-Care Discharge Goal (KN7819Q8): independent (06)  Putting On/Taking Off Footwear: Self-Care Discharge Goal (GL6002T6): independent (06)             Occupational Therapy Education     Title: PT OT SLP Therapies (In Progress)     Topic: Occupational Therapy (In Progress)     Point: ADL training (In Progress)     Description:   Instruct learner(s) on proper safety adaptation and remediation techniques during self care or transfers.   Instruct in proper use of assistive devices.              Learning Progress Summary           Patient ALEJANDRO Chao, NR by EG at 5/10/2023 0800    Comment: Education on compensatory techniques for UB ADL's  Educated on sling donning, ROM and WB status orders for RUE  Education on general safety techniques and fall risk  prevention                   Point: Home exercise program (In Progress)     Description:   Instruct learner(s) on appropriate technique for monitoring, assisting and/or progressing therapeutic exercises/activities.              Learning Progress Summary           Patient ALEJANDRO Chao, NR by EG at 5/10/2023 0800    Comment: Education on compensatory techniques for UB ADL's  Educated on sling donning, ROM and WB status orders for RUE  Education on general safety techniques and fall risk prevention                   Point: Precautions (In Progress)     Description:   Instruct learner(s) on prescribed precautions during self-care and functional transfers.              Learning Progress Summary           Patient Zhanna, E, NR by EG at 5/10/2023 0800    Comment: Education on compensatory techniques for UB ADL's  Educated on sling donning, ROM and WB status orders for RUE  Education on general safety techniques and fall risk prevention                   Point: Body mechanics (In Progress)     Description:   Instruct learner(s) on proper positioning and spine alignment during self-care, functional mobility activities and/or exercises.              Learning Progress Summary           Patient ALEJANDRO Chao, NR by EG at 5/10/2023 0800    Comment: Education on compensatory techniques for UB ADL's  Educated on sling donning, ROM and WB status orders for RUE  Education on general safety techniques and fall risk prevention                               User Key     Initials Effective Dates Name Provider Type Discipline    EG 09/14/22 -  Letitia Chinchilla OT Occupational Therapist OT              OT Recommendation and Plan  Planned Therapy Interventions (OT): activity tolerance training, functional balance retraining, occupation/activity based interventions, adaptive equipment training, BADL retraining, neuromuscular control/coordination retraining, patient/caregiver education/training, transfer/mobility retraining, strengthening  exercise  Therapy Frequency (OT): 5 times/wk  Plan of Care Review  Plan of Care Reviewed With: patient  Progress: improving  Outcome Evaluation: Patient is participatory and cooperative this date; Continues to require extended time for functional tasks due to decreased attention and some cognitive deficits with memory noted; Overall functional gains are noted in all performance areas at this time; OT services can continue to benefit patient to improve limited balance, strength, safety and endurance skills.     Time Calculation:    Time Calculation- OT     Row Name 05/15/23 0802             Time Calculation- OT    OT Received On 05/15/23  -EG      OT Goal Re-Cert Due Date 06/08/23  -EG         Timed Charges    09044 - OT Therapeutic Exercise Minutes 19  -EG      15098 -  OT Neuromuscular Reeducation Minutes 13  -EG      92316 - OT Therapeutic Activity Minutes 13  -EG      73471 - OT Self Care/Mgmt Minutes 9  -EG         SNF Occupational Therapy Minutes    Skilled Minutes- OT 54 min  -EG         Total Minutes    Timed Charges Total Minutes 54  -EG       Total Minutes 54  -EG            User Key  (r) = Recorded By, (t) = Taken By, (c) = Cosigned By    Initials Name Provider Type    EG Letitia Chinchilla OT Occupational Therapist              Therapy Charges for Today     Code Description Service Date Service Provider Modifiers Qty    62132636755 HC OT NEUROMUSC RE EDUCATION EA 15 MIN 5/15/2023 Letitia Chinchilla OT GO 1    91620331568 HC OT THER PROC EA 15 MIN 5/15/2023 Letitia Chinchilla OT GO 1    98809283826 HC OT THERAPEUTIC ACT EA 15 MIN 5/15/2023 Letitia Chinchilla OT GO 1    97871678775 HC OT SELF CARE/MGMT/TRAIN EA 15 MIN 5/15/2023 Letitia Chinchilla OT GO 1               Letitia Chinchilla OT  5/15/2023

## 2023-05-16 LAB
ALBUMIN SERPL-MCNC: 3.4 G/DL (ref 3.5–5.2)
ALBUMIN SERPL-MCNC: 3.4 G/DL (ref 3.5–5.2)
ANION GAP SERPL CALCULATED.3IONS-SCNC: 12.7 MMOL/L (ref 5–15)
ANION GAP SERPL CALCULATED.3IONS-SCNC: 12.7 MMOL/L (ref 5–15)
BUN SERPL-MCNC: 70 MG/DL (ref 8–23)
BUN SERPL-MCNC: 70 MG/DL (ref 8–23)
BUN/CREAT SERPL: 40.5 (ref 7–25)
BUN/CREAT SERPL: 40.5 (ref 7–25)
CALCIUM SPEC-SCNC: 10 MG/DL (ref 8.6–10.5)
CALCIUM SPEC-SCNC: 10 MG/DL (ref 8.6–10.5)
CHLORIDE SERPL-SCNC: 99 MMOL/L (ref 98–107)
CHLORIDE SERPL-SCNC: 99 MMOL/L (ref 98–107)
CO2 SERPL-SCNC: 23.3 MMOL/L (ref 22–29)
CO2 SERPL-SCNC: 23.3 MMOL/L (ref 22–29)
CREAT SERPL-MCNC: 1.73 MG/DL (ref 0.57–1)
CREAT SERPL-MCNC: 1.73 MG/DL (ref 0.57–1)
DEPRECATED RDW RBC AUTO: 55.1 FL (ref 37–54)
DEPRECATED RDW RBC AUTO: 55.1 FL (ref 37–54)
EGFRCR SERPLBLD CKD-EPI 2021: 28.5 ML/MIN/1.73
EGFRCR SERPLBLD CKD-EPI 2021: 28.5 ML/MIN/1.73
ERYTHROCYTE [DISTWIDTH] IN BLOOD BY AUTOMATED COUNT: 16.4 % (ref 12.3–15.4)
ERYTHROCYTE [DISTWIDTH] IN BLOOD BY AUTOMATED COUNT: 16.4 % (ref 12.3–15.4)
GLUCOSE SERPL-MCNC: 89 MG/DL (ref 65–99)
GLUCOSE SERPL-MCNC: 89 MG/DL (ref 65–99)
HCT VFR BLD AUTO: 32.8 % (ref 34–46.6)
HCT VFR BLD AUTO: 32.8 % (ref 34–46.6)
HGB BLD-MCNC: 10.4 G/DL (ref 12–15.9)
HGB BLD-MCNC: 10.4 G/DL (ref 12–15.9)
MCH RBC QN AUTO: 28.9 PG (ref 26.6–33)
MCH RBC QN AUTO: 28.9 PG (ref 26.6–33)
MCHC RBC AUTO-ENTMCNC: 31.7 G/DL (ref 31.5–35.7)
MCHC RBC AUTO-ENTMCNC: 31.7 G/DL (ref 31.5–35.7)
MCV RBC AUTO: 91.1 FL (ref 79–97)
MCV RBC AUTO: 91.1 FL (ref 79–97)
NT-PROBNP SERPL-MCNC: 1454 PG/ML (ref 0–1800)
NT-PROBNP SERPL-MCNC: 1454 PG/ML (ref 0–1800)
PHOSPHATE SERPL-MCNC: 4.6 MG/DL (ref 2.5–4.5)
PHOSPHATE SERPL-MCNC: 4.6 MG/DL (ref 2.5–4.5)
PLATELET # BLD AUTO: 429 10*3/MM3 (ref 140–450)
PLATELET # BLD AUTO: 429 10*3/MM3 (ref 140–450)
PMV BLD AUTO: 9.8 FL (ref 6–12)
PMV BLD AUTO: 9.8 FL (ref 6–12)
POTASSIUM SERPL-SCNC: 4.5 MMOL/L (ref 3.5–5.2)
POTASSIUM SERPL-SCNC: 4.5 MMOL/L (ref 3.5–5.2)
RBC # BLD AUTO: 3.6 10*6/MM3 (ref 3.77–5.28)
RBC # BLD AUTO: 3.6 10*6/MM3 (ref 3.77–5.28)
SODIUM SERPL-SCNC: 135 MMOL/L (ref 136–145)
SODIUM SERPL-SCNC: 135 MMOL/L (ref 136–145)
WBC NRBC COR # BLD: 8.74 10*3/MM3 (ref 3.4–10.8)
WBC NRBC COR # BLD: 8.74 10*3/MM3 (ref 3.4–10.8)

## 2023-05-16 PROCEDURE — 83880 ASSAY OF NATRIURETIC PEPTIDE: CPT | Performed by: PHYSICIAN ASSISTANT

## 2023-05-16 PROCEDURE — 97530 THERAPEUTIC ACTIVITIES: CPT

## 2023-05-16 PROCEDURE — 80069 RENAL FUNCTION PANEL: CPT | Performed by: PHYSICIAN ASSISTANT

## 2023-05-16 PROCEDURE — 97110 THERAPEUTIC EXERCISES: CPT

## 2023-05-16 PROCEDURE — 97535 SELF CARE MNGMENT TRAINING: CPT

## 2023-05-16 PROCEDURE — 99309 SBSQ NF CARE MODERATE MDM 30: CPT | Performed by: PHYSICIAN ASSISTANT

## 2023-05-16 PROCEDURE — 97116 GAIT TRAINING THERAPY: CPT

## 2023-05-16 PROCEDURE — 85027 COMPLETE CBC AUTOMATED: CPT | Performed by: PHYSICIAN ASSISTANT

## 2023-05-16 RX ORDER — AMOXICILLIN 250 MG
2 CAPSULE ORAL 2 TIMES DAILY
Status: DISCONTINUED | OUTPATIENT
Start: 2023-05-16 | End: 2023-05-20 | Stop reason: HOSPADM

## 2023-05-16 RX ORDER — FUROSEMIDE 40 MG/1
40 TABLET ORAL
Status: DISCONTINUED | OUTPATIENT
Start: 2023-05-17 | End: 2023-05-18

## 2023-05-16 RX ADMIN — HYDROCODONE BITARTRATE AND ACETAMINOPHEN 1 TABLET: 7.5; 325 TABLET ORAL at 09:12

## 2023-05-16 RX ADMIN — FAMOTIDINE 5 DROP: 20 TABLET, FILM COATED ORAL at 09:14

## 2023-05-16 RX ADMIN — DOXYCYCLINE 100 MG: 100 CAPSULE ORAL at 20:29

## 2023-05-16 RX ADMIN — DOCUSATE SODIUM 100 MG: 100 CAPSULE, LIQUID FILLED ORAL at 09:12

## 2023-05-16 RX ADMIN — FERROUS SULFATE TAB 325 MG (65 MG ELEMENTAL FE) 325 MG: 325 (65 FE) TAB at 09:13

## 2023-05-16 RX ADMIN — OXYCODONE HYDROCHLORIDE AND ACETAMINOPHEN 500 MG: 500 TABLET ORAL at 09:13

## 2023-05-16 RX ADMIN — ASPIRIN 81 MG: 81 TABLET, COATED ORAL at 09:12

## 2023-05-16 RX ADMIN — POLYETHYLENE GLYCOL 3350 17 G: 17 POWDER, FOR SOLUTION ORAL at 09:12

## 2023-05-16 RX ADMIN — Medication 10 MG: at 20:32

## 2023-05-16 RX ADMIN — Medication 2000 UNITS: at 09:12

## 2023-05-16 RX ADMIN — PANTOPRAZOLE SODIUM 40 MG: 40 TABLET, DELAYED RELEASE ORAL at 05:32

## 2023-05-16 RX ADMIN — LEVOTHYROXINE SODIUM 112 MCG: 0.11 TABLET ORAL at 05:01

## 2023-05-16 RX ADMIN — TUBERCULIN PURIFIED PROTEIN DERIVATIVE 5 UNITS: 5 INJECTION, SOLUTION INTRADERMAL at 20:29

## 2023-05-16 RX ADMIN — ROSUVASTATIN CALCIUM 10 MG: 5 TABLET, FILM COATED ORAL at 20:29

## 2023-05-16 RX ADMIN — DOCUSATE SODIUM 50MG AND SENNOSIDES 8.6MG 2 TABLET: 8.6; 5 TABLET, FILM COATED ORAL at 20:29

## 2023-05-16 RX ADMIN — HYDROCODONE BITARTRATE AND ACETAMINOPHEN 1 TABLET: 7.5; 325 TABLET ORAL at 02:40

## 2023-05-16 RX ADMIN — CLOPIDOGREL BISULFATE 75 MG: 75 TABLET ORAL at 09:12

## 2023-05-16 RX ADMIN — NEBIVOLOL 10 MG: 10 TABLET ORAL at 09:12

## 2023-05-16 RX ADMIN — HYDROCODONE BITARTRATE AND ACETAMINOPHEN 1 TABLET: 7.5; 325 TABLET ORAL at 15:25

## 2023-05-16 RX ADMIN — MONTELUKAST 10 MG: 10 TABLET, FILM COATED ORAL at 20:29

## 2023-05-16 RX ADMIN — DOXYCYCLINE 100 MG: 100 CAPSULE ORAL at 09:12

## 2023-05-16 RX ADMIN — ALLOPURINOL 100 MG: 100 TABLET ORAL at 09:12

## 2023-05-16 RX ADMIN — MULTIPLE VITAMINS W/ MINERALS TAB 1 TABLET: TAB at 09:12

## 2023-05-16 RX ADMIN — FAMOTIDINE 5 DROP: 20 TABLET, FILM COATED ORAL at 20:37

## 2023-05-16 NOTE — THERAPY TREATMENT NOTE
SNF - Occupational Therapy Treatment Note   Jordin    Patient Name: Yelena Sanchez  : 1935    MRN: [id number]                              Today's Date: 2023       Admit Date: 2023    Visit Dx:     ICD-10-CM ICD-9-CM   1. Decreased activities of daily living (ADL)  Z78.9 V49.89   2. Difficulty walking  R26.2 719.7     Patient Active Problem List   Diagnosis   • Closed fracture of neck of right humerus   • HTN (hypertension)   • HLD (hyperlipidemia)   • Hypothyroidism   • Effusion of right elbow   • Renal insufficiency   • Musculoskeletal pain   • Osteoarthritis of left knee   • Fracture     Past Medical History:   Diagnosis Date   • Anemia    • Breast cancer    • CHF (congestive heart failure)      Past Surgical History:   Procedure Laterality Date   • LUNG SURGERY Left     LEFT UPPER LOBE ()   • MASTECTOMY Left       General Information     Row Name 23 1237          OT Time and Intention    Document Type therapy note (daily note)  -EG     Mode of Treatment individual therapy;occupational therapy  -     Row Name 23 1237          General Information    Existing Precautions/Restrictions fall;non-weight bearing;brace on at all times;weight bearing  -EG     Row Name 23 1237          Cognition    Orientation Status (Cognition) oriented to;person;time  -EG     Row Name 23 1237          Safety Issues, Functional Mobility    Impairments Affecting Function (Mobility) balance;endurance/activity tolerance;pain;range of motion (ROM);strength  -EG           User Key  (r) = Recorded By, (t) = Taken By, (c) = Cosigned By    Initials Name Provider Type    EG Letitia Chinchilla, OT Occupational Therapist                 Mobility/ADL's     Row Name 23 1238          Bed Mobility    Comment, (Bed Mobility) Up in recliner upon OT arrival  -EG     Row Name 23 1238          Transfers    Transfers sit-stand transfer;stand-sit transfer;bed-chair transfer;toilet transfer  -EG      Row Name 05/16/23 1238          Bed-Chair Transfer    Bed-Chair [place] (Transfers) contact guard;verbal cues  -EG     Assistive Device (Bed-Chair Transfers) cane, quad  -EG     Comment, (Bed-Chair Transfer) Continues to require CGA due to cueing for decreased attention to task and poor insight as well as awareness of surroundings during transfers and all transitional movements  -EG     Row Name 05/16/23 1238          Sit-Stand Transfer    Sit-Stand [place] (Transfers) contact guard  -EG     Assistive Device (Sit-Stand Transfers) cane, quad  -EG     Row Name 05/16/23 1238          Stand-Sit Transfer    Stand-Sit [place] (Transfers) contact guard  -EG     Assistive Device (Stand-Sit Transfers) cane, quad  -EG     Row Name 05/16/23 1238          Toilet Transfer    Type (Toilet Transfer) sit-stand;stand-sit  -EG     [place] Level (Toilet Transfer) verbal cues;1 person assist;contact guard  -EG     Assistive Device (Toilet Transfer) cane, quad  -EG     Row Name 05/16/23 1238          Functional Mobility    Functional Mobility- Ind. Level contact guard assist  -EG     Functional Mobility- Device cane, quad  -EG     Functional Mobility- Comment Continues to require CGA due to cueing for decreased attention to task and poor insight as well as awareness of surroundings during transfers and all transitional movements; functional mobility performed in room as well as to and from shower room  -EG     [facility] Name 05/16/23 1238          Activities of Daily Living    BADL Assessment/Intervention bathing;upper body dressing;lower body dressing;grooming;toileting  -EG     Row Name 05/16/23 1238          Mobility    Extremity Weight-bearing Status left lower extremity;right upper extremity  -EG     Right Upper Extremity (Weight-bearing Status) non weight-bearing (NWB)  -EG     Left Lower Extremity (Weight-bearing Status) weight-bearing as tolerated (WBAT)  -EG     [facility] Name 05/16/23 1238          Bathing  Assessment/Intervention    [place] Level (Bathing) bathing skills;upper body;lower body;verbal cues;moderate assist (50% patient effort)  -EG     Assistive Devices (Bathing) grab bar, tub/shower;hand-held shower spray hose;tub bench  -EG     Comment, (Bathing) ModA continues to be required; patient is pleasant but not open to attempts of using compensatory techniques with decreased insight into safety an deficits during performance, can be noncompliant  -EG     Row Name 05/16/23 1238          Upper Body Dressing Assessment/Training    [place] Level (Upper Body Dressing) upper body dressing skills;don;pull-over garment;moderate assist (50% patient effort)  -EG     Comment, (Upper Body Dressing) extended time and training for education on sling for RUE; patient has doffed incorrectly upon OT arrival and straps taken off causing unproper fit; patient non complaint with sling and poor cue follow through and education carryover for UB dressing  -EG     Row Name 05/16/23 1238          Lower Body Dressing Assessment/Training    [place] Level (Lower Body Dressing) lower body dressing skills;don;pants/bottoms;moderate assist (50% patient effort)  -EG     Comment, (Lower Body Dressing) Limited initiation requires increased cues and education; noncompliant  -EG     Row Name 05/16/23 1238          Grooming Assessment/Training    [place] Level (Grooming) grooming skills;set up;hair care, combing/brushing;wash face, hands  -EG     Comment, (Grooming) increased encouragement to perform with LUE  -EG     Row Name 05/16/23 1238          Toileting Assessment/Training    [place] Level (Toileting) toileting skills;verbal cues;contact guard assist;perform perineal hygiene;adjust/manage clothing  -EG     Comment, (Toileting) extended time for brief management  -EG           User Key  (r) = Recorded By, (t) = Taken By, (c) = Cosigned By    Initials Name Provider Type    EG Letitia Chinchilla, OT Occupational  Therapist               Obj/Interventions     Row Name 05/16/23 1244          Motor Skills    Motor Skills functional endurance  -EG     Functional Endurance fair  -EG     Row Name 05/16/23 1244          Balance    Balance Interventions standing;sit to stand;occupation based/functional task;weight shifting activity;UE activity with balance activity  -EG     Comment, Balance unsupported standing continues to be inconsistently tolerated 3-6 minutes; balance is inconsistent with dynamic weight shifting  -EG           User Key  (r) = Recorded By, (t) = Taken By, (c) = Cosigned By    Initials Name Provider Type    EG Letitia Chinchilla, LEAH Occupational Therapist               Goals/Plan    No documentation.                Clinical Impression     Row Name 05/16/23 1245          Pain Scale: FACES Pre/Post-Treatment    Pain: FACES Scale, Pretreatment 4-->hurts little more  -EG     Posttreatment Pain Rating 4-->hurts little more  -EG     Pain Location - Side/Orientation Right  -EG     Pain Location - shoulder  -EG     Row Name 05/16/23 1245          Plan of Care Review    Plan of Care Reviewed With patient  -EG     Progress improving  -EG     Outcome Evaluation Patient is pleasant but can be noncompliant with all orders regarding R shoulder; decreased carryover of education provided by OT: Potential for improvement is noted but is limited due to patients insight; OT services can continue to benefit patient due to impaired strength, safety, balance for ADL ind.  -EG     Row Name 05/16/23 1245          Therapy Assessment/Plan (OT)    Rehab Potential (OT) good, to achieve stated therapy goals  -EG     Criteria for Skilled Therapeutic Interventions Met (OT) yes;meets criteria;skilled treatment is necessary  -EG     Therapy Frequency (OT) 5 times/wk  -EG           User Key  (r) = Recorded By, (t) = Taken By, (c) = Cosigned By    Initials Name Provider Type    Letitia Carter, LEAH Occupational Therapist               Outcome  Measures     Row Name 05/16/23 1247          How much help from another is currently needed...    Putting on and taking off regular lower body clothing? 2  -EG     Bathing (including washing, rinsing, and drying) 2  -EG     Toileting (which includes using toilet bed pan or urinal) 3  -EG     Putting on and taking off regular upper body clothing 2  -EG     Taking care of personal grooming (such as brushing teeth) 3  -EG     Eating meals 4  -EG     AM-PAC 6 Clicks Score (OT) 16  -EG     Row Name 05/16/23 0943          How much help from another person do you currently need...    Turning from your back to your side while in flat bed without using bedrails? 2  -FH     Moving from lying on back to sitting on the side of a flat bed without bedrails? 2  -FH     Moving to and from a bed to a chair (including a wheelchair)? 3  -FH     Standing up from a chair using your arms (e.g., wheelchair, bedside chair)? 3  -FH     Climbing 3-5 steps with a railing? 3  -FH     To walk in hospital room? 3  -FH     AM-PAC 6 Clicks Score (PT) 16  -FH     Highest level of mobility 5 --> Static standing  -FH     Row Name 05/16/23 1247          Functional Assessment    Outcome Measure Options AM-PAC 6 Clicks Daily Activity (OT);Optimal Instrument  -EG     Row Name 05/16/23 1247          Optimal Instrument    Optimal Instrument Optimal - 3  -EG     Bending/Stooping 3  -EG     Standing 2  -EG     Reaching 3  -EG           User Key  (r) = Recorded By, (t) = Taken By, (c) = Cosigned By    Initials Name Provider Type     [place], RN Registered Nurse    EG Letitia Chinchilla OT Occupational Therapist              Section G  Mobility  Bed mobility - self performance: limited assistance (staff provide guided maneuvering of limbs or other non-weight bearing assistance)  Bed mobility support/assistance: One person assist  Transfer - self performance: limited assistance (staff provide guided maneuvering of limbs or other non-weight bearing  assistance)  Transfer support/assistance: One person assist  Walking in room - self performance: limited assistance (staff provide guided maneuvering of limbs or other non-weight bearing assistance)  Walking in room support/assistance: One person assist  Walking in corridors/hallway - self performance: limited assistance (staff provide guided maneuvering of limbs or other non-weight bearing assistance)  Walking in corridors/hallway support/assistance: One person assist  Locomotion on unit - self performance: limited assistance (staff provide guided maneuvering of limbs or other non-weight bearing assistance)  Locomotion on unit support/assistance: One person assist  Locomotion off unit - self performance: activity did not occur  Locomotion off unit support/assistance: Activity did not occur  Dressing - self performance: extensive assistance (provide any weight-bearing support, hold over while fabrice-care lift legs for transfer, etc.)  Dressing support/assistance: One person assist  Eating - self performance: independent  Eating support/assistance: Setup help only  Toileting - self performance: limited assistance (staff provide guided maneuvering of limbs or other non-weight bearing assistance)  Toileting support/assistance: One person assist  Personal hygiene - self performance: limited assistance (staff provide guided maneuvering of limbs or other non-weight bearing assistance)  Personal hygiene support/assistance: One person assist  Bathing  Bathing - self performance: Physical help with bathing (exclude washing back and hair for patient)  Bathing support/assistance: One person assist  Balance  Balance during transitions & walking: Not steady, requires assist to steady  Moving from seated to standing position: Not steady, requires assist to steady  Walking: Not steady, requires assist to steady  Turning around while walking: Not steady, requires assist to steady  Moving on and off toilet: Not steady, requires assist  to steady  Surface-to-surface transfer: Not steady, requires assist to steady  Mobility devices:  (pawan walker)  Range of Motion  Upper Extremity: Impairment on one side  Lower Extremity: No impairment  Section GG  SectionGG: Functional Ability/Goals, Adm  Self Care, Prior Functioning ([id number]): 3. Independent  Functional Cognition, Prior Functioning (EV0352C): 3. Independent  Self Care, Admission (Section GG)  Eating: Self-Care Admission Performance ([id number]): setup or clean-up assistance (05)  Oral Hygiene: Self-Care Admission Performance ([id number]): setup or clean-up assistance (05)  Toileting Hygiene: Self-Care Admission Performance (GO5109I2): supervision or touching assistance (04)  Shower/Bathe Self: Self-Care Admission Performance ([id number]): partial/moderate assistance (03)  Upper Body Dressing: Self-Care Admission Performance ([id number]): partial/moderate assistance (03)  Lower Body Dressing: Self-Care Admission Performance (SS8214X5): partial/moderate assistance (03)  Putting On/Taking Off Footwear: Self-Care Admission Performance (LA1561D8): partial/moderate assistance (03)     Section GG: Functional Ability/Goals, DC  Eating: Self-Care Discharge Goal (BU1091E7): independent (06)  Oral Hygiene: Self-Care Discharge Goal (DB1266J6): independent (06)  Toileting Hygiene: Self-Care Discharge Goal (QD1248J5): independent (06)  Shower/Bathe Self: Self-Care Discharge Goal ([id number]): independent (06)  Upper Body Dressing: Self-Care Discharge Goal ([id number]): independent (06)  Lower Body Dressing: Self-Care Discharge Goal (CQ9558G9): independent (06)  Putting On/Taking Off Footwear: Self-Care Discharge Goal (DQ0272U0): independent (06)             Occupational Therapy Education     Title: PT OT SLP Therapies (In Progress)     Topic: Occupational Therapy (In Progress)     Point: ADL training (In Progress)     Description:   Instruct learner(s) on proper safety adaptation and remediation techniques during self care  or transfers.   Instruct in proper use of assistive devices.              Learning Progress Summary           Patient Eager, E, NR by EG at 5/10/2023 0800    Comment: Education on compensatory techniques for UB ADL's  Educated on sling donning, ROM and WB status orders for RUE  Education on general safety techniques and fall risk prevention                   Point: Home exercise program (In Progress)     Description:   Instruct learner(s) on appropriate technique for monitoring, assisting and/or progressing therapeutic exercises/activities.              Learning Progress Summary           Patient Eager, E, NR by EG at 5/10/2023 0800    Comment: Education on compensatory techniques for UB ADL's  Educated on sling donning, ROM and WB status orders for RUE  Education on general safety techniques and fall risk prevention                   Point: Precautions (In Progress)     Description:   Instruct learner(s) on prescribed precautions during self-care and functional transfers.              Learning Progress Summary           Patient Eager, E, NR by EG at 5/10/2023 0800    Comment: Education on compensatory techniques for UB ADL's  Educated on sling donning, ROM and WB status orders for RUE  Education on general safety techniques and fall risk prevention                   Point: Body mechanics (In Progress)     Description:   Instruct learner(s) on proper positioning and spine alignment during self-care, functional mobility activities and/or exercises.              Learning Progress Summary           Patient Eager, E, NR by EG at 5/10/2023 0800    Comment: Education on compensatory techniques for UB ADL's  Educated on sling donning, ROM and WB status orders for RUE  Education on general safety techniques and fall risk prevention                               User Key     Initials Effective Dates Name Provider Type Discipline    EG 09/14/22 -  Letitia Chinchilla, OT Occupational Therapist OT              OT Recommendation  and Plan  Planned Therapy Interventions (OT): activity tolerance training, functional balance retraining, occupation/activity based interventions, adaptive equipment training, BADL retraining, neuromuscular control/coordination retraining, patient/caregiver education/training, transfer/mobility retraining, strengthening exercise  Therapy Frequency (OT): 5 times/wk  Plan of Care Review  Plan of Care Reviewed With: patient  Progress: improving  Outcome Evaluation: Patient is pleasant but can be noncompliant with all orders regarding R shoulder; decreased carryover of education provided by OT: Potential for improvement is noted but is limited due to patients insight; OT services can continue to benefit patient due to impaired strength, safety, balance for ADL ind.     Time Calculation:    Time Calculation- OT     Row Name 05/16/23 1248             Time Calculation- OT    OT Received On 05/16/23  -EG      OT Goal Re-Cert Due Date 06/08/23  -EG         Timed Charges    53710 - OT Therapeutic Activity Minutes 21  -EG      86648 - OT Self Care/Mgmt Minutes 33  -EG         SNF Occupational Therapy Minutes    Skilled Minutes- OT 54 min  -EG         Total Minutes    Timed Charges Total Minutes 54  -EG       Total Minutes 54  -EG            User Key  (r) = Recorded By, (t) = Taken By, (c) = Cosigned By    Initials Name Provider Type    EG Letitia Chinchilla, OT Occupational Therapist              Therapy Charges for Today     Code Description Service Date Service Provider Modifiers Qty    51611836911 HC OT NEUROMUSC RE EDUCATION EA 15 MIN 5/15/2023 Letitia Chinchilla OT GO 1    99219534199 HC OT THER PROC EA 15 MIN 5/15/2023 Letitia Chinchilla OT GO 1    46172271352 HC OT THERAPEUTIC ACT EA 15 MIN 5/15/2023 Letitia Chinchilla OT GO 1    63793715541 HC OT SELF CARE/MGMT/TRAIN EA 15 MIN 5/15/2023 Letitia Chinchilla OT GO 1    58801278481 HC OT THERAPEUTIC ACT EA 15 MIN 5/16/2023 Letitia Chinchilla OT GO 2    70802808047 HC OT SELF  CARE/MGMT/TRAIN EA 15 MIN 5/16/2023 Letitia Chinchilla, OT GO 2               Letitia Chinchilla, OT  5/16/2023

## 2023-05-16 NOTE — PLAN OF CARE
Goal Outcome Evaluation:  Plan of Care Reviewed With: patient           Outcome Evaluation: Pt is alert and oriented x 4 and vitals are stable. She is a 1 assist to the bathroom. She has a right arm sling. She had been medicated for pain and sleep last night. Will continue with her plan of care. Call light and personal items within reach.

## 2023-05-16 NOTE — SIGNIFICANT NOTE
Wound Eval / Progress Noted    SHAYNA Brenner     Patient Name: Yelena Sanchez  : 1935  MRN: [id number]  Today's Date: 2023                 Admit Date: 2023    Visit Dx:    ICD-10-CM ICD-9-CM   1. Decreased activities of daily living (ADL)  Z78.9 V49.89   2. Difficulty walking  R26.2 719.7       Patient Active Problem List   Diagnosis    Closed fracture of neck of right humerus    HTN (hypertension)    HLD (hyperlipidemia)    Hypothyroidism    Effusion of right elbow    Renal insufficiency    Musculoskeletal pain    Osteoarthritis of left knee    Fracture        Past Medical History:   Diagnosis Date    Anemia     Breast cancer     CHF (congestive heart failure)         Past Surgical History:   Procedure Laterality Date    LUNG SURGERY Left     LEFT UPPER LOBE ()    MASTECTOMY Left          Physical Assessment:     05/15/23 1040   Wound 23 Left anterior knee Abrasion   Placement Date/Time: 23   Side: Left  Orientation: anterior  Location: knee  Primary Wound Type: Abrasion   Dressing Appearance dressing loose   Base scab;red   Periwound ecchymotic;swelling   Periwound Temperature warm   Periwound Skin Turgor soft   Edges open;rolled/closed   Drainage Amount none   Care, Wound cleansed with;sterile normal saline   Dressing Care dressing applied;border dressing;hydrofiber;silver impregnated;silicone   Periwound Care absorptive dressing applied   Wound Left posterior thumb Abrasion   No placement date or time found.   Side: Left  Orientation: posterior  Location: thumb  Primary Wound Type: Abrasion  Additional Comments: resident had splint in place but removed per self exposing scab.   Dressing Appearance intact;dried drainage   Base necrotic;moist;red;dry   Periwound ecchymotic;dry;intact   Periwound Temperature warm   Periwound Skin Turgor soft   Edges open   Wound Length (cm) 2 cm   Wound Width (cm) 0.4 cm   Wound Depth (cm) 0.1 cm   Wound Surface Area (cm^2) 0.8 cm^2   Wound  Volume (cm^3) 0.08 cm^3   Drainage Characteristics/Odor serosanguineous   Drainage Amount scant   Care, Wound cleansed with;sterile normal saline   Dressing Care dressing applied;non-adherent;petroleum-based;other (see comments)  (bandaid)   Periwound Care absorptive dressing applied           Wound Check / Follow-up:  Patient seen today for wound consult. Patient currently sitting up in chair. She is in the SNF for rehabilitation.   She has recently had a fall. She has a sling to her right arm. She has traumatic injuries to bilateral knees. Left is worse than the right. Right only with minimal bruising noted. Left knee is edematous with erythema and ecchymosis. Bruising in various states of healing. Scattered crusted areas to anterior portion of knee. Will recommend daily dressing for crusted areas. Will also request possible Kpad application to knee several times a day for comfort.   She further has a traumatic injury to her left thumb with some visible soft tissue necrosis and open wound with red moist tissue. Cleansed with NS and gauze. Non-adherent petroleum based gauze and bandaid applied. Recommending daily dressing change.   Buttocks is pink and blanchable. Patient was concerned about possible bruising to right gluteal aspect / hip area but no bruising visualized at this time. Recommending skin protection.     Impression: Traumatic injury to right arm, left knee and left thumb    Short term goals:  Regain skin integrity, Daily dressing changes. Skin protection.     Yanelis Ledesma RN    5/16/2023    08:26 EDT

## 2023-05-16 NOTE — PROGRESS NOTES
[facility]   Hospitalist Progress Note  Date: 2023  Patient Name: Yelena Sanchez  : 1935  MRN: [id number]  Date of admission: 2023      Subjective   Subjective     Chief Complaint: Weakness    Summary: 86 y/o F with HTN, CAD, CHF hypothyroidism who presented after mechanical fall with pain involving the right shoulder, right elbow left hand/knee.  No head trauma or loss of consciousness.  Presentation little hypertensive otherwise vital stable.  Labs notable for WBC 14.61, platelets 215, hemoglobin 12, creatinine elevated 2.12, baseline around 1.6, rest of the CMP is nonsignificant.  Imaging with nondisplaced fracture of the right humerus neck, right elbow with joint effusion, left hand minimally displaced fracture of the thumb.  Right shoulder in splint.  Orthopedic surgery consulted, nonoperative management, pain control, PT and rehab.     Seen by PT and OT inpatient rehab recommended patient accepted bed at [facility] skilled nursing facility.     Interval Followup:   Patient seen and examined resting comfortably doing well with therapy pain is reasonably controlled left knee improving continue with oral doxycycline to complete a 7-day course.  Serum creatinine up some appears to be a little on the dry side we will hold Lasix resume tomorrow    Review of systems:  All systems reviewed and negative except the following: Patient with generalized weakness fatigue knee pain right arm pain    Objective   Objective     Vitals:   Temp:  [97.3 °F (36.3 °C)-98.1 °F (36.7 °C)] 97.3 °F (36.3 °C)  Heart Rate:  [59-60] 60  Resp:  [16-18] 18  BP: (134-151)/(56-58) 151/58        Physical Exam   Constitutional: Awake alert oriented no acute  Respiratory: Clear  Cardiovascular: RRR  GI: Abdomen soft nontender bowel sounds  Extremities: Right upper extremity neurovascular intact immobilized in left knee with extensive bruising some erythema noted  Skin: Significant bruising    Result Review    Result Review:  I  have personally reviewed the results from the time of this admission to 5/16/2023 13:57 EDT and agree with these findings:  []  Laboratory  []  Microbiology  []  Radiology  []  EKG/Telemetry   []  Cardiology/Vascular   []  Pathology  []  Old records  []  Other:    Assessment & Plan   Assessment / Plan   Assessment:    Assessment:     • Weakness status post recent hospitalization  • Close fracture of neck of right humerus nonoperative management  • Systolic CHF  • CKD 3B baseline 1.6-2.1  • COPD  • CAD  • History of AICD  • Left knee OA  • Hypertension  • Hyperlipidemia  • Hypothyroidism  • Effusion of right elbow     Plan:     • Hold Lasix resume tomorrow  • Bowel regimen  • Continue with oral doxycycline 100 mg p.o. twice daily and continuing topical wound care for the left knee wound  • Continue with right upper extremity immobilization with right arm sling  • Continue with diuretics monitoring volume status renal function and weights  • Continue with oral analgesics  • Daily PT OT  • Likely home soon  Discussed plan with RN.      DVT prophylaxis:  Medical DVT prophylaxis orders are present.    CODE STATUS:   Level Of Support Discussed With: Patient  Code Status (Patient has no pulse and is not breathing): CPR (Attempt to Resuscitate)  Medical Interventions (Patient has pulse or is breathing): Full Support    Electronically signed by ROSY Castro, 05/16/23, 1:58 PM EDT.

## 2023-05-16 NOTE — THERAPY TREATMENT NOTE
SNF - Physical Therapy Treatment Note   Jordin     Patient Name: Yelena Sanchez  : 1935  MRN: [id number]  Today's Date: 2023      Visit Dx:     ICD-10-CM ICD-9-CM   1. Decreased activities of daily living (ADL)  Z78.9 V49.89   2. Difficulty walking  R26.2 719.7     Patient Active Problem List   Diagnosis   • Closed fracture of neck of right humerus   • HTN (hypertension)   • HLD (hyperlipidemia)   • Hypothyroidism   • Effusion of right elbow   • Renal insufficiency   • Musculoskeletal pain   • Osteoarthritis of left knee   • Fracture     Past Medical History:   Diagnosis Date   • Anemia    • Breast cancer    • CHF (congestive heart failure)      Past Surgical History:   Procedure Laterality Date   • LUNG SURGERY Left     LEFT UPPER LOBE ()   • MASTECTOMY Left      PT Assessment (last 12 hours)     PT Evaluation and Treatment     Row Name 23 1328          Physical Therapy Time and Intention    Document Type therapy note (daily note)  -WM     Mode of Treatment individual therapy;physical therapy  -WM     Patient Effort good  -WM     Symptoms Noted During/After Treatment fatigue  -WM     Row Name 23 1328          Pain Scale: FACES Pre/Post-Treatment    Pain: FACES Scale, Pretreatment 4-->hurts little more  -WM     Posttreatment Pain Rating 4-->hurts little more  -WM     Pain Location - Side/Orientation Right  -WM     Pain Location - shoulder  -WM     Row Name 23 1328          Sit-Stand Transfer    Sit-Stand [place] (Transfers) contact guard;verbal cues  -WM     Assistive Device (Sit-Stand Transfers) cane, quad  -WM     Row Name 23 1328          Stand-Sit Transfer    Stand-Sit [place] (Transfers) contact guard;verbal cues  -WM     Assistive Device (Stand-Sit Transfers) cane, quad  -WM     Row Name 23 1328          Gait/Stairs (Locomotion)    [place] Level (Gait) contact guard;verbal cues  -WM     Assistive Device (Gait) cane, quad  -WM     Distance in Feet  (Gait) 150  -     Pattern (Gait) 3-point;step-through  -     Deviations/Abnormal Patterns (Gait) gait speed decreased;stride length decreased  -     Row Name 05/16/23 1328          Safety Issues, Functional Mobility    Impairments Affecting Function (Mobility) balance;endurance/activity tolerance;pain;range of motion (ROM);strength  -     Row Name 05/16/23 1328          Hip (Therapeutic Exercise)    Hip AAROM (Therapeutic Exercise) bilateral;aBduction;aDduction;supine;10 repetitions;5 repetitions;2 sets  -     Hip Strengthening (Therapeutic Exercise) bilateral;heel slides;supine;15 repititions;2 sets  Manual resistance  -     Row Name 05/16/23 1328          Knee (Therapeutic Exercise)    Knee AROM (Therapeutic Exercise) bilateral;LAQ (long arc quad);sitting;15 repititions;2 sets  -     Knee Strengthening (Therapeutic Exercise) bilateral;hamstring curls;sitting;resistance band;yellow;15 repititions;2 sets;marching while seated  -     Row Name 05/16/23 1328          Ankle (Therapeutic Exercise)    Ankle AROM (Therapeutic Exercise) bilateral;dorsiflexion;plantarflexion;supine;30 repititions  -     Row Name             Wound 05/04/23 2211 Left anterior knee Abrasion    Wound - Properties Group Placement Date: 05/04/23 -JN Placement Time: 2211 -JN Side: Left  -JN Orientation: anterior  -JN Location: knee  -JN Primary Wound Type: Abrasion  -JN    Retired Wound - Properties Group Placement Date: 05/04/23 -JN Placement Time: 2211 -JN Side: Left  -JN Orientation: anterior  -JN Location: knee  -JN Primary Wound Type: Abrasion  -JN    Retired Wound - Properties Group Date first assessed: 05/04/23 -JN Time first assessed: 2211 -JN Side: Left  -JN Location: knee  -JN Primary Wound Type: Abrasion  -JN    Row Name             Wound Left posterior thumb Abrasion    Wound - Properties Group Side: Left  -FH Orientation: posterior  -FH Location: thumb  -FH Primary Wound Type: Abrasion  -FH Additional Comments:  resident had splint in place but removed per self exposing scab.  -FH    Retired Wound - Properties Group Side: Left  -FH Orientation: posterior  -FH Location: thumb  -FH Primary Wound Type: Abrasion  -FH Additional Comments: resident had splint in place but removed per self exposing scab.  -FH    Retired Wound - Properties Group Side: Left  -FH Location: thumb  -FH Primary Wound Type: Abrasion  -FH Additional Comments: resident had splint in place but removed per self exposing scab.  -FH    Row Name 05/16/23 1328          Progress Summary (PT)    Progress Toward Functional Goals (PT) progress toward functional goals is good  -WM           User Key  (r) = Recorded By, (t) = Taken By, (c) = Cosigned By    Initials Name Provider Type    Lyly Williamson, RN Registered Nurse     [place] RN Registered Nurse    Cameron Mcgregor PTA Physical Therapist Assistant                Physical Therapy Education     Title: PT OT SLP Therapies (In Progress)     Topic: Physical Therapy (In Progress)     Point: Mobility training (In Progress)     Learning Progress Summary           Patient Acceptance, E, NR by  at 5/10/2023 1652                   Point: Precautions (In Progress)     Learning Progress Summary           Patient Acceptance, E, NR by  at 5/10/2023 1652                               User Key     Initials Effective Dates Name Provider Type Discipline     04/25/21 -  Ruth Mina, PT Physical Therapist PT              PT Recommendation and Plan     Progress Summary (PT)  Progress Toward Functional Goals (PT): progress toward functional goals is good  Daily Progress Summary (PT): Pt participated in BLE exercises, but declined transfers and GT   Outcome Measures     Row Name 05/16/23 1332 05/15/23 1333          How much help from another person do you currently need...    Turning from your back to your side while in flat bed without using bedrails? 2  -WM 2  -WM     Moving from lying on back to  sitting on the side of a flat bed without bedrails? 2  -WM 2  -WM     Moving to and from a bed to a chair (including a wheelchair)? 3  -WM 3  -WM     Standing up from a chair using your arms (e.g., wheelchair, bedside chair)? 3  -WM 3  -WM     Climbing 3-5 steps with a railing? 3  -WM 3  -WM     To walk in hospital room? 3  -WM 3  -WM     AM-PAC 6 Clicks Score (PT) 16  -WM 16  -WM           User Key  (r) = Recorded By, (t) = Taken By, (c) = Cosigned By    Initials Name Provider Type    Cameron Mcgregor PTA Physical Therapist Assistant                 Time Calculation:    PT Charges     Row Name 05/16/23 1327             Time Calculation    PT Received On 05/16/23  -WM         Timed Charges    14485 - PT Therapeutic Exercise Minutes 17  -WM      71401 - Gait Training Minutes  6  -WM      01378 - PT Therapeutic Activity Minutes 3  -WM         SNF Physical Therapy Minutes    Skilled Minutes- PT 26 min  -WM         Total Minutes    Timed Charges Total Minutes 26  -WM       Total Minutes 26  -WM            User Key  (r) = Recorded By, (t) = Taken By, (c) = Cosigned By    Initials Name Provider Type    Cameron Mcgregor PTA Physical Therapist Assistant              Therapy Charges for Today     Code Description Service Date Service Provider Modifiers Qty    64763567530 HC PT THER PROC EA 15 MIN 5/15/2023 Cameron Mohamud PTA GP 1    76680168377 HC PT THER PROC EA 15 MIN 5/16/2023 Cameron Mohamud PTA GP 1    98929743672 HC GAIT TRAINING EA 15 MIN 5/16/2023 Cameron Mohamud PTA GP 1          PT G-Codes  Outcome Measure Options: AM-PAC 6 Clicks Daily Activity (OT), Optimal Instrument  AM-PAC 6 Clicks Score (PT): 16  AM-PAC 6 Clicks Score (OT): 16    Cameron Mohamud PTA  5/16/2023

## 2023-05-16 NOTE — PLAN OF CARE
Goal Outcome Evaluation:  Plan of Care Reviewed With: patient        Progress: improving  Outcome Evaluation: Resident alert, oriented, able to make needs known to staff. transfers with assist of one to bathroom with cane. Medicated for prn pain x 2. rt arm sling in place while up, oob and transfers, off or loosened while in bed or resting in chair as per ortho order. Participated in therapy as ordered, tolerated well. Resident pleasant and cooperative.

## 2023-05-17 LAB
ALBUMIN SERPL-MCNC: 3.5 G/DL (ref 3.5–5.2)
ALBUMIN SERPL-MCNC: 3.5 G/DL (ref 3.5–5.2)
ANION GAP SERPL CALCULATED.3IONS-SCNC: 14.2 MMOL/L (ref 5–15)
ANION GAP SERPL CALCULATED.3IONS-SCNC: 14.2 MMOL/L (ref 5–15)
BUN SERPL-MCNC: 69 MG/DL (ref 8–23)
BUN SERPL-MCNC: 69 MG/DL (ref 8–23)
BUN/CREAT SERPL: 41.8 (ref 7–25)
BUN/CREAT SERPL: 41.8 (ref 7–25)
CALCIUM SPEC-SCNC: 10.1 MG/DL (ref 8.6–10.5)
CALCIUM SPEC-SCNC: 10.1 MG/DL (ref 8.6–10.5)
CHLORIDE SERPL-SCNC: 100 MMOL/L (ref 98–107)
CHLORIDE SERPL-SCNC: 100 MMOL/L (ref 98–107)
CO2 SERPL-SCNC: 21.8 MMOL/L (ref 22–29)
CO2 SERPL-SCNC: 21.8 MMOL/L (ref 22–29)
CREAT SERPL-MCNC: 1.65 MG/DL (ref 0.57–1)
CREAT SERPL-MCNC: 1.65 MG/DL (ref 0.57–1)
DEPRECATED RDW RBC AUTO: 53.4 FL (ref 37–54)
DEPRECATED RDW RBC AUTO: 53.4 FL (ref 37–54)
EGFRCR SERPLBLD CKD-EPI 2021: 30.1 ML/MIN/1.73
EGFRCR SERPLBLD CKD-EPI 2021: 30.1 ML/MIN/1.73
ERYTHROCYTE [DISTWIDTH] IN BLOOD BY AUTOMATED COUNT: 16.3 % (ref 12.3–15.4)
ERYTHROCYTE [DISTWIDTH] IN BLOOD BY AUTOMATED COUNT: 16.3 % (ref 12.3–15.4)
GLUCOSE SERPL-MCNC: 121 MG/DL (ref 65–99)
GLUCOSE SERPL-MCNC: 121 MG/DL (ref 65–99)
HCT VFR BLD AUTO: 33.5 % (ref 34–46.6)
HCT VFR BLD AUTO: 33.5 % (ref 34–46.6)
HGB BLD-MCNC: 10.7 G/DL (ref 12–15.9)
HGB BLD-MCNC: 10.7 G/DL (ref 12–15.9)
MAGNESIUM SERPL-MCNC: 2.9 MG/DL (ref 1.6–2.4)
MAGNESIUM SERPL-MCNC: 2.9 MG/DL (ref 1.6–2.4)
MCH RBC QN AUTO: 29 PG (ref 26.6–33)
MCH RBC QN AUTO: 29 PG (ref 26.6–33)
MCHC RBC AUTO-ENTMCNC: 31.9 G/DL (ref 31.5–35.7)
MCHC RBC AUTO-ENTMCNC: 31.9 G/DL (ref 31.5–35.7)
MCV RBC AUTO: 90.8 FL (ref 79–97)
MCV RBC AUTO: 90.8 FL (ref 79–97)
PHOSPHATE SERPL-MCNC: 3.7 MG/DL (ref 2.5–4.5)
PHOSPHATE SERPL-MCNC: 3.7 MG/DL (ref 2.5–4.5)
PLATELET # BLD AUTO: 417 10*3/MM3 (ref 140–450)
PLATELET # BLD AUTO: 417 10*3/MM3 (ref 140–450)
PMV BLD AUTO: 9.2 FL (ref 6–12)
PMV BLD AUTO: 9.2 FL (ref 6–12)
POTASSIUM SERPL-SCNC: 4.6 MMOL/L (ref 3.5–5.2)
POTASSIUM SERPL-SCNC: 4.6 MMOL/L (ref 3.5–5.2)
RBC # BLD AUTO: 3.69 10*6/MM3 (ref 3.77–5.28)
RBC # BLD AUTO: 3.69 10*6/MM3 (ref 3.77–5.28)
SODIUM SERPL-SCNC: 136 MMOL/L (ref 136–145)
SODIUM SERPL-SCNC: 136 MMOL/L (ref 136–145)
WBC NRBC COR # BLD: 7.98 10*3/MM3 (ref 3.4–10.8)
WBC NRBC COR # BLD: 7.98 10*3/MM3 (ref 3.4–10.8)

## 2023-05-17 PROCEDURE — 80069 RENAL FUNCTION PANEL: CPT | Performed by: PHYSICIAN ASSISTANT

## 2023-05-17 PROCEDURE — 97110 THERAPEUTIC EXERCISES: CPT

## 2023-05-17 PROCEDURE — 97530 THERAPEUTIC ACTIVITIES: CPT

## 2023-05-17 PROCEDURE — 85027 COMPLETE CBC AUTOMATED: CPT | Performed by: PHYSICIAN ASSISTANT

## 2023-05-17 PROCEDURE — 83735 ASSAY OF MAGNESIUM: CPT | Performed by: PHYSICIAN ASSISTANT

## 2023-05-17 PROCEDURE — 99309 SBSQ NF CARE MODERATE MDM 30: CPT | Performed by: PHYSICIAN ASSISTANT

## 2023-05-17 PROCEDURE — 97535 SELF CARE MNGMENT TRAINING: CPT

## 2023-05-17 PROCEDURE — 97116 GAIT TRAINING THERAPY: CPT

## 2023-05-17 RX ADMIN — OXYCODONE HYDROCHLORIDE AND ACETAMINOPHEN 500 MG: 500 TABLET ORAL at 08:51

## 2023-05-17 RX ADMIN — NEBIVOLOL 10 MG: 10 TABLET ORAL at 08:49

## 2023-05-17 RX ADMIN — DOXYCYCLINE 100 MG: 100 CAPSULE ORAL at 08:49

## 2023-05-17 RX ADMIN — FUROSEMIDE 40 MG: 40 TABLET ORAL at 18:35

## 2023-05-17 RX ADMIN — Medication 10 MG: at 20:23

## 2023-05-17 RX ADMIN — ASPIRIN 81 MG: 81 TABLET, COATED ORAL at 08:49

## 2023-05-17 RX ADMIN — HYDROCODONE BITARTRATE AND ACETAMINOPHEN 1 TABLET: 7.5; 325 TABLET ORAL at 21:38

## 2023-05-17 RX ADMIN — DOCUSATE SODIUM 50MG AND SENNOSIDES 8.6MG 2 TABLET: 8.6; 5 TABLET, FILM COATED ORAL at 20:23

## 2023-05-17 RX ADMIN — POLYETHYLENE GLYCOL 3350 17 G: 17 POWDER, FOR SOLUTION ORAL at 08:49

## 2023-05-17 RX ADMIN — PANTOPRAZOLE SODIUM 40 MG: 40 TABLET, DELAYED RELEASE ORAL at 05:46

## 2023-05-17 RX ADMIN — HYDROCODONE BITARTRATE AND ACETAMINOPHEN 1 TABLET: 7.5; 325 TABLET ORAL at 01:01

## 2023-05-17 RX ADMIN — DOCUSATE SODIUM 50MG AND SENNOSIDES 8.6MG 2 TABLET: 8.6; 5 TABLET, FILM COATED ORAL at 08:49

## 2023-05-17 RX ADMIN — DOXYCYCLINE 100 MG: 100 CAPSULE ORAL at 20:23

## 2023-05-17 RX ADMIN — Medication 2000 UNITS: at 08:50

## 2023-05-17 RX ADMIN — MULTIPLE VITAMINS W/ MINERALS TAB 1 TABLET: TAB at 08:50

## 2023-05-17 RX ADMIN — MONTELUKAST 10 MG: 10 TABLET, FILM COATED ORAL at 20:23

## 2023-05-17 RX ADMIN — CLOPIDOGREL BISULFATE 75 MG: 75 TABLET ORAL at 08:50

## 2023-05-17 RX ADMIN — ALLOPURINOL 100 MG: 100 TABLET ORAL at 08:50

## 2023-05-17 RX ADMIN — FAMOTIDINE 5 DROP: 20 TABLET, FILM COATED ORAL at 08:59

## 2023-05-17 RX ADMIN — ROSUVASTATIN CALCIUM 10 MG: 5 TABLET, FILM COATED ORAL at 20:23

## 2023-05-17 RX ADMIN — HYDROCODONE BITARTRATE AND ACETAMINOPHEN 1 TABLET: 7.5; 325 TABLET ORAL at 08:50

## 2023-05-17 RX ADMIN — FAMOTIDINE 5 DROP: 20 TABLET, FILM COATED ORAL at 20:26

## 2023-05-17 RX ADMIN — LEVOTHYROXINE SODIUM 112 MCG: 0.11 TABLET ORAL at 05:20

## 2023-05-17 RX ADMIN — HYDROCODONE BITARTRATE AND ACETAMINOPHEN 1 TABLET: 7.5; 325 TABLET ORAL at 15:04

## 2023-05-17 RX ADMIN — FERROUS SULFATE TAB 325 MG (65 MG ELEMENTAL FE) 325 MG: 325 (65 FE) TAB at 08:50

## 2023-05-17 RX ADMIN — FUROSEMIDE 40 MG: 40 TABLET ORAL at 08:50

## 2023-05-17 NOTE — THERAPY TREATMENT NOTE
SNF - Physical Therapy Treatment Note   Jordin     Patient Name: Yelena Sanchez  : 1935  MRN: [id number]  Today's Date: 2023      Visit Dx:     ICD-10-CM ICD-9-CM   1. Decreased activities of daily living (ADL)  Z78.9 V49.89   2. Difficulty walking  R26.2 719.7     Patient Active Problem List   Diagnosis   • Closed fracture of neck of right humerus   • HTN (hypertension)   • HLD (hyperlipidemia)   • Hypothyroidism   • Effusion of right elbow   • Renal insufficiency   • Musculoskeletal pain   • Osteoarthritis of left knee   • Fracture     Past Medical History:   Diagnosis Date   • Anemia    • Breast cancer    • CHF (congestive heart failure)      Past Surgical History:   Procedure Laterality Date   • LUNG SURGERY Left     LEFT UPPER LOBE ()   • MASTECTOMY Left      PT Assessment (last 12 hours)     PT Evaluation and Treatment     Row Name 23 1200          Physical Therapy Time and Intention    Document Type therapy note (daily note)  -WM     Mode of Treatment individual therapy;physical therapy  -WM     Patient Effort good  -WM     Symptoms Noted During/After Treatment fatigue  -WM     Row Name 23 1200          Pain Scale: FACES Pre/Post-Treatment    Pain: FACES Scale, Pretreatment 2-->hurts little bit  -WM     Posttreatment Pain Rating 2-->hurts little bit  -WM     Pain Location - Side/Orientation Right  -WM     Pain Location - shoulder  -WM     Row Name 23 1200          Sit-Stand Transfer    Sit-Stand [place] (Transfers) contact guard;verbal cues  -WM     Assistive Device (Sit-Stand Transfers) cane, quad  -WM     Row Name 23 1200          Stand-Sit Transfer    Stand-Sit [place] (Transfers) contact guard;verbal cues  -WM     Assistive Device (Stand-Sit Transfers) cane, quad  -WM     Row Name 23 1200          Gait/Stairs (Locomotion)    [place] Level (Gait) contact guard;standby assist  -WM     Assistive Device (Gait) cane, quad  -WM     Distance in  Feet (Gait) 40 + 160  -WM     Pattern (Gait) 3-point;step-through  -WM     Deviations/Abnormal Patterns (Gait) gait speed decreased;stride length decreased  -WM     [place] Level (Stairs) contact guard  -WM     Handrail Location (Stairs) left side (ascending);right side (descending)  -WM     Number of Steps (Stairs) 2 x 2  -WM     Ascending Technique (Stairs) step-to-step  -WM     Descending Technique (Stairs) step-to-step  -WM     Stairs, Impairments strength decreased;impaired balance  -     Row Name 05/17/23 1200          Safety Issues, Functional Mobility    Impairments Affecting Function (Mobility) balance;endurance/activity tolerance;pain;range of motion (ROM);strength  NWB RUE  -     Row Name 05/17/23 1200          Balance    Balance Interventions standing;dynamic  Forward/backward walking, side stepping in parallel bars  -     Row Name 05/17/23 1200          Hip (Therapeutic Exercise)    Hip Strengthening (Therapeutic Exercise) bilateral;aBduction;aDduction;marching while seated;sitting;1 lb free weight;resistance band;yellow;15 repititions;2 sets  -     Row Name 05/17/23 1200          Knee (Therapeutic Exercise)    Knee Strengthening (Therapeutic Exercise) bilateral;LAQ (long arc quad);hamstring curls;sitting;1 lb free weight;resistance band;yellow;15 repititions;2 sets  -     Row Name             Wound 05/04/23 2211 Left anterior knee Abrasion    Wound - Properties Group Placement Date: 05/04/23 -JN Placement Time: 2211 -JN Side: Left  -JN Orientation: anterior  -JN Location: knee  -JN Primary Wound Type: Abrasion  -JN    Retired Wound - Properties Group Placement Date: 05/04/23 -JN Placement Time: 2211 -JN Side: Left  -JN Orientation: anterior  -JN Location: knee  -JN Primary Wound Type: Abrasion  -JN    Retired Wound - Properties Group Date first assessed: 05/04/23 -JN Time first assessed: 2211 -JN Side: Left  -JN Location: knee  -JN Primary Wound Type: Abrasion  -JN    Row Name              Wound Left posterior thumb Abrasion    Wound - Properties Group Side: Left  -FH Orientation: posterior  -FH Location: thumb  -FH Primary Wound Type: Abrasion  -FH Additional Comments: resident had splint in place but removed per self exposing scab.  -FH    Retired Wound - Properties Group Side: Left  -FH Orientation: posterior  -FH Location: thumb  -FH Primary Wound Type: Abrasion  -FH Additional Comments: resident had splint in place but removed per self exposing scab.  -FH    Retired Wound - Properties Group Side: Left  -FH Location: thumb  -FH Primary Wound Type: Abrasion  -FH Additional Comments: resident had splint in place but removed per self exposing scab.  -    Row Name 05/17/23 1200          Progress Summary (PT)    Progress Toward Functional Goals (PT) progress toward functional goals is good  -WM           User Key  (r) = Recorded By, (t) = Taken By, (c) = Cosigned By    Initials Name Provider Type    Lyly Williamson RN Registered Nurse    [place] RN Registered Nurse    Cameron Mcgregor PTA Physical Therapist Assistant                Physical Therapy Education     Title: PT OT SLP Therapies (In Progress)     Topic: Physical Therapy (In Progress)     Point: Mobility training (In Progress)     Learning Progress Summary           Patient Acceptance, E, NR by  at 5/10/2023 1652                   Point: Precautions (In Progress)     Learning Progress Summary           Patient Acceptance, E, NR by  at 5/10/2023 1652                               User Key     Initials Effective Dates Name Provider Type Discipline     04/25/21 -  Ruth Mina, PT Physical Therapist PT              PT Recommendation and Plan     Progress Summary (PT)  Progress Toward Functional Goals (PT): progress toward functional goals is good  Daily Progress Summary (PT): Pt participated in BLE exercises, but declined transfers and GT   Outcome Measures     Row Name 05/17/23 1235 05/16/23 9152  05/15/23 1333       How much help from another person do you currently need...    Turning from your back to your side while in flat bed without using bedrails? 2  -WM 2  -WM 2  -WM    Moving from lying on back to sitting on the side of a flat bed without bedrails? 2  -WM 2  -WM 2  -WM    Moving to and from a bed to a chair (including a wheelchair)? 3  -WM 3  -WM 3  -WM    Standing up from a chair using your arms (e.g., wheelchair, bedside chair)? 3  -WM 3  -WM 3  -WM    Climbing 3-5 steps with a railing? 3  -WM 3  -WM 3  -WM    To walk in hospital room? 3  -WM 3  -WM 3  -WM    AM-PAC 6 Clicks Score (PT) 16  -WM 16  -WM 16  -WM          User Key  (r) = Recorded By, (t) = Taken By, (c) = Cosigned By    Initials Name Provider Type     Cameron Mohamud PTA Physical Therapist Assistant                 Time Calculation:    PT Charges     Row Name 05/17/23 1225             Time Calculation    PT Received On 05/17/23  -WM         Timed Charges    35712 - PT Therapeutic Exercise Minutes 16  -WM      94175 - Gait Training Minutes  7  -WM      59277 - PT Therapeutic Activity Minutes 3  -WM         SNF Physical Therapy Minutes    Skilled Minutes- PT 26 min  -WM         Total Minutes    Timed Charges Total Minutes 26  -WM       Total Minutes 26  -WM            User Key  (r) = Recorded By, (t) = Taken By, (c) = Cosigned By    Initials Name Provider Type     Cameron Mohamud PTA Physical Therapist Assistant              Therapy Charges for Today     Code Description Service Date Service Provider Modifiers Qty    82946982223 HC PT THER PROC EA 15 MIN 5/16/2023 Cameron Mohamud PTA GP 1    58279566359 HC GAIT TRAINING EA 15 MIN 5/16/2023 Cameron Mohamud PTA GP 1    73814694641 HC PT THER PROC EA 15 MIN 5/17/2023 Cameron Mohamud PTA GP 1    32726674444 HC GAIT TRAINING EA 15 MIN 5/17/2023 Cameron Mohamud PTA GP 1          PT G-Codes  Outcome Measure Options: AM-PAC 6 Clicks Daily Activity (OT), Optimal Instrument  AM-PAC 6  Clicks Score (PT): 16  AM-PAC 6 Clicks Score (OT): 16    Cameron Mohamud PTA  5/17/2023

## 2023-05-17 NOTE — PROGRESS NOTES
[facility]   Hospitalist Progress Note       Patient Name: Yelena Sanchze  : 1935  MRN: [id number]  Primary Care Physician: Kaelyn Eugene MD  Date of admission: 2023  Today's Date: 2023  Room / Bed:   304/1  Subjective   Chief Complaint: Weakness after recent hospitalization    Summary:  88 y/o F with HTN, CAD, CHF hypothyroidism who presented after mechanical fall with pain involving the right shoulder, right elbow left hand/knee.  No head trauma or loss of consciousness.  Presentation little hypertensive otherwise vital stable.  Labs notable for WBC 14.61, platelets 215, hemoglobin 12, creatinine elevated 2.12, baseline around 1.6, rest of the CMP is nonsignificant.  Imaging with nondisplaced fracture of the right humerus neck, right elbow with joint effusion, left hand minimally displaced fracture of the thumb.  Right shoulder in splint.  Orthopedic surgery consulted, nonoperative management, pain control, PT and rehab.     Seen by PT and OT inpatient rehab recommended patient accepted bed at [facility] skilled nursing facility.        Interval Followup: 2023    • Very pleasant  • Denies any new complaints  • Tolerating physical therapy  • Tolerating medical regimen  • Vital signs stable  • Cr stable: 1.5   1.5   1.6   1.7   1.6      REVIEW OF SYSTEMS: All other systems reviewed and are negative.   • Generalized weakness  Objective   Temp:  [97.3 °F (36.3 °C)-97.8 °F (36.6 °C)] 97.3 °F (36.3 °C)  Heart Rate:  [59-60] 59  Resp:  [16] 16  BP: (111-152)/(53-54) 152/54  PHYSICAL EXAM   • CON: WN. WD. NAD.  Alert and conversational.  • EYES:  Sclera anicteric. EOMI. Normal conjunctiva.   • ENT:  Oropharyngeal mucosa without ulcers or thrush.    • NECK:  No thyromegaly. No stridor. Trachea midline.  • RESP:  CTA. No wheezes. No crackles.  No work of breathing or tachypnea.   • CV:  Rhythm regular. Rate WNL. No murmur noted.  No edema.  • GI:  Soft and nontender. Nondistended.  Bowel  sounds present.   • EXT: RU E immobilized.  Extensive bruising.  • LYMPH:  No lymphedema noted.  No cervical lymphadenopathy.  • PSYCH:  Alert. Oriented. Normal affect and mood.  • NEURO:  CNII-XII grossly intact. No dysarthria or aphasia. No unilateral weakness or paresthesia.  • SKIN: No chronic venous stasis changes or varicosities.  Extensive bruising on torso arms legs    Results from last 7 days   Lab Units 05/17/23  0812 05/16/23  0500 05/13/23  0805   WBC 10*3/mm3 7.98 8.74 8.60   HEMOGLOBIN g/dL 10.7* 10.4* 10.2*   HEMATOCRIT % 33.5* 32.8* 31.9*   PLATELETS 10*3/mm3 417 429 314     Results from last 7 days   Lab Units 05/17/23  0811 05/16/23  0500 05/13/23  0805 05/11/23  0551   SODIUM mmol/L 136 135* 136 137   POTASSIUM mmol/L 4.6 4.5 4.5 4.0   CO2 mmol/L 21.8* 23.3 24.6 23.7   CHLORIDE mmol/L 100 99 97* 98   ANION GAP mmol/L 14.2 12.7 14.4 15.3*   BUN mg/dL 69* 70* 64* 54*   CREATININE mg/dL 1.65* 1.73* 1.59* 1.58*   GLUCOSE mg/dL 121* 89 88 93           RESULTS REVIEWED:  I have personally reviewed the results from the time of this admission to 5/17/2023 16:33 EDT and agree with these findings:  []  Laboratory  []  Microbiology  []  Radiology  []  EKG/Telemetry   []  Cardiology/Vascular   []  Pathology  []  Old records  []  Other:  Assessment / Plan   Assessment:    • Weakness status post recent hospitalization  • Close fracture of neck of right humerus (nonoperative management)  • History systolic CHF  • CKD 3B baseline 1.6-2.1  • COPD  • CAD  • History of AICD  • Left knee OA  • Hypertension  • Hyperlipidemia  • Hypothyroidism  • Effusion of right elbow       Plan:    • Resume Lasix   • Continue bowel regimen  • Continue topical wound care left knee wound  • Continue/complete doxycycline  • Right upper extremity immobilization with sling  • Monitor volume status, renal function, in light of CKD/CHF history  • PT/OT  • Heparin for DVT prophylaxis    Discussed plan with RN.  DVT prophylaxis:  Medical DVT  prophylaxis orders are present.  CODE STATUS:      Level Of Support Discussed With: Patient  Code Status (Patient has no pulse and is not breathing): CPR (Attempt to Resuscitate)  Medical Interventions (Patient has pulse or is breathing): Full Support       Electronically signed by ROSY Elder, 05/17/23, 4:33 PM EDT.

## 2023-05-17 NOTE — THERAPY TREATMENT NOTE
SNF - Occupational Therapy Treatment Note   Jordin    Patient Name: Yelena Sanchez  : 1935    MRN: [id number]                              Today's Date: 2023       Admit Date: 2023    Visit Dx:     ICD-10-CM ICD-9-CM   1. Decreased activities of daily living (ADL)  Z78.9 V49.89   2. Difficulty walking  R26.2 719.7     Patient Active Problem List   Diagnosis   • Closed fracture of neck of right humerus   • HTN (hypertension)   • HLD (hyperlipidemia)   • Hypothyroidism   • Effusion of right elbow   • Renal insufficiency   • Musculoskeletal pain   • Osteoarthritis of left knee   • Fracture     Past Medical History:   Diagnosis Date   • Anemia    • Breast cancer    • CHF (congestive heart failure)      Past Surgical History:   Procedure Laterality Date   • LUNG SURGERY Left     LEFT UPPER LOBE ()   • MASTECTOMY Left       General Information     Row Name 23 1311          OT Time and Intention    Document Type therapy note (daily note)  -EG     Mode of Treatment individual therapy;occupational therapy  -EG     Row Name 23 131          General Information    Existing Precautions/Restrictions fall;non-weight bearing;brace on at all times;weight bearing  -EG     Row Name 23 131          Cognition    Orientation Status (Cognition) oriented to;person;time  -EG     Row Name 23 131          Safety Issues, Functional Mobility    Impairments Affecting Function (Mobility) balance;endurance/activity tolerance;pain;range of motion (ROM);strength  -EG           User Key  (r) = Recorded By, (t) = Taken By, (c) = Cosigned By    Initials Name Provider Type    EG Letitia Chinchilla OT Occupational Therapist                 Mobility/ADL's     Row Name 23 131          Bed Mobility    Comment, (Bed Mobility) Up in recliner upon OT arrival  -EG     Row Name 23 131          Transfers    Transfers sit-stand transfer;stand-sit transfer;bed-chair transfer;toilet transfer  -EG      Row Name 05/17/23 1311          Bed-Chair Transfer    Bed-Chair [place] (Transfers) contact guard;verbal cues;standby assist  -EG     Assistive Device (Bed-Chair Transfers) cane, quad  -EG     Row Name 05/17/23 1311          Sit-Stand Transfer    Sit-Stand [place] (Transfers) contact guard;verbal cues;standby assist  -EG     Assistive Device (Sit-Stand Transfers) cane, quad  -EG     Row Name 05/17/23 1311          Stand-Sit Transfer    Stand-Sit [place] (Transfers) contact guard;verbal cues;standby assist  -EG     Assistive Device (Stand-Sit Transfers) cane, quad  -EG     Row Name 05/17/23 1311          Toilet Transfer    Type (Toilet Transfer) sit-stand;stand-sit  -EG     [place] Level (Toilet Transfer) verbal cues;1 person assist;contact guard;standby assist  -EG     Assistive Device (Toilet Transfer) cane, quad  -EG     Row Name 05/17/23 1311          Functional Mobility    Functional Mobility- Ind. Level contact guard assist  -EG     Functional Mobility- Device cane, quad  -EG     Functional Mobility- Comment Education to patient and patients  on difference between quad cane and SPC for stability reasons; Patient agreeable after education to continue to use quad cane for stability purposes; functional mobility during ADL's continues to require cues for safety and awareness  -EG     Row Name 05/17/23 1311          Activities of Daily Living    BADL Assessment/Intervention bathing;upper body dressing;lower body dressing;grooming;toileting  -EG     Row Name 05/17/23 1311          Mobility    Extremity Weight-bearing Status left lower extremity;right upper extremity  -EG     Right Upper Extremity (Weight-bearing Status) non weight-bearing (NWB)  -EG     Left Lower Extremity (Weight-bearing Status) weight-bearing as tolerated (WBAT)  -EG     [facility] Name 05/17/23 1311          Bathing Assessment/Intervention    [place] Level (Bathing) bathing skills;upper body;lower body;verbal  cues;moderate assist (50% patient effort)  -EG     Comment, (Bathing) sponge bath performed seated in recliner chair; secondary to limited attention and carryover modA still required overall for bathing  -EG     Row Name 05/17/23 1311          Upper Body Dressing Assessment/Training    [place] Level (Upper Body Dressing) upper body dressing skills;don;pull-over garment;minimum assist (75% patient effort);verbal cues  -EG     Comment, (Upper Body Dressing) improved performance and cue follow through;  present and participated in UB dressing training for donning shirt and sling safely with use of compensatory techniques to ensure safe carryover at home upon discharge  -EG     Row Name 05/17/23 1311          Lower Body Dressing Assessment/Training    [place] Level (Lower Body Dressing) lower body dressing skills;don;pants/bottoms;moderate assist (50% patient effort);minimum assist (75% patient effort)  -EG     Comment, (Lower Body Dressing) Improvements noted; decreased carryover and attention to task limit independent performance; attempts to educate and training on compensatory methods with  present but limited carryover and attention to task continue to limit  -EG     [facility] Name 05/17/23 1311          Grooming Assessment/Training    [place] Level (Grooming) grooming skills;set up;hair care, combing/brushing;wash face, hands  -EG     [facility] Name 05/17/23 1311          Toileting Assessment/Training    [place] Level (Toileting) toileting skills;verbal cues;contact guard assist;perform perineal hygiene;adjust/manage clothing  -EG           User Key  (r) = Recorded By, (t) = Taken By, (c) = Cosigned By    Initials Name Provider Type    EG Letitia Chinchilla OT Occupational Therapist               Obj/Interventions     Row Name 05/17/23 1316          Shoulder (Therapeutic Exercise)    Shoulder (Therapeutic Exercise) AROM (active range of motion);strengthening exercise  -EG     Shoulder AROM  (Therapeutic Exercise) left;flexion;extension;15 repititions;3 sets;sitting  refuses to peform with weights or resistance still despite education  -EG     Shoulder Strengthening (Therapeutic Exercise) left;aBduction;aDduction;horizontal aBduction/aDduction;external rotation;internal rotation;1 lb free weight;3 sets;15 repititions;sitting  -EG     Row Name 05/17/23 1316          Elbow/Forearm (Therapeutic Exercise)    Elbow/Forearm Strengthening (Therapeutic Exercise) left;flexion;extension;15 repititions;3 sets;sitting;1 lb free weight  -EG     Row Name 05/17/23 1316          Motor Skills    Therapeutic Exercise shoulder;elbow/forearm  -EG     Row Name 05/17/23 1316          Balance    Balance Interventions sit to stand;standing;occupation based/functional task;weight shifting activity;dynamic;UE activity with balance activity  -EG           User Key  (r) = Recorded By, (t) = Taken By, (c) = Cosigned By    Initials Name Provider Type    EG Letitia Chinchilla, OT Occupational Therapist               Goals/Plan    No documentation.                Clinical Impression     Row Name 05/17/23 1318          Pain Scale: FACES Pre/Post-Treatment    Pain: FACES Scale, Pretreatment 2-->hurts little bit  -EG     Posttreatment Pain Rating 2-->hurts little bit  -EG     Pain Location - Side/Orientation Right  -EG     Pain Location - shoulder  -EG     Row Name 05/17/23 1318          Plan of Care Review    Plan of Care Reviewed With patient  -EG     Progress improving  -EG     Outcome Evaluation Patient is pleasant and cooperative; Contiues to display need for increased cues to attend to task; Patient continues to display need for OT services to promote improved ADL ind. by addressing limited balance, endurance, strength and safety.  -EG     Row Name 05/17/23 1318          Therapy Assessment/Plan (OT)    Rehab Potential (OT) good, to achieve stated therapy goals  -EG     Criteria for Skilled Therapeutic Interventions Met (OT) yes;meets  criteria;skilled treatment is necessary  -EG     Therapy Frequency (OT) 5 times/wk  -EG           User Key  (r) = Recorded By, (t) = Taken By, (c) = Cosigned By    Initials Name Provider Type    EG Letitia Chinchilla, LEAH Occupational Therapist               Outcome Measures     Row Name 05/17/23 1320          How much help from another is currently needed...    Putting on and taking off regular lower body clothing? 2  -EG     Bathing (including washing, rinsing, and drying) 2  -EG     Toileting (which includes using toilet bed pan or urinal) 3  -EG     Putting on and taking off regular upper body clothing 3  -EG     Taking care of personal grooming (such as brushing teeth) 3  -EG     Eating meals 3  -EG     AM-PAC 6 Clicks Score (OT) 16  -EG     Row Name 05/17/23 1235 05/17/23 0915       How much help from another person do you currently need...    Turning from your back to your side while in flat bed without using bedrails? 2  -WM 2  -FH    Moving from lying on back to sitting on the side of a flat bed without bedrails? 2  -WM 2  -FH    Moving to and from a bed to a chair (including a wheelchair)? 3  -WM 3  -FH    Standing up from a chair using your arms (e.g., wheelchair, bedside chair)? 3  -WM 3  -FH    Climbing 3-5 steps with a railing? 3  -WM 3  -FH    To walk in hospital room? 3  -WM 3  -FH    AM-PAC 6 Clicks Score (PT) 16  -WM 16  -FH    Highest level of mobility 5 --> Static standing  -WM 5 --> Static standing  -FH    Row Name 05/17/23 0400          How much help from another person do you currently need...    Turning from your back to your side while in flat bed without using bedrails? 2  -FM     Moving from lying on back to sitting on the side of a flat bed without bedrails? 2  -FM     Moving to and from a bed to a chair (including a wheelchair)? 3  -FM     Standing up from a chair using your arms (e.g., wheelchair, bedside chair)? 3  -FM     Climbing 3-5 steps with a railing? 3  -FM     To walk in hospital  room? 3  -FM     AM-PAC 6 Clicks Score (PT) 16  -FM     Highest level of mobility 5 --> Static standing  -FM     Row Name 05/17/23 1320          Functional Assessment    Outcome Measure Options AM-PAC 6 Clicks Daily Activity (OT);Optimal Instrument  -EG     Row Name 05/17/23 1320          Optimal Instrument    Optimal Instrument Optimal - 3  -EG     Bending/Stooping 2  -EG     Standing 2  -EG     Reaching 2  -EG           User Key  (r) = Recorded By, (t) = Taken By, (c) = Cosigned By    Initials Name Provider Type    FM Divina Gupta, RN Registered Nurse     [place] RN Registered Nurse    Cameron Mcgregor PTA Physical Therapist Assistant    EG Letitia Chinchilla OT Occupational Therapist              Section G  Mobility  Bed mobility - self performance: limited assistance (staff provide guided maneuvering of limbs or other non-weight bearing assistance)  Bed mobility support/assistance: One person assist  Transfer - self performance: limited assistance (staff provide guided maneuvering of limbs or other non-weight bearing assistance)  Transfer support/assistance: One person assist  Walking in room - self performance: limited assistance (staff provide guided maneuvering of limbs or other non-weight bearing assistance)  Walking in room support/assistance: One person assist  Walking in corridors/hallway - self performance: limited assistance (staff provide guided maneuvering of limbs or other non-weight bearing assistance)  Walking in corridors/hallway support/assistance: One person assist  Locomotion on unit - self performance: limited assistance (staff provide guided maneuvering of limbs or other non-weight bearing assistance)  Locomotion on unit support/assistance: One person assist  Locomotion off unit - self performance: activity did not occur  Locomotion off unit support/assistance: Activity did not occur  Dressing - self performance: extensive assistance (provide any weight-bearing support,  hold over while fabrice-care lift legs for transfer, etc.)  Dressing support/assistance: One person assist  Eating - self performance: independent  Eating support/assistance: Setup help only  Toileting - self performance: limited assistance (staff provide guided maneuvering of limbs or other non-weight bearing assistance)  Toileting support/assistance: One person assist  Personal hygiene - self performance: limited assistance (staff provide guided maneuvering of limbs or other non-weight bearing assistance)  Personal hygiene support/assistance: One person assist  Bathing  Bathing - self performance: Physical help with bathing (exclude washing back and hair for patient)  Bathing support/assistance: One person assist  Balance  Balance during transitions & walking: Not steady, requires assist to steady  Moving from seated to standing position: Not steady, requires assist to steady  Walking: Not steady, requires assist to steady  Turning around while walking: Not steady, requires assist to steady  Moving on and off toilet: Not steady, requires assist to steady  Surface-to-surface transfer: Not steady, requires assist to steady  Mobility devices:  (pawan walker)  Range of Motion  Upper Extremity: Impairment on one side  Lower Extremity: No impairment  Section GG  SectionGG: Functional Ability/Goals, Adm  Self Care, Prior Functioning ([id number]): 3. Independent  Functional Cognition, Prior Functioning (JD1958T): 3. Independent  Self Care, Admission (Section GG)  Eating: Self-Care Admission Performance ([id number]): setup or clean-up assistance (05)  Oral Hygiene: Self-Care Admission Performance (ML7270A6): setup or clean-up assistance (05)  Toileting Hygiene: Self-Care Admission Performance (NK0382R5): supervision or touching assistance (04)  Shower/Bathe Self: Self-Care Admission Performance (AJ6560F3): partial/moderate assistance (03)  Upper Body Dressing: Self-Care Admission Performance (BY5556A6): partial/moderate assistance  (03)  Lower Body Dressing: Self-Care Admission Performance (EL5708P9): partial/moderate assistance (03)  Putting On/Taking Off Footwear: Self-Care Admission Performance (BY5010E5): partial/moderate assistance (03)     Section GG: Functional Ability/Goals, DC  Eating: Self-Care Discharge Goal ([id number]): independent (06)  Oral Hygiene: Self-Care Discharge Goal (GP4504T3): independent (06)  Toileting Hygiene: Self-Care Discharge Goal (WQ8211M5): independent (06)  Shower/Bathe Self: Self-Care Discharge Goal (AP3317P8): independent (06)  Upper Body Dressing: Self-Care Discharge Goal (WV0566Z2): independent (06)  Lower Body Dressing: Self-Care Discharge Goal (NO5805P7): independent (06)  Putting On/Taking Off Footwear: Self-Care Discharge Goal (NR3943L2): independent (06)             Occupational Therapy Education     Title: PT OT SLP Therapies (In Progress)     Topic: Occupational Therapy (In Progress)     Point: ADL training (In Progress)     Description:   Instruct learner(s) on proper safety adaptation and remediation techniques during self care or transfers.   Instruct in proper use of assistive devices.              Learning Progress Summary           Patient Eager, E, NR by EG at 5/10/2023 0800    Comment: Education on compensatory techniques for UB ADL's  Educated on sling donning, ROM and WB status orders for RUE  Education on general safety techniques and fall risk prevention                   Point: Home exercise program (In Progress)     Description:   Instruct learner(s) on appropriate technique for monitoring, assisting and/or progressing therapeutic exercises/activities.              Learning Progress Summary           Patient Eager, E, NR by EG at 5/10/2023 0800    Comment: Education on compensatory techniques for UB ADL's  Educated on sling donning, ROM and WB status orders for RUE  Education on general safety techniques and fall risk prevention                   Point: Precautions (In Progress)      Description:   Instruct learner(s) on prescribed precautions during self-care and functional transfers.              Learning Progress Summary           Patient Eager, E, NR by EG at 5/10/2023 0800    Comment: Education on compensatory techniques for UB ADL's  Educated on sling donning, ROM and WB status orders for RUE  Education on general safety techniques and fall risk prevention                   Point: Body mechanics (In Progress)     Description:   Instruct learner(s) on proper positioning and spine alignment during self-care, functional mobility activities and/or exercises.              Learning Progress Summary           Patient Eager, E, NR by EG at 5/10/2023 0800    Comment: Education on compensatory techniques for UB ADL's  Educated on sling donning, ROM and WB status orders for RUE  Education on general safety techniques and fall risk prevention                               User Key     Initials Effective Dates Name Provider Type Discipline    EG 09/14/22 -  Letitia Chinchilla, OT Occupational Therapist OT              OT Recommendation and Plan  Planned Therapy Interventions (OT): activity tolerance training, functional balance retraining, occupation/activity based interventions, adaptive equipment training, BADL retraining, neuromuscular control/coordination retraining, patient/caregiver education/training, transfer/mobility retraining, strengthening exercise  Therapy Frequency (OT): 5 times/wk  Plan of Care Review  Plan of Care Reviewed With: patient  Progress: improving  Outcome Evaluation: Patient is pleasant and cooperative; Contiues to display need for increased cues to attend to task; Patient continues to display need for OT services to promote improved ADL ind. by addressing limited balance, endurance, strength and safety.     Time Calculation:    Time Calculation- OT     Row Name 05/17/23 1322 05/17/23 1225          Time Calculation- OT    OT Received On 05/17/23  -EG --     OT Goal Re-Cert Due  Date 06/08/23  -EG --        Timed Charges    15969 - Gait Training Minutes  -- 7  -WM     94483 - OT Therapeutic Activity Minutes 17  -EG --     11600 - OT Self Care/Mgmt Minutes 41  -EG --        SNF Occupational Therapy Minutes    Skilled Minutes- OT 58 min  -EG --        Total Minutes    Timed Charges Total Minutes 58  -EG 7  -WM      Total Minutes 58  -EG 7  -WM           User Key  (r) = Recorded By, (t) = Taken By, (c) = Cosigned By    Initials Name Provider Type    Cameron Mcgregor PTA Physical Therapist Assistant    Letitia Carter OT Occupational Therapist              Therapy Charges for Today     Code Description Service Date Service Provider Modifiers Qty    10683580994 HC OT THERAPEUTIC ACT EA 15 MIN 5/16/2023 Letitia Chinchilla, OT GO 2    69271618387 HC OT SELF CARE/MGMT/TRAIN EA 15 MIN 5/16/2023 Letitia Chinchilla OT GO 2    42025526185 HC OT THERAPEUTIC ACT EA 15 MIN 5/17/2023 Letitia Chinchilla OT GO 1    08851517441 HC OT SELF CARE/MGMT/TRAIN EA 15 MIN 5/17/2023 Letitia Chinchilla OT GO 3               Letitia Chinchilla OT  5/17/2023

## 2023-05-17 NOTE — PLAN OF CARE
Goal Outcome Evaluation:           Progress: no change  Outcome Evaluation: Rsd. is alert and oriented x4, able to make needs known to staff.  Rsd. has been medicated x1 this shift with prn [place], see emar.  Rsd. states medication effective, and has rested well this shift.  Prn melatonin utilized also this shift, see emar.  Rsd. transfers x1 assist to bathroom with cane with R) arm sling in place.  Call light and personal items within reach, Rsd. reminded to use call light for assistance, victorinozes understanding.  Will continue to monitor and notify on-coming staff.  Current plan of care remains in place at this time.

## 2023-05-18 LAB
BILIRUB UR QL STRIP: NEGATIVE
BILIRUB UR QL STRIP: NEGATIVE
CLARITY UR: CLEAR
CLARITY UR: CLEAR
COLOR UR: YELLOW
COLOR UR: YELLOW
GLUCOSE UR STRIP-MCNC: NEGATIVE MG/DL
GLUCOSE UR STRIP-MCNC: NEGATIVE MG/DL
HGB UR QL STRIP.AUTO: NEGATIVE
HGB UR QL STRIP.AUTO: NEGATIVE
KETONES UR QL STRIP: NEGATIVE
KETONES UR QL STRIP: NEGATIVE
LEUKOCYTE ESTERASE UR QL STRIP.AUTO: NEGATIVE
LEUKOCYTE ESTERASE UR QL STRIP.AUTO: NEGATIVE
NITRITE UR QL STRIP: NEGATIVE
NITRITE UR QL STRIP: NEGATIVE
PH UR STRIP.AUTO: 5.5 [PH] (ref 5–8)
PH UR STRIP.AUTO: 5.5 [PH] (ref 5–8)
PROT UR QL STRIP: NEGATIVE
PROT UR QL STRIP: NEGATIVE
SP GR UR STRIP: 1.01 (ref 1–1.03)
SP GR UR STRIP: 1.01 (ref 1–1.03)
UROBILINOGEN UR QL STRIP: NORMAL
UROBILINOGEN UR QL STRIP: NORMAL

## 2023-05-18 PROCEDURE — 97535 SELF CARE MNGMENT TRAINING: CPT

## 2023-05-18 PROCEDURE — 81003 URINALYSIS AUTO W/O SCOPE: CPT | Performed by: PHYSICIAN ASSISTANT

## 2023-05-18 PROCEDURE — 97112 NEUROMUSCULAR REEDUCATION: CPT

## 2023-05-18 PROCEDURE — 97116 GAIT TRAINING THERAPY: CPT

## 2023-05-18 PROCEDURE — 99308 SBSQ NF CARE LOW MDM 20: CPT | Performed by: PHYSICIAN ASSISTANT

## 2023-05-18 PROCEDURE — 97530 THERAPEUTIC ACTIVITIES: CPT

## 2023-05-18 PROCEDURE — 97110 THERAPEUTIC EXERCISES: CPT

## 2023-05-18 RX ORDER — FUROSEMIDE 40 MG/1
40 TABLET ORAL
Status: DISCONTINUED | OUTPATIENT
Start: 2023-05-19 | End: 2023-05-20 | Stop reason: HOSPADM

## 2023-05-18 RX ADMIN — HYDROCODONE BITARTRATE AND ACETAMINOPHEN 1 TABLET: 5; 325 TABLET ORAL at 11:58

## 2023-05-18 RX ADMIN — POLYETHYLENE GLYCOL 3350 17 G: 17 POWDER, FOR SOLUTION ORAL at 08:38

## 2023-05-18 RX ADMIN — Medication 10 MG: at 21:04

## 2023-05-18 RX ADMIN — CLOPIDOGREL BISULFATE 75 MG: 75 TABLET ORAL at 08:39

## 2023-05-18 RX ADMIN — FUROSEMIDE 40 MG: 40 TABLET ORAL at 08:39

## 2023-05-18 RX ADMIN — OXYCODONE HYDROCHLORIDE AND ACETAMINOPHEN 500 MG: 500 TABLET ORAL at 08:46

## 2023-05-18 RX ADMIN — ACETAMINOPHEN 650 MG: 325 TABLET ORAL at 16:14

## 2023-05-18 RX ADMIN — FUROSEMIDE 40 MG: 40 TABLET ORAL at 17:41

## 2023-05-18 RX ADMIN — DOCUSATE SODIUM 50MG AND SENNOSIDES 8.6MG 2 TABLET: 8.6; 5 TABLET, FILM COATED ORAL at 21:04

## 2023-05-18 RX ADMIN — ASPIRIN 81 MG: 81 TABLET, COATED ORAL at 08:40

## 2023-05-18 RX ADMIN — DOXYCYCLINE 100 MG: 100 CAPSULE ORAL at 08:38

## 2023-05-18 RX ADMIN — ALLOPURINOL 100 MG: 100 TABLET ORAL at 08:38

## 2023-05-18 RX ADMIN — LEVOTHYROXINE SODIUM 112 MCG: 0.11 TABLET ORAL at 05:18

## 2023-05-18 RX ADMIN — ROSUVASTATIN CALCIUM 10 MG: 5 TABLET, FILM COATED ORAL at 21:04

## 2023-05-18 RX ADMIN — NEBIVOLOL 10 MG: 10 TABLET ORAL at 08:39

## 2023-05-18 RX ADMIN — FAMOTIDINE 5 DROP: 20 TABLET, FILM COATED ORAL at 21:31

## 2023-05-18 RX ADMIN — DOXYCYCLINE 100 MG: 100 CAPSULE ORAL at 21:04

## 2023-05-18 RX ADMIN — PANTOPRAZOLE SODIUM 40 MG: 40 TABLET, DELAYED RELEASE ORAL at 05:51

## 2023-05-18 RX ADMIN — FAMOTIDINE 5 DROP: 20 TABLET, FILM COATED ORAL at 08:43

## 2023-05-18 RX ADMIN — MULTIPLE VITAMINS W/ MINERALS TAB 1 TABLET: TAB at 08:38

## 2023-05-18 RX ADMIN — MONTELUKAST 10 MG: 10 TABLET, FILM COATED ORAL at 21:04

## 2023-05-18 RX ADMIN — FERROUS SULFATE TAB 325 MG (65 MG ELEMENTAL FE) 325 MG: 325 (65 FE) TAB at 07:58

## 2023-05-18 RX ADMIN — DOCUSATE SODIUM 50MG AND SENNOSIDES 8.6MG 2 TABLET: 8.6; 5 TABLET, FILM COATED ORAL at 08:39

## 2023-05-18 RX ADMIN — HYDROCODONE BITARTRATE AND ACETAMINOPHEN 1 TABLET: 5; 325 TABLET ORAL at 21:19

## 2023-05-18 RX ADMIN — Medication 2000 UNITS: at 08:39

## 2023-05-18 NOTE — THERAPY TREATMENT NOTE
SNF - Occupational Therapy Treatment Note   Jordin    Patient Name: Yelena Sanchez  : 1935    MRN: [id number]                              Today's Date: 2023       Admit Date: 2023    Visit Dx:     ICD-10-CM ICD-9-CM   1. Decreased activities of daily living (ADL)  Z78.9 V49.89   2. Difficulty walking  R26.2 719.7     Patient Active Problem List   Diagnosis   • Closed fracture of neck of right humerus   • HTN (hypertension)   • HLD (hyperlipidemia)   • Hypothyroidism   • Effusion of right elbow   • Renal insufficiency   • Musculoskeletal pain   • Osteoarthritis of left knee   • Fracture     Past Medical History:   Diagnosis Date   • Anemia    • Breast cancer    • CHF (congestive heart failure)      Past Surgical History:   Procedure Laterality Date   • LUNG SURGERY Left     LEFT UPPER LOBE ()   • MASTECTOMY Left       General Information     Row Name 23 1032          OT Time and Intention    Document Type therapy note (daily note)  -EG     Mode of Treatment individual therapy;occupational therapy  -EG     Row Name 23 1032          General Information    Existing Precautions/Restrictions fall;non-weight bearing;brace on at all times;weight bearing  -EG     Row Name 23 1032          Cognition    Orientation Status (Cognition) oriented to;person;time  -EG     Row Name 23 1032          Safety Issues, Functional Mobility    Impairments Affecting Function (Mobility) balance;endurance/activity tolerance;pain;range of motion (ROM);strength  -EG           User Key  (r) = Recorded By, (t) = Taken By, (c) = Cosigned By    Initials Name Provider Type    EG Letitia Chinchilla OT Occupational Therapist                 Mobility/ADL's     Row Name 23 1032          Bed Mobility    Bed Mobility sit-supine  -EG     All Activities, [place] (Bed Mobility) standby assist;verbal cues  -EG     Bed Mobility, Safety Issues decreased use of arms for pushing/pulling;decreased use of  legs for bridging/pushing  -EG     Assistive Device (Bed Mobility) bed rails;head of bed elevated  -EG     Comment, (Bed Mobility) Minimal follow through on ROM/WB status for UE as well as does not have sling donned while in bed  -EG     Row Name 05/18/23 1032          Transfers    Transfers sit-stand transfer;stand-sit transfer;bed-chair transfer;toilet transfer  -EG     Comment, (Transfers) SBA/CGA for all transfers with use of quad cane; still displays poor insight and safety  -EG     Row Name 05/18/23 1032          Bed-Chair Transfer    Bed-Chair [place] (Transfers) contact guard;verbal cues;standby assist  -EG     Assistive Device (Bed-Chair Transfers) cane, quad  -EG     Row Name 05/18/23 1032          Sit-Stand Transfer    Sit-Stand [place] (Transfers) standby assist;verbal cues;contact guard  -EG     Assistive Device (Sit-Stand Transfers) cane, quad  -EG     Row Name 05/18/23 1032          Stand-Sit Transfer    Stand-Sit [place] (Transfers) standby assist;verbal cues;contact guard  -EG     Assistive Device (Stand-Sit Transfers) cane, quad  -EG     Row Name 05/18/23 1032          Toilet Transfer    Type (Toilet Transfer) sit-stand;stand-sit  -EG     [place] Level (Toilet Transfer) verbal cues;1 person assist;contact guard;standby assist  -EG     Assistive Device (Toilet Transfer) cane, quad  -EG     Row Name 05/18/23 1032          Functional Mobility    Functional Mobility- Ind. Level contact guard assist  -EG     Functional Mobility- Device cane, quad  -EG     Functional Mobility- Comment Patient able to perform functional mobility to and from shower room; Mobility obstacle course manuevering in and out of small spaces with quad cane continuing to display need for increased cues due to poor safety and awareness of surroudings  -EG     Row Name 05/18/23 1032          Activities of Daily Living    BADL Assessment/Intervention bathing;upper body dressing;lower body  dressing;grooming;toileting  -EG     Row Name 05/18/23 1032          Mobility    Extremity Weight-bearing Status left lower extremity;right upper extremity  -EG     Right Upper Extremity (Weight-bearing Status) non weight-bearing (NWB)  -EG     Left Lower Extremity (Weight-bearing Status) weight-bearing as tolerated (WBAT)  -EG     Row Name 05/18/23 1032          Bathing Assessment/Intervention    [place] Level (Bathing) bathing skills;upper body;lower body;verbal cues;moderate assist (50% patient effort)  -EG     Assistive Devices (Bathing) grab bar, tub/shower;hand-held shower spray hose;tub bench  -EG     Position (Bathing) unsupported sitting  -EG     Row Name 05/18/23 1032          Upper Body Dressing Assessment/Training    [place] Level (Upper Body Dressing) upper body dressing skills;don;pull-over garment;minimum assist (75% patient effort);verbal cues  -EG     Row Name 05/18/23 1032          Lower Body Dressing Assessment/Training    [place] Level (Lower Body Dressing) lower body dressing skills;don;pants/bottoms;moderate assist (50% patient effort);minimum assist (75% patient effort)  -EG     Row Name 05/18/23 1032          Grooming Assessment/Training    [place] Level (Grooming) grooming skills;set up;hair care, combing/brushing;wash face, hands  -EG     Row Name 05/18/23 1032          Toileting Assessment/Training    [place] Level (Toileting) toileting skills;verbal cues;contact guard assist;perform perineal hygiene;adjust/manage clothing  -EG           User Key  (r) = Recorded By, (t) = Taken By, (c) = Cosigned By    Initials Name Provider Type    EG Letitia Chinchilla OT Occupational Therapist               Obj/Interventions     Row Name 05/18/23 1035          Balance    Balance Interventions standing;sit to stand;supported;weight shifting activity;occupation based/functional task;UE activity with balance activity  -EG     Comment, Balance Patient with poor body awareness;  unsupported standing tolerance with dynamic weight shifting and gross motor stepping patterns CGA/Ananya occ. lateral LOB noted  -EG           User Key  (r) = Recorded By, (t) = Taken By, (c) = Cosigned By    Initials Name Provider Type    Letitia Carter, OT Occupational Therapist               Goals/Plan    No documentation.                Clinical Impression     Row Name 05/18/23 1036          Pain Scale: FACES Pre/Post-Treatment    Pain: FACES Scale, Pretreatment 2-->hurts little bit  -EG     Posttreatment Pain Rating 2-->hurts little bit  -EG     Row Name 05/18/23 1036          Plan of Care Review    Plan of Care Reviewed With patient  -EG     Progress no change  -EG     Outcome Evaluation Patient is reaching a plateau secondary to poor carryover and cognition; pleasant during session with complaints of pain but continues to be noncompliant with sling; Patient continues to demonstrate need for skilled OT services to address decreased balance, safety, strength, endurance and insight  -EG     Row Name 05/18/23 1036          Therapy Assessment/Plan (OT)    Rehab Potential (OT) good, to achieve stated therapy goals  -EG     Criteria for Skilled Therapeutic Interventions Met (OT) yes;meets criteria;skilled treatment is necessary  -EG     Therapy Frequency (OT) 5 times/wk  -EG           User Key  (r) = Recorded By, (t) = Taken By, (c) = Cosigned By    Initials Name Provider Type    Letitia Carter OT Occupational Therapist               Outcome Measures     Row Name 05/18/23 1037          How much help from another is currently needed...    Putting on and taking off regular lower body clothing? 2  -EG     Bathing (including washing, rinsing, and drying) 2  -EG     Toileting (which includes using toilet bed pan or urinal) 3  -EG     Putting on and taking off regular upper body clothing 3  -EG     Taking care of personal grooming (such as brushing teeth) 4  -EG     Eating meals 4  -EG     AM-PAC 6 Clicks Score  (OT) 18  -EG     Row Name 05/18/23 1006 05/18/23 0104       How much help from another person do you currently need...    Turning from your back to your side while in flat bed without using bedrails? 2  -WM 2  -FM    Moving from lying on back to sitting on the side of a flat bed without bedrails? 2  -WM 2  -FM    Moving to and from a bed to a chair (including a wheelchair)? 3  -WM 3  -FM    Standing up from a chair using your arms (e.g., wheelchair, bedside chair)? 3  -WM 3  -FM    Climbing 3-5 steps with a railing? 3  -WM 3  -FM    To walk in hospital room? 3  -WM 3  -FM    AM-PAC 6 Clicks Score (PT) 16  -WM 16  -FM    Highest level of mobility 5 --> Static standing  -WM 5 --> Static standing  -FM    Row Name 05/18/23 [facility]          Functional Assessment    Outcome Measure Options AM-PAC 6 Clicks Daily Activity (OT);Optimal Instrument  -EG     Row Name 05/18/23 1037          Optimal Instrument    Optimal Instrument Optimal - 3  -EG     Bending/Stooping 2  -EG     Standing 2  -EG     Reaching 2  -EG           User Key  (r) = Recorded By, (t) = Taken By, (c) = Cosigned By    Initials Name Provider Type    FM Divina Gupta, RN Registered Nurse    Cameron Mcgregor PTA Physical Therapist Assistant    Letitia Carter OT Occupational Therapist              Section G  Mobility  Bed mobility - self performance: limited assistance (staff provide guided maneuvering of limbs or other non-weight bearing assistance)  Bed mobility support/assistance: One person assist  Transfer - self performance: limited assistance (staff provide guided maneuvering of limbs or other non-weight bearing assistance)  Transfer support/assistance: One person assist  Walking in room - self performance: limited assistance (staff provide guided maneuvering of limbs or other non-weight bearing assistance)  Walking in room support/assistance: One person assist  Walking in corridors/hallway - self performance: limited assistance (staff provide  guided maneuvering of limbs or other non-weight bearing assistance)  Walking in corridors/hallway support/assistance: One person assist  Locomotion on unit - self performance: limited assistance (staff provide guided maneuvering of limbs or other non-weight bearing assistance)  Locomotion on unit support/assistance: One person assist  Locomotion off unit - self performance: activity did not occur  Locomotion off unit support/assistance: Activity did not occur  Dressing - self performance: extensive assistance (provide any weight-bearing support, hold over while fabrice-care lift legs for transfer, etc.)  Dressing support/assistance: One person assist  Eating - self performance: independent  Eating support/assistance: Setup help only  Toileting - self performance: limited assistance (staff provide guided maneuvering of limbs or other non-weight bearing assistance)  Toileting support/assistance: One person assist  Personal hygiene - self performance: limited assistance (staff provide guided maneuvering of limbs or other non-weight bearing assistance)  Personal hygiene support/assistance: One person assist  Bathing  Bathing - self performance: Physical help with bathing (exclude washing back and hair for patient)  Bathing support/assistance: One person assist  Balance  Balance during transitions & walking: Not steady, requires assist to steady  Moving from seated to standing position: Not steady, requires assist to steady  Walking: Not steady, requires assist to steady  Turning around while walking: Not steady, requires assist to steady  Moving on and off toilet: Not steady, requires assist to steady  Surface-to-surface transfer: Not steady, requires assist to steady  Mobility devices:  (pawan walker)  Range of Motion  Upper Extremity: Impairment on one side  Lower Extremity: No impairment  Section GG  SectionGG: Functional Ability/Goals, Adm  Self Care, Prior Functioning (CS4288S): 3. Independent  Functional Cognition,  Prior Functioning (QA0901Q): 3. Independent  Self Care, Admission (Section GG)  Eating: Self-Care Admission Performance ([id number]): setup or clean-up assistance (05)  Oral Hygiene: Self-Care Admission Performance ([id number]): setup or clean-up assistance (05)  Toileting Hygiene: Self-Care Admission Performance ([id number]): supervision or touching assistance (04)  Shower/Bathe Self: Self-Care Admission Performance ([id number]): partial/moderate assistance (03)  Upper Body Dressing: Self-Care Admission Performance ([id number]): partial/moderate assistance (03)  Lower Body Dressing: Self-Care Admission Performance ([id number]): partial/moderate assistance (03)  Putting On/Taking Off Footwear: Self-Care Admission Performance ([id number]): partial/moderate assistance (03)     Section GG: Functional Ability/Goals, DC  Eating: Self-Care Discharge Goal ([id number]): independent (06)  Oral Hygiene: Self-Care Discharge Goal (CT8423N8): independent (06)  Toileting Hygiene: Self-Care Discharge Goal (UY4583L5): independent (06)  Shower/Bathe Self: Self-Care Discharge Goal ([id number]): independent (06)  Upper Body Dressing: Self-Care Discharge Goal ([id number]): independent (06)  Lower Body Dressing: Self-Care Discharge Goal (GI2105E3): independent (06)  Putting On/Taking Off Footwear: Self-Care Discharge Goal (YI4078N4): independent (06)             Occupational Therapy Education     Title: PT OT SLP Therapies (In Progress)     Topic: Occupational Therapy (In Progress)     Point: ADL training (In Progress)     Description:   Instruct learner(s) on proper safety adaptation and remediation techniques during self care or transfers.   Instruct in proper use of assistive devices.              Learning Progress Summary           Patient ALEJANDRO Chao, NR by EG at 5/10/2023 0800    Comment: Education on compensatory techniques for UB ADL's  Educated on sling donning, ROM and WB status orders for RUE  Education on general safety techniques and fall risk  prevention                   Point: Home exercise program (In Progress)     Description:   Instruct learner(s) on appropriate technique for monitoring, assisting and/or progressing therapeutic exercises/activities.              Learning Progress Summary           Patient ALEJANDRO Chao, NR by EG at 5/10/2023 0800    Comment: Education on compensatory techniques for UB ADL's  Educated on sling donning, ROM and WB status orders for RUE  Education on general safety techniques and fall risk prevention                   Point: Precautions (In Progress)     Description:   Instruct learner(s) on prescribed precautions during self-care and functional transfers.              Learning Progress Summary           Patient Zhanna, E, NR by EG at 5/10/2023 0800    Comment: Education on compensatory techniques for UB ADL's  Educated on sling donning, ROM and WB status orders for RUE  Education on general safety techniques and fall risk prevention                   Point: Body mechanics (In Progress)     Description:   Instruct learner(s) on proper positioning and spine alignment during self-care, functional mobility activities and/or exercises.              Learning Progress Summary           Patient ALEJANDRO Chao, NR by EG at 5/10/2023 0800    Comment: Education on compensatory techniques for UB ADL's  Educated on sling donning, ROM and WB status orders for RUE  Education on general safety techniques and fall risk prevention                               User Key     Initials Effective Dates Name Provider Type Discipline    EG 09/14/22 -  Letitia Chinchilla OT Occupational Therapist OT              OT Recommendation and Plan  Planned Therapy Interventions (OT): activity tolerance training, functional balance retraining, occupation/activity based interventions, adaptive equipment training, BADL retraining, neuromuscular control/coordination retraining, patient/caregiver education/training, transfer/mobility retraining, strengthening  exercise  Therapy Frequency (OT): 5 times/wk  Plan of Care Review  Plan of Care Reviewed With: patient  Progress: no change  Outcome Evaluation: Patient is reaching a plateau secondary to poor carryover and cognition; pleasant during session with complaints of pain but continues to be noncompliant with sling; Patient continues to demonstrate need for skilled OT services to address decreased balance, safety, strength, endurance and insight     Time Calculation:    Time Calculation- OT     Row Name 05/18/23 1038 05/18/23 0958          Time Calculation- OT    OT Received On 05/18/23  -EG --     OT Goal Re-Cert Due Date 06/08/23  -EG --        Timed Charges    66860 -  OT Neuromuscular Reeducation Minutes 12  -EG --     42461 - Gait Training Minutes  -- 7  -WM     75603 - OT Therapeutic Activity Minutes 14  -EG --     78192 - OT Self Care/Mgmt Minutes 27  -EG --        SNF Occupational Therapy Minutes    Skilled Minutes- OT 53 min  -EG --        Total Minutes    Timed Charges Total Minutes 53  -EG 7  -WM      Total Minutes 53  -EG 7  -WM           User Key  (r) = Recorded By, (t) = Taken By, (c) = Cosigned By    Initials Name Provider Type    Cameron Mcgregor PTA Physical Therapist Assistant    Letitia Carter OT Occupational Therapist              Therapy Charges for Today     Code Description Service Date Service Provider Modifiers Qty    30034256739 HC OT THERAPEUTIC ACT EA 15 MIN 5/17/2023 Letitia Chinchilla OT GO 1    27318220213 HC OT SELF CARE/MGMT/TRAIN EA 15 MIN 5/17/2023 Letitia Chinchilla OT GO 3    46779221306 HC OT NEUROMUSC RE EDUCATION EA 15 MIN 5/18/2023 Letitia Chinchilla OT GO 1    75170637344 HC OT THERAPEUTIC ACT EA 15 MIN 5/18/2023 Letitia Chinchilla OT GO 1    09681515855 HC OT SELF CARE/MGMT/TRAIN EA 15 MIN 5/18/2023 Letitia Chinchilla OT GO 2               Letitia Chinchilla OT  5/18/2023

## 2023-05-18 NOTE — THERAPY TREATMENT NOTE
SNF - Physical Therapy Treatment Note  SHAYNA Brenner     Patient Name: Yelena Sanchez  : 1935  MRN: [id number]  Today's Date: 2023      Visit Dx:     ICD-10-CM ICD-9-CM   1. Decreased activities of daily living (ADL)  Z78.9 V49.89   2. Difficulty walking  R26.2 719.7     Patient Active Problem List   Diagnosis   • Closed fracture of neck of right humerus   • HTN (hypertension)   • HLD (hyperlipidemia)   • Hypothyroidism   • Effusion of right elbow   • Renal insufficiency   • Musculoskeletal pain   • Osteoarthritis of left knee   • Fracture     Past Medical History:   Diagnosis Date   • Anemia    • Breast cancer    • CHF (congestive heart failure)      Past Surgical History:   Procedure Laterality Date   • LUNG SURGERY Left     LEFT UPPER LOBE ()   • MASTECTOMY Left      PT Assessment (last 12 hours)     PT Evaluation and Treatment     Row Name 23 1420 23 1001       Physical Therapy Time and Intention    Document Type therapy note (daily note)  -WM therapy note (daily note)  -WM    Mode of Treatment individual therapy;physical therapy  -WM individual therapy;physical therapy  -WM    Patient Effort good  -WM good  -WM    Symptoms Noted During/After Treatment fatigue  -WM fatigue  -WM    Row Name 23 1420 23 1001       Pain Scale: FACES Pre/Post-Treatment    Pain: FACES Scale, Pretreatment 2-->hurts little bit  -WM 2-->hurts little bit  -WM    Posttreatment Pain Rating 2-->hurts little bit  -WM 2-->hurts little bit  -WM    Pain Location - Side/Orientation Right  -WM Right  -WM    Pain Location - shoulder  -WM shoulder  -WM    Row Name 23 1420 23 1001       Sit-Stand Transfer    Sit-Stand [place] (Transfers) contact guard;standby assist;verbal cues  -WM standby assist;verbal cues  -WM    Assistive Device (Sit-Stand Transfers) cane, quad  -WM cane, quad  -WM    Row Name 23 1420 23 1001       Stand-Sit Transfer    Stand-Sit [place] (Transfers) contact  guard;standby assist;verbal cues  -WM standby assist;verbal cues  -WM    Assistive Device (Stand-Sit Transfers) cane, quad  -WM cane, quad  -WM    Row Name 05/18/23 1420 05/18/23 1001       Gait/Stairs (Locomotion)    [place] Level (Gait) contact guard;standby assist;verbal cues  -WM standby assist;verbal cues  -WM    Assistive Device (Gait) cane, quad  -WM cane, quad  -WM    Distance in Feet (Gait) 120  - + 40  -WM    Pattern (Gait) step-through  -WM 3-point;step-through  -WM    Deviations/Abnormal Patterns (Gait) gait speed decreased;stride length decreased  -WM gait speed decreased;stride length decreased  -WM    [place] Level (Stairs) -- contact guard;stand by assist  -    Handrail Location (Stairs) -- left side (ascending);right side (descending)  -    Number of Steps (Stairs) -- 2 x 2  -WM    Ascending Technique (Stairs) -- step-to-step  -WM    Descending Technique (Stairs) -- step-to-step  -WM    Stairs, Impairments -- strength decreased;impaired balance  -    Row Name 05/18/23 1420 05/18/23 1001       Safety Issues, Functional Mobility    Impairments Affecting Function (Mobility) balance;endurance/activity tolerance;pain;range of motion (ROM);strength  - balance;endurance/activity tolerance;pain;range of motion (ROM);strength  -    Row Name 05/18/23 1001          Balance    Balance Interventions standing;dynamic  Forward/backward walking, side stepping in parallel bars  -     Row Name 05/18/23 1001          Hip (Therapeutic Exercise)    Hip Strengthening (Therapeutic Exercise) bilateral;aBduction;aDduction;marching while seated;sitting;2 lb free weight;resistance band;yellow;15 repititions;2 sets  -     Row Name 05/18/23 1001          Knee (Therapeutic Exercise)    Knee Strengthening (Therapeutic Exercise) bilateral;LAQ (long arc quad);hamstring curls;sitting;2 lb free weight;resistance band;yellow;15 repititions;2 sets  -     Row Name 05/18/23 1001          Ankle (Therapeutic  Exercise)    Ankle (Therapeutic Exercise) strengthening exercise  -WM     Ankle Strengthening (Therapeutic Exercise) bilateral;standing;20 repititions  Heel raises in parallel bars  -WM     Row Name             Wound 05/04/23 2211 Left anterior knee Abrasion    Wound - Properties Group Placement Date: 05/04/23 -JN Placement Time: 2211 -JN Side: Left  -JN Orientation: anterior  -JN Location: knee  -JN Primary Wound Type: Abrasion  -JN    Retired Wound - Properties Group Placement Date: 05/04/23 -JN Placement Time: 2211 -JN Side: Left  -JN Orientation: anterior  -JN Location: knee  -JN Primary Wound Type: Abrasion  -JN    Retired Wound - Properties Group Date first assessed: 05/04/23 -JN Time first assessed: 2211 -JN Side: Left  -JN Location: knee  -JN Primary Wound Type: Abrasion  -JN    Row Name             Wound Left posterior thumb Abrasion    Wound - Properties Group Side: Left  -FH Orientation: posterior  -FH Location: thumb  -FH Primary Wound Type: Abrasion  -FH Additional Comments: resident had splint in place but removed per self exposing scab.  -FH    Retired Wound - Properties Group Side: Left  -FH Orientation: posterior  -FH Location: thumb  -FH Primary Wound Type: Abrasion  -FH Additional Comments: resident had splint in place but removed per self exposing scab.  -FH    Retired Wound - Properties Group Side: Left  -FH Location: thumb  -FH Primary Wound Type: Abrasion  -FH Additional Comments: resident had splint in place but removed per self exposing scab.  -    Row Name 05/18/23 1420 05/18/23 1001       Progress Summary (PT)    Progress Toward Functional Goals (PT) progress toward functional goals is good  - progress toward functional goals is good  -          User Key  (r) = Recorded By, (t) = Taken By, (c) = Cosigned By    Initials Name Provider Type    Lyly Williamson RN Registered Nurse    [place] RN Registered Nurse    Cameron Mcgregor PTA Physical Therapist  Assistant                Physical Therapy Education     Title: PT OT SLP Therapies (In Progress)     Topic: Physical Therapy (In Progress)     Point: Mobility training (In Progress)     Learning Progress Summary           Patient Acceptance, E, NR by CS at 5/10/2023 1652                   Point: Precautions (In Progress)     Learning Progress Summary           Patient Acceptance, E, NR by CS at 5/10/2023 1652                               User Key     Initials Effective Dates Name Provider Type Discipline     04/25/21 -  Ruth Mina, PT Physical Therapist PT              PT Recommendation and Plan     Progress Summary (PT)  Progress Toward Functional Goals (PT): progress toward functional goals is good  Daily Progress Summary (PT): Pt participated in BLE exercises, but declined transfers and GT   Outcome Measures     Row Name 05/18/23 1422 05/18/23 1006 05/17/23 1235       How much help from another person do you currently need...    Turning from your back to your side while in flat bed without using bedrails? 2  -WM 2  -WM 2  -WM    Moving from lying on back to sitting on the side of a flat bed without bedrails? 2  -WM 2  -WM 2  -WM    Moving to and from a bed to a chair (including a wheelchair)? 3  -WM 3  -WM 3  -WM    Standing up from a chair using your arms (e.g., wheelchair, bedside chair)? 3  -WM 3  -WM 3  -WM    Climbing 3-5 steps with a railing? 3  -WM 3  -WM 3  -WM    To walk in hospital room? 3  -WM 3  -WM 3  -WM    AM-PAC 6 Clicks Score (PT) 16  -WM 16  -WM 16  -WM    Row Name 05/16/23 1332             How much help from another person do you currently need...    Turning from your back to your side while in flat bed without using bedrails? 2  -WM      Moving from lying on back to sitting on the side of a flat bed without bedrails? 2  -WM      Moving to and from a bed to a chair (including a wheelchair)? 3  -WM      Standing up from a chair using your arms (e.g., wheelchair, bedside chair)? 3  -WM       Climbing 3-5 steps with a railing? 3  -WM      To walk in hospital room? 3  -WM      AM-PAC 6 Clicks Score (PT) 16  -WM            User Key  (r) = Recorded By, (t) = Taken By, (c) = Cosigned By    Initials Name Provider Type    Cameron Mcgregor PTA Physical Therapist Assistant                 Time Calculation:    PT Charges     Row Name 05/18/23 1419 05/18/23 0958          Time Calculation    PT Received On 05/18/23  -WM --        Timed Charges    33946 - PT Therapeutic Exercise Minutes -- 17  -WM     31573 - Gait Training Minutes  7  -WM 7  -WM     92156 - PT Therapeutic Activity Minutes 3  -WM 3  -WM        SNF Physical Therapy Minutes    Skilled Minutes- PT 10 min  -WM 27 min  -WM        Total Minutes    Timed Charges Total Minutes 10  -WM 27  -WM      Total Minutes 10  -WM 27  -WM           User Key  (r) = Recorded By, (t) = Taken By, (c) = Cosigned By    Initials Name Provider Type    Cameron Mcgregor PTA Physical Therapist Assistant              Therapy Charges for Today     Code Description Service Date Service Provider Modifiers Qty    30007533504 HC PT THER PROC EA 15 MIN 5/17/2023 Cameron Mohamud, PTA GP 1    62430062881 HC GAIT TRAINING EA 15 MIN 5/17/2023 Cameron Mohamud, PTA GP 1    24428905324 HC PT THER PROC EA 15 MIN 5/18/2023 Cameron Mohamud PTA GP 1    86097832385 HC GAIT TRAINING EA 15 MIN 5/18/2023 Cameron Mohamud PTA GP 1    26763076039 HC GAIT TRAINING EA 15 MIN 5/18/2023 Cameron Mohamud PTA GP 1          PT G-Codes  Outcome Measure Options: AM-PAC 6 Clicks Daily Activity (OT), Optimal Instrument  AM-PAC 6 Clicks Score (PT): 16  AM-PAC 6 Clicks Score (OT): 18    Cameron Mohamud PTA  5/18/2023

## 2023-05-18 NOTE — PROGRESS NOTES
[facility]   Hospitalist Progress Note       Patient Name: Yelena Sanchez  : 1935  MRN: [id number]  Primary Care Physician: Kaelyn uEgene MD  Date of admission: 2023  Today's Date: 2023  Room / Bed:   304/1  Subjective   Chief Complaint: Weakness after recent hospitalization    Summary:  88 y/o F with HTN, CAD, CHF hypothyroidism who presented after mechanical fall with pain involving the right shoulder, right elbow left hand/knee.  No head trauma or loss of consciousness.  Presentation little hypertensive otherwise vital stable.  Labs notable for WBC 14.61, platelets 215, hemoglobin 12, creatinine elevated 2.12, baseline around 1.6, rest of the CMP is nonsignificant.  Imaging with nondisplaced fracture of the right humerus neck, right elbow with joint effusion, left hand minimally displaced fracture of the thumb.  Right shoulder in splint.  Orthopedic surgery consulted, nonoperative management, pain control, PT and rehab.     Seen by PT and OT inpatient rehab recommended patient accepted bed at [facility] skilled nursing facility.        Interval Followup: 2023    • Very pleasant.  Has no new complaints  • Tolerating physical therapy today  • Vital signs stable  • Cr stable  • Knee abrasion/wound healing up.  Knee pain improving in general.      REVIEW OF SYSTEMS: All other systems reviewed and are negative.   • Generalized weakness  Objective   Temp:  [97.3 °F (36.3 °C)-98.2 °F (36.8 °C)] 97.3 °F (36.3 °C)  Heart Rate:  [60] 60  Resp:  [16] 16  BP: (156-166)/(54-58) 166/54  PHYSICAL EXAM   • CON: WN. WD. NAD.  Alert and conversational.  • EYES:  Sclera anicteric. EOMI. Normal conjunctiva.   • ENT:  Oropharyngeal mucosa without ulcers or thrush.    • NECK:  No thyromegaly. No stridor. Trachea midline.  • RESP:  CTA. No wheezes. No crackles.  No work of breathing or tachypnea.   • CV:  Rhythm regular. Rate WNL. No murmur noted.  No edema.  • GI:  Soft and nontender. Nondistended.   Bowel sounds present.   • EXT: RU E immobilized.  Extensive bruising.  • LYMPH:  No lymphedema noted.  No cervical lymphadenopathy.  • PSYCH:  Alert. Oriented. Normal affect and mood.  • NEURO:  CNII-XII grossly intact. No dysarthria or aphasia. No unilateral weakness or paresthesia.  • SKIN: No chronic venous stasis changes or varicosities.  Extensive bruising on torso arms legs    Results from last 7 days   Lab Units 05/17/23  0812 05/16/23  0500 05/13/23  0805   WBC 10*3/mm3 7.98 8.74 8.60   HEMOGLOBIN g/dL 10.7* 10.4* 10.2*   HEMATOCRIT % 33.5* 32.8* 31.9*   PLATELETS 10*3/mm3 417 429 314     Results from last 7 days   Lab Units 05/17/23  0811 05/16/23  0500 05/13/23  0805   SODIUM mmol/L 136 135* 136   POTASSIUM mmol/L 4.6 4.5 4.5   CO2 mmol/L 21.8* 23.3 24.6   CHLORIDE mmol/L 100 99 97*   ANION GAP mmol/L 14.2 12.7 14.4   BUN mg/dL 69* 70* 64*   CREATININE mg/dL 1.65* 1.73* 1.59*   GLUCOSE mg/dL 121* 89 88           RESULTS REVIEWED:  I have personally reviewed the results from the time of this admission to 5/18/2023 19:36 EDT and agree with these findings:  []  Laboratory  []  Microbiology  []  Radiology  []  EKG/Telemetry   []  Cardiology/Vascular   []  Pathology  []  Old records  []  Other:  Assessment / Plan   Assessment:    • Weakness status post recent hospitalization  • Close fracture of neck of right humerus (nonoperative management)  • History systolic CHF  • CKD 3B baseline 1.6-2.1  • COPD  • CAD  • History of AICD  • Left knee OA  • Hypertension  • Hyperlipidemia  • Hypothyroidism  • Effusion of right elbow       Plan:    • Resume Lasix: 40 mg a.m. and 40 mg earlier afternoon  • Continue bowel regimen  • Continue topical wound care left knee wound  • Continue/complete doxycycline  • Right upper extremity immobilization with sling  • Monitor volume status, renal function, in light of CKD/CHF history  • PT/OT  • Heparin for DVT prophylaxis    Discussed plan with RN.  DVT prophylaxis:  Medical DVT  prophylaxis orders are present.  CODE STATUS:      Level Of Support Discussed With: Patient  Code Status (Patient has no pulse and is not breathing): CPR (Attempt to Resuscitate)  Medical Interventions (Patient has pulse or is breathing): Full Support

## 2023-05-18 NOTE — PLAN OF CARE
Goal Outcome Evaluation:           Progress: no change  Outcome Evaluation: Rsd. is alert and oriented x4, able to make needs known to staff.  Rsd. has been medicated x1 this shift with prn [place], see emar.  Rsd. states medication effective, and has rested well this shift.  Prn melatonin utilized also this shift, see emar.  Rsd. transfers x1 assist to bathroom with cane with R) arm sling in place.  Call light and personal items within reach, Rsd. reminded to use call light for assistance, victorinozes understanding.  Will continue to monitor and notify on-coming staff.  Current plan of care remains in place at this time.

## 2023-05-18 NOTE — THERAPY TREATMENT NOTE
SNF - Physical Therapy Treatment Note   Jordin     Patient Name: Yelena Sanchez  : 1935  MRN: [id number]  Today's Date: 2023      Visit Dx:     ICD-10-CM ICD-9-CM   1. Decreased activities of daily living (ADL)  Z78.9 V49.89   2. Difficulty walking  R26.2 719.7     Patient Active Problem List   Diagnosis   • Closed fracture of neck of right humerus   • HTN (hypertension)   • HLD (hyperlipidemia)   • Hypothyroidism   • Effusion of right elbow   • Renal insufficiency   • Musculoskeletal pain   • Osteoarthritis of left knee   • Fracture     Past Medical History:   Diagnosis Date   • Anemia    • Breast cancer    • CHF (congestive heart failure)      Past Surgical History:   Procedure Laterality Date   • LUNG SURGERY Left     LEFT UPPER LOBE ()   • MASTECTOMY Left      PT Assessment (last 12 hours)     PT Evaluation and Treatment     Row Name 23 1001          Physical Therapy Time and Intention    Document Type therapy note (daily note)  -WM     Mode of Treatment individual therapy;physical therapy  -WM     Patient Effort good  -WM     Symptoms Noted During/After Treatment fatigue  -WM     Row Name 23 1001          Pain Scale: FACES Pre/Post-Treatment    Pain: FACES Scale, Pretreatment 2-->hurts little bit  -WM     Posttreatment Pain Rating 2-->hurts little bit  -WM     Pain Location - Side/Orientation Right  -WM     Pain Location - shoulder  -WM     Row Name 23 1001          Sit-Stand Transfer    Sit-Stand [place] (Transfers) standby assist;verbal cues  -WM     Assistive Device (Sit-Stand Transfers) cane, quad  -WM     Row Name 23 1001          Stand-Sit Transfer    Stand-Sit [place] (Transfers) standby assist;verbal cues  -WM     Assistive Device (Stand-Sit Transfers) cane, quad  -WM     Row Name 23 1001          Gait/Stairs (Locomotion)    [place] Level (Gait) standby assist;verbal cues  -WM     Assistive Device (Gait) cane, quad  -WM     Distance in  Feet (Gait) 160 + 40  -     Pattern (Gait) 3-point;step-through  -     Deviations/Abnormal Patterns (Gait) gait speed decreased;stride length decreased  -     [place] Level (Stairs) contact guard;stand by assist  -     Handrail Location (Stairs) left side (ascending);right side (descending)  -     Number of Steps (Stairs) 2 x 2  -WM     Ascending Technique (Stairs) step-to-step  -WM     Descending Technique (Stairs) step-to-step  -WM     Stairs, Impairments strength decreased;impaired balance  -     Row Name 05/18/23 1001          Safety Issues, Functional Mobility    Impairments Affecting Function (Mobility) balance;endurance/activity tolerance;pain;range of motion (ROM);strength  -     Row Name 05/18/23 1001          Balance    Balance Interventions standing;dynamic  Forward/backward walking, side stepping in parallel bars  -     Row Name 05/18/23 1001          Hip (Therapeutic Exercise)    Hip Strengthening (Therapeutic Exercise) bilateral;aBduction;aDduction;marching while seated;sitting;2 lb free weight;resistance band;yellow;15 repititions;2 sets  -     Row Name 05/18/23 1001          Knee (Therapeutic Exercise)    Knee Strengthening (Therapeutic Exercise) bilateral;LAQ (long arc quad);hamstring curls;sitting;2 lb free weight;resistance band;yellow;15 repititions;2 sets  -     Row Name 05/18/23 1001          Ankle (Therapeutic Exercise)    Ankle (Therapeutic Exercise) strengthening exercise  -     Ankle Strengthening (Therapeutic Exercise) bilateral;standing;20 repititions  Heel raises in parallel bars  -     Row Name             Wound 05/04/23 2211 Left anterior knee Abrasion    Wound - Properties Group Placement Date: 05/04/23 -JN Placement Time: 2211 -JN Side: Left  -JN Orientation: anterior  -JN Location: knee  -JN Primary Wound Type: Abrasion  -JN    Retired Wound - Properties Group Placement Date: 05/04/23 -JN Placement Time: 2211 -JN Side: Left  -JN Orientation: anterior   -JN Location: knee  -JN Primary Wound Type: Abrasion  -JN    Retired Wound - Properties Group Date first assessed: 05/04/23  -JN Time first assessed: 2211  -JN Side: Left  -JN Location: knee  -JN Primary Wound Type: Abrasion  -JN    Row Name             Wound Left posterior thumb Abrasion    Wound - Properties Group Side: Left  -FH Orientation: posterior  -FH Location: thumb  -FH Primary Wound Type: Abrasion  -FH Additional Comments: resident had splint in place but removed per self exposing scab.  -FH    Retired Wound - Properties Group Side: Left  -FH Orientation: posterior  -FH Location: thumb  -FH Primary Wound Type: Abrasion  -FH Additional Comments: resident had splint in place but removed per self exposing scab.  -FH    Retired Wound - Properties Group Side: Left  -FH Location: thumb  -FH Primary Wound Type: Abrasion  -FH Additional Comments: resident had splint in place but removed per self exposing scab.  -    Row Name 05/18/23 1001          Progress Summary (PT)    Progress Toward Functional Goals (PT) progress toward functional goals is good  -WM           User Key  (r) = Recorded By, (t) = Taken By, (c) = Cosigned By    Initials Name Provider Type    Lyly Williamson, RN Registered Nurse    Belle [place]lamas RN Registered Nurse    Cameron Mcgregor PTA Physical Therapist Assistant                Physical Therapy Education     Title: PT OT SLP Therapies (In Progress)     Topic: Physical Therapy (In Progress)     Point: Mobility training (In Progress)     Learning Progress Summary           Patient Acceptance, E, NR by  at 5/10/2023 1652                   Point: Precautions (In Progress)     Learning Progress Summary           Patient Acceptance, E, NR by  at 5/10/2023 1652                               User Key     Initials Effective Dates Name Provider Type Discipline     04/25/21 -  Ruht Mina, PT Physical Therapist PT              PT Recommendation and Plan     Progress  Summary (PT)  Progress Toward Functional Goals (PT): progress toward functional goals is good  Daily Progress Summary (PT): Pt participated in BLE exercises, but declined transfers and GT   Outcome Measures     Row Name 05/18/23 1006 05/17/23 1235 05/16/23 1332       How much help from another person do you currently need...    Turning from your back to your side while in flat bed without using bedrails? 2  -WM 2  -WM 2  -WM    Moving from lying on back to sitting on the side of a flat bed without bedrails? 2  -WM 2  -WM 2  -WM    Moving to and from a bed to a chair (including a wheelchair)? 3  -WM 3  -WM 3  -WM    Standing up from a chair using your arms (e.g., wheelchair, bedside chair)? 3  -WM 3  -WM 3  -WM    Climbing 3-5 steps with a railing? 3  -WM 3  -WM 3  -WM    To walk in hospital room? 3  -WM 3  -WM 3  -WM    AM-PAC 6 Clicks Score (PT) 16  -WM 16  -WM 16  -WM    Row Name 05/15/23 1333             How much help from another person do you currently need...    Turning from your back to your side while in flat bed without using bedrails? 2  -WM      Moving from lying on back to sitting on the side of a flat bed without bedrails? 2  -WM      Moving to and from a bed to a chair (including a wheelchair)? 3  -WM      Standing up from a chair using your arms (e.g., wheelchair, bedside chair)? 3  -WM      Climbing 3-5 steps with a railing? 3  -WM      To walk in hospital room? 3  -WM      AM-PAC 6 Clicks Score (PT) 16  -WM            User Key  (r) = Recorded By, (t) = Taken By, (c) = Cosigned By    Initials Name Provider Type    Cameron Mcgregor PTA Physical Therapist Assistant                 Time Calculation:    PT Charges     Row Name 05/18/23 0958             Timed Charges    73554 - PT Therapeutic Exercise Minutes 17  -WM      61658 - Gait Training Minutes  7  -WM      09511 - PT Therapeutic Activity Minutes 3  -WM         SNF Physical Therapy Minutes    Skilled Minutes- PT 27 min  -WM         Total Minutes     Timed Charges Total Minutes 27  -WM       Total Minutes 27  -WM            User Key  (r) = Recorded By, (t) = Taken By, (c) = Cosigned By    Initials Name Provider Type     Cameron Mohamud PTA Physical Therapist Assistant              Therapy Charges for Today     Code Description Service Date Service Provider Modifiers Qty    65379505876 HC PT THER PROC EA 15 MIN 5/17/2023 Cameron Mohamud, PTA GP 1    55364450970 HC GAIT TRAINING EA 15 MIN 5/17/2023 Cameron Mohamud, SOCRATES GP 1    76088486731 HC PT THER PROC EA 15 MIN 5/18/2023 Cameron Mohamud, PTA GP 1    37172757427 HC GAIT TRAINING EA 15 MIN 5/18/2023 Cameron Mohamud, SOCRATES GP 1          PT G-Codes  Outcome Measure Options: AM-PAC 6 Clicks Daily Activity (OT), Optimal Instrument  AM-PAC 6 Clicks Score (PT): 16  AM-PAC 6 Clicks Score (OT): 16    Cameron Mohamud PTA  5/18/2023

## 2023-05-18 NOTE — PLAN OF CARE
Goal Outcome Evaluation:   Alert and oriented and pleasant with staff. X1 assist for transfers and ambulation. X1 admin PRN pain medication, with relief of symptoms noted. Still refuses Heparin Injection. Sitting up in bed, call light in reach.

## 2023-05-19 ENCOUNTER — TELEPHONE (OUTPATIENT)
Dept: ORTHOPEDIC SURGERY | Facility: CLINIC | Age: 88
End: 2023-05-19
Payer: MEDICARE

## 2023-05-19 LAB
INDURATION: 0 MM (ref 0–10)
INDURATION: 0 MM (ref 0–10)
TB SKIN TEST: NEGATIVE
TB SKIN TEST: NEGATIVE

## 2023-05-19 PROCEDURE — 97110 THERAPEUTIC EXERCISES: CPT

## 2023-05-19 PROCEDURE — 97116 GAIT TRAINING THERAPY: CPT

## 2023-05-19 PROCEDURE — 97535 SELF CARE MNGMENT TRAINING: CPT

## 2023-05-19 PROCEDURE — 99308 SBSQ NF CARE LOW MDM 20: CPT | Performed by: PHYSICIAN ASSISTANT

## 2023-05-19 PROCEDURE — 97530 THERAPEUTIC ACTIVITIES: CPT

## 2023-05-19 RX ADMIN — HYDROCODONE BITARTRATE AND ACETAMINOPHEN 1 TABLET: 5; 325 TABLET ORAL at 06:26

## 2023-05-19 RX ADMIN — FAMOTIDINE 5 DROP: 20 TABLET, FILM COATED ORAL at 09:55

## 2023-05-19 RX ADMIN — HYDROCODONE BITARTRATE AND ACETAMINOPHEN 1 TABLET: 7.5; 325 TABLET ORAL at 20:22

## 2023-05-19 RX ADMIN — MONTELUKAST 10 MG: 10 TABLET, FILM COATED ORAL at 20:22

## 2023-05-19 RX ADMIN — DOXYCYCLINE 100 MG: 100 CAPSULE ORAL at 11:52

## 2023-05-19 RX ADMIN — LEVOTHYROXINE SODIUM 112 MCG: 0.11 TABLET ORAL at 05:06

## 2023-05-19 RX ADMIN — ASPIRIN 81 MG: 81 TABLET, COATED ORAL at 09:52

## 2023-05-19 RX ADMIN — FERROUS SULFATE TAB 325 MG (65 MG ELEMENTAL FE) 325 MG: 325 (65 FE) TAB at 09:52

## 2023-05-19 RX ADMIN — ROSUVASTATIN CALCIUM 10 MG: 5 TABLET, FILM COATED ORAL at 20:22

## 2023-05-19 RX ADMIN — DOXYCYCLINE 100 MG: 100 CAPSULE ORAL at 20:23

## 2023-05-19 RX ADMIN — PANTOPRAZOLE SODIUM 40 MG: 40 TABLET, DELAYED RELEASE ORAL at 05:48

## 2023-05-19 RX ADMIN — Medication 2000 UNITS: at 09:52

## 2023-05-19 RX ADMIN — MULTIPLE VITAMINS W/ MINERALS TAB 1 TABLET: TAB at 09:52

## 2023-05-19 RX ADMIN — ALLOPURINOL 100 MG: 100 TABLET ORAL at 09:53

## 2023-05-19 RX ADMIN — HYDROCODONE BITARTRATE AND ACETAMINOPHEN 1 TABLET: 5; 325 TABLET ORAL at 14:09

## 2023-05-19 RX ADMIN — CLOPIDOGREL BISULFATE 75 MG: 75 TABLET ORAL at 09:53

## 2023-05-19 RX ADMIN — NEBIVOLOL 10 MG: 10 TABLET ORAL at 09:52

## 2023-05-19 RX ADMIN — FUROSEMIDE 40 MG: 40 TABLET ORAL at 14:09

## 2023-05-19 RX ADMIN — Medication 10 MG: at 20:22

## 2023-05-19 RX ADMIN — FUROSEMIDE 40 MG: 40 TABLET ORAL at 09:52

## 2023-05-19 RX ADMIN — POLYETHYLENE GLYCOL 3350 17 G: 17 POWDER, FOR SOLUTION ORAL at 09:51

## 2023-05-19 RX ADMIN — DOCUSATE SODIUM 50MG AND SENNOSIDES 8.6MG 2 TABLET: 8.6; 5 TABLET, FILM COATED ORAL at 09:52

## 2023-05-19 RX ADMIN — OXYCODONE HYDROCHLORIDE AND ACETAMINOPHEN 500 MG: 500 TABLET ORAL at 09:53

## 2023-05-19 NOTE — PROGRESS NOTES
[facility]   Hospitalist Progress Note       Patient Name: Yelena Sanchez  : 1935  MRN: [id number]  Primary Care Physician: Kaelyn Eugene MD  Date of admission: 2023  Today's Date: 2023  Room / Bed:   304/1  Subjective   Chief Complaint: Weakness after recent hospitalization    Summary:  86 y/o F with HTN, CAD, CHF hypothyroidism who presented after mechanical fall with pain involving the right shoulder, right elbow left hand/knee.  No head trauma or loss of consciousness.  Presentation little hypertensive otherwise vital stable.  Labs notable for WBC 14.61, platelets 215, hemoglobin 12, creatinine elevated 2.12, baseline around 1.6, rest of the CMP is nonsignificant.  Imaging with nondisplaced fracture of the right humerus neck, right elbow with joint effusion, left hand minimally displaced fracture of the thumb.  Right shoulder in splint.  Orthopedic surgery consulted, nonoperative management, pain control, PT and rehab.     Seen by PT and OT inpatient rehab recommended patient accepted bed at [facility] skilled nursing facility.        Interval Followup: 2023    • No new complaints.  No overnight issues.  Ambulating around SNF with staff.  • Tolerating physical therapy.  • Vital signs stable.  No fevers.  /57 - 153/56.  On room air.  • Knee abrasion/wound healing up.  Knee pain improving in general.  • Very eager to be returning home soon.  • Discharge planned for Saturday.      REVIEW OF SYSTEMS: All other systems reviewed and are negative.   • Generalized weakness  Objective   Temp:  [97.7 °F (36.5 °C)-98.1 °F (36.7 °C)] 97.7 °F (36.5 °C)  Heart Rate:  [60] 60  Resp:  [16] 16  BP: (134-153)/(56-57) 153/56  PHYSICAL EXAM   • CON: WN. WD. NAD.  Alert and conversational.  • EYES:  Sclera anicteric. EOMI. Normal conjunctiva.   • ENT:  Oropharyngeal mucosa without ulcers or thrush.    • NECK:  No thyromegaly. No stridor. Trachea midline.  • RESP:  CTA. No wheezes. No crackles.   No work of breathing or tachypnea.   • CV:  Rhythm regular. Rate WNL. No murmur noted.  No edema.  • GI:  Soft and nontender. Nondistended.  Bowel sounds present.   • EXT: RU E immobilized.  Extensive bruising.  • LYMPH:  No lymphedema noted.  No cervical lymphadenopathy.  • PSYCH:  Alert. Oriented. Normal affect and mood.  • NEURO:  CNII-XII grossly intact. No dysarthria or aphasia. No unilateral weakness or paresthesia.  • SKIN: No chronic venous stasis changes or varicosities.  Extensive bruising on torso arms legs    Results from last 7 days   Lab Units 05/17/23  0812 05/16/23  0500 05/13/23  0805   WBC 10*3/mm3 7.98 8.74 8.60   HEMOGLOBIN g/dL 10.7* 10.4* 10.2*   HEMATOCRIT % 33.5* 32.8* 31.9*   PLATELETS 10*3/mm3 417 429 314     Results from last 7 days   Lab Units 05/17/23  0811 05/16/23  0500 05/13/23  0805   SODIUM mmol/L 136 135* 136   POTASSIUM mmol/L 4.6 4.5 4.5   CO2 mmol/L 21.8* 23.3 24.6   CHLORIDE mmol/L 100 99 97*   ANION GAP mmol/L 14.2 12.7 14.4   BUN mg/dL 69* 70* 64*   CREATININE mg/dL 1.65* 1.73* 1.59*   GLUCOSE mg/dL 121* 89 88           RESULTS REVIEWED:  I have personally reviewed the results from the time of this admission to 5/19/2023 15:42 EDT and agree with these findings:  []  Laboratory  []  Microbiology  []  Radiology  []  EKG/Telemetry   []  Cardiology/Vascular   []  Pathology  []  Old records  []  Other:  Assessment / Plan   Assessment:    • Weakness status post recent hospitalization  • Close fracture of neck of right humerus (nonoperative management)  • History systolic CHF  • CKD 3B baseline 1.6-2.1  • COPD  • CAD  • History of AICD  • Left knee OA  • Hypertension  • Hyperlipidemia  • Hypothyroidism  • Effusion of right elbow       Plan:    • Home soon   • resume Lasix: 40 mg a.m. and 40 mg earlier afternoon  • Continue bowel regimen  • Continue topical wound care left knee wound  • Continue/complete doxycycline  • Right upper extremity immobilization with sling  • Monitor volume  status, renal function, in light of CKD/CHF history  • PT/OT  • Heparin for DVT prophylaxis    Discussed plan with RN.  DVT prophylaxis:  Medical DVT prophylaxis orders are present.  CODE STATUS:      Level Of Support Discussed With: Patient  Code Status (Patient has no pulse and is not breathing): CPR (Attempt to Resuscitate)  Medical Interventions (Patient has pulse or is breathing): Full Support

## 2023-05-19 NOTE — PLAN OF CARE
Goal Outcome Evaluation:           Progress: no change  Outcome Evaluation: Rsd. is alert and oriented x4, able to make needs known to staff.  Rsd. medicated x1 with prn Norco, and Melatonin for sleep.  Rsd. has rested well, medications effective.  Transfers x1 assist to bathroom with cane.  Arm sling in place when oob.  Call light and personal items within reach, Rsd. reminded to use call light for assistance, verbalizes understanding.  Will continue to monitor and notify on-coming staff.  Current plan of care remains in place at this time.  Rsd. tenative d/c home with home health tomorrow.

## 2023-05-19 NOTE — THERAPY TREATMENT NOTE
SNF - Physical Therapy Treatment Note   Jordin     Patient Name: Yelena Sanchez  : 1935  MRN: [id number]  Today's Date: 2023      Visit Dx:     ICD-10-CM ICD-9-CM   1. Decreased activities of daily living (ADL)  Z78.9 V49.89   2. Difficulty walking  R26.2 719.7     Patient Active Problem List   Diagnosis   • Closed fracture of neck of right humerus   • HTN (hypertension)   • HLD (hyperlipidemia)   • Hypothyroidism   • Effusion of right elbow   • Renal insufficiency   • Musculoskeletal pain   • Osteoarthritis of left knee   • Fracture     Past Medical History:   Diagnosis Date   • Anemia    • Breast cancer    • CHF (congestive heart failure)      Past Surgical History:   Procedure Laterality Date   • LUNG SURGERY Left     LEFT UPPER LOBE ()   • MASTECTOMY Left      PT Assessment (last 12 hours)     PT Evaluation and Treatment     Row Name 23 1143          Physical Therapy Time and Intention    Document Type therapy note (daily note)  -WM     Mode of Treatment physical therapy  -WM     Patient Effort good  -WM     Symptoms Noted During/After Treatment fatigue  -WM     Row Name 23 1143          Pain Scale: FACES Pre/Post-Treatment    Pain: FACES Scale, Pretreatment 4-->hurts little more  -WM     Posttreatment Pain Rating 4-->hurts little more  -WM     Pain Location - Side/Orientation Right  -WM     Pain Location - shoulder  -WM     Row Name 23 1143          Sit-Stand Transfer    Sit-Stand Emily (Transfers) contact guard  -WM     Assistive Device (Sit-Stand Transfers) cane, quad  -WM     Row Name 23 1143          Stand-Sit Transfer    Stand-Sit Emily (Transfers) contact guard  -WM     Assistive Device (Stand-Sit Transfers) cane, quad  -WM     Row Name 23 1143          Gait/Stairs (Locomotion)    Emily Level (Gait) standby assist  -WM     Assistive Device (Gait) cane, quad  -WM     Distance in Feet (Gait) 140 x 2  -WM     Pattern (Gait)  3-point;step-through  -     Deviations/Abnormal Patterns (Gait) gait speed decreased;stride length decreased  -     [place] Level (Stairs) contact guard;stand by assist  -     Handrail Location (Stairs) left side (ascending);right side (descending)  -     Number of Steps (Stairs) 2 x 2  -WM     Ascending Technique (Stairs) step-to-step  -WM     Descending Technique (Stairs) step-to-step  -WM     Stairs, Impairments strength decreased;impaired balance  -     Row Name 05/19/23 1143          Safety Issues, Functional Mobility    Impairments Affecting Function (Mobility) balance;endurance/activity tolerance;pain;range of motion (ROM);strength  -     Row Name 05/19/23 1143          Balance    Balance Interventions standing;dynamic  Forward/backward walking, side stepping in parallel bars  -     Row Name 05/19/23 1143          Hip (Therapeutic Exercise)    Hip Strengthening (Therapeutic Exercise) bilateral;aBduction;aDduction;marching while seated;sitting;2 lb free weight;resistance band;yellow;15 repititions;2 sets  -     Row Name 05/19/23 1143          Knee (Therapeutic Exercise)    Knee Strengthening (Therapeutic Exercise) bilateral;LAQ (long arc quad);hamstring curls;sitting;2 lb free weight;resistance band;yellow;15 repititions;2 sets  -     Row Name 05/19/23 1143          Ankle (Therapeutic Exercise)    Ankle Strengthening (Therapeutic Exercise) bilateral;standing;15 repititions;2 sets  Heel/toe raises in parallel bars  -     Row Name             Wound 05/04/23 2211 Left anterior knee Abrasion    Wound - Properties Group Placement Date: 05/04/23 -JN Placement Time: 2211 -JN Side: Left  -JN Orientation: anterior  -JN Location: knee  -JN Primary Wound Type: Abrasion  -JN    Retired Wound - Properties Group Placement Date: 05/04/23 -JN Placement Time: 2211 -JN Side: Left  -JN Orientation: anterior  -JN Location: knee  -JN Primary Wound Type: Abrasion  -JN    Retired Wound - Properties Group  Date first assessed: 05/04/23  -SHERITA Time first assessed: 2211 -JN Side: Left  -JN Location: knee  -JN Primary Wound Type: Abrasion  -JN    Row Name             Wound Left posterior thumb Abrasion    Wound - Properties Group Side: Left  -FH Orientation: posterior  -FH Location: thumb  -FH Primary Wound Type: Abrasion  -FH Additional Comments: resident had splint in place but removed per self exposing scab.  -FH    Retired Wound - Properties Group Side: Left  -FH Orientation: posterior  -FH Location: thumb  -FH Primary Wound Type: Abrasion  -FH Additional Comments: resident had splint in place but removed per self exposing scab.  -FH    Retired Wound - Properties Group Side: Left  -FH Location: thumb  -FH Primary Wound Type: Abrasion  -FH Additional Comments: resident had splint in place but removed per self exposing scab.  -    Row Name 05/19/23 1143          Progress Summary (PT)    Progress Toward Functional Goals (PT) progress toward functional goals is good  -WM           User Key  (r) = Recorded By, (t) = Taken By, (c) = Cosigned By    Initials Name Provider Type    Lyly Williamson, RN Registered Nurse     [place] RN Registered Nurse    Cameron Mcgregor PTA Physical Therapist Assistant                Physical Therapy Education     Title: PT OT SLP Therapies (In Progress)     Topic: Physical Therapy (In Progress)     Point: Mobility training (In Progress)     Learning Progress Summary           Patient Acceptance, E, NR by  at 5/10/2023 1652                   Point: Precautions (In Progress)     Learning Progress Summary           Patient Acceptance, E, NR by  at 5/10/2023 1652                               User Key     Initials Effective Dates Name Provider Type [facility] 04/25/21 -  Ruth Mina, PT Physical Therapist PT              PT Recommendation and Plan     Progress Summary (PT)  Progress Toward Functional Goals (PT): progress toward functional goals is good  Daily  Progress Summary (PT): Pt participated in BLE exercises, but declined transfers and GT   Outcome Measures     Row Name 05/19/23 1149 05/18/23 1422 05/18/23 1006       How much help from another person do you currently need...    Turning from your back to your side while in flat bed without using bedrails? 2  -WM 2  -WM 2  -WM    Moving from lying on back to sitting on the side of a flat bed without bedrails? 2  -WM 2  -WM 2  -WM    Moving to and from a bed to a chair (including a wheelchair)? 3  -WM 3  -WM 3  -WM    Standing up from a chair using your arms (e.g., wheelchair, bedside chair)? 3  -WM 3  -WM 3  -WM    Climbing 3-5 steps with a railing? 3  -WM 3  -WM 3  -WM    To walk in hospital room? 3  -WM 3  -WM 3  -WM    AM-PAC 6 Clicks Score (PT) 16  -WM 16  -WM 16  -WM    Row Name 05/17/23 1235 05/16/23 1332          How much help from another person do you currently need...    Turning from your back to your side while in flat bed without using bedrails? 2  -WM 2  -WM     Moving from lying on back to sitting on the side of a flat bed without bedrails? 2  -WM 2  -WM     Moving to and from a bed to a chair (including a wheelchair)? 3  -WM 3  -WM     Standing up from a chair using your arms (e.g., wheelchair, bedside chair)? 3  -WM 3  -WM     Climbing 3-5 steps with a railing? 3  -WM 3  -WM     To walk in hospital room? 3  -WM 3  -WM     AM-PAC 6 Clicks Score (PT) 16  -WM 16  -WM           User Key  (r) = Recorded By, (t) = Taken By, (c) = Cosigned By    Initials Name Provider Type    Cameron Mcgregor PTA Physical Therapist Assistant                 Time Calculation:    PT Charges     Row Name 05/19/23 1142             Time Calculation    PT Received On 05/19/23  -WM         Timed Charges    08029 - PT Therapeutic Exercise Minutes 15  -WM      35318 - Gait Training Minutes  10  -WM      81984 - PT Therapeutic Activity Minutes 2  -WM         SNF Physical Therapy Minutes    Skilled Minutes- PT 27 min  -WM          Total Minutes    Timed Charges Total Minutes 27  -WM       Total Minutes 27  -WM            User Key  (r) = Recorded By, (t) = Taken By, (c) = Cosigned By    Initials Name Provider Type     Cameron Mohamud PTA Physical Therapist Assistant              Therapy Charges for Today     Code Description Service Date Service Provider Modifiers Qty    36306405357 HC PT THER PROC EA 15 MIN 5/18/2023 Cameron Mohamud, SOCRATES GP 1    00413806456 HC GAIT TRAINING EA 15 MIN 5/18/2023 Cameron Mohamud, SOCRATES GP 1    19554167905 HC GAIT TRAINING EA 15 MIN 5/18/2023 Cameron Mohamud, SOCRATES GP 1    11644771034 HC PT THER PROC EA 15 MIN 5/19/2023 Cameron Mohamud, SOCRATES GP 1    05012714292 HC GAIT TRAINING EA 15 MIN 5/19/2023 Cameron Mohamud, SOCRATES GP 1          PT G-Codes  Outcome Measure Options: AM-PAC 6 Clicks Daily Activity (OT), Optimal Instrument  AM-PAC 6 Clicks Score (PT): 16  AM-PAC 6 Clicks Score (OT): 18    Cameron Mohamud PTA  5/19/2023

## 2023-05-19 NOTE — TELEPHONE ENCOUNTER
Per Dr. Rahman next week follow up. Patient has seen Andrews in the past and he said she could see her if she requests.

## 2023-05-19 NOTE — TELEPHONE ENCOUNTER
ROBBIE W/ SKILLED NURSING CALLED TO STATE MRS. ARROYO IS BEING DISCHARGED TODAY    PITER DID A CONSULT ON 05/04/23 IN THE ED ON PATIENT    DOES SHE NEED TO SCHEDULE A F/UP, AND IF SO, WHEN?

## 2023-05-19 NOTE — THERAPY TREATMENT NOTE
SNF - Occupational Therapy Treatment Note   Jordin    Patient Name: Yelena Sanchez  : 1935    MRN: [id number]                              Today's Date: 2023       Admit Date: 2023    Visit Dx:     ICD-10-CM ICD-9-CM   1. Decreased activities of daily living (ADL)  Z78.9 V49.89   2. Difficulty walking  R26.2 719.7     Patient Active Problem List   Diagnosis   • Closed fracture of neck of right humerus   • HTN (hypertension)   • HLD (hyperlipidemia)   • Hypothyroidism   • Effusion of right elbow   • Renal insufficiency   • Musculoskeletal pain   • Osteoarthritis of left knee   • Fracture     Past Medical History:   Diagnosis Date   • Anemia    • Breast cancer    • CHF (congestive heart failure)      Past Surgical History:   Procedure Laterality Date   • LUNG SURGERY Left     LEFT UPPER LOBE ()   • MASTECTOMY Left       General Information     Row Name 23 1027          OT Time and Intention    Document Type therapy note (daily note)  -EG     Mode of Treatment individual therapy;occupational therapy  -EG     Row Name 23 1027          General Information    Existing Precautions/Restrictions fall;non-weight bearing;brace on at all times;weight bearing  -EG     Row Name 23 1027          Cognition    Orientation Status (Cognition) oriented to;person;time  -EG     Row Name 23 1027          Safety Issues, Functional Mobility    Impairments Affecting Function (Mobility) balance;endurance/activity tolerance;pain;range of motion (ROM);strength  -EG           User Key  (r) = Recorded By, (t) = Taken By, (c) = Cosigned By    Initials Name Provider Type    EG Letitia Chinchilla OT Occupational Therapist                 Mobility/ADL's     Row Name 23 1027          Bed Mobility    Bed Mobility bed mobility (all) activities  -EG     All Activities, Magee (Bed Mobility) standby assist;verbal cues  -EG     Row Name 23 1027          Transfers    Transfers sit-stand  transfer;stand-sit transfer;bed-chair transfer;toilet transfer  -EG     Comment, (Transfers) Still displays decreased safety and insight with low fall risk present  -EG     Row Name 05/19/23 1027          Bed-Chair Transfer    Bed-Chair McKean (Transfers) contact guard;verbal cues;standby assist  -EG     Assistive Device (Bed-Chair Transfers) cane, quad  -EG     Row Name 05/19/23 1027          Sit-Stand Transfer    Sit-Stand [place]Kean (Transfers) contact guard;standby assist;verbal cues  -EG     Assistive Device (Sit-Stand Transfers) cane, quad  -EG     Row Name 05/19/23 1027          Stand-Sit Transfer    Stand-Sit McKean (Transfers) contact guard;standby assist;verbal cues  -EG     Assistive Device (Stand-Sit Transfers) cane, quad  -EG     Row Name 05/19/23 1027          Toilet Transfer    Type (Toilet Transfer) sit-stand;stand-sit  -EG     McKean Level (Toilet Transfer) verbal cues;1 person assist;contact guard;standby assist  -EG     Assistive Device (Toilet Transfer) cane, quad  -EG     Row Name 05/19/23 1027          Functional Mobility    Functional Mobility- Ind. Level contact guard assist  -EG     Functional Mobility- Device cane, quad  -EG     Row Name 05/19/23 1027          Mobility    Extremity Weight-bearing Status left lower extremity;right upper extremity  -EG     Right Upper Extremity (Weight-bearing Status) non weight-bearing (NWB)  -EG     Left Lower Extremity (Weight-bearing Status) weight-bearing as tolerated (WBAT)  -EG     Row Name 05/19/23 1027          Bathing Assessment/Intervention    [place] Level (Bathing) bathing skills;upper body;lower body;verbal cues;moderate assist (50% patient effort);minimum assist (75% patient effort)  -EG     Position (Bathing) unsupported sitting  -EG     Comment, (Bathing) sponge bath min/modA overall; increased encouragement to perform for self and use techniques as educated on; benefits from long handled sponge for distal BLe's  -EG      Row Name 05/19/23 1027          Upper Body Dressing Assessment/Training    [place] Level (Upper Body Dressing) upper body dressing skills;don;pull-over garment;minimum assist (75% patient effort);verbal cues  -EG     Row Name 05/19/23 1027          Lower Body Dressing Assessment/Training    [place] Level (Lower Body Dressing) lower body dressing skills;don;pants/bottoms;moderate assist (50% patient effort);minimum assist (75% patient effort)  -EG     Row Name 05/19/23 1027          Grooming Assessment/Training    [place] Level (Grooming) grooming skills;set up;hair care, combing/brushing;wash face, hands  -EG     Row Name 05/19/23 1027          Toileting Assessment/Training    [place] Level (Toileting) toileting skills;verbal cues;contact guard assist;perform perineal hygiene;adjust/manage clothing  -EG           User Key  (r) = Recorded By, (t) = Taken By, (c) = Cosigned By    Initials Name Provider Type    EG Letitia Chinchilla OT Occupational Therapist               Obj/Interventions     [facility] Name 05/19/23 1029          Shoulder (Therapeutic Exercise)    Shoulder (Therapeutic Exercise) AROM (active range of motion);strengthening exercise  -EG     Shoulder AROM (Therapeutic Exercise) left;flexion;15 repititions;3 sets;sitting;extension  refuses to perform with weights still despite education  -EG     Shoulder Strengthening (Therapeutic Exercise) left;aBduction;aDduction;horizontal aBduction/aDduction;external rotation;internal rotation;scapular stabilization;2 lb free weight;3 sets;15 repititions;sitting  -EG     [facility] Name 05/19/23 1029          Motor Skills    Motor Skills functional endurance  -EG     Functional Endurance fair  -EG           User Key  (r) = Recorded By, (t) = Taken By, (c) = Cosigned By    Initials Name Provider Type    Letitia Carter OT Occupational Therapist               Goals/Plan    No documentation.                Clinical Impression     Row Name 05/19/23 1031           Pain Assessment    Additional Documentation Pain Scale: FACES Pre/Post-Treatment (Group)  -EG     Row Name 05/19/23 1031          Pain Scale: FACES Pre/Post-Treatment    Pain: FACES Scale, Pretreatment 4-->hurts little more  -EG     Posttreatment Pain Rating 4-->hurts little more  -EG     Pain Location - Side/Orientation Right  -EG     Pain Location - shoulder  -EG     Row Name 05/19/23 1031          Plan of Care Review    Plan of Care Reviewed With patient  -EG     Progress no change  -EG     Outcome Evaluation Patient is discharging from SNF tomorrow with ; limited carryover on education and training has been noted but overall has demonstrated improvements with skilled therapy services; OT reccomends continuation of services upon discharge to facilitate safety/ind. with ADL's/transfers.  -EG     Row Name 05/19/23 1031          Therapy Assessment/Plan (OT)    Rehab Potential (OT) good, to achieve stated therapy goals  -EG     Criteria for Skilled Therapeutic Interventions Met (OT) yes;meets criteria;skilled treatment is necessary  -EG     Therapy Frequency (OT) 5 times/wk  -EG     Row Name 05/19/23 1031          Therapy Plan Review/Discharge Plan (OT)    Anticipated Discharge Disposition (OT) home with home health  -EG           User Key  (r) = Recorded By, (t) = Taken By, (c) = Cosigned By    Initials Name Provider Type    EG Letitia Chinchilla OT Occupational Therapist               Outcome Measures     Row Name 05/19/23 1032          How much help from another is currently needed...    Putting on and taking off regular lower body clothing? 2  -EG     Bathing (including washing, rinsing, and drying) 2  -EG     Toileting (which includes using toilet bed pan or urinal) 3  -EG     Putting on and taking off regular upper body clothing 3  -EG     Taking care of personal grooming (such as brushing teeth) 4  -EG     Eating meals 4  -EG     AM-PAC 6 Clicks Score (OT) 18  -EG     Row Name 05/19/23 0241           How much help from another person do you currently need...    Turning from your back to your side while in flat bed without using bedrails? 2  -FM     Moving from lying on back to sitting on the side of a flat bed without bedrails? 2  -FM     Moving to and from a bed to a chair (including a wheelchair)? 3  -FM     Standing up from a chair using your arms (e.g., wheelchair, bedside chair)? 3  -FM     Climbing 3-5 steps with a railing? 3  -FM     To walk in hospital room? 3  -FM     AM-PAC 6 Clicks Score (PT) 16  -FM     Highest level of mobility 5 --> Static standing  -FM     Row Name 05/19/23 1032          Functional Assessment    Outcome Measure Options AM-PAC 6 Clicks Daily Activity (OT);Optimal Instrument  -EG     Row Name 05/19/23 1032          Optimal Instrument    Optimal Instrument Optimal - 3  -EG     Bending/Stooping 2  -EG     Standing 2  -EG     Reaching 1  -EG           User Key  (r) = Recorded By, (t) = Taken By, (c) = Cosigned By    Initials Name Provider Type    FM Divina Gupta, RN Registered Nurse    Letitia Carter OT Occupational Therapist              Section G  Mobility  Bed mobility - self performance: limited assistance (staff provide guided maneuvering of limbs or other non-weight bearing assistance)  Bed mobility support/assistance: One person assist  Transfer - self performance: limited assistance (staff provide guided maneuvering of limbs or other non-weight bearing assistance)  Transfer support/assistance: One person assist  Walking in room - self performance: limited assistance (staff provide guided maneuvering of limbs or other non-weight bearing assistance)  Walking in room support/assistance: One person assist  Walking in corridors/hallway - self performance: limited assistance (staff provide guided maneuvering of limbs or other non-weight bearing assistance)  Walking in corridors/hallway support/assistance: One person assist  Locomotion on unit - self performance: limited  assistance (staff provide guided maneuvering of limbs or other non-weight bearing assistance)  Locomotion on unit support/assistance: One person assist  Locomotion off unit - self performance: activity did not occur  Locomotion off unit support/assistance: Activity did not occur  Dressing - self performance: extensive assistance (provide any weight-bearing support, hold over while fabrice-care lift legs for transfer, etc.)  Dressing support/assistance: One person assist  Eating - self performance: independent  Eating support/assistance: Setup help only  Toileting - self performance: limited assistance (staff provide guided maneuvering of limbs or other non-weight bearing assistance)  Toileting support/assistance: One person assist  Personal hygiene - self performance: limited assistance (staff provide guided maneuvering of limbs or other non-weight bearing assistance)  Personal hygiene support/assistance: One person assist  Bathing  Bathing - self performance: Physical help with bathing (exclude washing back and hair for patient)  Bathing support/assistance: One person assist  Balance  Balance during transitions & walking: Not steady, requires assist to steady  Moving from seated to standing position: Not steady, requires assist to steady  Walking: Not steady, requires assist to steady  Turning around while walking: Not steady, requires assist to steady  Moving on and off toilet: Not steady, requires assist to steady  Surface-to-surface transfer: Not steady, requires assist to steady  Mobility devices:  (pawan walker)  Range of Motion  Upper Extremity: Impairment on one side  Lower Extremity: No impairment  Section GG  SectionGG: Functional Ability/Goals, Adm  Self Care, Prior Functioning (NP6723P): 3. Independent  Functional Cognition, Prior Functioning (GK9818H): 3. Independent  Self Care, Admission (Section GG)  Eating: Self-Care Admission Performance (GN3575E5): setup or clean-up assistance (05)  Oral Hygiene:  Self-Care Admission Performance (AD2905R7): setup or clean-up assistance (05)  Toileting Hygiene: Self-Care Admission Performance (NI1882N9): supervision or touching assistance (04)  Shower/Bathe Self: Self-Care Admission Performance (HB2574J2): partial/moderate assistance (03)  Upper Body Dressing: Self-Care Admission Performance (WF2874C7): partial/moderate assistance (03)  Lower Body Dressing: Self-Care Admission Performance (VC5450H1): partial/moderate assistance (03)  Putting On/Taking Off Footwear: Self-Care Admission Performance (LP2288A0): partial/moderate assistance (03)     Section GG: Functional Ability/Goals, DC  Eating: Self-Care Discharge Goal (YI3471N2): independent (06)  Oral Hygiene: Self-Care Discharge Goal (AM3741F6): independent (06)  Toileting Hygiene: Self-Care Discharge Goal (JU8906M2): independent (06)  Shower/Bathe Self: Self-Care Discharge Goal (DS7199J8): independent (06)  Upper Body Dressing: Self-Care Discharge Goal (NE3278D7): independent (06)  Lower Body Dressing: Self-Care Discharge Goal (TD5136I3): independent (06)  Putting On/Taking Off Footwear: Self-Care Discharge Goal (PG4666O8): independent (06)             Occupational Therapy Education     Title: PT OT SLP Therapies (In Progress)     Topic: Occupational Therapy (In Progress)     Point: ADL training (In Progress)     Description:   Instruct learner(s) on proper safety adaptation and remediation techniques during self care or transfers.   Instruct in proper use of assistive devices.              Learning Progress Summary           Patient ALEJANDRO Chao, NR by EG at 5/10/2023 0800    Comment: Education on compensatory techniques for UB ADL's  Educated on sling donning, ROM and WB status orders for RUE  Education on general safety techniques and fall risk prevention                   Point: Home exercise program (In Progress)     Description:   Instruct learner(s) on appropriate technique for monitoring, assisting and/or progressing  therapeutic exercises/activities.              Learning Progress Summary           Patient Eager, E, NR by EG at 5/10/2023 0800    Comment: Education on compensatory techniques for UB ADL's  Educated on sling donning, ROM and WB status orders for RUE  Education on general safety techniques and fall risk prevention                   Point: Precautions (In Progress)     Description:   Instruct learner(s) on prescribed precautions during self-care and functional transfers.              Learning Progress Summary           Patient Eager, E, NR by EG at 5/10/2023 0800    Comment: Education on compensatory techniques for UB ADL's  Educated on sling donning, ROM and WB status orders for RUE  Education on general safety techniques and fall risk prevention                   Point: Body mechanics (In Progress)     Description:   Instruct learner(s) on proper positioning and spine alignment during self-care, functional mobility activities and/or exercises.              Learning Progress Summary           Patient Eager, E, NR by EG at 5/10/2023 0800    Comment: Education on compensatory techniques for UB ADL's  Educated on sling donning, ROM and WB status orders for RUE  Education on general safety techniques and fall risk prevention                               User Key     Initials Effective Dates Name Provider Type Discipline    EG 09/14/22 -  Letitia Chinchilla, LEAH Occupational Therapist OT              OT Recommendation and Plan  Planned Therapy Interventions (OT): activity tolerance training, functional balance retraining, occupation/activity based interventions, adaptive equipment training, BADL retraining, neuromuscular control/coordination retraining, patient/caregiver education/training, transfer/mobility retraining, strengthening exercise  Therapy Frequency (OT): 5 times/wk  Plan of Care Review  Plan of Care Reviewed With: patient  Progress: no change  Outcome Evaluation: Patient is discharging from SNF tomorrow with  ; limited carryover on education and training has been noted but overall has demonstrated improvements with skilled therapy services; OT reccomends continuation of services upon discharge to facilitate safety/ind. with ADL's/transfers.     Time Calculation:    Time Calculation- OT     Row Name 05/19/23 1032             Time Calculation- OT    OT Received On 05/19/23  -EG      OT Goal Re-Cert Due Date 06/08/23  -EG         Timed Charges    46882 - OT Therapeutic Exercise Minutes 14  -EG      39948 - OT Therapeutic Activity Minutes 15  -EG      53884 - OT Self Care/Mgmt Minutes 25  -EG         SNF Occupational Therapy Minutes    Skilled Minutes- OT 54 min  -EG         Total Minutes    Timed Charges Total Minutes 54  -EG       Total Minutes 54  -EG            User Key  (r) = Recorded By, (t) = Taken By, (c) = Cosigned By    Initials Name Provider Type    EG Letitia Chinchilla OT Occupational Therapist              Therapy Charges for Today     Code Description Service Date Service Provider Modifiers Qty    56987784992 HC OT NEUROMUSC RE EDUCATION EA 15 MIN 5/18/2023 Letitia Chinchilla OT GO 1    61328868257 HC OT THERAPEUTIC ACT EA 15 MIN 5/18/2023 Letitia Chinchilla, OT GO 1    39903272593 HC OT SELF CARE/MGMT/TRAIN EA 15 MIN 5/18/2023 Letitia Chinchilla OT GO 2    96122553912 HC OT THER PROC EA 15 MIN 5/19/2023 Letitia Chinchilla OT GO 1    93893332056 HC OT THERAPEUTIC ACT EA 15 MIN 5/19/2023 Letitia Chinchilla OT GO 1    76261702110 HC OT SELF CARE/MGMT/TRAIN EA 15 MIN 5/19/2023 Letitia Chinchilla OT GO 2               Letitia Chinchilla OT  5/19/2023

## 2023-05-20 VITALS
SYSTOLIC BLOOD PRESSURE: 154 MMHG | HEIGHT: 59 IN | BODY MASS INDEX: 30.09 KG/M2 | OXYGEN SATURATION: 98 % | HEART RATE: 61 BPM | RESPIRATION RATE: 16 BRPM | OXYGEN SATURATION: 98 % | HEART RATE: 61 BPM | TEMPERATURE: 97.7 F | TEMPERATURE: 97.7 F | WEIGHT: 149.25 LBS | RESPIRATION RATE: 16 BRPM | BODY MASS INDEX: 30.09 KG/M2 | DIASTOLIC BLOOD PRESSURE: 69 MMHG | HEIGHT: 59 IN | WEIGHT: 149.25 LBS | SYSTOLIC BLOOD PRESSURE: 154 MMHG | DIASTOLIC BLOOD PRESSURE: 69 MMHG

## 2023-05-20 PROCEDURE — 99316 NF DSCHRG MGMT 30 MIN+: CPT | Performed by: PHYSICIAN ASSISTANT

## 2023-05-20 PROCEDURE — 97110 THERAPEUTIC EXERCISES: CPT

## 2023-05-20 PROCEDURE — 97116 GAIT TRAINING THERAPY: CPT

## 2023-05-20 PROCEDURE — 25010000002 HEPARIN (PORCINE) PER 1000 UNITS: Performed by: INTERNAL MEDICINE

## 2023-05-20 RX ORDER — HYDROCODONE BITARTRATE AND ACETAMINOPHEN 5; 325 MG/1; MG/1
1 TABLET ORAL EVERY 6 HOURS PRN
Qty: 12 TABLET | Refills: 0 | Status: SHIPPED | OUTPATIENT
Start: 2023-05-20 | End: 2023-05-23

## 2023-05-20 RX ADMIN — CLOPIDOGREL BISULFATE 75 MG: 75 TABLET ORAL at 09:17

## 2023-05-20 RX ADMIN — FUROSEMIDE 40 MG: 40 TABLET ORAL at 09:16

## 2023-05-20 RX ADMIN — ASPIRIN 81 MG: 81 TABLET, COATED ORAL at 09:18

## 2023-05-20 RX ADMIN — FERROUS SULFATE TAB 325 MG (65 MG ELEMENTAL FE) 325 MG: 325 (65 FE) TAB at 08:04

## 2023-05-20 RX ADMIN — NEBIVOLOL 10 MG: 10 TABLET ORAL at 09:18

## 2023-05-20 RX ADMIN — DOCUSATE SODIUM 50MG AND SENNOSIDES 8.6MG 2 TABLET: 8.6; 5 TABLET, FILM COATED ORAL at 09:17

## 2023-05-20 RX ADMIN — LEVOTHYROXINE SODIUM 112 MCG: 0.11 TABLET ORAL at 05:21

## 2023-05-20 RX ADMIN — OXYCODONE HYDROCHLORIDE AND ACETAMINOPHEN 500 MG: 500 TABLET ORAL at 09:17

## 2023-05-20 RX ADMIN — MULTIPLE VITAMINS W/ MINERALS TAB 1 TABLET: TAB at 09:18

## 2023-05-20 RX ADMIN — PANTOPRAZOLE SODIUM 40 MG: 40 TABLET, DELAYED RELEASE ORAL at 05:21

## 2023-05-20 RX ADMIN — ALLOPURINOL 100 MG: 100 TABLET ORAL at 09:17

## 2023-05-20 RX ADMIN — HYDROCODONE BITARTRATE AND ACETAMINOPHEN 1 TABLET: 7.5; 325 TABLET ORAL at 09:18

## 2023-05-20 RX ADMIN — HYDROCODONE BITARTRATE AND ACETAMINOPHEN 1 TABLET: 7.5; 325 TABLET ORAL at 02:38

## 2023-05-20 RX ADMIN — Medication 2000 UNITS: at 09:18

## 2023-05-20 NOTE — DISCHARGE SUMMARY
[facility]  HOSPITALIST  DISCHARGE SUMMARY       Patient Name: Yelena Sanchez  : 1935  MRN: [id number]  Primary Care Physician: Kaelyn Eugene MD    Date of Admission to SNF rehab: 2023  Date of Discharge from SNF rehab: 2023    Discharge Diagnoses   ? Weakness status post recent hospitalization  ? Close fracture of neck of right humerus (nonoperative management)  ? Minimally displaced fracture left thumb  ? History systolic CHF  ? CKD 3B baseline 1.6-2.1  ? COPD  ? CAD  ? History of AICD  ? Left knee OA  ? Hypertension  ? Hyperlipidemia  ? Hypothyroidism  ? Effusion of right elbow  SNF rehab Hospital Course   SNF rehab Hospital Course:    Yelena Sanchez is a pleasant 87 y.o. female who presented after mechanical fall with pain involving the right shoulder, right elbow left hand/knee.  No head trauma or loss of consciousness.  Imaging with nondisplaced fracture of the right humerus neck, right elbow with joint effusion, left hand minimally displaced fracture of the thumb.  Right shoulder in splint.  Orthopedic surgery consulted, nonoperative management, pain control, PT and rehab.     Seen by PT and OT inpatient rehab recommended patient accepted bed at [facility] skilled nursing [facility].     She was admitted to our SNF on 23.  She spent 11 days in our rehab and worked with PT and OT on a daily basis.  She remained medically stable while here.  Her pain was reasonably controlled on oral medications.  She had no setbacks during SNF rehab.  For knee abrasions/possible superficial infection, she was on doxycycline and finished a course of this while here.  Knee abrasions healed up well.  She is very eager to return home at the time of discharge.  She is to continue with right upper extremity immobilization sling.  She will be following up with PCP and orthopedist in 1 week.    Discharge Follow Up / Recommendations (labs, diagnostics, meds, etc):   • As above  Future Appointments    Date Time Provider Department Center   5/25/2023 10:30 AM [place] [facility] [place] [facility] [place]   •   Consultants     Consults     Date and Time Order Name Status Description    5/4/2023  9:23 PM Inpatient Orthopedic Surgery Consult Completed       •   On Day of Discharge   VS: Temp:  [97.7 °F (36.5 °C)-98.4 °F (36.9 °C)] 97.7 °F (36.5 °C)  Heart Rate:  [59-61] 61  Resp:  [16] 16  BP: (120-154)/(54-69) 154/69  EXAM:  (refer to progress note from 5/20/2023)     Discharge Medications      New Medications      Instructions Start Date   HYDROcodone-acetaminophen 5-325 MG per tablet  Commonly known as: NORCO   1 tablet, Oral, Every 6 Hours PRN         Continue These Medications      Instructions Start Date   allopurinol 100 MG tablet  Commonly known as: ZYLOPRIM   100 mg, Oral, Daily      amLODIPine 5 MG tablet  Commonly known as: NORVASC   5 mg, Oral, Daily      aspirin 81 MG EC tablet   81 mg, Oral, Daily      azelastine 0.1 % nasal spray  Commonly known as: ASTELIN   2 sprays, Nasal, Daily, Use in each nostril as directed      Centrum Silver 50+Women tablet tablet   1 tablet, Oral, Daily      chlorpheniramine 4 MG tablet  Commonly known as: CHLOR-TRIMETON   4 mg, Oral, Every 6 Hours PRN      cholecalciferol 25 MCG (1000 UT) tablet  Commonly known as: VITAMIN D3   2,000 Units, Oral, Daily      clopidogrel 75 MG tablet  Commonly known as: PLAVIX   75 mg, Oral, Daily      ezetimibe 10 MG tablet  Commonly known as: ZETIA   10 mg, Oral, Daily      furosemide 40 MG tablet  Commonly known as: LASIX   40 mg, Oral, 2 Times Daily      lansoprazole 30 MG capsule  Commonly known as: PREVACID   30 mg, Oral, Daily      levothyroxine 112 MCG tablet  Commonly known as: SYNTHROID, LEVOTHROID   112 mcg, Oral, Daily      montelukast 10 MG tablet  Commonly known as: SINGULAIR   10 mg, Oral, Nightly      nebivolol 10 MG tablet  Commonly known as: BYSTOLIC   10 mg, Oral, Daily      rosuvastatin 20 MG tablet  Commonly known as: CRESTOR    20 mg, Oral, Daily      spironolactone 25 MG tablet  Commonly known as: ALDACTONE   12.5 mg, Oral, Daily           Procedures   None in rehab  Imaging   None in rehab  Discharge Details   Hospital Diet:     Diet Order   Procedures   • Diet: Regular/House Diet; Texture: Regular Texture (IDDSI 7); Fluid Consistency: Thin (IDDSI 0)     CODE STATUS:    Code Status and Medical Interventions:   Ordered at: 05/09/23 0954     Level Of Support Discussed With:    Patient     Code Status (Patient has no pulse and is not breathing):    CPR (Attempt to Resuscitate)     Medical Interventions (Patient has pulse or is breathing):    Full Support     Additional Instructions for the Follow-ups that You Need to Schedule     Discharge Follow-up with PCP   As directed       Currently Documented PCP:    Kaelyn Eugene MD    PCP Phone Number:    [phone]     Follow Up Details: 1 week         Discharge Follow-up with Specified Provider: Orthopedic clinic 1 week   As directed      To: Orthopedic clinic 1 week             Discharge Disposition: [place]-Health Care [facility] [place]  Pertinent  Labs   LAB RESULTS:      Lab 05/17/23  0812 05/16/23  0500   WBC 7.98 8.74   HEMOGLOBIN 10.7* 10.4*   HEMATOCRIT 33.5* 32.8*   PLATELETS 417 429   MCV 90.8 91.1         Lab 05/17/23  0811 05/16/23  0500   SODIUM 136 135*   POTASSIUM 4.6 4.5   CHLORIDE 100 99   CO2 21.8* 23.3   ANION GAP 14.2 12.7   BUN 69* 70*   CREATININE 1.65* 1.73*   EGFR 30.1* 28.5*   GLUCOSE 121* 89   CALCIUM 10.1 10.0   MAGNESIUM 2.9*  --    PHOSPHORUS 3.7 4.6*         Lab 05/17/23  0811 05/16/23  0500   ALBUMIN 3.5 3.4*         Lab 05/16/23  0500   PROBNP 1,454.0                 Brief Urine Lab Results  (Last result in the past 365 days)      Color   Clarity   Blood   Leuk Est   Nitrite   Protein   CREAT   Urine HCG        05/18/23 0053 Yellow   Clear   Negative   Negative   Negative   Negative               Microbiology Results (last 10 days)     ** No results found for the last  240 hours. **        Labs Pending at Discharge:   Time spent on Discharge including face to face service: > 30 minutes  Electronically signed by ROSY Elder, 05/20/23, 2:11 PM EDT.

## 2023-05-20 NOTE — PROGRESS NOTES
[facility]   Hospitalist Progress Note       Patient Name: Yelena Sanchez  : 1935  MRN: [id number]  Primary Care Physician: Kaelyn Eugene MD  Date of admission: 2023  Today's Date: 2023  Room / Bed:   304/1  Subjective   Chief Complaint: Weakness after recent hospitalization    Summary:  88 y/o F with HTN, CAD, CHF hypothyroidism who presented after mechanical fall with pain involving the right shoulder, right elbow left hand/knee.  No head trauma or loss of consciousness.  Presentation little hypertensive otherwise vital stable.  Labs notable for WBC 14.61, platelets 215, hemoglobin 12, creatinine elevated 2.12, baseline around 1.6, rest of the CMP is nonsignificant.  Imaging with nondisplaced fracture of the right humerus neck, right elbow with joint effusion, left hand minimally displaced fracture of the thumb.  Right shoulder in splint.  Orthopedic surgery consulted, nonoperative management, pain control, PT and rehab.     Seen by PT and OT inpatient rehab recommended patient accepted bed at [facility] skilled nursing facility.        Interval Followup: 2023    • No new complaints.    • No overnight issues.    • Tolerating physical therapy.  • Vital signs stable.  /54  -  154 /69   • No fevers.    • On room air.  • Knee abrasion/wound healing up and knee pain much improved  • Very eager to be returning home today        REVIEW OF SYSTEMS: All other systems reviewed and are negative.   • Generalized weakness  Objective   Temp:  [97.7 °F (36.5 °C)-98.4 °F (36.9 °C)] 97.7 °F (36.5 °C)  Heart Rate:  [59-61] 61  Resp:  [16] 16  BP: (120-154)/(54-69) 154/69  PHYSICAL EXAM   • CON: WN. WD. NAD.  Alert and conversational.  • EYES:  Sclera anicteric. EOMI. Normal conjunctiva.   • ENT:  Oropharyngeal mucosa without ulcers or thrush.    • NECK:  No thyromegaly. No stridor. Trachea midline.  • RESP:  CTA. No wheezes. No crackles.  No work of breathing or tachypnea.   • CV:  Rhythm  regular. Rate WNL. No murmur noted.  No edema.  • GI:  Soft and nontender. Nondistended.  Bowel sounds present.   • EXT: RUE immobilized.  Extensive bruising.  • LYMPH:  No lymphedema noted.  No cervical lymphadenopathy.  • PSYCH:  Alert. Oriented. Normal affect and mood.  • NEURO:  CNII-XII grossly intact. No dysarthria or aphasia. No unilateral weakness or paresthesia.  • SKIN: No chronic venous stasis changes or varicosities.  Resolving extensive bruising on torso arms legs. L knee abrasions healing well.  No erythema/cellulitic process.    Results from last 7 days   Lab Units 05/17/23  0812 05/16/23  0500   WBC 10*3/mm3 7.98 8.74   HEMOGLOBIN g/dL 10.7* 10.4*   HEMATOCRIT % 33.5* 32.8*   PLATELETS 10*3/mm3 417 429     Results from last 7 days   Lab Units 05/17/23  0811 05/16/23  0500   SODIUM mmol/L 136 135*   POTASSIUM mmol/L 4.6 4.5   CO2 mmol/L 21.8* 23.3   CHLORIDE mmol/L 100 99   ANION GAP mmol/L 14.2 12.7   BUN mg/dL 69* 70*   CREATININE mg/dL 1.65* 1.73*   GLUCOSE mg/dL 121* 89           RESULTS REVIEWED:  I have personally reviewed the results from the time of this admission to 5/20/2023 09:35 EDT and agree with these findings:  []  Laboratory  []  Microbiology  []  Radiology  []  EKG/Telemetry   []  Cardiology/Vascular   []  Pathology  []  Old records  []  Other:  Assessment / Plan   Assessment:    • Weakness status post recent hospitalization  • Close fracture of neck of right humerus (nonoperative management)  • History systolic CHF  • CKD 3B baseline 1.6-2.1  • COPD  • CAD  • History of AICD  • Left knee OA  • Hypertension  • Hyperlipidemia  • Hypothyroidism  • Effusion of right elbow       Plan:    • Home today.  Home health.  F/U w PCP and ortho clinic 1 week.  • Continue Lasix  • Continue bowel regimen  • Continue topical wound care left knee wound  • Continue/complete doxycycline  • Right upper extremity immobilization with sling  • Monitor volume status, renal function, in light of CKD/CHF  history  • PT/OT  • Heparin for DVT prophylaxis    Discussed plan with RN.  DVT prophylaxis:  Medical DVT prophylaxis orders are present.  CODE STATUS:      Level Of Support Discussed With: Patient  Code Status (Patient has no pulse and is not breathing): CPR (Attempt to Resuscitate)  Medical Interventions (Patient has pulse or is breathing): Full Support

## 2023-05-20 NOTE — PLAN OF CARE
Goal Outcome Evaluation:           Progress: improving  Outcome Evaluation: Vital signs stable. Alert and oriented. Norco administered X 2 during night. Ambulates to bathroom with rolling walker and one person assistance. Declined snack. Discharge planned for today.

## 2023-05-20 NOTE — THERAPY TREATMENT NOTE
SNF - Physical Therapy Progress Note   Jordin     Patient Name: Yelena Sanchez  : 1935  MRN: [id number]  Today's Date: 2023      Visit Dx:    ICD-10-CM ICD-9-CM   1. Decreased activities of daily living (ADL)  Z78.9 V49.89   2. Difficulty walking  R26.2 719.7   3. Fracture  T14.8XXA 829.0     Patient Active Problem List   Diagnosis   • Closed fracture of neck of right humerus   • HTN (hypertension)   • HLD (hyperlipidemia)   • Hypothyroidism   • Effusion of right elbow   • Renal insufficiency   • Musculoskeletal pain   • Osteoarthritis of left knee   • Fracture     Past Medical History:   Diagnosis Date   • Anemia    • Breast cancer    • CHF (congestive heart failure)      Past Surgical History:   Procedure Laterality Date   • LUNG SURGERY Left     LEFT UPPER LOBE ()   • MASTECTOMY Left        PT Assessment (last 12 hours)     PT Evaluation and Treatment     Row Name 23 1200          Physical Therapy Time and Intention    Subjective Information no complaints  -CS     Document Type therapy note (daily note)  -CS     Mode of Treatment individual therapy;physical therapy  -CS     Patient Effort good  -CS     Symptoms Noted During/After Treatment none  -CS     Comment Pt. states that she is ready to go home  -CS     Row Name 23 1200          Mobility    Right Upper Extremity (Weight-bearing Status) non weight-bearing (NWB)  -CS     Left Lower Extremity (Weight-bearing Status) weight-bearing as tolerated (WBAT)  -CS     Row Name 23 1200          Sit-Stand Transfer    Sit-Stand [place] (Transfers) standby assist  -CS     Assistive Device (Sit-Stand Transfers) cane, quad  -CS     Row Name 23 1200          Stand-Sit Transfer    Stand-Sit [place] (Transfers) standby assist  -CS     Assistive Device (Stand-Sit Transfers) cane, quad  -CS     Row Name 23 1200          Gait/Stairs (Locomotion)    [place] Level (Gait) standby assist  -CS     Assistive Device (Gait)  cane, quad  -CS     Distance in Feet (Gait) 300  -CS     Pattern (Gait) 3-point;step-through  -CS     Deviations/Abnormal Patterns (Gait) gait speed decreased;stride length decreased  -CS     Row Name 05/20/23 1200          Hip (Therapeutic Exercise)    Hip AROM (Therapeutic Exercise) bilateral;sitting;20 repititions  marches, hip abd/add, hip IR/ER  -CS     Row Name 05/20/23 1200          Knee (Therapeutic Exercise)    Knee AROM (Therapeutic Exercise) bilateral;LAQ (long arc quad);sitting;20 repititions  -CS     Row Name 05/20/23 1200          Ankle (Therapeutic Exercise)    Ankle AROM (Therapeutic Exercise) bilateral;dorsiflexion;plantarflexion;inversion;eversion;sitting;20 repititions  -CS     Row Name             Wound 05/04/23 2211 Left anterior knee Abrasion    Wound - Properties Group Placement Date: 05/04/23 -JN Placement Time: 2211 -JN Side: Left  -JN Orientation: anterior  -JN Location: knee  -JN Primary Wound Type: Abrasion  -JN    Retired Wound - Properties Group Placement Date: 05/04/23 -JN Placement Time: 2211 -JN Side: Left  -JN Orientation: anterior  -JN Location: knee  -JN Primary Wound Type: Abrasion  -JN    Retired Wound - Properties Group Date first assessed: 05/04/23 -JN Time first assessed: 2211 -JN Side: Left  -JN Location: knee  -JN Primary Wound Type: Abrasion  -JN    Row Name             Wound Left posterior thumb Abrasion    Wound - Properties Group Side: Left  -FH Orientation: posterior  -FH Location: thumb  -FH Primary Wound Type: Abrasion  -FH Additional Comments: resident had splint in place but removed per self exposing scab.  -FH    Retired Wound - Properties Group Side: Left  -FH Orientation: posterior  -FH Location: thumb  -FH Primary Wound Type: Abrasion  -FH Additional Comments: resident had splint in place but removed per self exposing scab.  -FH    Retired Wound - Properties Group Side: Left  -FH Location: thumb  -FH Primary Wound Type: Abrasion  -FH Additional Comments:  resident had splint in place but removed per self exposing scab.  -    Row Name 05/20/23 1200          Progress Summary (PT)    Progress Toward Functional Goals (PT) progress toward functional goals is good  -           User Key  (r) = Recorded By, (t) = Taken By, (c) = Cosigned By    Initials Name Provider Type    Lyly Williamson, RN Registered Nurse     [place] RN Registered Nurse     Erik Elizondo PTA Physical Therapist Assistant              Section G              Section GG                       Physical Therapy Education     Title: PT OT SLP Therapies (Resolved)     Topic: Physical Therapy (Resolved)     Point: Mobility training (Resolved)     Learning Progress Summary           Patient Acceptance, E, NR by  at 5/10/2023 1652                   Point: Precautions (Resolved)     Learning Progress Summary           Patient Acceptance, E, NR by  at 5/10/2023 1652                               User Key     Initials Effective Dates Name Provider Type Discipline     04/25/21 -  Ruth Mina, PT Physical Therapist PT              PT Recommendation and Plan     Progress Summary (PT)  Progress Toward Functional Goals (PT): progress toward functional goals is good   Outcome Measures     Row Name 05/19/23 1149 05/18/23 1422 05/18/23 1006       How much help from another person do you currently need...    Turning from your back to your side while in flat bed without using bedrails? 2  -WM 2  -WM 2  -WM    Moving from lying on back to sitting on the side of a flat bed without bedrails? 2  -WM 2  -WM 2  -WM    Moving to and from a bed to a chair (including a wheelchair)? 3  -WM 3  -WM 3  -WM    Standing up from a chair using your arms (e.g., wheelchair, bedside chair)? 3  -WM 3  -WM 3  -WM    Climbing 3-5 steps with a railing? 3  -WM 3  -WM 3  -WM    To walk in hospital room? 3  -WM 3  -WM 3  -WM    AM-PAC 6 Clicks Score (PT) 16  -WM 16  -WM 16  -WM          User Key  (r) = Recorded  By, (t) = Taken By, (c) = Cosigned By    Initials Name Provider Type     Cameron Mohamud PTA Physical Therapist Assistant                 Time Calculation:    PT Charges     Row Name 05/20/23 1249             Time Calculation    Start Time 1015  -CS      PT Received On 05/20/23  -CS         Timed Charges    27164 - PT Therapeutic Exercise Minutes 9  -CS      25067 - Gait Training Minutes  14  -CS      01554 - PT Therapeutic Activity Minutes 2  -CS         SNF Physical Therapy Minutes    Skilled Minutes- PT 25 min  -CS         Total Minutes    Timed Charges Total Minutes 25  -CS       Total Minutes 25  -CS            User Key  (r) = Recorded By, (t) = Taken By, (c) = Cosigned By    Initials Name Provider Type    CS Erik Elizondo PTA Physical Therapist Assistant              Therapy Charges for Today     Code Description Service Date Service Provider Modifiers Qty    28833603578 HC GAIT TRAINING EA 15 MIN 5/20/2023 Erik Elizondo PTA GP 1    11297130870 HC PT THER PROC EA 15 MIN 5/20/2023 Erik Elizondo PTA GP 1          PT G-Codes  Outcome Measure Options: AM-PAC 6 Clicks Daily Activity (OT), Optimal Instrument  AM-PAC 6 Clicks Score (PT): 16  AM-PAC 6 Clicks Score (OT): 18    Erik Elizondo PTA  5/20/2023

## 2023-05-20 NOTE — PLAN OF CARE
Goal Outcome Evaluation:   Alert and oriented and pleasant with staff. Scheduled for Discharge, 05/20/23. X1 standby assist for transfers and ambulation. X1 admin prn pain medication, with relief of symptoms noted. Sitting up in recliner, family at bedside, call light in reach.

## 2023-05-22 NOTE — THERAPY DISCHARGE NOTE
SNF - Physical Therapy Treatment Note/Discharge  SHAYNA Brenner     Patient Name: Yelena Sanchez  : 1935  MRN: [id number]  Today's Date: 2023                Admit Date: 2023    Visit Dx:    ICD-10-CM ICD-9-CM   1. Decreased activities of daily living (ADL)  Z78.9 V49.89   2. Difficulty walking  R26.2 719.7   3. Fracture  T14.8XXA 829.0     Patient Active Problem List   Diagnosis   • Iron deficiency anemia   • Primary osteoarthritis of left knee   • Chronic combined systolic and diastolic congestive heart failure   • Presence of biventricular implantable cardioverter-defibrillator (ICD)   • Coronary artery disease involving native coronary artery of native heart without angina pectoris   • Mixed hyperlipidemia   • Essential hypertension   • Trochanteric bursitis of right hip   • Sciatica of right side   • Carotid artery disease   • Acute on chronic systolic heart failure (CMS/HCC)   • Closed fracture of neck of right humerus   • HTN (hypertension)   • HLD (hyperlipidemia)   • Hypothyroidism   • Effusion of right elbow   • Renal insufficiency   • Musculoskeletal pain   • Osteoarthritis of left knee   • Fracture     Past Medical History:   Diagnosis Date   • Anemia    • Arthritis    • Asthma    • Breast cancer    • CHF (congestive heart failure)    • Congestive heart failure    • COPD (chronic obstructive pulmonary disease)    • Diverticulosis 2014   • GERD (gastroesophageal reflux disease)    • History of tobacco abuse    • HTN (hypertension)    • Hyperlipemia    • Hyperthyroidism    • Neck mass    • Seasonal allergies    • SOB (shortness of breath)      Past Surgical History:   Procedure Laterality Date   • COLONOSCOPY     • ENDOSCOPY     • EYE SURGERY      Implant; yes    • LUNG LOBECTOMY Left     Left lower lobe   • LUNG SURGERY     • LUNG SURGERY Left     LEFT UPPER LOBE ()   • MASTECTOMY      Left    • MASTECTOMY Left    • OTHER SURGICAL HISTORY      Joint Surgery   • PACEMAKER  IMPLANTATION         PT Assessment (last 12 hours)     PT Evaluation and Treatment     Row Name             Wound 05/04/23 2211 Left anterior knee Abrasion    Wound - Properties Group Placement Date: 05/04/23 -JN Placement Time: 2211 -JN Side: Left  -JN Orientation: anterior  -JN Location: knee  -JN Primary Wound Type: Abrasion  -JN    Retired Wound - Properties Group Placement Date: 05/04/23 -JN Placement Time: 2211 -JN Side: Left  -JN Orientation: anterior  -JN Location: knee  -JN Primary Wound Type: Abrasion  -JN    Retired Wound - Properties Group Date first assessed: 05/04/23 -JN Time first assessed: 2211 -JN Side: Left  -JN Location: knee  -JN Primary Wound Type: Abrasion  -JN    Row Name             Wound Left posterior thumb Abrasion    Wound - Properties Group Side: Left  -FH Orientation: posterior  -FH Location: thumb  -FH Primary Wound Type: Abrasion  -FH Additional Comments: resident had splint in place but removed per self exposing scab.  -FH    Retired Wound - Properties Group Side: Left  -FH Orientation: posterior  -FH Location: thumb  -FH Primary Wound Type: Abrasion  -FH Additional Comments: resident had splint in place but removed per self exposing scab.  -FH    Retired Wound - Properties Group Side: Left  -FH Location: thumb  -FH Primary Wound Type: Abrasion  -FH Additional Comments: resident had splint in place but removed per self exposing scab.  -FH    Row Name 05/22/23 1021          Bed Mobility Goal 1 (PT)    Progress/Outcomes (Bed Mobility Goal 1, PT) continuing progress toward goal  -WM     Row Name 05/22/23 1021          Transfer Goal 1 (PT)    Progress/Outcome (Transfer Goal 1, PT) continuing progress toward goal  -WM     Row Name 05/22/23 1021          Gait Training Goal 1 (PT)    Progress/Outcome (Gait Training Goal 1, PT) goal partially met  -WM     Row Name 05/22/23 1021          Stairs Goal 1 (PT)    Progress/Outcome (Stairs Goal 1, PT) goal not met  -WM     Row Name 05/22/23  1021          Patient Education Goal (PT)    Progress/Outcome (Patient Education Goal, PT) goal met  -WM     Row Name 05/22/23 1021          Discharge Summary (PT)    Additional Documentation Discharge Summary (PT) (Group)  -WM     Row Name 05/22/23 1021          Discharge Summary (PT)    Reason for Discharge (PT) patient discharged from this facility  -WM     Outcomes Achieved/Progress Made Upon Discharge (PT) progress was made toward goals  -WM     Transfer to Another Level of Care or Facility (PT) home with home health recommended;home with supervision recommended  -WM           User Key  (r) = Recorded By, (t) = Taken By, (c) = Cosigned By    Initials Name Provider Type    Lyly Williamson, RN Registered Nurse    [place] RN Registered Nurse    Cameron Mcgregor PTA Physical Therapist Assistant              Section G              Section GG                    Mobility, Discharge Performance (HH2182)  Roll Left & Right: Mobility Discharge (FL1706O3): supervision or touching assistance (04)  Sit to Lying: Mobility Discharge Performance (KF1459P4): supervision or touching assistance (04)  Lying to Sitting, Side of Bed: Mobility Discharge Performance (HO0405B6): supervision or touching assistance (04)  Sit to Stand: Mobility Discharge Performance (UQ5157T5): supervision or touching assistance (04)  Chair/Bed-Chair Transfer: Mobility Discharge Performance (OC2026N4): supervision or touching assistance (04)  Toilet Transfer: Mobility Discharge Performance (HA1433B3): supervision or touching assistance (04)  Car Transfer: Mobility Discharge Performance (DD6386I2): not attempted due to environmental limitations (10)  Walk 10 Feet: Mobility Discharge Performance (GJ8599P5): supervision or touching assistance (04)  Walk 50 Feet With Two Turns: Mobility Discharge Performance (RJ1496A3): supervision or touching assistance (04)  Walk 150 Feet: Mobility Discharge Performance (XZ1347D2): supervision or  touching assistance (04)  Walk 10 Ft, Uneven Surfaces: Mobility Discharge Performance (QJ9494U2): not applicable (09)  1 Step/Curb: Mobility Discharge Performance (IA9173U4): supervision or touching assistance (04)  4 Steps: Mobility Discharge Performance (VH4349I5): supervision or touching assistance (04)  12 Steps: Mobility Discharge Performance (WH7468B7): not applicable (09)  Picking up object: Mobility Discharge Performance (PN1386J7): not attempted, medical condition/safety concern (88)  Wheel 50 Ft Two Turns: Mobility Discharge Performance (TO7190O8): not applicable (09)  Wheel 150 Feet: Mobility Discharge Performance (TY2768F5): not applicable (09)    Physical Therapy Education     Title: PT OT SLP Therapies (Resolved)     Topic: Physical Therapy (Resolved)     Point: Mobility training (Resolved)     Learning Progress Summary           Patient Acceptance, E, NR by  at 5/10/2023 1652                   Point: Home exercise program (Not Started)     Learner Progress:  Not documented in this visit.          Point: Body mechanics (Not Started)     Learner Progress:  Not documented in this visit.          Point: Precautions (Resolved)     Learning Progress Summary           Patient Acceptance, E, NR by  at 5/10/2023 1652                               User Key     Initials Effective Dates Name Provider Type Discipline     21 -  Ruth Mina, PT Physical Therapist PT                PT Recommendation and Plan     Progress Summary (PT)  Progress Toward Functional Goals (PT): progress toward functional goals is good  Daily Progress Summary (PT): Pt participated in BLE exercises, but declined transfers and GT     Outcome Measures     Row Name 23 6593             How much help from another person do you currently need...    Turning from your back to your side while in flat bed without using bedrails? 2  -WM      Moving from lying on back to sitting on the side of a flat bed without bedrails? 2  -WM       Moving to and from a bed to a chair (including a wheelchair)? 3  -WM      Standing up from a chair using your arms (e.g., wheelchair, bedside chair)? 3  -WM      Climbing 3-5 steps with a railing? 3  -WM      To walk in hospital room? 3  -WM      AM-PAC 6 Clicks Score (PT) 16  -WM            User Key  (r) = Recorded By, (t) = Taken By, (c) = Cosigned By    Initials Name Provider Type    Cameron Mcgregor PTA Physical Therapist Assistant                 Time Calculation:         PT G-Codes  Outcome Measure Options: AM-PAC 6 Clicks Daily Activity (OT), Optimal Instrument  AM-PAC 6 Clicks Score (PT): 16  AM-PAC 6 Clicks Score (OT): 18         Cameron Mohamud PTA  5/22/2023

## 2023-05-22 NOTE — THERAPY DISCHARGE NOTE
SNF - Occupational Therapy Discharge   [facility]    Patient Name: Yelena Sanchez  : 1935    MRN: [id number]                              Today's Date: 2023       Admit Date: 2023    Visit Dx:     ICD-10-CM ICD-9-CM   1. Decreased activities of daily living (ADL)  Z78.9 V49.89   2. Difficulty walking  R26.2 719.7   3. Fracture  T14.8XXA 829.0     Patient Active Problem List   Diagnosis   • Iron deficiency anemia   • Primary osteoarthritis of left knee   • Chronic combined systolic and diastolic congestive heart failure   • Presence of biventricular implantable cardioverter-defibrillator (ICD)   • Coronary artery disease involving native coronary artery of native heart without angina pectoris   • Mixed hyperlipidemia   • Essential hypertension   • Trochanteric bursitis of right hip   • Sciatica of right side   • Carotid artery disease   • Acute on chronic systolic heart failure (CMS/HCC)   • Closed fracture of neck of right humerus   • HTN (hypertension)   • HLD (hyperlipidemia)   • Hypothyroidism   • Effusion of right elbow   • Renal insufficiency   • Musculoskeletal pain   • Osteoarthritis of left knee   • Fracture     Past Medical History:   Diagnosis Date   • Anemia    • Arthritis    • Asthma    • Breast cancer    • CHF (congestive heart failure)    • Congestive heart failure    • COPD (chronic obstructive pulmonary disease)    • Diverticulosis 2014   • GERD (gastroesophageal reflux disease)    • History of tobacco abuse    • HTN (hypertension)    • Hyperlipemia    • Hyperthyroidism    • Neck mass    • Seasonal allergies    • SOB (shortness of breath)      Past Surgical History:   Procedure Laterality Date   • COLONOSCOPY     • ENDOSCOPY     • EYE SURGERY      Implant; yes    • LUNG LOBECTOMY Left     Left lower lobe   • LUNG SURGERY     • LUNG SURGERY Left     LEFT UPPER LOBE ()   • MASTECTOMY      Left    • MASTECTOMY Left    • OTHER SURGICAL HISTORY      Joint Surgery   •  PACEMAKER IMPLANTATION        General Information    No documentation.                Mobility/ADL's    No documentation.                Obj/Interventions    No documentation.                Goals/Plan     Row Name 05/22/23 0749          Bed Mobility Goal 1 (OT)    Activity/Assistive Device (Bed Mobility Goal 1, OT) bed mobility activities, all  -EG     [place] Level/Cues Needed (Bed Mobility Goal 1, OT) modified independence  -EG     Time Frame (Bed Mobility Goal 1, OT) long term goal (LTG);30 days  -EG     Progress/Outcomes (Bed Mobility Goal 1, OT) continuing progress toward goal  -EG     Row Name 05/22/23 0749          Transfer Goal 1 (OT)    Activity/Assistive Device (Transfer Goal 1, OT) transfers, all  -EG     [place] Level/Cues Needed (Transfer Goal 1, OT) modified independence  -EG     Time Frame (Transfer Goal 1, OT) long term goal (LTG);30 days  -EG     Progress/Outcome (Transfer Goal 1, OT) continuing progress toward goal  -EG     Row Name 05/22/23 0749          Bathing Goal 1 (OT)    Activity/Device (Bathing Goal 1, OT) bathing skills, all  -EG     [place] Level/Cues Needed (Bathing Goal 1, OT) modified independence  -EG     Time Frame (Bathing Goal 1, OT) long term goal (LTG);30 days  -EG     Progress/Outcomes (Bathing Goal 1, OT) continuing progress toward goal  -EG     Row Name 05/22/23 0749          Dressing Goal 1 (OT)    Activity/Device (Dressing Goal 1, OT) dressing skills, all  -EG     [place]/Cues Needed (Dressing Goal 1, OT) modified independence  -EG     Time Frame (Dressing Goal 1, OT) long term goal (LTG);30 days  -EG     Progress/Outcome (Dressing Goal 1, OT) continuing progress toward goal  -EG     Row Name 05/22/23 0749          Toileting Goal 1 (OT)    Activity/Device (Toileting Goal 1, OT) toileting skills, all  -EG     [place] Level/Cues Needed (Toileting Goal 1, OT) modified independence  -EG     Time Frame (Toileting Goal 1, OT) long term goal (LTG);30 days   -EG     Progress/Outcome (Toileting Goal 1, OT) continuing progress toward goal  -EG     Row Name 05/22/23 0749          Grooming Goal 1 (OT)    Activity/Device (Grooming Goal 1, OT) grooming skills, all  -EG     [place] (Grooming Goal 1, OT) modified independence  -EG     Time Frame (Grooming Goal 1, OT) long term goal (LTG);30 days  -EG     Progress/Outcome (Grooming Goal 1, OT) continuing progress toward goal  -EG     Row Name 05/22/23 0749          ROM Goal 1 (OT)    ROM Goal 1 (OT) patient will improve RUE ROM, as permitted by ortho, to wfl.  -EG     Time Frame (ROM Goal 1, OT) long term goal (LTG);30 days  -EG     Progress/Outcome (ROM Goal 1, OT) continuing progress toward goal  -EG     Row Name 05/22/23 0749          Strength Goal 1 (OT)    Strength Goal 1 (OT) patient will improve RUE strength to 4/5, as permitted by ortho, in order to increase independence with adls.  -EG     Time Frame (Strength Goal 1, OT) long term goal (LTG);30 days  -EG     Progress/Outcome (Strength Goal 1, OT) continuing progress toward goal  -EG     Row Name 05/22/23 0749          Problem Specific Goal 1 (OT)    Problem Specific Goal 1 (OT) patient will improve endurance to good for safe adls.  -EG     Time Frame (Problem Specific Goal 1, OT) long term goal (LTG);30 days  -EG     Progress/Outcome (Problem Specific Goal 1, OT) continuing progress toward goal  -EG           User Key  (r) = Recorded By, (t) = Taken By, (c) = Cosigned By    Initials Name Provider Type    EG Letitia Chinchilla OT Occupational Therapist               Clinical Impression    No documentation.                Outcome Measures    No documentation.               Section G  Mobility  Bed mobility - self performance: limited assistance (staff provide guided maneuvering of limbs or other non-weight bearing assistance)  Bed mobility support/assistance: One person assist  Transfer - self performance: limited assistance (staff provide guided maneuvering of limbs  or other non-weight bearing assistance)  Transfer support/assistance: One person assist  Walking in room - self performance: limited assistance (staff provide guided maneuvering of limbs or other non-weight bearing assistance)  Walking in room support/assistance: One person assist  Walking in corridors/hallway - self performance: limited assistance (staff provide guided maneuvering of limbs or other non-weight bearing assistance)  Walking in corridors/hallway support/assistance: One person assist  Locomotion on unit - self performance: limited assistance (staff provide guided maneuvering of limbs or other non-weight bearing assistance)  Locomotion on unit support/assistance: One person assist  Locomotion off unit - self performance: activity did not occur  Locomotion off unit support/assistance: Activity did not occur  Dressing - self performance: limited assistance (staff provide guided maneuvering of limbs or other non-weight bearing assistance)  Dressing support/assistance: One person assist  Eating - self performance: independent  Eating support/assistance: Setup help only  Toileting - self performance: limited assistance (staff provide guided maneuvering of limbs or other non-weight bearing assistance)  Toileting support/assistance: One person assist  Personal hygiene - self performance: limited assistance (staff provide guided maneuvering of limbs or other non-weight bearing assistance)  Personal hygiene support/assistance: One person assist  Bathing  Bathing - self performance: Physical help with bathing (exclude washing back and hair for patient)  Bathing support/assistance: One person assist  Balance  Balance during transitions & walking: Not steady, requires assist to steady  Moving from seated to standing position: Not steady, requires assist to steady  Walking: Not steady, requires assist to steady  Turning around while walking: Not steady, requires assist to steady  Moving on and off toilet: Not steady,  requires assist to steady  Surface-to-surface transfer: Not steady, requires assist to steady  Mobility devices: Cane/crutch  Range of Motion  Upper Extremity: Impairment on one side  Lower Extremity: No impairment  Section GG  SectionGG: Functional Ability/Goals, Adm  Self Care, Prior Functioning (TH7555H): 3. Independent  Functional Cognition, Prior Functioning (UV3209F): 3. Independent  Self Care, Admission (Section GG)  Eating: Self-Care Admission Performance (EI6126C5): setup or clean-up assistance (05)  Oral Hygiene: Self-Care Admission Performance (RE5715W2): setup or clean-up assistance (05)  Toileting Hygiene: Self-Care Admission Performance (LQ0172D7): supervision or touching assistance (04)  Shower/Bathe Self: Self-Care Admission Performance (IZ6465K2): partial/moderate assistance (03)  Upper Body Dressing: Self-Care Admission Performance (SQ8875K7): partial/moderate assistance (03)  Lower Body Dressing: Self-Care Admission Performance (TN4313Q7): partial/moderate assistance (03)  Putting On/Taking Off Footwear: Self-Care Admission Performance (MM5888O7): partial/moderate assistance (03)     Section GG: Functional Ability/Goals, DC  Eating: Self-Care Discharge Goal (DG9359N9): independent (06)  Oral Hygiene: Self-Care Discharge Goal (VS2388C0): independent (06)  Toileting Hygiene: Self-Care Discharge Goal (GB9172C7): independent (06)  Shower/Bathe Self: Self-Care Discharge Goal (WF8301Y2): independent (06)  Upper Body Dressing: Self-Care Discharge Goal (TJ5297S5): independent (06)  Lower Body Dressing: Self-Care Discharge Goal (II1155F6): independent (06)  Putting On/Taking Off Footwear: Self-Care Discharge Goal (YF0755G1): independent (06)     Section GG: Functional Ability/Goals, DC  Eating: Self-Care Discharge Performance (MD5270Y2): independent (06)  Oral Hygiene: Self-Care Discharge Performance (QI6389O4): setup or clean-up assistance (05)  Toileting Hygiene: Self-Care Discharge Performance  (IU9486I6): supervision or touching assistance (04)  Shower/Bathe Self: Self-Care Discharge Performance (TZ9171P9): partial/moderate assistance (03)  Upper Body Dressing: Self-Care Discharge Performance (TA3259S0): partial/moderate assistance (03)  Lower Body Dressing: Self-Care Discharge Performance (RL5502J4): partial/moderate assistance (03)  Putting On/Taking Off Footwear: Self-Care Discharge Performance (RE9964N0): partial/moderate assistance (03)       Occupational Therapy Education     Title: PT OT SLP Therapies (Resolved)     Topic: Occupational Therapy (Resolved)     Point: ADL training (Resolved)     Description:   Instruct learner(s) on proper safety adaptation and remediation techniques during self care or transfers.   Instruct in proper use of assistive devices.              Learning Progress Summary           Patient Eager, E, NR by EG at 5/10/2023 0800    Comment: Education on compensatory techniques for UB ADL's  Educated on sling donning, ROM and WB status orders for RUE  Education on general safety techniques and fall risk prevention                   Point: Home exercise program (Resolved)     Description:   Instruct learner(s) on appropriate technique for monitoring, assisting and/or progressing therapeutic exercises/activities.              Learning Progress Summary           Patient Eager, E, NR by EG at 5/10/2023 0800    Comment: Education on compensatory techniques for UB ADL's  Educated on sling donning, ROM and WB status orders for RUE  Education on general safety techniques and fall risk prevention                   Point: Precautions (Resolved)     Description:   Instruct learner(s) on prescribed precautions during self-care and functional transfers.              Learning Progress Summary           Patient Eager, E, NR by EG at 5/10/2023 0800    Comment: Education on compensatory techniques for UB ADL's  Educated on sling donning, ROM and WB status orders for RUE  Education on general  safety techniques and fall risk prevention                   Point: Body mechanics (Resolved)     Description:   Instruct learner(s) on proper positioning and spine alignment during self-care, functional mobility activities and/or exercises.              Learning Progress Summary           Patient Eager, E, NR by EG at 5/10/2023 0800    Comment: Education on compensatory techniques for UB ADL's  Educated on sling donning, ROM and WB status orders for RUE  Education on general safety techniques and fall risk prevention                               User Key     Initials Effective Dates Name Provider Type Discipline    EG 09/14/22 -  Letitia Chinchilla OT Occupational Therapist OT              OT Recommendation and Plan  Planned Therapy Interventions (OT): activity tolerance training, functional balance retraining, occupation/activity based interventions, adaptive equipment training, BADL retraining, neuromuscular control/coordination retraining, patient/caregiver education/training, transfer/mobility retraining, strengthening exercise  Therapy Frequency (OT): 5 times/wk  Plan of Care Review  Plan of Care Reviewed With: patient  Progress: no change  Outcome Evaluation: Patient is discharging from SNF tomorrow with ; limited carryover on education and training has been noted but overall has demonstrated improvements with skilled therapy services; OT reccomends continuation of services upon discharge to facilitate safety/ind. with ADL's/transfers.  Plan of Care Reviewed With: patient  Outcome Evaluation: Patient is discharging from SNF tomorrow with ; limited carryover on education and training has been noted but overall has demonstrated improvements with skilled therapy services; OT reccomends continuation of services upon discharge to facilitate safety/ind. with ADL's/transfers.     Time Calculation:              Letitia Chinchilla OT  5/22/2023

## 2023-05-25 ENCOUNTER — OFFICE VISIT (OUTPATIENT)
Dept: ORTHOPEDIC SURGERY | Facility: CLINIC | Age: 88
End: 2023-05-25
Payer: MEDICARE

## 2023-05-25 VITALS — HEIGHT: 59 IN | HEART RATE: 52 BPM | OXYGEN SATURATION: 89 % | WEIGHT: 149 LBS | BODY MASS INDEX: 30.04 KG/M2

## 2023-05-25 DIAGNOSIS — M25.562 LEFT KNEE PAIN, UNSPECIFIED CHRONICITY: ICD-10-CM

## 2023-05-25 DIAGNOSIS — S42.411A CLOSED SUPRACONDYLAR FRACTURE OF RIGHT HUMERUS, INITIAL ENCOUNTER: ICD-10-CM

## 2023-05-25 DIAGNOSIS — M25.511 RIGHT SHOULDER PAIN, UNSPECIFIED CHRONICITY: Primary | ICD-10-CM

## 2023-05-25 DIAGNOSIS — M17.11 OSTEOARTHRITIS OF RIGHT KNEE, UNSPECIFIED OSTEOARTHRITIS TYPE: ICD-10-CM

## 2023-05-25 NOTE — PROGRESS NOTES
"Chief Complaint  Pain and Initial Evaluation of the Right Shoulder and Initial Evaluation and Pain of the Left Knee     Subjective      Yelena Sanchez presents to [facility] for initial evaluation of the right shoulder and the left knee.  She ambulates with a cane for support and stability. She had a fall and skinned up her left knee and hurt her shoulder.  Her fall was 3 weeks ago.  She is here today in a sling.     No Known Allergies     Social History     Socioeconomic History   • Marital status:    Tobacco Use   • Smoking status: Never     Passive exposure: Never   • Smokeless tobacco: Never   Vaping Use   • Vaping Use: Never used   Substance and Sexual Activity   • Alcohol use: Never   • Drug use: Never   • Sexual activity: Defer        Review of Systems     Objective   Vital Signs:   Pulse 52   Ht 149.9 cm (59\")   Wt 67.6 kg (149 lb)   SpO2 (!) 89%   BMI 30.09 kg/m²       Physical Exam  Constitutional:       Appearance: Normal appearance. Patient is well-developed and normal weight.   HENT:      Head: Normocephalic.      Right Ear: Hearing and external ear normal.      Left Ear: Hearing and external ear normal.      Nose: Nose normal.   Eyes:      Conjunctiva/sclera: Conjunctivae normal.   Cardiovascular:      Rate and Rhythm: Normal rate.   Pulmonary:      Effort: Pulmonary effort is normal.      Breath sounds: No wheezing or rales.   Abdominal:      Palpations: Abdomen is soft.      Tenderness: There is no abdominal tenderness.   Musculoskeletal:      Cervical back: Normal range of motion.   Skin:     Findings: No rash.   Neurological:      Mental Status: Patient is alert and oriented to person, place, and time.   Psychiatric:         Mood and Affect: Mood and affect normal.         Judgment: Judgment normal.       Ortho Exam      RIGHT SHOULDER She is in a sling.  Sensation intact to light touch, median, radial, ulnar nerve. Positive AIN, PIN, ulnar nerve motor. " Positive pulses.  Good strength in triceps, biceps, deltoid, wrist extensors and wrist flexors.     LEFT KNEE Flexion 100. Extension -3. Stable to varus/valgus stress. Stable to anterior/posterior drawer. Neurovascularly intact. Negative [place]. Negative Lachman. Positive EHL, FHL, HS and TA. Sensation intact to light touch all 5 nerves of the foot. Ambulates with Antalgic gait. Patella is well tracking. Calf supple, non-tender. Positive tenderness to the medial joint line. Positive tenderness to the lateral joint line. Positive Crepitus. Good strength to hamstrings, quadriceps, dorsiflexors, and plantar flexors.  Knee Extensor Mechanism intact      Procedures      Imaging Results (Most Recent)     Procedure Component Value Units Date/Time    XR Scapula Right [[id number]] Resulted: 05/25/23 1041     Updated: 05/25/23 1044    XR Knee 3 View Left [[id number]] Resulted: 05/25/23 1041     Updated: 05/25/23 1044           Result Review :     X-Ray Report:  Right scapula X-Ray  Indication: Evaluation of the right scapula  AP/Lateral view(s)  Findings: Routine healing of the humerus fracture.  The fracture is non displaced.   Prior studies available for comparison: yes     X-Ray Report:  Left knee X-Ray  Indication: Evaluation of the left knee  AP/Lateral and [place] view(s)  Findings: Advanced degenerative arthritis. Bone on bone.   Prior studies available for comparison: yes            Assessment and Plan     Diagnoses and all orders for this visit:    1. Right shoulder pain, unspecified chronicity (Primary)  -     XR Scapula Right    2. Left knee pain, unspecified chronicity  -     XR Knee 3 View Left    3. Closed fracture of right humerus, initial encounter    4. Osteoarthritis of right knee, unspecified osteoarthritis type        Discussed the treatment plan with the patient. I reviewed the X-rays that were obtained today with the patient.     Ice and elevate the left knee.     Start elbow flexion/extension exercises.   Full , thumb opposition, MCP flexors, DIP flexors and PIP flexors. Start pendulum exercises.     Will obtain X-Rays of the right shoulder at next visit.    Call or return if worsening symptoms.    Follow Up     2-3 weeks with X ray       Patient was given instructions and counseling regarding her condition or for health maintenance advice. Please see specific information pulled into the AVS if appropriate.     Scribed for August Sparrow MD by Ebony Parker MA.  05/25/23   10:38 EDT    I have personally performed the services described in this document as scribed by the above individual and it is both accurate and complete. August Sparrow MD 05/25/23

## 2023-06-05 DIAGNOSIS — I50.42 CHRONIC COMBINED SYSTOLIC AND DIASTOLIC CONGESTIVE HEART FAILURE: ICD-10-CM

## 2023-06-05 RX ORDER — SPIRONOLACTONE 25 MG/1
12.5 TABLET ORAL DAILY
Qty: 45 TABLET | Refills: 2 | Status: SHIPPED | OUTPATIENT
Start: 2023-06-05

## 2023-06-05 RX ORDER — SPIRONOLACTONE 25 MG/1
12.5 TABLET ORAL DAILY
Qty: 45 TABLET | Refills: 2 | Status: SHIPPED | OUTPATIENT
Start: 2023-06-05 | End: 2023-06-05

## 2023-06-13 ENCOUNTER — OFFICE VISIT (OUTPATIENT)
Dept: ORTHOPEDIC SURGERY | Facility: CLINIC | Age: 88
End: 2023-06-13
Payer: MEDICARE

## 2023-06-13 VITALS
BODY MASS INDEX: 31 KG/M2 | OXYGEN SATURATION: 94 % | SYSTOLIC BLOOD PRESSURE: 129 MMHG | HEIGHT: 59 IN | HEART RATE: 60 BPM | DIASTOLIC BLOOD PRESSURE: 71 MMHG | WEIGHT: 153.8 LBS

## 2023-06-13 DIAGNOSIS — S42.201D CLOSED FRACTURE OF PROXIMAL END OF RIGHT HUMERUS WITH ROUTINE HEALING, UNSPECIFIED FRACTURE MORPHOLOGY, SUBSEQUENT ENCOUNTER: ICD-10-CM

## 2023-06-13 DIAGNOSIS — M25.511 RIGHT SHOULDER PAIN, UNSPECIFIED CHRONICITY: Primary | ICD-10-CM

## 2023-06-13 NOTE — PROGRESS NOTES
"Chief Complaint  Follow-up of the Right Shoulder     Subjective      Yelena Sanchez presents to [facility] for follow up of the right shoulder. She had a fall 5/4/23 .  The pain is a little better and she is trying to move it a little.  She has bruising and pain in the lateral aspect of her shoulder.  She is having home health for her lower extremity.  She states she cannot comb her hair but is trying to do ROM.      No Known Allergies     Social History     Socioeconomic History   • Marital status:    Tobacco Use   • Smoking status: Never     Passive exposure: Never   • Smokeless tobacco: Never   Vaping Use   • Vaping Use: Never used   Substance and Sexual Activity   • Alcohol use: Never   • Drug use: Never   • Sexual activity: Defer        Review of Systems     Objective   Vital Signs:   /71 (BP Location: Left arm, Patient Position: Sitting, Cuff Size: Adult)   Pulse 60   Ht 149.9 cm (59\")   Wt 69.8 kg (153 lb 12.8 oz)   SpO2 94%   BMI 31.06 kg/m²       Physical Exam  Constitutional:       Appearance: Normal appearance. Patient is well-developed and normal weight.   HENT:      Head: Normocephalic.      Right Ear: Hearing and external ear normal.      Left Ear: Hearing and external ear normal.      Nose: Nose normal.   Eyes:      Conjunctiva/sclera: Conjunctivae normal.   Cardiovascular:      Rate and Rhythm: Normal rate.   Pulmonary:      Effort: Pulmonary effort is normal.      Breath sounds: No wheezing or rales.   Abdominal:      Palpations: Abdomen is soft.      Tenderness: There is no abdominal tenderness.   Musculoskeletal:      Cervical back: Normal range of motion.   Skin:     Findings: No rash.   Neurological:      Mental Status: Patient is alert and oriented to person, place, and time.   Psychiatric:         Mood and Affect: Mood and affect normal.         Judgment: Judgment normal.       Ortho Exam        RIGHT SHOULDER She is in a sling.  Sensation " intact to light touch, median, radial, ulnar nerve. Positive AIN, PIN, ulnar nerve motor. Positive pulses.  Good strength in triceps, biceps, deltoid, wrist extensors and wrist flexors       Procedures      Imaging Results (Most Recent)       Procedure Component Value Units Date/Time    XR Scapula Right [[id number]] Resulted: 06/13/23 1005     Updated: 06/13/23 1008             Result Review :     X-Ray Report:  Right scapula X-Ray  Indication: Evaluation of the right scapula  AP/Lateral view(s)  Findings: Routine healing of the proximal humerus fracture.    Prior studies available for comparison: yes              Assessment and Plan     Diagnoses and all orders for this visit:    1. Right shoulder pain, unspecified chronicity (Primary)  -     XR Scapula Right    2. Closed fracture of proximal end of right humerus with routine healing, unspecified fracture morphology, subsequent encounter        Discussed the treatment plan with the patient. I reviewed the X-rays that were obtained today with the patient.     Physical therapy for the right shoulder.     Will obtain X-Rays of left scapula at next visit.    Call or return if worsening symptoms.    Follow Up     3-4 weeks with I reviewed the X-rays that were obtained today with the patient. Ray of the right shoulder.        Patient was given instructions and counseling regarding her condition or for health maintenance advice. Please see specific information pulled into the AVS if appropriate.     Scribed for August Sparrow MD by Ebony Parker MA.  06/13/23   10:10 EDT    I have personally performed the services described in this document as scribed by the above individual and it is both accurate and complete. August Sparrow MD 06/13/23

## 2023-09-12 ENCOUNTER — OFFICE VISIT (OUTPATIENT)
Dept: ORTHOPEDIC SURGERY | Facility: CLINIC | Age: 88
End: 2023-09-12
Payer: MEDICARE

## 2023-09-12 VITALS
HEIGHT: 59 IN | DIASTOLIC BLOOD PRESSURE: 82 MMHG | SYSTOLIC BLOOD PRESSURE: 144 MMHG | BODY MASS INDEX: 30.64 KG/M2 | HEART RATE: 60 BPM | WEIGHT: 152 LBS | OXYGEN SATURATION: 92 %

## 2023-09-12 DIAGNOSIS — M25.511 RIGHT SHOULDER PAIN, UNSPECIFIED CHRONICITY: ICD-10-CM

## 2023-09-12 DIAGNOSIS — M17.11 PRIMARY OSTEOARTHRITIS OF RIGHT KNEE: ICD-10-CM

## 2023-09-12 DIAGNOSIS — M25.561 RIGHT KNEE PAIN, UNSPECIFIED CHRONICITY: Primary | ICD-10-CM

## 2023-09-12 NOTE — PATIENT INSTRUCTIONS
It is improving from a right shoulder standpoint.  She feels ready for discharge from outpatient physical therapy.  Advised to continue home exercise program.  Caution with overhead lifting, pushing, or pulling.    X-rays of the right knee taken and reviewed.  Discussed options of participation in physical therapy, home exercise program, trial of anti-inflammatories, intra-articular steroid injection, viscosupplementation, or surgery.  Patient elects to proceed with right knee Synvisc injection, administered in office today.  Patient advised on 6-month duration between injections.  Can consider Zilretta injection after 6 weeks, if needed.  Follow-up as needed.  Call changes or concerns.

## 2023-09-12 NOTE — PROGRESS NOTES
"Chief Complaint  Initial Evaluation of the Right Knee and Follow-up of the Right Shoulder    Subjective      Yelena Sanchez presents to [facility] for follow-up of right shoulder pain, history of right humeral fracture, and initial evaluation of right knee pain.  He has remote history of right femur fracture status post IM nail placement.  She was last evaluated in office on 7/11/2023 received referral to physical therapy.  She reports she has 2 remaining PT sessions left and she does not wish to pursue additional PT.  She has noted improvement in pain and ROM, although does report difficulties remaining with cross body adduction.  She is also complaining of painful right knee.  She states at times her right knee is very painful and it feels as if the \"bones are rubbing on each other\".    Objective   No Known Allergies    Vital Signs:   /82   Pulse 60   Ht 149.9 cm (59\")   Wt 68.9 kg (152 lb)   SpO2 92%   BMI 30.70 kg/m²       Physical Exam    Constitutional: Awake, alert. Well nourished appearance.    Integumentary: Warm, dry, intact. No obvious rashes.    HENT: Atraumatic, normocephalic.   Respiratory: Non labored respirations .   Cardiovascular: Intact peripheral pulses.    Psychiatric: Normal mood and affect. A&O X3    Ortho Exam  Right knee: Skin is warm, dry, and intact.  Tenderness to palpation of medial lateral joint line.  Crepitus with ROM.  -3 degrees of extension and flexion to 115 degrees.  Full plantarflexion and dorsiflexion of the ankle.  Sensation intact light touch.  Distal neurovascular intact.    Right shoulder: Skin is warm, dry, and intact.  Active shoulder forward flexion to 130 degrees, abduction 115 degrees, and internal rotation to sacrum.  Full flexion and extension of the wrist and elbow.  Full pronation supination.  Patient is able to form a full fist.  Sensation intact light touch.  Distal neurovascular intact.    Imaging Results (Most Recent)  "      Procedure Component Value Units Date/Time    XR Knee 3 View Right [[id number]] Resulted: 09/12/23 1304     Updated: 09/12/23 1306    Narrative:      X-Ray Report:  Study: X-rays ordered, taken in the office, and reviewed today.   Site: Right knee Xray  Indication: Pain  View: AP/Lateral, Standing, and [place] view(s)  Findings: Moderately advanced osteoarthritis of the right knee with joint   space narrowing appreciated in the medial compartment, approaching   bone-on-bone.  Demonstration of right femoral IM nail with intact distal   cross locking screws.  Prior studies available for comparison: no             Large Joint Arthrocentesis: R knee  Date/Time: 9/12/2023 11:18 AM  Consent given by: patient  Site marked: site marked  Timeout: Immediately prior to procedure a time out was called to verify the correct patient, procedure, equipment, support staff and site/side marked as required   Supporting Documentation  Indications: pain   Procedure Details  Location: knee - R knee  Preparation: Patient was prepped and draped in the usual sterile fashion  Needle gauge: 21g.  Medications administered: 48 mg hylan 48 MG/6ML  Patient tolerance: patient tolerated the procedure well with no immediate complications            Assessment and Plan   Problem List Items Addressed This Visit    None  Visit Diagnoses       Right knee pain, unspecified chronicity    -  Primary    Relevant Orders    XR Knee 3 View Right (Completed)    Primary osteoarthritis of right knee        Relevant Orders    Large Joint Arthrocentesis: R knee    Right shoulder pain, unspecified chronicity              Follow Up   Return if symptoms worsen or fail to improve.    Tobacco Use: Low Risk     Smoking Tobacco Use: Never    Smokeless Tobacco Use: Never    Passive Exposure: Never     Patient is a non-smoker.  Did not discuss tobacco cessation options.    Patient Instructions   It is improving from a right shoulder standpoint.  She feels ready for  discharge from outpatient physical therapy.  Advised to continue home exercise program.  Caution with overhead lifting, pushing, or pulling.    X-rays of the right knee taken and reviewed.  Discussed options of participation in physical therapy, home exercise program, trial of anti-inflammatories, intra-articular steroid injection, viscosupplementation, or surgery.  Patient elects to proceed with right knee Synvisc injection, administered in office today.  Patient advised on 6-month duration between injections.  Can consider Zilretta injection after 6 weeks, if needed.  Follow-up as needed.  Call changes or concerns.  Patient was given instructions and counseling regarding her condition or for health maintenance advice. Please see specific information pulled into the AVS if appropriate.

## 2023-09-20 ENCOUNTER — TELEPHONE (OUTPATIENT)
Dept: ORTHOPEDIC SURGERY | Facility: CLINIC | Age: 88
End: 2023-09-20
Payer: OTHER GOVERNMENT

## 2023-09-20 NOTE — TELEPHONE ENCOUNTER
LESTER FROM [facility] CALLED AND STATES PATIENT IS BEING DISCHARGED FROM OT TODAY.  PATIENT WAS CALLED AND ADVISED TO CALL THE OFFICE IF SHE NEEDED ANYTHING OR HAD ANY CONCERNS.  PATIENT VOICES UNDERSTANDING.

## 2023-10-14 ENCOUNTER — TRANSCRIBE ORDERS (OUTPATIENT)
Dept: ADMINISTRATIVE | Facility: HOSPITAL | Age: 88
End: 2023-10-14
Payer: OTHER GOVERNMENT

## 2023-10-14 ENCOUNTER — LAB (OUTPATIENT)
Dept: LAB | Facility: HOSPITAL | Age: 88
End: 2023-10-14
Payer: MEDICARE

## 2023-10-14 DIAGNOSIS — E83.41 HYPERMAGNESEMIA: ICD-10-CM

## 2023-10-14 DIAGNOSIS — I42.9 CONGESTIVE HEART FAILURE DUE TO CARDIOMYOPATHY: ICD-10-CM

## 2023-10-14 DIAGNOSIS — M10.9 GOUT, UNSPECIFIED CAUSE, UNSPECIFIED CHRONICITY, UNSPECIFIED SITE: ICD-10-CM

## 2023-10-14 DIAGNOSIS — I10 ESSENTIAL HYPERTENSION, MALIGNANT: ICD-10-CM

## 2023-10-14 DIAGNOSIS — E61.1 IRON DEFICIENCY: ICD-10-CM

## 2023-10-14 DIAGNOSIS — E55.9 VITAMIN D DEFICIENCY: ICD-10-CM

## 2023-10-14 DIAGNOSIS — D64.9 ANEMIA, UNSPECIFIED TYPE: ICD-10-CM

## 2023-10-14 DIAGNOSIS — E03.9 HYPOTHYROIDISM, UNSPECIFIED TYPE: ICD-10-CM

## 2023-10-14 DIAGNOSIS — E78.5 HYPERLIPIDEMIA, UNSPECIFIED HYPERLIPIDEMIA TYPE: ICD-10-CM

## 2023-10-14 DIAGNOSIS — N18.9 CHRONIC KIDNEY DISEASE, UNSPECIFIED CKD STAGE: ICD-10-CM

## 2023-10-14 DIAGNOSIS — R74.8 ELEVATED VITAMIN B12 LEVEL: ICD-10-CM

## 2023-10-14 DIAGNOSIS — N19 RENAL FAILURE, UNSPECIFIED CHRONICITY: ICD-10-CM

## 2023-10-14 DIAGNOSIS — I50.9 CONGESTIVE HEART FAILURE DUE TO CARDIOMYOPATHY: ICD-10-CM

## 2023-10-14 DIAGNOSIS — O35.EXX0 RENAL ABNORMALITY OF FETUS ON PRENATAL ULTRASOUND: Primary | ICD-10-CM

## 2023-10-14 LAB
25(OH)D3 SERPL-MCNC: 56.3 NG/ML (ref 30–100)
ALBUMIN SERPL-MCNC: 4.3 G/DL (ref 3.5–5.2)
ALP SERPL-CCNC: 91 U/L (ref 39–117)
ALT SERPL W P-5'-P-CCNC: 13 U/L (ref 1–33)
ANION GAP SERPL CALCULATED.3IONS-SCNC: 12.2 MMOL/L (ref 5–15)
AST SERPL-CCNC: 14 U/L (ref 1–32)
BACTERIA UR QL AUTO: ABNORMAL /HPF
BILIRUB CONJ SERPL-MCNC: <0.2 MG/DL (ref 0–0.3)
BILIRUB INDIRECT SERPL-MCNC: NORMAL MG/DL
BILIRUB SERPL-MCNC: 0.2 MG/DL (ref 0–1.2)
BILIRUB UR QL STRIP: NEGATIVE
BUN SERPL-MCNC: 52 MG/DL (ref 8–23)
BUN/CREAT SERPL: 29.2 (ref 7–25)
CALCIUM SPEC-SCNC: 10 MG/DL (ref 8.6–10.5)
CHLORIDE SERPL-SCNC: 103 MMOL/L (ref 98–107)
CHOLEST SERPL-MCNC: 227 MG/DL (ref 0–200)
CLARITY UR: CLEAR
CO2 SERPL-SCNC: 24.8 MMOL/L (ref 22–29)
COLOR UR: YELLOW
CREAT SERPL-MCNC: 1.78 MG/DL (ref 0.57–1)
CRP SERPL-MCNC: 0.61 MG/DL (ref 0–0.5)
DEPRECATED RDW RBC AUTO: 51.3 FL (ref 37–54)
EGFRCR SERPLBLD CKD-EPI 2021: 27.4 ML/MIN/1.73
EOSINOPHIL # BLD MANUAL: 0.16 10*3/MM3 (ref 0–0.4)
EOSINOPHIL NFR BLD MANUAL: 3 % (ref 0.3–6.2)
ERYTHROCYTE [DISTWIDTH] IN BLOOD BY AUTOMATED COUNT: 15 % (ref 12.3–15.4)
FERRITIN SERPL-MCNC: 304 NG/ML (ref 13–150)
GLUCOSE SERPL-MCNC: 101 MG/DL (ref 65–99)
GLUCOSE UR STRIP-MCNC: NEGATIVE MG/DL
HCT VFR BLD AUTO: 43.8 % (ref 34–46.6)
HDLC SERPL-MCNC: 53 MG/DL (ref 40–60)
HGB BLD-MCNC: 14.4 G/DL (ref 12–15.9)
HGB UR QL STRIP.AUTO: NEGATIVE
HYALINE CASTS UR QL AUTO: ABNORMAL /LPF
IRON 24H UR-MRATE: 81 MCG/DL (ref 37–145)
IRON SATN MFR SERPL: 26 % (ref 20–50)
KETONES UR QL STRIP: NEGATIVE
LDLC SERPL CALC-MCNC: 154 MG/DL (ref 0–100)
LDLC/HDLC SERPL: 2.86 {RATIO}
LEUKOCYTE ESTERASE UR QL STRIP.AUTO: ABNORMAL
LYMPHOCYTES # BLD MANUAL: 0.82 10*3/MM3 (ref 0.7–3.1)
LYMPHOCYTES NFR BLD MANUAL: 14 % (ref 5–12)
MAGNESIUM SERPL-MCNC: 2.8 MG/DL (ref 1.6–2.4)
MCH RBC QN AUTO: 30.6 PG (ref 26.6–33)
MCHC RBC AUTO-ENTMCNC: 32.9 G/DL (ref 31.5–35.7)
MCV RBC AUTO: 93 FL (ref 79–97)
MONOCYTES # BLD: 0.76 10*3/MM3 (ref 0.1–0.9)
NEUTROPHILS # BLD AUTO: 3.7 10*3/MM3 (ref 1.7–7)
NEUTROPHILS NFR BLD MANUAL: 68 % (ref 42.7–76)
NITRITE UR QL STRIP: NEGATIVE
NRBC BLD AUTO-RTO: 0 /100 WBC (ref 0–0.2)
NT-PROBNP SERPL-MCNC: 1479 PG/ML (ref 0–1800)
PH UR STRIP.AUTO: 5.5 [PH] (ref 5–8)
PLAT MORPH BLD: NORMAL
PLATELET # BLD AUTO: 224 10*3/MM3 (ref 140–450)
PMV BLD AUTO: 11.7 FL (ref 6–12)
POTASSIUM SERPL-SCNC: 4.2 MMOL/L (ref 3.5–5.2)
PROT SERPL-MCNC: 6.9 G/DL (ref 6–8.5)
PROT UR QL STRIP: ABNORMAL
RBC # BLD AUTO: 4.71 10*6/MM3 (ref 3.77–5.28)
RBC # UR STRIP: ABNORMAL /HPF
RBC MORPH BLD: NORMAL
REF LAB TEST METHOD: ABNORMAL
SODIUM SERPL-SCNC: 140 MMOL/L (ref 136–145)
SP GR UR STRIP: 1.02 (ref 1–1.03)
SQUAMOUS #/AREA URNS HPF: ABNORMAL /HPF
T4 FREE SERPL-MCNC: 1.31 NG/DL (ref 0.93–1.7)
TIBC SERPL-MCNC: 308 MCG/DL (ref 298–536)
TRANSFERRIN SERPL-MCNC: 207 MG/DL (ref 200–360)
TRIGL SERPL-MCNC: 112 MG/DL (ref 0–150)
TSH SERPL DL<=0.05 MIU/L-ACNC: 0.88 UIU/ML (ref 0.27–4.2)
URATE SERPL-MCNC: 7.3 MG/DL (ref 2.4–5.7)
UROBILINOGEN UR QL STRIP: ABNORMAL
VARIANT LYMPHS NFR BLD MANUAL: 1 % (ref 0–5)
VARIANT LYMPHS NFR BLD MANUAL: 14 % (ref 19.6–45.3)
VIT B12 BLD-MCNC: 1595 PG/ML (ref 211–946)
VLDLC SERPL-MCNC: 20 MG/DL (ref 5–40)
WBC # UR STRIP: ABNORMAL /HPF
WBC MORPH BLD: NORMAL
WBC NRBC COR # BLD: 5.44 10*3/MM3 (ref 3.4–10.8)

## 2023-10-14 PROCEDURE — 82306 VITAMIN D 25 HYDROXY: CPT

## 2023-10-14 PROCEDURE — 82728 ASSAY OF FERRITIN: CPT

## 2023-10-14 PROCEDURE — 36415 COLL VENOUS BLD VENIPUNCTURE: CPT

## 2023-10-14 PROCEDURE — 84550 ASSAY OF BLOOD/URIC ACID: CPT

## 2023-10-14 PROCEDURE — 85007 BL SMEAR W/DIFF WBC COUNT: CPT

## 2023-10-14 PROCEDURE — 85025 COMPLETE CBC W/AUTO DIFF WBC: CPT

## 2023-10-14 PROCEDURE — 84466 ASSAY OF TRANSFERRIN: CPT

## 2023-10-14 PROCEDURE — 86140 C-REACTIVE PROTEIN: CPT

## 2023-10-14 PROCEDURE — 84439 ASSAY OF FREE THYROXINE: CPT

## 2023-10-14 PROCEDURE — 81001 URINALYSIS AUTO W/SCOPE: CPT

## 2023-10-14 PROCEDURE — 83540 ASSAY OF IRON: CPT

## 2023-10-14 PROCEDURE — 80076 HEPATIC FUNCTION PANEL: CPT

## 2023-10-14 PROCEDURE — 82607 VITAMIN B-12: CPT

## 2023-10-14 PROCEDURE — 83880 ASSAY OF NATRIURETIC PEPTIDE: CPT

## 2023-10-14 PROCEDURE — 84443 ASSAY THYROID STIM HORMONE: CPT

## 2023-10-14 PROCEDURE — 83735 ASSAY OF MAGNESIUM: CPT

## 2023-10-14 PROCEDURE — 80061 LIPID PANEL: CPT

## 2023-10-14 PROCEDURE — 80048 BASIC METABOLIC PNL TOTAL CA: CPT

## 2023-12-19 ENCOUNTER — LAB (OUTPATIENT)
Dept: ONCOLOGY | Facility: HOSPITAL | Age: 88
End: 2023-12-19
Payer: MEDICARE

## 2023-12-19 ENCOUNTER — CONSULT (OUTPATIENT)
Dept: ONCOLOGY | Facility: HOSPITAL | Age: 88
End: 2023-12-19
Payer: MEDICARE

## 2023-12-19 VITALS
DIASTOLIC BLOOD PRESSURE: 70 MMHG | SYSTOLIC BLOOD PRESSURE: 190 MMHG | OXYGEN SATURATION: 100 % | RESPIRATION RATE: 18 BRPM | WEIGHT: 154.98 LBS | HEART RATE: 58 BPM | HEIGHT: 59 IN | TEMPERATURE: 97.1 F | BODY MASS INDEX: 31.24 KG/M2

## 2023-12-19 DIAGNOSIS — C34.90 PRIMARY ADENOCARCINOMA OF LUNG, UNSPECIFIED LATERALITY: ICD-10-CM

## 2023-12-19 DIAGNOSIS — D46.9 MDS (MYELODYSPLASTIC SYNDROME): Primary | ICD-10-CM

## 2023-12-19 DIAGNOSIS — D50.9 IRON DEFICIENCY ANEMIA, UNSPECIFIED IRON DEFICIENCY ANEMIA TYPE: ICD-10-CM

## 2023-12-19 DIAGNOSIS — D46.9 MDS (MYELODYSPLASTIC SYNDROME): ICD-10-CM

## 2023-12-19 DIAGNOSIS — C50.912 MALIGNANT NEOPLASM OF LEFT FEMALE BREAST, UNSPECIFIED ESTROGEN RECEPTOR STATUS, UNSPECIFIED SITE OF BREAST: ICD-10-CM

## 2023-12-19 DIAGNOSIS — Z12.31 ENCOUNTER FOR SCREENING MAMMOGRAM FOR MALIGNANT NEOPLASM OF BREAST: ICD-10-CM

## 2023-12-19 LAB
ALBUMIN SERPL-MCNC: 4.3 G/DL (ref 3.5–5.2)
ALBUMIN/GLOB SERPL: 1.4 G/DL
ALP SERPL-CCNC: 98 U/L (ref 39–117)
ALT SERPL W P-5'-P-CCNC: 15 U/L (ref 1–33)
ANION GAP SERPL CALCULATED.3IONS-SCNC: 13.1 MMOL/L (ref 5–15)
AST SERPL-CCNC: 17 U/L (ref 1–32)
BASOPHILS # BLD AUTO: 0.03 10*3/MM3 (ref 0–0.2)
BASOPHILS NFR BLD AUTO: 0.4 % (ref 0–1.5)
BILIRUB SERPL-MCNC: 0.2 MG/DL (ref 0–1.2)
BUN SERPL-MCNC: 45 MG/DL (ref 8–23)
BUN/CREAT SERPL: 25.9 (ref 7–25)
CALCIUM SPEC-SCNC: 9.9 MG/DL (ref 8.6–10.5)
CHLORIDE SERPL-SCNC: 101 MMOL/L (ref 98–107)
CO2 SERPL-SCNC: 22.9 MMOL/L (ref 22–29)
CREAT SERPL-MCNC: 1.74 MG/DL (ref 0.57–1)
DEPRECATED RDW RBC AUTO: 53.7 FL (ref 37–54)
EGFRCR SERPLBLD CKD-EPI 2021: 28.1 ML/MIN/1.73
EOSINOPHIL # BLD AUTO: 0.1 10*3/MM3 (ref 0–0.4)
EOSINOPHIL NFR BLD AUTO: 1.3 % (ref 0.3–6.2)
ERYTHROCYTE [DISTWIDTH] IN BLOOD BY AUTOMATED COUNT: 15.2 % (ref 12.3–15.4)
FERRITIN SERPL-MCNC: 447.8 NG/ML (ref 13–150)
GLOBULIN UR ELPH-MCNC: 3.1 GM/DL
GLUCOSE SERPL-MCNC: 87 MG/DL (ref 65–99)
HCT VFR BLD AUTO: 43.9 % (ref 34–46.6)
HGB BLD-MCNC: 13.7 G/DL (ref 12–15.9)
IMM GRANULOCYTES # BLD AUTO: 0.02 10*3/MM3 (ref 0–0.05)
IMM GRANULOCYTES NFR BLD AUTO: 0.3 % (ref 0–0.5)
IRON 24H UR-MRATE: 83 MCG/DL (ref 37–145)
IRON SATN MFR SERPL: 25 % (ref 20–50)
LYMPHOCYTES # BLD AUTO: 0.7 10*3/MM3 (ref 0.7–3.1)
LYMPHOCYTES NFR BLD AUTO: 8.8 % (ref 19.6–45.3)
MCH RBC QN AUTO: 30 PG (ref 26.6–33)
MCHC RBC AUTO-ENTMCNC: 31.2 G/DL (ref 31.5–35.7)
MCV RBC AUTO: 96.3 FL (ref 79–97)
MONOCYTES # BLD AUTO: 1.61 10*3/MM3 (ref 0.1–0.9)
MONOCYTES NFR BLD AUTO: 20.2 % (ref 5–12)
NEUTROPHILS NFR BLD AUTO: 5.51 10*3/MM3 (ref 1.7–7)
NEUTROPHILS NFR BLD AUTO: 69 % (ref 42.7–76)
NRBC BLD AUTO-RTO: 0 /100 WBC (ref 0–0.2)
PLATELET # BLD AUTO: 227 10*3/MM3 (ref 140–450)
PMV BLD AUTO: 11.4 FL (ref 6–12)
POTASSIUM SERPL-SCNC: 5.1 MMOL/L (ref 3.5–5.2)
PROT SERPL-MCNC: 7.4 G/DL (ref 6–8.5)
RBC # BLD AUTO: 4.56 10*6/MM3 (ref 3.77–5.28)
RBC MORPH BLD: NORMAL
SMALL PLATELETS BLD QL SMEAR: ADEQUATE
SODIUM SERPL-SCNC: 137 MMOL/L (ref 136–145)
TIBC SERPL-MCNC: 331 MCG/DL (ref 298–536)
TRANSFERRIN SERPL-MCNC: 222 MG/DL (ref 200–360)
WBC MORPH BLD: NORMAL
WBC NRBC COR # BLD AUTO: 7.97 10*3/MM3 (ref 3.4–10.8)

## 2023-12-19 PROCEDURE — 85025 COMPLETE CBC W/AUTO DIFF WBC: CPT

## 2023-12-19 PROCEDURE — 84466 ASSAY OF TRANSFERRIN: CPT

## 2023-12-19 PROCEDURE — 83540 ASSAY OF IRON: CPT

## 2023-12-19 PROCEDURE — 80053 COMPREHEN METABOLIC PANEL: CPT

## 2023-12-19 PROCEDURE — G0463 HOSPITAL OUTPT CLINIC VISIT: HCPCS | Performed by: INTERNAL MEDICINE

## 2023-12-19 PROCEDURE — 85007 BL SMEAR W/DIFF WBC COUNT: CPT

## 2023-12-19 PROCEDURE — 82728 ASSAY OF FERRITIN: CPT

## 2023-12-19 PROCEDURE — 36415 COLL VENOUS BLD VENIPUNCTURE: CPT

## 2023-12-19 RX ORDER — LEVOTHYROXINE SODIUM 100 MCG
TABLET ORAL
COMMUNITY
Start: 2023-10-20

## 2023-12-19 RX ORDER — TRIAMCINOLONE ACETONIDE 1 MG/G
CREAM TOPICAL
COMMUNITY
Start: 2023-10-13

## 2023-12-19 RX ORDER — FLUOCINOLONE ACETONIDE 0.11 MG/ML
OIL AURICULAR (OTIC)
COMMUNITY
Start: 2023-10-20

## 2023-12-19 RX ORDER — FLUTICASONE PROPIONATE 50 MCG
SPRAY, SUSPENSION (ML) NASAL
COMMUNITY
Start: 2023-10-20

## 2023-12-19 RX ORDER — CLOBETASOL PROPIONATE 0.5 MG/G
CREAM TOPICAL
COMMUNITY
Start: 2023-10-20

## 2023-12-19 NOTE — PROGRESS NOTES
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 Noel and pt transitioned care to Dr. Wagner on 12/19/2023.   Lung Cancer- Stage I, diagnosed 2004, s/p lobectomy by Dr. West, pt denies receiving chemotherapy or radiation treatment    Left Breast Cancer- diagnosed 6/2011, s/p left mastectomy on 7/2011, pt denied receiving chemotherapy or RT, received 7-8 years of adjuvant endocrine therapy, reports stopping by 2020, currently under active surveillance    MDS with multilineage dysplasia    Yelena Sanchez is a 88 y.o. female who presents to [facility] for follow up regarding lung cancer, breast cancer history, and MDS.  Patient currently active surveillance for both lung cancer and breast cancer.  Patient denies any breast lesions or concerns today.  Regarding MDS patient reports last Epogen injection was in October 2023, reports she has been receiving this injections periodically for the past few years.  Ported fever, chills, night sweats, unintentional weight loss or new lymphadenopathy.    FH: Sister with Ovarian cancer    Review of Systems   Musculoskeletal:  Positive for arthralgias.   All other systems reviewed and are negative.       Oncology/Hematology History    No history exists.       Objective     Vitals:    12/19/23 1256   BP: (!) 190/70  Comment: 181/68 provider notified   Pulse: 58   Resp: 18   Temp: 97.1 °F (36.2 °C)   TempSrc: Temporal   SpO2: 100%   Weight: 70.3 kg (154 lb 15.7 oz)   Height: 149.9 cm  "(59.02\")   PainSc:   7   PainLoc: Generalized     ECOG score: 0         PHQ-9 Total Score:         Physical Exam  Vitals reviewed. Exam conducted with a chaperone present.   Constitutional:       General: She is not in acute distress.     Appearance: Normal appearance.   HENT:      Head: Normocephalic and atraumatic.   Eyes:      Extraocular Movements: Extraocular movements intact.      Conjunctiva/sclera: Conjunctivae normal.   Pulmonary:      Effort: Pulmonary effort is normal.   Musculoskeletal:      Cervical back: Normal range of motion and neck supple.   Skin:     General: Skin is warm and dry.      Findings: No bruising.   Neurological:      Mental Status: She is oriented to person, place, and time.           Past Medical History     Past Medical History:   Diagnosis Date    Anemia     Arthritis     Asthma     Bicipitoradial bursitis     Breast cancer     CHF (congestive heart failure)     Congestive heart failure     COPD (chronic obstructive pulmonary disease)     Diverticulosis 11/26/2014    GERD (gastroesophageal reflux disease)     History of tobacco abuse     HTN (hypertension)     Hyperlipemia     Hyperthyroidism     ICD (implantable cardioverter-defibrillator), biventricular, in situ     Neck mass     Seasonal allergies     SOB (shortness of breath)      Current Outpatient Medications on File Prior to Visit   Medication Sig Dispense Refill    allopurinol (ZYLOPRIM) 100 MG tablet Take 1 tablet by mouth Daily.      amLODIPine (NORVASC) 5 MG tablet Take 1 tablet by mouth Daily. 90 tablet 3    aspirin 81 MG EC tablet Take 1 tablet by mouth Daily.      azelastine (ASTELIN) 0.1 % nasal spray 2 sprays into the nostril(s) as directed by provider Daily. Use in each nostril as directed      cetirizine (zyrTEC) 10 MG tablet       chlorpheniramine (CHLOR-TRIMETON) 4 MG tablet Take 1 tablet by mouth Every 6 (Six) Hours As Needed for Allergies.      cholecalciferol (VITAMIN D3) 25 MCG (1000 UT) tablet Take 2 tablets " by mouth Daily.      clobetasol (TEMOVATE) 0.05 % cream       clopidogrel (PLAVIX) 75 MG tablet Take 1 tablet by mouth Daily. 90 tablet 3    docusate calcium (SURFAK) 240 MG capsule       ezetimibe (ZETIA) 10 MG tablet Take 1 tablet by mouth Daily. 90 tablet 3    fluocinolone acetonide (DERMOTIC) 0.01 % oil otic oil Instill 1-2 drops IN each ear EVERY DAY AS NEEDED itching      fluticasone (FLONASE) 50 MCG/ACT nasal spray       furosemide (LASIX) 40 MG tablet Take 1 tablet by mouth 2 (Two) Times a Day. 180 tablet 3    HYDROcodone-acetaminophen (NORCO) 5-325 MG per tablet       ipratropium (ATROVENT) 0.06 % nasal spray 2 sprays into the nostril(s) as directed by provider 3 (Three) Times a Day. 15 mL 11    lansoprazole (PREVACID) 30 MG capsule       Lotemax SM 0.38 % gel instill ONE drop IN EACH EYE TWICE DAILY      Lubricating Plus Eye Drops 0.5 % solution       multivitamin with minerals tablet tablet Take 1 tablet by mouth Daily.      multivitamin with minerals tablet tablet Take 1 tablet by mouth Daily.      nebivolol (Bystolic) 10 MG tablet Take 1 tablet by mouth Daily. 90 tablet 3    polyethylene glycol (MIRALAX) 17 GM/SCOOP powder       Synthroid 100 MCG tablet       levothyroxine (SYNTHROID, LEVOTHROID) 112 MCG tablet Take 1 tablet by mouth Daily. (Patient not taking: Reported on 12/19/2023)      montelukast (SINGULAIR) 10 MG tablet Take 1 tablet by mouth Every Night. (Patient not taking: Reported on 12/19/2023)      rosuvastatin (Crestor) 10 MG tablet Take 1 tablet by mouth Every Night. (Patient not taking: Reported on 12/19/2023) 90 tablet 3    spironolactone (ALDACTONE) 25 MG tablet Take 0.5 tablets by mouth Daily. (Patient not taking: Reported on 12/19/2023) 45 tablet 3    Synthroid 112 MCG tablet  (Patient not taking: Reported on 12/19/2023)      Synthroid 125 MCG tablet  (Patient not taking: Reported on 12/19/2023)      triamcinolone (KENALOG) 0.1 % cream apply to lids twice a day for 1 month (Patient  not taking: Reported on 12/19/2023)      Xopenex HFA 45 MCG/ACT inhaler  (Patient not taking: Reported on 12/19/2023)      zolpidem (AMBIEN) 5 MG tablet  (Patient not taking: Reported on 12/19/2023)       No current facility-administered medications on file prior to visit.      No Known Allergies  Past Surgical History:   Procedure Laterality Date    COLONOSCOPY  2014    ENDOSCOPY  2014    EYE SURGERY      Implant; yes     LUNG LOBECTOMY Left     Left lower lobe    LUNG SURGERY      LUNG SURGERY Left     LEFT UPPER LOBE (2004)    MASTECTOMY      Left     MASTECTOMY Left     OTHER SURGICAL HISTORY      Joint Surgery    PACEMAKER IMPLANTATION       Social History     Socioeconomic History    Marital status:    Tobacco Use    Smoking status: Never     Passive exposure: Never    Smokeless tobacco: Never   Vaping Use    Vaping Use: Never used   Substance and Sexual Activity    Alcohol use: Never    Drug use: Never    Sexual activity: Defer     Family History   Problem Relation Age of Onset    Colon cancer Mother         60s    Lung cancer Sister        Results     Result Review   The following data was reviewed by: Enma Wagner MD on 12/19/2023:  Lab Results   Component Value Date    HGB 13.7 12/19/2023    HCT 43.9 12/19/2023    MCV 96.3 12/19/2023     12/19/2023    WBC 7.97 12/19/2023    NEUTROABS 5.51 12/19/2023    LYMPHSABS 0.70 12/19/2023    MONOSABS 1.61 (H) 12/19/2023    EOSABS 0.10 12/19/2023    BASOSABS 0.03 12/19/2023     Lab Results   Component Value Date    GLUCOSE 87 12/19/2023    BUN 45 (H) 12/19/2023    CREATININE 1.74 (H) 12/19/2023     12/19/2023    K 5.1 12/19/2023     12/19/2023    CO2 22.9 12/19/2023    CALCIUM 9.9 12/19/2023    PROTEINTOT 7.4 12/19/2023    ALBUMIN 4.3 12/19/2023    BILITOT 0.2 12/19/2023    ALKPHOS 98 12/19/2023    AST 17 12/19/2023    ALT 15 12/19/2023     Lab Results   Component Value Date    MG 2.8 (H) 10/14/2023    PHOS 3.7 05/17/2023    FREET4 1.31  10/14/2023    TSH 0.881 10/14/2023           No radiology results for the last day       Assessment & Plan     Diagnoses and all orders for this visit:    1. MDS (myelodysplastic syndrome) (Primary)  -     CBC & Differential; Future  -     Comprehensive Metabolic Panel; Future  -     Ferritin; Future  -     Iron Profile; Future  -     Mammo screening modified with tomosynthesis right w CAD; Future  -     CBC & Differential; Future  -     Comprehensive Metabolic Panel; Future  -     Ferritin; Future  -     Iron Profile; Future    2. Iron deficiency anemia, unspecified iron deficiency anemia type  -     CBC & Differential; Future  -     Comprehensive Metabolic Panel; Future  -     Ferritin; Future  -     Iron Profile; Future  -     CBC & Differential; Future  -     Comprehensive Metabolic Panel; Future  -     Ferritin; Future  -     Iron Profile; Future    3. Malignant neoplasm of left female breast, unspecified estrogen receptor status, unspecified site of breast  -     Mammo screening modified with tomosynthesis right w CAD; Future    4. Primary adenocarcinoma of lung, unspecified laterality  -     CBC & Differential; Future  -     Comprehensive Metabolic Panel; Future  -     Ferritin; Future  -     Iron Profile; Future    5. Encounter for screening mammogram for malignant neoplasm of breast  -     Mammo screening modified with tomosynthesis right w CAD; Future        Yelena Sanchez is a 88 y.o. female who presents to [facility] for follow-up regarding lung cancer, breast cancer, and MDS.    Lung Cancer- Stage I, diagnosed 2004, s/p lobectomy by Dr. West, pt denies receiving chemotherapy or radiation treatment, since patient is almost 20 years since her initial diagnosis and treatment, will obtain imaging if patient has symptoms done for disease recurrence.    Left Breast Cancer- diagnosed 6/2011, s/p left mastectomy on 7/2011, pt denied receiving chemotherapy or RT,  received 7-8 years of adjuvant endocrine therapy, reports stopping by 2020, currently under active surveillance, discussed obtaining mammogram today, patient agreed this place order for mammogram today.    MDS with multilineage dysplasia-seen per bone marrow biopsy from 3/31/2022, boy diagnosis and prognosis today with patient , patient receiving Epogen injections periodically if hemoglobin is less than 10, will repeat labs and assess for need for Epogen injection  -Since patient's iron studies were within normal limits, patient does not require IV iron infusion, and since her hemoglobin is over 10 at 13.7 today, patient does not require Epogen injection      Plan patient follow-up in 1 month after mammogram and reassess for Epogen injection needed at that time with CBC and CMP.    Please note that portions of this note were completed with a voice recognition program.    Electronically signed by Enma Wagner MD, 12/19/23, 5:35 PM EST.          Follow Up     I spent 60 minutes caring for Yelena on this date of service. This time includes time spent by me in the following activities:preparing for the visit, reviewing tests, obtaining and/or reviewing a separately obtained history, performing a medically appropriate examination and/or evaluation , counseling and educating the patient/family/caregiver, ordering medications, tests, or procedures, referring and communicating with other health care professionals , documenting information in the medical record, independently interpreting results and communicating that information with the patient/family/caregiver, and care coordination.    This is an acute or chronic illness that poses a threat to life or bodily function. The above treatment plan involves a high risk of complications and/or mortality of patient management.    The patient was seen and examined. Work by the provider also included review and/or ordering of lab tests, review and/or ordering of  radiology tests, review and/or ordering of medicine tests, discussion with other physicians or providers, independent review of data, obtaining old records, review/summation of old records, and/or other review.    I have reviewed the family history, social history, and past medical history for this patient. Previous information and data has been reviewed and updated as needed. I have reviewed and verified the chief complaint, history, and other documentation. The patient was interviewed and examined in the clinic and the chart reviewed. The previous observations, recommendations, and conclusions were reviewed including those of other providers.     The plan was discussed with the patient and/or family. The patient was given time to ask questions and these questions were answered. At the conclusion of their visit they had no additional questions or concerns and all questions were answered to their satisfaction.    Patient was given instructions and counseling regarding her condition or for health maintenance advice. Please see specific information pulled into the AVS if appropriate.

## 2023-12-20 ENCOUNTER — PATIENT ROUNDING (BHMG ONLY) (OUTPATIENT)
Dept: ONCOLOGY | Facility: HOSPITAL | Age: 88
End: 2023-12-20
Payer: OTHER GOVERNMENT

## 2023-12-20 ENCOUNTER — TELEPHONE (OUTPATIENT)
Dept: ONCOLOGY | Facility: HOSPITAL | Age: 88
End: 2023-12-20
Payer: OTHER GOVERNMENT

## 2023-12-20 NOTE — TELEPHONE ENCOUNTER
Spoke with Yonathan as patient was not available to talk, advised that the patient's hemoglobin is up to 13.7 so she does not need an Epogen injection and that her iron studies show that she has adequate iron and does not need iv iron at this time. Instructed to maintain all follow up visits and to contact the office with any questions or concerns. Patient verbalized understanding of all information discussed.

## 2023-12-20 NOTE — PROGRESS NOTES
December 20, 2023    Hello, may I speak with Yelena Hansen?    My name is Tierra.      I am  with [facility]  [address] [place] [address] [place] [address] [phone].    Before we get started may I verify your date of birth? 1935    I am calling to officially welcome you to our practice and ask about your recent visit. Is this a good time to talk? yes    Tell me about your visit with us. What things went well?      I spoke with the patient's  who stated that they were just waiting on the Doctor to call them with her lab results. I verified that they were resulted and informed the  that as soon as the Doctor read the results that we would get a call back to them. No other questions or concerns at this time.     We're always looking for ways to make our patients' experiences even better. Do you have recommendations on ways we may improve?  No    Overall were you satisfied with your first visit to our practice? yes       I appreciate you taking the time to speak with me today. Is there anything else I can do for you? no      Thank you, and have a great day.

## 2023-12-22 DIAGNOSIS — I50.42 CHRONIC COMBINED SYSTOLIC AND DIASTOLIC CONGESTIVE HEART FAILURE: ICD-10-CM

## 2023-12-22 DIAGNOSIS — I10 ESSENTIAL HYPERTENSION: ICD-10-CM

## 2023-12-22 RX ORDER — NEBIVOLOL HYDROCHLORIDE 10 MG/1
10 TABLET ORAL DAILY
Qty: 90 TABLET | Refills: 3 | Status: SHIPPED | OUTPATIENT
Start: 2023-12-22

## 2023-12-29 ENCOUNTER — HOSPITAL ENCOUNTER (OUTPATIENT)
Dept: MAMMOGRAPHY | Facility: HOSPITAL | Age: 88
Discharge: HOME | End: 2023-12-29
Admitting: INTERNAL MEDICINE
Payer: MEDICARE

## 2023-12-29 DIAGNOSIS — Z12.31 ENCOUNTER FOR SCREENING MAMMOGRAM FOR MALIGNANT NEOPLASM OF BREAST: ICD-10-CM

## 2023-12-29 DIAGNOSIS — D46.9 MDS (MYELODYSPLASTIC SYNDROME): ICD-10-CM

## 2023-12-29 DIAGNOSIS — C50.912 MALIGNANT NEOPLASM OF LEFT FEMALE BREAST, UNSPECIFIED ESTROGEN RECEPTOR STATUS, UNSPECIFIED SITE OF BREAST: ICD-10-CM

## 2023-12-29 PROCEDURE — 77067 SCR MAMMO BI INCL CAD: CPT

## 2023-12-29 PROCEDURE — 77063 BREAST TOMOSYNTHESIS BI: CPT

## 2024-01-04 ENCOUNTER — OFFICE VISIT (OUTPATIENT)
Dept: ORTHOPEDIC SURGERY | Facility: CLINIC | Age: 89
End: 2024-01-04
Payer: MEDICARE

## 2024-01-04 VITALS
HEART RATE: 66 BPM | OXYGEN SATURATION: 92 % | HEIGHT: 59 IN | SYSTOLIC BLOOD PRESSURE: 175 MMHG | DIASTOLIC BLOOD PRESSURE: 79 MMHG | WEIGHT: 157.2 LBS | BODY MASS INDEX: 31.69 KG/M2

## 2024-01-04 DIAGNOSIS — M25.562 LEFT KNEE PAIN, UNSPECIFIED CHRONICITY: Primary | ICD-10-CM

## 2024-01-04 DIAGNOSIS — R22.32 MASS OF FINGER OF LEFT HAND: ICD-10-CM

## 2024-01-04 RX ADMIN — LIDOCAINE HYDROCHLORIDE 1 ML: 10 INJECTION, SOLUTION INFILTRATION; PERINEURAL at 10:15

## 2024-01-04 RX ADMIN — TRIAMCINOLONE ACETONIDE 20 MG: 40 INJECTION, SUSPENSION INTRA-ARTICULAR; INTRAMUSCULAR at 10:15

## 2024-01-04 NOTE — PROGRESS NOTES
"Chief Complaint  Pain and Follow-up of the Left Knee and Follow-up and Pain of the Left Hand     Subjective      Yelena Sanchez presents to [facility] for follow up of the left knee and the left hand. She has a mass on the left index finger.  She is having pain in the left knee and is still swelling from when she feel and it is getting harder and harder to walk.  The pain is on the lateral aspect of the knee and the back of the knee. She takes anti inflammatory.     No Known Allergies     Social History     Socioeconomic History    Marital status:    Tobacco Use    Smoking status: Never     Passive exposure: Never    Smokeless tobacco: Never   Vaping Use    Vaping Use: Never used   Substance and Sexual Activity    Alcohol use: Never    Drug use: Never    Sexual activity: Defer        I reviewed the patient's chief complaint, history of present illness, review of systems, past medical history, surgical history, family history, social history, medications, and allergy list.     Review of Systems     Constitutional: Denies fevers, chills, weight loss  Cardiovascular: Denies chest pain, shortness of breath  Skin: Denies rashes, acute skin changes  Neurologic: Denies headache, loss of consciousness      Vital Signs:   /79   Pulse 66   Ht 149.9 cm (59\")   Wt 71.3 kg (157 lb 3.2 oz)   SpO2 92%   BMI 31.75 kg/m²          Physical Exam  General: Alert. No acute distress    Ortho Exam        LEFT KNEE Flexion 105. Extension -5. Stable to varus/valgus stress. Stable to anterior/posterior drawer. Neurovascularly intact. Negative [place]. Negative Lachman. Positive EHL, FHL, HS and TA. Sensation intact to light touch all 5 nerves of the foot. Ambulates with Antalgic gait. Patella is well tracking. Calf supple, non-tender. Positive tenderness to the medial joint line. Positive tenderness to the lateral joint line. Positive Crepitus. Good strength to hamstrings, quadriceps, " dorsiflexors, and plantar flexors.  Knee Extensor Mechanism intact     LEFT HAND  Full ROM of the hand, fingers, elbow and wrist. Mass on the left index finger. Sensation grossly intact to light touch, median, radial and ulnar nerve. Positive AIN, PIN and ulnar nerve motor function intact. Axillary nerve intact. Positive pulses.  Aspirated 2 cc of clear fluid from the bursa       Small Joint Arthrocentesis LEFT INDEX FINGER  Consent given by: patient  Site marked: site marked  Timeout: Immediately prior to procedure a time out was called to verify the correct patient, procedure, equipment, support staff and site/side marked as required   Supporting Documentation  Indications: pain   Procedure Details  Location: index finger -   Preparation: Patient was prepped and draped in the usual sterile fashion  Needle gauge: 23G.  Medications administered: 1 mL lidocaine 1 %; 20 mg triamcinolone acetonide 40 MG/ML  Aspirate amount: 2 mL  Aspirate: clear  Patient tolerance: patient tolerated the procedure well with no immediate complications        Imaging Results (Most Recent)       Procedure Component Value Units Date/Time    XR Knee 3 View Left [[id number]] Resulted: 01/04/24 1054     Updated: 01/04/24 1100             Result Review :        X-Ray Report:  Left knee X-Ray  Indication: Evaluation of the left knee  AP/Lateral and [place] view(s)  Findings: Advanced degenerative bone on bone arthritis with osteophyte formation.   Prior studies available for comparison: yes             Assessment and Plan     Diagnoses and all orders for this visit:    1. Left knee pain, unspecified chronicity (Primary)  -     Cancel: XR Knee 3+ View With Sunrise Left  -     XR Knee 3 View Left    2. Mass of finger of left hand        Discussed the treatment plan with the patient. I reviewed the X-rays that were obtained today with the patient.     Use topical cream for the left knee.      Aspirated the mass on the left hand index finger.        Call or return if worsening symptoms.    Follow Up     PRN      Patient was given instructions and counseling regarding her condition or for health maintenance advice. Please see specific information pulled into the AVS if appropriate.     Scribed for August Sparrow MD by Ebony Parker MA.  01/04/24   10:29 EST    I have personally performed the services described in this document as scribed by the above individual and it is both accurate and complete. August Sparrow MD 01/09/24

## 2024-01-09 RX ORDER — LIDOCAINE HYDROCHLORIDE 10 MG/ML
1 INJECTION, SOLUTION INFILTRATION; PERINEURAL
Status: COMPLETED | OUTPATIENT
Start: 2024-01-04 | End: 2024-01-04

## 2024-01-09 RX ORDER — TRIAMCINOLONE ACETONIDE 40 MG/ML
20 INJECTION, SUSPENSION INTRA-ARTICULAR; INTRAMUSCULAR
Status: COMPLETED | OUTPATIENT
Start: 2024-01-04 | End: 2024-01-04

## 2024-01-11 ENCOUNTER — TRANSCRIBE ORDERS (OUTPATIENT)
Dept: ADMINISTRATIVE | Facility: HOSPITAL | Age: 89
End: 2024-01-11
Payer: MEDICARE

## 2024-01-11 ENCOUNTER — LAB (OUTPATIENT)
Dept: LAB | Facility: HOSPITAL | Age: 89
End: 2024-01-11
Payer: MEDICARE

## 2024-01-11 DIAGNOSIS — Z79.899 ENCOUNTER FOR LONG-TERM (CURRENT) USE OF OTHER MEDICATIONS: ICD-10-CM

## 2024-01-11 DIAGNOSIS — D46.9 MDS (MYELODYSPLASTIC SYNDROME): ICD-10-CM

## 2024-01-11 DIAGNOSIS — E55.9 VITAMIN D DEFICIENCY: ICD-10-CM

## 2024-01-11 DIAGNOSIS — E83.41 HYPERMAGNESEMIA: ICD-10-CM

## 2024-01-11 DIAGNOSIS — D50.9 IRON DEFICIENCY ANEMIA, UNSPECIFIED IRON DEFICIENCY ANEMIA TYPE: ICD-10-CM

## 2024-01-11 DIAGNOSIS — E78.5 HYPERLIPIDEMIA, UNSPECIFIED HYPERLIPIDEMIA TYPE: ICD-10-CM

## 2024-01-11 DIAGNOSIS — I10 HYPERTENSION, UNSPECIFIED TYPE: ICD-10-CM

## 2024-01-11 DIAGNOSIS — Z86.2 HISTORY OF ANEMIA: Primary | ICD-10-CM

## 2024-01-11 DIAGNOSIS — E61.1 IRON DEFICIENCY: ICD-10-CM

## 2024-01-11 DIAGNOSIS — E03.9 HYPOTHYROIDISM, UNSPECIFIED TYPE: ICD-10-CM

## 2024-01-11 DIAGNOSIS — Z86.2 HISTORY OF ANEMIA: ICD-10-CM

## 2024-01-11 LAB
25(OH)D3 SERPL-MCNC: 53 NG/ML (ref 30–100)
ALBUMIN SERPL-MCNC: 4.2 G/DL (ref 3.5–5.2)
ALBUMIN/GLOB SERPL: 1.2 G/DL
ALP SERPL-CCNC: 106 U/L (ref 39–117)
ALT SERPL W P-5'-P-CCNC: 9 U/L (ref 1–33)
ANION GAP SERPL CALCULATED.3IONS-SCNC: 14.1 MMOL/L (ref 5–15)
AST SERPL-CCNC: 13 U/L (ref 1–32)
BACTERIA UR QL AUTO: NORMAL /HPF
BASOPHILS # BLD AUTO: 0.03 10*3/MM3 (ref 0–0.2)
BASOPHILS NFR BLD AUTO: 0.5 % (ref 0–1.5)
BILIRUB CONJ SERPL-MCNC: <0.2 MG/DL (ref 0–0.3)
BILIRUB SERPL-MCNC: 0.3 MG/DL (ref 0–1.2)
BILIRUB UR QL STRIP: NEGATIVE
BUN SERPL-MCNC: 51 MG/DL (ref 8–23)
BUN/CREAT SERPL: 25.6 (ref 7–25)
CALCIUM SPEC-SCNC: 10 MG/DL (ref 8.6–10.5)
CHLORIDE SERPL-SCNC: 104 MMOL/L (ref 98–107)
CHOLEST SERPL-MCNC: 158 MG/DL (ref 0–200)
CLARITY UR: CLEAR
CO2 SERPL-SCNC: 24.9 MMOL/L (ref 22–29)
COLOR UR: YELLOW
CREAT SERPL-MCNC: 1.99 MG/DL (ref 0.57–1)
CRP SERPL-MCNC: 2.63 MG/DL (ref 0–0.5)
DEPRECATED RDW RBC AUTO: 48.5 FL (ref 37–54)
EGFRCR SERPLBLD CKD-EPI 2021: 23.9 ML/MIN/1.73
EOSINOPHIL # BLD AUTO: 0.09 10*3/MM3 (ref 0–0.4)
EOSINOPHIL NFR BLD AUTO: 1.5 % (ref 0.3–6.2)
ERYTHROCYTE [DISTWIDTH] IN BLOOD BY AUTOMATED COUNT: 14 % (ref 12.3–15.4)
FERRITIN SERPL-MCNC: 555 NG/ML (ref 13–150)
FOLATE SERPL-MCNC: >20 NG/ML (ref 4.78–24.2)
GLOBULIN UR ELPH-MCNC: 3.4 GM/DL
GLUCOSE SERPL-MCNC: 99 MG/DL (ref 65–99)
GLUCOSE UR STRIP-MCNC: NEGATIVE MG/DL
HCT VFR BLD AUTO: 43.3 % (ref 34–46.6)
HDLC SERPL-MCNC: 56 MG/DL (ref 40–60)
HGB BLD-MCNC: 14.3 G/DL (ref 12–15.9)
HGB UR QL STRIP.AUTO: NEGATIVE
HYALINE CASTS UR QL AUTO: NORMAL /LPF
IMM GRANULOCYTES # BLD AUTO: 0.01 10*3/MM3 (ref 0–0.05)
IMM GRANULOCYTES NFR BLD AUTO: 0.2 % (ref 0–0.5)
IRON 24H UR-MRATE: 76 MCG/DL (ref 37–145)
IRON SATN MFR SERPL: 26 % (ref 20–50)
KETONES UR QL STRIP: NEGATIVE
LDLC SERPL CALC-MCNC: 90 MG/DL (ref 0–100)
LDLC/HDLC SERPL: 1.61 {RATIO}
LEUKOCYTE ESTERASE UR QL STRIP.AUTO: NEGATIVE
LYMPHOCYTES # BLD AUTO: 0.64 10*3/MM3 (ref 0.7–3.1)
LYMPHOCYTES NFR BLD AUTO: 10.4 % (ref 19.6–45.3)
MAGNESIUM SERPL-MCNC: 2.5 MG/DL (ref 1.6–2.4)
MCH RBC QN AUTO: 31.2 PG (ref 26.6–33)
MCHC RBC AUTO-ENTMCNC: 33 G/DL (ref 31.5–35.7)
MCV RBC AUTO: 94.3 FL (ref 79–97)
MONOCYTES # BLD AUTO: 1.15 10*3/MM3 (ref 0.1–0.9)
MONOCYTES NFR BLD AUTO: 18.7 % (ref 5–12)
NEUTROPHILS NFR BLD AUTO: 4.22 10*3/MM3 (ref 1.7–7)
NEUTROPHILS NFR BLD AUTO: 68.7 % (ref 42.7–76)
NITRITE UR QL STRIP: NEGATIVE
NRBC BLD AUTO-RTO: 0 /100 WBC (ref 0–0.2)
PH UR STRIP.AUTO: 6 [PH] (ref 5–8)
PLATELET # BLD AUTO: 254 10*3/MM3 (ref 140–450)
PMV BLD AUTO: 11 FL (ref 6–12)
POTASSIUM SERPL-SCNC: 4.1 MMOL/L (ref 3.5–5.2)
PROT SERPL-MCNC: 7.6 G/DL (ref 6–8.5)
PROT UR QL STRIP: ABNORMAL
RBC # BLD AUTO: 4.59 10*6/MM3 (ref 3.77–5.28)
RBC # UR STRIP: NORMAL /HPF
REF LAB TEST METHOD: NORMAL
SODIUM SERPL-SCNC: 143 MMOL/L (ref 136–145)
SP GR UR STRIP: 1.02 (ref 1–1.03)
SQUAMOUS #/AREA URNS HPF: NORMAL /HPF
T4 FREE SERPL-MCNC: 1.35 NG/DL (ref 0.93–1.7)
TIBC SERPL-MCNC: 291 MCG/DL (ref 298–536)
TRANSFERRIN SERPL-MCNC: 195 MG/DL (ref 200–360)
TRIGL SERPL-MCNC: 60 MG/DL (ref 0–150)
TSH SERPL DL<=0.05 MIU/L-ACNC: 0.28 UIU/ML (ref 0.27–4.2)
URATE SERPL-MCNC: 7.3 MG/DL (ref 2.4–5.7)
UROBILINOGEN UR QL STRIP: ABNORMAL
VIT B12 BLD-MCNC: 1831 PG/ML (ref 211–946)
VLDLC SERPL-MCNC: 12 MG/DL (ref 5–40)
WBC # UR STRIP: NORMAL /HPF
WBC NRBC COR # BLD AUTO: 6.14 10*3/MM3 (ref 3.4–10.8)

## 2024-01-11 PROCEDURE — 84443 ASSAY THYROID STIM HORMONE: CPT

## 2024-01-11 PROCEDURE — 84466 ASSAY OF TRANSFERRIN: CPT

## 2024-01-11 PROCEDURE — 84550 ASSAY OF BLOOD/URIC ACID: CPT

## 2024-01-11 PROCEDURE — 82728 ASSAY OF FERRITIN: CPT

## 2024-01-11 PROCEDURE — 86140 C-REACTIVE PROTEIN: CPT

## 2024-01-11 PROCEDURE — 82306 VITAMIN D 25 HYDROXY: CPT

## 2024-01-11 PROCEDURE — 36415 COLL VENOUS BLD VENIPUNCTURE: CPT

## 2024-01-11 PROCEDURE — 84439 ASSAY OF FREE THYROXINE: CPT

## 2024-01-11 PROCEDURE — 83540 ASSAY OF IRON: CPT

## 2024-01-11 PROCEDURE — 83735 ASSAY OF MAGNESIUM: CPT

## 2024-01-11 PROCEDURE — 80061 LIPID PANEL: CPT

## 2024-01-11 PROCEDURE — 85025 COMPLETE CBC W/AUTO DIFF WBC: CPT

## 2024-01-11 PROCEDURE — 81001 URINALYSIS AUTO W/SCOPE: CPT

## 2024-01-11 PROCEDURE — 80053 COMPREHEN METABOLIC PANEL: CPT

## 2024-01-11 PROCEDURE — 82248 BILIRUBIN DIRECT: CPT

## 2024-01-11 PROCEDURE — 82607 VITAMIN B-12: CPT

## 2024-01-11 PROCEDURE — 82746 ASSAY OF FOLIC ACID SERUM: CPT

## 2024-01-17 ENCOUNTER — OFFICE VISIT (OUTPATIENT)
Dept: ONCOLOGY | Facility: HOSPITAL | Age: 89
End: 2024-01-17
Payer: MEDICARE

## 2024-01-17 VITALS
HEART RATE: 63 BPM | SYSTOLIC BLOOD PRESSURE: 159 MMHG | WEIGHT: 154.32 LBS | DIASTOLIC BLOOD PRESSURE: 64 MMHG | OXYGEN SATURATION: 92 % | BODY MASS INDEX: 31.17 KG/M2 | RESPIRATION RATE: 16 BRPM | TEMPERATURE: 97.4 F

## 2024-01-17 DIAGNOSIS — D46.9 MDS (MYELODYSPLASTIC SYNDROME): Primary | ICD-10-CM

## 2024-01-17 DIAGNOSIS — B37.2 CANDIDAL SKIN INFECTION: ICD-10-CM

## 2024-01-17 DIAGNOSIS — C34.90 PRIMARY ADENOCARCINOMA OF LUNG, UNSPECIFIED LATERALITY: ICD-10-CM

## 2024-01-17 DIAGNOSIS — C50.912 MALIGNANT NEOPLASM OF LEFT FEMALE BREAST, UNSPECIFIED ESTROGEN RECEPTOR STATUS, UNSPECIFIED SITE OF BREAST: ICD-10-CM

## 2024-01-17 PROCEDURE — G0463 HOSPITAL OUTPT CLINIC VISIT: HCPCS | Performed by: INTERNAL MEDICINE

## 2024-01-17 RX ORDER — NYSTATIN 100000 U/G
1 CREAM TOPICAL 2 TIMES DAILY
Qty: 30 G | Refills: 1 | Status: SHIPPED | OUTPATIENT
Start: 2024-01-17

## 2024-01-17 NOTE — PROGRESS NOTES
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 Noel and pt transitioned care to Dr. Wagner on 12/19/2023.   Lung Cancer- Stage I, diagnosed 2004, s/p lobectomy by Dr. West, pt denies receiving chemotherapy or radiation treatment    Left Breast Cancer- diagnosed 6/2011, s/p left mastectomy on 7/2011, pt denied receiving chemotherapy or RT, received 7-8 years of adjuvant endocrine therapy, reports stopping by 2020, currently under active surveillance    MDS with multilineage dysplasia    Yelena Sanchez is a 88 y.o. female who presents to [facility] for follow up regarding lung cancer, breast cancer history, and MDS.  Patient currently active surveillance for both lung cancer and breast cancer.  Patient denies any breast lesions or concerns today.  Regarding MDS patient reports last Epogen injection was in October 2023, reports she has been receiving this injections periodically for the past few years.  Ported fever, chills, night sweats, unintentional weight loss or new lymphadenopathy.    FH: Sister with Ovarian cancer    Interval history: Patient here with her  to follow-up on labs for MDS and breast cancer.  Patient here to see if she requires Epogen injection.  She reports rash under her right breast for which she has been prescribed nystatin powder which she states does not improve her rash.  Patient with nocturia due to diuretics, reports she increased her iron pill twice a day, tolerating slow iron  and capsule better than prescribed iron.  She has gout and is on allopurinol prescribed by Dr. Leach, denies any alcohol intake, but does report intake of beef.    Review of Systems   Constitutional:  Positive for fatigue.   Respiratory:  Positive for choking.    Musculoskeletal:  Positive for arthralgias.   All other systems reviewed and are negative.       Oncology/Hematology History    No history exists.       Objective     Vitals:    01/17/24 1143   BP: 159/64   Pulse: 63   Resp: 16   Temp: 97.4 °F (36.3 °C)   TempSrc: Temporal   SpO2: 92%   Weight: 70 kg (154 lb 5.2 oz)   PainSc:   7   PainLoc: Generalized       ECOG score: 0         PHQ-9 Total Score:         Physical Exam  Vitals reviewed. Exam conducted with a chaperone present.   Constitutional:       General: She is not in acute distress.     Appearance: Normal appearance.   HENT:      Head: Normocephalic and atraumatic.   Eyes:      Extraocular Movements: Extraocular movements intact.      Conjunctiva/sclera: Conjunctivae normal.   Pulmonary:      Effort: Pulmonary effort is normal.   Musculoskeletal:      Cervical back: Normal range of motion and neck supple.   Skin:     General: Skin is warm and dry.      Findings: No bruising.      Comments: Candidal intertrigo under right breast   Neurological:      Mental Status: She is oriented to person, place, and time.            Past Medical History     Past Medical History:   Diagnosis Date    Anemia     Arthritis     Asthma     Bicipitoradial bursitis     Breast cancer     CHF (congestive heart failure)     Congestive heart failure     COPD (chronic obstructive pulmonary disease)     Diverticulosis 11/26/2014    GERD (gastroesophageal reflux disease)     History of tobacco abuse     HTN (hypertension)     Hyperlipemia     Hyperthyroidism     ICD (implantable cardioverter-defibrillator), biventricular, in situ     Neck mass     Seasonal allergies     SOB (shortness of breath)      Current Outpatient Medications  on File Prior to Visit   Medication Sig Dispense Refill    allopurinol (ZYLOPRIM) 100 MG tablet Take 1 tablet by mouth Daily.      amLODIPine (NORVASC) 5 MG tablet Take 1 tablet by mouth Daily. 90 tablet 3    aspirin 81 MG EC tablet Take 1 tablet by mouth Daily.      azelastine (ASTELIN) 0.1 % nasal spray 2 sprays into the nostril(s) as directed by provider Daily. Use in each nostril as directed      Bystolic 10 MG tablet TAKE 1 TABLET DAILY 90 tablet 3    cetirizine (zyrTEC) 10 MG tablet       chlorpheniramine (CHLOR-TRIMETON) 4 MG tablet Take 1 tablet by mouth Every 6 (Six) Hours As Needed for Allergies.      cholecalciferol (VITAMIN D3) 25 MCG (1000 UT) tablet Take 2 tablets by mouth Daily.      clobetasol (TEMOVATE) 0.05 % cream       clopidogrel (PLAVIX) 75 MG tablet Take 1 tablet by mouth Daily. 90 tablet 3    docusate calcium (SURFAK) 240 MG capsule       ezetimibe (ZETIA) 10 MG tablet Take 1 tablet by mouth Daily. 90 tablet 3    fluocinolone acetonide (DERMOTIC) 0.01 % oil otic oil Instill 1-2 drops IN each ear EVERY DAY AS NEEDED itching      fluticasone (FLONASE) 50 MCG/ACT nasal spray       furosemide (LASIX) 40 MG tablet Take 1 tablet by mouth 2 (Two) Times a Day. 180 tablet 3    HYDROcodone-acetaminophen (NORCO) 5-325 MG per tablet       ipratropium (ATROVENT) 0.06 % nasal spray 2 sprays into the nostril(s) as directed by provider 3 (Three) Times a Day. 15 mL 11    lansoprazole (PREVACID) 30 MG capsule       Lotemax SM 0.38 % gel instill ONE drop IN EACH EYE TWICE DAILY      Lubricating Plus Eye Drops 0.5 % solution       montelukast (SINGULAIR) 10 MG tablet Take 1 tablet by mouth Every Night.      multivitamin with minerals tablet tablet Take 1 tablet by mouth Daily.      multivitamin with minerals tablet tablet Take 1 tablet by mouth Daily.      polyethylene glycol (MIRALAX) 17 GM/SCOOP powder       Synthroid 100 MCG tablet       levothyroxine (SYNTHROID, LEVOTHROID) 112 MCG tablet Take 1 tablet by  mouth Daily. (Patient not taking: Reported on 1/4/2024)      rosuvastatin (Crestor) 10 MG tablet Take 1 tablet by mouth Every Night. (Patient not taking: Reported on 12/19/2023) 90 tablet 3    spironolactone (ALDACTONE) 25 MG tablet Take 0.5 tablets by mouth Daily. (Patient not taking: Reported on 12/19/2023) 45 tablet 3    Synthroid 112 MCG tablet  (Patient not taking: Reported on 12/19/2023)      Synthroid 125 MCG tablet  (Patient not taking: Reported on 12/19/2023)      triamcinolone (KENALOG) 0.1 % cream apply to lids twice a day for 1 month (Patient not taking: Reported on 12/19/2023)      Xopenex HFA 45 MCG/ACT inhaler  (Patient not taking: Reported on 12/19/2023)      zolpidem (AMBIEN) 5 MG tablet  (Patient not taking: Reported on 12/19/2023)       No current facility-administered medications on file prior to visit.      No Known Allergies  Past Surgical History:   Procedure Laterality Date    COLONOSCOPY  2014    ENDOSCOPY  2014    EYE SURGERY      Implant; yes     LUNG LOBECTOMY Left     Left lower lobe    LUNG SURGERY      LUNG SURGERY Left     LEFT UPPER LOBE (2004)    MASTECTOMY      Left     MASTECTOMY Left     OTHER SURGICAL HISTORY      Joint Surgery    PACEMAKER IMPLANTATION       Social History     Socioeconomic History    Marital status:    Tobacco Use    Smoking status: Never     Passive exposure: Never    Smokeless tobacco: Never   Vaping Use    Vaping Use: Never used   Substance and Sexual Activity    Alcohol use: Never    Drug use: Never    Sexual activity: Defer     Family History   Problem Relation Age of Onset    Colon cancer Mother         60s    Lung cancer Sister        Results     Result Review   The following data was reviewed by: Enma Wagner MD on 12/19/2023:  Lab Results   Component Value Date    HGB 14.3 01/11/2024    HCT 43.3 01/11/2024    MCV 94.3 01/11/2024     01/11/2024    WBC 6.14 01/11/2024    NEUTROABS 4.22 01/11/2024    LYMPHSABS 0.64 (L) 01/11/2024     MONOSABS 1.15 (H) 01/11/2024    EOSABS 0.09 01/11/2024    BASOSABS 0.03 01/11/2024     Lab Results   Component Value Date    GLUCOSE 99 01/11/2024    BUN 51 (H) 01/11/2024    CREATININE 1.99 (H) 01/11/2024     01/11/2024    K 4.1 01/11/2024     01/11/2024    CO2 24.9 01/11/2024    CALCIUM 10.0 01/11/2024    PROTEINTOT 7.6 01/11/2024    ALBUMIN 4.2 01/11/2024    BILITOT 0.3 01/11/2024    ALKPHOS 106 01/11/2024    AST 13 01/11/2024    ALT 9 01/11/2024     Lab Results   Component Value Date    MG 2.5 (H) 01/11/2024    PHOS 3.7 05/17/2023    FREET4 1.35 01/11/2024    TSH 0.284 01/11/2024           No radiology results for the last day  Mammo Screening Modified With Tomosynthesis Right With CAD    Result Date: 12/29/2023  Impression: Benign right mammogram. Suggest routine mammographic screening.  Post left mastectomy.  RECOMMENDATION(S):  ROUTINE MAMMOGRAM AND CLINICAL EVALUATION IN 12 MONTHS.   BIRADS:  DIAGNOSTIC CATEGORY 2--BENIGN FINDING   BREAST COMPOSITION: Heterogeneously dense,which may obscure small masses.  PLEASE NOTE:  A NORMAL MAMMOGRAM DOES NOT EXCLUDE THE POSSIBILITY OF BREAST CANCER. ANY CLINICALLY SUSPICIOUS PALPABLE LUMP SHOULD BE BIOPSIED.      SOHSHANA TOURE MD       Electronically Signed and Approved By: SHOSHANA TOURE MD on 12/29/2023 at 15:23              Assessment & Plan     Diagnoses and all orders for this visit:    1. MDS (myelodysplastic syndrome) (Primary)  -     nystatin (MYCOSTATIN) 782678 UNIT/GM cream; Apply 1 Application topically to the appropriate area as directed 2 (Two) Times a Day. Under right breast  Dispense: 30 g; Refill: 1  -     CBC & Differential; Future  -     Comprehensive Metabolic Panel; Future    2. Primary adenocarcinoma of lung, unspecified laterality  -     nystatin (MYCOSTATIN) 806123 UNIT/GM cream; Apply 1 Application topically to the appropriate area as directed 2 (Two) Times a Day. Under right breast  Dispense: 30 g; Refill: 1  -     CBC &  Differential; Future  -     Comprehensive Metabolic Panel; Future    3. Malignant neoplasm of left female breast, unspecified estrogen receptor status, unspecified site of breast  -     nystatin (MYCOSTATIN) 308492 UNIT/GM cream; Apply 1 Application topically to the appropriate area as directed 2 (Two) Times a Day. Under right breast  Dispense: 30 g; Refill: 1  -     CBC & Differential; Future  -     Comprehensive Metabolic Panel; Future    4. Candidal skin infection  -     nystatin (MYCOSTATIN) 497587 UNIT/GM cream; Apply 1 Application topically to the appropriate area as directed 2 (Two) Times a Day. Under right breast  Dispense: 30 g; Refill: 1          Yelena Sanchez is a 88 y.o. female who presents to [facility] for follow-up regarding lung cancer, breast cancer, and MDS.    Lung Cancer- Stage I, diagnosed 2004, s/p lobectomy by Dr. West, pt denies receiving chemotherapy or radiation treatment, since patient is almost 20 years since her initial diagnosis and treatment, will obtain imaging if patient has symptoms done for disease recurrence.    Left Breast Cancer- diagnosed 6/2011, s/p left mastectomy on 7/2011, pt denied receiving chemotherapy or RT, received 7-8 years of adjuvant endocrine therapy, reports stopping by 2020, currently under active surveillance, mammogram from 12/29/2023 was BI-RADS 2 negative, repeat mammogram due in December 2024.    MDS with multilineage dysplasia-seen per bone marrow biopsy from 3/31/2022, boy diagnosis and prognosis today with patient , patient receiving Epogen injections periodically if hemoglobin is less than 10, will repeat labs and assess for need for Epogen injection  -Since patient's iron studies were within normal limits, patient does not require IV iron infusion, and since her hemoglobin is over 10 at 14.3 today, patient does not require Epogen injection      Candidal intertrigo  -Since patient has not had  improvement with rash with nystatin powder, prescribed nystatin cream today and recommend she switch to this.  -If rash does not improve recommend she follow-up primary care or urgent care.    Plan for patient follow-up in 1 month to reassess for Epogen injection needed at that time with CBC and CMP.    Please note that portions of this note were completed with a voice recognition program.      Electronically signed by Enma Wagner MD, 01/17/24, 3:28 PM EST.          Follow Up     I spent 30 minutes caring for Yelena on this date of service. This time includes time spent by me in the following activities:preparing for the visit, reviewing tests, obtaining and/or reviewing a separately obtained history, performing a medically appropriate examination and/or evaluation , counseling and educating the patient/family/caregiver, ordering medications, tests, or procedures, referring and communicating with other health care professionals , documenting information in the medical record, independently interpreting results and communicating that information with the patient/family/caregiver, and care coordination.    This is an acute or chronic illness that poses a threat to life or bodily function. The above treatment plan involves a high risk of complications and/or mortality of patient management.    The patient was seen and examined. Work by the provider also included review and/or ordering of lab tests, review and/or ordering of radiology tests, review and/or ordering of medicine tests, discussion with other physicians or providers, independent review of data, obtaining old records, review/summation of old records, and/or other review.    I have reviewed the family history, social history, and past medical history for this patient. Previous information and data has been reviewed and updated as needed. I have reviewed and verified the chief complaint, history, and other documentation. The patient was interviewed and  examined in the clinic and the chart reviewed. The previous observations, recommendations, and conclusions were reviewed including those of other providers.     The plan was discussed with the patient and/or family. The patient was given time to ask questions and these questions were answered. At the conclusion of their visit they had no additional questions or concerns and all questions were answered to their satisfaction.    Patient was given instructions and counseling regarding her condition or for health maintenance advice. Please see specific information pulled into the AVS if appropriate.

## 2024-01-23 ENCOUNTER — OFFICE VISIT (OUTPATIENT)
Dept: CARDIOLOGY | Facility: CLINIC | Age: 89
End: 2024-01-23
Payer: MEDICARE

## 2024-01-23 ENCOUNTER — CLINICAL SUPPORT NO REQUIREMENTS (OUTPATIENT)
Dept: CARDIOLOGY | Facility: CLINIC | Age: 89
End: 2024-01-23
Payer: MEDICARE

## 2024-01-23 VITALS
WEIGHT: 153 LBS | BODY MASS INDEX: 30.84 KG/M2 | HEART RATE: 62 BPM | SYSTOLIC BLOOD PRESSURE: 165 MMHG | HEIGHT: 59 IN | DIASTOLIC BLOOD PRESSURE: 60 MMHG

## 2024-01-23 DIAGNOSIS — Z95.810 PRESENCE OF BIVENTRICULAR IMPLANTABLE CARDIOVERTER-DEFIBRILLATOR (ICD): Primary | ICD-10-CM

## 2024-01-23 DIAGNOSIS — E78.2 MIXED HYPERLIPIDEMIA: ICD-10-CM

## 2024-01-23 DIAGNOSIS — I65.23 BILATERAL CAROTID ARTERY STENOSIS: ICD-10-CM

## 2024-01-23 DIAGNOSIS — I25.10 CORONARY ARTERY DISEASE INVOLVING NATIVE CORONARY ARTERY OF NATIVE HEART WITHOUT ANGINA PECTORIS: ICD-10-CM

## 2024-01-23 DIAGNOSIS — I50.42 CHRONIC COMBINED SYSTOLIC AND DIASTOLIC CONGESTIVE HEART FAILURE: ICD-10-CM

## 2024-01-23 DIAGNOSIS — I10 ESSENTIAL HYPERTENSION: ICD-10-CM

## 2024-01-23 PROCEDURE — 93284 PRGRMG EVAL IMPLANTABLE DFB: CPT | Performed by: INTERNAL MEDICINE

## 2024-01-23 NOTE — PROGRESS NOTES
Normal BI-V ICD Chamber Device Interrogation and Device Testing.  Normal evaluation of device function and lead measurements.  No optimization was needed of parameters or maximization of device longevity.  Patient is on automated Home Remote Monitoring.  Remaining battery is 1 year.

## 2024-02-03 NOTE — ASSESSMENT & PLAN NOTE
Most recent LDL is 90, which is near goal, significantly down from previous levels.  Will continue rosuvastatin and Crestor at the current dose.

## 2024-02-03 NOTE — ASSESSMENT & PLAN NOTE
Stable with no angina will continue statin and beta-blocker.  She is also on Plavix as advised with vascular surgeons due to history of carotid stenting.

## 2024-02-03 NOTE — PROGRESS NOTES
CARDIOLOGY FOLLOW-UP PROGRESS NOTE        Chief Complaint  Follow-up, Pacemaker Check, and Congestive Heart Failure    Subjective            Yelena Sanchez presents to [facility] CARDIOLOGY  History of Present Illness    Ms Sanchez is here for routine 6-month follow-up visit.  She had a prolonged hospital admission last year following a fall resulting in closed fracture of the neck of humerus.  He underwent nonoperative management limited physical therapy.  She spent several days in skilled nursing facility.  She was last seen in our office on 7/17/2023.  Today, she reports ongoing symptoms including fatigue and shortness of breath.  No significant pedal edema at this time.  She is able to ambulate with the help of her walker.  Joint pains persisting but better.  Denies any chest pain or palpitations.  She was previously following up with vascular surgeons in [place] but want to have annual carotid Dopplers done locally.      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 Cardiac catheterization in 2014 showed 70% mid RCA lesion with 30% left circumflex lesion. No angioplasty was performed. 4) chronic kidney disease 6) iron deficiency anemia     Medical History:  Past Medical History:   Diagnosis Date    Anemia     Arthritis     Asthma     Bicipitoradial bursitis     Breast cancer     CHF (congestive heart failure)     Congestive heart failure     COPD (chronic obstructive pulmonary disease)     Diverticulosis 11/26/2014    GERD (gastroesophageal reflux disease)     History of tobacco abuse     HTN (hypertension)     Hyperlipemia     Hyperthyroidism     ICD (implantable cardioverter-defibrillator), biventricular, in situ     Neck mass     Seasonal allergies     SOB (shortness of breath)         Surgical History: has a past surgical history that includes Colonoscopy (2014); Esophagogastroduodenoscopy (2014); Eye surgery; Other surgical history; Lung surgery; Mastectomy; Pacemaker Implantation; Lung lobectomy (Left); Mastectomy (Left); and Lung surgery (Left).     Family History: family history includes Colon cancer in her mother; Lung cancer in her sister.     Social History: reports that she has never smoked. She has never been exposed to tobacco smoke. She has never used smokeless tobacco. She reports that she does not drink alcohol and does not use drugs.    Allergies: Patient has no known allergies.    Current Outpatient Medications on File Prior to Visit   Medication Sig    allopurinol (ZYLOPRIM) 100 MG tablet Take 1 tablet by mouth Daily.    amLODIPine (NORVASC) 5 MG tablet Take 1 tablet by mouth Daily.    azelastine (ASTELIN) 0.1 % nasal spray 2 sprays into the nostril(s) as directed by provider Daily. Use in each nostril as directed    Bystolic 10 MG tablet TAKE 1 TABLET DAILY    cetirizine (zyrTEC) 10 MG tablet     chlorpheniramine (CHLOR-TRIMETON) 4 MG tablet Take 1 tablet by mouth Every 6 (Six) Hours As Needed for Allergies.    cholecalciferol (VITAMIN D3) 25 MCG (1000 UT) tablet Take 2 tablets by mouth Daily.    clopidogrel (PLAVIX) 75 MG tablet Take 1 tablet by mouth Daily.    ezetimibe (ZETIA) 10 MG tablet Take 1 tablet by mouth Daily.    furosemide (LASIX) 40 MG tablet Take 1 tablet by mouth 2 (Two) Times a Day.    levothyroxine (SYNTHROID, LEVOTHROID) 125 MCG tablet Take 1 tablet by mouth Daily.    montelukast (SINGULAIR) 10 MG tablet Take 1 tablet by mouth Every Night.    multivitamin with minerals tablet tablet Take 1 tablet by mouth Daily.    nystatin (MYCOSTATIN) 110482 UNIT/GM cream Apply 1 Application topically to the appropriate area as directed 2 (Two) Times a Day. Under right breast    polyethylene glycol (MIRALAX) 17 GM/SCOOP powder     rosuvastatin (Crestor) 10 MG tablet  "Take 1 tablet by mouth Every Night.    spironolactone (ALDACTONE) 25 MG tablet Take 0.5 tablets by mouth Daily.         Review of Systems   Constitutional:  Positive for fatigue.   Respiratory:  Positive for shortness of breath. Negative for cough and wheezing.    Cardiovascular:  Negative for chest pain, palpitations and leg swelling.   Gastrointestinal:  Negative for nausea and vomiting.   Neurological:  Negative for dizziness and syncope.        Objective     /60   Pulse 62   Ht 149.9 cm (59\")   Wt 69.4 kg (153 lb)   BMI 30.90 kg/m²       Physical Exam    General : Alert, awake, no acute distress  Neck : Supple, no carotid bruit, no jugular venous distention  CVS : Regular rate and rhythm, no murmur, rubs or gallops  Lungs: Clear to auscultation bilaterally, no crackles or rhonchi  Abdomen: Soft, nontender, bowel sounds heard in all 4 quadrants  Extremities: Warm, well-perfused, trace edema bilaterally    Result Review :     The following data was reviewed by: Simon Talbot MD on 01/23/2024:    CMP          10/14/2023    07:08 12/19/2023    14:09 1/11/2024    07:38   CMP   Glucose 101  87  99    BUN 52  45  51    Creatinine 1.78  1.74  1.99    EGFR 27.4  28.1  23.9    Sodium 140  137  143    Potassium 4.2  5.1  4.1    Chloride 103  101  104    Calcium 10.0  9.9  10.0    Total Protein 6.9  7.4  7.6    Albumin 4.3  4.3  4.2    Globulin  3.1  3.4    Total Bilirubin 0.2  0.2  0.3    Alkaline Phosphatase 91  98  106    AST (SGOT) 14  17  13    ALT (SGPT) 13  15  9    Albumin/Globulin Ratio  1.4  1.2    BUN/Creatinine Ratio 29.2  25.9  25.6    Anion Gap 12.2  13.1  14.1      CBC          10/14/2023    07:08 12/19/2023    14:09 1/11/2024    07:38   CBC   WBC 5.44  7.97  6.14    RBC 4.71  4.56  4.59    Hemoglobin 14.4  13.7  14.3    Hematocrit 43.8  43.9  43.3    MCV 93.0  96.3  94.3    MCH 30.6  30.0  31.2    MCHC 32.9  31.2  33.0    RDW 15.0  15.2  14.0    Platelets 224  227  254      TSH          " 7/14/2023    07:56 10/14/2023    07:08 1/11/2024    07:38   TSH   TSH 0.056  0.881  0.284      Lipid Panel          7/14/2023    07:56 10/14/2023    07:08 1/11/2024    07:38   Lipid Panel   Total Cholesterol 109  227  158    Triglycerides 107  112  60    HDL Cholesterol 40  53  56    VLDL Cholesterol 20  20  12    LDL Cholesterol  49  154  90    LDL/HDL Ratio 1.19  2.86  1.61           Data reviewed: Cardiology studies        Results for orders placed during the hospital encounter of 01/02/23    Adult Transthoracic Echo Complete w/ Color, Spectral and Contrast if necessary per protocol    Interpretation Summary    Left ventricular ejection fraction appears to be 31 - 35%.  Moderate global hypokinesis.    The left ventricular cavity is mild to moderately dilated.    Left ventricular diastolic function is consistent with (grade I) impaired relaxation and age.    Mildly reduced right ventricular systolic function noted.    Estimated right ventricular systolic pressure from tricuspid regurgitation is normal (<35 mmHg).    No significant valvular disease.    Electronic lead noted in right atrium and right ventricle.                   Assessment and Plan        Diagnoses and all orders for this visit:    1. Presence of biventricular implantable cardioverter-defibrillator (ICD) (Primary)  Assessment & Plan:  Pacemaker/defibrillator interrogated in the office today, normally functioning device.  Lead parameters appropriate.  Will continue with home remote monitoring      2. Chronic combined systolic and diastolic congestive heart failure  Assessment & Plan:  Most recent LV ejection fraction 30 to 35%.  She is clinically not volume overloaded.  NYHA class III symptoms at this time.  Recent labs showed a creatinine of 1.9, just above her baseline.  Continue Lasix at the current dose.  Continue Bystolic and spironolactone.  Unable to add ACE inhibitor/ARB due to chronic kidney disease.      3. Mixed hyperlipidemia  Assessment  & Plan:  Most recent LDL is 90, which is near goal, significantly down from previous levels.  Will continue rosuvastatin and Crestor at the current dose.      4. Essential hypertension  Assessment & Plan:  Blood pressure on the higher side in the office today, well-controlled during previous visits.  Medications reviewed with the patient.  Will continue current regimen.  Counseled regarding low-sodium diet and encouraged to keep home blood pressure log.      5. Coronary artery disease involving native coronary artery of native heart without angina pectoris  Assessment & Plan:  Stable with no angina will continue statin and beta-blocker.  She is also on Plavix as advised with vascular surgeons due to history of carotid stenting.      6. Bilateral carotid artery stenosis  Assessment & Plan:  Previous carotid artery stenting, she does not want to go to [place] to have annual carotid Dopplers for surveillance.  We will do bilateral carotid Dopplers in [place].  Continue Plavix.    Orders:  -     Duplex Carotid Ultrasound CAR; Future              Follow Up     Return in about 6 months (around 7/23/2024) for Next scheduled follow up, ok TO USE A NEW PATIENT SLOT IF NEEDED .    Patient was given instructions and counseling regarding her condition or for health maintenance advice. Please see specific information pulled into the AVS if appropriate.

## 2024-02-03 NOTE — ASSESSMENT & PLAN NOTE
Previous carotid artery stenting, she does not want to go to [place] to have annual carotid Dopplers for surveillance.  We will do bilateral carotid Dopplers in [place].  Continue Plavix.

## 2024-02-03 NOTE — ASSESSMENT & PLAN NOTE
Pacemaker/defibrillator interrogated in the office today, normally functioning device.  Lead parameters appropriate.  Will continue with home remote monitoring

## 2024-02-03 NOTE — ASSESSMENT & PLAN NOTE
Most recent LV ejection fraction 30 to 35%.  She is clinically not volume overloaded.  NYHA class III symptoms at this time.  Recent labs showed a creatinine of 1.9, just above her baseline.  Continue Lasix at the current dose.  Continue Bystolic and spironolactone.  Unable to add ACE inhibitor/ARB due to chronic kidney disease.

## 2024-02-03 NOTE — ASSESSMENT & PLAN NOTE
Blood pressure on the higher side in the office today, well-controlled during previous visits.  Medications reviewed with the patient.  Will continue current regimen.  Counseled regarding low-sodium diet and encouraged to keep home blood pressure log.

## 2024-02-12 ENCOUNTER — LAB (OUTPATIENT)
Dept: LAB | Facility: HOSPITAL | Age: 89
End: 2024-02-12
Payer: MEDICARE

## 2024-02-12 ENCOUNTER — HOSPITAL ENCOUNTER (OUTPATIENT)
Dept: CARDIOLOGY | Facility: HOSPITAL | Age: 89
Discharge: HOME | End: 2024-02-12
Payer: MEDICARE

## 2024-02-12 DIAGNOSIS — D46.9 MDS (MYELODYSPLASTIC SYNDROME): ICD-10-CM

## 2024-02-12 DIAGNOSIS — C50.912 MALIGNANT NEOPLASM OF LEFT FEMALE BREAST, UNSPECIFIED ESTROGEN RECEPTOR STATUS, UNSPECIFIED SITE OF BREAST: ICD-10-CM

## 2024-02-12 DIAGNOSIS — C34.90 PRIMARY ADENOCARCINOMA OF LUNG, UNSPECIFIED LATERALITY: ICD-10-CM

## 2024-02-12 DIAGNOSIS — I65.23 BILATERAL CAROTID ARTERY STENOSIS: ICD-10-CM

## 2024-02-12 LAB
ALBUMIN SERPL-MCNC: 4.1 G/DL (ref 3.5–5.2)
ALBUMIN/GLOB SERPL: 1.3 G/DL
ALP SERPL-CCNC: 96 U/L (ref 39–117)
ALT SERPL W P-5'-P-CCNC: 12 U/L (ref 1–33)
ANION GAP SERPL CALCULATED.3IONS-SCNC: 13.5 MMOL/L (ref 5–15)
AST SERPL-CCNC: 14 U/L (ref 1–32)
BASOPHILS # BLD AUTO: 0.03 10*3/MM3 (ref 0–0.2)
BASOPHILS NFR BLD AUTO: 0.4 % (ref 0–1.5)
BH CV DISTAL LEFT ICA HIDDEN LRR: 1 CM
BH CV LEFT CCA HIDDEN LRR: 1 CM/S
BH CV MID LEFT ICA HIDDEN LRR: 1 CM
BH CV VAS CAROTID LEFT DISTAL STENT EDV: 41 CM/S
BH CV VAS CAROTID LEFT DISTAL STENT: 222 CM/S
BH CV VAS CAROTID LEFT DISTAL TO STENT EDV: 15 CM/S
BH CV VAS CAROTID LEFT DISTAL TO STENT: 60 CM/S
BH CV VAS CAROTID LEFT MID STENT EDV: 55 CM/S
BH CV VAS CAROTID LEFT MID STENT: 273 CM/S
BH CV VAS CAROTID LEFT PROXIMAL STENT EDV: 20 CM/S
BH CV VAS CAROTID LEFT PROXIMAL STENT: 97 CM/S
BH CV VAS CAROTID LEFT PROXIMAL TO STENT: 40 CM/S
BH CV VAS CAROTID LEFT STENT NATIVE VESSEL PROXIMAL ED: 10 CM/S
BH CV XLRA MEAS LEFT CAROTID BULB EDV: 19 CM/SEC
BH CV XLRA MEAS LEFT CAROTID BULB PSV: 64 CM/SEC
BH CV XLRA MEAS LEFT DIST CCA EDV: -19.6 CM/SEC
BH CV XLRA MEAS LEFT DIST CCA PSV: -72.2 CM/SEC
BH CV XLRA MEAS LEFT DIST ICA EDV: 14.9 CM/SEC
BH CV XLRA MEAS LEFT DIST ICA PSV: 60.3 CM/SEC
BH CV XLRA MEAS LEFT ICA/CCA RATIO: 3.8
BH CV XLRA MEAS LEFT MID ICA EDV: -41.2 CM/SEC
BH CV XLRA MEAS LEFT MID ICA PSV: -222.3 CM/SEC
BH CV XLRA MEAS LEFT PROX CCA EDV: 12.9 CM/SEC
BH CV XLRA MEAS LEFT PROX CCA PSV: 41.9 CM/SEC
BH CV XLRA MEAS LEFT PROX ECA EDV: -4.9 CM/SEC
BH CV XLRA MEAS LEFT PROX ECA PSV: -39.6 CM/SEC
BH CV XLRA MEAS LEFT PROX ICA EDV: -54.9 CM/SEC
BH CV XLRA MEAS LEFT PROX ICA PSV: -272.8 CM/SEC
BH CV XLRA MEAS LEFT VERTEBRAL A EDV: -8.1 CM/SEC
BH CV XLRA MEAS LEFT VERTEBRAL A PSV: -28 CM/SEC
BH CV XLRA MEAS RIGHT CAROTID BULB EDV: 9 CM/SEC
BH CV XLRA MEAS RIGHT CAROTID BULB PSV: 33 CM/SEC
BH CV XLRA MEAS RIGHT DIST CCA EDV: -7.7 CM/SEC
BH CV XLRA MEAS RIGHT DIST CCA PSV: -33.3 CM/SEC
BH CV XLRA MEAS RIGHT DIST ICA EDV: 14.9 CM/SEC
BH CV XLRA MEAS RIGHT DIST ICA PSV: 70.2 CM/SEC
BH CV XLRA MEAS RIGHT ICA/CCA RATIO: 8
BH CV XLRA MEAS RIGHT MID ICA EDV: -26.3 CM/SEC
BH CV XLRA MEAS RIGHT MID ICA PSV: -159.1 CM/SEC
BH CV XLRA MEAS RIGHT PROX CCA EDV: 9.3 CM/SEC
BH CV XLRA MEAS RIGHT PROX CCA PSV: 57.2 CM/SEC
BH CV XLRA MEAS RIGHT PROX ECA EDV: -9.9 CM/SEC
BH CV XLRA MEAS RIGHT PROX ECA PSV: -69.6 CM/SEC
BH CV XLRA MEAS RIGHT PROX ICA EDV: -50.8 CM/SEC
BH CV XLRA MEAS RIGHT PROX ICA PSV: -267.5 CM/SEC
BH CV XLRA MEAS RIGHT VERTEBRAL A EDV: 14.3 CM/SEC
BH CV XLRA MEAS RIGHT VERTEBRAL A PSV: 46.6 CM/SEC
BH CVPROX LEFT ICA HIDDEN LRR: 1 CM
BILIRUB SERPL-MCNC: 0.3 MG/DL (ref 0–1.2)
BUN SERPL-MCNC: 54 MG/DL (ref 8–23)
BUN/CREAT SERPL: 29.2 (ref 7–25)
CALCIUM SPEC-SCNC: 10.5 MG/DL (ref 8.6–10.5)
CHLORIDE SERPL-SCNC: 104 MMOL/L (ref 98–107)
CO2 SERPL-SCNC: 23.5 MMOL/L (ref 22–29)
CREAT SERPL-MCNC: 1.85 MG/DL (ref 0.57–1)
DEPRECATED RDW RBC AUTO: 46.3 FL (ref 37–54)
EGFRCR SERPLBLD CKD-EPI 2021: 26.1 ML/MIN/1.73
EOSINOPHIL # BLD AUTO: 0.09 10*3/MM3 (ref 0–0.4)
EOSINOPHIL NFR BLD AUTO: 1.2 % (ref 0.3–6.2)
ERYTHROCYTE [DISTWIDTH] IN BLOOD BY AUTOMATED COUNT: 13.5 % (ref 12.3–15.4)
GLOBULIN UR ELPH-MCNC: 3.2 GM/DL
GLUCOSE SERPL-MCNC: 85 MG/DL (ref 65–99)
HCT VFR BLD AUTO: 43.5 % (ref 34–46.6)
HGB BLD-MCNC: 14.2 G/DL (ref 12–15.9)
IMM GRANULOCYTES # BLD AUTO: 0.03 10*3/MM3 (ref 0–0.05)
IMM GRANULOCYTES NFR BLD AUTO: 0.4 % (ref 0–0.5)
LEFT ARM BP: NORMAL MMHG
LYMPHOCYTES # BLD AUTO: 0.64 10*3/MM3 (ref 0.7–3.1)
LYMPHOCYTES NFR BLD AUTO: 8.8 % (ref 19.6–45.3)
MCH RBC QN AUTO: 30.9 PG (ref 26.6–33)
MCHC RBC AUTO-ENTMCNC: 32.6 G/DL (ref 31.5–35.7)
MCV RBC AUTO: 94.6 FL (ref 79–97)
MONOCYTES # BLD AUTO: 1.44 10*3/MM3 (ref 0.1–0.9)
MONOCYTES NFR BLD AUTO: 19.9 % (ref 5–12)
NEUTROPHILS NFR BLD AUTO: 5.02 10*3/MM3 (ref 1.7–7)
NEUTROPHILS NFR BLD AUTO: 69.3 % (ref 42.7–76)
NRBC BLD AUTO-RTO: 0 /100 WBC (ref 0–0.2)
PLATELET # BLD AUTO: 242 10*3/MM3 (ref 140–450)
PMV BLD AUTO: 11.3 FL (ref 6–12)
POTASSIUM SERPL-SCNC: 5.3 MMOL/L (ref 3.5–5.2)
PROT SERPL-MCNC: 7.3 G/DL (ref 6–8.5)
RBC # BLD AUTO: 4.6 10*6/MM3 (ref 3.77–5.28)
RIGHT ARM BP: NORMAL MMHG
SODIUM SERPL-SCNC: 141 MMOL/L (ref 136–145)
WBC NRBC COR # BLD AUTO: 7.25 10*3/MM3 (ref 3.4–10.8)

## 2024-02-12 PROCEDURE — 93880 EXTRACRANIAL BILAT STUDY: CPT

## 2024-02-12 PROCEDURE — 93880 EXTRACRANIAL BILAT STUDY: CPT | Performed by: SURGERY

## 2024-02-12 PROCEDURE — 80053 COMPREHEN METABOLIC PANEL: CPT

## 2024-02-12 PROCEDURE — 85025 COMPLETE CBC W/AUTO DIFF WBC: CPT

## 2024-02-12 PROCEDURE — 36415 COLL VENOUS BLD VENIPUNCTURE: CPT

## 2024-02-13 ENCOUNTER — TELEPHONE (OUTPATIENT)
Dept: CARDIOLOGY | Facility: CLINIC | Age: 89
End: 2024-02-13
Payer: MEDICARE

## 2024-02-13 DIAGNOSIS — I65.23 BILATERAL CAROTID ARTERY STENOSIS: Primary | ICD-10-CM

## 2024-02-14 ENCOUNTER — OFFICE VISIT (OUTPATIENT)
Dept: ONCOLOGY | Facility: HOSPITAL | Age: 89
End: 2024-02-14
Payer: MEDICARE

## 2024-02-14 VITALS
HEIGHT: 59 IN | RESPIRATION RATE: 18 BRPM | WEIGHT: 152.56 LBS | DIASTOLIC BLOOD PRESSURE: 57 MMHG | HEART RATE: 66 BPM | OXYGEN SATURATION: 100 % | SYSTOLIC BLOOD PRESSURE: 167 MMHG | BODY MASS INDEX: 30.76 KG/M2 | TEMPERATURE: 97.6 F

## 2024-02-14 DIAGNOSIS — D46.9 MDS (MYELODYSPLASTIC SYNDROME): Primary | ICD-10-CM

## 2024-02-14 DIAGNOSIS — C50.912 MALIGNANT NEOPLASM OF LEFT FEMALE BREAST, UNSPECIFIED ESTROGEN RECEPTOR STATUS, UNSPECIFIED SITE OF BREAST: ICD-10-CM

## 2024-02-14 DIAGNOSIS — Z12.31 ENCOUNTER FOR SCREENING MAMMOGRAM FOR MALIGNANT NEOPLASM OF BREAST: ICD-10-CM

## 2024-02-14 DIAGNOSIS — C34.90 PRIMARY ADENOCARCINOMA OF LUNG, UNSPECIFIED LATERALITY: ICD-10-CM

## 2024-02-14 PROCEDURE — G0463 HOSPITAL OUTPT CLINIC VISIT: HCPCS | Performed by: INTERNAL MEDICINE

## 2024-02-14 RX ORDER — NALOXONE HYDROCHLORIDE 4 MG/.1ML
SPRAY NASAL
COMMUNITY
Start: 2024-01-23

## 2024-02-20 ENCOUNTER — OFFICE VISIT (OUTPATIENT)
Dept: VASCULAR SURGERY | Facility: HOSPITAL | Age: 89
End: 2024-02-20
Payer: MEDICARE

## 2024-02-20 VITALS
SYSTOLIC BLOOD PRESSURE: 160 MMHG | TEMPERATURE: 97.1 F | DIASTOLIC BLOOD PRESSURE: 68 MMHG | OXYGEN SATURATION: 96 % | HEART RATE: 65 BPM | RESPIRATION RATE: 16 BRPM

## 2024-02-20 DIAGNOSIS — I65.23 BILATERAL CAROTID ARTERY STENOSIS: Primary | ICD-10-CM

## 2024-02-20 DIAGNOSIS — Z98.890 HISTORY OF LEFT COMMON CAROTID ARTERY STENT PLACEMENT: ICD-10-CM

## 2024-02-20 DIAGNOSIS — Z95.828 HISTORY OF LEFT COMMON CAROTID ARTERY STENT PLACEMENT: ICD-10-CM

## 2024-02-20 PROCEDURE — 1160F RVW MEDS BY RX/DR IN RCRD: CPT | Performed by: NURSE PRACTITIONER

## 2024-02-20 PROCEDURE — G0463 HOSPITAL OUTPT CLINIC VISIT: HCPCS | Performed by: NURSE PRACTITIONER

## 2024-02-20 PROCEDURE — 1159F MED LIST DOCD IN RCRD: CPT | Performed by: NURSE PRACTITIONER

## 2024-02-20 PROCEDURE — 99202 OFFICE O/P NEW SF 15 MIN: CPT | Performed by: NURSE PRACTITIONER

## 2024-02-20 NOTE — PROGRESS NOTES
[facility] Vascular Surgery New Patient Office Note     Date of Encounter: 02/20/2024     MRN Number: [id number]  Name: Yelena Sanchez  Phone Number: [phone]     Referred By: Simon Talbot MD  PCP: Kaelyn Eugene MD    Chief Complaint:    Chief Complaint   Patient presents with    Carotid Artery Disease     Patient is an 88 year old female that presents to the clinic with carotid artery stenosis. Patient had a recent carotid duplex on 12 February that indicated bilateral stenosis. Patient has been seeing Dr. Nunez in [place]. Patient is s/p left carotid stents several years ago.        Subjective      History of Present Illness:    Yelena Sanchez is a 88 y.o. female presents as a referral from Dr. Talbot for carotid stenosis. She has previously seen Dr. Boyle in [place] and has left carotid artery stent along with right carotid artery stenosis  She was last seen in his office in July 2022.  She states she did not a vascular surgeon closer because the trips to [place] were too hard on her and her .  She denies any current signs/symptoms to suggest CVA, TIA or amaurosis fugax.  She is non-smoker.     Review of Systems:  ROS  Review of Systems   Constitutional: Negative.   HENT: Negative.    Cardiovascular: Negative.    Respiratory: Negative.    Skin: Negative.    Musculoskeletal: Negative.    Gastrointestinal: Negative.    Neurological: Negative.    Psychiatric/Behavioral: Negative.     I have reviewed the following portions of the patient's history: problem list, current medications, allergies, past surgical history, past medical history, past social history, past family history, and ROS and confirm it's accurate.    Allergies:  No Known Allergies    Medications:      Current Outpatient Medications:     allopurinol (ZYLOPRIM) 100 MG tablet, Take 1 tablet by mouth Daily., Disp: , Rfl:     amLODIPine (NORVASC) 5 MG tablet, Take 1 tablet by mouth Daily., Disp: 90  tablet, Rfl: 3    azelastine (ASTELIN) 0.1 % nasal spray, 2 sprays into the nostril(s) as directed by provider Daily. Use in each nostril as directed, Disp: , Rfl:     Bystolic 10 MG tablet, TAKE 1 TABLET DAILY, Disp: 90 tablet, Rfl: 3    cetirizine (zyrTEC) 10 MG tablet, , Disp: , Rfl:     chlorpheniramine (CHLOR-TRIMETON) 4 MG tablet, Take 1 tablet by mouth Every 6 (Six) Hours As Needed for Allergies., Disp: , Rfl:     cholecalciferol (VITAMIN D3) 25 MCG (1000 UT) tablet, Take 2 tablets by mouth Daily., Disp: , Rfl:     clopidogrel (PLAVIX) 75 MG tablet, Take 1 tablet by mouth Daily., Disp: 90 tablet, Rfl: 3    ezetimibe (ZETIA) 10 MG tablet, Take 1 tablet by mouth Daily., Disp: 90 tablet, Rfl: 3    furosemide (LASIX) 40 MG tablet, Take 1 tablet by mouth 2 (Two) Times a Day., Disp: 180 tablet, Rfl: 3    levothyroxine (SYNTHROID, LEVOTHROID) 125 MCG tablet, Take 1 tablet by mouth Daily., Disp: , Rfl:     montelukast (SINGULAIR) 10 MG tablet, Take 1 tablet by mouth Every Night., Disp: , Rfl:     multivitamin with minerals tablet tablet, Take 1 tablet by mouth Daily., Disp: , Rfl:     naloxone (NARCAN) 4 MG/0.1ML nasal spray, , Disp: , Rfl:     nystatin (MYCOSTATIN) 637074 UNIT/GM cream, Apply 1 Application topically to the appropriate area as directed 2 (Two) Times a Day. Under right breast, Disp: 30 g, Rfl: 1    polyethylene glycol (MIRALAX) 17 GM/SCOOP powder, , Disp: , Rfl:     rosuvastatin (Crestor) 10 MG tablet, Take 1 tablet by mouth Every Night., Disp: 90 tablet, Rfl: 3    spironolactone (ALDACTONE) 25 MG tablet, Take 0.5 tablets by mouth Daily., Disp: 45 tablet, Rfl: 3    History:   Past Medical History:   Diagnosis Date    Anemia     Arthritis     Asthma     Bicipitoradial bursitis     Breast cancer     CHF (congestive heart failure)     Congestive heart failure     COPD (chronic obstructive pulmonary disease)     Diverticulosis 11/26/2014    GERD (gastroesophageal reflux disease)     History of tobacco  abuse     HTN (hypertension)     Hyperlipemia     Hyperthyroidism     ICD (implantable cardioverter-defibrillator), biventricular, in situ     Neck mass     Seasonal allergies     SOB (shortness of breath)        Past Surgical History:   Procedure Laterality Date    COLONOSCOPY  2014    ENDOSCOPY  2014    EYE SURGERY      Implant; yes     LUNG LOBECTOMY Left     Left lower lobe    LUNG SURGERY      LUNG SURGERY Left     LEFT UPPER LOBE (2004)    MASTECTOMY      Left     MASTECTOMY Left     OTHER SURGICAL HISTORY      Joint Surgery    PACEMAKER IMPLANTATION         Social History     Socioeconomic History    Marital status:    Tobacco Use    Smoking status: Never     Passive exposure: Never    Smokeless tobacco: Never   Vaping Use    Vaping Use: Never used   Substance and Sexual Activity    Alcohol use: Never    Drug use: Never    Sexual activity: Defer        Family History   Problem Relation Age of Onset    Colon cancer Mother         60s    Lung cancer Sister        Objective     Physical Exam:  Vitals:    02/20/24 1033   BP: 160/68   BP Location: Right arm   Patient Position: Sitting   Cuff Size: Large Adult   Pulse: 65   Resp: 16   Temp: 97.1 °F (36.2 °C)   TempSrc: Temporal   SpO2: 96%      There is no height or weight on file to calculate BMI.    Physical Exam   Physical Exam  Constitutional:       Appearance: Normal appearance.   HENT:      Head: Normocephalic.   Cardiovascular:      Rate and Rhythm: Normal rate.      Pulses: Normal pulses.      Comments:   Pulmonary:      Effort: Pulmonary effort is normal.   Musculoskeletal:         General: Normal range of motion.      Cervical back: Normal range of motion.   Skin:     General: Skin is warm and dry.      Capillary Refill: Capillary refill takes less than 2 seconds.      Comments:   Neurological:      General: No focal deficit present.      Mental Status: Alert and oriented to person, place, and time.   Psychiatric:         Mood and Affect: Mood  normal.         Behavior: Behavior normal.  Imaging/Labs:  I have reviewed the preliminary results of the carotid duplex performed on 2/12/2024  The duplex reveals increased velocities at the right proximal ICA at 267.5 cm/s with a diastolic of 50.8 cm/s.  This is consistent with the study performed in July 2022 that revealed the right proximal ICA at 242 cm/s with a diastolic of 59 cm/sec.  The duplex also revealed restenosis within the stent of the left carotid arteries that is similar to the prior study. No radiology results for the last 30 days.     I also reviewed the CT report performed in June 2022.    IMPRESSION:             1. There are stents in place within the left common and internal carotid arteries with probable in   stent restenosis of approximately 50%  2. Calcified plaque of the right carotid bifurcation resulting in less than 50% stenosis of the   right internal carotid artery  3. Vertebral arteries are patent with antegrade flow bilaterally  4. 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 Bilateral carotid artery stenosis (Primary)  -     Duplex Carotid Ultrasound CAR; Future    2. History of left common carotid artery stent placement  -     Duplex Carotid Ultrasound CAR; Future    Mrs. Sanchez has a history of a left carotid stent and right carotid artery stenosis. She was previously follow in [place] but the ability to get to [facility] has become an issue. The carotid duplex performed on 02/12/24 does not show any significant differences in the one performed at [facility] in June 2022. At that time a CT was also performed that revealed the carotid stents were in place with approximately 50 percent restenosis, along with calcified plaque in the right with less than 50 percent stenosis. I recommend that we repeat the carotid duplex in 6 months and reevaluate at that time  if stable we can proceede with annual surveillance. She is aware that we do not do carotid stents at our facility and there may come a time that she may have to follow up in [place].     I have answered all of her questions and she is in agreement with the plan at this time.  Thank you for allowing me to participate in your patient's care.        Follow Up:   No follow-ups on file.   Or sooner for any further concerns or worsening sign and symptoms. If unable to reach us in the office please dial 911 or go to the nearest emergency department.      LUZ MARIA Rivera  [facility] Vascular Surgery

## 2024-03-01 ENCOUNTER — PATIENT ROUNDING (BHMG ONLY) (OUTPATIENT)
Dept: VASCULAR SURGERY | Facility: HOSPITAL | Age: 89
End: 2024-03-01
Payer: MEDICARE

## 2024-03-01 NOTE — PROGRESS NOTES
PT WAS NEW TO OUR OFFICE AND SAW HILDA. THE  STATES THE VISIT WENT VERY WELL AND HAD NO QUESTIONS. WE DID VERIFY HIS APPTS IN AUGUST FOR VASCULAR SURGERY.

## 2024-03-05 NOTE — PROGRESS NOTES
Recent labs reviewed.  It showed some improvement in kidney functions.  Hemoglobin significantly improved.    BNP, a marker of fluid retention is higher than before.    Is there any worsening of shortness of breath or swelling ?  Any recent weight gain ?       Electronically signed by Simon Talbot MD, 04/16/23, 9:11 AM EDT.       Class II - visualization of the soft palate, fauces, and uvula

## 2024-04-13 ENCOUNTER — TRANSCRIBE ORDERS (OUTPATIENT)
Dept: LAB | Facility: HOSPITAL | Age: 89
End: 2024-04-13
Payer: MEDICARE

## 2024-04-13 ENCOUNTER — LAB (OUTPATIENT)
Dept: LAB | Facility: HOSPITAL | Age: 89
End: 2024-04-13
Payer: MEDICARE

## 2024-04-13 DIAGNOSIS — I10 ESSENTIAL HYPERTENSION, MALIGNANT: ICD-10-CM

## 2024-04-13 DIAGNOSIS — E55.9 VITAMIN D DEFICIENCY DISEASE: ICD-10-CM

## 2024-04-13 DIAGNOSIS — C34.90 PRIMARY ADENOCARCINOMA OF LUNG, UNSPECIFIED LATERALITY: ICD-10-CM

## 2024-04-13 DIAGNOSIS — M10.9 GOUT, UNSPECIFIED CAUSE, UNSPECIFIED CHRONICITY, UNSPECIFIED SITE: Primary | ICD-10-CM

## 2024-04-13 DIAGNOSIS — E03.9 MYXEDEMA HEART DISEASE: ICD-10-CM

## 2024-04-13 DIAGNOSIS — Z79.899 ENCOUNTER FOR LONG-TERM (CURRENT) USE OF OTHER MEDICATIONS: ICD-10-CM

## 2024-04-13 DIAGNOSIS — M06.9 RHEUMATOID ARTHRITIS, INVOLVING UNSPECIFIED SITE, UNSPECIFIED WHETHER RHEUMATOID FACTOR PRESENT: ICD-10-CM

## 2024-04-13 DIAGNOSIS — E78.5 HYPERLIPIDEMIA, UNSPECIFIED HYPERLIPIDEMIA TYPE: ICD-10-CM

## 2024-04-13 DIAGNOSIS — E87.5 HYPERKALEMIA: ICD-10-CM

## 2024-04-13 DIAGNOSIS — D64.9 ANEMIA, UNSPECIFIED TYPE: ICD-10-CM

## 2024-04-13 DIAGNOSIS — C50.912 MALIGNANT NEOPLASM OF LEFT FEMALE BREAST, UNSPECIFIED ESTROGEN RECEPTOR STATUS, UNSPECIFIED SITE OF BREAST: ICD-10-CM

## 2024-04-13 DIAGNOSIS — M10.9 GOUT, UNSPECIFIED CAUSE, UNSPECIFIED CHRONICITY, UNSPECIFIED SITE: ICD-10-CM

## 2024-04-13 DIAGNOSIS — I50.9 HEART FAILURE, UNSPECIFIED HF CHRONICITY, UNSPECIFIED HEART FAILURE TYPE: ICD-10-CM

## 2024-04-13 DIAGNOSIS — R73.09 IMPAIRED GLUCOSE TOLERANCE TEST: ICD-10-CM

## 2024-04-13 DIAGNOSIS — D46.9 MDS (MYELODYSPLASTIC SYNDROME): ICD-10-CM

## 2024-04-13 DIAGNOSIS — N19 RENAL FAILURE, UNSPECIFIED CHRONICITY: ICD-10-CM

## 2024-04-13 DIAGNOSIS — I51.9 MYXEDEMA HEART DISEASE: ICD-10-CM

## 2024-04-13 DIAGNOSIS — E61.1 IRON DEFICIENCY: ICD-10-CM

## 2024-04-13 LAB
25(OH)D3 SERPL-MCNC: 56.4 NG/ML (ref 30–100)
ALBUMIN SERPL-MCNC: 4.3 G/DL (ref 3.5–5.2)
ALBUMIN UR-MCNC: 49.4 MG/DL
ALBUMIN/GLOB SERPL: 1.5 G/DL
ALP SERPL-CCNC: 94 U/L (ref 39–117)
ALT SERPL W P-5'-P-CCNC: 12 U/L (ref 1–33)
ANION GAP SERPL CALCULATED.3IONS-SCNC: 13.3 MMOL/L (ref 5–15)
AST SERPL-CCNC: 14 U/L (ref 1–32)
BACTERIA UR QL AUTO: ABNORMAL /HPF
BASOPHILS # BLD MANUAL: 0.07 10*3/MM3 (ref 0–0.2)
BASOPHILS NFR BLD MANUAL: 1 % (ref 0–1.5)
BILIRUB CONJ SERPL-MCNC: <0.2 MG/DL (ref 0–0.3)
BILIRUB SERPL-MCNC: 0.3 MG/DL (ref 0–1.2)
BILIRUB UR QL STRIP: NEGATIVE
BUN SERPL-MCNC: 51 MG/DL (ref 8–23)
BUN/CREAT SERPL: 27 (ref 7–25)
CALCIUM SPEC-SCNC: 9.6 MG/DL (ref 8.6–10.5)
CHLORIDE SERPL-SCNC: 103 MMOL/L (ref 98–107)
CHOLEST SERPL-MCNC: 160 MG/DL (ref 0–200)
CLARITY UR: CLEAR
CO2 SERPL-SCNC: 25.7 MMOL/L (ref 22–29)
COLOR UR: YELLOW
CREAT SERPL-MCNC: 1.89 MG/DL (ref 0.57–1)
CRP SERPL-MCNC: 1.4 MG/DL (ref 0–0.5)
DEPRECATED RDW RBC AUTO: 50.5 FL (ref 37–54)
EGFRCR SERPLBLD CKD-EPI 2021: 25.5 ML/MIN/1.73
EOSINOPHIL # BLD MANUAL: 0.14 10*3/MM3 (ref 0–0.4)
EOSINOPHIL NFR BLD MANUAL: 2 % (ref 0.3–6.2)
ERYTHROCYTE [DISTWIDTH] IN BLOOD BY AUTOMATED COUNT: 14.6 % (ref 12.3–15.4)
FERRITIN SERPL-MCNC: 762 NG/ML (ref 13–150)
FOLATE SERPL-MCNC: >20 NG/ML (ref 4.78–24.2)
GIANT PLATELETS: ABNORMAL
GLOBULIN UR ELPH-MCNC: 2.9 GM/DL
GLUCOSE SERPL-MCNC: 98 MG/DL (ref 65–99)
GLUCOSE UR STRIP-MCNC: NEGATIVE MG/DL
HCT VFR BLD AUTO: 45.5 % (ref 34–46.6)
HDLC SERPL-MCNC: 56 MG/DL (ref 40–60)
HGB BLD-MCNC: 14.5 G/DL (ref 12–15.9)
HGB UR QL STRIP.AUTO: NEGATIVE
HYALINE CASTS UR QL AUTO: ABNORMAL /LPF
IRON 24H UR-MRATE: 100 MCG/DL (ref 37–145)
IRON SATN MFR SERPL: 32 % (ref 20–50)
KETONES UR QL STRIP: NEGATIVE
LARGE PLATELETS: ABNORMAL
LDLC SERPL CALC-MCNC: 90 MG/DL (ref 0–100)
LDLC/HDLC SERPL: 1.59 {RATIO}
LEUKOCYTE ESTERASE UR QL STRIP.AUTO: NEGATIVE
LYMPHOCYTES # BLD MANUAL: 0.68 10*3/MM3 (ref 0.7–3.1)
LYMPHOCYTES NFR BLD MANUAL: 13 % (ref 5–12)
MAGNESIUM SERPL-MCNC: 2.8 MG/DL (ref 1.6–2.4)
MCH RBC QN AUTO: 30.5 PG (ref 26.6–33)
MCHC RBC AUTO-ENTMCNC: 31.9 G/DL (ref 31.5–35.7)
MCV RBC AUTO: 95.8 FL (ref 79–97)
METAMYELOCYTES NFR BLD MANUAL: 2 % (ref 0–0)
MONOCYTES # BLD: 0.89 10*3/MM3 (ref 0.1–0.9)
NEUTROPHILS # BLD AUTO: 4.92 10*3/MM3 (ref 1.7–7)
NEUTROPHILS NFR BLD MANUAL: 69 % (ref 42.7–76)
NEUTS BAND NFR BLD MANUAL: 3 % (ref 0–5)
NITRITE UR QL STRIP: NEGATIVE
OVALOCYTES BLD QL SMEAR: ABNORMAL
PH UR STRIP.AUTO: 5.5 [PH] (ref 5–8)
PLATELET # BLD AUTO: 230 10*3/MM3 (ref 140–450)
PMV BLD AUTO: 11.6 FL (ref 6–12)
POIKILOCYTOSIS BLD QL SMEAR: ABNORMAL
POTASSIUM SERPL-SCNC: 4.5 MMOL/L (ref 3.5–5.2)
PROT SERPL-MCNC: 7.2 G/DL (ref 6–8.5)
PROT UR QL STRIP: ABNORMAL
RBC # BLD AUTO: 4.75 10*6/MM3 (ref 3.77–5.28)
RBC # UR STRIP: ABNORMAL /HPF
REF LAB TEST METHOD: ABNORMAL
SCHISTOCYTES BLD QL SMEAR: ABNORMAL
SMALL PLATELETS BLD QL SMEAR: ADEQUATE
SODIUM SERPL-SCNC: 142 MMOL/L (ref 136–145)
SP GR UR STRIP: 1.02 (ref 1–1.03)
SQUAMOUS #/AREA URNS HPF: ABNORMAL /HPF
TIBC SERPL-MCNC: 308 MCG/DL (ref 298–536)
TRANSFERRIN SERPL-MCNC: 207 MG/DL (ref 200–360)
TRIGL SERPL-MCNC: 74 MG/DL (ref 0–150)
URATE SERPL-MCNC: 6.8 MG/DL (ref 2.4–5.7)
UROBILINOGEN UR QL STRIP: ABNORMAL
VARIANT LYMPHS NFR BLD MANUAL: 10 % (ref 19.6–45.3)
VIT B12 BLD-MCNC: >2000 PG/ML (ref 211–946)
VLDLC SERPL-MCNC: 14 MG/DL (ref 5–40)
WBC # UR STRIP: ABNORMAL /HPF
WBC MORPH BLD: NORMAL
WBC NRBC COR # BLD AUTO: 6.83 10*3/MM3 (ref 3.4–10.8)

## 2024-04-13 PROCEDURE — 85007 BL SMEAR W/DIFF WBC COUNT: CPT

## 2024-04-13 PROCEDURE — 81001 URINALYSIS AUTO W/SCOPE: CPT

## 2024-04-13 PROCEDURE — 82746 ASSAY OF FOLIC ACID SERUM: CPT

## 2024-04-13 PROCEDURE — 80053 COMPREHEN METABOLIC PANEL: CPT

## 2024-04-13 PROCEDURE — 82607 VITAMIN B-12: CPT

## 2024-04-13 PROCEDURE — 84443 ASSAY THYROID STIM HORMONE: CPT

## 2024-04-13 PROCEDURE — 82728 ASSAY OF FERRITIN: CPT

## 2024-04-13 PROCEDURE — 84436 ASSAY OF TOTAL THYROXINE: CPT

## 2024-04-13 PROCEDURE — 85025 COMPLETE CBC W/AUTO DIFF WBC: CPT

## 2024-04-13 PROCEDURE — 83735 ASSAY OF MAGNESIUM: CPT

## 2024-04-13 PROCEDURE — 82306 VITAMIN D 25 HYDROXY: CPT

## 2024-04-13 PROCEDURE — 82248 BILIRUBIN DIRECT: CPT

## 2024-04-13 PROCEDURE — 84466 ASSAY OF TRANSFERRIN: CPT

## 2024-04-13 PROCEDURE — 84550 ASSAY OF BLOOD/URIC ACID: CPT

## 2024-04-13 PROCEDURE — 36415 COLL VENOUS BLD VENIPUNCTURE: CPT

## 2024-04-13 PROCEDURE — 83540 ASSAY OF IRON: CPT

## 2024-04-13 PROCEDURE — 82043 UR ALBUMIN QUANTITATIVE: CPT

## 2024-04-13 PROCEDURE — 80061 LIPID PANEL: CPT

## 2024-04-13 PROCEDURE — 84479 ASSAY OF THYROID (T3 OR T4): CPT

## 2024-04-13 PROCEDURE — 84480 ASSAY TRIIODOTHYRONINE (T3): CPT

## 2024-04-13 PROCEDURE — 86140 C-REACTIVE PROTEIN: CPT

## 2024-04-16 LAB
FT4I SERPL CALC-MCNC: 3 (ref 1.2–4.9)
T3 SERPL-MCNC: 83 NG/DL (ref 71–180)
T3RU NFR SERPL: 31 % (ref 24–39)
T4 SERPL-MCNC: 9.8 UG/DL (ref 4.5–12)
TSH SERPL DL<=0.005 MIU/L-ACNC: 0.23 UIU/ML (ref 0.45–4.5)

## 2024-04-26 ENCOUNTER — OFFICE VISIT (OUTPATIENT)
Dept: ORTHOPEDIC SURGERY | Facility: CLINIC | Age: 89
End: 2024-04-26
Payer: MEDICARE

## 2024-04-26 VITALS
DIASTOLIC BLOOD PRESSURE: 75 MMHG | BODY MASS INDEX: 30.24 KG/M2 | WEIGHT: 150 LBS | HEIGHT: 59 IN | HEART RATE: 60 BPM | SYSTOLIC BLOOD PRESSURE: 176 MMHG | OXYGEN SATURATION: 97 %

## 2024-04-26 DIAGNOSIS — M19.011 OSTEOARTHRITIS OF RIGHT SHOULDER, UNSPECIFIED OSTEOARTHRITIS TYPE: ICD-10-CM

## 2024-04-26 DIAGNOSIS — M25.511 RIGHT SHOULDER PAIN, UNSPECIFIED CHRONICITY: Primary | ICD-10-CM

## 2024-04-26 DIAGNOSIS — M19.012 OSTEOARTHRITIS OF LEFT SHOULDER, UNSPECIFIED OSTEOARTHRITIS TYPE: ICD-10-CM

## 2024-04-26 DIAGNOSIS — M25.512 LEFT SHOULDER PAIN, UNSPECIFIED CHRONICITY: ICD-10-CM

## 2024-04-26 RX ORDER — TRIAMCINOLONE ACETONIDE 40 MG/ML
40 INJECTION, SUSPENSION INTRA-ARTICULAR; INTRAMUSCULAR
Status: COMPLETED | OUTPATIENT
Start: 2024-04-26 | End: 2024-04-26

## 2024-04-26 RX ORDER — LIDOCAINE HYDROCHLORIDE 10 MG/ML
5 INJECTION, SOLUTION INFILTRATION; PERINEURAL
Status: COMPLETED | OUTPATIENT
Start: 2024-04-26 | End: 2024-04-26

## 2024-04-26 RX ADMIN — TRIAMCINOLONE ACETONIDE 40 MG: 40 INJECTION, SUSPENSION INTRA-ARTICULAR; INTRAMUSCULAR at 10:23

## 2024-04-26 RX ADMIN — LIDOCAINE HYDROCHLORIDE 5 ML: 10 INJECTION, SOLUTION INFILTRATION; PERINEURAL at 10:23

## 2024-04-26 RX ADMIN — LIDOCAINE HYDROCHLORIDE 5 ML: 10 INJECTION, SOLUTION INFILTRATION; PERINEURAL at 10:22

## 2024-04-26 RX ADMIN — TRIAMCINOLONE ACETONIDE 40 MG: 40 INJECTION, SUSPENSION INTRA-ARTICULAR; INTRAMUSCULAR at 10:22

## 2024-04-26 NOTE — PROGRESS NOTES
"Chief Complaint  Follow-up and Pain of the Right Shoulder and Follow-up of the Left Shoulder     Subjective      Yelena Sanchez presents to [facility] for follow up of the bilateral shoulders. She has a history of a history of right humeral fracture.  She has pain in her shoulder, her neck and has pain all over. She has had physical therapy in the past.  She has pain in the anterior aspect of the shoulder. She has had injections in the past and is here for repeat injections.     No Known Allergies     Social History     Socioeconomic History    Marital status:    Tobacco Use    Smoking status: Never     Passive exposure: Never    Smokeless tobacco: Never   Vaping Use    Vaping status: Never Used   Substance and Sexual Activity    Alcohol use: Never    Drug use: Never    Sexual activity: Defer        I reviewed the patient's chief complaint, history of present illness, review of systems, past medical history, surgical history, family history, social history, medications, and allergy list.     Review of Systems     Constitutional: Denies fevers, chills, weight loss  Cardiovascular: Denies chest pain, shortness of breath  Skin: Denies rashes, acute skin changes  Neurologic: Denies headache, loss of consciousness        Vital Signs:   /75   Pulse 60   Ht 149.9 cm (59\")   Wt 68 kg (150 lb)   SpO2 97%   BMI 30.30 kg/m²          Physical Exam  General: Alert. No acute distress    Ortho Exam      Bilateral shoulders: Skin is warm, dry, and intact. Active shoulder forward flexion to 130 degrees, abduction 115 degrees, and internal rotation to sacrum. Full flexion and extension of the wrist and elbow. Full pronation supination. Patient is able to form a full fist. Sensation intact light touch. Distal neurovascular intact.       Large Joint Arthrocentesis: R subacromial bursa  Date/Time: 4/26/2024 10:22 AM  Consent given by: patient  Site marked: site marked  Timeout: " Immediately prior to procedure a time out was called to verify the correct patient, procedure, equipment, support staff and site/side marked as required   Supporting Documentation  Indications: pain   Procedure Details  Location: shoulder - R subacromial bursa  Preparation: Patient was prepped and draped in the usual sterile fashion  Needle gauge: 21G.  Medications administered: 5 mL lidocaine 1 %; 40 mg triamcinolone acetonide 40 MG/ML  Patient tolerance: patient tolerated the procedure well with no immediate complications      Large Joint Arthrocentesis: L subacromial bursa  Date/Time: 4/26/2024 10:23 AM  Consent given by: patient  Site marked: site marked  Timeout: Immediately prior to procedure a time out was called to verify the correct patient, procedure, equipment, support staff and site/side marked as required   Supporting Documentation  Indications: pain   Procedure Details  Location: shoulder - L subacromial bursa  Preparation: Patient was prepped and draped in the usual sterile fashion  Needle gauge: 21G.  Medications administered: 5 mL lidocaine 1 %; 40 mg triamcinolone acetonide 40 MG/ML  Patient tolerance: patient tolerated the procedure well with no immediate complications    This injection documentation was Scribed for August Sparrow MD by Dhara Mckinley.  04/26/24   10:23 EDT      Imaging Results (Most Recent)       None             Result Review :               Assessment and Plan     Diagnoses and all orders for this visit:    1. Right shoulder pain, unspecified chronicity (Primary)    2. Left shoulder pain, unspecified chronicity    3. Osteoarthritis of right shoulder, unspecified osteoarthritis type    4. Osteoarthritis of left shoulder, unspecified osteoarthritis type        Discussed the treatment plan with the patient.     Discussed the risks and benefits of conservative measures. The patient expressed understanding and wished to proceed with bilateral shoulder steroid injections.   She tolerated the injections well.     Call or return if worsening symptoms.    Follow Up     PRN      Patient was given instructions and counseling regarding her condition or for health maintenance advice. Please see specific information pulled into the AVS if appropriate.     Scribed for August Sparrow MD by Ebony Parker MA.  04/26/24   09:12 EDT    I have personally performed the services described in this document as scribed by the above individual and it is both accurate and complete. August Sparrow MD 04/26/24

## 2024-05-10 ENCOUNTER — TELEPHONE (OUTPATIENT)
Dept: ORTHOPEDIC SURGERY | Facility: CLINIC | Age: 89
End: 2024-05-10

## 2024-05-10 NOTE — TELEPHONE ENCOUNTER
PLEASE CALL / LEAVE VMAIL NOTIFYING PATIENT WHEN MEDICARE A & B and/or  FOR LIFE MEDICARE SUPPLEMENT PRIOR AUTH HAS BEEN OBTAINED FOR BILATERAL KNEE GEL INJECTIONS, IF REQUIRED     AND TO DISCUSS SCHEDULING FOR BILATERAL KNEE GEL INJECTIONS FOLLOW UP - NO NEW INJURIES SINCE 09-12-23 RIGHT KNEE GEL INJECTION & 01-04-24 LEFT KNEE CORTISONE INJECTION     THANKS

## 2024-05-14 ENCOUNTER — OFFICE VISIT (OUTPATIENT)
Dept: ORTHOPEDIC SURGERY | Facility: CLINIC | Age: 89
End: 2024-05-14
Payer: MEDICARE

## 2024-05-14 DIAGNOSIS — M17.0 PRIMARY OSTEOARTHRITIS OF KNEES, BILATERAL: Primary | ICD-10-CM

## 2024-05-14 DIAGNOSIS — M25.562 PAIN IN BOTH KNEES, UNSPECIFIED CHRONICITY: ICD-10-CM

## 2024-05-14 DIAGNOSIS — M25.561 PAIN IN BOTH KNEES, UNSPECIFIED CHRONICITY: ICD-10-CM

## 2024-05-14 RX ORDER — FLUTICASONE PROPIONATE 50 MCG
SPRAY, SUSPENSION (ML) NASAL
COMMUNITY
Start: 2024-04-17

## 2024-05-14 RX ORDER — LIDOCAINE 50 MG/G
PATCH TOPICAL
COMMUNITY
Start: 2024-04-17

## 2024-05-14 RX ORDER — L. ACIDOPHILUS/PECTIN, CITRUS 25MM-100MG
TABLET ORAL
COMMUNITY
Start: 2024-04-17

## 2024-05-14 RX ORDER — LANSOPRAZOLE 30 MG/1
CAPSULE, DELAYED RELEASE ORAL
COMMUNITY
Start: 2024-04-16

## 2024-05-14 RX ORDER — HYDROCODONE BITARTRATE AND ACETAMINOPHEN 5; 325 MG/1; MG/1
TABLET ORAL
COMMUNITY
Start: 2024-04-17

## 2024-05-14 RX ORDER — ZOLPIDEM TARTRATE 5 MG/1
TABLET ORAL
COMMUNITY
Start: 2024-04-17

## 2024-05-14 RX ORDER — KETOCONAZOLE 20 MG/G
CREAM TOPICAL
COMMUNITY
Start: 2024-04-16

## 2024-05-14 NOTE — PROGRESS NOTES
"Chief Complaint  Follow-up of the Left Knee and Follow-up of the Right Knee    Subjective      Yelena Sanchez presents to [facility] for follow up of her bilateral knees.  Patient has bilateral knee osteoarthritis that she been treating conservatively.  She previously received right knee Synvisc injection in office on 9/12/2023 and left knee Synvisc on 7/11/2023.  She presents independently ambulatory without use of assistive device.  States that her knees have remained painful and endorses significant feeling of instability, stating that her knee always feel as if they are \"collapsing\".    No Known Allergies    Objective     Vital Signs:   There were no vitals filed for this visit.  There is no height or weight on file to calculate BMI.    I reviewed the patient's chief complaint, history of present illness, review of systems, past medical history, surgical history, family history, social history, medications, and allergy list.     REVIEW OF SYSTEMS    Constitutional: Denies fevers, chills, weight loss  Cardiovascular: Denies chest pain, shortness of breath  Skin: Denies rashes, acute skin changes  Neurologic: Denies headache, loss of consciousness  MSK: bilateral knee pain .     Ortho Exam  Bilateral knees: Skin is warm, dry, and intact.  Moderate edema.  Tenderness to palpation medial lateral joint lines.  Palpable osteophytes and crepitus with ROM.  -3 to -5 degrees of extension and flexion 115-120 degrees.  Full plantarflexion and dorsiflexion ankles.  Sensation and distal neurovascular intact.    Large Joint: R knee  Date/Time: 5/14/2024 2:25 PM  Consent given by: patient  Site marked: site marked  Timeout: Immediately prior to procedure a time out was called to verify the correct patient, procedure, equipment, support staff and site/side marked as required   Supporting Documentation  Indications: pain   Procedure Details  Location: knee - R knee  Preparation: Patient was prepped " and draped in the usual sterile fashion  Needle gauge: 21G.  Medications administered: 48 mg hylan 48 MG/6ML  Patient tolerance: patient tolerated the procedure well with no immediate complications      Large Joint: L knee  Date/Time: 5/14/2024 2:26 PM  Consent given by: patient  Site marked: site marked  Timeout: Immediately prior to procedure a time out was called to verify the correct patient, procedure, equipment, support staff and site/side marked as required   Supporting Documentation  Indications: pain   Procedure Details  Location: knee - L knee  Preparation: Patient was prepped and draped in the usual sterile fashion  Needle gauge: 21G.  Medications administered: 48 mg hylan 48 MG/6ML  Patient tolerance: patient tolerated the procedure well with no immediate complications      This injection documentation was Scribed for Jane Baltazar PA-C by Evie Topete MA.  05/14/24   14:27 EDT      Imaging Results (Most Recent)       None              Assessment and Plan   Diagnoses and all orders for this visit:    1. Primary osteoarthritis of knees, bilateral (Primary)    2. Pain in both knees, unspecified chronicity    Other orders  -     Large Joint: R knee  -     Large Joint: L knee       Tobacco Use: Low Risk  (5/15/2024)    Patient History     Smoking Tobacco Use: Never     Smokeless Tobacco Use: Never     Passive Exposure: Never     Patient reports that they are a nonsmoker; cessation education not applicable.     Follow Up   Return in about 6 weeks (around 6/25/2024).  Patient Instructions   Bilateral knee Synvisc injections administered in office today.  Advised on 6-month duration between injections.  Consider Zilretta injections after 6 weeks, if needed.  Follow-up as needed.  Call with changes or concerns.  Patient was given instructions and counseling regarding her condition or for health maintenance advice. Please see specific information pulled into the AVS if appropriate.       Dictated Utilizing Dragon  Dictation. Please note that portions of this note were completed with a voice recognition program. Part of this note may be an electronic transcription/translation of spoken language to printed text using the Dragon Dictation System.

## 2024-05-15 ENCOUNTER — OFFICE VISIT (OUTPATIENT)
Dept: ONCOLOGY | Facility: HOSPITAL | Age: 89
End: 2024-05-15
Payer: MEDICARE

## 2024-05-15 VITALS
HEIGHT: 59 IN | OXYGEN SATURATION: 98 % | BODY MASS INDEX: 30.4 KG/M2 | TEMPERATURE: 97.8 F | WEIGHT: 150.79 LBS | RESPIRATION RATE: 18 BRPM | HEART RATE: 116 BPM

## 2024-05-15 DIAGNOSIS — C50.912 MALIGNANT NEOPLASM OF LEFT FEMALE BREAST, UNSPECIFIED ESTROGEN RECEPTOR STATUS, UNSPECIFIED SITE OF BREAST: ICD-10-CM

## 2024-05-15 DIAGNOSIS — D46.9 MDS (MYELODYSPLASTIC SYNDROME): Primary | ICD-10-CM

## 2024-05-15 PROCEDURE — G0463 HOSPITAL OUTPT CLINIC VISIT: HCPCS | Performed by: INTERNAL MEDICINE

## 2024-05-15 NOTE — PROGRESS NOTES
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 Noel and pt transitioned care to Dr. Wagner on 12/19/2023.   Lung Cancer- Stage I, diagnosed 2004, s/p lobectomy by Dr. West, pt denies receiving chemotherapy or radiation treatment    Left Breast Cancer- diagnosed 6/2011, s/p left mastectomy on 7/2011, pt denied receiving chemotherapy or RT, received 7-8 years of adjuvant endocrine therapy, reports stopping by 2020, currently under active surveillance    MDS with multilineage dysplasia    Yelena Sanchez is a 88 y.o. female who presents to [facility] for follow up regarding lung cancer, breast cancer history, and MDS.  Patient currently active surveillance for both lung cancer and breast cancer.  Patient denies any breast lesions or concerns today.  Regarding MDS patient reports last Epogen injection was in October 2023, reports she has been receiving this injections periodically for the past few years.  Ported fever, chills, night sweats, unintentional weight loss or new lymphadenopathy.    FH: Sister with Ovarian cancer    Interval history: Patient here with her  to follow-up on labs for MDS and breast cancer.  Patient here to see if she requires Epogen injection.  She reports rash under her right breast for which she has been prescribed nystatin powder and nystatin cream for which she reports has not improved her rash. She was recently evaluated by dermatology who has also prescribed  creams for this lesion. Patient reports she increased her iron pill twice a day, tolerating slow iron and capsule better than prescribed iron.  She has gout and is on allopurinol prescribed by Dr. Leach, again denies any alcohol intake, but does report intake of beef.  She reports her spironolactone diuretic has been changed to every other day, and she has been advised to avoid bananas but she has continued to consume bananas.  Patient reports fullness in her right ear.  Denies any pica, fatigue, lightheadedness, dizziness, blood loss, melena, or new breast concerns.  Of note she recent underwent knee injections by orthopedics.    Review of Systems   Constitutional:  Positive for fatigue. Negative for appetite change, diaphoresis, fever, unexpected weight gain and unexpected weight loss.   HENT:  Positive for ear pain (RIGHT EAR). Negative for hearing loss, mouth sores, sore throat, swollen glands, trouble swallowing and voice change.    Eyes:  Negative for blurred vision.   Respiratory:  Positive for choking. Negative for cough, shortness of breath and wheezing.    Cardiovascular:  Negative for chest pain and palpitations.   Gastrointestinal:  Negative for abdominal pain, blood in stool, constipation, diarrhea, nausea and vomiting.   Endocrine: Negative for cold intolerance and heat intolerance.   Genitourinary:  Negative for difficulty urinating, dysuria, frequency, hematuria and urinary incontinence.   Musculoskeletal:  Positive for arthralgias. Negative for back pain and myalgias.   Skin:  Negative for rash, skin lesions and wound.   Neurological:  Negative for dizziness, seizures, weakness, numbness and headache.   Hematological:  Does not bruise/bleed easily.   Psychiatric/Behavioral:  Negative for depressed mood. The patient is not nervous/anxious.    All other systems reviewed and are negative.       Oncology/Hematology History    No history exists.       Objective     Vitals:    05/15/24 1105   BP: Comment:  "provider notified 181/62   Pulse: 116   Resp: 18   Temp: 97.8 °F (36.6 °C)   TempSrc: Temporal   SpO2: 98%   Weight: 68.4 kg (150 lb 12.7 oz)   Height: 149.9 cm (59.02\")   PainSc:   2   PainLoc: Generalized           ECOG score: 0         PHQ-9 Total Score:         Physical Exam  Vitals reviewed. Exam conducted with a chaperone present.   Constitutional:       General: She is not in acute distress.     Appearance: Normal appearance.   HENT:      Head: Normocephalic and atraumatic.      Right Ear: There is impacted cerumen.      Left Ear: Tympanic membrane normal. There is no impacted cerumen.      Ears:      Comments: Right ear canal was unable to be visualized secondary to cerumen obstructing view  Eyes:      Extraocular Movements: Extraocular movements intact.      Conjunctiva/sclera: Conjunctivae normal.   Pulmonary:      Effort: Pulmonary effort is normal.   Musculoskeletal:      Cervical back: Normal range of motion and neck supple.   Skin:     General: Skin is warm and dry.      Findings: No bruising.      Comments: Candidal intertrigo under right breast   Neurological:      Mental Status: She is oriented to person, place, and time.            Past Medical History     Past Medical History:   Diagnosis Date    Anemia     Arthritis     Asthma     Bicipitoradial bursitis     Breast cancer     CHF (congestive heart failure)     Congestive heart failure     COPD (chronic obstructive pulmonary disease)     Diverticulosis 11/26/2014    GERD (gastroesophageal reflux disease)     History of tobacco abuse     HTN (hypertension)     Hyperlipemia     Hyperthyroidism     ICD (implantable cardioverter-defibrillator), biventricular, in situ     Neck mass     Seasonal allergies     SOB (shortness of breath)      Current Outpatient Medications on File Prior to Visit   Medication Sig Dispense Refill    allopurinol (ZYLOPRIM) 100 MG tablet Take 1 tablet by mouth Daily.      amLODIPine (NORVASC) 5 MG tablet Take 1 tablet by mouth " Daily. 90 tablet 3    azelastine (ASTELIN) 0.1 % nasal spray 2 sprays into the nostril(s) as directed by provider Daily. Use in each nostril as directed      Bystolic 10 MG tablet TAKE 1 TABLET DAILY 90 tablet 3    cetirizine (zyrTEC) 10 MG tablet       chlorpheniramine (CHLOR-TRIMETON) 4 MG tablet Take 1 tablet by mouth Every 6 (Six) Hours As Needed for Allergies.      cholecalciferol (VITAMIN D3) 25 MCG (1000 UT) tablet Take 2 tablets by mouth Daily.      clopidogrel (PLAVIX) 75 MG tablet Take 1 tablet by mouth Daily. 90 tablet 3    ezetimibe (ZETIA) 10 MG tablet Take 1 tablet by mouth Daily. 90 tablet 3    fluticasone (FLONASE) 50 MCG/ACT nasal spray       furosemide (LASIX) 40 MG tablet Take 1 tablet by mouth 2 (Two) Times a Day. 180 tablet 3    HYDROcodone-acetaminophen (NORCO) 5-325 MG per tablet       ketoconazole (NIZORAL) 2 % cream Apply to area twice a day for 8 weeks then as needed for flares      Lactobacillus Acid-Pectin (Acidophilus/Citrus Pectin) tablet tablet       lansoprazole (PREVACID) 30 MG capsule       levothyroxine (SYNTHROID, LEVOTHROID) 125 MCG tablet Take 1 tablet by mouth Daily.      lidocaine (LIDODERM) 5 %       montelukast (SINGULAIR) 10 MG tablet Take 1 tablet by mouth Every Night.      multivitamin with minerals tablet tablet Take 1 tablet by mouth Daily.      naloxone (NARCAN) 4 MG/0.1ML nasal spray       nystatin (MYCOSTATIN) 933170 UNIT/GM cream Apply 1 Application topically to the appropriate area as directed 2 (Two) Times a Day. Under right breast 30 g 1    polyethylene glycol (MIRALAX) 17 GM/SCOOP powder       rosuvastatin (Crestor) 10 MG tablet Take 1 tablet by mouth Every Night. 90 tablet 3    spironolactone (ALDACTONE) 25 MG tablet Take 0.5 tablets by mouth Daily. 45 tablet 3    zolpidem (AMBIEN) 5 MG tablet        No current facility-administered medications on file prior to visit.      No Known Allergies  Past Surgical History:   Procedure Laterality Date    COLONOSCOPY   2014    ENDOSCOPY  2014    EYE SURGERY      Implant; yes     LUNG LOBECTOMY Left     Left lower lobe    LUNG SURGERY      LUNG SURGERY Left     LEFT UPPER LOBE (2004)    MASTECTOMY      Left     MASTECTOMY Left     OTHER SURGICAL HISTORY      Joint Surgery    PACEMAKER IMPLANTATION       Social History     Socioeconomic History    Marital status:    Tobacco Use    Smoking status: Never     Passive exposure: Never    Smokeless tobacco: Never   Vaping Use    Vaping status: Never Used   Substance and Sexual Activity    Alcohol use: Never    Drug use: Never    Sexual activity: Defer     Family History   Problem Relation Age of Onset    Colon cancer Mother         60s    Lung cancer Sister        Results     Result Review   The following data was reviewed by: Enma Wagner MD on 12/19/2023:  Lab Results   Component Value Date    HGB 14.5 04/13/2024    HCT 45.5 04/13/2024    MCV 95.8 04/13/2024     04/13/2024    WBC 6.83 04/13/2024    NEUTROABS 4.92 04/13/2024    LYMPHSABS 0.64 (L) 02/12/2024    MONOSABS 1.44 (H) 02/12/2024    EOSABS 0.14 04/13/2024    BASOSABS 0.07 04/13/2024     Lab Results   Component Value Date    GLUCOSE 98 04/13/2024    BUN 51 (H) 04/13/2024    CREATININE 1.89 (H) 04/13/2024     04/13/2024    K 4.5 04/13/2024     04/13/2024    CO2 25.7 04/13/2024    CALCIUM 9.6 04/13/2024    PROTEINTOT 7.2 04/13/2024    ALBUMIN 4.3 04/13/2024    BILITOT 0.3 04/13/2024    ALKPHOS 94 04/13/2024    AST 14 04/13/2024    ALT 12 04/13/2024     Lab Results   Component Value Date    MG 2.8 (H) 04/13/2024    PHOS 3.7 05/17/2023    FREET4 1.35 01/11/2024    TSH 0.230 (L) 04/13/2024           No radiology results for the last day       Assessment & Plan     Diagnoses and all orders for this visit:    1. MDS (myelodysplastic syndrome) (Primary)  -     CBC & Differential; Future  -     Comprehensive Metabolic Panel; Future  -     Ferritin; Future  -     Iron Profile; Future    2. Malignant neoplasm of  left female breast, unspecified estrogen receptor status, unspecified site of breast  -     CBC & Differential; Future  -     Comprehensive Metabolic Panel; Future              Yelena Sanchez is a 88 y.o. female who presents to [facility] for follow-up regarding lung cancer, breast cancer, and MDS.    Lung Cancer- Stage I, diagnosed 2004, s/p lobectomy by Dr. West, pt denies receiving chemotherapy or radiation treatment, since patient is almost 20 years since her initial diagnosis and treatment, will obtain imaging if patient has symptoms done for disease recurrence.    Left Breast Cancer- diagnosed 6/2011, s/p left mastectomy on 7/2011, pt denied receiving chemotherapy or RT, received 7-8 years of adjuvant endocrine therapy, reports stopping by 2020, currently under active surveillance, mammogram from 12/29/2023 was BI-RADS 2 negative, repeat mammogram due in December 2024.    MDS with multilineage dysplasia-seen per bone marrow biopsy from 3/31/2022, discussed diagnosis and prognosis today with patient , patient receiving Epogen injections periodically if hemoglobin is less than 10, will repeat labs and assess for need for Epogen injection  -Since patient's iron studies were within normal limits, patient does not require IV iron infusion, and since her hemoglobin is over 10 on most recent CBC patient does not require Epogen injection, plan to recheck in 3 months.      Candidal intertrigo  -Since patient has not had improvement with rash with nystatin powder, or nystatin cream today and recommend evaluation by her dermatologist Dr. Mcgregor      Pt reports receiving labs with PCP every 3 months and prefers to have lab check via that office, pt to notify me when she has labs collected.    Plan for patient follow-up in 6 months after mammogram due in 12/2024 with labs.    Please note that portions of this note were completed with a voice recognition  program.      Electronically signed by Enma Wagner MD, 05/15/24, 3:31 PM EDT.              Follow Up     I spent 30 minutes caring for Yelena on this date of service. This time includes time spent by me in the following activities:preparing for the visit, reviewing tests, obtaining and/or reviewing a separately obtained history, performing a medically appropriate examination and/or evaluation , counseling and educating the patient/family/caregiver, ordering medications, tests, or procedures, referring and communicating with other health care professionals , documenting information in the medical record, independently interpreting results and communicating that information with the patient/family/caregiver, and care coordination.    This is an acute or chronic illness that poses a threat to life or bodily function. The above treatment plan involves a high risk of complications and/or mortality of patient management.    The patient was seen and examined. Work by the provider also included review and/or ordering of lab tests, review and/or ordering of radiology tests, review and/or ordering of medicine tests, discussion with other physicians or providers, independent review of data, obtaining old records, review/summation of old records, and/or other review.    I have reviewed the family history, social history, and past medical history for this patient. Previous information and data has been reviewed and updated as needed. I have reviewed and verified the chief complaint, history, and other documentation. The patient was interviewed and examined in the clinic and the chart reviewed. The previous observations, recommendations, and conclusions were reviewed including those of other providers.     The plan was discussed with the patient and/or family. The patient was given time to ask questions and these questions were answered. At the conclusion of their visit they had no additional questions or concerns and all  questions were answered to their satisfaction.    Patient was given instructions and counseling regarding her condition or for health maintenance advice. Please see specific information pulled into the AVS if appropriate.

## 2024-05-16 ENCOUNTER — TELEPHONE (OUTPATIENT)
Dept: CARDIOLOGY | Facility: CLINIC | Age: 89
End: 2024-05-16
Payer: MEDICARE

## 2024-05-16 NOTE — PATIENT INSTRUCTIONS
Bilateral knee Synvisc injections administered in office today.  Advised on 6-month duration between injections.  Consider Zilretta injections after 6 weeks, if needed.  Follow-up as needed.  Call with changes or concerns.

## 2024-05-16 NOTE — TELEPHONE ENCOUNTER
Mrs Sanchez's appointment with Dr. Talbot was rescheduled, however I explained that her device battery still has 11 months remaining and that her device download looks great, no episodes and no alerts.    I also confirmed that if there are any changes with battery that we will notify patient.

## 2024-06-25 ENCOUNTER — OFFICE VISIT (OUTPATIENT)
Dept: ORTHOPEDIC SURGERY | Facility: CLINIC | Age: 89
End: 2024-06-25
Payer: MEDICARE

## 2024-06-25 VITALS
WEIGHT: 151.46 LBS | SYSTOLIC BLOOD PRESSURE: 129 MMHG | HEIGHT: 59 IN | OXYGEN SATURATION: 90 % | DIASTOLIC BLOOD PRESSURE: 76 MMHG | BODY MASS INDEX: 30.53 KG/M2 | HEART RATE: 65 BPM

## 2024-06-25 DIAGNOSIS — M17.0 PRIMARY OSTEOARTHRITIS OF KNEES, BILATERAL: Primary | ICD-10-CM

## 2024-06-25 PROCEDURE — 20610 DRAIN/INJ JOINT/BURSA W/O US: CPT | Performed by: PHYSICIAN ASSISTANT

## 2024-06-25 PROCEDURE — 1160F RVW MEDS BY RX/DR IN RCRD: CPT | Performed by: PHYSICIAN ASSISTANT

## 2024-06-25 PROCEDURE — 1159F MED LIST DOCD IN RCRD: CPT | Performed by: PHYSICIAN ASSISTANT

## 2024-06-25 RX ORDER — LIDOCAINE HYDROCHLORIDE 10 MG/ML
5 INJECTION, SOLUTION INFILTRATION; PERINEURAL
Status: COMPLETED | OUTPATIENT
Start: 2024-06-25 | End: 2024-06-25

## 2024-06-25 RX ADMIN — LIDOCAINE HYDROCHLORIDE 5 ML: 10 INJECTION, SOLUTION INFILTRATION; PERINEURAL at 15:28

## 2024-06-25 RX ADMIN — LIDOCAINE HYDROCHLORIDE 5 ML: 10 INJECTION, SOLUTION INFILTRATION; PERINEURAL at 15:27

## 2024-06-25 NOTE — PROGRESS NOTES
"Chief Complaint  Pain and Follow-up of the Left Knee and Pain and Follow-up of the Right Knee    Subjective      Yelena Sanchez presents to [facility] for follow-up of bilateral knee pain and osteoarthritis.  She has managed these conservatively with intermittent injections in the past.  Patient last seen in office on 5/14/2024 for bilateral knee Synvisc injections.  She presents independently ambulatory without use of assistive device.  States that her knees are \"not so good\".  Does state that gel injections helped.  States her left knee is worse than her right.  She would like to proceed with repeat injections in office today.    Objective   No Known Allergies    Vital Signs:   /76   Pulse 65   Ht 149.9 cm (59\")   Wt 68.7 kg (151 lb 7.3 oz)   SpO2 90%   BMI 30.59 kg/m²       Physical Exam    Constitutional: Awake, alert. Well nourished appearance.    Integumentary: Warm, dry, intact. No obvious rashes.    HENT: Atraumatic, normocephalic.   Respiratory: Non labored respirations .   Cardiovascular: Intact peripheral pulses.    Psychiatric: Normal mood and affect. A&O X3    Ortho Exam  Bilateral knees: Skin is warm, dry, and intact.  There is moderate edema.  Tenderness to palpation along joint lines.  There are palpable osteophytes and crepitus with ROM.  -3 to -5 degrees of extension and flexion to 115-120 degrees.  Full plantarflexion and dorsiflexion the ankle.  Sensation and distal neurovascular intact.    Imaging Results (Most Recent)       None             Large Joint Arthrocentesis: R knee  Date/Time: 6/25/2024 3:27 PM  Consent given by: patient  Site marked: site marked  Timeout: Immediately prior to procedure a time out was called to verify the correct patient, procedure, equipment, support staff and site/side marked as required   Supporting Documentation  Indications: pain   Procedure Details  Location: knee - R knee  Preparation: Patient was prepped and draped in " the usual sterile fashion  Needle gauge: 21 G.  Approach: lateral  Medications administered: 5 mL lidocaine 1 %; 32 mg Triamcinolone Acetonide 32 MG  Patient tolerance: patient tolerated the procedure well with no immediate complications      Large Joint Arthrocentesis: L knee  Date/Time: 6/25/2024 3:28 PM  Consent given by: patient  Site marked: site marked  Timeout: Immediately prior to procedure a time out was called to verify the correct patient, procedure, equipment, support staff and site/side marked as required   Supporting Documentation  Indications: pain   Procedure Details  Location: knee - L knee  Preparation: Patient was prepped and draped in the usual sterile fashion  Needle gauge: 21 G.  Approach: lateral  Medications administered: 5 mL lidocaine 1 %; 32 mg Triamcinolone Acetonide 32 MG  Patient tolerance: patient tolerated the procedure well with no immediate complications       This injection documentation was Scribed for Jane Baltazar PA-C by Meredith Sommers.  06/25/24   15:28 EDT         Assessment and Plan   Problem List Items Addressed This Visit    None  Visit Diagnoses       Primary osteoarthritis of knees, bilateral    -  Primary            Follow Up   Return if symptoms worsen or fail to improve.    Tobacco Use: Low Risk  (6/25/2024)    Patient History     Smoking Tobacco Use: Never     Smokeless Tobacco Use: Never     Passive Exposure: Never     Patient is a non-smoker.  Did not discuss tobacco cessation options.    Patient Instructions   Bilateral knee Zilretta injections administered in office today.  Advised on 3 months duration between injections.  Follow-up as needed.  Call changes or concerns.  Patient was given instructions and counseling regarding her condition or for health maintenance advice. Please see specific information pulled into the AVS if appropriate.

## 2024-06-25 NOTE — PATIENT INSTRUCTIONS
Bilateral knee Zilretta injections administered in office today.  Advised on 3 months duration between injections.  Follow-up as needed.  Call changes or concerns.

## 2024-07-09 ENCOUNTER — LAB (OUTPATIENT)
Dept: LAB | Facility: HOSPITAL | Age: 89
End: 2024-07-09
Payer: MEDICARE

## 2024-07-09 ENCOUNTER — TRANSCRIBE ORDERS (OUTPATIENT)
Dept: ADMINISTRATIVE | Facility: HOSPITAL | Age: 89
End: 2024-07-09
Payer: MEDICARE

## 2024-07-09 DIAGNOSIS — I10 BENIGN HYPERTENSION: ICD-10-CM

## 2024-07-09 DIAGNOSIS — E55.9 VITAMIN D DEFICIENCY: ICD-10-CM

## 2024-07-09 DIAGNOSIS — M10.9 GOUT, UNSPECIFIED CAUSE, UNSPECIFIED CHRONICITY, UNSPECIFIED SITE: ICD-10-CM

## 2024-07-09 DIAGNOSIS — I50.9 CONGESTIVE HEART FAILURE, UNSPECIFIED HF CHRONICITY, UNSPECIFIED HEART FAILURE TYPE: ICD-10-CM

## 2024-07-09 DIAGNOSIS — M06.9 RHEUMATOID ARTHRITIS, INVOLVING UNSPECIFIED SITE, UNSPECIFIED WHETHER RHEUMATOID FACTOR PRESENT: ICD-10-CM

## 2024-07-09 DIAGNOSIS — E78.5 HYPERLIPIDEMIA, UNSPECIFIED HYPERLIPIDEMIA TYPE: ICD-10-CM

## 2024-07-09 DIAGNOSIS — E61.1 IRON DEFICIENCY: ICD-10-CM

## 2024-07-09 DIAGNOSIS — N19 RENAL FAILURE, UNSPECIFIED CHRONICITY: ICD-10-CM

## 2024-07-09 DIAGNOSIS — D64.9 ANEMIA, UNSPECIFIED TYPE: Primary | ICD-10-CM

## 2024-07-09 DIAGNOSIS — D64.9 ANEMIA, UNSPECIFIED TYPE: ICD-10-CM

## 2024-07-09 DIAGNOSIS — Z79.899 ENCOUNTER FOR LONG-TERM (CURRENT) USE OF OTHER MEDICATIONS: ICD-10-CM

## 2024-07-09 DIAGNOSIS — R73.09 IMPAIRED GLUCOSE TOLERANCE TEST: ICD-10-CM

## 2024-07-09 DIAGNOSIS — E03.9 HYPOTHYROIDISM, ADULT: ICD-10-CM

## 2024-07-09 LAB
25(OH)D3 SERPL-MCNC: 50 NG/ML (ref 30–100)
ALBUMIN SERPL-MCNC: 4.1 G/DL (ref 3.5–5.2)
ALBUMIN UR-MCNC: 136.4 MG/DL
ALP SERPL-CCNC: 73 U/L (ref 39–117)
ALT SERPL W P-5'-P-CCNC: 12 U/L (ref 1–33)
ANION GAP SERPL CALCULATED.3IONS-SCNC: 14 MMOL/L (ref 5–15)
AST SERPL-CCNC: 16 U/L (ref 1–32)
BACTERIA UR QL AUTO: ABNORMAL /HPF
BASOPHILS # BLD AUTO: 0.03 10*3/MM3 (ref 0–0.2)
BASOPHILS NFR BLD AUTO: 0.4 % (ref 0–1.5)
BILIRUB CONJ SERPL-MCNC: <0.2 MG/DL (ref 0–0.3)
BILIRUB INDIRECT SERPL-MCNC: NORMAL MG/DL
BILIRUB SERPL-MCNC: 0.3 MG/DL (ref 0–1.2)
BILIRUB UR QL STRIP: NEGATIVE
BUN SERPL-MCNC: 44 MG/DL (ref 8–23)
BUN/CREAT SERPL: 26.7 (ref 7–25)
CALCIUM SPEC-SCNC: 9.5 MG/DL (ref 8.6–10.5)
CHLORIDE SERPL-SCNC: 106 MMOL/L (ref 98–107)
CHOLEST SERPL-MCNC: 175 MG/DL (ref 0–200)
CLARITY UR: CLEAR
CO2 SERPL-SCNC: 21 MMOL/L (ref 22–29)
COLOR UR: YELLOW
CREAT SERPL-MCNC: 1.65 MG/DL (ref 0.57–1)
CREAT UR-MCNC: 103.6 MG/DL
CRP SERPL-MCNC: 0.57 MG/DL (ref 0–0.5)
DEPRECATED RDW RBC AUTO: 48.6 FL (ref 37–54)
EGFRCR SERPLBLD CKD-EPI 2021: 30 ML/MIN/1.73
EOSINOPHIL # BLD AUTO: 0.09 10*3/MM3 (ref 0–0.4)
EOSINOPHIL NFR BLD AUTO: 1.1 % (ref 0.3–6.2)
ERYTHROCYTE [DISTWIDTH] IN BLOOD BY AUTOMATED COUNT: 14.4 % (ref 12.3–15.4)
FERRITIN SERPL-MCNC: 716 NG/ML (ref 13–150)
FOLATE SERPL-MCNC: >20 NG/ML (ref 4.78–24.2)
GLUCOSE SERPL-MCNC: 85 MG/DL (ref 65–99)
GLUCOSE UR STRIP-MCNC: NEGATIVE MG/DL
HCT VFR BLD AUTO: 39.7 % (ref 34–46.6)
HDLC SERPL-MCNC: 62 MG/DL (ref 40–60)
HGB BLD-MCNC: 13.4 G/DL (ref 12–15.9)
HGB UR QL STRIP.AUTO: NEGATIVE
HYALINE CASTS UR QL AUTO: ABNORMAL /LPF
IMM GRANULOCYTES # BLD AUTO: 0.04 10*3/MM3 (ref 0–0.05)
IMM GRANULOCYTES NFR BLD AUTO: 0.5 % (ref 0–0.5)
IRON 24H UR-MRATE: 81 MCG/DL (ref 37–145)
IRON SATN MFR SERPL: 28 % (ref 20–50)
KETONES UR QL STRIP: NEGATIVE
LDLC SERPL CALC-MCNC: 101 MG/DL (ref 0–100)
LDLC/HDLC SERPL: 1.62 {RATIO}
LEUKOCYTE ESTERASE UR QL STRIP.AUTO: ABNORMAL
LYMPHOCYTES # BLD AUTO: 0.63 10*3/MM3 (ref 0.7–3.1)
LYMPHOCYTES NFR BLD AUTO: 7.4 % (ref 19.6–45.3)
MAGNESIUM SERPL-MCNC: 2.3 MG/DL (ref 1.6–2.4)
MCH RBC QN AUTO: 31.9 PG (ref 26.6–33)
MCHC RBC AUTO-ENTMCNC: 33.8 G/DL (ref 31.5–35.7)
MCV RBC AUTO: 94.5 FL (ref 79–97)
MICROALBUMIN/CREAT UR: 1316.6 MG/G (ref 0–29)
MONOCYTES # BLD AUTO: 1.09 10*3/MM3 (ref 0.1–0.9)
MONOCYTES NFR BLD AUTO: 12.8 % (ref 5–12)
NEUTROPHILS NFR BLD AUTO: 6.61 10*3/MM3 (ref 1.7–7)
NEUTROPHILS NFR BLD AUTO: 77.8 % (ref 42.7–76)
NITRITE UR QL STRIP: NEGATIVE
NRBC BLD AUTO-RTO: 0 /100 WBC (ref 0–0.2)
PH UR STRIP.AUTO: 5.5 [PH] (ref 5–8)
PLATELET # BLD AUTO: 233 10*3/MM3 (ref 140–450)
PMV BLD AUTO: 11.4 FL (ref 6–12)
POTASSIUM SERPL-SCNC: 4 MMOL/L (ref 3.5–5.2)
PROT SERPL-MCNC: 6.8 G/DL (ref 6–8.5)
PROT UR QL STRIP: ABNORMAL
RBC # BLD AUTO: 4.2 10*6/MM3 (ref 3.77–5.28)
RBC # UR STRIP: ABNORMAL /HPF
REF LAB TEST METHOD: ABNORMAL
SODIUM SERPL-SCNC: 141 MMOL/L (ref 136–145)
SP GR UR STRIP: 1.02 (ref 1–1.03)
SQUAMOUS #/AREA URNS HPF: ABNORMAL /HPF
T-UPTAKE NFR SERPL: 1.04 TBI (ref 0.8–1.3)
T4 SERPL-MCNC: 7.47 MCG/DL (ref 4.5–11.7)
TIBC SERPL-MCNC: 292 MCG/DL (ref 298–536)
TRANSFERRIN SERPL-MCNC: 196 MG/DL (ref 200–360)
TRIGL SERPL-MCNC: 63 MG/DL (ref 0–150)
TSH SERPL DL<=0.05 MIU/L-ACNC: 0.32 UIU/ML (ref 0.27–4.2)
URATE SERPL-MCNC: 6.5 MG/DL (ref 2.4–5.7)
UROBILINOGEN UR QL STRIP: ABNORMAL
VIT B12 BLD-MCNC: 1534 PG/ML (ref 211–946)
VLDLC SERPL-MCNC: 12 MG/DL (ref 5–40)
WBC # UR STRIP: ABNORMAL /HPF
WBC NRBC COR # BLD AUTO: 8.49 10*3/MM3 (ref 3.4–10.8)

## 2024-07-09 PROCEDURE — 84550 ASSAY OF BLOOD/URIC ACID: CPT

## 2024-07-09 PROCEDURE — 86140 C-REACTIVE PROTEIN: CPT

## 2024-07-09 PROCEDURE — 84436 ASSAY OF TOTAL THYROXINE: CPT

## 2024-07-09 PROCEDURE — 80048 BASIC METABOLIC PNL TOTAL CA: CPT

## 2024-07-09 PROCEDURE — 85025 COMPLETE CBC W/AUTO DIFF WBC: CPT

## 2024-07-09 PROCEDURE — 82746 ASSAY OF FOLIC ACID SERUM: CPT

## 2024-07-09 PROCEDURE — 84443 ASSAY THYROID STIM HORMONE: CPT

## 2024-07-09 PROCEDURE — 36415 COLL VENOUS BLD VENIPUNCTURE: CPT

## 2024-07-09 PROCEDURE — 83540 ASSAY OF IRON: CPT

## 2024-07-09 PROCEDURE — 82728 ASSAY OF FERRITIN: CPT

## 2024-07-09 PROCEDURE — 82570 ASSAY OF URINE CREATININE: CPT

## 2024-07-09 PROCEDURE — 80076 HEPATIC FUNCTION PANEL: CPT

## 2024-07-09 PROCEDURE — 83735 ASSAY OF MAGNESIUM: CPT

## 2024-07-09 PROCEDURE — 82607 VITAMIN B-12: CPT

## 2024-07-09 PROCEDURE — 80061 LIPID PANEL: CPT

## 2024-07-09 PROCEDURE — 82306 VITAMIN D 25 HYDROXY: CPT

## 2024-07-09 PROCEDURE — 81001 URINALYSIS AUTO W/SCOPE: CPT

## 2024-07-09 PROCEDURE — 82043 UR ALBUMIN QUANTITATIVE: CPT

## 2024-07-09 PROCEDURE — 84479 ASSAY OF THYROID (T3 OR T4): CPT

## 2024-07-09 PROCEDURE — 84466 ASSAY OF TRANSFERRIN: CPT

## 2024-07-16 ENCOUNTER — TRANSCRIBE ORDERS (OUTPATIENT)
Dept: ADMINISTRATIVE | Facility: HOSPITAL | Age: 89
End: 2024-07-16
Payer: MEDICARE

## 2024-07-16 DIAGNOSIS — R22.1 NECK MASS: ICD-10-CM

## 2024-07-16 DIAGNOSIS — R13.19 OTHER DYSPHAGIA: Primary | ICD-10-CM

## 2024-07-24 ENCOUNTER — HOSPITAL ENCOUNTER (OUTPATIENT)
Dept: CT IMAGING | Facility: HOSPITAL | Age: 89
Discharge: HOME | End: 2024-07-24
Admitting: FAMILY MEDICINE
Payer: MEDICARE

## 2024-07-24 DIAGNOSIS — R13.19 OTHER DYSPHAGIA: ICD-10-CM

## 2024-07-24 DIAGNOSIS — R22.1 NECK MASS: ICD-10-CM

## 2024-07-24 PROCEDURE — 70490 CT SOFT TISSUE NECK W/O DYE: CPT

## 2024-08-08 ENCOUNTER — OFFICE VISIT (OUTPATIENT)
Dept: CARDIOLOGY | Facility: CLINIC | Age: 89
End: 2024-08-08
Payer: MEDICARE

## 2024-08-08 VITALS
SYSTOLIC BLOOD PRESSURE: 163 MMHG | HEIGHT: 59 IN | DIASTOLIC BLOOD PRESSURE: 62 MMHG | BODY MASS INDEX: 30.44 KG/M2 | HEART RATE: 63 BPM | WEIGHT: 151 LBS

## 2024-08-08 DIAGNOSIS — I77.9 CAROTID ARTERY DISEASE, UNSPECIFIED LATERALITY, UNSPECIFIED TYPE: ICD-10-CM

## 2024-08-08 DIAGNOSIS — Z95.810 PRESENCE OF BIVENTRICULAR IMPLANTABLE CARDIOVERTER-DEFIBRILLATOR (ICD): Primary | ICD-10-CM

## 2024-08-08 DIAGNOSIS — I25.10 CORONARY ARTERY DISEASE INVOLVING NATIVE CORONARY ARTERY OF NATIVE HEART WITHOUT ANGINA PECTORIS: ICD-10-CM

## 2024-08-08 DIAGNOSIS — E78.2 MIXED HYPERLIPIDEMIA: ICD-10-CM

## 2024-08-08 DIAGNOSIS — I10 ESSENTIAL HYPERTENSION: ICD-10-CM

## 2024-08-08 DIAGNOSIS — I50.42 CHRONIC COMBINED SYSTOLIC AND DIASTOLIC CONGESTIVE HEART FAILURE: ICD-10-CM

## 2024-08-08 RX ORDER — ROSUVASTATIN CALCIUM 10 MG/1
10 TABLET, COATED ORAL NIGHTLY
Qty: 90 TABLET | Refills: 3 | Status: SHIPPED | OUTPATIENT
Start: 2024-08-08

## 2024-08-08 RX ORDER — EZETIMIBE 10 MG/1
10 TABLET ORAL DAILY
Qty: 90 TABLET | Refills: 3 | Status: SHIPPED | OUTPATIENT
Start: 2024-08-08

## 2024-08-08 RX ORDER — NEBIVOLOL 10 MG/1
10 TABLET ORAL DAILY
Qty: 90 TABLET | Refills: 3 | Status: SHIPPED | OUTPATIENT
Start: 2024-08-08

## 2024-08-08 RX ORDER — CLOPIDOGREL BISULFATE 75 MG/1
75 TABLET ORAL DAILY
Qty: 90 TABLET | Refills: 3 | Status: SHIPPED | OUTPATIENT
Start: 2024-08-08

## 2024-08-08 RX ORDER — FUROSEMIDE 40 MG/1
40 TABLET ORAL 2 TIMES DAILY
Qty: 180 TABLET | Refills: 3 | Status: SHIPPED | OUTPATIENT
Start: 2024-08-08

## 2024-08-08 RX ORDER — SPIRONOLACTONE 25 MG/1
12.5 TABLET ORAL DAILY
Qty: 45 TABLET | Refills: 3 | Status: SHIPPED | OUTPATIENT
Start: 2024-08-08

## 2024-08-11 NOTE — ASSESSMENT & PLAN NOTE
8 months of battery life left per home remote interrogation.  Will continue to monitor closely by remote interrogation to decide the timing of generator change.

## 2024-08-11 NOTE — PROGRESS NOTES
CARDIOLOGY FOLLOW-UP PROGRESS NOTE        Chief Complaint  Follow-up, Coronary Artery Disease, and Congestive Heart Failure    Subjective            Yelena Sanchez presents to [facility] CARDIOLOGY  History of Present Illness    Ms Sanchez is here for a follow-up visit accompanied by her .  She reports no new complaints.  She is able to walk with help of walker at this time.  Continues to have some pedal edema and shortness of breath.  No recent falls.  Denies any chest pain.  No recent hospitalizations this year.       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 Cardiac catheterization in 2014 showed 70% mid RCA lesion with 30% left circumflex lesion. No angioplasty was performed. 4) chronic kidney disease 6) iron deficiency anemia     Medical History:  Past Medical History:   Diagnosis Date    Anemia     Arthritis     Asthma     Bicipitoradial bursitis     Breast cancer     CHF (congestive heart failure)     Congestive heart failure     COPD (chronic obstructive pulmonary disease)     Diverticulosis 11/26/2014    GERD (gastroesophageal reflux disease)     History of tobacco abuse     HTN (hypertension)     Hyperlipemia     Hyperthyroidism     ICD (implantable cardioverter-defibrillator), biventricular, in situ     Neck mass     Seasonal allergies     SOB (shortness of breath)        Surgical History: has a past surgical history that includes Colonoscopy (2014); Esophagogastroduodenoscopy (2014); Eye surgery; Other surgical history; Lung surgery; Mastectomy; Pacemaker Implantation; Lung lobectomy (Left); Mastectomy (Left); and Lung surgery (Left).     Family History: family history includes Colon cancer in her mother; Lung cancer in her sister.     Social History: reports that  she has never smoked. She has never been exposed to tobacco smoke. She has never used smokeless tobacco. She reports that she does not drink alcohol and does not use drugs.    Allergies: Patient has no known allergies.    Current Outpatient Medications on File Prior to Visit   Medication Sig    allopurinol (ZYLOPRIM) 100 MG tablet Take 1 tablet by mouth Daily.    amLODIPine (NORVASC) 5 MG tablet Take 1 tablet by mouth Daily.    azelastine (ASTELIN) 0.1 % nasal spray 2 sprays into the nostril(s) as directed by provider Daily. Use in each nostril as directed    cetirizine (zyrTEC) 10 MG tablet     chlorpheniramine (CHLOR-TRIMETON) 4 MG tablet Take 1 tablet by mouth Every 6 (Six) Hours As Needed for Allergies.    cholecalciferol (VITAMIN D3) 25 MCG (1000 UT) tablet Take 2 tablets by mouth Daily.    fluticasone (FLONASE) 50 MCG/ACT nasal spray     HYDROcodone-acetaminophen (NORCO) 5-325 MG per tablet     ketoconazole (NIZORAL) 2 % cream Apply to area twice a day for 8 weeks then as needed for flares    Lactobacillus Acid-Pectin (Acidophilus/Citrus Pectin) tablet tablet     lansoprazole (PREVACID) 30 MG capsule     levothyroxine (SYNTHROID, LEVOTHROID) 125 MCG tablet Take 1 tablet by mouth Daily.    lidocaine (LIDODERM) 5 %     montelukast (SINGULAIR) 10 MG tablet Take 1 tablet by mouth Every Night.    multivitamin with minerals tablet tablet Take 1 tablet by mouth Daily.    naloxone (NARCAN) 4 MG/0.1ML nasal spray     nystatin (MYCOSTATIN) 201076 UNIT/GM cream Apply 1 Application topically to the appropriate area as directed 2 (Two) Times a Day. Under right breast    polyethylene glycol (MIRALAX) 17 GM/SCOOP powder     zolpidem (AMBIEN) 5 MG tablet      No current facility-administered medications on file prior to visit.          Review of Systems   Constitutional:  Positive for fatigue.   Respiratory:  Positive for shortness of breath. Negative for cough and wheezing.    Cardiovascular:  Positive for leg swelling.  "Negative for chest pain and palpitations.   Gastrointestinal:  Negative for nausea and vomiting.   Neurological:  Negative for dizziness and syncope.        Objective     /62   Pulse 63   Ht 149.9 cm (59\")   Wt 68.5 kg (151 lb)   BMI 30.50 kg/m²       Physical Exam    General : Alert, awake, no acute distress  Neck : Supple, no carotid bruit, no jugular venous distention  CVS : Regular rate and rhythm, no murmur, rubs or gallops  Lungs: Clear to auscultation bilaterally, no crackles or rhonchi  Abdomen: Soft, nontender, bowel sounds heard in all 4 quadrants  Extremities: Warm, well-perfused, trace edema bilaterally    Result Review :     The following data was reviewed by: Simon Talbot MD on 08/08/2024:    CMP          2/12/2024    12:04 4/13/2024    07:29 7/9/2024    07:45   CMP   Glucose 85  98  85    BUN 54  51  44    Creatinine 1.85  1.89  1.65    EGFR 26.1  25.5  30.0    Sodium 141  142  141    Potassium 5.3  4.5  4.0    Chloride 104  103  106    Calcium 10.5  9.6  9.5    Total Protein 7.3  7.2  6.8    Albumin 4.1  4.3  4.1    Globulin 3.2  2.9     Total Bilirubin 0.3  0.3  0.3    Alkaline Phosphatase 96  94  73    AST (SGOT) 14  14  16    ALT (SGPT) 12  12  12    Albumin/Globulin Ratio 1.3  1.5     BUN/Creatinine Ratio 29.2  27.0  26.7    Anion Gap 13.5  13.3  14.0      CBC          2/12/2024    12:04 4/13/2024    07:29 7/9/2024    07:45   CBC   WBC 7.25  6.83  8.49    RBC 4.60  4.75  4.20    Hemoglobin 14.2  14.5  13.4    Hematocrit 43.5  45.5  39.7    MCV 94.6  95.8  94.5    MCH 30.9  30.5  31.9    MCHC 32.6  31.9  33.8    RDW 13.5  14.6  14.4    Platelets 242  230  233      TSH          1/11/2024    07:38 4/13/2024    07:29 7/9/2024    07:45   TSH   TSH 0.284  0.230  0.319      Lipid Panel          1/11/2024    07:38 4/13/2024    07:29 7/9/2024    07:45   Lipid Panel   Total Cholesterol 158  160  175    Triglycerides 60  74  63    HDL Cholesterol 56  56  62    VLDL Cholesterol 12  14  12  "   LDL Cholesterol  90  90  101    LDL/HDL Ratio 1.61  1.59  1.62           Data reviewed: Cardiology studies        Results for orders placed during the hospital encounter of 01/02/23    Adult Transthoracic Echo Complete w/ Color, Spectral and Contrast if necessary per protocol    Interpretation Summary    Left ventricular ejection fraction appears to be 31 - 35%.  Moderate global hypokinesis.    The left ventricular cavity is mild to moderately dilated.    Left ventricular diastolic function is consistent with (grade I) impaired relaxation and age.    Mildly reduced right ventricular systolic function noted.    Estimated right ventricular systolic pressure from tricuspid regurgitation is normal (<35 mmHg).    No significant valvular disease.    Electronic lead noted in right atrium and right ventricle.                   Assessment and Plan        Diagnoses and all orders for this visit:    1. Presence of biventricular implantable cardioverter-defibrillator (ICD) (Primary)  Assessment & Plan:  8 months of battery life left per home remote interrogation.  Will continue to monitor closely by remote interrogation to decide the timing of generator change.      2. Chronic combined systolic and diastolic congestive heart failure  Assessment & Plan:  No significant volume overload on physical examination.  NYHA class III symptoms, question capacity is mostly limited by noncardiac problems as well.  Echocardiogram will be repeated at this time to reevaluate LV function.  Most recent labs showed a creatinine of 1.65 which is close to her baseline.  Will continue Bystolic, Lasix and spironolactone without changes.  Unable to add ACE inhibitor/ARB at this time due to chronic kidney disease.    Orders:  -     nebivolol (Bystolic) 10 MG tablet; Take 1 tablet by mouth Daily.  Dispense: 90 tablet; Refill: 3  -     furosemide (LASIX) 40 MG tablet; Take 1 tablet by mouth 2 (Two) Times a Day.  Dispense: 180 tablet; Refill: 3  -      spironolactone (ALDACTONE) 25 MG tablet; Take 0.5 tablets by mouth Daily.  Dispense: 45 tablet; Refill: 3  -     Adult Transthoracic Echo Complete W/ Cont if Necessary Per Protocol; Future    3. Essential hypertension  Assessment & Plan:  On the higher side in the office today, well-controlled during previous visits.  She likely has a component of whitecoat hypertension as well.  All the current medications will be continued.  Encouraged to follow a low-sodium diet.    Orders:  -     nebivolol (Bystolic) 10 MG tablet; Take 1 tablet by mouth Daily.  Dispense: 90 tablet; Refill: 3    4. Carotid artery disease, unspecified laterality, unspecified type  -     clopidogrel (PLAVIX) 75 MG tablet; Take 1 tablet by mouth Daily.  Dispense: 90 tablet; Refill: 3    5. Mixed hyperlipidemia  Assessment & Plan:  Most recent LDL is 101, which is higher than previous levels and not well-controlled.  She is taking rosuvastatin 10 mg on a daily basis at this time.  She is unable to tolerate higher doses.  Will also continue Zetia.  Counseled about dietary changes.    Orders:  -     ezetimibe (ZETIA) 10 MG tablet; Take 1 tablet by mouth Daily.  Dispense: 90 tablet; Refill: 3  -     rosuvastatin (Crestor) 10 MG tablet; Take 1 tablet by mouth Every Night.  Dispense: 90 tablet; Refill: 3    6. Coronary artery disease involving native coronary artery of native heart without angina pectoris  Assessment & Plan:  Stable with no angina.  Continue Plavix, statins and beta-blockers.    Orders:  -     rosuvastatin (Crestor) 10 MG tablet; Take 1 tablet by mouth Every Night.  Dispense: 90 tablet; Refill: 3              Follow Up     Return in about 6 months (around 2/8/2025) for Next scheduled follow up, OK to use a new patient slot .    Patient was given instructions and counseling regarding her condition or for health maintenance advice. Please see specific information pulled into the AVS if appropriate.

## 2024-08-11 NOTE — ASSESSMENT & PLAN NOTE
On the higher side in the office today, well-controlled during previous visits.  She likely has a component of whitecoat hypertension as well.  All the current medications will be continued.  Encouraged to follow a low-sodium diet.

## 2024-08-11 NOTE — ASSESSMENT & PLAN NOTE
Most recent LDL is 101, which is higher than previous levels and not well-controlled.  She is taking rosuvastatin 10 mg on a daily basis at this time.  She is unable to tolerate higher doses.  Will also continue Zetia.  Counseled about dietary changes.

## 2024-08-11 NOTE — ASSESSMENT & PLAN NOTE
No significant volume overload on physical examination.  NYHA class III symptoms, question capacity is mostly limited by noncardiac problems as well.  Echocardiogram will be repeated at this time to reevaluate LV function.  Most recent labs showed a creatinine of 1.65 which is close to her baseline.  Will continue Bystolic, Lasix and spironolactone without changes.  Unable to add ACE inhibitor/ARB at this time due to chronic kidney disease.

## 2024-08-12 ENCOUNTER — TELEPHONE (OUTPATIENT)
Dept: CARDIOLOGY | Facility: CLINIC | Age: 89
End: 2024-08-12
Payer: MEDICARE

## 2024-08-12 NOTE — TELEPHONE ENCOUNTER
Can you please let the patient/ know that her pacemaker/ICD has 8 more months of battery left in it. The device tech did confirmed 8 months of battery life. We are anticipating a generator change within the next 4 to 5 months, but not at this time.     SW patient, patient verbalized understanding and appreciation.

## 2024-08-26 ENCOUNTER — HOSPITAL ENCOUNTER (OUTPATIENT)
Dept: CARDIOLOGY | Facility: HOSPITAL | Age: 89
Discharge: HOME | End: 2024-08-26
Admitting: INTERNAL MEDICINE
Payer: MEDICARE

## 2024-08-26 DIAGNOSIS — I50.42 CHRONIC COMBINED SYSTOLIC AND DIASTOLIC CONGESTIVE HEART FAILURE: ICD-10-CM

## 2024-08-26 PROCEDURE — 93306 TTE W/DOPPLER COMPLETE: CPT

## 2024-08-27 LAB
ASCENDING AORTA: 3.8 CM
BH CV ECHO MEAS - ACS: 1.5 CM
BH CV ECHO MEAS - AO MAX PG: 7.5 MMHG
BH CV ECHO MEAS - AO MEAN PG: 4.2 MMHG
BH CV ECHO MEAS - AO ROOT DIAM: 2.8 CM
BH CV ECHO MEAS - AO V2 MAX: 137.2 CM/SEC
BH CV ECHO MEAS - AO V2 VTI: 31.8 CM
BH CV ECHO MEAS - EDV(MOD-SP2): 161.6 ML
BH CV ECHO MEAS - EDV(MOD-SP4): 99.2 ML
BH CV ECHO MEAS - EF(MOD-BP): 33.9 %
BH CV ECHO MEAS - EF(MOD-SP2): 42.3 %
BH CV ECHO MEAS - EF(MOD-SP4): 21 %
BH CV ECHO MEAS - ESV(MOD-SP2): 93.3 ML
BH CV ECHO MEAS - ESV(MOD-SP4): 78.4 ML
BH CV ECHO MEAS - IVS/LVPW: 0.59 CM
BH CV ECHO MEAS - IVSD: 1 CM
BH CV ECHO MEAS - LA DIMENSION: 4.1 CM
BH CV ECHO MEAS - LAT PEAK E' VEL: 4.6 CM/SEC
BH CV ECHO MEAS - LV DIASTOLIC VOL/BSA (35-75): 60.4 CM2
BH CV ECHO MEAS - LV MAX PG: 1.44 MMHG
BH CV ECHO MEAS - LV MEAN PG: 0.6 MMHG
BH CV ECHO MEAS - LV SYSTOLIC VOL/BSA (12-30): 47.8 CM2
BH CV ECHO MEAS - LV V1 MAX: 60 CM/SEC
BH CV ECHO MEAS - LV V1 VTI: 13.4 CM
BH CV ECHO MEAS - LVIDD: 5.6 CM
BH CV ECHO MEAS - LVIDS: 4.8 CM
BH CV ECHO MEAS - LVOT DIAM: 1.8 CM
BH CV ECHO MEAS - LVPWD: 1.2 CM
BH CV ECHO MEAS - MED PEAK E' VEL: 6.1 CM/SEC
BH CV ECHO MEAS - MR MAX PG: 39.2 MMHG
BH CV ECHO MEAS - MR MAX VEL: 304.6 CM/SEC
BH CV ECHO MEAS - MV A MAX VEL: 80.4 CM/SEC
BH CV ECHO MEAS - MV E MAX VEL: 68.3 CM/SEC
BH CV ECHO MEAS - MV E/A: 0.85
BH CV ECHO MEAS - MV MAX PG: 5.9 MMHG
BH CV ECHO MEAS - MV MEAN PG: 2.2 MMHG
BH CV ECHO MEAS - MV V2 VTI: 37.7 CM
BH CV ECHO MEAS - PA ACC TIME: 0.08 SEC
BH CV ECHO MEAS - PA V2 MAX: 80 CM/SEC
BH CV ECHO MEAS - RAP SYSTOLE: 5 MMHG
BH CV ECHO MEAS - RV MAX PG: 1 MMHG
BH CV ECHO MEAS - RV V1 MAX: 50 CM/SEC
BH CV ECHO MEAS - RV V1 VTI: 11 CM
BH CV ECHO MEAS - RVSP: 26 MMHG
BH CV ECHO MEAS - SV(MOD-SP2): 68.3 ML
BH CV ECHO MEAS - SV(MOD-SP4): 20.8 ML
BH CV ECHO MEAS - SVI(MOD-SP2): 41.6 ML/M2
BH CV ECHO MEAS - SVI(MOD-SP4): 12.7 ML/M2
BH CV ECHO MEAS - TAPSE (>1.6): 2.9 CM
BH CV ECHO MEAS - TR MAX PG: 21.2 MMHG
BH CV ECHO MEAS - TR MAX VEL: 229.2 CM/SEC
BH CV ECHO MEASUREMENTS AVERAGE E/E' RATIO: 12.77
BH CV XLRA - TDI S': 9.7 CM/SEC

## 2024-08-27 NOTE — PROGRESS NOTES
Echocardiogram showed no significant change from previous study.  Ejection fraction is 30 to 35%, unchanged from before.  There are no major valvular problems.    Follow-up as scheduled earlier.  Please call with any new concerns or problems.      Electronically signed by Simon Talbot MD, 08/27/24, 7:56 PM EDT.

## 2024-08-28 ENCOUNTER — TELEPHONE (OUTPATIENT)
Dept: CARDIOLOGY | Facility: CLINIC | Age: 89
End: 2024-08-28
Payer: MEDICARE

## 2024-08-28 NOTE — TELEPHONE ENCOUNTER
I left a message that her battery life is accurate and that she does have 8 month until we can get her scheduled for a battery change out.    Insurance will not approve it to be scheduled any sooner.  She is on automatic downloads and communicating properly.

## 2024-08-28 NOTE — TELEPHONE ENCOUNTER
----- Message from Simon Talbot sent at 8/27/2024  7:56 PM EDT -----  Echocardiogram showed no significant change from previous study.  Ejection fraction is 30 to 35%, unchanged from before.  There are no major valvular problems.    Follow-up as scheduled earlier.  Please call with any new concerns or problems.      Electronically signed by Simon Talbot MD, 08/27/24, 7:56 PM EDT.

## 2024-08-28 NOTE — TELEPHONE ENCOUNTER
WALTER patient's . Verified patient  is on verbal consent. Went over results and recommendations. Patient's  did not concern over the battery life of her device. Eileen did reassure patient  it does have near 8 months left, patient's  wanted to know if that was really accurate. RN ensured patient's  our device tech keeps a close eye on the device battery life and will notify patient and spouse if anything should change.     Patient's  verbalized appreciation.

## 2024-09-09 ENCOUNTER — OFFICE VISIT (OUTPATIENT)
Dept: VASCULAR SURGERY | Facility: HOSPITAL | Age: 89
End: 2024-09-09
Payer: MEDICARE

## 2024-09-09 ENCOUNTER — HOSPITAL ENCOUNTER (OUTPATIENT)
Dept: CARDIOLOGY | Facility: HOSPITAL | Age: 89
Discharge: HOME | End: 2024-09-09
Payer: MEDICARE

## 2024-09-09 VITALS
DIASTOLIC BLOOD PRESSURE: 75 MMHG | RESPIRATION RATE: 16 BRPM | SYSTOLIC BLOOD PRESSURE: 155 MMHG | TEMPERATURE: 97.9 F | OXYGEN SATURATION: 96 % | HEART RATE: 74 BPM

## 2024-09-09 DIAGNOSIS — I65.23 OCCLUSION AND STENOSIS OF BILATERAL CAROTID ARTERIES: Primary | ICD-10-CM

## 2024-09-09 DIAGNOSIS — I65.23 BILATERAL CAROTID ARTERY STENOSIS: ICD-10-CM

## 2024-09-09 DIAGNOSIS — Z95.828 HISTORY OF LEFT COMMON CAROTID ARTERY STENT PLACEMENT: ICD-10-CM

## 2024-09-09 DIAGNOSIS — Z98.890 HISTORY OF LEFT COMMON CAROTID ARTERY STENT PLACEMENT: ICD-10-CM

## 2024-09-09 LAB
BH CV LEFT CCA HIDDEN LRR: 1 CM/S
BH CV MID LEFT ICA HIDDEN LRR: 1 CM
BH CV VAS CAROTID LEFT DISTAL STENT EDV: 57 CM/S
BH CV VAS CAROTID LEFT DISTAL STENT: 248 CM/S
BH CV VAS CAROTID LEFT DISTAL TO STENT EDV: 17 CM/S
BH CV VAS CAROTID LEFT DISTAL TO STENT: 87 CM/S
BH CV VAS CAROTID LEFT MID STENT EDV: 17 CM/S
BH CV VAS CAROTID LEFT MID STENT: 61 CM/S
BH CV VAS CAROTID LEFT PROXIMAL STENT EDV: 17 CM/S
BH CV VAS CAROTID LEFT PROXIMAL STENT: 65 CM/S
BH CV VAS CAROTID LEFT PROXIMAL TO STENT: 35 CM/S
BH CV VAS CAROTID LEFT STENT NATIVE VESSEL PROXIMAL ED: 9 CM/S
BH CV XLRA MEAS LEFT CAROTID BULB EDV: 16.9 CM/SEC
BH CV XLRA MEAS LEFT CAROTID BULB PSV: 61.5 CM/SEC
BH CV XLRA MEAS LEFT DIST CCA EDV: 16.8 CM/SEC
BH CV XLRA MEAS LEFT DIST CCA PSV: 64.8 CM/SEC
BH CV XLRA MEAS LEFT DIST ICA EDV: 16.7 CM/SEC
BH CV XLRA MEAS LEFT DIST ICA PSV: 87.2 CM/SEC
BH CV XLRA MEAS LEFT ICA/CCA RATIO: 0.8
BH CV XLRA MEAS LEFT MID ICA EDV: 57.1 CM/SEC
BH CV XLRA MEAS LEFT MID ICA PSV: 248.7 CM/SEC
BH CV XLRA MEAS LEFT PROX CCA EDV: 10.7 CM/SEC
BH CV XLRA MEAS LEFT PROX CCA PSV: 37.8 CM/SEC
BH CV XLRA MEAS LEFT PROX ECA EDV: 5.2 CM/SEC
BH CV XLRA MEAS LEFT PROX ECA PSV: 40.5 CM/SEC
BH CV XLRA MEAS LEFT PROX ICA EDV: 13 CM/SEC
BH CV XLRA MEAS LEFT PROX ICA PSV: 52.8 CM/SEC
BH CV XLRA MEAS LEFT VERTEBRAL A EDV: 10.4 CM/SEC
BH CV XLRA MEAS LEFT VERTEBRAL A PSV: 34.3 CM/SEC
BH CV XLRA MEAS RIGHT CAROTID BULB EDV: 6.6 CM/SEC
BH CV XLRA MEAS RIGHT CAROTID BULB PSV: 30.5 CM/SEC
BH CV XLRA MEAS RIGHT DIST CCA EDV: 5.4 CM/SEC
BH CV XLRA MEAS RIGHT DIST CCA PSV: 31.8 CM/SEC
BH CV XLRA MEAS RIGHT DIST ICA EDV: 17 CM/SEC
BH CV XLRA MEAS RIGHT DIST ICA PSV: 76.6 CM/SEC
BH CV XLRA MEAS RIGHT ICA/CCA RATIO: 7.1
BH CV XLRA MEAS RIGHT MID ICA EDV: 13.2 CM/SEC
BH CV XLRA MEAS RIGHT MID ICA PSV: 108.8 CM/SEC
BH CV XLRA MEAS RIGHT PROX CCA EDV: 8.1 CM/SEC
BH CV XLRA MEAS RIGHT PROX CCA PSV: 43.4 CM/SEC
BH CV XLRA MEAS RIGHT PROX ECA EDV: 8.7 CM/SEC
BH CV XLRA MEAS RIGHT PROX ECA PSV: 54 CM/SEC
BH CV XLRA MEAS RIGHT PROX ICA EDV: 40.4 CM/SEC
BH CV XLRA MEAS RIGHT PROX ICA PSV: 225.5 CM/SEC
BH CV XLRA MEAS RIGHT VERTEBRAL A EDV: 9.5 CM/SEC
BH CV XLRA MEAS RIGHT VERTEBRAL A PSV: 39.7 CM/SEC
BH CVPROX LEFT ICA HIDDEN LRR: 1 CM
LEFT ARM BP: NORMAL MMHG
RIGHT ARM BP: NORMAL MMHG

## 2024-09-09 PROCEDURE — 1159F MED LIST DOCD IN RCRD: CPT | Performed by: SURGERY

## 2024-09-09 PROCEDURE — 93880 EXTRACRANIAL BILAT STUDY: CPT

## 2024-09-09 PROCEDURE — 99213 OFFICE O/P EST LOW 20 MIN: CPT | Performed by: SURGERY

## 2024-09-09 PROCEDURE — 93880 EXTRACRANIAL BILAT STUDY: CPT | Performed by: SURGERY

## 2024-09-09 PROCEDURE — 1160F RVW MEDS BY RX/DR IN RCRD: CPT | Performed by: SURGERY

## 2024-09-09 RX ORDER — HYDROCORTISONE 2.5 %
CREAM (GRAM) TOPICAL
COMMUNITY
Start: 2024-08-08

## 2024-09-09 RX ORDER — KETOCONAZOLE 20 MG/ML
SHAMPOO TOPICAL
COMMUNITY
Start: 2024-08-12

## 2024-09-09 RX ORDER — LEVALBUTEROL TARTRATE 45 UG/1
AEROSOL, METERED ORAL
COMMUNITY
Start: 2024-07-12

## 2024-09-09 NOTE — PROGRESS NOTES
[facility]   Follow up Office    Patient Name: Yelena Sanchez  : 1935  MRN: [id number]  Primary Care Physician:  Kaelyn Eugene MD      Subjective   Subjective     HPI:    Yelena Sanchez is a 88 y.o. female who has previously undergone a left carotid stenting in [facility] back in .  She was being followed on an annual basis and was last seen in  but she came to our office to establish herself as it became increasingly more difficult to go to [place] for the follow-up.  She is doing well.  No new complaints.  Here in routine follow-up for her carotid disease.      Objective     Vitals:   Temp:  [97.9 °F (36.6 °C)] 97.9 °F (36.6 °C)  Heart Rate:  [74] 74  Resp:  [16] 16  BP: (155-159)/(71-75) 155/75    Physical Exam      General: Alert, no acute distress.  HEENT: PERRLA  Abdomen: Benign  Extremities: Symmetric.  Neuro: No gross deficits    Diagnostic studies: A carotid duplex in the office today demonstrates moderate bilateral carotid stenosis with the highest peak systolic velocity on the right side at 225.5 cm/s with an end-diastolic velocity of 40.4 cm/s.  The highest peak systolic velocity on the left side at 248.7 cm/s with an end-diastolic velocity of 57.1 cm/s.  This compares to the most recent previous study where the velocities were 267.5 and 50.8 cm/s on the right side and 273 and 55 cm/s on the left.    Assessment & Plan   Assessment / Plan     Diagnoses and all orders for this visit:    1. Occlusion and stenosis of bilateral carotid arteries (Primary)       Assessment/Plan:   Stable carotid disease.  Follow-up with a repeat study in 1 year.        Electronically signed by Gualberto Romano MD, 24, 11:42 AM EDT.

## 2024-09-11 ENCOUNTER — OFFICE VISIT (OUTPATIENT)
Dept: OTOLARYNGOLOGY | Facility: CLINIC | Age: 89
End: 2024-09-11
Payer: MEDICARE

## 2024-09-11 VITALS
WEIGHT: 151 LBS | SYSTOLIC BLOOD PRESSURE: 139 MMHG | TEMPERATURE: 97.5 F | DIASTOLIC BLOOD PRESSURE: 71 MMHG | BODY MASS INDEX: 30.44 KG/M2 | HEIGHT: 59 IN | HEART RATE: 67 BPM | OXYGEN SATURATION: 92 %

## 2024-09-11 DIAGNOSIS — H61.21 IMPACTED CERUMEN OF RIGHT EAR: ICD-10-CM

## 2024-09-11 DIAGNOSIS — R13.10 DYSPHAGIA, UNSPECIFIED TYPE: ICD-10-CM

## 2024-09-11 DIAGNOSIS — M54.2 NECK PAIN: Primary | ICD-10-CM

## 2024-09-11 DIAGNOSIS — R49.0 VOICE HOARSENESS: ICD-10-CM

## 2024-09-11 NOTE — PROGRESS NOTES
Patient Name: Yelena Sanchez   Visit Date: 09/11/2024   Patient ID: [id number]  Provider: Lefty Acevedo MD    Sex: female  Location: [facility]   YOB: 1935  Location Address: [address] [place] [address], [address],?[place]?[address]    Primary Care Provider Kaelyn Eugene MD  Location Phone: [phone]    Referring Provider: No ref. provider found        Chief Complaint  Difficulty Swallowing    History of Present Illness  Yelena Sanchez is a 88 y.o. female  with past medical history significant for coronary artery disease, GERD, hypothyroidism, hyperlipidemia, and cerebrovascular disease on Plavix who returns today for follow-up.  She was originally seen on 7/3/2019 at which time she reported a previous mass which was excised by Dr. Galindo in 2014. PET scan on 3/18/2014 revealed that the left neck mass had an SUV of 4.5. an image guided FNA at the time revealed scant cystic material, histiocytes, and a few inflammatory cells which was nondiagnostic. She is a former smoker who quit in 2017. A CT scan of the neck without contrast on 6/13/2019 revealed a 2.75 x 1.8 cm solid-appearing mass just inferior to the hyoid bone on the left involving the preepiglottic space and left false vocal fold. It appears to abut her left internal carotid artery.  Laryngoscopic examination that day revealed fullness to the left false vocal fold and aryepiglottic fold. Previous records from [facility] revealed that she underwent a [employer] laryngoscopy with excision of a left false vocal fold cystic lesion on 5/20/2014.  Subsequent pathology revealed a benign epithelial cyst without any evidence of malignancy. CT scan of the neck with contrast on 1/4/2023 was unremarkable aside from cervical spine degenration.     She returns today for follow-up having last been seen on 4/5/2023.  She has been experiencing issues with dysphagia and hoarseness.  She also reports continued neck pain status  post fall.  She underwent a CT scan of the neck without contrast on 7/24/2024 which revealed a single opacified right anterior ethmoid air cell, degenerative changes to the cervical spine, and a potential left vallecular/epiglottic cyst.    Past Medical History:   Diagnosis Date    Anemia     Arthritis     Asthma     Bicipitoradial bursitis     Breast cancer     CHF (congestive heart failure)     Congestive heart failure     COPD (chronic obstructive pulmonary disease)     Diverticulosis 11/26/2014    GERD (gastroesophageal reflux disease)     History of tobacco abuse     HTN (hypertension)     Hyperlipemia     Hyperthyroidism     ICD (implantable cardioverter-defibrillator), biventricular, in situ     Neck mass     Seasonal allergies     SOB (shortness of breath)        Past Surgical History:   Procedure Laterality Date    COLONOSCOPY  2014    ENDOSCOPY  2014    EYE SURGERY      Implant; yes     LUNG LOBECTOMY Left     Left lower lobe    LUNG SURGERY      LUNG SURGERY Left     LEFT UPPER LOBE (2004)    MASTECTOMY      Left     MASTECTOMY Left     OTHER SURGICAL HISTORY      Joint Surgery    PACEMAKER IMPLANTATION           Current Outpatient Medications:     allopurinol (ZYLOPRIM) 100 MG tablet, Take 1 tablet by mouth Daily., Disp: , Rfl:     amLODIPine (NORVASC) 5 MG tablet, Take 1 tablet by mouth Daily., Disp: 90 tablet, Rfl: 3    azelastine (ASTELIN) 0.1 % nasal spray, 2 sprays into the nostril(s) as directed by provider Daily. Use in each nostril as directed, Disp: , Rfl:     cetirizine (zyrTEC) 10 MG tablet, , Disp: , Rfl:     chlorpheniramine (CHLOR-TRIMETON) 4 MG tablet, Take 1 tablet by mouth Every 6 (Six) Hours As Needed for Allergies., Disp: , Rfl:     cholecalciferol (VITAMIN D3) 25 MCG (1000 UT) tablet, Take 2 tablets by mouth Daily., Disp: , Rfl:     clopidogrel (PLAVIX) 75 MG tablet, Take 1 tablet by mouth Daily., Disp: 90 tablet, Rfl: 3    ezetimibe (ZETIA) 10 MG tablet, Take 1 tablet by mouth  Daily., Disp: 90 tablet, Rfl: 3    fluticasone (FLONASE) 50 MCG/ACT nasal spray, , Disp: , Rfl:     furosemide (LASIX) 40 MG tablet, Take 1 tablet by mouth 2 (Two) Times a Day., Disp: 180 tablet, Rfl: 3    HYDROcodone-acetaminophen (NORCO) 5-325 MG per tablet, , Disp: , Rfl:     hydrocortisone 2.5 % cream, APPLY TO THE AFFECTED AREA(S) TWICE DAILY up to TWO weeks AS NEEDED, Disp: , Rfl:     ketoconazole (NIZORAL) 2 % cream, Apply to area twice a day for 8 weeks then as needed for flares, Disp: , Rfl:     ketoconazole (NIZORAL) 2 % shampoo, Wash face EVERY DAY for seborrheic dermatitis, Disp: , Rfl:     Lactobacillus Acid-Pectin (Acidophilus/Citrus Pectin) tablet tablet, , Disp: , Rfl:     lansoprazole (PREVACID) 30 MG capsule, , Disp: , Rfl:     levothyroxine (SYNTHROID, LEVOTHROID) 125 MCG tablet, Take 1 tablet by mouth Daily., Disp: , Rfl:     lidocaine (LIDODERM) 5 %, , Disp: , Rfl:     montelukast (SINGULAIR) 10 MG tablet, Take 1 tablet by mouth Every Night., Disp: , Rfl:     multivitamin with minerals tablet tablet, Take 1 tablet by mouth Daily., Disp: , Rfl:     nebivolol (Bystolic) 10 MG tablet, Take 1 tablet by mouth Daily., Disp: 90 tablet, Rfl: 3    nystatin (MYCOSTATIN) 299952 UNIT/GM cream, Apply 1 Application topically to the appropriate area as directed 2 (Two) Times a Day. Under right breast, Disp: 30 g, Rfl: 1    polyethylene glycol (MIRALAX) 17 GM/SCOOP powder, , Disp: , Rfl:     rosuvastatin (Crestor) 10 MG tablet, Take 1 tablet by mouth Every Night., Disp: 90 tablet, Rfl: 3    spironolactone (ALDACTONE) 25 MG tablet, Take 0.5 tablets by mouth Daily., Disp: 45 tablet, Rfl: 3    Xopenex HFA 45 MCG/ACT inhaler, , Disp: , Rfl:     zolpidem (AMBIEN) 5 MG tablet, , Disp: , Rfl:     naloxone (NARCAN) 4 MG/0.1ML nasal spray, , Disp: , Rfl:      No Known Allergies    Social History     Tobacco Use    Smoking status: Never     Passive exposure: Never    Smokeless tobacco: Never   Vaping Use    Vaping  "status: Never Used   Substance Use Topics    Alcohol use: Never    Drug use: Never        Objective     Vital Signs:   /71   Pulse 67   Temp 97.5 °F (36.4 °C)   Ht 149.9 cm (59\")   Wt 68.5 kg (151 lb)   SpO2 92%   BMI 30.50 kg/m²       Physical Exam    General: Well developed, well nourished patient of stated age in no acute distress. Voice is strong and clear.   Head: Normocephalic and atraumatic.  Small pustule involving the occipital scalp.  Face: No lesions.  Bilateral parotid and submandibular glands are unremarkable.  Stensen's and Warthin's ducts are productive of clear saliva bilaterally.  House-Brackmann I/VI     bilaterally.   muscles and temporomandibular joint nontender to palpation.  No TMJ crepitus.  Eyes: PERRLA, sclerae anicteric, no conjunctival injection. Extra ocular movements are intact and full. No nystagmus.   Ears: Auricles are normal in appearance.  Right external auditory canal is impacted with cerumen.  This was removed using a working otoscope and a curette.  Left external auditory canal is unremarkable.. Bilateral tympanic membranes are clear and without effusion. Hearing normal to conversational voice.   Nose: External nose is normal in appearance. Bilateral nares are patent with normal appearing mucosa. Septum midline. Turbinates are unremarkable. No lesions.   Oral Cavity: Lips are normal in appearance. Oral mucosa is unremarkable. Gingiva is unremarkable.  Edentulous. Tongue is unremarkable with good movement. Hard palate is unremarkable.   Oropharynx: Soft palate is unremarkable with full movement. Uvula is unremarkable. Bilateral tonsils are unremarkable. Posterior oropharynx is unremarkable.    Larynx and hypopharynx: Deferred secondary to gag reflex.  Neck: Supple.  No mass.  Her left sternocleidomastoid muscle is tender to palpation.  Trachea midline. Thyroid normal size and without nodules to palpation.   Lymphatic: No lymphadenopathy upon " palpation.   Psychiatric: Appropriate affect, cooperative   Neurologic: Oriented x 3, strength symmetric in all extremities, Cranial Nerves II-XII are grossly intact to confrontation   Skin: Warm and dry. No rashes.    Procedures     Result Review :   Procedure: Flexible fiberoptic nasolaryngoscopy.    Indications: Persistent dysphagia and hoarseness.    Summary: The patient's bilateral nares were decongested and anesthetized with Afrin and lidocaine sprays respectively. After giving the medications ample time to take effect flexible fiberoptic scope was inserted in the patient's right naris revealing a normal-appearing nasal cavity and nasopharynx.  There is a small left sided vallecular cyst.  The base of tongue, epiglottis, aryepiglottic folds, and arytenoid cartilages are all normal-appearing aside from some edema and erythema of the arytenoids and posterior commissure. The piriform sinuses were normal in appearance. The bilateral false vocal folds are normal in appearance.  The bilateral true vocal folds are mildly bowed and atrophic.  They adducted and abducted normally believe a small glottic gap upon phonation. The subglottis was widely patent. The patient tolerated the procedure well.            Assessment and Plan    Diagnoses and all orders for this visit:    1. Neck pain (Primary)    2. Dysphagia, unspecified type    3. Voice hoarseness    4. Impacted cerumen of right ear        Impressions and findings were discussed.  She continues to be bothered by neck pain, dysphagia, and hoarseness.  We reviewed and discussed the images from her CT scan of the neck without contrast on 7/24/2024.  We again discussed my concern that her neck pain is musculoskeletal and discussed physical therapy as an option.  She is unable to take muscle relaxers or anti-inflammatories.  We did discuss her small vallecular cyst which was noted on laryngoscopic examination today.  We discussed the pathophysiology and natural history  of this condition.  We discussed my concerns that this is not responsible for her dysphagia.  She may consider further workup with a modified barium swallow study and esophagram.  In regards to her hoarseness she may benefit from speech therapy.  She is going to think about her options and call once she reaches a decision.  She was given ample time to ask questions, all of which were answered to her satisfaction.    Follow Up   No follow-ups on file.  Patient was given instructions and counseling regarding her condition or for health maintenance advice. Please see specific information pulled into the AVS if appropriate.

## 2024-10-04 ENCOUNTER — LAB (OUTPATIENT)
Dept: LAB | Facility: HOSPITAL | Age: 89
End: 2024-10-04
Payer: MEDICARE

## 2024-10-04 ENCOUNTER — TRANSCRIBE ORDERS (OUTPATIENT)
Dept: ADMINISTRATIVE | Facility: HOSPITAL | Age: 89
End: 2024-10-04
Payer: MEDICARE

## 2024-10-04 DIAGNOSIS — J43.9 PULMONARY EMPHYSEMA, UNSPECIFIED EMPHYSEMA TYPE: ICD-10-CM

## 2024-10-04 DIAGNOSIS — N19 RENAL FAILURE, UNSPECIFIED CHRONICITY: ICD-10-CM

## 2024-10-04 DIAGNOSIS — Z79.899 OTHER LONG TERM (CURRENT) DRUG THERAPY: ICD-10-CM

## 2024-10-04 DIAGNOSIS — E83.41 HYPERMAGNESEMIA: ICD-10-CM

## 2024-10-04 DIAGNOSIS — D64.9 ANEMIA, UNSPECIFIED TYPE: ICD-10-CM

## 2024-10-04 DIAGNOSIS — R73.09 OTHER ABNORMAL GLUCOSE: ICD-10-CM

## 2024-10-04 DIAGNOSIS — E61.1 IRON DEFICIENCY: ICD-10-CM

## 2024-10-04 DIAGNOSIS — E55.9 VITAMIN D DEFICIENCY: ICD-10-CM

## 2024-10-04 DIAGNOSIS — E87.5 HYPERKALEMIA: ICD-10-CM

## 2024-10-04 DIAGNOSIS — M10.9 GOUT, UNSPECIFIED CAUSE, UNSPECIFIED CHRONICITY, UNSPECIFIED SITE: Primary | ICD-10-CM

## 2024-10-04 DIAGNOSIS — I50.9 HEART FAILURE, UNSPECIFIED HF CHRONICITY, UNSPECIFIED HEART FAILURE TYPE: ICD-10-CM

## 2024-10-04 DIAGNOSIS — E78.5 HYPERLIPIDEMIA, UNSPECIFIED HYPERLIPIDEMIA TYPE: ICD-10-CM

## 2024-10-04 DIAGNOSIS — M06.9 RHEUMATOID ARTHRITIS, INVOLVING UNSPECIFIED SITE, UNSPECIFIED WHETHER RHEUMATOID FACTOR PRESENT: ICD-10-CM

## 2024-10-04 DIAGNOSIS — I10 HYPERTENSION, UNSPECIFIED TYPE: ICD-10-CM

## 2024-10-04 DIAGNOSIS — R79.89 LOW VITAMIN B12 LEVEL: ICD-10-CM

## 2024-10-04 DIAGNOSIS — E03.9 HYPOTHYROIDISM, UNSPECIFIED TYPE: ICD-10-CM

## 2024-10-04 DIAGNOSIS — N18.9 CHRONIC KIDNEY DISEASE, UNSPECIFIED CKD STAGE: ICD-10-CM

## 2024-10-04 DIAGNOSIS — M10.9 GOUT, UNSPECIFIED CAUSE, UNSPECIFIED CHRONICITY, UNSPECIFIED SITE: ICD-10-CM

## 2024-10-04 LAB
25(OH)D3 SERPL-MCNC: 54.2 NG/ML (ref 30–100)
ALBUMIN SERPL-MCNC: 4.2 G/DL (ref 3.5–5.2)
ALBUMIN UR-MCNC: 41.6 MG/DL
ALP SERPL-CCNC: 91 U/L (ref 39–117)
ALT SERPL W P-5'-P-CCNC: 12 U/L (ref 1–33)
ANION GAP SERPL CALCULATED.3IONS-SCNC: 11 MMOL/L (ref 5–15)
AST SERPL-CCNC: 15 U/L (ref 1–32)
BACTERIA UR QL AUTO: NORMAL /HPF
BASOPHILS # BLD AUTO: 0.03 10*3/MM3 (ref 0–0.2)
BASOPHILS NFR BLD AUTO: 0.5 % (ref 0–1.5)
BILIRUB CONJ SERPL-MCNC: <0.2 MG/DL (ref 0–0.3)
BILIRUB INDIRECT SERPL-MCNC: NORMAL MG/DL
BILIRUB SERPL-MCNC: 0.2 MG/DL (ref 0–1.2)
BILIRUB UR QL STRIP: NEGATIVE
BILIRUB UR QL STRIP: NEGATIVE
BUN SERPL-MCNC: 48 MG/DL (ref 8–23)
BUN/CREAT SERPL: 25.7 (ref 7–25)
CALCIUM SPEC-SCNC: 9.7 MG/DL (ref 8.6–10.5)
CHLORIDE SERPL-SCNC: 105 MMOL/L (ref 98–107)
CHOLEST SERPL-MCNC: 158 MG/DL (ref 0–200)
CLARITY UR: CLEAR
CLARITY UR: CLEAR
CO2 SERPL-SCNC: 24 MMOL/L (ref 22–29)
COLOR UR: YELLOW
COLOR UR: YELLOW
CREAT SERPL-MCNC: 1.87 MG/DL (ref 0.57–1)
CRP SERPL-MCNC: 2.28 MG/DL (ref 0–0.5)
DEPRECATED RDW RBC AUTO: 46.3 FL (ref 37–54)
EGFRCR SERPLBLD CKD-EPI 2021: 25.6 ML/MIN/1.73
EOSINOPHIL # BLD AUTO: 0.08 10*3/MM3 (ref 0–0.4)
EOSINOPHIL NFR BLD AUTO: 1.2 % (ref 0.3–6.2)
ERYTHROCYTE [DISTWIDTH] IN BLOOD BY AUTOMATED COUNT: 13.4 % (ref 12.3–15.4)
FERRITIN SERPL-MCNC: 841 NG/ML (ref 13–150)
FOLATE SERPL-MCNC: >20 NG/ML (ref 4.78–24.2)
GLUCOSE SERPL-MCNC: 92 MG/DL (ref 65–99)
GLUCOSE UR STRIP-MCNC: NEGATIVE MG/DL
GLUCOSE UR STRIP-MCNC: NEGATIVE MG/DL
HCT VFR BLD AUTO: 38.8 % (ref 34–46.6)
HDLC SERPL-MCNC: 52 MG/DL (ref 40–60)
HGB BLD-MCNC: 12.6 G/DL (ref 12–15.9)
HGB UR QL STRIP.AUTO: NEGATIVE
HGB UR QL STRIP.AUTO: NEGATIVE
HOLD SPECIMEN: NORMAL
HYALINE CASTS UR QL AUTO: NORMAL /LPF
IMM GRANULOCYTES # BLD AUTO: 0.02 10*3/MM3 (ref 0–0.05)
IMM GRANULOCYTES NFR BLD AUTO: 0.3 % (ref 0–0.5)
IRON 24H UR-MRATE: 62 MCG/DL (ref 37–145)
IRON SATN MFR SERPL: 22 % (ref 20–50)
KETONES UR QL STRIP: NEGATIVE
KETONES UR QL STRIP: NEGATIVE
LDLC SERPL CALC-MCNC: 94 MG/DL (ref 0–100)
LDLC/HDLC SERPL: 1.81 {RATIO}
LEUKOCYTE ESTERASE UR QL STRIP.AUTO: NEGATIVE
LEUKOCYTE ESTERASE UR QL STRIP.AUTO: NEGATIVE
LYMPHOCYTES # BLD AUTO: 0.6 10*3/MM3 (ref 0.7–3.1)
LYMPHOCYTES NFR BLD AUTO: 9.1 % (ref 19.6–45.3)
MAGNESIUM SERPL-MCNC: 2.5 MG/DL (ref 1.6–2.4)
MCH RBC QN AUTO: 30.6 PG (ref 26.6–33)
MCHC RBC AUTO-ENTMCNC: 32.5 G/DL (ref 31.5–35.7)
MCV RBC AUTO: 94.2 FL (ref 79–97)
MONOCYTES # BLD AUTO: 1.23 10*3/MM3 (ref 0.1–0.9)
MONOCYTES NFR BLD AUTO: 18.7 % (ref 5–12)
NEUTROPHILS NFR BLD AUTO: 4.62 10*3/MM3 (ref 1.7–7)
NEUTROPHILS NFR BLD AUTO: 70.2 % (ref 42.7–76)
NITRITE UR QL STRIP: NEGATIVE
NITRITE UR QL STRIP: NEGATIVE
NRBC BLD AUTO-RTO: 0 /100 WBC (ref 0–0.2)
PH UR STRIP.AUTO: 6 [PH] (ref 5–8)
PH UR STRIP.AUTO: 6 [PH] (ref 5–8)
PLATELET # BLD AUTO: 229 10*3/MM3 (ref 140–450)
PMV BLD AUTO: 11 FL (ref 6–12)
POTASSIUM SERPL-SCNC: 4.8 MMOL/L (ref 3.5–5.2)
PROT SERPL-MCNC: 6.6 G/DL (ref 6–8.5)
PROT UR QL STRIP: ABNORMAL
PROT UR QL STRIP: ABNORMAL
RBC # BLD AUTO: 4.12 10*6/MM3 (ref 3.77–5.28)
RBC # UR STRIP: NORMAL /HPF
REF LAB TEST METHOD: NORMAL
SODIUM SERPL-SCNC: 140 MMOL/L (ref 136–145)
SP GR UR STRIP: 1.01 (ref 1–1.03)
SP GR UR STRIP: 1.01 (ref 1–1.03)
SQUAMOUS #/AREA URNS HPF: NORMAL /HPF
T4 FREE SERPL-MCNC: 1.41 NG/DL (ref 0.92–1.68)
TIBC SERPL-MCNC: 286 MCG/DL (ref 298–536)
TRANSFERRIN SERPL-MCNC: 192 MG/DL (ref 200–360)
TRIGL SERPL-MCNC: 60 MG/DL (ref 0–150)
TSH SERPL DL<=0.05 MIU/L-ACNC: 0.25 UIU/ML (ref 0.27–4.2)
URATE SERPL-MCNC: 7.3 MG/DL (ref 2.4–5.7)
UROBILINOGEN UR QL STRIP: ABNORMAL
UROBILINOGEN UR QL STRIP: ABNORMAL
VIT B12 BLD-MCNC: 1375 PG/ML (ref 211–946)
VLDLC SERPL-MCNC: 12 MG/DL (ref 5–40)
WBC # UR STRIP: NORMAL /HPF
WBC NRBC COR # BLD AUTO: 6.58 10*3/MM3 (ref 3.4–10.8)

## 2024-10-04 PROCEDURE — 82746 ASSAY OF FOLIC ACID SERUM: CPT

## 2024-10-04 PROCEDURE — 84443 ASSAY THYROID STIM HORMONE: CPT

## 2024-10-04 PROCEDURE — 80076 HEPATIC FUNCTION PANEL: CPT

## 2024-10-04 PROCEDURE — 82607 VITAMIN B-12: CPT

## 2024-10-04 PROCEDURE — 82043 UR ALBUMIN QUANTITATIVE: CPT

## 2024-10-04 PROCEDURE — 83540 ASSAY OF IRON: CPT

## 2024-10-04 PROCEDURE — 80048 BASIC METABOLIC PNL TOTAL CA: CPT

## 2024-10-04 PROCEDURE — 36415 COLL VENOUS BLD VENIPUNCTURE: CPT

## 2024-10-04 PROCEDURE — 82728 ASSAY OF FERRITIN: CPT

## 2024-10-04 PROCEDURE — 84439 ASSAY OF FREE THYROXINE: CPT

## 2024-10-04 PROCEDURE — 80061 LIPID PANEL: CPT

## 2024-10-04 PROCEDURE — 86140 C-REACTIVE PROTEIN: CPT

## 2024-10-04 PROCEDURE — 84466 ASSAY OF TRANSFERRIN: CPT

## 2024-10-04 PROCEDURE — 82306 VITAMIN D 25 HYDROXY: CPT

## 2024-10-04 PROCEDURE — 81001 URINALYSIS AUTO W/SCOPE: CPT

## 2024-10-04 PROCEDURE — 84550 ASSAY OF BLOOD/URIC ACID: CPT

## 2024-10-04 PROCEDURE — 83735 ASSAY OF MAGNESIUM: CPT

## 2024-10-04 PROCEDURE — 85025 COMPLETE CBC W/AUTO DIFF WBC: CPT

## 2024-10-14 ENCOUNTER — TELEPHONE (OUTPATIENT)
Dept: CARDIOLOGY | Facility: CLINIC | Age: 89
End: 2024-10-14
Payer: MEDICARE

## 2024-10-14 NOTE — TELEPHONE ENCOUNTER
Caller: MICAELA ARROYO    Relationship: Emergency Contact    Best call back number: [phone]        Who are you requesting to speak with (clinical staff, provider,  specific staff member): SANDRA         What was the call regarding: DEVICE QUESTION    Is it okay if the provider responds through MyChart: NO

## 2024-10-14 NOTE — TELEPHONE ENCOUNTER
[facility] and working off a computer in a room, she has 4 more months BEFORE it clicks DEQUAN. Let him know that I will call them once insurance will approve.

## 2024-10-23 NOTE — PROGRESS NOTES
Primary Care Provider  Kaelyn Eugene MD     Referring Provider  No ref. provider found     Chief Complaint  COPD and Follow-up    Subjective          History of Presenting Illness  Patient is an 89-year-old female who presents for management of COPD who presents for a follow-up visit today.  Patient's  is present with the patient in the office today.  Of note, patient has not followed up in the office since October 2022.  Patient states that since last visit her breathing is doing well.  Patient states that she is only taking Xopenex inhaler as needed.  Patient states that she is taking Singulair and Astelin nasal spray for seasonal allergies.  Patient states that she does have oxygen that she uses at night.                                Patient denies fever, chills, night sweats, swollen glands in the head and neck, unintentional weight loss, hemoptysis, purulent sputum production, dysphagia, chest pain, palpitations, chest tightness, abdominal pain, nausea, vomiting, and diarrhea.  Patient also denies any myalgias, changes in sense of taste and/or smell, sore throat, any other coronavirus or flu-like symptoms.  Patient denies any leg swelling, orthopnea, paroxysmal nocturnal dyspnea.  Patient is able to perform activities of daily living.        Review of Systems     Family History   Problem Relation Age of Onset   • Colon cancer Mother         60s   • Lung cancer Sister         Social History     Socioeconomic History   • Marital status:    Tobacco Use   • Smoking status: Never     Passive exposure: Never   • Smokeless tobacco: Never   Vaping Use   • Vaping status: Never Used   Substance and Sexual Activity   • Alcohol use: Never   • Drug use: Never   • Sexual activity: Defer        Past Medical History:   Diagnosis Date   • Anemia    • Arthritis    • Asthma    • Bicipitoradial bursitis    • Breast cancer    • CHF (congestive heart failure)    • Congestive heart failure    • COPD  (chronic obstructive pulmonary disease)    • Diverticulosis 11/26/2014   • GERD (gastroesophageal reflux disease)    • History of tobacco abuse    • HTN (hypertension)    • Hyperlipemia    • Hyperthyroidism    • ICD (implantable cardioverter-defibrillator), biventricular, in situ    • Neck mass    • Seasonal allergies    • SOB (shortness of breath)         Immunization History   Administered Date(s) Administered   • Fluzone High-Dose 65+YRS 09/16/2020, 09/16/2020, 10/12/2021, 10/12/2021   • Fluzone High-Dose 65+yrs 10/06/2022, 10/06/2022   • Influenza Seasonal Injectable 10/03/2016, 09/01/2019   • Influenza Split Preservative Free ID 09/02/2020   • Influenza, Unspecified 09/03/2021   • PPD Test 05/09/2023, 05/16/2023       No Known Allergies       Current Outpatient Medications:   •  allopurinol (ZYLOPRIM) 100 MG tablet, Take 1 tablet by mouth Daily., Disp: , Rfl:   •  amLODIPine (NORVASC) 5 MG tablet, Take 1 tablet by mouth Daily., Disp: 90 tablet, Rfl: 3  •  azelastine (ASTELIN) 0.1 % nasal spray, Administer 2 sprays into the nostril(s) as directed by provider Daily. Use in each nostril as directed, Disp: , Rfl:   •  chlorpheniramine (CHLOR-TRIMETON) 4 MG tablet, Take 1 tablet by mouth Every 6 (Six) Hours As Needed for Allergies., Disp: , Rfl:   •  clopidogrel (PLAVIX) 75 MG tablet, Take 1 tablet by mouth Daily., Disp: 90 tablet, Rfl: 3  •  ezetimibe (ZETIA) 10 MG tablet, Take 1 tablet by mouth Daily., Disp: 90 tablet, Rfl: 3  •  furosemide (LASIX) 40 MG tablet, Take 1 tablet by mouth 2 (Two) Times a Day., Disp: 180 tablet, Rfl: 3  •  lansoprazole (PREVACID) 30 MG capsule, , Disp: , Rfl:   •  levothyroxine (SYNTHROID, LEVOTHROID) 125 MCG tablet, Take 1 tablet by mouth Daily., Disp: , Rfl:   •  montelukast (SINGULAIR) 10 MG tablet, Take 1 tablet by mouth Every Night., Disp: , Rfl:   •  nebivolol (Bystolic) 10 MG tablet, Take 1 tablet by mouth Daily., Disp: 90 tablet, Rfl: 3  •  nystatin (MYCOSTATIN) 528230 UNIT/GM  cream, Apply 1 Application topically to the appropriate area as directed 2 (Two) Times a Day. Under right breast, Disp: 30 g, Rfl: 1  •  rosuvastatin (Crestor) 10 MG tablet, Take 1 tablet by mouth Every Night., Disp: 90 tablet, Rfl: 3  •  spironolactone (ALDACTONE) 25 MG tablet, Take 0.5 tablets by mouth Daily., Disp: 45 tablet, Rfl: 3  •  Xopenex HFA 45 MCG/ACT inhaler, , Disp: , Rfl:   •  cetirizine (zyrTEC) 10 MG tablet, , Disp: , Rfl:   •  cholecalciferol (VITAMIN D3) 25 MCG (1000 UT) tablet, Take 2 tablets by mouth Daily., Disp: , Rfl:   •  fluticasone (FLONASE) 50 MCG/ACT nasal spray, , Disp: , Rfl:   •  HYDROcodone-acetaminophen (NORCO) 5-325 MG per tablet, , Disp: , Rfl:   •  hydrocortisone 2.5 % cream, APPLY TO THE AFFECTED AREA(S) TWICE DAILY up to TWO weeks AS NEEDED (Patient not taking: Reported on 10/30/2024), Disp: , Rfl:   •  ketoconazole (NIZORAL) 2 % cream, Apply to area twice a day for 8 weeks then as needed for flares (Patient not taking: Reported on 10/30/2024), Disp: , Rfl:   •  ketoconazole (NIZORAL) 2 % shampoo, Wash face EVERY DAY for seborrheic dermatitis (Patient not taking: Reported on 10/30/2024), Disp: , Rfl:   •  Lactobacillus Acid-Pectin (Acidophilus/Citrus Pectin) tablet tablet, , Disp: , Rfl:   •  lidocaine (LIDODERM) 5 %, , Disp: , Rfl:   •  multivitamin with minerals tablet tablet, Take 1 tablet by mouth Daily. (Patient not taking: Reported on 10/30/2024), Disp: , Rfl:   •  naloxone (NARCAN) 4 MG/0.1ML nasal spray, , Disp: , Rfl:   •  polyethylene glycol (MIRALAX) 17 GM/SCOOP powder, , Disp: , Rfl:   •  zolpidem (AMBIEN) 5 MG tablet, , Disp: , Rfl:      Objective     Physical Exam  Vital Signs:   WDWN, Alert, NAD.    HEENT:  PERRL, EOMI.  OP, nares clear, no sinus tenderness  Neck:  Supple, no JVD, no thyromegaly.  Lymph: no axillary, cervical, supraclavicular lymphadenopathy noted bilaterally  Chest:  good aeration, clear to auscultation bilaterally, tympanic to percussion  "bilaterally, no work of breathing noted  CV: RRR, no MGR, pulses 2+, equal.  Abd:  Soft, NT, ND, + BS, no HSM  EXT:  no clubbing, no cyanosis, no edema, no joint tenderness  Neuro:  A&Ox3, CN grossly intact, no focal deficits.  Skin: No rashes or lesions noted.    /55 (BP Location: Right arm, Patient Position: Sitting, Cuff Size: Large Adult)   Pulse 60   Temp 98 °F (36.7 °C) (Oral)   Resp 18   Ht 149.9 cm (59.02\")   Wt 69.9 kg (154 lb)   SpO2 92% Comment: room air  BMI 31.09 kg/m²         Result Review :   I have reviewed LUZ MARIA Desai last office visit note.    Procedures:              Assessment and Plan      Assessment:  1. COPD of unclear severity.   2.  History of lung cancer with left lower lobectomy in 2004 by Dr. West  3.  History of left breast cancer in 6/2011 status post left mastectomy on 7/2011.  4.  MDS: Patient is under the care of Dr. Wagner, oncologist.  5.  Coronary artery disease.  Patient has an ICD in place.  Patient is under the care of Dr. Gautam, cardiologist.  6.  Nocturnal hypoxia.  Patient is on supplemental oxygen at night.  7.  Seasonal allergies.  8.  Emphysema on chest CT scan dated from 10/9/2019.  9.  Never smoker.  10.  Encounter for immunization: Flu vaccine and pneumococcal 20 given to the patient in the office today.        Plan:  1.  Will order a chest CT scan, pulmonary function test, and a 6-minute walk test.  2.  Alpha-1 antitrypsin level and genotype drawn in the office today.  3.  Continue Xopenex as needed.  Patient declines a daily inhaler at this time.  4.  Continue Singulair and Astelin nasal spray.  5.  Patient is using and benefiting from oxygen at night.  Patient to continue oxygen at night.  6.  Follow-up with oncology and cardiology as scheduled.  7.  Vaccination status: Flu vaccine and pneumococcal 20 vaccine given to the patient in the office today.  Patient declines COVID-19 vaccination.  Discussed with patient the benefits of vaccination " including decreased risk of severe illness, hospitalization and death related to flu, pneumonia, and COVID-19.  Patient verbalized understanding and will consider getting vaccinated.  Patient is advised to continue to follow CDC recommendations such as social distancing, wearing a mask, and washing hands for least 20 seconds.  8.  Smoking status: Never smoker.  9.  Patient to call the office, 911, or go to the ER with new or worsening symptoms.  10. Follow-up in 2 months with Dr. Marquez, sooner if needed.              Follow Up   Return for 2 months with Dr. Marquez.  Patient was given instructions and counseling regarding her condition or for health maintenance advice. Please see specific information pulled into the AVS if appropriate.

## 2024-10-28 ENCOUNTER — TELEPHONE (OUTPATIENT)
Dept: CARDIOLOGY | Facility: CLINIC | Age: 89
End: 2024-10-28
Payer: MEDICARE

## 2024-10-28 ENCOUNTER — PREP FOR SURGERY (OUTPATIENT)
Dept: OTHER | Facility: HOSPITAL | Age: 89
End: 2024-10-28
Payer: MEDICARE

## 2024-10-28 DIAGNOSIS — Z45.02 ICD (IMPLANTABLE CARDIOVERTER-DEFIBRILLATOR) BATTERY DEPLETION: Primary | ICD-10-CM

## 2024-10-28 RX ORDER — SODIUM CHLORIDE 0.9 % (FLUSH) 0.9 %
10 SYRINGE (ML) INJECTION AS NEEDED
OUTPATIENT
Start: 2024-10-28

## 2024-10-28 RX ORDER — SODIUM CHLORIDE 0.9 % (FLUSH) 0.9 %
10 SYRINGE (ML) INJECTION EVERY 12 HOURS SCHEDULED
OUTPATIENT
Start: 2024-10-28

## 2024-10-28 NOTE — TELEPHONE ENCOUNTER
Mrs. Sanchez has a [employer] BI-V ICD that has reached DEQUAN.    I have spoken to Mrs. Sanchez and explained the ICD battery change out procedure.  I did let her know that someone will contact her by Tuesday or Wednesday to get it scheduled.

## 2024-10-29 PROBLEM — Z45.02 ICD (IMPLANTABLE CARDIOVERTER-DEFIBRILLATOR) BATTERY DEPLETION: Status: ACTIVE | Noted: 2024-10-28

## 2024-10-30 ENCOUNTER — OFFICE VISIT (OUTPATIENT)
Dept: PULMONOLOGY | Facility: CLINIC | Age: 89
End: 2024-10-30
Payer: MEDICARE

## 2024-10-30 VITALS
HEIGHT: 59 IN | HEART RATE: 60 BPM | RESPIRATION RATE: 18 BRPM | WEIGHT: 154 LBS | SYSTOLIC BLOOD PRESSURE: 149 MMHG | BODY MASS INDEX: 31.04 KG/M2 | DIASTOLIC BLOOD PRESSURE: 55 MMHG | OXYGEN SATURATION: 92 % | TEMPERATURE: 98 F

## 2024-10-30 DIAGNOSIS — Z85.3 HISTORY OF BREAST CANCER: ICD-10-CM

## 2024-10-30 DIAGNOSIS — I25.10 CORONARY ARTERY DISEASE INVOLVING NATIVE CORONARY ARTERY OF NATIVE HEART WITHOUT ANGINA PECTORIS: ICD-10-CM

## 2024-10-30 DIAGNOSIS — R06.00 DYSPNEA, UNSPECIFIED TYPE: ICD-10-CM

## 2024-10-30 DIAGNOSIS — J43.9 PULMONARY EMPHYSEMA, UNSPECIFIED EMPHYSEMA TYPE: ICD-10-CM

## 2024-10-30 DIAGNOSIS — Z85.118 HISTORY OF LUNG CANCER: ICD-10-CM

## 2024-10-30 DIAGNOSIS — Z23 NEED FOR VACCINATION WITH 20-POLYVALENT PNEUMOCOCCAL CONJUGATE VACCINE: ICD-10-CM

## 2024-10-30 DIAGNOSIS — Z23 ENCOUNTER FOR IMMUNIZATION: ICD-10-CM

## 2024-10-30 DIAGNOSIS — Z23 FLU VACCINE NEED: ICD-10-CM

## 2024-10-30 DIAGNOSIS — G47.34 NOCTURNAL HYPOXIA: ICD-10-CM

## 2024-10-30 DIAGNOSIS — D46.9 MDS (MYELODYSPLASTIC SYNDROME): ICD-10-CM

## 2024-10-30 DIAGNOSIS — J44.9 CHRONIC OBSTRUCTIVE PULMONARY DISEASE, UNSPECIFIED COPD TYPE: Primary | ICD-10-CM

## 2024-11-05 ENCOUNTER — TELEPHONE (OUTPATIENT)
Dept: PULMONOLOGY | Facility: CLINIC | Age: 89
End: 2024-11-05

## 2024-11-05 NOTE — TELEPHONE ENCOUNTER
Spoke with PT and advised them per dr gordon keep an eye on it and if there is no improvement call back and get seen with an APRN or

## 2024-11-05 NOTE — TELEPHONE ENCOUNTER
Caller: MICAELA ARROYO    Relationship to patient: Emergency Contact    Best call back number: [phone]     Patient is needing: PT CAME TO US 2 WEEKS AGO AND GOT A VACCINE. SHE NOW HAS A RED [place] AROUND IT AND ITS VERY SORE STILL. HER  IS WANTING TO KNOW IF THAT IS NORMAL OR IF THERE IS SOMETHING THEY NEED TO DO

## 2024-11-06 ENCOUNTER — TELEPHONE (OUTPATIENT)
Dept: CARDIOLOGY | Facility: CLINIC | Age: 89
End: 2024-11-06
Payer: MEDICARE

## 2024-11-06 NOTE — TELEPHONE ENCOUNTER
I spoke to patients  and gave an arrival time of 7:00 on 11/14/24 for [employer] Bi-V ICD battery change. Patient was instructed to have a  for the day of the procedure and to arrive at the entrance C registration area. Patient was instructed to continue all medications as usual. Patient was instructed to be NPO after midnight with only sips of water as needed. Patient was instructed to have labs completed on the morning of the procedure. Patient is agreeable with no other questions or concerns.

## 2024-11-13 ENCOUNTER — TELEPHONE (OUTPATIENT)
Dept: ORTHOPEDIC SURGERY | Facility: CLINIC | Age: 89
End: 2024-11-13
Payer: MEDICARE

## 2024-11-13 NOTE — PRE-PROCEDURE INSTRUCTIONS
PATIENT INSTRUCTED TO BE:    - NPO AFTER MIDNIGHT EXCEPT CAN HAVE SIPS OF WATER WITH HIS MEDICATION PRIOR TO PROCEDURE    -  INSTRUCTED NO LOTIONS, JEWELRY, PIERCINGS, OR DEODORANT DAY OF THE PROCEDURE    - INSTRUCTED TO TAKE THE FOLLOWING MEDICATIONS THE DAY OF SURGERY: NORCO IF NEEDED, INHALER, BYSTOLIC, LASIX, SPIRONOLACTONE, ALLOPURINOL, PLAVIX,FLONASE, LEVOTHYROXINE, PREVACID           - INSTRUCTED PT TO FOLLOW ANY INSTRUCTIONS GIVEN BY HIS CARDIOLOGIST.    - INFORMED PT THEY ATTEMPT RADIAL APPROACH FIRST IF UNABLE TO PERFORM CATH WITH THAT APPROACH THEY WILL DO A FEMORAL APPROACH.  ALSO, INFORMED PT THEY WILL BE ON BEDREST POSTOP.  IF THE SURGEON FEELS  AN INTERVENTION OR STENTS NEEDS TO BE PLACED, HE/SHE WILL STAY OVER NIGHT FOR OBSERVATION AND MONITORING.     - INFORMED THE PATIENT HE CAN HAVE TWO VISITORS WITH HIM THE DAY OF THE PROCEDURE    - INSTRUCTED PT TO PARK ON THE NORTH SIDE OF THE HOSPITAL IN THE OPEN PARKING LOT, ENTER THE HOSPITAL THRU ENTRANCE C/ NORTH TOWER AND  CHECK IN AT THE REGISTRATION DESK, AFTER REGISTRATION PT WILL BE TAKEN THE THE PREOP AREA FOR HIS OR HER PROCEDURE.    -ARRIVAL TIME GIVEN BY CARDIOLOGIST OFFICE, INFORMED PT IF ARRIVAL TIME CHANGES OR ADJUSTMENTS NEED TO BE MADE IN THEIR ARRIVAL TIME, HE/SHE WOULD RECEIVE A CALL.    -INSTRUCTED PT TO COME TO [facility] TO GET THEIR LABS/ EKG DONE PRIOR TO PROCEDURE      - PATIENT VERBALIZED UNDERSTANDING

## 2024-11-13 NOTE — TELEPHONE ENCOUNTER
PLEASE CALL / LEAVE VMAIL  MICAELA's CELL [phone] TO DISCUSS SCHEDULING FOR     FOLLOW UP / BILATERAL KNEES / NO NEW INJURIES SINCE 06-25-24 BILATERAL ZILRETTA INJECTIONS (WANTS ADDITIONAL INJECTIONS) / LEFT KNEE XR 01-04-24 / RIGHT KNEE XR 09-12-24 (PATIENT WANTS NEW XRs) / DR MARR PATIENT     THANKS

## 2024-11-14 ENCOUNTER — HOSPITAL ENCOUNTER (OUTPATIENT)
Facility: HOSPITAL | Age: 89
Setting detail: HOSPITAL OUTPATIENT SURGERY
Discharge: HOME | End: 2024-11-14
Attending: INTERNAL MEDICINE | Admitting: INTERNAL MEDICINE
Payer: MEDICARE

## 2024-11-14 VITALS
HEART RATE: 64 BPM | TEMPERATURE: 97.2 F | WEIGHT: 155.2 LBS | RESPIRATION RATE: 16 BRPM | BODY MASS INDEX: 31.29 KG/M2 | OXYGEN SATURATION: 94 % | HEIGHT: 59 IN | SYSTOLIC BLOOD PRESSURE: 151 MMHG | DIASTOLIC BLOOD PRESSURE: 58 MMHG

## 2024-11-14 DIAGNOSIS — Z45.02 ICD (IMPLANTABLE CARDIOVERTER-DEFIBRILLATOR) BATTERY DEPLETION: ICD-10-CM

## 2024-11-14 LAB
ANION GAP SERPL CALCULATED.3IONS-SCNC: 13.6 MMOL/L (ref 5–15)
BUN SERPL-MCNC: 50 MG/DL (ref 8–23)
BUN/CREAT SERPL: 26.3 (ref 7–25)
CALCIUM SPEC-SCNC: 9.2 MG/DL (ref 8.6–10.5)
CHLORIDE SERPL-SCNC: 103 MMOL/L (ref 98–107)
CO2 SERPL-SCNC: 25.4 MMOL/L (ref 22–29)
CREAT SERPL-MCNC: 1.9 MG/DL (ref 0.57–1)
DEPRECATED RDW RBC AUTO: 53.6 FL (ref 37–54)
EGFRCR SERPLBLD CKD-EPI 2021: 25.1 ML/MIN/1.73
ERYTHROCYTE [DISTWIDTH] IN BLOOD BY AUTOMATED COUNT: 14.9 % (ref 12.3–15.4)
GLUCOSE SERPL-MCNC: 101 MG/DL (ref 65–99)
HCT VFR BLD AUTO: 39.9 % (ref 34–46.6)
HGB BLD-MCNC: 12.1 G/DL (ref 12–15.9)
INR PPP: 0.97 (ref 0.86–1.15)
MCH RBC QN AUTO: 29.4 PG (ref 26.6–33)
MCHC RBC AUTO-ENTMCNC: 30.3 G/DL (ref 31.5–35.7)
MCV RBC AUTO: 97.1 FL (ref 79–97)
PLATELET # BLD AUTO: 219 10*3/MM3 (ref 140–450)
PMV BLD AUTO: 10.5 FL (ref 6–12)
POTASSIUM SERPL-SCNC: 4.1 MMOL/L (ref 3.5–5.2)
PROTHROMBIN TIME: 13.1 SECONDS (ref 11.8–14.9)
RBC # BLD AUTO: 4.11 10*6/MM3 (ref 3.77–5.28)
SODIUM SERPL-SCNC: 142 MMOL/L (ref 136–145)
WBC NRBC COR # BLD AUTO: 6.27 10*3/MM3 (ref 3.4–10.8)

## 2024-11-14 PROCEDURE — 25010000002 MIDAZOLAM PER 1MG: Performed by: INTERNAL MEDICINE

## 2024-11-14 PROCEDURE — 85027 COMPLETE CBC AUTOMATED: CPT | Performed by: INTERNAL MEDICINE

## 2024-11-14 PROCEDURE — 85610 PROTHROMBIN TIME: CPT | Performed by: INTERNAL MEDICINE

## 2024-11-14 PROCEDURE — 25010000002 FENTANYL CITRATE (PF) 50 MCG/ML SOLUTION: Performed by: INTERNAL MEDICINE

## 2024-11-14 PROCEDURE — 33264 RMVL & RPLCMT DFB GEN MLT LD: CPT | Performed by: INTERNAL MEDICINE

## 2024-11-14 PROCEDURE — 25010000002 LIDOCAINE 2% SOLUTION: Performed by: INTERNAL MEDICINE

## 2024-11-14 PROCEDURE — 25010000002 CEFAZOLIN PER 500 MG: Performed by: INTERNAL MEDICINE

## 2024-11-14 PROCEDURE — 25010000002 DIPHENHYDRAMINE PER 50 MG: Performed by: INTERNAL MEDICINE

## 2024-11-14 PROCEDURE — 25010000002 BUPIVACAINE (PF) 0.5 % SOLUTION: Performed by: INTERNAL MEDICINE

## 2024-11-14 PROCEDURE — C1882 AICD, OTHER THAN SING/DUAL: HCPCS | Performed by: INTERNAL MEDICINE

## 2024-11-14 PROCEDURE — 80048 BASIC METABOLIC PNL TOTAL CA: CPT | Performed by: INTERNAL MEDICINE

## 2024-11-14 DEVICE — CARDIAC RESYNCHRONIZATION THERAPY DEFIBRILLATOR
Type: IMPLANTABLE DEVICE | Status: FUNCTIONAL
Brand: MOMENTUM™ CRT-D

## 2024-11-14 RX ORDER — BUPIVACAINE HYDROCHLORIDE 5 MG/ML
INJECTION, SOLUTION EPIDURAL; INTRACAUDAL
Status: DISCONTINUED | OUTPATIENT
Start: 2024-11-14 | End: 2024-11-14 | Stop reason: HOSPADM

## 2024-11-14 RX ORDER — SODIUM CHLORIDE 0.9 % (FLUSH) 0.9 %
10 SYRINGE (ML) INJECTION AS NEEDED
Status: DISCONTINUED | OUTPATIENT
Start: 2024-11-14 | End: 2024-11-14 | Stop reason: HOSPADM

## 2024-11-14 RX ORDER — SODIUM CHLORIDE 9 MG/ML
40 INJECTION, SOLUTION INTRAVENOUS AS NEEDED
Status: DISCONTINUED | OUTPATIENT
Start: 2024-11-14 | End: 2024-11-14 | Stop reason: HOSPADM

## 2024-11-14 RX ORDER — LIDOCAINE HYDROCHLORIDE 20 MG/ML
INJECTION, SOLUTION INFILTRATION; PERINEURAL
Status: DISCONTINUED | OUTPATIENT
Start: 2024-11-14 | End: 2024-11-14 | Stop reason: HOSPADM

## 2024-11-14 RX ORDER — DIPHENHYDRAMINE HYDROCHLORIDE 50 MG/ML
INJECTION INTRAMUSCULAR; INTRAVENOUS
Status: DISCONTINUED | OUTPATIENT
Start: 2024-11-14 | End: 2024-11-14 | Stop reason: HOSPADM

## 2024-11-14 RX ORDER — MIDAZOLAM HYDROCHLORIDE 2 MG/2ML
INJECTION, SOLUTION INTRAMUSCULAR; INTRAVENOUS
Status: DISCONTINUED | OUTPATIENT
Start: 2024-11-14 | End: 2024-11-14 | Stop reason: HOSPADM

## 2024-11-14 RX ORDER — SODIUM CHLORIDE 0.9 % (FLUSH) 0.9 %
10 SYRINGE (ML) INJECTION EVERY 12 HOURS SCHEDULED
Status: DISCONTINUED | OUTPATIENT
Start: 2024-11-14 | End: 2024-11-14 | Stop reason: HOSPADM

## 2024-11-14 RX ORDER — FENTANYL CITRATE 50 UG/ML
INJECTION, SOLUTION INTRAMUSCULAR; INTRAVENOUS
Status: DISCONTINUED | OUTPATIENT
Start: 2024-11-14 | End: 2024-11-14 | Stop reason: HOSPADM

## 2024-11-14 NOTE — DISCHARGE INSTRUCTIONS
Discharge Instructions for Permanent Pacemaker/ Defibrillator Implant    *Keep the incision dry until wound check appointment.  Sponge bathe or hand shower until then.  If it gets a little wet, just pat lisa and do not rub the site.    *The Dermabond adhesive will come off over 2-3 weeks on its own.    *Do not raise your elbow on the side of the pacemaker/defibrillator implant above the shoulder level for two weeks but move your arm freely otherwise.    *No heavy lifting (no more than 10 pounds) for one week then resume normal activity.    *Avoid activity that involves rough contact with the pacemaker site.    *Do not swing a bat or golf club for two weeks. Do not swim for two weeks.  It is advised not to drive for first week post-op to protect the incision    *You can resume your normal activities and work as you feel you are able, unless you have been instructed otherwise.    *Sexual activity can be resumed after pacemaker/defibrillator implant.    *Report any signs of infection, fever, pain, swelling, redness, oozing or heat at the site especially if the symptoms increase after the first 3 to 4 days. Call my office at   [phone] if signs of infection occurs.    *You will already have an appointment made in the office to allow the wound to be examined for proper healing, this visit is done by the device technician, not the doctor. Another appointment will normally be needed in three months with the doctor.    *Know your pacemaker 's name ([employer])   All testing, troubleshooting and programming of your pacemaker depends on knowing the manufacture of the device. You will receive an ID card for your pacemaker. Carry your pacemaker identification card with you at all times.

## 2024-11-14 NOTE — Clinical Note
Prepped: left chest. Prepped with: ChloraPrep. The site was clipped. The patient was draped in a sterile fashion. BACK PAIN

## 2024-11-14 NOTE — H&P
CARDIOLOGY FOLLOW-UP PROGRESS NOTE        Chief Complaint  No chief complaint on file.    Subjective            Yelena Sanchez presents to [facility] PRE OP PHASE II  History of Present Illness    Ms Sanchez is here for a follow-up visit accompanied by her .  She is here for elective biventricular ICD generator replacement.  She has had no new clinical events since previous visit with Dr. Talbot in August.  The device has lasted about 10 years.      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 Cardiac catheterization in 2014 showed 70% mid RCA lesion with 30% left circumflex lesion. No angioplasty was performed. 4) chronic kidney disease 6) iron deficiency anemia     Medical History:  Past Medical History:   Diagnosis Date    Anemia     Arthritis     Asthma     Bicipitoradial bursitis     Breast cancer     CHF (congestive heart failure)     Congestive heart failure     COPD (chronic obstructive pulmonary disease)     Diverticulosis 11/26/2014    GERD (gastroesophageal reflux disease)     History of tobacco abuse     HTN (hypertension)     Hyperlipemia     Hyperthyroidism     ICD (implantable cardioverter-defibrillator), biventricular, in situ     Neck mass     Seasonal allergies     SOB (shortness of breath)     TIA (transient ischemic attack)     YRS AGO, NO RESIDUALS       Surgical History: has a past surgical history that includes Colonoscopy (2014); Esophagogastroduodenoscopy (2014); Eye surgery; Other surgical history; Lung surgery; Mastectomy; Pacemaker Implantation; Lung lobectomy (Left); Mastectomy (Left); and Lung surgery (Left).     Family History: family history includes Colon cancer in her mother; Lung cancer in her sister.     Social History: reports that she has never  smoked. She has never been exposed to tobacco smoke. She has never used smokeless tobacco. She reports that she does not drink alcohol and does not use drugs.    Allergies: Patient has no known allergies.    No current facility-administered medications on file prior to encounter.     Current Outpatient Medications on File Prior to Encounter   Medication Sig    allopurinol (ZYLOPRIM) 100 MG tablet Take 1 tablet by mouth 2 (Two) Times a Day.    clopidogrel (PLAVIX) 75 MG tablet Take 1 tablet by mouth Daily.    furosemide (LASIX) 40 MG tablet Take 1 tablet by mouth 2 (Two) Times a Day.    HYDROcodone-acetaminophen (NORCO) 5-325 MG per tablet Take 1 tablet by mouth Every 6 (Six) Hours As Needed.    lansoprazole (PREVACID) 30 MG capsule     levothyroxine (SYNTHROID, LEVOTHROID) 125 MCG tablet Take 1 tablet by mouth Daily.    montelukast (SINGULAIR) 10 MG tablet Take 1 tablet by mouth Every Night.    nebivolol (Bystolic) 10 MG tablet Take 1 tablet by mouth Daily.    rosuvastatin (Crestor) 10 MG tablet Take 1 tablet by mouth Every Night.    spironolactone (ALDACTONE) 25 MG tablet Take 0.5 tablets by mouth Daily.    Xopenex HFA 45 MCG/ACT inhaler     zolpidem (AMBIEN) 5 MG tablet Take 1 tablet by mouth At Night As Needed.    chlorpheniramine (CHLOR-TRIMETON) 4 MG tablet Take 1 tablet by mouth Every 6 (Six) Hours As Needed for Allergies.    cholecalciferol (VITAMIN D3) 25 MCG (1000 UT) tablet Take 2 tablets by mouth Daily.    fluticasone (FLONASE) 50 MCG/ACT nasal spray Administer 1 spray into the nostril(s) as directed by provider Daily.    hydrocortisone 2.5 % cream Apply 1 Application topically to the appropriate area as directed 2 (Two) Times a Day.    ketoconazole (NIZORAL) 2 % cream Apply 1 Application topically to the appropriate area as directed Daily.    ketoconazole (NIZORAL) 2 % shampoo     Lactobacillus Acid-Pectin (Acidophilus/Citrus Pectin) tablet tablet Take 1 tablet by mouth Daily.    lidocaine (LIDODERM) 5 %  "    multivitamin with minerals tablet tablet Take 1 tablet by mouth Daily.    naloxone (NARCAN) 4 MG/0.1ML nasal spray Administer 1 spray into the nostril(s) as directed by provider As Needed.    nystatin (MYCOSTATIN) 522206 UNIT/GM cream Apply 1 Application topically to the appropriate area as directed 2 (Two) Times a Day. Under right breast    polyethylene glycol (MIRALAX) 17 GM/SCOOP powder Take 17 g by mouth Daily.          Review of Systems   Constitutional:  Positive for fatigue.   Respiratory:  Positive for shortness of breath. Negative for cough and wheezing.    Cardiovascular:  Positive for leg swelling. Negative for chest pain and palpitations.   Gastrointestinal:  Negative for nausea and vomiting.   Neurological:  Negative for dizziness and syncope.        Objective     /65 (BP Location: Right arm, Patient Position: Sitting)   Pulse 60   Temp 98.3 °F (36.8 °C) (Temporal)   Resp 16   Ht 149.9 cm (59\")   Wt 70.4 kg (155 lb 3.3 oz)   SpO2 94%   BMI 31.35 kg/m²       Physical Exam    General : Alert, awake, no acute distress  Neck : Supple, no carotid bruit, no jugular venous distention  CVS : Regular rate and rhythm, no murmur, rubs or gallops  Lungs: Clear to auscultation bilaterally, no crackles or rhonchi  Abdomen: Soft, nontender, bowel sounds heard in all 4 quadrants  Extremities: Warm, well-perfused, trace edema bilaterally  Skin: ICD site is intact, nontender, appears normal    Result Review :     The following data was reviewed by: Laureano Gautam MD on 08/08/2024:    CMP          7/9/2024    07:45 10/4/2024    08:05 11/14/2024    07:39   CMP   Glucose 85  92  101    BUN 44  48  50    Creatinine 1.65  1.87  1.90    EGFR 30.0  25.6  25.1    Sodium 141  140  142    Potassium 4.0  4.8  4.1    Chloride 106  105  103    Calcium 9.5  9.7  9.2    Total Protein 6.8  6.6     Albumin 4.1  4.2     Total Bilirubin 0.3  0.2     Alkaline Phosphatase 73  91     AST (SGOT) 16  15     ALT " (SGPT) 12  12     BUN/Creatinine Ratio 26.7  25.7  26.3    Anion Gap 14.0  11.0  13.6      CBC          7/9/2024    07:45 10/4/2024    08:05 11/14/2024    07:39   CBC   WBC 8.49  6.58  6.27    RBC 4.20  4.12  4.11    Hemoglobin 13.4  12.6  12.1    Hematocrit 39.7  38.8  39.9    MCV 94.5  94.2  97.1    MCH 31.9  30.6  29.4    MCHC 33.8  32.5  30.3    RDW 14.4  13.4  14.9    Platelets 233  229  219      TSH          4/13/2024    07:29 7/9/2024    07:45 10/4/2024    08:05   TSH   TSH 0.230  0.319  0.246      Lipid Panel          4/13/2024    07:29 7/9/2024    07:45 10/4/2024    08:05   Lipid Panel   Total Cholesterol 160  175  158    Triglycerides 74  63  60    HDL Cholesterol 56  62  52    VLDL Cholesterol 14  12  12    LDL Cholesterol  90  101  94    LDL/HDL Ratio 1.59  1.62  1.81           Data reviewed: Cardiology studies        Results for orders placed during the hospital encounter of 08/26/24    Adult Transthoracic Echo Complete W/ Cont if Necessary Per Protocol    Interpretation Summary    Left ventricular systolic function is moderately decreased with global hypokinesis. Left ventricular ejection fraction appears to be 31 - 35%.    The left ventricular cavity is mildly dilated.    Left ventricular diastolic function is consistent with (grade I) impaired relaxation.    The left atrial cavity is mildly dilated.    Mild aortic valve regurgitation is present.    There is mild mitral regurgitation.    Estimated right ventricular systolic pressure from tricuspid regurgitation is normal (<35 mmHg).    Mild dilation of the ascending aorta is present.      ICD remote monitoring data/interrogations reviewed             Assessment and Plan      Biventricular ICD battery depletion-the risks and benefits of ICD generator replacement were discussed with the patient and her  this morning.  She is agreeable to proceed.  Office follow-up will be arranged after the procedure          Laureano Gautam MD

## 2024-11-25 ENCOUNTER — HOSPITAL ENCOUNTER (INPATIENT)
Facility: HOSPITAL | Age: 89
LOS: 9 days | Discharge: HOME HEALTH | End: 2024-12-05
Attending: EMERGENCY MEDICINE | Admitting: FAMILY MEDICINE
Payer: MEDICARE

## 2024-11-25 ENCOUNTER — APPOINTMENT (OUTPATIENT)
Dept: GENERAL RADIOLOGY | Facility: HOSPITAL | Age: 89
End: 2024-11-25
Payer: MEDICARE

## 2024-11-25 ENCOUNTER — TELEPHONE (OUTPATIENT)
Dept: CARDIOLOGY | Facility: CLINIC | Age: 89
End: 2024-11-25
Payer: MEDICARE

## 2024-11-25 DIAGNOSIS — I50.9 ACUTE ON CHRONIC CONGESTIVE HEART FAILURE, UNSPECIFIED HEART FAILURE TYPE: ICD-10-CM

## 2024-11-25 DIAGNOSIS — J44.1 COPD WITH ACUTE EXACERBATION: ICD-10-CM

## 2024-11-25 DIAGNOSIS — R26.2 DIFFICULTY IN WALKING: ICD-10-CM

## 2024-11-25 DIAGNOSIS — J96.02 ACUTE RESPIRATORY FAILURE WITH HYPOXIA AND HYPERCAPNIA: Primary | ICD-10-CM

## 2024-11-25 DIAGNOSIS — J96.01 ACUTE RESPIRATORY FAILURE WITH HYPOXIA AND HYPERCAPNIA: Primary | ICD-10-CM

## 2024-11-25 DIAGNOSIS — I50.42 CHRONIC COMBINED SYSTOLIC AND DIASTOLIC CONGESTIVE HEART FAILURE: ICD-10-CM

## 2024-11-25 LAB
ALBUMIN SERPL-MCNC: 4.3 G/DL (ref 3.5–5.2)
ALBUMIN/GLOB SERPL: 1.4 G/DL
ALP SERPL-CCNC: 109 U/L (ref 39–117)
ALT SERPL W P-5'-P-CCNC: 13 U/L (ref 1–33)
ANION GAP SERPL CALCULATED.3IONS-SCNC: 13.8 MMOL/L (ref 5–15)
ARTERIAL PATENCY WRIST A: POSITIVE
AST SERPL-CCNC: 18 U/L (ref 1–32)
ATMOSPHERIC PRESS: 742.7 MMHG
BASE EXCESS BLDA CALC-SCNC: -2.9 MMOL/L (ref -2–2)
BASOPHILS # BLD AUTO: 0.05 10*3/MM3 (ref 0–0.2)
BASOPHILS NFR BLD AUTO: 0.5 % (ref 0–1.5)
BDY SITE: ABNORMAL
BILIRUB SERPL-MCNC: 0.2 MG/DL (ref 0–1.2)
BUN SERPL-MCNC: 38 MG/DL (ref 8–23)
BUN/CREAT SERPL: 20.8 (ref 7–25)
CA-I BLDA-SCNC: 1.14 MMOL/L (ref 1.13–1.32)
CALCIUM SPEC-SCNC: 9.4 MG/DL (ref 8.6–10.5)
CHLORIDE BLDA-SCNC: 107 MMOL/L (ref 98–107)
CHLORIDE SERPL-SCNC: 105 MMOL/L (ref 98–107)
CO2 SERPL-SCNC: 24.2 MMOL/L (ref 22–29)
CREAT SERPL-MCNC: 1.83 MG/DL (ref 0.57–1)
D-LACTATE SERPL-SCNC: 0.7 MMOL/L
DEPRECATED RDW RBC AUTO: 53.5 FL (ref 37–54)
EGFRCR SERPLBLD CKD-EPI 2021: 26.3 ML/MIN/1.73
EOSINOPHIL # BLD AUTO: 0.1 10*3/MM3 (ref 0–0.4)
EOSINOPHIL NFR BLD AUTO: 1 % (ref 0.3–6.2)
ERYTHROCYTE [DISTWIDTH] IN BLOOD BY AUTOMATED COUNT: 14.8 % (ref 12.3–15.4)
GLOBULIN UR ELPH-MCNC: 3.1 GM/DL
GLUCOSE BLDC GLUCOMTR-MCNC: 155 MG/DL (ref 65–99)
GLUCOSE SERPL-MCNC: 164 MG/DL (ref 65–99)
HCO3 BLDA-SCNC: 26.5 MMOL/L (ref 22–26)
HCT VFR BLD AUTO: 41.6 % (ref 34–46.6)
HCT VFR BLD CALC: 42 % (ref 38–51)
HEMODILUTION: NO
HGB BLD-MCNC: 12.7 G/DL (ref 12–15.9)
HGB BLDA-MCNC: 14.1 G/DL (ref 12–18)
HOLD SPECIMEN: NORMAL
IMM GRANULOCYTES # BLD AUTO: 0.04 10*3/MM3 (ref 0–0.05)
IMM GRANULOCYTES NFR BLD AUTO: 0.4 % (ref 0–0.5)
INHALED O2 CONCENTRATION: 70 %
LYMPHOCYTES # BLD AUTO: 0.99 10*3/MM3 (ref 0.7–3.1)
LYMPHOCYTES NFR BLD AUTO: 10.2 % (ref 19.6–45.3)
Lab: ABNORMAL
MCH RBC QN AUTO: 30.1 PG (ref 26.6–33)
MCHC RBC AUTO-ENTMCNC: 30.5 G/DL (ref 31.5–35.7)
MCV RBC AUTO: 98.6 FL (ref 79–97)
MODALITY: ABNORMAL
MONOCYTES # BLD AUTO: 1.42 10*3/MM3 (ref 0.1–0.9)
MONOCYTES NFR BLD AUTO: 14.6 % (ref 5–12)
NEUTROPHILS NFR BLD AUTO: 7.12 10*3/MM3 (ref 1.7–7)
NEUTROPHILS NFR BLD AUTO: 73.3 % (ref 42.7–76)
NOTIFIED WHO: ABNORMAL
NRBC BLD AUTO-RTO: 0 /100 WBC (ref 0–0.2)
NT-PROBNP SERPL-MCNC: ABNORMAL PG/ML (ref 0–1800)
PAW @ PEAK INSP FLOW SETTING VENT: 14 CMH2O
PCO2 BLDA: 65.9 MM HG (ref 35–45)
PEEP RESPIRATORY: 7 CM[H2O]
PH BLDA: 7.21 PH UNITS (ref 7.35–7.45)
PLATELET # BLD AUTO: 296 10*3/MM3 (ref 140–450)
PMV BLD AUTO: 10 FL (ref 6–12)
PO2 BLD: 320 MM[HG] (ref 0–500)
PO2 BLDA: 223.9 MM HG (ref 80–100)
POTASSIUM BLDA-SCNC: 4 MMOL/L (ref 3.5–5)
POTASSIUM SERPL-SCNC: 4 MMOL/L (ref 3.5–5.2)
PROT SERPL-MCNC: 7.4 G/DL (ref 6–8.5)
RBC # BLD AUTO: 4.22 10*6/MM3 (ref 3.77–5.28)
READ BACK: YES
SAO2 % BLDCOA: 99.6 % (ref 95–99)
SARS-COV-2 RNA RESP QL NAA+PROBE: NOT DETECTED
SODIUM BLD-SCNC: 143 MMOL/L (ref 131–143)
SODIUM SERPL-SCNC: 143 MMOL/L (ref 136–145)
TROPONIN T SERPL HS-MCNC: 37 NG/L
WBC NRBC COR # BLD AUTO: 9.72 10*3/MM3 (ref 3.4–10.8)
WHOLE BLOOD HOLD COAG: NORMAL
WHOLE BLOOD HOLD SPECIMEN: NORMAL

## 2024-11-25 PROCEDURE — 99291 CRITICAL CARE FIRST HOUR: CPT

## 2024-11-25 PROCEDURE — 94799 UNLISTED PULMONARY SVC/PX: CPT

## 2024-11-25 PROCEDURE — 85025 COMPLETE CBC W/AUTO DIFF WBC: CPT | Performed by: EMERGENCY MEDICINE

## 2024-11-25 PROCEDURE — 80053 COMPREHEN METABOLIC PANEL: CPT | Performed by: EMERGENCY MEDICINE

## 2024-11-25 PROCEDURE — 86738 MYCOPLASMA ANTIBODY: CPT | Performed by: FAMILY MEDICINE

## 2024-11-25 PROCEDURE — 84484 ASSAY OF TROPONIN QUANT: CPT | Performed by: EMERGENCY MEDICINE

## 2024-11-25 PROCEDURE — 94761 N-INVAS EAR/PLS OXIMETRY MLT: CPT

## 2024-11-25 PROCEDURE — 87635 SARS-COV-2 COVID-19 AMP PRB: CPT | Performed by: EMERGENCY MEDICINE

## 2024-11-25 PROCEDURE — 82803 BLOOD GASES ANY COMBINATION: CPT

## 2024-11-25 PROCEDURE — 25010000002 FUROSEMIDE PER 20 MG: Performed by: EMERGENCY MEDICINE

## 2024-11-25 PROCEDURE — 82948 REAGENT STRIP/BLOOD GLUCOSE: CPT

## 2024-11-25 PROCEDURE — 80051 ELECTROLYTE PANEL: CPT

## 2024-11-25 PROCEDURE — 93010 ELECTROCARDIOGRAM REPORT: CPT | Performed by: INTERNAL MEDICINE

## 2024-11-25 PROCEDURE — 93005 ELECTROCARDIOGRAM TRACING: CPT

## 2024-11-25 PROCEDURE — 99285 EMERGENCY DEPT VISIT HI MDM: CPT

## 2024-11-25 PROCEDURE — 83880 ASSAY OF NATRIURETIC PEPTIDE: CPT | Performed by: EMERGENCY MEDICINE

## 2024-11-25 PROCEDURE — 93005 ELECTROCARDIOGRAM TRACING: CPT | Performed by: EMERGENCY MEDICINE

## 2024-11-25 PROCEDURE — 83605 ASSAY OF LACTIC ACID: CPT

## 2024-11-25 PROCEDURE — 71045 X-RAY EXAM CHEST 1 VIEW: CPT

## 2024-11-25 PROCEDURE — 36600 WITHDRAWAL OF ARTERIAL BLOOD: CPT

## 2024-11-25 PROCEDURE — 94640 AIRWAY INHALATION TREATMENT: CPT

## 2024-11-25 PROCEDURE — 82330 ASSAY OF CALCIUM: CPT

## 2024-11-25 PROCEDURE — 94660 CPAP INITIATION&MGMT: CPT

## 2024-11-25 RX ORDER — IPRATROPIUM BROMIDE AND ALBUTEROL SULFATE 2.5; .5 MG/3ML; MG/3ML
SOLUTION RESPIRATORY (INHALATION)
Status: COMPLETED
Start: 2024-11-25 | End: 2024-11-25

## 2024-11-25 RX ORDER — FUROSEMIDE 10 MG/ML
40 INJECTION INTRAMUSCULAR; INTRAVENOUS ONCE
Status: COMPLETED | OUTPATIENT
Start: 2024-11-26 | End: 2024-11-25

## 2024-11-25 RX ORDER — SODIUM CHLORIDE 0.9 % (FLUSH) 0.9 %
10 SYRINGE (ML) INJECTION AS NEEDED
Status: DISCONTINUED | OUTPATIENT
Start: 2024-11-25 | End: 2024-12-05 | Stop reason: HOSPADM

## 2024-11-25 RX ADMIN — FUROSEMIDE 40 MG: 10 INJECTION, SOLUTION INTRAMUSCULAR; INTRAVENOUS at 23:53

## 2024-11-25 RX ADMIN — IPRATROPIUM BROMIDE AND ALBUTEROL SULFATE 3 ML: .5; 3 SOLUTION RESPIRATORY (INHALATION) at 22:29

## 2024-11-25 NOTE — Clinical Note
Level of Care: Telemetry [5]   Diagnosis: Acute on chronic respiratory failure with hypercapnia [[id number]]   Admitting Physician: JULIO CESAR GARCIA [[id number]]   Certification: I Certify That Inpatient Hospital Services Are Medically Necessary For Greater Than 2 Midnights

## 2024-11-25 NOTE — TELEPHONE ENCOUNTER
Caller: MICAELA ARROYO     Relationship: SPOUSE    Best call back number: [phone]     What is your medical concern? PATIENT IS EXPERIENCING SHORTNESS OF BREATH WITH HER NEW PACEMAKER. HER SPOUSE IS REQUESTING TO TALK TO BUDDY.

## 2024-11-25 NOTE — TELEPHONE ENCOUNTER
Spoke with Mr. Sanchez, regarding Mrs. Stout device.    She has an appointment tomorrow for a wound check.  I assured him the rep programs the new device the same way as the previous device.    We will discuss at visit tomorrow.  If her SOB is to get worst, she is to go to the ER.

## 2024-11-26 ENCOUNTER — APPOINTMENT (OUTPATIENT)
Dept: CT IMAGING | Facility: HOSPITAL | Age: 89
End: 2024-11-26
Payer: MEDICARE

## 2024-11-26 ENCOUNTER — APPOINTMENT (OUTPATIENT)
Dept: GENERAL RADIOLOGY | Facility: HOSPITAL | Age: 89
End: 2024-11-26
Payer: MEDICARE

## 2024-11-26 PROBLEM — J96.22 ACUTE ON CHRONIC RESPIRATORY FAILURE WITH HYPERCAPNIA: Status: ACTIVE | Noted: 2024-11-26

## 2024-11-26 PROBLEM — J44.1 COPD WITH ACUTE EXACERBATION: Status: ACTIVE | Noted: 2024-10-30

## 2024-11-26 PROBLEM — I50.9 ACUTE ON CHRONIC CONGESTIVE HEART FAILURE: Status: ACTIVE | Noted: 2024-11-26

## 2024-11-26 LAB
ARTERIAL PATENCY WRIST A: POSITIVE
ATMOSPHERIC PRESS: 744.6 MMHG
BASE EXCESS BLDA CALC-SCNC: 1.1 MMOL/L (ref -2–2)
BDY SITE: ABNORMAL
HCO3 BLDA-SCNC: 27.2 MMOL/L (ref 22–26)
HCT VFR BLD CALC: 40 % (ref 38–51)
HEMODILUTION: NO
HGB BLDA-MCNC: 13.4 G/DL (ref 12–18)
INHALED O2 CONCENTRATION: 30 %
M PNEUMO IGM SER QL: POSITIVE
MODALITY: ABNORMAL
PAW @ PEAK INSP FLOW SETTING VENT: 14 CMH2O
PCO2 BLDA: 47.8 MM HG (ref 35–45)
PEEP RESPIRATORY: 7 CM[H2O]
PH BLDA: 7.36 PH UNITS (ref 7.35–7.45)
PO2 BLD: 272 MM[HG] (ref 0–500)
PO2 BLDA: 81.5 MM HG (ref 80–100)
PROCALCITONIN SERPL-MCNC: 0.25 NG/ML (ref 0–0.25)
QT INTERVAL: 408 MS
QTC INTERVAL: 540 MS
SAO2 % BLDCOA: 95.4 % (ref 95–99)
WHOLE BLOOD HOLD SPECIMEN: NORMAL

## 2024-11-26 PROCEDURE — 25010000002 METHYLPREDNISOLONE PER 40 MG: Performed by: FAMILY MEDICINE

## 2024-11-26 PROCEDURE — 82803 BLOOD GASES ANY COMBINATION: CPT

## 2024-11-26 PROCEDURE — 25010000002 FUROSEMIDE PER 20 MG: Performed by: FAMILY MEDICINE

## 2024-11-26 PROCEDURE — 94660 CPAP INITIATION&MGMT: CPT

## 2024-11-26 PROCEDURE — 94799 UNLISTED PULMONARY SVC/PX: CPT

## 2024-11-26 PROCEDURE — 36600 WITHDRAWAL OF ARTERIAL BLOOD: CPT

## 2024-11-26 PROCEDURE — 94761 N-INVAS EAR/PLS OXIMETRY MLT: CPT

## 2024-11-26 PROCEDURE — 99223 1ST HOSP IP/OBS HIGH 75: CPT | Performed by: FAMILY MEDICINE

## 2024-11-26 PROCEDURE — 71250 CT THORAX DX C-: CPT

## 2024-11-26 PROCEDURE — 84145 PROCALCITONIN (PCT): CPT

## 2024-11-26 PROCEDURE — 99222 1ST HOSP IP/OBS MODERATE 55: CPT | Performed by: INTERNAL MEDICINE

## 2024-11-26 PROCEDURE — 25010000002 HEPARIN (PORCINE) PER 1000 UNITS: Performed by: FAMILY MEDICINE

## 2024-11-26 PROCEDURE — 71045 X-RAY EXAM CHEST 1 VIEW: CPT

## 2024-11-26 RX ORDER — ARFORMOTEROL TARTRATE 15 UG/2ML
15 SOLUTION RESPIRATORY (INHALATION)
Status: DISCONTINUED | OUTPATIENT
Start: 2024-11-26 | End: 2024-12-05 | Stop reason: HOSPADM

## 2024-11-26 RX ORDER — ROSUVASTATIN CALCIUM 5 MG/1
10 TABLET, COATED ORAL NIGHTLY
Status: DISCONTINUED | OUTPATIENT
Start: 2024-11-26 | End: 2024-12-05 | Stop reason: HOSPADM

## 2024-11-26 RX ORDER — FUROSEMIDE 10 MG/ML
60 INJECTION INTRAMUSCULAR; INTRAVENOUS ONCE
Status: COMPLETED | OUTPATIENT
Start: 2024-11-26 | End: 2024-11-26

## 2024-11-26 RX ORDER — FUROSEMIDE 10 MG/ML
40 INJECTION INTRAMUSCULAR; INTRAVENOUS EVERY 24 HOURS
Status: DISCONTINUED | OUTPATIENT
Start: 2024-11-26 | End: 2024-11-29

## 2024-11-26 RX ORDER — FUROSEMIDE 40 MG/1
40 TABLET ORAL DAILY
COMMUNITY

## 2024-11-26 RX ORDER — LEVOTHYROXINE SODIUM 125 UG/1
125 TABLET ORAL DAILY
Status: DISCONTINUED | OUTPATIENT
Start: 2024-11-26 | End: 2024-11-26 | Stop reason: ALTCHOICE

## 2024-11-26 RX ORDER — FUROSEMIDE 40 MG/1
20 TABLET ORAL EVERY OTHER DAY
COMMUNITY

## 2024-11-26 RX ORDER — CLOPIDOGREL BISULFATE 75 MG/1
75 TABLET ORAL DAILY
Status: DISCONTINUED | OUTPATIENT
Start: 2024-11-26 | End: 2024-12-05 | Stop reason: HOSPADM

## 2024-11-26 RX ORDER — NYSTATIN 100000 U/G
1 OINTMENT TOPICAL
Status: DISCONTINUED | OUTPATIENT
Start: 2024-11-26 | End: 2024-12-05 | Stop reason: HOSPADM

## 2024-11-26 RX ORDER — METHYLPREDNISOLONE SODIUM SUCCINATE 40 MG/ML
40 INJECTION, POWDER, LYOPHILIZED, FOR SOLUTION INTRAMUSCULAR; INTRAVENOUS EVERY 12 HOURS
Status: DISCONTINUED | OUTPATIENT
Start: 2024-11-26 | End: 2024-11-27

## 2024-11-26 RX ORDER — PANTOPRAZOLE SODIUM 40 MG/1
40 TABLET, DELAYED RELEASE ORAL
Status: DISCONTINUED | OUTPATIENT
Start: 2024-11-26 | End: 2024-12-05 | Stop reason: HOSPADM

## 2024-11-26 RX ORDER — LEVOTHYROXINE SODIUM 100 UG/1
100 TABLET ORAL
Status: DISCONTINUED | OUTPATIENT
Start: 2024-11-26 | End: 2024-12-05 | Stop reason: HOSPADM

## 2024-11-26 RX ORDER — BUDESONIDE 0.5 MG/2ML
0.5 INHALANT ORAL
Status: DISCONTINUED | OUTPATIENT
Start: 2024-11-26 | End: 2024-12-05 | Stop reason: HOSPADM

## 2024-11-26 RX ORDER — FERROUS SULFATE 325(65) MG
325 TABLET ORAL 2 TIMES DAILY
COMMUNITY

## 2024-11-26 RX ORDER — ONDANSETRON 2 MG/ML
4 INJECTION INTRAMUSCULAR; INTRAVENOUS EVERY 6 HOURS PRN
Status: DISCONTINUED | OUTPATIENT
Start: 2024-11-26 | End: 2024-12-05 | Stop reason: HOSPADM

## 2024-11-26 RX ORDER — AMOXICILLIN 250 MG
2 CAPSULE ORAL 2 TIMES DAILY PRN
Status: DISCONTINUED | OUTPATIENT
Start: 2024-11-26 | End: 2024-12-05 | Stop reason: HOSPADM

## 2024-11-26 RX ORDER — SODIUM CHLORIDE 0.9 % (FLUSH) 0.9 %
10 SYRINGE (ML) INJECTION AS NEEDED
Status: DISCONTINUED | OUTPATIENT
Start: 2024-11-26 | End: 2024-12-05 | Stop reason: HOSPADM

## 2024-11-26 RX ORDER — EZETIMIBE 10 MG/1
10 TABLET ORAL DAILY
COMMUNITY

## 2024-11-26 RX ORDER — ALLOPURINOL 100 MG/1
100 TABLET ORAL 2 TIMES DAILY
Status: DISCONTINUED | OUTPATIENT
Start: 2024-11-26 | End: 2024-12-05 | Stop reason: HOSPADM

## 2024-11-26 RX ORDER — DOCUSATE SODIUM 100 MG/1
100 CAPSULE, LIQUID FILLED ORAL DAILY
COMMUNITY

## 2024-11-26 RX ORDER — SODIUM CHLORIDE 9 MG/ML
40 INJECTION, SOLUTION INTRAVENOUS AS NEEDED
Status: DISCONTINUED | OUTPATIENT
Start: 2024-11-26 | End: 2024-12-05 | Stop reason: HOSPADM

## 2024-11-26 RX ORDER — POLYETHYLENE GLYCOL 3350 17 G/17G
17 POWDER, FOR SOLUTION ORAL DAILY PRN
Status: DISCONTINUED | OUTPATIENT
Start: 2024-11-26 | End: 2024-12-05 | Stop reason: HOSPADM

## 2024-11-26 RX ORDER — IPRATROPIUM BROMIDE AND ALBUTEROL SULFATE 2.5; .5 MG/3ML; MG/3ML
3 SOLUTION RESPIRATORY (INHALATION) EVERY 4 HOURS PRN
Status: DISCONTINUED | OUTPATIENT
Start: 2024-11-26 | End: 2024-12-03

## 2024-11-26 RX ORDER — BISACODYL 10 MG
10 SUPPOSITORY, RECTAL RECTAL DAILY PRN
Status: DISCONTINUED | OUTPATIENT
Start: 2024-11-26 | End: 2024-12-05 | Stop reason: HOSPADM

## 2024-11-26 RX ORDER — SODIUM CHLORIDE 0.9 % (FLUSH) 0.9 %
10 SYRINGE (ML) INJECTION EVERY 12 HOURS SCHEDULED
Status: DISCONTINUED | OUTPATIENT
Start: 2024-11-26 | End: 2024-12-05 | Stop reason: HOSPADM

## 2024-11-26 RX ORDER — GUAIFENESIN/DEXTROMETHORPHAN 100-10MG/5
10 SYRUP ORAL EVERY 6 HOURS PRN
Status: DISCONTINUED | OUTPATIENT
Start: 2024-11-26 | End: 2024-12-05 | Stop reason: HOSPADM

## 2024-11-26 RX ORDER — HYDROCODONE BITARTRATE AND ACETAMINOPHEN 5; 325 MG/1; MG/1
1 TABLET ORAL EVERY 6 HOURS PRN
Status: DISCONTINUED | OUTPATIENT
Start: 2024-11-26 | End: 2024-12-05 | Stop reason: HOSPADM

## 2024-11-26 RX ORDER — MONTELUKAST SODIUM 10 MG/1
10 TABLET ORAL NIGHTLY
Status: DISCONTINUED | OUTPATIENT
Start: 2024-11-26 | End: 2024-12-05 | Stop reason: HOSPADM

## 2024-11-26 RX ORDER — HEPARIN SODIUM 5000 [USP'U]/ML
5000 INJECTION, SOLUTION INTRAVENOUS; SUBCUTANEOUS EVERY 12 HOURS SCHEDULED
Status: DISCONTINUED | OUTPATIENT
Start: 2024-11-26 | End: 2024-12-05

## 2024-11-26 RX ORDER — BISACODYL 5 MG/1
5 TABLET, DELAYED RELEASE ORAL DAILY PRN
Status: DISCONTINUED | OUTPATIENT
Start: 2024-11-26 | End: 2024-12-05 | Stop reason: HOSPADM

## 2024-11-26 RX ORDER — METHYLPREDNISOLONE SODIUM SUCCINATE 40 MG/ML
40 INJECTION, POWDER, LYOPHILIZED, FOR SOLUTION INTRAMUSCULAR; INTRAVENOUS EVERY 12 HOURS
Status: DISCONTINUED | OUTPATIENT
Start: 2024-11-26 | End: 2024-11-26

## 2024-11-26 RX ORDER — IPRATROPIUM BROMIDE AND ALBUTEROL SULFATE 2.5; .5 MG/3ML; MG/3ML
3 SOLUTION RESPIRATORY (INHALATION)
Status: DISCONTINUED | OUTPATIENT
Start: 2024-11-26 | End: 2024-12-03

## 2024-11-26 RX ORDER — DOXYCYCLINE 100 MG/1
100 CAPSULE ORAL EVERY 12 HOURS SCHEDULED
Status: COMPLETED | OUTPATIENT
Start: 2024-11-26 | End: 2024-12-02

## 2024-11-26 RX ORDER — GUAIFENESIN 600 MG/1
600 TABLET, EXTENDED RELEASE ORAL EVERY 12 HOURS SCHEDULED
Status: DISCONTINUED | OUTPATIENT
Start: 2024-11-26 | End: 2024-12-05 | Stop reason: HOSPADM

## 2024-11-26 RX ADMIN — ALLOPURINOL 100 MG: 100 TABLET ORAL at 20:50

## 2024-11-26 RX ADMIN — ARFORMOTEROL TARTRATE 15 MCG: 15 SOLUTION RESPIRATORY (INHALATION) at 19:00

## 2024-11-26 RX ADMIN — DOXYCYCLINE 100 MG: 100 CAPSULE ORAL at 20:50

## 2024-11-26 RX ADMIN — NYSTATIN OINTMENT 1 APPLICATION: 100000 OINTMENT TOPICAL at 21:00

## 2024-11-26 RX ADMIN — GUAIFENESIN 600 MG: 600 TABLET ORAL at 20:50

## 2024-11-26 RX ADMIN — DOXYCYCLINE 100 MG: 100 CAPSULE ORAL at 09:41

## 2024-11-26 RX ADMIN — Medication 12.5 MG: at 09:41

## 2024-11-26 RX ADMIN — HEPARIN SODIUM 5000 UNITS: 5000 INJECTION INTRAVENOUS; SUBCUTANEOUS at 20:50

## 2024-11-26 RX ADMIN — LEVOTHYROXINE SODIUM 100 MCG: 0.1 TABLET ORAL at 06:19

## 2024-11-26 RX ADMIN — FUROSEMIDE 60 MG: 10 INJECTION, SOLUTION INTRAMUSCULAR; INTRAVENOUS at 22:35

## 2024-11-26 RX ADMIN — GUAIFENESIN 600 MG: 600 TABLET ORAL at 09:41

## 2024-11-26 RX ADMIN — FUROSEMIDE 40 MG: 10 INJECTION, SOLUTION INTRAMUSCULAR; INTRAVENOUS at 05:59

## 2024-11-26 RX ADMIN — BUDESONIDE 0.5 MG: 0.5 INHALANT RESPIRATORY (INHALATION) at 07:13

## 2024-11-26 RX ADMIN — METHYLPREDNISOLONE SODIUM SUCCINATE 40 MG: 40 INJECTION, POWDER, FOR SOLUTION INTRAMUSCULAR; INTRAVENOUS at 03:49

## 2024-11-26 RX ADMIN — IPRATROPIUM BROMIDE AND ALBUTEROL SULFATE 3 ML: .5; 3 SOLUTION RESPIRATORY (INHALATION) at 14:14

## 2024-11-26 RX ADMIN — CLOPIDOGREL BISULFATE 75 MG: 75 TABLET ORAL at 09:41

## 2024-11-26 RX ADMIN — PANTOPRAZOLE SODIUM 40 MG: 40 TABLET, DELAYED RELEASE ORAL at 05:59

## 2024-11-26 RX ADMIN — MONTELUKAST 10 MG: 10 TABLET, FILM COATED ORAL at 20:50

## 2024-11-26 RX ADMIN — ALLOPURINOL 100 MG: 100 TABLET ORAL at 09:41

## 2024-11-26 RX ADMIN — METHYLPREDNISOLONE SODIUM SUCCINATE 40 MG: 40 INJECTION, POWDER, FOR SOLUTION INTRAMUSCULAR; INTRAVENOUS at 16:13

## 2024-11-26 RX ADMIN — Medication 10 ML: at 20:58

## 2024-11-26 RX ADMIN — IPRATROPIUM BROMIDE AND ALBUTEROL SULFATE 3 ML: .5; 3 SOLUTION RESPIRATORY (INHALATION) at 07:13

## 2024-11-26 RX ADMIN — IPRATROPIUM BROMIDE AND ALBUTEROL SULFATE 3 ML: .5; 3 SOLUTION RESPIRATORY (INHALATION) at 03:14

## 2024-11-26 RX ADMIN — Medication 1 APPLICATION: at 21:00

## 2024-11-26 RX ADMIN — IPRATROPIUM BROMIDE AND ALBUTEROL SULFATE 3 ML: .5; 3 SOLUTION RESPIRATORY (INHALATION) at 19:00

## 2024-11-26 RX ADMIN — ARFORMOTEROL TARTRATE 15 MCG: 15 SOLUTION RESPIRATORY (INHALATION) at 07:13

## 2024-11-26 RX ADMIN — IPRATROPIUM BROMIDE AND ALBUTEROL SULFATE 3 ML: .5; 3 SOLUTION RESPIRATORY (INHALATION) at 23:54

## 2024-11-26 RX ADMIN — Medication 10 ML: at 09:43

## 2024-11-26 RX ADMIN — ROSUVASTATIN CALCIUM 10 MG: 5 TABLET, FILM COATED ORAL at 20:50

## 2024-11-26 RX ADMIN — BUDESONIDE 0.5 MG: 0.5 INHALANT RESPIRATORY (INHALATION) at 19:00

## 2024-11-26 NOTE — PROGRESS NOTES
RT EQUIPMENT DEVICE RELATED - SKIN ASSESSMENT    RT Medical Equipment/Device:     NIV Mask:  Full-face    size: S    Skin Assessment:      Cheek:  Intact  Neck:  Intact  Nose:  Intact  Mouth:  Intact    Device Skin Pressure Protection:  Positioning supports utilized and Skin-to-device areas padded:  None Required    Nurse Notification:  Vero Bobo, RRT

## 2024-11-26 NOTE — ED PROVIDER NOTES
Time: 1:11 AM EST  Date of encounter:  11/25/2024  Independent Historian/Clinical History and Information was obtained by:   Patient    History is limited by: N/A    Chief Complaint: shortness of breath      History of Present Illness:  Patient is a 89 y.o. year old female who presents to the emergency department for evaluation of Shortness of breath.  On arrival patient is diaphoretic for severe shortness of breath.  This is been getting worse the last few days.  She recently had a pacemaker placed.  She denies any chest pain.  She also has a history of COPD.  Patient did receive 2 DuoNeb's by EMS as well as Solu-Medrol prior to arrival.  He states she still has had good urine output.      Patient Care Team  Primary Care Provider: Kaelyn Eugene MD    Past Medical History:     No Known Allergies  Past Medical History:   Diagnosis Date    Anemia     Arthritis     Asthma     Bicipitoradial bursitis     Breast cancer     CHF (congestive heart failure)     Congestive heart failure     COPD (chronic obstructive pulmonary disease)     Diverticulosis 11/26/2014    GERD (gastroesophageal reflux disease)     History of tobacco abuse     HTN (hypertension)     Hyperlipemia     Hyperthyroidism     ICD (implantable cardioverter-defibrillator), biventricular, in situ     Neck mass     Seasonal allergies     SOB (shortness of breath)     TIA (transient ischemic attack)     YRS AGO, NO RESIDUALS     Past Surgical History:   Procedure Laterality Date    COLONOSCOPY  2014    ENDOSCOPY  2014    EYE SURGERY      Implant; yes     ICD GENERATOR REPLACEMENT N/A 11/14/2024    Procedure: ICD Generator Change - Bi-Ventricular -[employer];  Surgeon: JANETTE Gautam MD;  Location: [facility] INVASIVE LOCATION;  Service: Cardiovascular;  Laterality: N/A;    LUNG LOBECTOMY Left     Left lower lobe    LUNG SURGERY      LUNG SURGERY Left     LEFT UPPER LOBE (2004)    MASTECTOMY      Left     MASTECTOMY Left     OTHER SURGICAL  HISTORY      Joint Surgery    PACEMAKER IMPLANTATION       Family History   Problem Relation Age of Onset    Colon cancer Mother         60s    Lung cancer Sister        Home Medications:  Prior to Admission medications    Medication Sig Start Date End Date Taking? Authorizing Provider   allopurinol (ZYLOPRIM) 100 MG tablet Take 1 tablet by mouth 2 (Two) Times a Day. 8/4/21   Sorin Younger MD   chlorpheniramine (CHLOR-TRIMETON) 4 MG tablet Take 1 tablet by mouth Every 6 (Six) Hours As Needed for Allergies.    Sorin Younger MD   cholecalciferol (VITAMIN D3) 25 MCG (1000 UT) tablet Take 2 tablets by mouth Daily.    Sorin Younger MD   clopidogrel (PLAVIX) 75 MG tablet Take 1 tablet by mouth Daily. 8/8/24   Simon Talbot MD   fluticasone (FLONASE) 50 MCG/ACT nasal spray Administer 1 spray into the nostril(s) as directed by provider Daily. 4/17/24   Sorin Younger MD   furosemide (LASIX) 40 MG tablet Take 1 tablet by mouth 2 (Two) Times a Day. 8/8/24   Simon Talbot MD   HYDROcodone-acetaminophen (NORCO) 5-325 MG per tablet Take 1 tablet by mouth Every 6 (Six) Hours As Needed. 4/17/24   Sorin Younger MD   hydrocortisone 2.5 % cream Apply 1 Application topically to the appropriate area as directed 2 (Two) Times a Day. 8/8/24   Sorin Younger MD   ketoconazole (NIZORAL) 2 % cream Apply 1 Application topically to the appropriate area as directed Daily. 4/16/24   Sorin Younger MD   ketoconazole (NIZORAL) 2 % shampoo  8/12/24   Sorin Younger MD   Lactobacillus Acid-Pectin (Acidophilus/Citrus Pectin) tablet tablet Take 1 tablet by mouth Daily. 4/17/24   Sorin Younger MD   lansoprazole (PREVACID) 30 MG capsule  4/16/24   Sorin Younger MD   levothyroxine (SYNTHROID, LEVOTHROID) 125 MCG tablet Take 1 tablet by mouth Daily.    Sorin Younger MD   lidocaine (LIDODERM) 5 %  4/17/24   Sorin Younger MD   montelukast (SINGULAIR) 10 MG  tablet Take 1 tablet by mouth Every Night.    Sorin Younger MD   multivitamin with minerals tablet tablet Take 1 tablet by mouth Daily.    Sorin Younger MD   naloxone (NARCAN) 4 MG/0.1ML nasal spray Administer 1 spray into the nostril(s) as directed by provider As Needed. 1/23/24   Sorin Younger MD   nebivolol (Bystolic) 10 MG tablet Take 1 tablet by mouth Daily. 8/8/24   Simon Talbot MD   nystatin (MYCOSTATIN) 866324 UNIT/GM cream Apply 1 Application topically to the appropriate area as directed 2 (Two) Times a Day. Under right breast 1/17/24   Enma Wagner MD   polyethylene glycol (MIRALAX) 17 GM/SCOOP powder Take 17 g by mouth Daily. 1/20/23   Sorin Younger MD   rosuvastatin (Crestor) 10 MG tablet Take 1 tablet by mouth Every Night. 8/8/24   Simon Talbot MD   spironolactone (ALDACTONE) 25 MG tablet Take 0.5 tablets by mouth Daily. 8/8/24   Simon Talbot MD   Xopenex HFA 45 MCG/ACT inhaler  7/12/24   Sorin Younger MD   zolpidem (AMBIEN) 5 MG tablet Take 1 tablet by mouth At Night As Needed. 4/17/24   Sorin Younger MD        Social History:   Social History     Tobacco Use    Smoking status: Never     Passive exposure: Never    Smokeless tobacco: Never   Vaping Use    Vaping status: Never Used   Substance Use Topics    Alcohol use: Never    Drug use: Never         Review of Systems:  Review of Systems   Constitutional:  Positive for diaphoresis. Negative for chills and fever.   HENT:  Negative for congestion, ear pain and sore throat.    Eyes:  Negative for pain.   Respiratory:  Positive for shortness of breath. Negative for cough and chest tightness.    Cardiovascular:  Positive for leg swelling. Negative for chest pain.   Gastrointestinal:  Negative for abdominal pain, diarrhea, nausea and vomiting.   Genitourinary:  Negative for flank pain and hematuria.   Musculoskeletal:  Negative for joint swelling.   Skin:  Negative for pallor.  "  Neurological:  Negative for seizures and headaches.   All other systems reviewed and are negative.       Physical Exam:  /73   Pulse 83   Temp 98.2 °F (36.8 °C) (Oral)   Resp 20   Ht 149.9 cm (59.02\")   Wt 70.4 kg (155 lb 3.3 oz)   SpO2 100%   BMI 31.33 kg/m²     Physical Exam  Constitutional:       General: She is in acute distress.      Appearance: She is ill-appearing.   HENT:      Head: Normocephalic.      Mouth/Throat:      Mouth: Mucous membranes are moist.   Eyes:      Extraocular Movements: Extraocular movements intact.   Pulmonary:      Breath sounds: Decreased breath sounds, wheezing and rhonchi present.   Neurological:      Mental Status: She is alert.                  Procedures:  Procedures      Medical Decision Making:      Comorbidities that affect care:    Cancer, Congestive Heart Failure, COPD, Coronary Artery Disease, Hypertension, Thyroid Disease    External Notes reviewed:    Previous Clinic Note: Patient was seen by her PCP on for 10/30/2024 for COPD, coronary disease, history of lung cancer, dyspnea, pulmonary emphysema, count of immunizations, nocturnal hypoxia, MDS, history of breast cancer      The following orders were placed and all results were independently analyzed by me:  Orders Placed This Encounter   Procedures    COVID PRE-OP / PRE-PROCEDURE SCREENING ORDER (NO ISOLATION) - Swab, Nasal Cavity    COVID-19,CEPHEID/LARON,COR/HECTOR/PAD/ADELE/LAG/NITHYA IN-HOUSE,NP SWAB IN TRANSPORT MEDIA 1 HR TAT, RT-PCR - Swab, Nasopharynx    XR Chest 1 View    Elliott Draw    Comprehensive Metabolic Panel    BNP    Single High Sensitivity Troponin T    CBC Auto Differential    Arterial Blood Gas, Lytes, Lactate    Blood Gas, Arterial -    Diet: Cardiac, Fluid Restriction (240 mL/tray); Healthy Heart (2-3 Na+); 1500 mL/day; Fluid Consistency: Thin (IDDSI 0)    Undress & Gown    Continuous Pulse Oximetry    Vital Signs    Code Status and Medical Interventions: CPR (Attempt to Resuscitate); Full " Support    Inpatient Hospitalist Consult    Oxygen Therapy- Nasal Cannula; Titrate 1-6 LPM Per SpO2; 90 - 95%    NIPPV (CPAP or BIPAP)    POC Glucose Once    POC Lactate    POC Electrolyte Panel    ECG 12 Lead ED Triage Standing Order; SOA    Insert Peripheral IV    Inpatient Admission    CBC & Differential    Green Top (Gel)    Lavender Top    Gold Top - SST    Light Blue Top    Extra Tubes    Gray Top       Medications Given in the Emergency Department:  Medications   sodium chloride 0.9 % flush 10 mL (has no administration in time range)   ipratropium-albuterol (DUO-NEB) 0.5-2.5 mg/3 ml nebulizer solution  - ADS Override Pull (3 mL  Given 11/25/24 2229)   furosemide (LASIX) injection 40 mg (40 mg Intravenous Given 11/25/24 2353)        ED Course:    ED Course as of 11/26/24 0116 Tue Nov 26, 2024   0113 ECG 12 Lead ED Triage Standing Order; SOA  Atrial sensed ventricular paced rhythm with rate of 105.  No definite ST elevation.  EKG interpreted by me [LD]      ED Course User Index  [LD] Romeo Perry MD       Labs:    Lab Results (last 24 hours)       Procedure Component Value Units Date/Time    CBC & Differential [[id number]]  (Abnormal) Collected: 11/25/24 2223    Specimen: Blood Updated: 11/25/24 2230    Narrative:      The following orders were created for panel order CBC & Differential.  Procedure                               Abnormality         Status                     ---------                               -----------         ------                     CBC Auto Differential[[id number]]        Abnormal            Final result                 Please view results for these tests on the individual orders.    Comprehensive Metabolic Panel [[id number]]  (Abnormal) Collected: 11/25/24 2223    Specimen: Blood Updated: 11/25/24 2254     Glucose 164 mg/dL      BUN 38 mg/dL      Creatinine 1.83 mg/dL      Sodium 143 mmol/L      Potassium 4.0 mmol/L      Chloride 105 mmol/L      CO2 24.2 mmol/L      Calcium  9.4 mg/dL      Total Protein 7.4 g/dL      Albumin 4.3 g/dL      ALT (SGPT) 13 U/L      AST (SGOT) 18 U/L      Alkaline Phosphatase 109 U/L      Total Bilirubin 0.2 mg/dL      Globulin 3.1 gm/dL      A/G Ratio 1.4 g/dL      BUN/Creatinine Ratio 20.8     Anion Gap 13.8 mmol/L      eGFR 26.3 mL/min/1.73     Narrative:      GFR Normal >60  Chronic Kidney Disease <60  Kidney Failure <15    The GFR formula is only valid for adults with stable renal function between ages 18 and 70.    BNP [[id number]]  (Abnormal) Collected: 11/25/24 2223    Specimen: Blood Updated: 11/25/24 2251     proBNP 15,960.0 pg/mL     Narrative:      This assay is used as an aid in the diagnosis of individuals suspected of having heart failure. It can be used as an aid in the diagnosis of acute decompensated heart failure (ADHF) in patients presenting with signs and symptoms of ADHF to the emergency department (ED). In addition, NT-proBNP of <300 pg/mL indicates ADHF is not likely.    Age Range Result Interpretation  NT-proBNP Concentration (pg/mL:      <50             Positive            >450                   Gray                 300-450                    Negative             <300    50-75           Positive            >900                  Gray                300-900                  Negative            <300      >75             Positive            >1800                  Gray                300-1800                  Negative            <300    Single High Sensitivity Troponin T [[id number]]  (Abnormal) Collected: 11/25/24 2223    Specimen: Blood Updated: 11/25/24 2254     HS Troponin T 37 ng/L     Narrative:      High Sensitive Troponin T Reference Range:  <14.0 ng/L- Negative Female for AMI  <22.0 ng/L- Negative Male for AMI  >=14 - Abnormal Female indicating possible myocardial injury.  >=22 - Abnormal Male indicating possible myocardial injury.   Clinicians would have to utilize clinical acumen, EKG, Troponin, and serial changes to determine  if it is an Acute Myocardial Infarction or myocardial injury due to an underlying chronic condition.         CBC Auto Differential [[id number]]  (Abnormal) Collected: 11/25/24 2223    Specimen: Blood Updated: 11/25/24 2230     WBC 9.72 10*3/mm3      RBC 4.22 10*6/mm3      Hemoglobin 12.7 g/dL      Hematocrit 41.6 %      MCV 98.6 fL      MCH 30.1 pg      MCHC 30.5 g/dL      RDW 14.8 %      RDW-SD 53.5 fl      MPV 10.0 fL      Platelets 296 10*3/mm3      Neutrophil % 73.3 %      Lymphocyte % 10.2 %      Monocyte % 14.6 %      Eosinophil % 1.0 %      Basophil % 0.5 %      Immature Grans % 0.4 %      Neutrophils, Absolute 7.12 10*3/mm3      Lymphocytes, Absolute 0.99 10*3/mm3      Monocytes, Absolute 1.42 10*3/mm3      Eosinophils, Absolute 0.10 10*3/mm3      Basophils, Absolute 0.05 10*3/mm3      Immature Grans, Absolute 0.04 10*3/mm3      nRBC 0.0 /100 WBC     COVID PRE-OP / PRE-PROCEDURE SCREENING ORDER (NO ISOLATION) - Swab, Nasal Cavity [[id number]]  (Normal) Collected: 11/25/24 2223    Specimen: Swab from Nasal Cavity Updated: 11/25/24 2319    Narrative:      The following orders were created for panel order COVID PRE-OP / PRE-PROCEDURE SCREENING ORDER (NO ISOLATION) - Swab, Nasal Cavity.  Procedure                               Abnormality         Status                     ---------                               -----------         ------                     COVID-19,CEPHEID/LARON,CO...[[id number]]  Normal              Final result                 Please view results for these tests on the individual orders.    COVID-19,CEPHEID/LARON,COR/HECTOR/PAD/ADELE/LAG/NITHYA IN-HOUSE,NP SWAB IN TRANSPORT MEDIA 1 HR TAT, RT-PCR - Swab, Nasopharynx [[id number]]  (Normal) Collected: 11/25/24 2223    Specimen: Swab from Nasopharynx Updated: 11/25/24 2319     COVID19 Not Detected    Narrative:      Fact sheet for providers: https://www.fda.gov/media/669420/download     Fact sheet for patients: https://www.fda.gov/media/582549/download  Fact  sheet for providers: https://www.fda.gov/media/483687/download     Fact sheet for patients: https://www.fda.gov/media/965121/download    POC Glucose Once [[id number]]  (Abnormal) Collected: 11/25/24 2242    Specimen: Arterial Blood Updated: 11/25/24 2246     Glucose 155 mg/dL      Comment: Serial Number: [id number]:  [id number]       Blood Gas, Arterial - [[id number]]  (Abnormal) Collected: 11/25/24 2242    Specimen: Arterial Blood Updated: 11/25/24 2246     Site Right Radial     Brian's Test Positive     pH, Arterial 7.212 pH units      pCO2, Arterial 65.9 mm Hg      pO2, Arterial 223.9 mm Hg      HCO3, Arterial 26.5 mmol/L      Base Excess, Arterial -2.9 mmol/L      Comment: Serial Number: [id number]:  [id number]        O2 Saturation, Arterial 99.6 %      Hemoglobin, Blood Gas 14.1 g/dL      Hematocrit, Blood Gas 42.0 %      Barometric Pressure for Blood Gas 742.7000 mmHg      Modality BiPap     FIO2 70 %      PEEP 7     PIP 14 cmH2O      Notified Who Dr Perry     Read Back Yes     Notified Time --     Hemodilution No     PO2/FIO2 320    POC Lactate [[id number]]  (Normal) Collected: 11/25/24 2242    Specimen: Arterial Blood Updated: 11/25/24 2246     Lactate 0.7 mmol/L      Comment: Serial Number: [id number]:  [id number]       POC Electrolyte Panel [[id number]]  (Normal) Collected: 11/25/24 2242    Specimen: Arterial Blood Updated: 11/25/24 2246     Sodium 143 mmol/L      POC Potassium 4.0 mmol/L      Chloride 107 mmol/L      Ionized Calcium 1.14 mmol/L      Comment: Serial Number: [id number]:  [id number]                Imaging:    XR Chest 1 View    Result Date: 11/25/2024  XR CHEST 1 VW Date of Exam: 11/25/2024 10:30 PM EST Indication: SOA Triage Protocol Comparison: 5/9/2023 Findings: Heart remains enlarged. There is atherosclerotic disease in the aorta. Left-sided pacer/defibrillator is present. There is vascular congestion and pulmonary edema. Small left pleural effusion not excluded. No pneumothorax.      Cardiomegaly with vascular congestion and pulmonary edema. Electronically Signed: Bartolo Garcia MD  11/25/2024 10:40 PM EST  Workstation ID: [id number]       Differential Diagnosis and Discussion:    Dyspnea: Differential diagnosis includes but is not limited to metabolic acidosis, neurological disorders, psychogenic, asthma, pneumothorax, upper airway obstruction, COPD, pneumonia, noncardiogenic pulmonary edema, interstitial lung disease, anemia, congestive heart failure, and pulmonary embolism    All labs were reviewed and interpreted by me.  All X-rays impressions were independently interpreted by me.  EKG was interpreted by me.    MDM  Number of Diagnoses or Management Options  Diagnosis management comments: On arrival patient appears to be in acute distress.  She was placed on BiPAP.  Initial blood gas showed a pH of 7.2 with a pCO2 of 65.9.  pO2 223.  Labs and elevated creatinine of 1.8.  This is similar to previous.  BNP 15,000.  This is significantly up from previous.  X-ray showed pulmonary edema.  Patient was given Lasix.  Patient was discussed with hospitalist and will be admitted for further care.       Amount and/or Complexity of Data Reviewed  Clinical lab tests: reviewed  Tests in the radiology section of CPT®: reviewed  Review and summarize past medical records: yes  Independent visualization of images, tracings, or specimens: yes    Risk of Complications, Morbidity, and/or Mortality  Presenting problems: moderate  Management options: moderate             Total Critical Care time of 45 minutes. Total critical care time documented does not include time spent on separately billed procedures for services of nurses or physician assistants. I personally saw and examined the patient. I have reviewed all diagnostic interpretations and treatment plans as written. I was present for the key portions of any procedures performed and the inclusive time noted in any critical care statement. Critical care time  includes patient management by me, time spent at the patients bedside,  time to review lab and imaging results, discussing patient care, documentation in the medical record, and time spent with family or caregiver.          Patient Care Considerations:    SEPSIS was considered but is NOT present in the emergency department as SIRS criteria is not present.      Consultants/Shared Management Plan:    Hospitalist: I have discussed the case with Dr Garcia who agrees to accept the patient for admission.    Social Determinants of Health:    Patient has presented with family members who are responsible, reliable and will ensure follow up care.      Disposition and Care Coordination:    Admit:   Through independent evaluation of the patient's history, physical, and imperical data, the patient meets criteria for inpatient admission to the hospital.        Final diagnoses:   Acute respiratory failure with hypoxia and hypercapnia   Acute on chronic congestive heart failure, unspecified heart failure type        ED Disposition       ED Disposition   Decision to Admit    Condition   --    Comment   Level of Care: Telemetry [5]  Diagnosis: Acute on chronic respiratory failure with hypercapnia [[id number]]  Admitting Physician: JULIO CESAR GARCIA [[id number]]  Certification: I Certify That Inpatient Hospital Services Are Medically Necessary For [facility] n 2 Midnights                 This medical record created using voice recognition software.             Romeo Perry MD  11/26/24 0117

## 2024-11-26 NOTE — PROGRESS NOTES
RT EQUIPMENT DEVICE RELATED - SKIN ASSESSMENT    RT Medical Equipment/Device:     NIV Mask:  Full-face    size: S    Skin Assessment:      MOUTH, CHEEKS, NOSE:  Intact    Device Skin Pressure Protection:  Positioning supports utilized    Nurse Notification:  Vero Clayton, RRT

## 2024-11-26 NOTE — PLAN OF CARE
Goal Outcome Evaluation:  Plan of Care Reviewed With: patient        Progress: no change  Outcome Evaluation: Pt came up from ER on bipap. Pt wore bipap most of the night.  V60 is now on standby. Pt currently on a 2L nasal cannula resting at this time.

## 2024-11-26 NOTE — H&P
[facility]   HISTORY AND PHYSICAL    Patient Name: Yelena Sanchez  : 1935  MRN: [id number]  Primary Care Physician:  Kaelyn Eugene MD  Date of admission: 2024    Subjective   Subjective     Chief Complaint: Shortness of breath, confusion    HPI:    Yelena Sanchez is a 89 y.o. female with past medical history of hypertension, COPD, CHF, hypothyroidism, prior TIA, arthritis, and GERD presenting to the ED for evaluation of shortness of breath.  Patient states that for the last 3 to 4 days she has been having worsening shortness of breath to where she was symptomatic even at rest and prior to arrival became confused, tachypnea, and diaphoretic.  She then started a DuoNeb treatment which was ineffective.  Due to concern EMS was called to bring her to the ED for further evaluation.  En route patient was given nebulizer treatment along with Solu-Medrol.  In the ED patient was tachypneic, hypertensive, hypoxic on arrival requiring oxygen supplementation and eventually BiPAP.  Labs showed that she had above baseline proBNP, reduced renal function, and ABG showing hypercapnic respiratory failure with acidosis.  Chest x-ray showed cardiomegaly with vascular congestion and edema.  When seen patient was on BiPAP and stated that she was breathing feeling much better.  She denied any recent fevers, headaches, focal weakness, chest pain, abdominal pain, nausea, vomiting, diarrhea, constipation, dysuria, hematuria, hematochezia, melena, or anxiety.  Patient admitted for further evaluation and treatment.    Of note patient states that her breathing became an issue after placement of her pacemaker 2 months prior.    Review of Systems   All systems were reviewed and negative except for: As per HPI    Personal History     Past Medical History:   Diagnosis Date    Anemia     Arthritis     Asthma     Bicipitoradial bursitis     Breast cancer     CHF (congestive heart failure)     Congestive heart failure      COPD (chronic obstructive pulmonary disease)     Diverticulosis 11/26/2014    GERD (gastroesophageal reflux disease)     History of tobacco abuse     HTN (hypertension)     Hyperlipemia     Hyperthyroidism     ICD (implantable cardioverter-defibrillator), biventricular, in situ     Neck mass     Seasonal allergies     SOB (shortness of breath)     TIA (transient ischemic attack)     YRS AGO, NO RESIDUALS       Past Surgical History:   Procedure Laterality Date    COLONOSCOPY  2014    ENDOSCOPY  2014    EYE SURGERY      Implant; yes     ICD GENERATOR REPLACEMENT N/A 11/14/2024    Procedure: ICD Generator Change - Bi-Ventricular -[employer];  Surgeon: JANETTE Gautam MD;  Location: [facility] INVASIVE LOCATION;  Service: Cardiovascular;  Laterality: N/A;    LUNG LOBECTOMY Left     Left lower lobe    LUNG SURGERY      LUNG SURGERY Left     LEFT UPPER LOBE (2004)    MASTECTOMY      Left     MASTECTOMY Left     OTHER SURGICAL HISTORY      Joint Surgery    PACEMAKER IMPLANTATION         Family History: family history includes Colon cancer in her mother; Lung cancer in her sister. Otherwise pertinent FHx was reviewed and not pertinent to current issue.    Social History:  reports that she has never smoked. She has never been exposed to tobacco smoke. She has never used smokeless tobacco. She reports that she does not drink alcohol and does not use drugs.    Home Medications:  Acidophilus/Citrus Pectin, HYDROcodone-acetaminophen, allopurinol, chlorpheniramine, cholecalciferol, clopidogrel, fluticasone, furosemide, hydrocortisone, ketoconazole, lansoprazole, levalbuterol, levothyroxine, lidocaine, montelukast, multivitamin with minerals, naloxone, nebivolol, nystatin, polyethylene glycol, rosuvastatin, spironolactone, and zolpidem      Allergies:  No Known Allergies    Objective   Objective     Vitals:   Temp:  [97.9 °F (36.6 °C)-98.2 °F (36.8 °C)] 97.9 °F (36.6 °C)  Heart Rate:  [] 88  Resp:  [20-34]  20  BP: (149-189)/() 171/74  Physical Exam    Constitutional: Awake, alert   Eyes: PERRLA, sclerae anicteric, no conjunctival injection   HENT: NCAT, mucous membranes moist   Neck: Supple, no thyromegaly, no lymphadenopathy, trachea midline   Respiratory: Wheezing, rhonchi, decreased breath sounds, on BiPAP support   Cardiovascular: RRR, no murmurs, rubs, or gallops, palpable pedal pulses bilaterally   Gastrointestinal: Positive bowel sounds, soft, nontender, nondistended   Musculoskeletal: Bilateral lower extremity edema, no clubbing or cyanosis to extremities   Psychiatric: Appropriate affect, cooperative   Neurologic: Oriented x 3, strength symmetric in all extremities, Cranial Nerves grossly intact to confrontation, speech clear   Skin: No rashes     Result Review    Result Review:  I have personally reviewed the results from the time of this admission to 11/26/2024 04:08 EST and agree with these findings:  [x]  Laboratory list / accordion  []  Microbiology  [x]  Radiology  [x]  EKG/Telemetry   []  Cardiology/Vascular   []  Pathology  []  Old records  []  Other:  Most notable findings include: Elevated proBNP's, hypercapnic respiratory failure with acidosis on ABG, x-ray with CHF findings      Assessment & Plan   Assessment / Plan     Brief Patient Summary:  Yelena Sanchez is a 89 y.o. female with past medical history of hypertension, COPD, CHF, hypothyroidism, prior TIA, arthritis, and GERD presenting to the ED for evaluation of shortness of breath.    Active Hospital Problems:  Active Hospital Problems    Diagnosis     **Acute on chronic respiratory failure with hypercapnia     Acute on chronic congestive heart failure     Dyspnea     History of breast cancer     COPD with acute exacerbation     HLD (hyperlipidemia)     Essential hypertension     Presence of biventricular implantable cardioverter-defibrillator (ICD)      Plan:     Acute on chronic respiratory failure with hypercapnia  -Admit to  telemetry  -Secondary to COPD exacerbation with superimposed CHF  -Echo done on 8/20/2024 showed EF of 35%  -We will diurese with IV Lasix twice daily  -1.5 L fluid restriction  -Daily weights  -Supplemental oxygen as needed, wean down as tolerated  -ABG reviewed  -BiPAP  -IV Solu-Medrol  -DuoNeb 3 times daily and as needed  -Mucinex, Tessalon Perles  -Incentive spirometry  -Adjust home meds if warranted  -Consult pulmonology if warranted  -Supportive care    HTN  -Currently well controlled  -PRN BP meds  -Resume home meds when available  -Titrate if needed    Hypothyroidism  Gout  Arthritis  Obesity    GI ppx  DVT ppx          VTE Prophylaxis:  Pharmacologic VTE prophylaxis orders are present.        CODE STATUS:    Level Of Support Discussed With: Patient  Code Status (Patient has no pulse and is not breathing): CPR (Attempt to Resuscitate)  Medical Interventions (Patient has pulse or is breathing): Full Support    Admission Status:  I believe this patient meets inpatient status.      Electronically signed by Ramiro Miner MD, 11/26/24, 4:08 AM EST.

## 2024-11-26 NOTE — CONSULTS
Pulmonary / Critical Care Consult Note      Patient Name: Yelena Sanchez  : 1935  MRN: [id number]  Primary Care Physician:  Kaelyn Eugene MD  Referring Physician: Karen Raphael MD  Date of admission: 2024    Subjective   Subjective     Reason for Consult/ Chief Complaint:   Acute hypoxic and hypercapnic respiratory failure, CHF exacerbation    HPI:  Yelena Sanchez is a 89 y.o. female with past medical history of COPD, history of lung cancer left lower lobe lobectomy in , history of left breast cancer in  s/p left mastectomy, MDS, coronary artery disease s/p ICD, and nocturnal hypoxia presented to the ED for evaluation of worsening shortness of breath.  In the ED, ABG was 7.2 , proBNP 15,960.  Patient was placed on NIPPV.  CXR demonstrating cardiomegaly with vascular congestion and pulmonary edema.  The service was consulted to assist in the management treatment of the patient's acute issues.  Upon assessment, patient lying in bed on 2 L nasal cannula in no apparent respiratory distress.  Findings and plan were discussed with the patient.  Patient stated that her symptoms began a few days ago she thought that her ICD was malfunctioning.  Reports that she does take a water pill at home.  Patient denies any chest pain or chest tightness, nausea or vomiting, fever or chills, cough with sputum production.  Patient denies being around any known sick contacts      Personal History     Past Medical History:   Diagnosis Date    Anemia     Arthritis     Asthma     Bicipitoradial bursitis     Breast cancer     CHF (congestive heart failure)     Congestive heart failure     COPD (chronic obstructive pulmonary disease)     Diverticulosis 2014    GERD (gastroesophageal reflux disease)     History of tobacco abuse     HTN (hypertension)     Hyperlipemia     Hyperthyroidism     ICD (implantable cardioverter-defibrillator), biventricular, in situ     Neck mass     Seasonal  allergies     SOB (shortness of breath)     TIA (transient ischemic attack)     YRS AGO, NO RESIDUALS       Past Surgical History:   Procedure Laterality Date    COLONOSCOPY  2014    ENDOSCOPY  2014    EYE SURGERY      Implant; yes     ICD GENERATOR REPLACEMENT N/A 11/14/2024    Procedure: ICD Generator Change - Bi-Ventricular -[employer];  Surgeon: JANETTE Gautam MD;  Location: [facility] INVASIVE LOCATION;  Service: Cardiovascular;  Laterality: N/A;    LUNG LOBECTOMY Left     Left lower lobe    LUNG SURGERY      LUNG SURGERY Left     LEFT UPPER LOBE (2004)    MASTECTOMY      Left     MASTECTOMY Left     OTHER SURGICAL HISTORY      Joint Surgery    PACEMAKER IMPLANTATION         Family History: family history includes Colon cancer in her mother; Lung cancer in her sister. Otherwise pertinent FHx was reviewed and not pertinent to current issue.    Social History:  reports that she has never smoked. She has never been exposed to tobacco smoke. She has never used smokeless tobacco. She reports that she does not drink alcohol and does not use drugs.    Home Medications:  Acidophilus/Citrus Pectin, HYDROcodone-acetaminophen, allopurinol, chlorpheniramine, cholecalciferol, clopidogrel, fluticasone, furosemide, hydrocortisone, ketoconazole, lansoprazole, levalbuterol, levothyroxine, lidocaine, montelukast, multivitamin with minerals, naloxone, nebivolol, nystatin, polyethylene glycol, rosuvastatin, spironolactone, and zolpidem    Allergies:  No Known Allergies    Objective    Objective     Vitals:   Temp:  [97.9 °F (36.6 °C)-98.2 °F (36.8 °C)] 97.9 °F (36.6 °C)  Heart Rate:  [] 67  Resp:  [20-34] 20  BP: (149-189)/() 171/74    Physical Exam:  Vital Signs Reviewed   General:  WDWN, Alert, NAD.  Chronically ill-appearing elderly female, lying in bed  HEENT:  PERRL, EOMI.  OP, nares clear, no sinus tenderness  Neck:  Supple, no JVD, no thyromegaly  Lymph: no axillary, cervical, supraclavicular  lymphadenopathy noted bilaterally  Chest:  good aeration, diminished with Velcro-like crackles, no rhonchi or wheezing, no increased work of breathing on 2 L while at rest  CV: RRR, no MGR, pulses 2+, equal.  Abd:  Soft, NT, ND, + BS, no HSM, obese  EXT:  no clubbing, no cyanosis, BLE edema, no joint tenderness  Neuro:  A&Ox3, CN grossly intact, no focal deficits.  Skin: No rashes or lesions noted      Result Review    Result Review:  I have personally reviewed the results from the time of this admission to 11/26/2024 08:25 EST and agree with these findings:  [x]  Laboratory  [x]  Microbiology  [x]  Radiology  []  EKG/Telemetry   []  Cardiology/Vascular   []  Pathology  []  Old records  []  Other:  Most notable findings include:       Lab 11/25/24  2223   WBC 9.72   HEMOGLOBIN 12.7   HEMATOCRIT 41.6   PLATELETS 296   SODIUM 143   POTASSIUM 4.0   CHLORIDE 105   CO2 24.2   BUN 38*   CREATININE 1.83*   GLUCOSE 164*   CALCIUM 9.4   TOTAL PROTEIN 7.4   ALBUMIN 4.3   GLOBULIN 3.1       Assessment & Plan   Assessment / Plan     Active Hospital Problems:  Active Hospital Problems    Diagnosis     **Acute on chronic respiratory failure with hypercapnia     Acute on chronic congestive heart failure     Dyspnea     History of breast cancer     COPD with acute exacerbation     HLD (hyperlipidemia)     Essential hypertension     Presence of biventricular implantable cardioverter-defibrillator (ICD)    Impression:  Acute hypoxic and hypercapnic respiratory failure requiring NIPPV  Acute on chronic congestive diastolic heart failure  Acute cardiogenic pulmonary edema  Elevated proBNP, 15,960  History of lung cancer with left lower lobe lobectomy in 2004  History of left breast cancer 2011 s/p left mastectomy  History of MDS  History of coronary artery disease s/p ICD placed  Never smoker    Plan:  -Continue to wean supplemental oxygen to maintain SpO2 greater than 90%.  Patient is only on nocturnal oxygen at baseline  -CXR  demonstrating cardiomegaly with pulmonary edema  -Check chest CT with noncontrast  -Continue Brovana, Pulmicort, DuoNebs  -Micro negative today except for positive for mycoplasma infection.  Procalcitonin and white count normal.  -Continue doxycycline   -Continue Solu-Medrol 40 mg IV twice daily for now.  Can likely transition in the next day or so  -Continue Lasix 40 mg IV daily and Aldactone 12.5 mg oral daily  -Continue to monitor renal panel and electrolytes.  Replace electrolytes as necessary  -Continue bronchopulmonary hygiene.  Encourage I-S and flutter      VTE Prophylaxis:  Pharmacologic VTE prophylaxis orders are present.      Code Status and Medical Interventions: CPR (Attempt to Resuscitate); Full Support   Ordered at: 11/26/24 0026     Level Of Support Discussed With:    Patient     Code Status (Patient has no pulse and is not breathing):    CPR (Attempt to Resuscitate)     Medical Interventions (Patient has pulse or is breathing):    Full Support      Labs, imaging, notes and medications personally reviewed.  Discussed with primary    Electronically signed by LUZ MARIA Augustine, 11/26/24, 8:35 AM EST.  This visit was performed by BOTH a physician and an APC. I personally evaluated and examined the patient. I performed all aspects of MDM as documented. , I have reviewed and confirmed the accuracy of the patient's history as documented in this note., and I have reexamined the patient and the results are consistent with the previously documented exam. I have updated the documentation as necessary. Electronically signed by Anastacio Rosales MD, 11/26/24, 3:57 PM EST.     Electronically signed by Anastacio Rosales MD, 11/26/24, 3:57 PM EST.

## 2024-11-26 NOTE — PLAN OF CARE
Goal Outcome Evaluation:  Plan of Care Reviewed With: patient        Progress: no change  Outcome Evaluation: Patient came into the Er with sortness of breath. She was placed on the BiPAP and is tolerating it well.

## 2024-11-26 NOTE — PROGRESS NOTES
RT EQUIPMENT DEVICE RELATED - SKIN ASSESSMENT    RT Medical Equipment/Device:     NIV Mask:  Full-face    size: S    Skin Assessment:      Cheek:  Intact  Chin:  Intact  Forehead:  Intact  Nose:  Intact    Device Skin Pressure Protection:  Pressure points protected    Nurse Notification:  Vero Land, RRT

## 2024-11-26 NOTE — SIGNIFICANT NOTE
Wound Eval / Progress Noted    SHAYNA Brenner     Patient Name: Yelena Sanchez  : 1935  MRN: [id number]  Today's Date: 2024                 Admit Date: 2024    Visit Dx:    ICD-10-CM ICD-9-CM   1. Acute respiratory failure with hypoxia and hypercapnia  J96.01 518.81    J96.02    2. Acute on chronic congestive heart failure, unspecified heart failure type  I50.9 428.0         Acute on chronic respiratory failure with hypercapnia    Presence of biventricular implantable cardioverter-defibrillator (ICD)    Essential hypertension    HLD (hyperlipidemia)    COPD with acute exacerbation    Dyspnea    History of breast cancer    Acute on chronic congestive heart failure        Past Medical History:   Diagnosis Date    Anemia     Arthritis     Asthma     Bicipitoradial bursitis     Breast cancer     CHF (congestive heart failure)     Congestive heart failure     COPD (chronic obstructive pulmonary disease)     Diverticulosis 2014    GERD (gastroesophageal reflux disease)     History of tobacco abuse     HTN (hypertension)     Hyperlipemia     Hyperthyroidism     ICD (implantable cardioverter-defibrillator), biventricular, in situ     Neck mass     Seasonal allergies     SOB (shortness of breath)     TIA (transient ischemic attack)     YRS AGO, NO RESIDUALS        Past Surgical History:   Procedure Laterality Date    COLONOSCOPY      ENDOSCOPY      EYE SURGERY      Implant; yes     ICD GENERATOR REPLACEMENT N/A 2024    Procedure: ICD Generator Change - Bi-Ventricular -[employer];  Surgeon: JANETTE Gautam MD;  Location: [facility] INVASIVE LOCATION;  Service: Cardiovascular;  Laterality: N/A;    LUNG LOBECTOMY Left     Left lower lobe    LUNG SURGERY      LUNG SURGERY Left     LEFT UPPER LOBE ()    MASTECTOMY      Left     MASTECTOMY Left     OTHER SURGICAL HISTORY      Joint Surgery    PACEMAKER IMPLANTATION           Physical Assessment:  Wound 24 0257 Left lower arm  (Active)   Wound Image   11/26/24 1221   Dressing Appearance dry;intact 11/26/24 1221   Closure None 11/26/24 1221   Base dry;tan;brown;moist;red 11/26/24 1221   Periwound dry;redness 11/26/24 1221   Periwound Temperature warm 11/26/24 1221   Periwound Skin Turgor soft 11/26/24 1221   Edges open 11/26/24 1221   Drainage Characteristics/Odor serosanguineous;malodorous 11/26/24 1221   Drainage Amount scant 11/26/24 1221   Care, Wound cleansed with;sterile normal saline 11/26/24 1221   Dressing Care dressing removed;dressing applied;silver impregnated;hydrofiber;gauze;tubular wrap 11/26/24 1221   Periwound Care absorptive dressing applied 11/26/24 1221       Wound 11/26/24 1220 Right breast MASD (moisture associated skin damage) (Active)   Wound Image   11/26/24 1220   Dressing Appearance open to air 11/26/24 1220   Closure None 11/26/24 1220   Base moist;red 11/26/24 1220   Red (%), Wound Tissue Color 100 11/26/24 1220   Periwound dry;redness 11/26/24 1220   Periwound Temperature warm 11/26/24 1220   Periwound Skin Turgor soft 11/26/24 1220   Edges open 11/26/24 1220   Drainage Characteristics/Odor serosanguineous 11/26/24 1220   Drainage Amount scant 11/26/24 1220   Care, Wound cleansed with;sterile normal saline 11/26/24 1220   Dressing Care open to air 11/26/24 1220   Periwound Care dry periwound area maintained 11/26/24 1220      Wound Check / Follow-up:  Patient seen today for wound consult. Patient is awake, alert and oriented. Patient reports she had a bump to her left arm that was biopsied by dermatology. Patient states dermatology reported the site to be skin cancer and that she will receive a scraping with dermatology to have the area removed on 12/5/24. Patient also reports the area to her right breast has been present on and off for months.    Left lower arm with skin cancer lesion per patient report. Area of raised tissue with dry tan/brown tissue covering lesion, and areas of moist red tissue noted  within. A scant odor is noted to wound base. Periwound tissue is dry with areas of redness. Cleansed with normal saline and gauze, blotted dry. Recommending daily dressing changes with silver impregnated hydrofiber, moistened with two drops of normal saline, and gauze roll securement.     Right breast crease presents with moisture associated skin damage and a fungal presentation with areas of erosion. Erosion presents with moist redness with dry redness and a fungal presentation surrounding. Cleansed with normal saline and gauze, blotted dry. Recommending twice a day wound care with application of nystatin ointment. Discussed findings with Attending MD and orders obtained.    Scattered areas of dry, scaly patches. Patient reports diagnosis of psoriasis. Recommending quality skin care and hygiene with application of Aveeno lotion twice a day.    Patient denies any additional areas of skin breakdown at this time.    Impression: Skin cancer lesion to left lower arm. MASD with fungal presentation and erosion to right breast crease. Dry, scaly patches.    Short term goals: Regain skin integrity, skin protection, daily dressing change, topical treatment.    Maryam Olivera RN    11/26/2024    13:54 EST

## 2024-11-26 NOTE — PLAN OF CARE
Problem: Adult Inpatient Plan of Care  Goal: Plan of Care Review  Outcome: Progressing  Flowsheets (Taken 11/26/2024 1805)  Progress: improving  Outcome Evaluation: Patient alert and oriented x 4. Patient on 2L NC, tolerating well. Patient up to bedside commode with x1 assist. Patient had CT scan completed, see results. Continuing with plan of care.  Plan of Care Reviewed With: patient   Goal Outcome Evaluation:  Plan of Care Reviewed With: patient        Progress: improving  Outcome Evaluation: Patient alert and oriented x 4. Patient on 2L NC, tolerating well. Patient up to bedside commode with x1 assist. Patient had CT scan completed, see results. Continuing with plan of care.

## 2024-11-26 NOTE — PROGRESS NOTES
Reviewed hospitalization plan, agree with H&P, met with patient, all questions answered, pulmonary consulted, found to have mycoplasma IgM positivity, added doxycycline, clinical course to dictate further management, discussed with nurse at the bedside    Electronically signed by Karen Raphael MD, 11/26/24, 1:44 PM EST.  Portions of this documentation were transcribed electronically from a voice recognition software.  I confirm all data accurately represents the service(s) I performed at today's visit.

## 2024-11-26 NOTE — PROGRESS NOTES
Respiratory Therapist Broncho-Pulmonary Hygiene Progress Note      Patient Name:  Yelena Sanchez  YOB: 1935    Yelena Sanchez meets the qualification for Level 2 of the Bronco-Pulmonary Hygiene Protocol. This was based on my daily patient assessment and includes review of chest x-ray results, cough ability and quality, oxygenation, secretions or risk for secretion development and patient mobility.     Broncho-Pulmonary Hygiene Assessment:    Level of Movement: Actively changing positions without assistance  Alert/ oriented/ cooperative    Breath Sounds: Diminished and/or coarse rhonchi    Cough: Strong, effective    Chest X-Ray: Possible signs of consolidation and/or atelectasis or clear.     Sputum Productions: None or small amount of thin or watery secretions with effective cough    History and Physical: Chronic condition    SpO2 to Oxygen Need: greater than 92% on room air or  less than 3L nasal canula    Current SpO2 is: 98% on 2L nasal cannula     Based on this information, I have completed the following interventions: Aerobika with bronchodialtor medication or TID      Electronically signed by Beverley Land, [place], 11/26/24, 7:22 AM EST.

## 2024-11-27 LAB
ALBUMIN SERPL-MCNC: 3.6 G/DL (ref 3.5–5.2)
ALP SERPL-CCNC: 91 U/L (ref 39–117)
ALT SERPL W P-5'-P-CCNC: 9 U/L (ref 1–33)
ANION GAP SERPL CALCULATED.3IONS-SCNC: 12.7 MMOL/L (ref 5–15)
AST SERPL-CCNC: 12 U/L (ref 1–32)
BASOPHILS # BLD AUTO: 0.01 10*3/MM3 (ref 0–0.2)
BASOPHILS NFR BLD AUTO: 0.1 % (ref 0–1.5)
BILIRUB CONJ SERPL-MCNC: 0.1 MG/DL (ref 0–0.3)
BILIRUB INDIRECT SERPL-MCNC: 0.1 MG/DL
BILIRUB SERPL-MCNC: 0.2 MG/DL (ref 0–1.2)
BUN SERPL-MCNC: 56 MG/DL (ref 8–23)
BUN/CREAT SERPL: 31.1 (ref 7–25)
CALCIUM SPEC-SCNC: 9.5 MG/DL (ref 8.6–10.5)
CHLORIDE SERPL-SCNC: 106 MMOL/L (ref 98–107)
CO2 SERPL-SCNC: 24.3 MMOL/L (ref 22–29)
CREAT SERPL-MCNC: 1.8 MG/DL (ref 0.57–1)
DEPRECATED RDW RBC AUTO: 52 FL (ref 37–54)
EGFRCR SERPLBLD CKD-EPI 2021: 26.8 ML/MIN/1.73
EOSINOPHIL # BLD AUTO: 0 10*3/MM3 (ref 0–0.4)
EOSINOPHIL NFR BLD AUTO: 0 % (ref 0.3–6.2)
ERYTHROCYTE [DISTWIDTH] IN BLOOD BY AUTOMATED COUNT: 14.9 % (ref 12.3–15.4)
GLUCOSE SERPL-MCNC: 128 MG/DL (ref 65–99)
HCT VFR BLD AUTO: 36 % (ref 34–46.6)
HGB BLD-MCNC: 11.2 G/DL (ref 12–15.9)
IMM GRANULOCYTES # BLD AUTO: 0.06 10*3/MM3 (ref 0–0.05)
IMM GRANULOCYTES NFR BLD AUTO: 0.5 % (ref 0–0.5)
LYMPHOCYTES # BLD AUTO: 0.26 10*3/MM3 (ref 0.7–3.1)
LYMPHOCYTES NFR BLD AUTO: 2 % (ref 19.6–45.3)
MAGNESIUM SERPL-MCNC: 2.3 MG/DL (ref 1.6–2.4)
MCH RBC QN AUTO: 29.9 PG (ref 26.6–33)
MCHC RBC AUTO-ENTMCNC: 31.1 G/DL (ref 31.5–35.7)
MCV RBC AUTO: 96 FL (ref 79–97)
MONOCYTES # BLD AUTO: 1.04 10*3/MM3 (ref 0.1–0.9)
MONOCYTES NFR BLD AUTO: 8 % (ref 5–12)
NEUTROPHILS NFR BLD AUTO: 11.6 10*3/MM3 (ref 1.7–7)
NEUTROPHILS NFR BLD AUTO: 89.4 % (ref 42.7–76)
NRBC BLD AUTO-RTO: 0 /100 WBC (ref 0–0.2)
PHOSPHATE SERPL-MCNC: 3.7 MG/DL (ref 2.5–4.5)
PLATELET # BLD AUTO: 258 10*3/MM3 (ref 140–450)
PMV BLD AUTO: 10.7 FL (ref 6–12)
POTASSIUM SERPL-SCNC: 4.2 MMOL/L (ref 3.5–5.2)
PROT SERPL-MCNC: 6.5 G/DL (ref 6–8.5)
RBC # BLD AUTO: 3.75 10*6/MM3 (ref 3.77–5.28)
SODIUM SERPL-SCNC: 143 MMOL/L (ref 136–145)
WBC NRBC COR # BLD AUTO: 12.97 10*3/MM3 (ref 3.4–10.8)

## 2024-11-27 PROCEDURE — 85025 COMPLETE CBC W/AUTO DIFF WBC: CPT | Performed by: FAMILY MEDICINE

## 2024-11-27 PROCEDURE — 80048 BASIC METABOLIC PNL TOTAL CA: CPT | Performed by: FAMILY MEDICINE

## 2024-11-27 PROCEDURE — 97162 PT EVAL MOD COMPLEX 30 MIN: CPT | Performed by: PHYSICAL THERAPIST

## 2024-11-27 PROCEDURE — 83735 ASSAY OF MAGNESIUM: CPT | Performed by: FAMILY MEDICINE

## 2024-11-27 PROCEDURE — 99232 SBSQ HOSP IP/OBS MODERATE 35: CPT | Performed by: INTERNAL MEDICINE

## 2024-11-27 PROCEDURE — 94799 UNLISTED PULMONARY SVC/PX: CPT

## 2024-11-27 PROCEDURE — 63710000001 PREDNISONE PER 1 MG: Performed by: NURSE PRACTITIONER

## 2024-11-27 PROCEDURE — 80076 HEPATIC FUNCTION PANEL: CPT | Performed by: FAMILY MEDICINE

## 2024-11-27 PROCEDURE — 25010000002 HEPARIN (PORCINE) PER 1000 UNITS: Performed by: FAMILY MEDICINE

## 2024-11-27 PROCEDURE — 99232 SBSQ HOSP IP/OBS MODERATE 35: CPT | Performed by: FAMILY MEDICINE

## 2024-11-27 PROCEDURE — 25010000002 FUROSEMIDE PER 20 MG: Performed by: FAMILY MEDICINE

## 2024-11-27 PROCEDURE — 94664 DEMO&/EVAL PT USE INHALER: CPT

## 2024-11-27 PROCEDURE — 25010000002 METHYLPREDNISOLONE PER 40 MG: Performed by: FAMILY MEDICINE

## 2024-11-27 PROCEDURE — 84100 ASSAY OF PHOSPHORUS: CPT | Performed by: FAMILY MEDICINE

## 2024-11-27 PROCEDURE — 94761 N-INVAS EAR/PLS OXIMETRY MLT: CPT

## 2024-11-27 RX ORDER — PREDNISONE 20 MG/1
40 TABLET ORAL
Status: DISCONTINUED | OUTPATIENT
Start: 2024-11-27 | End: 2024-11-30

## 2024-11-27 RX ORDER — PREDNISONE 20 MG/1
40 TABLET ORAL
Status: DISCONTINUED | OUTPATIENT
Start: 2024-11-27 | End: 2024-11-27

## 2024-11-27 RX ORDER — NEBIVOLOL 10 MG/1
10 TABLET ORAL DAILY
Status: DISCONTINUED | OUTPATIENT
Start: 2024-11-27 | End: 2024-12-05 | Stop reason: HOSPADM

## 2024-11-27 RX ADMIN — DOXYCYCLINE 100 MG: 100 CAPSULE ORAL at 09:42

## 2024-11-27 RX ADMIN — GUAIFENESIN 600 MG: 600 TABLET ORAL at 21:38

## 2024-11-27 RX ADMIN — Medication 10 ML: at 09:43

## 2024-11-27 RX ADMIN — Medication 10 ML: at 21:39

## 2024-11-27 RX ADMIN — LEVOTHYROXINE SODIUM 100 MCG: 0.1 TABLET ORAL at 05:00

## 2024-11-27 RX ADMIN — DOXYCYCLINE 100 MG: 100 CAPSULE ORAL at 21:39

## 2024-11-27 RX ADMIN — IPRATROPIUM BROMIDE AND ALBUTEROL SULFATE 3 ML: .5; 3 SOLUTION RESPIRATORY (INHALATION) at 13:37

## 2024-11-27 RX ADMIN — PANTOPRAZOLE SODIUM 40 MG: 40 TABLET, DELAYED RELEASE ORAL at 05:00

## 2024-11-27 RX ADMIN — FUROSEMIDE 40 MG: 10 INJECTION, SOLUTION INTRAMUSCULAR; INTRAVENOUS at 05:00

## 2024-11-27 RX ADMIN — IPRATROPIUM BROMIDE AND ALBUTEROL SULFATE 3 ML: .5; 3 SOLUTION RESPIRATORY (INHALATION) at 18:53

## 2024-11-27 RX ADMIN — Medication 12.5 MG: at 09:42

## 2024-11-27 RX ADMIN — CLOPIDOGREL BISULFATE 75 MG: 75 TABLET ORAL at 09:43

## 2024-11-27 RX ADMIN — MONTELUKAST 10 MG: 10 TABLET, FILM COATED ORAL at 21:39

## 2024-11-27 RX ADMIN — HEPARIN SODIUM 5000 UNITS: 5000 INJECTION INTRAVENOUS; SUBCUTANEOUS at 09:43

## 2024-11-27 RX ADMIN — HEPARIN SODIUM 5000 UNITS: 5000 INJECTION INTRAVENOUS; SUBCUTANEOUS at 21:38

## 2024-11-27 RX ADMIN — ALLOPURINOL 100 MG: 100 TABLET ORAL at 21:39

## 2024-11-27 RX ADMIN — NYSTATIN OINTMENT 1 APPLICATION: 100000 OINTMENT TOPICAL at 09:47

## 2024-11-27 RX ADMIN — NYSTATIN OINTMENT 1 APPLICATION: 100000 OINTMENT TOPICAL at 21:39

## 2024-11-27 RX ADMIN — ARFORMOTEROL TARTRATE 15 MCG: 15 SOLUTION RESPIRATORY (INHALATION) at 06:48

## 2024-11-27 RX ADMIN — BUDESONIDE 0.5 MG: 0.5 INHALANT RESPIRATORY (INHALATION) at 06:48

## 2024-11-27 RX ADMIN — ROSUVASTATIN CALCIUM 10 MG: 5 TABLET, FILM COATED ORAL at 21:38

## 2024-11-27 RX ADMIN — BUDESONIDE 0.5 MG: 0.5 INHALANT RESPIRATORY (INHALATION) at 18:53

## 2024-11-27 RX ADMIN — NEBIVOLOL 10 MG: 10 TABLET ORAL at 09:42

## 2024-11-27 RX ADMIN — Medication 1 APPLICATION: at 21:39

## 2024-11-27 RX ADMIN — ARFORMOTEROL TARTRATE 15 MCG: 15 SOLUTION RESPIRATORY (INHALATION) at 18:53

## 2024-11-27 RX ADMIN — GUAIFENESIN 600 MG: 600 TABLET ORAL at 09:43

## 2024-11-27 RX ADMIN — METHYLPREDNISOLONE SODIUM SUCCINATE 40 MG: 40 INJECTION, POWDER, FOR SOLUTION INTRAMUSCULAR; INTRAVENOUS at 04:59

## 2024-11-27 RX ADMIN — PREDNISONE 40 MG: 20 TABLET ORAL at 11:59

## 2024-11-27 RX ADMIN — ALLOPURINOL 100 MG: 100 TABLET ORAL at 09:42

## 2024-11-27 RX ADMIN — IPRATROPIUM BROMIDE AND ALBUTEROL SULFATE 3 ML: .5; 3 SOLUTION RESPIRATORY (INHALATION) at 06:48

## 2024-11-27 RX ADMIN — Medication 1 APPLICATION: at 09:47

## 2024-11-27 NOTE — PLAN OF CARE
"Goal Outcome Evaluation:      Patient only wore BiPap briefly during the night. She states that she was breathing \"normal again\" and did not need the BiPap.  She is currently on 3lpm N/C this AM.                                        "

## 2024-11-27 NOTE — PROGRESS NOTES
RT EQUIPMENT DEVICE RELATED - SKIN ASSESSMENT    RT Medical Equipment/Device:     NIV Mask:  Full-face    size: S    Skin Assessment:      Cheek:  Intact  Chin:  Intact  Neck:  Intact  Nose:  Intact    Device Skin Pressure Protection:  Positioning supports utilized    Nurse Notification:  Vero Forde, RRT

## 2024-11-27 NOTE — PROGRESS NOTES
Pulmonary / Critical Care Progress Note      Patient Name: Yelena Sanchez  : 1935  MRN: [id number]  Primary Care Physician:  Kaelyn Eugene MD  Date of admission: 2024    Subjective   Subjective   Follow-up for acute hypoxic and hypercapnic respiratory failure, CHF exacerbation    No acute events overnight.     This morning,  Lying in bed  Currently on 3 L nasal cannula  Dyspnea improving  Congested cough with some sputum production  Overall feeling better  Remains dyspneic with activity  No fever or chills  No chest pain or hemoptysis    Objective   Objective     Vitals:   Temp:  [97.6 °F (36.4 °C)-98.2 °F (36.8 °C)] 97.6 °F (36.4 °C)  Heart Rate:  [] 79  Resp:  [18-20] 20  BP: (138-166)/(59-90) 151/59  Flow (L/min) (Oxygen Therapy):  [2-3] 3    Physical Exam   Vital Signs Reviewed   General: Chronically ill-appearing, elderly female, Alert, NAD, lying in bed.    Chest:  good aeration, bibasilar crackles to auscultation bilaterally, no work of breathing noted on 3 L NC  CV: regular rate and rhythm regular  EXT:  no clubbing, no cyanosis, no edema  Neuro:  A&Ox3, moving all 4 extremities spontaneously  Skin: No rashes or lesions noted    Result Review    Result Review:  I have personally reviewed the results from the time of this admission to 2024 12:18 EST and agree with these findings:  [x]  Laboratory  [x]  Microbiology  [x]  Radiology  [x]  EKG/Telemetry   []  Cardiology/Vascular   []  Pathology  []  Old records  []  Other:  Most notable findings include:   proBNP 15,960      Lab 24  0506 24  2223   WBC 12.97* 9.72   HEMOGLOBIN 11.2* 12.7   HEMATOCRIT 36.0 41.6   PLATELETS 258 296   SODIUM 143 143   POTASSIUM 4.2 4.0   CHLORIDE 106 105   CO2 24.3 24.2   BUN 56* 38*   CREATININE 1.80* 1.83*   GLUCOSE 128* 164*   CALCIUM 9.5 9.4   PHOSPHORUS 3.7  --    TOTAL PROTEIN 6.5 7.4   ALBUMIN 3.6 4.3   GLOBULIN  --  3.1   CT Chest Without Contrast Diagnostic    Result Date:  11/26/2024  CT CHEST WO CONTRAST DIAGNOSTIC Date of Exam: 11/26/2024 5:17 PM EST Indication: hypoxia. Comparison: Chest radiograph from November 25, 2024 and CT chest from October 8, 2019 Technique: Axial CT images were obtained of the chest without contrast administration.  Reconstructed coronal and sagittal images were also obtained. Automated exposure control and iterative construction methods were used. Findings: There are changes from left lower lobectomy, and the residual central tracheobronchial tree is clear. There are trace bilateral pleural effusions with mild bibasilar atelectasis. There is no focal consolidation. No discrete pulmonary nodule is identified. A left subclavian pacemaker is in place. The heart is enlarged, with evidence of calcified coronary artery disease. The thoracic aorta appears normal in caliber. The main pulmonary artery is enlarged, suggesting a component of pulmonary arterial hypertension. No abnormally enlarged lymph nodes are identified. There are changes from left mastectomy. Partial evaluation of the upper abdomen demonstrates a right renal cyst. No aggressive osseous lesions are identified.     Impression: Impression: 1.Trace bilateral pleural effusions with mild bibasilar atelectasis. 2.Cardiomegaly. 3.Enlarged main pulmonary artery, suggesting a component of pulmonary arterial hypertension. Electronically Signed: Manuel Anderson MD  11/26/2024 8:37 PM EST  Workstation ID: [id number]     Assessment & Plan   Assessment / Plan   Impression:  Acute hypoxic and hypercapnic respiratory failure requiring NIPPV  Acute on chronic congestive diastolic heart failure  Acute cardiogenic pulmonary edema  History of lung cancer with left lower lobe lobectomy in 2004  History of left breast cancer 2011 s/p left mastectomy  History of MDS  History of coronary artery disease s/p ICD placed  Never smoker    Plan:  -Continue to wean O2 to maintain SpO2 greater than 90%.  Patient is only on  nocturnal oxygen at baseline.  -CXR demonstrating cardiomegaly with pulmonary edema  -CT chest reviewed revealing trace bilateral pleural effusions with mild bibasilar atelectasis.  Enlarged main pulmonary artery concerning for PAH.  -Continue Brovana, Pulmicort, and DuoNebs  -Micro negative today except for positive for mycoplasma infection.  Procalcitonin and white count normal.  -Continue doxycycline x 7 days  -Solu-Medrol transition to prednisone 40 mg daily x 5 days  -Continue Lasix 40 mg IV daily and Aldactone 12.5 mg oral daily  -Continue to monitor renal panel and electrolytes.  Replace electrolytes as necessary  -Continue bronchopulmonary hygiene.  Encourage I-S and flutter  -Encourage mobilization.  Out of bed to chair.    Active Hospital Problems:  Active Hospital Problems    Diagnosis     **Acute on chronic respiratory failure with hypercapnia     Acute on chronic congestive heart failure     Dyspnea     History of breast cancer     COPD with acute exacerbation     HLD (hyperlipidemia)     Essential hypertension     Presence of biventricular implantable cardioverter-defibrillator (ICD)      VTE Prophylaxis:  Pharmacologic VTE prophylaxis orders are present.        CODE STATUS:   Level Of Support Discussed With: Patient  Code Status (Patient has no pulse and is not breathing): CPR (Attempt to Resuscitate)  Medical Interventions (Patient has pulse or is breathing): Full Support      Electronically signed by LUZ MARIA Walters, 11/27/24, 12:18 PM EST.  This visit was performed by BOTH a physician and an APC. I personally evaluated and examined the patient. I performed all aspects of MDM as documented. , I have reviewed and confirmed the accuracy of the patient's history as documented in this note., and I have reexamined the patient and the results are consistent with the previously documented exam. I have updated the documentation as necessary. Electronically signed by Anastacio Rosales MD, 11/27/24, 1:23  PM EST.     Electronically signed by Anastacio Rosales MD, 11/27/24, 1:22 PM EST.

## 2024-11-27 NOTE — PLAN OF CARE
Problem: Adult Inpatient Plan of Care  Goal: Plan of Care Review  Outcome: Progressing  Flowsheets (Taken 11/27/2024 1520)  Progress: no change  Outcome Evaluation: Patient alert and oriented x 4. Patient remains on 3 L NC. Patient up to bedside commode with stand by asssist. Wound and skin care completed. Continuing with plan of care.  Plan of Care Reviewed With: patient   Goal Outcome Evaluation:  Plan of Care Reviewed With: patient        Progress: no change  Outcome Evaluation: Patient alert and oriented x 4. Patient remains on 3 L NC. Patient up to bedside commode with stand by asssist. Wound and skin care completed. Continuing with plan of care.

## 2024-11-27 NOTE — PLAN OF CARE
Goal Outcome Evaluation:   Patient was on 2Lnc earlier in the shift and tolerating ok. Patient did have SOA episode around 2200 and was placed onto v60. ABG drawn and unremarkable. No issues or changes at this time.

## 2024-11-27 NOTE — PLAN OF CARE
Goal Outcome Evaluation:  Plan of Care Reviewed With: patient, spouse           Outcome Evaluation: Pt presents with decreased B LE muscle strength, decreased balance and decreased stability limiting her ability to perform bed mobility, transfers and ambulation without increased external assist. Pt would benefit from skilled PT services to address these deficits. Pt appears safe to return home with family s/p hospital discharge.    Anticipated Discharge Disposition (PT): home with assist

## 2024-11-27 NOTE — THERAPY EVALUATION
Acute Care - Physical Therapy Initial Evaluation  SHAYNA Brenner     Patient Name: Yelena Sanchez  : 1935  MRN: [id number]  Today's Date: 2024      Visit Dx:     ICD-10-CM ICD-9-CM   1. Acute respiratory failure with hypoxia and hypercapnia  J96.01 518.81    J96.02    2. Acute on chronic congestive heart failure, unspecified heart failure type  I50.9 428.0   3. Difficulty in walking  R26.2 719.7     Patient Active Problem List   Diagnosis    Iron deficiency anemia    Primary osteoarthritis of left knee    Chronic combined systolic and diastolic congestive heart failure    Presence of biventricular implantable cardioverter-defibrillator (ICD)    Coronary artery disease involving native coronary artery of native heart without angina pectoris    Mixed hyperlipidemia    Essential hypertension    Trochanteric bursitis of right hip    Sciatica of right side    Carotid artery disease    Acute on chronic systolic heart failure    Closed fracture of neck of right humerus    HTN (hypertension)    HLD (hyperlipidemia)    Hypothyroidism    Effusion of right elbow    Renal insufficiency    Musculoskeletal pain    Osteoarthritis of left knee    Fracture    ICD (implantable cardioverter-defibrillator) battery depletion    COPD with acute exacerbation    History of lung cancer    Dyspnea    Need for vaccination with 20-polyvalent pneumococcal conjugate vaccine    Nocturnal hypoxia    MDS (myelodysplastic syndrome)    History of breast cancer    Acute on chronic respiratory failure with hypercapnia    Acute on chronic congestive heart failure     Past Medical History:   Diagnosis Date    Anemia     Arthritis     Asthma     Bicipitoradial bursitis     Breast cancer     CHF (congestive heart failure)     Congestive heart failure     COPD (chronic obstructive pulmonary disease)     Diverticulosis 2014    GERD (gastroesophageal reflux disease)     History of tobacco abuse     HTN (hypertension)     Hyperlipemia      Hyperthyroidism     ICD (implantable cardioverter-defibrillator), biventricular, in situ     Neck mass     Seasonal allergies     SOB (shortness of breath)     TIA (transient ischemic attack)     YRS AGO, NO RESIDUALS     Past Surgical History:   Procedure Laterality Date    COLONOSCOPY  2014    ENDOSCOPY  2014    EYE SURGERY      Implant; yes     ICD GENERATOR REPLACEMENT N/A 11/14/2024    Procedure: ICD Generator Change - Bi-Ventricular -[employer];  Surgeon: JANETTE Gautam MD;  Location: [facility] INVASIVE LOCATION;  Service: Cardiovascular;  Laterality: N/A;    LUNG LOBECTOMY Left     Left lower lobe    LUNG SURGERY      LUNG SURGERY Left     LEFT UPPER LOBE (2004)    MASTECTOMY      Left     MASTECTOMY Left     OTHER SURGICAL HISTORY      Joint Surgery    PACEMAKER IMPLANTATION       PT Assessment (Last 12 Hours)       PT Evaluation and Treatment       Row Name 11/27/24 1200          Physical Therapy Time and Intention    Subjective Information complains of;weakness  -TC     Document Type evaluation  -TC     Mode of Treatment individual therapy;physical therapy  -TC     Patient Effort good  -TC     Symptoms Noted During/After Treatment shortness of breath  -TC       Row Name 11/27/24 1200          General Information    Patient Profile Reviewed yes  -TC     Patient Observations alert;cooperative;agree to therapy  -TC     Prior Level of Function independent:;all household mobility  -TC     Equipment Currently Used at Home none;other (see comments)  Yet pt reports she has a RW at home if needed.  -TC     Existing Precautions/Restrictions fall  -TC       Row Name 11/27/24 1200          Living Environment    Current Living Arrangements home  -TC     Home Accessibility stairs to enter home  -TC     People in Home spouse  -TC     Primary Care Provided by self  -TC       Row Name 11/27/24 1200          Home Use of Assistive/Adaptive Equipment    Equipment Currently Used at Home oxygen  -TC       Row Name  11/27/24 1200          Pain    Pretreatment Pain Rating 0/10 - no pain  -TC     Posttreatment Pain Rating 0/10 - no pain  -TC       Row Name 11/27/24 1200          Cognition    Affect/Mental Status (Cognition) WFL  -TC     Follows Commands (Cognition) WFL  -TC       Row Name 11/27/24 1200          Range of Motion (ROM)    Range of Motion ROM is WFL;bilateral lower extremities  -TC       Row Name 11/27/24 1200          Range of Motion Comprehensive    General Range of Motion no range of motion deficits identified  -TC       Row Name 11/27/24 1200          Strength (Manual Muscle Testing)    Strength (Manual Muscle Testing) bilateral lower extremities  -TC       Row Name 11/27/24 1200          Strength Comprehensive (MMT)    General Manual Muscle Testing (MMT) Assessment lower extremity strength deficits identified  -TC     Comment, General Manual Muscle Testing (MMT) Assessment 3+/5 B LE  -TC       Row Name 11/27/24 1200          Bed Mobility    Bed Mobility supine-sit;sit-supine  -TC     Supine-Sit [place] (Bed Mobility) standby assist  -TC     Sit-Supine [place] (Bed Mobility) standby assist  -TC     Assistive Device (Bed Mobility) bed rails  -TC       Row Name 11/27/24 1200          Transfers    Transfers sit-stand transfer;stand-sit transfer  -TC       Row Name 11/27/24 1200          Sit-Stand Transfer    Sit-Stand [place] (Transfers) contact guard  -TC     Assistive Device (Sit-Stand Transfers) walker, front-wheeled  -TC       Row Name 11/27/24 1200          Stand-Sit Transfer    Stand-Sit [place] (Transfers) contact guard  -TC     Assistive Device (Stand-Sit Transfers) walker, front-wheeled  -TC       Row Name 11/27/24 1200          Gait/Stairs (Locomotion)    Gait/Stairs Locomotion gait/ambulation assistive device  -TC     [place] Level (Gait) contact guard  -TC     Assistive Device (Gait) walker, front-wheeled  -TC     Patient was able to Ambulate yes  -TC     Distance in Feet (Gait)  35  -TC     Pattern (Gait) step-through  -TC     Deviations/Abnormal Patterns (Gait) base of support, wide;gait speed decreased  -TC     Bilateral Gait Deviations forward flexed posture  -TC       Row Name 11/27/24 1200          Balance    Balance Assessment standing dynamic balance;standing static balance  -TC     Static Standing Balance contact guard  -TC     Dynamic Standing Balance contact guard  -TC     Position/Device Used, Standing Balance walker, front-wheeled  -TC       Row Name             Wound 11/26/24 0257 Left lower arm    Wound - Properties Group Placement Date: 11/26/24  -PH Placement Time: 0257  -PH Side: Left  -PH Orientation: lower  -PH Location: arm  -PH Present on Original Admission: Y  -PH Additional Comments: Pt currently seeing dermatology for wound  -PH    Retired Wound - Properties Group Placement Date: 11/26/24  -PH Placement Time: 0257  -PH Present on Original Admission: Y  -PH Side: Left  -PH Orientation: lower  -PH Location: arm  -PH Additional Comments: Pt currently seeing dermatology for wound  -PH    Retired Wound - Properties Group Placement Date: 11/26/24  -PH Placement Time: 0257  -PH Present on Original Admission: Y  -PH Side: Left  -PH Orientation: lower  -PH Location: arm  -PH Additional Comments: Pt currently seeing dermatology for wound  -PH    Retired Wound - Properties Group Date first assessed: 11/26/24  -PH Time first assessed: 0257  -PH Present on Original Admission: Y  -PH Side: Left  -PH Location: arm  -PH Additional Comments: Pt currently seeing dermatology for wound  -PH      Row Name             Wound 11/26/24 1220 Right breast MASD (moisture associated skin damage)    Wound - Properties Group Placement Date: 11/26/24  -KE Placement Time: 1220  -KE Side: Right  -KE Location: breast  -KE Primary Wound Type: MASD  -KE Type: MASD (moisture associated skin damage)  -KE    Retired Wound - Properties Group Placement Date: 11/26/24  -KE Placement Time: 1220  -KE Side:  Right  -KE Location: breast  -KE Primary Wound Type: MASD  -KE Type: MASD (moisture associated skin damage)  -KE    Retired Wound - Properties Group Placement Date: 11/26/24  -KE Placement Time: 1220  -KE Side: Right  -KE Location: breast  -KE Primary Wound Type: MASD  -KE Type: MASD (moisture associated skin damage)  -KE    Retired Wound - Properties Group Date first assessed: 11/26/24  -KE Time first assessed: 1220  -KE Side: Right  -KE Location: breast  -KE Primary Wound Type: MASD  -KE Type: MASD (moisture associated skin damage)  -KE      Row Name 11/27/24 1200          Plan of Care Review    Plan of Care Reviewed With patient;spouse  -TC     Outcome Evaluation Pt presents with decreased B LE muscle strength, decreased balance and decreased stability limiting her ability to perform bed mobility, transfers and ambulation without increased external assist. Pt would benefit from skilled PT services to address these deficits. Pt appears safe to return home with family s/p hospital discharge.  -TC       Row Name 11/27/24 1200          Vital Signs    Pre SpO2 (%) 92  -TC     O2 Delivery Pre Treatment nasal cannula  -TC     Intra SpO2 (%) 85  -TC     O2 Delivery Intra Treatment nasal cannula  -TC     Post SpO2 (%) 92  -TC     O2 Delivery Post Treatment nasal cannula  -TC     Pre Patient Position Supine  -TC     Intra Patient Position Standing  -TC     Post Patient Position Sitting  -TC       Row Name 11/27/24 1200          Positioning and Restraints    Pre-Treatment Position in bed  -TC     Post Treatment Position chair  -TC     In Chair call light within reach;with family/caregiver  -TC       Row Name 11/27/24 1200          Therapy Assessment/Plan (PT)    Rehab Potential (PT) good  -TC     Criteria for Skilled Interventions Met (PT) yes;meets criteria;skilled treatment is necessary  -TC     Therapy Frequency (PT) daily  -TC     Predicted Duration of Therapy Intervention (PT) 10 days  -TC     Problem List (PT)  problems related to;balance;coordination;mobility;strength  -TC     Activity Limitations Related to Problem List (PT) unable to ambulate safely;unable to transfer safely  -TC       Row Name 11/27/24 1200          PT Evaluation Complexity    History, PT Evaluation Complexity 3 or more personal factors and/or comorbidities  -TC     Examination of Body Systems (PT Eval Complexity) total of 3 or more elements  -TC     Clinical Presentation (PT Evaluation Complexity) stable  -TC     Clinical Decision Making (PT Evaluation Complexity) moderate complexity  -TC     Overall Complexity (PT Evaluation Complexity) low complexity  -TC       Row Name 11/27/24 1200          Therapy Plan Review/Discharge Plan (PT)    Therapy Plan Review (PT) evaluation/treatment results reviewed  -TC       Row Name 11/27/24 1200          Physical Therapy Goals    Bed Mobility Goal Selection (PT) bed mobility, PT goal 1  -TC     Transfer Goal Selection (PT) transfer, PT goal 1  -TC     Gait Training Goal Selection (PT) gait training, PT goal 1  -TC       Row Name 11/27/24 1200          Bed Mobility Goal 1 (PT)    Activity/Assistive Device (Bed Mobility Goal 1, PT) sit to supine;supine to sit  -TC     [place] Level/Cues Needed (Bed Mobility Goal 1, PT) modified independence  -TC     Time Frame (Bed Mobility Goal 1, PT) 10 days  -TC       Row Name 11/27/24 1200          Transfer Goal 1 (PT)    Activity/Assistive Device (Transfer Goal 1, PT) sit-to-stand/stand-to-sit;bed-to-chair/chair-to-bed  -TC     [place] Level/Cues Needed (Transfer Goal 1, PT) modified independence  -TC     Time Frame (Transfer Goal 1, PT) 10 days  -TC       Row Name 11/27/24 1200          Gait Training Goal 1 (PT)    Activity/Assistive Device (Gait Training Goal 1, PT) gait (walking locomotion);assistive device use  -TC     [place] Level (Gait Training Goal 1, PT) modified independence  -TC     Distance (Gait Training Goal 1, PT) 100  -TC     Time Frame (Gait  Training Goal 1, PT) 10 days  -TC               User Key  (r) = Recorded By, (t) = Taken By, (c) = Cosigned By      Initials Name Provider Type    Maryam Price, RN Registered Nurse    Rose Marie Leo, PT Physical Therapist    Jackson Cowan RN Registered Nurse                    Physical Therapy Education       Title: PT OT SLP Therapies (Not Started)       Topic: Physical Therapy (Not Started)       Point: Home exercise program (Not Started)       Learner Progress:  Not documented in this visit.              Point: Body mechanics (Not Started)       Learner Progress:  Not documented in this visit.                                  PT Recommendation and Plan  Anticipated Discharge Disposition (PT): home with assist  Planned Therapy Interventions (PT): balance training, bed mobility training, gait training, patient/family education, transfer training, strengthening  Therapy Frequency (PT): daily  Plan of Care Reviewed With: patient, spouse  Outcome Evaluation: Pt presents with decreased B LE muscle strength, decreased balance and decreased stability limiting her ability to perform bed mobility, transfers and ambulation without increased external assist. Pt would benefit from skilled PT services to address these deficits. Pt appears safe to return home with family s/p hospital discharge.   Outcome Measures       Row Name 11/27/24 1200             How much help from another person do you currently need...    Turning from your back to your side while in flat bed without using bedrails? 3  -TC      Moving from lying on back to sitting on the side of a flat bed without bedrails? 3  -TC      Moving to and from a bed to a chair (including a wheelchair)? 3  -TC      Standing up from a chair using your arms (e.g., wheelchair, bedside chair)? 3  -TC      Climbing 3-5 steps with a railing? 2  -TC      To walk in hospital room? 3  -TC      AM-PAC 6 Clicks Score (PT) 17  -TC         Functional Assessment    Outcome  Measure Options AM-PAC 6 Clicks Basic Mobility (PT)  -TC                User Key  (r) = Recorded By, (t) = Taken By, (c) = Cosigned By      Initials Name Provider Type    Rose Marie Leo PT Physical Therapist                     Time Calculation:    PT Charges       Row Name 11/27/24 1239             Time Calculation    PT Received On 11/27/24  -TC      PT Goal Re-Cert Due Date 12/06/24  -TC         Untimed Charges    PT Eval/Re-eval Minutes 30  -TC         Total Minutes    Untimed Charges Total Minutes 30  -TC       Total Minutes 30  -TC                User Key  (r) = Recorded By, (t) = Taken By, (c) = Cosigned By      Initials Name Provider Type    Rose Marie Leo, PT Physical Therapist                  Therapy Charges for Today       Code Description Service Date Service Provider Modifiers Qty    [id number] HC PT EVAL MOD COMPLEXITY 2 11/27/2024 Rose Marie Saul, PT GP 1            PT G-Codes  Outcome Measure Options: AM-PAC 6 Clicks Basic Mobility (PT)  AM-PAC 6 Clicks Score (PT): 17    Rose Marie Saul PT  11/27/2024

## 2024-11-27 NOTE — PLAN OF CARE
Goal Outcome Evaluation:  Plan of Care Reviewed With: patient        Progress: no change

## 2024-11-27 NOTE — PROGRESS NOTES
[facility]   Hospitalist Progress Note       Patient Name: Yelena Sanchez  : 1935  MRN: [id number]  Primary Care Physician: Kaelyn Eugene MD  Date of admission: 2024  Today's Date: 2024  Room / Bed:   [facility]  Subjective   Chief Complaint: Shortness of breath, confusion     HPI:     Yelena Sanchez is a 89 y.o. female with past medical history of hypertension, COPD, CHF, hypothyroidism, prior TIA, arthritis, and GERD presenting to the ED for evaluation of shortness of breath.  Patient states that for the last 3 to 4 days she has been having worsening shortness of breath to where she was symptomatic even at rest and prior to arrival became confused, tachypnea, and diaphoretic.  She then started a DuoNeb treatment which was ineffective.  Due to concern EMS was called to bring her to the ED for further evaluation.  En route patient was given nebulizer treatment along with Solu-Medrol.  In the ED patient was tachypneic, hypertensive, hypoxic on arrival requiring oxygen supplementation and eventually BiPAP.  Labs showed that she had above baseline proBNP, reduced renal function, and ABG showing hypercapnic respiratory failure with acidosis.  Chest x-ray showed cardiomegaly with vascular congestion and edema.  When seen patient was on BiPAP and stated that she was breathing feeling much better.  She denied any recent fevers, headaches, focal weakness, chest pain, abdominal pain, nausea, vomiting, diarrhea, constipation, dysuria, hematuria, hematochezia, melena, or anxiety.  Patient admitted for further evaluation and treatment.       Interval Followup: 2024    Up with staff.  Bedside commode.  Breathing better.  Alert and no distress currently.  On 3.5 L NC  BNP significantly elevated 16,000  ABG parameters improved: CO2 66 --> 47;  pH 7.21 ---> 7.36  Cr stable 1.8  Blood pressure improved down to 150/70  Mycoplasma IgM positive  Tolerating Solu-Medrol, doxycycline, IV Lasix  regimen   Does not have home NIPPV.  Has home O2 at nighttime.      REVIEW OF SYSTEMS:   SOA  Objective   Temp:  [97.6 °F (36.4 °C)-98.2 °F (36.8 °C)] 97.6 °F (36.4 °C)  Heart Rate:  [] 79  Resp:  [18-20] 20  BP: (138-166)/(59-90) 151/59  Flow (L/min) (Oxygen Therapy):  [2-3] 3  PHYSICAL EXAM   CON: WN. WD. NAD.   NECK:  No stridor.   RESP:  Diminished bilaterally  CV:  Rhythm regular. Rate WNL  GI:  Soft and nontender. Nondistended.    EXT: No cyanosis.  PSYCH:  Alert. Oriented. Normal affect and mood.  NEURO:  No dysarthria or aphasia.   Results from last 7 days   Lab Units 11/27/24  0506 11/25/24  2223   WBC 10*3/mm3 12.97* 9.72   HEMOGLOBIN g/dL 11.2* 12.7   HEMATOCRIT % 36.0 41.6   PLATELETS 10*3/mm3 258 296     Results from last 7 days   Lab Units 11/27/24  0506 11/25/24  2223   SODIUM mmol/L 143 143   POTASSIUM mmol/L 4.2 4.0   CO2 mmol/L 24.3 24.2   CHLORIDE mmol/L 106 105   ANION GAP mmol/L 12.7 13.8   BUN mg/dL 56* 38*   CREATININE mg/dL 1.80* 1.83*   GLUCOSE mg/dL 128* 164*         COMPLEXITY OF DATA / DECISION MAKING     []  Moderate: One acute illness or mild exacerbation of chronic or 2 stable chronic or tx side effects   []  High:  Severe acute illness or severe exacerbation of chronic - potential for major debility / life threatening         I have personally reviewed the results from the time of this admission to 11/27/2024 12:55 EST:  []  Laboratory:  []  Microbiology: []  Radiology:  []  Telemetry:   []  Cardiology/Vascular:  []  Pathology:  []  Prior external records:  []  Independent historian provided additional details:      []  Discussed case with specialists:    []  Independent interpretation of ECG/Imaging etc:             []  Moderate: Rx management, low risk surgery, suboptimal social situation   []  High:  Rx with close monitoring for toxicity, mod-high risk surgery, DNR decision, Comfort initiated, IV pain meds    Assessment / Plan   Assessment:    Acute on chronic  hypoxic/hypercarbic respiratory failure (Dr. Anglin)  Nocturnal hypoxia/nocturnal home O2  Pulmonary edema  Acute exacerbation COPD  Acute exacerbation systolic CHF  Mycoplasma pneumonia  Hx ischemic cardiomyopathy, status post ICD (Dr. Gautam)  CAD  Hx L breast cancer/mastectomy  HTN  Dyslipidemia  GERD  Hypothyroidism  Gout     Plan:    Admit to monitored bed  Pulmonology consulted  Continue doxycycline  Continue steroids with Solu-Medrol: Convert to oral soon  Continue diuretics: IV Lasix and Aldactone  Bronchodilator nebs scheduled and prn  Will need eval for home NIPPV  Will need 6-minute walk test prior to discharge re: change home O2 parameters     Discussed plan with RN.  VTE Prophylaxis:  Pharmacologic VTE prophylaxis orders are present.      CODE STATUS:      Level Of Support Discussed With: Patient  Code Status (Patient has no pulse and is not breathing): CPR (Attempt to Resuscitate)  Medical Interventions (Patient has pulse or is breathing): Full Support       Electronically signed by ROSY Dorado, 11/27/24, 12:55 PM EST.    Attending documentation:  I reviewed the above documentation and independently reviewed and rounded and evaluated the patient and discussed the care plan with CHRISTINE Ramirez PA-C, I agree with his findings and plan as documented, what I have added to the care plan and modified is as follows in my documentation and my medical decision making; 89-year-old female with history of CHF, breast cancer, CKD stage IIIb-IV COPD, hypertension, dyslipidemia, ICD biventricular in situ, hyperthyroidism, history of TIAs, hospitalized on 11/26/2024 for chief complaint of shortness of breath and confusion, found to be hypercapnic, pulmonary consulted, patient wore BiPAP, mentation improved, decompensated CHF superimposed with COPD exacerbation, diuresis ordered, found to be mycoplasma IgM positive, on doxycycline, breathing slow to improve.  Interval follow-up: Patient seen and examined this  morning, no acute distress, no acute major overnight events, on 3-1/2 L nasal cannula, still short of air with exertional activity, did not wear BiPAP very much overnight, ongoing diuresis, creatinine 1.8, BUN 56, potassium 4.2, sodium 143, white blood cell count 12,000, hemoglobin 11.2.  Urine output over the past 24 hours not large.  Review of systems obtained, all systems reviewed and negative set for cough, shortness of breath, weakness, fatigue vitals reviewed, labs reviewed on physical exam elderly appearing female laying in bed wearing nasal cannula oxygen, no acute distress, crackles throughout, coarse breath sounds, regular rate rhythm, soft nontender abdomen, alert and oriented x 3.  Assessment as above, acute decompensation of chronic systolic CHF with superimposed COPD exacerbation with hypercapnic and hypoxemic respiratory failure, plan, continue supplemental oxygen to maintain sats greater 92%, continue gentle diuresis with Lasix 40 mg IV daily, continue doxycycline, continue Plavix, continue Brovana Pulmicort nebs twice daily, strict I's and O's, daily weights, continue DuoNebs every 6 hours, reduce steroids to 40 mg p.o. daily, continue Aldactone, restart Bystolic, continue Singulair, continue Synthroid, out of bed to chair, PT/OT, telemetry monitoring, a.m. labs, full code, DVT prophylaxis with heparin, clinical course dictate further management, discussed with nurse at the bedside.  More than 85 % of the time of this patient's encounter was performed by me, this included face-to-face time, planning and coordinating, medical decision making and critical thinking personally done by me.    Electronically signed by Karen Raphael MD, 11/27/2024, 14:54 EST.    Portions of this documentation were transcribed electronically from a voice recognition software.  I confirm all data accurately represents the service(s) I performed at today's visit.

## 2024-11-28 LAB
ALBUMIN SERPL-MCNC: 3.6 G/DL (ref 3.5–5.2)
ALP SERPL-CCNC: 87 U/L (ref 39–117)
ALT SERPL W P-5'-P-CCNC: 9 U/L (ref 1–33)
ANION GAP SERPL CALCULATED.3IONS-SCNC: 13.8 MMOL/L (ref 5–15)
AST SERPL-CCNC: 13 U/L (ref 1–32)
BASOPHILS # BLD AUTO: 0.02 10*3/MM3 (ref 0–0.2)
BASOPHILS NFR BLD AUTO: 0.1 % (ref 0–1.5)
BILIRUB CONJ SERPL-MCNC: 0.1 MG/DL (ref 0–0.3)
BILIRUB INDIRECT SERPL-MCNC: 0.1 MG/DL
BILIRUB SERPL-MCNC: 0.2 MG/DL (ref 0–1.2)
BUN SERPL-MCNC: 60 MG/DL (ref 8–23)
BUN/CREAT SERPL: 33.5 (ref 7–25)
CALCIUM SPEC-SCNC: 9.8 MG/DL (ref 8.6–10.5)
CHLORIDE SERPL-SCNC: 104 MMOL/L (ref 98–107)
CO2 SERPL-SCNC: 23.2 MMOL/L (ref 22–29)
CREAT SERPL-MCNC: 1.79 MG/DL (ref 0.57–1)
DEPRECATED RDW RBC AUTO: 53.1 FL (ref 37–54)
EGFRCR SERPLBLD CKD-EPI 2021: 27 ML/MIN/1.73
EOSINOPHIL # BLD AUTO: 0 10*3/MM3 (ref 0–0.4)
EOSINOPHIL NFR BLD AUTO: 0 % (ref 0.3–6.2)
ERYTHROCYTE [DISTWIDTH] IN BLOOD BY AUTOMATED COUNT: 15.1 % (ref 12.3–15.4)
GLUCOSE SERPL-MCNC: 118 MG/DL (ref 65–99)
HCT VFR BLD AUTO: 37.5 % (ref 34–46.6)
HGB BLD-MCNC: 11.5 G/DL (ref 12–15.9)
IMM GRANULOCYTES # BLD AUTO: 0.11 10*3/MM3 (ref 0–0.05)
IMM GRANULOCYTES NFR BLD AUTO: 0.7 % (ref 0–0.5)
LYMPHOCYTES # BLD AUTO: 0.36 10*3/MM3 (ref 0.7–3.1)
LYMPHOCYTES NFR BLD AUTO: 2.3 % (ref 19.6–45.3)
MAGNESIUM SERPL-MCNC: 2.4 MG/DL (ref 1.6–2.4)
MCH RBC QN AUTO: 29.6 PG (ref 26.6–33)
MCHC RBC AUTO-ENTMCNC: 30.7 G/DL (ref 31.5–35.7)
MCV RBC AUTO: 96.4 FL (ref 79–97)
MONOCYTES # BLD AUTO: 0.87 10*3/MM3 (ref 0.1–0.9)
MONOCYTES NFR BLD AUTO: 5.5 % (ref 5–12)
NEUTROPHILS NFR BLD AUTO: 14.5 10*3/MM3 (ref 1.7–7)
NEUTROPHILS NFR BLD AUTO: 91.4 % (ref 42.7–76)
NRBC BLD AUTO-RTO: 0 /100 WBC (ref 0–0.2)
PHOSPHATE SERPL-MCNC: 3.6 MG/DL (ref 2.5–4.5)
PLATELET # BLD AUTO: 263 10*3/MM3 (ref 140–450)
PMV BLD AUTO: 10.3 FL (ref 6–12)
POTASSIUM SERPL-SCNC: 3.9 MMOL/L (ref 3.5–5.2)
PROT SERPL-MCNC: 6.5 G/DL (ref 6–8.5)
RBC # BLD AUTO: 3.89 10*6/MM3 (ref 3.77–5.28)
SODIUM SERPL-SCNC: 141 MMOL/L (ref 136–145)
WBC NRBC COR # BLD AUTO: 15.86 10*3/MM3 (ref 3.4–10.8)

## 2024-11-28 PROCEDURE — 84100 ASSAY OF PHOSPHORUS: CPT | Performed by: FAMILY MEDICINE

## 2024-11-28 PROCEDURE — 94761 N-INVAS EAR/PLS OXIMETRY MLT: CPT

## 2024-11-28 PROCEDURE — 94799 UNLISTED PULMONARY SVC/PX: CPT

## 2024-11-28 PROCEDURE — 80076 HEPATIC FUNCTION PANEL: CPT | Performed by: FAMILY MEDICINE

## 2024-11-28 PROCEDURE — 80048 BASIC METABOLIC PNL TOTAL CA: CPT | Performed by: FAMILY MEDICINE

## 2024-11-28 PROCEDURE — 85025 COMPLETE CBC W/AUTO DIFF WBC: CPT | Performed by: FAMILY MEDICINE

## 2024-11-28 PROCEDURE — 94664 DEMO&/EVAL PT USE INHALER: CPT

## 2024-11-28 PROCEDURE — 99232 SBSQ HOSP IP/OBS MODERATE 35: CPT | Performed by: INTERNAL MEDICINE

## 2024-11-28 PROCEDURE — 63710000001 PREDNISONE PER 1 MG: Performed by: NURSE PRACTITIONER

## 2024-11-28 PROCEDURE — 25010000002 FUROSEMIDE PER 20 MG: Performed by: FAMILY MEDICINE

## 2024-11-28 PROCEDURE — 99232 SBSQ HOSP IP/OBS MODERATE 35: CPT | Performed by: FAMILY MEDICINE

## 2024-11-28 PROCEDURE — 83735 ASSAY OF MAGNESIUM: CPT | Performed by: FAMILY MEDICINE

## 2024-11-28 PROCEDURE — 25010000002 HEPARIN (PORCINE) PER 1000 UNITS: Performed by: FAMILY MEDICINE

## 2024-11-28 RX ADMIN — Medication 5 MG: at 22:00

## 2024-11-28 RX ADMIN — PANTOPRAZOLE SODIUM 40 MG: 40 TABLET, DELAYED RELEASE ORAL at 05:20

## 2024-11-28 RX ADMIN — IPRATROPIUM BROMIDE AND ALBUTEROL SULFATE 3 ML: .5; 3 SOLUTION RESPIRATORY (INHALATION) at 01:11

## 2024-11-28 RX ADMIN — ALLOPURINOL 100 MG: 100 TABLET ORAL at 20:57

## 2024-11-28 RX ADMIN — LEVOTHYROXINE SODIUM 100 MCG: 0.1 TABLET ORAL at 05:20

## 2024-11-28 RX ADMIN — PREDNISONE 40 MG: 20 TABLET ORAL at 09:10

## 2024-11-28 RX ADMIN — Medication 10 ML: at 09:12

## 2024-11-28 RX ADMIN — NYSTATIN OINTMENT 1 APPLICATION: 100000 OINTMENT TOPICAL at 20:58

## 2024-11-28 RX ADMIN — FUROSEMIDE 40 MG: 10 INJECTION, SOLUTION INTRAMUSCULAR; INTRAVENOUS at 05:20

## 2024-11-28 RX ADMIN — NEBIVOLOL 10 MG: 10 TABLET ORAL at 09:10

## 2024-11-28 RX ADMIN — BUDESONIDE 0.5 MG: 0.5 INHALANT RESPIRATORY (INHALATION) at 07:05

## 2024-11-28 RX ADMIN — GUAIFENESIN 600 MG: 600 TABLET ORAL at 20:57

## 2024-11-28 RX ADMIN — MONTELUKAST 10 MG: 10 TABLET, FILM COATED ORAL at 20:57

## 2024-11-28 RX ADMIN — ARFORMOTEROL TARTRATE 15 MCG: 15 SOLUTION RESPIRATORY (INHALATION) at 19:32

## 2024-11-28 RX ADMIN — DOXYCYCLINE 100 MG: 100 CAPSULE ORAL at 09:10

## 2024-11-28 RX ADMIN — GUAIFENESIN 600 MG: 600 TABLET ORAL at 09:10

## 2024-11-28 RX ADMIN — IPRATROPIUM BROMIDE AND ALBUTEROL SULFATE 3 ML: .5; 3 SOLUTION RESPIRATORY (INHALATION) at 13:59

## 2024-11-28 RX ADMIN — BUDESONIDE 0.5 MG: 0.5 INHALANT RESPIRATORY (INHALATION) at 19:32

## 2024-11-28 RX ADMIN — ALLOPURINOL 100 MG: 100 TABLET ORAL at 09:10

## 2024-11-28 RX ADMIN — IPRATROPIUM BROMIDE AND ALBUTEROL SULFATE 3 ML: .5; 3 SOLUTION RESPIRATORY (INHALATION) at 19:32

## 2024-11-28 RX ADMIN — DOXYCYCLINE 100 MG: 100 CAPSULE ORAL at 20:57

## 2024-11-28 RX ADMIN — Medication 1 APPLICATION: at 09:13

## 2024-11-28 RX ADMIN — HEPARIN SODIUM 5000 UNITS: 5000 INJECTION INTRAVENOUS; SUBCUTANEOUS at 09:10

## 2024-11-28 RX ADMIN — Medication 1 APPLICATION: at 20:57

## 2024-11-28 RX ADMIN — ARFORMOTEROL TARTRATE 15 MCG: 15 SOLUTION RESPIRATORY (INHALATION) at 07:05

## 2024-11-28 RX ADMIN — IPRATROPIUM BROMIDE AND ALBUTEROL SULFATE 3 ML: .5; 3 SOLUTION RESPIRATORY (INHALATION) at 07:05

## 2024-11-28 RX ADMIN — CLOPIDOGREL BISULFATE 75 MG: 75 TABLET ORAL at 09:10

## 2024-11-28 RX ADMIN — Medication 12.5 MG: at 09:10

## 2024-11-28 RX ADMIN — NYSTATIN OINTMENT 1 APPLICATION: 100000 OINTMENT TOPICAL at 09:12

## 2024-11-28 RX ADMIN — Medication 10 ML: at 20:58

## 2024-11-28 RX ADMIN — HEPARIN SODIUM 5000 UNITS: 5000 INJECTION INTRAVENOUS; SUBCUTANEOUS at 20:57

## 2024-11-28 RX ADMIN — ROSUVASTATIN CALCIUM 10 MG: 5 TABLET, FILM COATED ORAL at 20:57

## 2024-11-28 NOTE — PROGRESS NOTES
[facility]   Hospitalist Progress Note       Patient Name: Yelena Sanchez  : 1935  MRN: [id number]  Primary Care Physician: Kaelyn Eugene MD  Date of admission: 2024  Today's Date: 2024  Room / Bed:   [facility]/  Subjective   Chief Complaint: Shortness of breath, confusion     HPI:     Yelena Sanchez is a 89 y.o. female with past medical history of hypertension, COPD, CHF, hypothyroidism, prior TIA, arthritis, and GERD presenting to the ED for evaluation of shortness of breath.  Patient states that for the last 3 to 4 days she has been having worsening shortness of breath to where she was symptomatic even at rest and prior to arrival became confused, tachypnea, and diaphoretic.  She then started a DuoNeb treatment which was ineffective.  Due to concern EMS was called to bring her to the ED for further evaluation.  En route patient was given nebulizer treatment along with Solu-Medrol.  In the ED patient was tachypneic, hypertensive, hypoxic on arrival requiring oxygen supplementation and eventually BiPAP.  Labs showed that she had above baseline proBNP, reduced renal function, and ABG showing hypercapnic respiratory failure with acidosis.  Chest x-ray showed cardiomegaly with vascular congestion and edema.  When seen patient was on BiPAP and stated that she was breathing feeling much better.  She denied any recent fevers, headaches, focal weakness, chest pain, abdominal pain, nausea, vomiting, diarrhea, constipation, dysuria, hematuria, hematochezia, melena, or anxiety.  Patient admitted for further evaluation and treatment.       Interval Followup: 2024    On 2 Lcurrently (uses home O2 at night.)  Did not use NIPPV last night   at bedside  Patient continues to improve daily  Up on the side of the bed eating lunch.  Alert and conversational.  In good spirits.  No obvious distress or conversational dyspnea.   Mycoplasma IgM positive      REVIEW OF SYSTEMS:   SOA  Objective    Temp:  [97.2 °F (36.2 °C)-98.1 °F (36.7 °C)] 97.9 °F (36.6 °C)  Heart Rate:  [68-94] 82  Resp:  [18-20] 20  BP: (139-175)/(65-82) 147/65  Flow (L/min) (Oxygen Therapy):  [2-3] 2  PHYSICAL EXAM   CON: WN. WD. NAD. Good spirits  NECK:  No stridor.   RESP:  Diminished bilaterally  CV:  Rhythm regular. Rate WNL  GI:  Soft and nontender. Nondistended.    EXT: No cyanosis.  PSYCH:  Alert. Oriented. Normal affect and mood.  NEURO:  No dysarthria or aphasia.   Results from last 7 days   Lab Units 11/28/24 0444 11/27/24  0506 11/25/24  2223   WBC 10*3/mm3 15.86* 12.97* 9.72   HEMOGLOBIN g/dL 11.5* 11.2* 12.7   HEMATOCRIT % 37.5 36.0 41.6   PLATELETS 10*3/mm3 263 258 296     Results from last 7 days   Lab Units 11/28/24  0443 11/27/24  0506 11/25/24  2223   SODIUM mmol/L 141 143 143   POTASSIUM mmol/L 3.9 4.2 4.0   CO2 mmol/L 23.2 24.3 24.2   CHLORIDE mmol/L 104 106 105   ANION GAP mmol/L 13.8 12.7 13.8   BUN mg/dL 60* 56* 38*   CREATININE mg/dL 1.79* 1.80* 1.83*   GLUCOSE mg/dL 118* 128* 164*         COMPLEXITY OF DATA / DECISION MAKING     []  Moderate: One acute illness or mild exacerbation of chronic or 2 stable chronic or tx side effects   []  High:  Severe acute illness or severe exacerbation of chronic - potential for major debility / life threatening         I have personally reviewed the results from the time of this admission to 11/28/2024 12:30 EST:  []  Laboratory:  []  Microbiology: []  Radiology:  []  Telemetry:   []  Cardiology/Vascular:  []  Pathology:  []  Prior external records:  []  Independent historian provided additional details:      []  Discussed case with specialists:    []  Independent interpretation of ECG/Imaging etc:             []  Moderate: Rx management, low risk surgery, suboptimal social situation   []  High:  Rx with close monitoring for toxicity, mod-high risk surgery, DNR decision, Comfort initiated, IV pain meds    Assessment / Plan   Assessment:    Acute on chronic hypoxic/hypercarbic  respiratory failure (Dr. Anglin)  Nocturnal hypoxia/nocturnal home O2  Pulmonary edema  Acute exacerbation COPD  Acute exacerbation systolic CHF  Mycoplasma pneumonia  Hx ischemic cardiomyopathy, status post ICD (Dr. Gautam)  CAD  Hx L breast cancer/mastectomy  HTN  Dyslipidemia  GERD  Hypothyroidism  Gout     Plan:    Mobilize as respiratory status improves.  Up in chair.  Wean O2 when feasible.  Baseline = nighttime O2 at home.  Pulmonology consulted  Continue doxycycline  Continue steroids with Solu-Medrol: Convert to oral soon  Continue diuretics: IV Lasix and Aldactone  Bronchodilator nebs scheduled and prn  Will need eval for home NIPPV  Will need 6-minute walk test prior to discharge re: change home O2 parameters     Discussed plan with RN.  VTE Prophylaxis:  Pharmacologic VTE prophylaxis orders are present.      CODE STATUS:      Level Of Support Discussed With: Patient  Code Status (Patient has no pulse and is not breathing): CPR (Attempt to Resuscitate)  Medical Interventions (Patient has pulse or is breathing): Full Support           Attending documentation:  I reviewed the above documentation and independently reviewed and rounded and evaluated the patient and discussed the care plan with CHRISTINE Rmairez PA-C, I agree with his findings and plan as documented, what I have added to the care plan and modified is as follows in my documentation and my medical decision making; 89-year-old female with history of CHF, breast cancer, CKD stage IIIb-IV COPD, hypertension, dyslipidemia, ICD biventricular in situ, hyperthyroidism, history of TIAs, hospitalized on 11/26/2024 for chief complaint of shortness of breath and confusion, found to be hypercapnic, pulmonary consulted, patient wore BiPAP, mentation improved, decompensated CHF superimposed with COPD exacerbation, diuresis ordered, found to be mycoplasma IgM positive, on doxycycline, breathing slow to improve.  Interval follow-up: Patient seen and examined this  morning, no acute distress, no acute major overnight events, remains on 2 L nasal cannula oxygen with sats in the 90s, blood pressure and vital signs are stable, white blood cell count 15,000, hemoglobin 11.5, creatinine 1.79, BUN 60, red blood cell count 11,000.  Still coughing and short of air with exertional activity.  Review of systems obtained, all systems reviewed and negative set for cough, shortness of breath, weakness, fatigue vitals reviewed, labs reviewed on physical exam elderly appearing female laying in bed wearing nasal cannula oxygen, no acute distress, crackles throughout, coarse breath sounds, regular rate rhythm, soft nontender abdomen, alert and oriented x 3.  Assessment as above, acute decompensation of chronic systolic CHF with superimposed COPD exacerbation with hypercapnic and hypoxemic respiratory failure, plan, continue supplemental oxygen to maintain sats greater 92%, continue gentle diuresis with Lasix 40 mg IV daily, will repeat a BNP in the morning, continue doxycycline, continue Plavix, continue Brovana Pulmicort nebs twice daily, strict I's and O's, daily weights, continue DuoNebs every 6 hours, reduce steroids to 40 mg p.o. daily, continue Aldactone, restart Bystolic, continue Singulair, continue Synthroid, out of bed to chair, PT/OT, telemetry monitoring, a.m. labs, full code, DVT prophylaxis with heparin, clinical course dictate further management, discussed with nurse at the bedside.  More than 75 % of the time of this patient's encounter was performed by me, this included face-to-face time, planning and coordinating, medical decision making and critical thinking personally done by me.      Electronically signed by Karen Raphael MD, 11/28/2024, 14:12 EST.    Portions of this documentation were transcribed electronically from a voice recognition software.  I confirm all data accurately represents the service(s) I performed at today's visit.

## 2024-11-28 NOTE — PLAN OF CARE
Goal Outcome Evaluation:  Plan of Care Reviewed With: patient        Progress: no change  Outcome Evaluation: Pt did not wear bipap last night. V60 on standby at bedside. Pt currently on a 2L nasal cannula resting at this time.

## 2024-11-28 NOTE — PLAN OF CARE
Goal Outcome Evaluation:   Patient alert and oriented x4. Patient on 1L nasal canula, tolerating well with no signs of distress. Patient complains of no pain at this time. Will continue to monitor and follow plan of care.   .Ada Mandujano RN

## 2024-11-28 NOTE — PLAN OF CARE
Goal Outcome Evaluation:  Plan of Care Reviewed With: patient, spouse        Progress: improving  Outcome Evaluation: A&O x4, 2L NC, stand by assistance to BSC, denies pain/discomfort, no concerns,

## 2024-11-28 NOTE — PROGRESS NOTES
Pulmonary / Critical Care Progress Note      Patient Name: Yelena Sanchez  : 1935  MRN: [id number]  Primary Care Physician:  Kaelyn Eugene MD  Date of admission: 2024    Subjective   Subjective   Follow-up for acute hypoxic and hypercapnic respiratory failure, CHF exacerbation    No acute events overnight.     This morning,  Lying in bed  Currently on 3 L nasal cannula with SpO2 of 98%  Decreased to 1 L nasal cannula  Overall feeling better  Dyspnea and cough improving  Continues to desat with activity  Family at bedside  Tolerating diuresis  No fever or chills  No chest pain or hemoptysis    Objective   Objective     Vitals:   Temp:  [97.2 °F (36.2 °C)-98.1 °F (36.7 °C)] 97.3 °F (36.3 °C)  Heart Rate:  [68-94] 94  Resp:  [18-20] 20  BP: (139-175)/(59-82) 139/82  Flow (L/min) (Oxygen Therapy):  [2-3] 2    Physical Exam   Vital Signs Reviewed   General: Chronically ill-appearing, elderly female, Alert, NAD, lying in bed.    Chest:  good aeration, bibasilar crackles to auscultation bilaterally, no work of breathing noted on 1 L NC  CV: regular rate and rhythm regular  EXT:  no clubbing, no cyanosis, no edema  Neuro:  A&Ox3, moving all 4 extremities spontaneously  Skin: No rashes or lesions noted    Result Review    Result Review:  I have personally reviewed the results from the time of this admission to 2024 10:56 EST and agree with these findings:  [x]  Laboratory  [x]  Microbiology  [x]  Radiology  [x]  EKG/Telemetry   []  Cardiology/Vascular   []  Pathology  []  Old records  []  Other:  Most notable findings include:   proBNP 15,960      Lab 24  0444 24  0443 24  0506 24  2223   WBC 15.86*  --  12.97* 9.72   HEMOGLOBIN 11.5*  --  11.2* 12.7   HEMATOCRIT 37.5  --  36.0 41.6   PLATELETS 263  --  258 296   SODIUM  --  141 143 143   POTASSIUM  --  3.9 4.2 4.0   CHLORIDE  --  104 106 105   CO2  --  23.2 24.3 24.2   BUN  --  60* 56* 38*   CREATININE  --  1.79* 1.80*  1.83*   GLUCOSE  --  118* 128* 164*   CALCIUM  --  9.8 9.5 9.4   PHOSPHORUS  --  3.6 3.7  --    TOTAL PROTEIN  --  6.5 6.5 7.4   ALBUMIN  --  3.6 3.6 4.3   GLOBULIN  --   --   --  3.1   CT Chest Without Contrast Diagnostic    Result Date: 11/26/2024  CT CHEST WO CONTRAST DIAGNOSTIC Date of Exam: 11/26/2024 5:17 PM EST Indication: hypoxia. Comparison: Chest radiograph from November 25, 2024 and CT chest from October 8, 2019 Technique: Axial CT images were obtained of the chest without contrast administration.  Reconstructed coronal and sagittal images were also obtained. Automated exposure control and iterative construction methods were used. Findings: There are changes from left lower lobectomy, and the residual central tracheobronchial tree is clear. There are trace bilateral pleural effusions with mild bibasilar atelectasis. There is no focal consolidation. No discrete pulmonary nodule is identified. A left subclavian pacemaker is in place. The heart is enlarged, with evidence of calcified coronary artery disease. The thoracic aorta appears normal in caliber. The main pulmonary artery is enlarged, suggesting a component of pulmonary arterial hypertension. No abnormally enlarged lymph nodes are identified. There are changes from left mastectomy. Partial evaluation of the upper abdomen demonstrates a right renal cyst. No aggressive osseous lesions are identified.     Impression: Impression: 1.Trace bilateral pleural effusions with mild bibasilar atelectasis. 2.Cardiomegaly. 3.Enlarged main pulmonary artery, suggesting a component of pulmonary arterial hypertension. Electronically Signed: Manuel Anderson MD  11/26/2024 8:37 PM EST  Workstation ID: [id number]     Assessment & Plan   Assessment / Plan     Active Hospital Problems:  Active Hospital Problems    Diagnosis     **Acute on chronic respiratory failure with hypercapnia     Acute on chronic congestive heart failure     Dyspnea     History of breast cancer      COPD with acute exacerbation     HLD (hyperlipidemia)     Essential hypertension     Presence of biventricular implantable cardioverter-defibrillator (ICD)    Impression:  Acute hypoxic and hypercapnic respiratory failure requiring NIPPV  Acute on chronic congestive diastolic heart failure  Acute cardiogenic pulmonary edema  History of lung cancer with left lower lobe lobectomy in 2004  History of left breast cancer 2011 s/p left mastectomy  History of MDS  History of coronary artery disease s/p ICD placed  Never smoker    Plan:  -Continue to wean O2 to maintain SpO2 greater than 90%.  Patient is only on nocturnal oxygen at baseline.  -CXR demonstrating cardiomegaly with pulmonary edema  -CT chest reviewed revealing trace bilateral pleural effusions with mild bibasilar atelectasis.  Enlarged main pulmonary artery concerning for PAH.  -Continue Brovana, Pulmicort, and DuoNebs  -Micro negative today except for positive for mycoplasma infection.  Procalcitonin and white count normal.  -Continue doxycycline x 7 days for mycoplasma pneumonia  -Continue prednisone 40 mg daily x 5 days  -Continue Lasix 40 mg IV daily and Aldactone 12.5 mg oral daily  -Trend electrolytes and renal panel.  Replace electrolytes as necessary  -Continue bronchopulmonary hygiene.  Encourage I-S and flutter valve  -Encourage mobilization.  Out of bed to chair.    VTE Prophylaxis:  Pharmacologic VTE prophylaxis orders are present.      CODE STATUS:   Level Of Support Discussed With: Patient  Code Status (Patient has no pulse and is not breathing): CPR (Attempt to Resuscitate)  Medical Interventions (Patient has pulse or is breathing): Full Support      Electronically signed by LUZ MARIA Walters, 11/28/24, 10:56 AM EST.    This visit was performed by BOTH a physician and an APC. I personally evaluated and examined the patient. I performed all aspects of MDM as documented. , I have reviewed and confirmed the accuracy of the patient's history as  documented in this note., and I have reexamined the patient and the results are consistent with the previously documented exam. I have updated the documentation as necessary. Electronically signed by Anastacio Rosales MD, 11/28/24, 11:49 AM EST.

## 2024-11-28 NOTE — PLAN OF CARE
Goal Outcome Evaluation:   Patient did not want to wear Bipap tonight. She is feeling much better and wanted to remain on the nasal cannula. Patient is on 2L and tolerating well.

## 2024-11-29 ENCOUNTER — APPOINTMENT (OUTPATIENT)
Dept: GENERAL RADIOLOGY | Facility: HOSPITAL | Age: 89
End: 2024-11-29
Payer: MEDICARE

## 2024-11-29 LAB
ALBUMIN SERPL-MCNC: 3.6 G/DL (ref 3.5–5.2)
ALP SERPL-CCNC: 86 U/L (ref 39–117)
ALT SERPL W P-5'-P-CCNC: 26 U/L (ref 1–33)
ANION GAP SERPL CALCULATED.3IONS-SCNC: 12.3 MMOL/L (ref 5–15)
AST SERPL-CCNC: 29 U/L (ref 1–32)
BASOPHILS # BLD AUTO: 0.01 10*3/MM3 (ref 0–0.2)
BASOPHILS NFR BLD AUTO: 0.1 % (ref 0–1.5)
BILIRUB CONJ SERPL-MCNC: 0.1 MG/DL (ref 0–0.3)
BILIRUB INDIRECT SERPL-MCNC: 0.1 MG/DL
BILIRUB SERPL-MCNC: 0.2 MG/DL (ref 0–1.2)
BUN SERPL-MCNC: 70 MG/DL (ref 8–23)
BUN/CREAT SERPL: 33.3 (ref 7–25)
CALCIUM SPEC-SCNC: 9.7 MG/DL (ref 8.6–10.5)
CHLORIDE SERPL-SCNC: 108 MMOL/L (ref 98–107)
CO2 SERPL-SCNC: 23.7 MMOL/L (ref 22–29)
CREAT SERPL-MCNC: 2.1 MG/DL (ref 0.57–1)
DEPRECATED RDW RBC AUTO: 54.4 FL (ref 37–54)
EGFRCR SERPLBLD CKD-EPI 2021: 22.3 ML/MIN/1.73
EOSINOPHIL # BLD AUTO: 0 10*3/MM3 (ref 0–0.4)
EOSINOPHIL NFR BLD AUTO: 0 % (ref 0.3–6.2)
ERYTHROCYTE [DISTWIDTH] IN BLOOD BY AUTOMATED COUNT: 15.2 % (ref 12.3–15.4)
GLUCOSE SERPL-MCNC: 124 MG/DL (ref 65–99)
HCT VFR BLD AUTO: 36.9 % (ref 34–46.6)
HGB BLD-MCNC: 11.3 G/DL (ref 12–15.9)
IMM GRANULOCYTES # BLD AUTO: 0.12 10*3/MM3 (ref 0–0.05)
IMM GRANULOCYTES NFR BLD AUTO: 0.9 % (ref 0–0.5)
LYMPHOCYTES # BLD AUTO: 0.36 10*3/MM3 (ref 0.7–3.1)
LYMPHOCYTES NFR BLD AUTO: 2.7 % (ref 19.6–45.3)
MAGNESIUM SERPL-MCNC: 2.4 MG/DL (ref 1.6–2.4)
MCH RBC QN AUTO: 29.8 PG (ref 26.6–33)
MCHC RBC AUTO-ENTMCNC: 30.6 G/DL (ref 31.5–35.7)
MCV RBC AUTO: 97.4 FL (ref 79–97)
MONOCYTES # BLD AUTO: 0.64 10*3/MM3 (ref 0.1–0.9)
MONOCYTES NFR BLD AUTO: 4.9 % (ref 5–12)
NEUTROPHILS NFR BLD AUTO: 12.02 10*3/MM3 (ref 1.7–7)
NEUTROPHILS NFR BLD AUTO: 91.4 % (ref 42.7–76)
NRBC BLD AUTO-RTO: 0 /100 WBC (ref 0–0.2)
NT-PROBNP SERPL-MCNC: ABNORMAL PG/ML (ref 0–1800)
PHOSPHATE SERPL-MCNC: 4.4 MG/DL (ref 2.5–4.5)
PLATELET # BLD AUTO: 242 10*3/MM3 (ref 140–450)
PMV BLD AUTO: 10.5 FL (ref 6–12)
POTASSIUM SERPL-SCNC: 4.2 MMOL/L (ref 3.5–5.2)
PROT SERPL-MCNC: 6.3 G/DL (ref 6–8.5)
RBC # BLD AUTO: 3.79 10*6/MM3 (ref 3.77–5.28)
SODIUM SERPL-SCNC: 144 MMOL/L (ref 136–145)
WBC NRBC COR # BLD AUTO: 13.15 10*3/MM3 (ref 3.4–10.8)

## 2024-11-29 PROCEDURE — 25010000002 HEPARIN (PORCINE) PER 1000 UNITS: Performed by: FAMILY MEDICINE

## 2024-11-29 PROCEDURE — 99232 SBSQ HOSP IP/OBS MODERATE 35: CPT | Performed by: INTERNAL MEDICINE

## 2024-11-29 PROCEDURE — 97530 THERAPEUTIC ACTIVITIES: CPT

## 2024-11-29 PROCEDURE — 94664 DEMO&/EVAL PT USE INHALER: CPT

## 2024-11-29 PROCEDURE — 83735 ASSAY OF MAGNESIUM: CPT | Performed by: FAMILY MEDICINE

## 2024-11-29 PROCEDURE — 94799 UNLISTED PULMONARY SVC/PX: CPT

## 2024-11-29 PROCEDURE — 84100 ASSAY OF PHOSPHORUS: CPT | Performed by: FAMILY MEDICINE

## 2024-11-29 PROCEDURE — 80076 HEPATIC FUNCTION PANEL: CPT | Performed by: FAMILY MEDICINE

## 2024-11-29 PROCEDURE — 83880 ASSAY OF NATRIURETIC PEPTIDE: CPT | Performed by: FAMILY MEDICINE

## 2024-11-29 PROCEDURE — 94618 PULMONARY STRESS TESTING: CPT

## 2024-11-29 PROCEDURE — 85025 COMPLETE CBC W/AUTO DIFF WBC: CPT | Performed by: FAMILY MEDICINE

## 2024-11-29 PROCEDURE — 99232 SBSQ HOSP IP/OBS MODERATE 35: CPT | Performed by: FAMILY MEDICINE

## 2024-11-29 PROCEDURE — 71045 X-RAY EXAM CHEST 1 VIEW: CPT

## 2024-11-29 PROCEDURE — 80048 BASIC METABOLIC PNL TOTAL CA: CPT | Performed by: FAMILY MEDICINE

## 2024-11-29 PROCEDURE — 97116 GAIT TRAINING THERAPY: CPT

## 2024-11-29 PROCEDURE — 63710000001 PREDNISONE PER 1 MG: Performed by: NURSE PRACTITIONER

## 2024-11-29 PROCEDURE — 94761 N-INVAS EAR/PLS OXIMETRY MLT: CPT

## 2024-11-29 PROCEDURE — 25010000002 FUROSEMIDE PER 20 MG: Performed by: FAMILY MEDICINE

## 2024-11-29 RX ORDER — AMLODIPINE BESYLATE 10 MG/1
5 TABLET ORAL
Status: DISCONTINUED | OUTPATIENT
Start: 2024-11-29 | End: 2024-12-04

## 2024-11-29 RX ORDER — ASCORBIC ACID 500 MG
1000 TABLET ORAL DAILY
Status: DISCONTINUED | OUTPATIENT
Start: 2024-11-29 | End: 2024-11-29

## 2024-11-29 RX ORDER — BUTYROSPERMUM PARKII(SHEA BUTTER), SIMMONDSIA CHINENSIS (JOJOBA) SEED OIL, ALOE BARBADENSIS LEAF EXTRACT .01; 1; 3.5 G/100G; G/100G; G/100G
1 LIQUID TOPICAL 2 TIMES DAILY
Status: DISCONTINUED | OUTPATIENT
Start: 2024-11-29 | End: 2024-12-05 | Stop reason: HOSPADM

## 2024-11-29 RX ORDER — LACTULOSE 10 G/15ML
10 SOLUTION ORAL DAILY PRN
Status: DISCONTINUED | OUTPATIENT
Start: 2024-11-29 | End: 2024-12-05 | Stop reason: HOSPADM

## 2024-11-29 RX ORDER — ASCORBIC ACID 500 MG
1000 TABLET ORAL 2 TIMES DAILY
Status: DISCONTINUED | OUTPATIENT
Start: 2024-11-29 | End: 2024-11-29

## 2024-11-29 RX ORDER — FUROSEMIDE 40 MG/1
40 TABLET ORAL DAILY
Status: DISCONTINUED | OUTPATIENT
Start: 2024-11-30 | End: 2024-11-30

## 2024-11-29 RX ADMIN — ARFORMOTEROL TARTRATE 15 MCG: 15 SOLUTION RESPIRATORY (INHALATION) at 19:19

## 2024-11-29 RX ADMIN — Medication 10 ML: at 08:12

## 2024-11-29 RX ADMIN — PREDNISONE 40 MG: 20 TABLET ORAL at 08:11

## 2024-11-29 RX ADMIN — AMLODIPINE BESYLATE 5 MG: 10 TABLET ORAL at 11:20

## 2024-11-29 RX ADMIN — SENNOSIDES AND DOCUSATE SODIUM 2 TABLET: 50; 8.6 TABLET ORAL at 06:01

## 2024-11-29 RX ADMIN — Medication 12.5 MG: at 08:10

## 2024-11-29 RX ADMIN — PANTOPRAZOLE SODIUM 40 MG: 40 TABLET, DELAYED RELEASE ORAL at 05:52

## 2024-11-29 RX ADMIN — ALLOPURINOL 100 MG: 100 TABLET ORAL at 21:14

## 2024-11-29 RX ADMIN — DOXYCYCLINE 100 MG: 100 CAPSULE ORAL at 08:11

## 2024-11-29 RX ADMIN — GUAIFENESIN 600 MG: 600 TABLET ORAL at 21:14

## 2024-11-29 RX ADMIN — POLYETHYLENE GLYCOL 3350 17 G: 17 POWDER, FOR SOLUTION ORAL at 21:14

## 2024-11-29 RX ADMIN — Medication 10 ML: at 21:15

## 2024-11-29 RX ADMIN — BUTYROSPERMUM PARKII(SHEA BUTTER), SIMMONDSIA CHINENSIS (JOJOBA) SEED OIL, ALOE BARBADENSIS LEAF EXTRACT 1 CAPSULE: .01; 1; 3.5 LIQUID TOPICAL at 11:21

## 2024-11-29 RX ADMIN — NEBIVOLOL 10 MG: 10 TABLET ORAL at 08:11

## 2024-11-29 RX ADMIN — LACTULOSE 10 G: 10 SOLUTION ORAL at 15:42

## 2024-11-29 RX ADMIN — Medication 1 APPLICATION: at 11:21

## 2024-11-29 RX ADMIN — LEVOTHYROXINE SODIUM 100 MCG: 0.1 TABLET ORAL at 05:52

## 2024-11-29 RX ADMIN — IPRATROPIUM BROMIDE AND ALBUTEROL SULFATE 3 ML: .5; 3 SOLUTION RESPIRATORY (INHALATION) at 13:29

## 2024-11-29 RX ADMIN — BUDESONIDE 0.5 MG: 0.5 INHALANT RESPIRATORY (INHALATION) at 07:19

## 2024-11-29 RX ADMIN — DOXYCYCLINE 100 MG: 100 CAPSULE ORAL at 21:15

## 2024-11-29 RX ADMIN — Medication 1000 MG: at 21:15

## 2024-11-29 RX ADMIN — FUROSEMIDE 40 MG: 10 INJECTION, SOLUTION INTRAMUSCULAR; INTRAVENOUS at 05:52

## 2024-11-29 RX ADMIN — ROSUVASTATIN CALCIUM 10 MG: 5 TABLET, FILM COATED ORAL at 21:14

## 2024-11-29 RX ADMIN — IPRATROPIUM BROMIDE AND ALBUTEROL SULFATE 3 ML: .5; 3 SOLUTION RESPIRATORY (INHALATION) at 07:19

## 2024-11-29 RX ADMIN — MONTELUKAST 10 MG: 10 TABLET, FILM COATED ORAL at 21:15

## 2024-11-29 RX ADMIN — NYSTATIN OINTMENT 1 APPLICATION: 100000 OINTMENT TOPICAL at 11:21

## 2024-11-29 RX ADMIN — ALLOPURINOL 100 MG: 100 TABLET ORAL at 08:11

## 2024-11-29 RX ADMIN — GUAIFENESIN 600 MG: 600 TABLET ORAL at 08:11

## 2024-11-29 RX ADMIN — CLOPIDOGREL BISULFATE 75 MG: 75 TABLET ORAL at 08:11

## 2024-11-29 RX ADMIN — Medication 1000 MG: at 11:20

## 2024-11-29 RX ADMIN — HEPARIN SODIUM 5000 UNITS: 5000 INJECTION INTRAVENOUS; SUBCUTANEOUS at 21:17

## 2024-11-29 RX ADMIN — Medication 1 APPLICATION: at 21:20

## 2024-11-29 RX ADMIN — BUDESONIDE 0.5 MG: 0.5 INHALANT RESPIRATORY (INHALATION) at 19:19

## 2024-11-29 RX ADMIN — ARFORMOTEROL TARTRATE 15 MCG: 15 SOLUTION RESPIRATORY (INHALATION) at 07:19

## 2024-11-29 RX ADMIN — IPRATROPIUM BROMIDE AND ALBUTEROL SULFATE 3 ML: .5; 3 SOLUTION RESPIRATORY (INHALATION) at 01:02

## 2024-11-29 RX ADMIN — NYSTATIN OINTMENT 1 APPLICATION: 100000 OINTMENT TOPICAL at 21:19

## 2024-11-29 RX ADMIN — BUTYROSPERMUM PARKII(SHEA BUTTER), SIMMONDSIA CHINENSIS (JOJOBA) SEED OIL, ALOE BARBADENSIS LEAF EXTRACT 1 CAPSULE: .01; 1; 3.5 LIQUID TOPICAL at 21:15

## 2024-11-29 RX ADMIN — HEPARIN SODIUM 5000 UNITS: 5000 INJECTION INTRAVENOUS; SUBCUTANEOUS at 08:11

## 2024-11-29 RX ADMIN — IPRATROPIUM BROMIDE AND ALBUTEROL SULFATE 3 ML: .5; 3 SOLUTION RESPIRATORY (INHALATION) at 19:19

## 2024-11-29 NOTE — PROGRESS NOTES
[facility]   Hospitalist Progress Note       Patient Name: Yelena Sanchez  : 1935  MRN: [id number]  Primary Care Physician: Kaelyn Eugene MD  Date of admission: 2024  Today's Date: 2024  Room / Bed:   [facility]  Subjective   Chief Complaint: Shortness of breath, confusion     HPI:     Yelena Sanchez is a 89 y.o. female with past medical history of hypertension, COPD, CHF, hypothyroidism, prior TIA, arthritis, and GERD presenting to the ED for evaluation of shortness of breath.  Patient states that for the last 3 to 4 days she has been having worsening shortness of breath to where she was symptomatic even at rest and prior to arrival became confused, tachypnea, and diaphoretic.  She then started a DuoNeb treatment which was ineffective.  Due to concern EMS was called to bring her to the ED for further evaluation.  En route patient was given nebulizer treatment along with Solu-Medrol.  In the ED patient was tachypneic, hypertensive, hypoxic on arrival requiring oxygen supplementation and eventually BiPAP.  Labs showed that she had above baseline proBNP, reduced renal function, and ABG showing hypercapnic respiratory failure with acidosis.  Chest x-ray showed cardiomegaly with vascular congestion and edema.  When seen patient was on BiPAP and stated that she was breathing feeling much better.  She denied any recent fevers, headaches, focal weakness, chest pain, abdominal pain, nausea, vomiting, diarrhea, constipation, dysuria, hematuria, hematochezia, melena, or anxiety.  Patient admitted for further evaluation and treatment.       Interval Followup: 2024    Pleasant lady.  Resting in bed.  Conversational.   at bedside.  Feeling better but not back to baseline  Getting 6-minute walk test this morning.  Tolerating prednisone  Tolerating diuretics (Lasix/Aldactone)      REVIEW OF SYSTEMS:   SOA  Objective   Temp:  [97.5 °F (36.4 °C)-99 °F (37.2 °C)] 97.7 °F (36.5 °C)  Heart  Rate:  [69-90] 75  Resp:  [18-20] 18  BP: (158-187)/(64-96) 159/64  Flow (L/min) (Oxygen Therapy):  [1-2] 2  PHYSICAL EXAM   CON: WN. WD. NAD. Good spirits  NECK:  No stridor.   RESP:  Diminished bilaterally  CV:  Rhythm regular. Rate WNL  GI:  Soft and nontender. Nondistended.    EXT: No cyanosis.  PSYCH:  Alert. Oriented. Normal affect and mood.  NEURO:  No dysarthria or aphasia.   Results from last 7 days   Lab Units 11/29/24 0418 11/28/24 0444 11/27/24  0506 11/25/24  2223   WBC 10*3/mm3 13.15* 15.86* 12.97* 9.72   HEMOGLOBIN g/dL 11.3* 11.5* 11.2* 12.7   HEMATOCRIT % 36.9 37.5 36.0 41.6   PLATELETS 10*3/mm3 242 263 258 296     Results from last 7 days   Lab Units 11/29/24 0418 11/28/24 0443 11/27/24  0506 11/25/24  2223   SODIUM mmol/L 144 141 143 143   POTASSIUM mmol/L 4.2 3.9 4.2 4.0   CO2 mmol/L 23.7 23.2 24.3 24.2   CHLORIDE mmol/L 108* 104 106 105   ANION GAP mmol/L 12.3 13.8 12.7 13.8   BUN mg/dL 70* 60* 56* 38*   CREATININE mg/dL 2.10* 1.79* 1.80* 1.83*   GLUCOSE mg/dL 124* 118* 128* 164*         COMPLEXITY OF DATA / DECISION MAKING     []  Moderate: One acute illness or mild exacerbation of chronic or 2 stable chronic or tx side effects   []  High:  Severe acute illness or severe exacerbation of chronic - potential for major debility / life threatening         I have personally reviewed the results from the time of this admission to 11/29/2024 12:37 EST:  []  Laboratory:  []  Microbiology: []  Radiology:  []  Telemetry:   []  Cardiology/Vascular:  []  Pathology:  []  Prior external records:  []  Independent historian provided additional details:      []  Discussed case with specialists:    []  Independent interpretation of ECG/Imaging etc:             []  Moderate: Rx management, low risk surgery, suboptimal social situation   []  High:  Rx with close monitoring for toxicity, mod-high risk surgery, DNR decision, Comfort initiated, IV pain meds    Assessment / Plan   Assessment:    Acute on chronic  hypoxic/hypercarbic respiratory failure (Dr. Anglin)  Nocturnal hypoxia/nocturnal home O2  Pulmonary edema  Acute exacerbation COPD  Acute exacerbation systolic CHF  Mycoplasma pneumonia  Hx ischemic cardiomyopathy, status post ICD (Dr. Gautam)  CAD  Hx L breast cancer/mastectomy  HTN  Dyslipidemia  GERD  Hypothyroidism  Gout     Plan:    Disposition:  Likely home ~48 hours if continues to improve satisfactorily  6-minute walk test for continuous O2  Continue to ambulate/mobilize as tolerated  Up in chair.  Wean O2 when feasible.  Baseline = nighttime O2 at home.  Pulmonology consulted  Finish doxycycline  Solu-Medrol converted to prednisone.   Continue diuretics: IV Lasix and Aldactone  Bronchodilator nebs scheduled and prn  Will need eval for home NIPPV      Discussed plan with RN.  VTE Prophylaxis:  Pharmacologic VTE prophylaxis orders are present.      CODE STATUS:      Level Of Support Discussed With: Patient  Code Status (Patient has no pulse and is not breathing): CPR (Attempt to Resuscitate)  Medical Interventions (Patient has pulse or is breathing): Full Support           Attending documentation:  I reviewed the above documentation and independently reviewed and rounded and evaluated the patient and discussed the care plan with CHRISTINE Ramirez PA-C, I agree with his findings and plan as documented, what I have added to the care plan and modified is as follows in my documentation and my medical decision making; 89-year-old female with history of CHF, breast cancer, CKD stage IIIb-IV COPD, hypertension, dyslipidemia, ICD biventricular in situ, hyperthyroidism, history of TIAs, hospitalized on 11/26/2024 for chief complaint of shortness of breath and confusion, found to be hypercapnic, pulmonary consulted, patient wore BiPAP, mentation improved, decompensated CHF superimposed with COPD exacerbation, diuresis ordered, found to be mycoplasma IgM positive, on doxycycline, breathing slow to improve.  Interval  follow-up: Patient seen and examined this morning, no acute distress, no acute major overnight events, remains on 2 L nasal cannula oxygen with sats in the 90s, coughing slowly improving.  Creatinine up to 2.1, diuretics held, BUN 70, potassium 4.2, sodium 144, white blood cell count 13,000, hemoglobin 11.3.  Review of systems obtained, all systems reviewed and negative except for for cough, shortness of breath, weakness, fatigue vitals reviewed, labs reviewed on physical exam elderly appearing female laying in bed wearing nasal cannula oxygen, no acute distress, very fine crackles throughout, coarse breath sounds, regular rate rhythm, soft nontender abdomen, alert and oriented x 3.  Assessment as above, acute decompensation of chronic systolic CHF with superimposed COPD exacerbation with hypercapnic and hypoxemic respiratory failure, plan, continue supplemental oxygen to maintain sats greater 92%, hold Lasix, follow-up repeat BNP, continue doxycycline, continue Plavix, continue Brovana Pulmicort nebs twice daily, strict I's and O's, daily weights, continue DuoNebs every 6 hours, reduce steroids to 40 mg p.o. daily, continue Aldactone, restart Bystolic, continue Singulair, continue Synthroid, out of bed to chair, PT/OT, telemetry monitoring, a.m. labs, full code, DVT prophylaxis with heparin, clinical course dictate further management, discussed with nurse at the bedside.  More than 70 % of the time of this patient's encounter was performed by me, this included face-to-face time, planning and coordinating, medical decision making and critical thinking personally done by me.      Electronically signed by Karen Raphael MD, 11/29/2024, 12:37 EST.    Portions of this documentation were transcribed electronically from a voice recognition software.  I confirm all data accurately represents the service(s) I performed at today's visit.

## 2024-11-29 NOTE — PLAN OF CARE
Goal Outcome Evaluation:  Plan of Care Reviewed With: patient, spouse        Progress: improving  Outcome Evaluation: Patient AOx4. Patient had walk test done today, patient will be going home with oxygen, to be worn with exertion. Patient has ambulated halls multiple times with standby assistance and O2. Continuing plan of care.

## 2024-11-29 NOTE — PLAN OF CARE
Goal Outcome Evaluation:  Plan of Care Reviewed With: patient           Outcome Evaluation: Patient wearing 1L nasal cannula. Bipap is at bedsid myles standby. Patient did not wear overnight. Will continue to monitor.

## 2024-11-29 NOTE — DISCHARGE INSTR - APPOINTMENTS
[facility], their number is [phone] if you have questions

## 2024-11-29 NOTE — PLAN OF CARE
Goal Outcome Evaluation:  Plan of Care Reviewed With: patient        Progress: no change  Outcome Evaluation: Patient refused to wear the BiPAP tonight. She is currently on 1L NC.

## 2024-11-29 NOTE — PROGRESS NOTES
Walking Oximetry Progress Note      Patient Name:  Yelena Sanchez  YOB: 1935  Date of Procedure: 11/29/24              ROOM AIR BASELINE   SpO2% 96   Heart Rate 79     EXERCISE ON ROOM AIR SpO2% EXERCISE ON O2 LPM SpO2%   1 MINUTE 84 1 MINUTE 5 PD 98   2 MINUTES  2 MINUTES 5 PD 94   3 MINUTES  3 MINUTES 5 PD 93   4 MINUTES  4 MINUTES 5 PD 93   5 MINUTES  5 MINUTES 5 PD 93   6 MINUTES  6 MINUTES 5 PD 93              Time to Recovery  2 minutes   SpO2% Post Exercise  97 on 2 LPM CF.    HR Post Exercise  80     Comments:           Electronically signed by Donald Houston CRT, 11/29/24, 10:10 AM EST.

## 2024-11-29 NOTE — PROGRESS NOTES
Pulmonary / Critical Care Progress Note      Patient Name: Yelena Sanchez  : 1935  MRN: [id number]  Primary Care Physician:  Kaelyn Eugene MD  Date of admission: 2024    Subjective   Subjective   Follow-up for acute hypoxic and hypercapnic respiratory failure, CHF exacerbation    No acute events overnight.     This morning,  Sitting on side of bed  Currently on 2 L nasal cannula  Overall feeling better  Dyspnea and cough improving  Continues to desat with activity  Creatinine trending up  No fever or chills  No chest pain or hemoptysis    Objective   Objective     Vitals:   Temp:  [97.5 °F (36.4 °C)-99 °F (37.2 °C)] 98.1 °F (36.7 °C)  Heart Rate:  [69-90] 90  Resp:  [18-20] 18  BP: (147-187)/(65-96) 160/96  Flow (L/min) (Oxygen Therapy):  [1-2] 2    Physical Exam   Vital Signs Reviewed   General: Chronically ill-appearing, elderly female, Alert, NAD, sitting on side of bed  Chest:  good aeration, bibasilar crackles to auscultation, no work of breathing noted on 2 L NC  CV: regular rate and rhythm regular  EXT:  no clubbing, no cyanosis, no edema  Neuro:  A&Ox3, moving all 4 extremities spontaneously  Skin: No rashes or lesions noted    Result Review    Result Review:  I have personally reviewed the results from the time of this admission to 2024 09:37 EST and agree with these findings:  [x]  Laboratory  [x]  Microbiology  [x]  Radiology  [x]  EKG/Telemetry   []  Cardiology/Vascular   []  Pathology  []  Old records  []  Other:  Most notable findings include:   proBNP 15,960      Lab 24  0418 24  0444 24  0443 24  0506 24  2223   WBC 13.15* 15.86*  --  12.97* 9.72   HEMOGLOBIN 11.3* 11.5*  --  11.2* 12.7   HEMATOCRIT 36.9 37.5  --  36.0 41.6   PLATELETS 242 263  --  258 296   SODIUM 144  --  141 143 143   POTASSIUM 4.2  --  3.9 4.2 4.0   CHLORIDE 108*  --  104 106 105   CO2 23.7  --  23.2 24.3 24.2   BUN 70*  --  60* 56* 38*   CREATININE 2.10*  --  1.79*  1.80* 1.83*   GLUCOSE 124*  --  118* 128* 164*   CALCIUM 9.7  --  9.8 9.5 9.4   PHOSPHORUS 4.4  --  3.6 3.7  --    TOTAL PROTEIN 6.3  --  6.5 6.5 7.4   ALBUMIN 3.6  --  3.6 3.6 4.3   GLOBULIN  --   --   --   --  3.1   CT Chest Without Contrast Diagnostic    Result Date: 11/26/2024  CT CHEST WO CONTRAST DIAGNOSTIC Date of Exam: 11/26/2024 5:17 PM EST Indication: hypoxia. Comparison: Chest radiograph from November 25, 2024 and CT chest from October 8, 2019 Technique: Axial CT images were obtained of the chest without contrast administration.  Reconstructed coronal and sagittal images were also obtained. Automated exposure control and iterative construction methods were used. Findings: There are changes from left lower lobectomy, and the residual central tracheobronchial tree is clear. There are trace bilateral pleural effusions with mild bibasilar atelectasis. There is no focal consolidation. No discrete pulmonary nodule is identified. A left subclavian pacemaker is in place. The heart is enlarged, with evidence of calcified coronary artery disease. The thoracic aorta appears normal in caliber. The main pulmonary artery is enlarged, suggesting a component of pulmonary arterial hypertension. No abnormally enlarged lymph nodes are identified. There are changes from left mastectomy. Partial evaluation of the upper abdomen demonstrates a right renal cyst. No aggressive osseous lesions are identified.     Impression: Impression: 1.Trace bilateral pleural effusions with mild bibasilar atelectasis. 2.Cardiomegaly. 3.Enlarged main pulmonary artery, suggesting a component of pulmonary arterial hypertension. Electronically Signed: Manuel Anderson MD  11/26/2024 8:37 PM EST  Workstation ID: [id number]     Assessment & Plan   Assessment / Plan     Active Hospital Problems:  Active Hospital Problems    Diagnosis     **Acute on chronic respiratory failure with hypercapnia     Acute on chronic congestive heart failure     Dyspnea      History of breast cancer     COPD with acute exacerbation     HLD (hyperlipidemia)     Essential hypertension     Presence of biventricular implantable cardioverter-defibrillator (ICD)    Impression:  Acute hypoxic and hypercapnic respiratory failure requiring NIPPV  Acute on chronic congestive diastolic heart failure  Acute cardiogenic pulmonary edema  History of lung cancer with left lower lobe lobectomy in 2004  History of left breast cancer 2011 s/p left mastectomy  History of MDS  History of coronary artery disease s/p ICD placed  Never smoker    Plan:  -Continue to wean O2 to maintain SpO2 greater than 90%.  Patient is only on nocturnal oxygen at baseline.  -Repeat CXR  -Continue Brovana, Pulmicort, and DuoNebs  -Micro negative today except for positive for mycoplasma infection.  Procalcitonin and white count normal.  -Continue doxycycline x 7 days for mycoplasma pneumonia  -Continue prednisone 40 mg daily x 5 days  -Lasix switched to 40 mg p.o. daily  -Continue Aldactone 12.5 mg oral daily  -Trend electrolytes and renal panel.  Replace electrolytes as necessary  -Continue bronchopulmonary hygiene.  Encourage I-S and flutter valve  -Encourage mobilization.  Out of bed to chair.  -Follow-up with pulmonology as scheduled on 1/7/2025    VTE Prophylaxis:  Pharmacologic VTE prophylaxis orders are present.      CODE STATUS:   Level Of Support Discussed With: Patient  Code Status (Patient has no pulse and is not breathing): CPR (Attempt to Resuscitate)  Medical Interventions (Patient has pulse or is breathing): Full Support      Electronically signed by LUZ MARIA Walters, 11/29/24, 9:37 AM EST.    This visit was performed by BOTH a physician and an APC. I personally evaluated and examined the patient. I performed all aspects of MDM as documented. , I have reviewed and confirmed the accuracy of the patient's history as documented in this note., and I have reexamined the patient and the results are consistent with  the previously documented exam. I have updated the documentation as necessary.  Electronically signed by Anastacio Rosales MD, 11/29/24, 1:03 PM EST.

## 2024-11-29 NOTE — PROGRESS NOTES
RT EQUIPMENT DEVICE RELATED - SKIN ASSESSMENT    RT Medical Equipment/Device:     NIV Mask:  Full-face    size: s    Skin Assessment:      Cheek:  Intact  Chin:  Intact  Nares:  Intact  Neck:  Intact  Mouth:  Intact    Device Skin Pressure Protection:  Pressure points protected    Nurse Notification:  Vero Martínez, RRT

## 2024-11-29 NOTE — PLAN OF CARE
Goal Outcome Evaluation:      Patient alert and able to make needs known, continues on o2 at 2L per nasal canula, short of breath with exertion, denies pain or discomfort , will continue with plan of care

## 2024-11-29 NOTE — THERAPY TREATMENT NOTE
Acute Care - Physical Therapy Progress Note  SHAYNA Brenner     Patient Name: Yelena Sanchez  : 1935  MRN: [id number]  Today's Date: 2024      Visit Dx:     ICD-10-CM ICD-9-CM   1. Acute respiratory failure with hypoxia and hypercapnia  J96.01 518.81    J96.02    2. Acute on chronic congestive heart failure, unspecified heart failure type  I50.9 428.0   3. Difficulty in walking  R26.2 719.7   4. Chronic combined systolic and diastolic congestive heart failure  I50.42 428.42     428.0   5. COPD with acute exacerbation  J44.1 491.21     Patient Active Problem List   Diagnosis    Iron deficiency anemia    Primary osteoarthritis of left knee    Chronic combined systolic and diastolic congestive heart failure    Presence of biventricular implantable cardioverter-defibrillator (ICD)    Coronary artery disease involving native coronary artery of native heart without angina pectoris    Mixed hyperlipidemia    Essential hypertension    Trochanteric bursitis of right hip    Sciatica of right side    Carotid artery disease    Acute on chronic systolic heart failure    Closed fracture of neck of right humerus    HTN (hypertension)    HLD (hyperlipidemia)    Hypothyroidism    Effusion of right elbow    Renal insufficiency    Musculoskeletal pain    Osteoarthritis of left knee    Fracture    ICD (implantable cardioverter-defibrillator) battery depletion    COPD with acute exacerbation    History of lung cancer    Dyspnea    Need for vaccination with 20-polyvalent pneumococcal conjugate vaccine    Nocturnal hypoxia    MDS (myelodysplastic syndrome)    History of breast cancer    Acute on chronic respiratory failure with hypercapnia    Acute on chronic congestive heart failure     Past Medical History:   Diagnosis Date    Anemia     Arthritis     Asthma     Bicipitoradial bursitis     Breast cancer     CHF (congestive heart failure)     Congestive heart failure     COPD (chronic obstructive pulmonary disease)      Diverticulosis 11/26/2014    GERD (gastroesophageal reflux disease)     History of tobacco abuse     HTN (hypertension)     Hyperlipemia     Hyperthyroidism     ICD (implantable cardioverter-defibrillator), biventricular, in situ     Neck mass     Seasonal allergies     SOB (shortness of breath)     TIA (transient ischemic attack)     YRS AGO, NO RESIDUALS     Past Surgical History:   Procedure Laterality Date    COLONOSCOPY  2014    ENDOSCOPY  2014    EYE SURGERY      Implant; yes     ICD GENERATOR REPLACEMENT N/A 11/14/2024    Procedure: ICD Generator Change - Bi-Ventricular -[employer];  Surgeon: JANETTE Gautam MD;  Location: [facility] CATH INVASIVE LOCATION;  Service: Cardiovascular;  Laterality: N/A;    LUNG LOBECTOMY Left     Left lower lobe    LUNG SURGERY      LUNG SURGERY Left     LEFT UPPER LOBE (2004)    MASTECTOMY      Left     MASTECTOMY Left     OTHER SURGICAL HISTORY      Joint Surgery    PACEMAKER IMPLANTATION       PT Assessment (Last 12 Hours)       PT Evaluation and Treatment       Row Name 11/29/24 1600          Physical Therapy Time and Intention    Subjective Information no complaints  -CS     Document Type therapy note (daily note)  -CS     Mode of Treatment individual therapy;physical therapy  -CS     Patient Effort good  -CS     Symptoms Noted During/After Treatment fatigue  -CS       Row Name 11/29/24 1600          Pain    Pretreatment Pain Rating 0/10 - no pain  -CS     Posttreatment Pain Rating 0/10 - no pain  -CS       Row Name 11/29/24 1600          Bed Mobility    Supine-Sit Mather (Bed Mobility) contact guard  -CS     Assistive Device (Bed Mobility) bed rails  -CS       Row Name 11/29/24 1600          Sit-Stand Transfer    Sit-Stand Mather (Transfers) standby assist  -CS     Assistive Device (Sit-Stand Transfers) walker, front-wheeled  -CS       Row Name 11/29/24 1600          Stand-Sit Transfer    Stand-Sit Mather (Transfers) standby assist  -CS     Assistive  Device (Stand-Sit Transfers) walker, front-wheeled  -CS       Row Name 11/29/24 1600          Gait/Stairs (Locomotion)    [place] Level (Gait) standby assist;1 person to manage equipment  PTA managed O2 tank  -CS     Assistive Device (Gait) walker, front-wheeled  -CS     Distance in Feet (Gait) 400  -CS     Pattern (Gait) 4-point;step-through  -CS     Deviations/Abnormal Patterns (Gait) base of support, wide;gait speed decreased  -CS     Bilateral Gait Deviations forward flexed posture  -CS       Row Name             Wound 11/26/24 0257 Left lower arm    Wound - Properties Group Placement Date: 11/26/24  -PH Placement Time: 0257  -PH Side: Left  -PH Orientation: lower  -PH Location: arm  -PH Present on Original Admission: Y  -PH Additional Comments: Pt currently seeing dermatology for wound  -PH    Retired Wound - Properties Group Placement Date: 11/26/24  -PH Placement Time: 0257  -PH Present on Original Admission: Y  -PH Side: Left  -PH Orientation: lower  -PH Location: arm  -PH Additional Comments: Pt currently seeing dermatology for wound  -PH    Retired Wound - Properties Group Placement Date: 11/26/24  -PH Placement Time: 0257  -PH Present on Original Admission: Y  -PH Side: Left  -PH Orientation: lower  -PH Location: arm  -PH Additional Comments: Pt currently seeing dermatology for wound  -PH    Retired Wound - Properties Group Date first assessed: 11/26/24  -PH Time first assessed: 0257  -PH Present on Original Admission: Y  -PH Side: Left  -PH Location: arm  -PH Additional Comments: Pt currently seeing dermatology for wound  -PH      Row Name             Wound 11/26/24 1220 Right breast MASD (moisture associated skin damage)    Wound - Properties Group Placement Date: 11/26/24  -KE Placement Time: 1220  -KE Side: Right  -KE Location: breast  -KE Primary Wound Type: MASD  -KE Type: MASD (moisture associated skin damage)  -KE    Retired Wound - Properties Group Placement Date: 11/26/24  -KE Placement  Time: 1220  -KE Side: Right  -KE Location: breast  -KE Primary Wound Type: MASD  -KE Type: MASD (moisture associated skin damage)  -KE    Retired Wound - Properties Group Placement Date: 11/26/24  -KE Placement Time: 1220  -KE Side: Right  -KE Location: breast  -KE Primary Wound Type: MASD  -KE Type: MASD (moisture associated skin damage)  -KE    Retired Wound - Properties Group Date first assessed: 11/26/24  -KE Time first assessed: 1220  -KE Side: Right  -KE Location: breast  -KE Primary Wound Type: MASD  -KE Type: MASD (moisture associated skin damage)  -KE      Row Name 11/29/24 1600          Positioning and Restraints    Pre-Treatment Position in bed  -CS     Post Treatment Position chair  -CS     In Chair reclined;call light within reach;encouraged to call for assist;heels elevated;legs elevated  no alarms active upon entering room  -CS       Row Name 11/29/24 1600          Progress Summary (PT)    Progress Toward Functional Goals (PT) progress toward functional goals is good  -CS               User Key  (r) = Recorded By, (t) = Taken By, (c) = Cosigned By      Initials Name Provider Type    Maryam Price, RN Registered Nurse    Erik Maxwell PTA Physical Therapist Assistant    Jackosn Cowan RN Registered Nurse                    Physical Therapy Education       Title: PT OT SLP Therapies (Not Started)       Topic: Physical Therapy (Not Started)       Point: Home exercise program (Not Started)       Learner Progress:  Not documented in this visit.              Point: Body mechanics (Not Started)       Learner Progress:  Not documented in this visit.                                  PT Recommendation and Plan     Progress Summary (PT)  Progress Toward Functional Goals (PT): progress toward functional goals is good   Outcome Measures       Row Name 11/29/24 1600 11/27/24 1200          How much help from another person do you currently need...    Turning from your back to your side while in flat  bed without using bedrails? 4  -CS 3  -TC     Moving from lying on back to sitting on the side of a flat bed without bedrails? 3  -CS 3  -TC     Moving to and from a bed to a chair (including a wheelchair)? 4  -CS 3  -TC     Standing up from a chair using your arms (e.g., wheelchair, bedside chair)? 4  -CS 3  -TC     Climbing 3-5 steps with a railing? 3  -CS 2  -TC     To walk in hospital room? 3  -CS 3  -TC     AM-PAC 6 Clicks Score (PT) 21  -CS 17  -TC        Functional Assessment    Outcome Measure Options AM-PAC 6 Clicks Basic Mobility (PT)  -CS AM-PAC 6 Clicks Basic Mobility (PT)  -TC               User Key  (r) = Recorded By, (t) = Taken By, (c) = Cosigned By      Initials Name Provider Type    CS Erik Elizondo PTA Physical Therapist Assistant    TC Rose Marie Saul, PT Physical Therapist                     Time Calculation:    PT Charges       Row Name 11/29/24 1612             Time Calculation    Start Time 1428  -CS      PT Received On 11/29/24  -CS         Timed Charges    47090 - Gait Training Minutes  17  -CS      06823 - PT Therapeutic Activity Minutes 6  -CS         Total Minutes    Timed Charges Total Minutes 23  -CS       Total Minutes 23  -CS                User Key  (r) = Recorded By, (t) = Taken By, (c) = Cosigned By      Initials Name Provider Type    Erik Maxwell PTA Physical Therapist Assistant                  Therapy Charges for Today       Code Description Service Date Service Provider Modifiers Qty    91927978718 HC GAIT TRAINING EA 15 MIN 11/29/2024 Erik Elizondo PTA GP 1    49048179077 HC PT THERAPEUTIC ACT EA 15 MIN 11/29/2024 Erik Elizondo PTA GP 1            PT G-Codes  Outcome Measure Options: AM-PAC 6 Clicks Basic Mobility (PT)  AM-PAC 6 Clicks Score (PT): 21    Erik lEizondo PTA  11/29/2024

## 2024-11-30 LAB
ALBUMIN SERPL-MCNC: 3.5 G/DL (ref 3.5–5.2)
ALP SERPL-CCNC: 81 U/L (ref 39–117)
ALT SERPL W P-5'-P-CCNC: 21 U/L (ref 1–33)
ANION GAP SERPL CALCULATED.3IONS-SCNC: 13.5 MMOL/L (ref 5–15)
AST SERPL-CCNC: 15 U/L (ref 1–32)
BASOPHILS # BLD AUTO: 0.02 10*3/MM3 (ref 0–0.2)
BASOPHILS NFR BLD AUTO: 0.2 % (ref 0–1.5)
BILIRUB CONJ SERPL-MCNC: 0.1 MG/DL (ref 0–0.3)
BILIRUB INDIRECT SERPL-MCNC: 0.1 MG/DL
BILIRUB SERPL-MCNC: 0.2 MG/DL (ref 0–1.2)
BUN SERPL-MCNC: 65 MG/DL (ref 8–23)
BUN/CREAT SERPL: 38.7 (ref 7–25)
CALCIUM SPEC-SCNC: 9.9 MG/DL (ref 8.6–10.5)
CHLORIDE SERPL-SCNC: 107 MMOL/L (ref 98–107)
CO2 SERPL-SCNC: 21.5 MMOL/L (ref 22–29)
CREAT SERPL-MCNC: 1.68 MG/DL (ref 0.57–1)
DEPRECATED RDW RBC AUTO: 53.7 FL (ref 37–54)
EGFRCR SERPLBLD CKD-EPI 2021: 29.1 ML/MIN/1.73
EOSINOPHIL # BLD AUTO: 0.01 10*3/MM3 (ref 0–0.4)
EOSINOPHIL NFR BLD AUTO: 0.1 % (ref 0.3–6.2)
ERYTHROCYTE [DISTWIDTH] IN BLOOD BY AUTOMATED COUNT: 15.1 % (ref 12.3–15.4)
GLUCOSE SERPL-MCNC: 90 MG/DL (ref 65–99)
HCT VFR BLD AUTO: 38.1 % (ref 34–46.6)
HGB BLD-MCNC: 11.6 G/DL (ref 12–15.9)
IMM GRANULOCYTES # BLD AUTO: 0.1 10*3/MM3 (ref 0–0.05)
IMM GRANULOCYTES NFR BLD AUTO: 0.8 % (ref 0–0.5)
LYMPHOCYTES # BLD AUTO: 0.66 10*3/MM3 (ref 0.7–3.1)
LYMPHOCYTES NFR BLD AUTO: 5.4 % (ref 19.6–45.3)
MAGNESIUM SERPL-MCNC: 2.5 MG/DL (ref 1.6–2.4)
MCH RBC QN AUTO: 29.7 PG (ref 26.6–33)
MCHC RBC AUTO-ENTMCNC: 30.4 G/DL (ref 31.5–35.7)
MCV RBC AUTO: 97.4 FL (ref 79–97)
MONOCYTES # BLD AUTO: 0.73 10*3/MM3 (ref 0.1–0.9)
MONOCYTES NFR BLD AUTO: 6 % (ref 5–12)
NEUTROPHILS NFR BLD AUTO: 10.64 10*3/MM3 (ref 1.7–7)
NEUTROPHILS NFR BLD AUTO: 87.5 % (ref 42.7–76)
NRBC BLD AUTO-RTO: 0 /100 WBC (ref 0–0.2)
PHOSPHATE SERPL-MCNC: 4.3 MG/DL (ref 2.5–4.5)
PLATELET # BLD AUTO: 251 10*3/MM3 (ref 140–450)
PMV BLD AUTO: 10.8 FL (ref 6–12)
POTASSIUM SERPL-SCNC: 4 MMOL/L (ref 3.5–5.2)
PROT SERPL-MCNC: 6.3 G/DL (ref 6–8.5)
RBC # BLD AUTO: 3.91 10*6/MM3 (ref 3.77–5.28)
SODIUM SERPL-SCNC: 142 MMOL/L (ref 136–145)
WBC NRBC COR # BLD AUTO: 12.16 10*3/MM3 (ref 3.4–10.8)

## 2024-11-30 PROCEDURE — 94761 N-INVAS EAR/PLS OXIMETRY MLT: CPT

## 2024-11-30 PROCEDURE — 80048 BASIC METABOLIC PNL TOTAL CA: CPT | Performed by: FAMILY MEDICINE

## 2024-11-30 PROCEDURE — 94664 DEMO&/EVAL PT USE INHALER: CPT

## 2024-11-30 PROCEDURE — 85025 COMPLETE CBC W/AUTO DIFF WBC: CPT | Performed by: FAMILY MEDICINE

## 2024-11-30 PROCEDURE — 99232 SBSQ HOSP IP/OBS MODERATE 35: CPT | Performed by: FAMILY MEDICINE

## 2024-11-30 PROCEDURE — 94799 UNLISTED PULMONARY SVC/PX: CPT

## 2024-11-30 PROCEDURE — 25010000002 HEPARIN (PORCINE) PER 1000 UNITS: Performed by: FAMILY MEDICINE

## 2024-11-30 PROCEDURE — 99232 SBSQ HOSP IP/OBS MODERATE 35: CPT | Performed by: INTERNAL MEDICINE

## 2024-11-30 PROCEDURE — 83735 ASSAY OF MAGNESIUM: CPT | Performed by: FAMILY MEDICINE

## 2024-11-30 PROCEDURE — 84100 ASSAY OF PHOSPHORUS: CPT | Performed by: FAMILY MEDICINE

## 2024-11-30 PROCEDURE — 80076 HEPATIC FUNCTION PANEL: CPT | Performed by: FAMILY MEDICINE

## 2024-11-30 RX ORDER — METOLAZONE 5 MG/1
5 TABLET ORAL ONCE
Status: COMPLETED | OUTPATIENT
Start: 2024-11-30 | End: 2024-11-30

## 2024-11-30 RX ORDER — FUROSEMIDE 40 MG/1
40 TABLET ORAL
Status: DISCONTINUED | OUTPATIENT
Start: 2024-11-30 | End: 2024-12-02

## 2024-11-30 RX ADMIN — Medication 10 ML: at 20:58

## 2024-11-30 RX ADMIN — IPRATROPIUM BROMIDE AND ALBUTEROL SULFATE 3 ML: .5; 3 SOLUTION RESPIRATORY (INHALATION) at 00:19

## 2024-11-30 RX ADMIN — GUAIFENESIN 600 MG: 600 TABLET ORAL at 10:15

## 2024-11-30 RX ADMIN — CLOPIDOGREL BISULFATE 75 MG: 75 TABLET ORAL at 10:15

## 2024-11-30 RX ADMIN — ARFORMOTEROL TARTRATE 15 MCG: 15 SOLUTION RESPIRATORY (INHALATION) at 07:22

## 2024-11-30 RX ADMIN — NYSTATIN OINTMENT 1 APPLICATION: 100000 OINTMENT TOPICAL at 10:17

## 2024-11-30 RX ADMIN — DOXYCYCLINE 100 MG: 100 CAPSULE ORAL at 10:15

## 2024-11-30 RX ADMIN — FUROSEMIDE 40 MG: 40 TABLET ORAL at 17:42

## 2024-11-30 RX ADMIN — BUDESONIDE 0.5 MG: 0.5 INHALANT RESPIRATORY (INHALATION) at 07:22

## 2024-11-30 RX ADMIN — AMLODIPINE BESYLATE 5 MG: 10 TABLET ORAL at 10:15

## 2024-11-30 RX ADMIN — Medication 1 APPLICATION: at 10:18

## 2024-11-30 RX ADMIN — BUTYROSPERMUM PARKII(SHEA BUTTER), SIMMONDSIA CHINENSIS (JOJOBA) SEED OIL, ALOE BARBADENSIS LEAF EXTRACT 1 CAPSULE: .01; 1; 3.5 LIQUID TOPICAL at 20:57

## 2024-11-30 RX ADMIN — HEPARIN SODIUM 5000 UNITS: 5000 INJECTION INTRAVENOUS; SUBCUTANEOUS at 10:22

## 2024-11-30 RX ADMIN — ARFORMOTEROL TARTRATE 15 MCG: 15 SOLUTION RESPIRATORY (INHALATION) at 19:12

## 2024-11-30 RX ADMIN — PANTOPRAZOLE SODIUM 40 MG: 40 TABLET, DELAYED RELEASE ORAL at 05:00

## 2024-11-30 RX ADMIN — IPRATROPIUM BROMIDE AND ALBUTEROL SULFATE 3 ML: .5; 3 SOLUTION RESPIRATORY (INHALATION) at 19:12

## 2024-11-30 RX ADMIN — LEVOTHYROXINE SODIUM 100 MCG: 0.1 TABLET ORAL at 05:00

## 2024-11-30 RX ADMIN — FUROSEMIDE 40 MG: 40 TABLET ORAL at 10:15

## 2024-11-30 RX ADMIN — MONTELUKAST 10 MG: 10 TABLET, FILM COATED ORAL at 20:57

## 2024-11-30 RX ADMIN — HEPARIN SODIUM 5000 UNITS: 5000 INJECTION INTRAVENOUS; SUBCUTANEOUS at 20:56

## 2024-11-30 RX ADMIN — Medication 1000 MG: at 10:17

## 2024-11-30 RX ADMIN — ROSUVASTATIN CALCIUM 10 MG: 5 TABLET, FILM COATED ORAL at 20:57

## 2024-11-30 RX ADMIN — Medication 1000 MG: at 20:57

## 2024-11-30 RX ADMIN — GUAIFENESIN 600 MG: 600 TABLET ORAL at 20:57

## 2024-11-30 RX ADMIN — IPRATROPIUM BROMIDE AND ALBUTEROL SULFATE 3 ML: .5; 3 SOLUTION RESPIRATORY (INHALATION) at 07:22

## 2024-11-30 RX ADMIN — DOXYCYCLINE 100 MG: 100 CAPSULE ORAL at 20:57

## 2024-11-30 RX ADMIN — NEBIVOLOL 10 MG: 10 TABLET ORAL at 10:15

## 2024-11-30 RX ADMIN — IPRATROPIUM BROMIDE AND ALBUTEROL SULFATE 3 ML: .5; 3 SOLUTION RESPIRATORY (INHALATION) at 13:39

## 2024-11-30 RX ADMIN — ALLOPURINOL 100 MG: 100 TABLET ORAL at 10:15

## 2024-11-30 RX ADMIN — Medication 12.5 MG: at 10:16

## 2024-11-30 RX ADMIN — Medication 10 ML: at 10:24

## 2024-11-30 RX ADMIN — Medication 1 APPLICATION: at 21:00

## 2024-11-30 RX ADMIN — NYSTATIN OINTMENT 1 APPLICATION: 100000 OINTMENT TOPICAL at 21:00

## 2024-11-30 RX ADMIN — BUTYROSPERMUM PARKII(SHEA BUTTER), SIMMONDSIA CHINENSIS (JOJOBA) SEED OIL, ALOE BARBADENSIS LEAF EXTRACT 1 CAPSULE: .01; 1; 3.5 LIQUID TOPICAL at 10:16

## 2024-11-30 RX ADMIN — ALLOPURINOL 100 MG: 100 TABLET ORAL at 20:57

## 2024-11-30 RX ADMIN — LACTULOSE 10 G: 10 SOLUTION ORAL at 20:59

## 2024-11-30 RX ADMIN — BUDESONIDE 0.5 MG: 0.5 INHALANT RESPIRATORY (INHALATION) at 19:13

## 2024-11-30 RX ADMIN — METOLAZONE 5 MG: 5 TABLET ORAL at 10:15

## 2024-11-30 NOTE — PROGRESS NOTES
Pulmonary / Critical Care Progress Note      Patient Name: Yelena Sanchez  : 1935  MRN: [id number]  Primary Care Physician:  Kaelyn Eugene MD  Date of admission: 2024    Subjective   Subjective   Follow-up for acute hypoxic and hypercapnic respiratory failure, CHF exacerbation    No acute events overnight.     This morning,  Sitting on side of bed  Family at bedside  Remains on 2 L nasal cannula  Overall feeling better  Dyspnea and cough improving  Scant sputum production  Continues to desat with activity  proBNP trending up  No fever or chills  No chest pain or hemoptysis    Objective   Objective     Vitals:   Temp:  [97.2 °F (36.2 °C)-98.1 °F (36.7 °C)] 97.6 °F (36.4 °C)  Heart Rate:  [70-84] 84  Resp:  [18-20] 20  BP: (153-179)/(78-99) 153/81  Flow (L/min) (Oxygen Therapy):  [2-3] 2    Physical Exam   Vital Signs Reviewed   General: Chronically ill-appearing, elderly female, Alert, NAD, sitting on side of bed  Chest:  good aeration, bibasilar crackles to auscultation, no work of breathing noted on 2 L NC  CV: regular rate and rhythm regular  EXT:  no clubbing, no cyanosis, no edema  Neuro:  A&Ox3, moving all 4 extremities spontaneously  Skin: No rashes or lesions noted    Result Review    Result Review:  I have personally reviewed the results from the time of this admission to 2024 11:34 EST and agree with these findings:  [x]  Laboratory  [x]  Microbiology  [x]  Radiology  [x]  EKG/Telemetry   []  Cardiology/Vascular   []  Pathology  []  Old records  []  Other:  Most notable findings include:   proBNP 15,960 --> 26,929      Lab 24  0529 24  0418 24  0444 24  0443 24  0506 24  2223   WBC 12.16* 13.15* 15.86*  --  12.97* 9.72   HEMOGLOBIN 11.6* 11.3* 11.5*  --  11.2* 12.7   HEMATOCRIT 38.1 36.9 37.5  --  36.0 41.6   PLATELETS 251 242 263  --  258 296   SODIUM 142 144  --  141 143 143   POTASSIUM 4.0 4.2  --  3.9 4.2 4.0   CHLORIDE 107 108*  --   104 106 105   CO2 21.5* 23.7  --  23.2 24.3 24.2   BUN 65* 70*  --  60* 56* 38*   CREATININE 1.68* 2.10*  --  1.79* 1.80* 1.83*   GLUCOSE 90 124*  --  118* 128* 164*   CALCIUM 9.9 9.7  --  9.8 9.5 9.4   PHOSPHORUS 4.3 4.4  --  3.6 3.7  --    TOTAL PROTEIN 6.3 6.3  --  6.5 6.5 7.4   ALBUMIN 3.5 3.6  --  3.6 3.6 4.3   GLOBULIN  --   --   --   --   --  3.1   CT Chest Without Contrast Diagnostic    Result Date: 11/26/2024  CT CHEST WO CONTRAST DIAGNOSTIC Date of Exam: 11/26/2024 5:17 PM EST Indication: hypoxia. Comparison: Chest radiograph from November 25, 2024 and CT chest from October 8, 2019 Technique: Axial CT images were obtained of the chest without contrast administration.  Reconstructed coronal and sagittal images were also obtained. Automated exposure control and iterative construction methods were used. Findings: There are changes from left lower lobectomy, and the residual central tracheobronchial tree is clear. There are trace bilateral pleural effusions with mild bibasilar atelectasis. There is no focal consolidation. No discrete pulmonary nodule is identified. A left subclavian pacemaker is in place. The heart is enlarged, with evidence of calcified coronary artery disease. The thoracic aorta appears normal in caliber. The main pulmonary artery is enlarged, suggesting a component of pulmonary arterial hypertension. No abnormally enlarged lymph nodes are identified. There are changes from left mastectomy. Partial evaluation of the upper abdomen demonstrates a right renal cyst. No aggressive osseous lesions are identified.     Impression: Impression: 1.Trace bilateral pleural effusions with mild bibasilar atelectasis. 2.Cardiomegaly. 3.Enlarged main pulmonary artery, suggesting a component of pulmonary arterial hypertension. Electronically Signed: Manuel Anderson MD  11/26/2024 8:37 PM EST  Workstation ID: [id number]     Assessment & Plan   Assessment / Plan     Active Hospital Problems:  Active Hospital  Problems    Diagnosis     **Acute on chronic respiratory failure with hypercapnia     Acute on chronic congestive heart failure     Dyspnea     History of breast cancer     COPD with acute exacerbation     HLD (hyperlipidemia)     Essential hypertension     Presence of biventricular implantable cardioverter-defibrillator (ICD)    Impression:  Acute hypoxic and hypercapnic respiratory failure requiring NIPPV  Acute on chronic congestive diastolic heart failure  Acute cardiogenic pulmonary edema  History of lung cancer with left lower lobe lobectomy in 2004  History of left breast cancer 2011 s/p left mastectomy  History of MDS  History of coronary artery disease s/p ICD placed  Never smoker    Plan:  -Continue to wean O2 to maintain SpO2 greater than 90%.  Patient is only on nocturnal oxygen at baseline.  -Completed 6 MWT requiring 5 L per nasal cannula with activity  -Repeat CXR with persistent pulmonary vascular congestion with improving interstitial edema, small left pleural effusion  -Continue Brovana, Pulmicort, and DuoNebs  -Micro negative today except for positive for mycoplasma infection.  Procalcitonin and white count normal.  -Continue doxycycline x 7 days for mycoplasma pneumonia  -Completed prednisone x 5 days  -Continue Lasix 40 mg p.o. daily.  Agree with metolazone 5 mg x 1.  -Continue Aldactone 12.5 mg oral daily  -Trend electrolytes and renal panel.  Replace electrolytes as necessary  -Continue bronchopulmonary hygiene.  Encourage I-S and flutter valve  -Encourage mobilization.  Out of bed to chair.  -Follow-up with pulmonology as scheduled on 1/7/2025    VTE Prophylaxis:  Pharmacologic VTE prophylaxis orders are present.      CODE STATUS:   Level Of Support Discussed With: Patient  Code Status (Patient has no pulse and is not breathing): CPR (Attempt to Resuscitate)  Medical Interventions (Patient has pulse or is breathing): Full Support    Electronically signed by LUZ MARIA Walters, 11/30/24,  11:34 AM EST.    This visit was performed by BOTH a physician and an APC. I personally evaluated and examined the patient. I performed all aspects of MDM as documented. , I have reviewed and confirmed the accuracy of the patient's history as documented in this note., and I have reexamined the patient and the results are consistent with the previously documented exam. I have updated the documentation as necessary.    Electronically signed by Anastacio Rosales MD, 11/30/24, 1:45 PM EST.

## 2024-11-30 NOTE — PROGRESS NOTES
RT EQUIPMENT DEVICE RELATED - SKIN ASSESSMENT    RT Medical Equipment/Device:     NIV Mask:  Full-face    size: S    Skin Assessment:      Cheek:  Intact  Chin:  Intact  Forehead:  Intact  Nose:  Intact    Device Skin Pressure Protection:  Positioning supports utilized    Nurse Notification:  Vero Land, RRT

## 2024-11-30 NOTE — PLAN OF CARE
Problem: Adult Inpatient Plan of Care  Goal: Plan of Care Review  Outcome: Progressing  Flowsheets (Taken 11/30/2024 7892)  Outcome Evaluation: Patient alert and oriented x 4. Patient remains on 2 L NC. No complaints of pain or discomfort at this time. Continuing with plan of care.  Plan of Care Reviewed With: patient   Goal Outcome Evaluation:  Plan of Care Reviewed With: patient        Progress: no change  Outcome Evaluation: Patient alert and oriented x 4. Patient remains on 2 L NC. No complaints of pain or discomfort at this time. Continuing with plan of care.

## 2024-11-30 NOTE — PLAN OF CARE
Goal Outcome Evaluation:         Patient was asked if she wanted to wear the bipap and she stated she did not. She is currently on 2L nasal cannula and in no distress.

## 2024-11-30 NOTE — PLAN OF CARE
Goal Outcome Evaluation:  Plan of Care Reviewed With: patient        Progress: improving  Outcome Evaluation: Patient A&Ox4. Patient on 2L NC. Denied Bipap at night. Ambulates with standby assist. Continue plan of care.

## 2024-11-30 NOTE — PROGRESS NOTES
[facility]   Hospitalist Progress Note       Patient Name: Yelena Sanchez  : 1935  MRN: [id number]  Primary Care Physician: Kaelyn Eugene MD  Date of admission: 2024  Today's Date: 2024  Room / Bed:   [facility]  Subjective   Chief Complaint: Shortness of breath, confusion     HPI:     Yelena Sanchez is a 89 y.o. female with past medical history of hypertension, COPD, CHF, hypothyroidism, prior TIA, arthritis, and GERD presenting to the ED for evaluation of shortness of breath.  Patient states that for the last 3 to 4 days she has been having worsening shortness of breath to where she was symptomatic even at rest and prior to arrival became confused, tachypnea, and diaphoretic.  She then started a DuoNeb treatment which was ineffective.  Due to concern EMS was called to bring her to the ED for further evaluation.  En route patient was given nebulizer treatment along with Solu-Medrol.  In the ED patient was tachypneic, hypertensive, hypoxic on arrival requiring oxygen supplementation and eventually BiPAP.  Labs showed that she had above baseline proBNP, reduced renal function, and ABG showing hypercapnic respiratory failure with acidosis.  Chest x-ray showed cardiomegaly with vascular congestion and edema.  When seen patient was on BiPAP and stated that she was breathing feeling much better.  She denied any recent fevers, headaches, focal weakness, chest pain, abdominal pain, nausea, vomiting, diarrhea, constipation, dysuria, hematuria, hematochezia, melena, or anxiety.  Patient admitted for further evaluation and treatment.       Interval Followup: 2024    Pleasant lady.  Resting in bed.  Conversational.    Feeling better but not back to baseline  6-minute walk test results noted.  Will need home O2.  Tolerating prednisone  BNP significantly elevated 27,000 (up from 16,000)  A.m. creatinine 1.68 baseline range    REVIEW OF SYSTEMS:   SOA  Objective   Temp:  [97.2 °F (36.2  °C)-98.1 °F (36.7 °C)] 97.6 °F (36.4 °C)  Heart Rate:  [70-84] 84  Resp:  [18-20] 20  BP: (153-179)/(64-99) 153/81  Flow (L/min) (Oxygen Therapy):  [2-3] 2  PHYSICAL EXAM   CON: WN. WD. NAD. Good spirits  NECK:  No stridor.   RESP:  Diminished bilaterally  CV:  Rhythm regular. Rate WNL  GI:  Soft and nontender. Nondistended.    EXT: No cyanosis.  PSYCH:  Alert. Oriented. Normal affect and mood.  NEURO:  No dysarthria or aphasia.   Results from last 7 days   Lab Units 11/30/24  0529 11/29/24  0418 11/28/24  0444 11/27/24  0506 11/25/24  2223   WBC 10*3/mm3 12.16* 13.15* 15.86* 12.97* 9.72   HEMOGLOBIN g/dL 11.6* 11.3* 11.5* 11.2* 12.7   HEMATOCRIT % 38.1 36.9 37.5 36.0 41.6   PLATELETS 10*3/mm3 251 242 263 258 296     Results from last 7 days   Lab Units 11/30/24  0529 11/29/24  0418 11/28/24  0443 11/27/24  0506 11/25/24  2223   SODIUM mmol/L 142 144 141 143 143   POTASSIUM mmol/L 4.0 4.2 3.9 4.2 4.0   CO2 mmol/L 21.5* 23.7 23.2 24.3 24.2   CHLORIDE mmol/L 107 108* 104 106 105   ANION GAP mmol/L 13.5 12.3 13.8 12.7 13.8   BUN mg/dL 65* 70* 60* 56* 38*   CREATININE mg/dL 1.68* 2.10* 1.79* 1.80* 1.83*   GLUCOSE mg/dL 90 124* 118* 128* 164*         COMPLEXITY OF DATA / DECISION MAKING     []  Moderate: One acute illness or mild exacerbation of chronic or 2 stable chronic or tx side effects   []  High:  Severe acute illness or severe exacerbation of chronic - potential for major debility / life threatening         I have personally reviewed the results from the time of this admission to 11/30/2024 10:39 EST:  []  Laboratory:  []  Microbiology: []  Radiology:  []  Telemetry:   []  Cardiology/Vascular:  []  Pathology:  []  Prior external records:  []  Independent historian provided additional details:      []  Discussed case with specialists:    []  Independent interpretation of ECG/Imaging etc:             []  Moderate: Rx management, low risk surgery, suboptimal social situation   []  High:  Rx with close monitoring for  toxicity, mod-high risk surgery, DNR decision, Comfort initiated, IV pain meds    Assessment / Plan   Assessment:    Acute on chronic hypoxic/hypercarbic respiratory failure (Dr. Anglin)  Nocturnal hypoxia/nocturnal home O2  Pulmonary edema  Acute exacerbation COPD  Acute exacerbation systolic CHF  Mycoplasma pneumonia  Hx ischemic cardiomyopathy, status post ICD (Dr. Gautam)  CAD  Hx L breast cancer/mastectomy  HTN  Dyslipidemia  GERD  Hypothyroidism  Gout     Plan:    Adjust Lasix 40 p.o. BID, spironolactone 12.5  Steroids/prednisone completed  Disposition:  Likely home 48 hours if continues to improve  6-minute walk test positive; requires home continuous O2  Continue to ambulate/mobilize as tolerated  Up in chair.  Wean O2 when feasible.  Baseline = nighttime O2 at home.  Pulmonology consulted  Finish doxycycline  Bronchodilator nebs scheduled and prn  Will need eval for home NIPPV      Discussed plan with RN.  VTE Prophylaxis:  Pharmacologic VTE prophylaxis orders are present.      CODE STATUS:      Level Of Support Discussed With: Patient  Code Status (Patient has no pulse and is not breathing): CPR (Attempt to Resuscitate)  Medical Interventions (Patient has pulse or is breathing): Full Support           Attending documentation:  I reviewed the above documentation and independently reviewed and rounded and evaluated the patient and discussed the care plan with CHRISTINE Ramirez PA-C, I agree with his findings and plan as documented, what I have added to the care plan and modified is as follows in my documentation and my medical decision making; 89-year-old female with history of CHF, breast cancer, CKD stage IIIb-IV COPD, hypertension, dyslipidemia, ICD biventricular in situ, hyperthyroidism, history of TIAs, hospitalized on 11/26/2024 for chief complaint of shortness of breath and confusion, found to be hypercapnic, pulmonary consulted, patient wore BiPAP, mentation improved, decompensated CHF superimposed with  COPD exacerbation, diuresis ordered, found to be mycoplasma IgM positive, on doxycycline, breathing slow to improve.  Working on diuresis while watching renal function.  Interval follow-up: Patient seen and examined this morning, no acute distress, no acute major overnight events, remains on 2 L nasal cannula oxygen with sats in the 90s, BNP markedly elevated despite diuresis, 26,000, creatinine down to 1.6, BUN 65, bicarb 21.5, potassium 4.0, sodium 142, white blood cell count 12,000.  Chest x-ray shows persistent vascular congestion.  Doubling diuretics today review of systems obtained, all systems reviewed and negative except for for cough, shortness of breath, weakness, fatigue vitals reviewed, labs reviewed on physical exam elderly appearing female laying in bed wearing nasal cannula oxygen, no acute distress, very fine crackles throughout, coarse breath sounds, regular rate rhythm, soft nontender abdomen, alert and oriented x 3.  Assessment as above, acute decompensation of chronic systolic CHF with superimposed COPD exacerbation with hypercapnic and hypoxemic respiratory failure, plan, continue supplemental oxygen to maintain sats greater 92%, resume Lasix 40 mg IV twice daily with additional metolazone 5 mg today, trending creatinine and electrolytes, anticipate some increase in creatinine, continue doxycycline to complete 7 days, continue Plavix, continue Brovana Pulmicort nebs twice daily, strict I's and O's, daily weights, continue DuoNebs every 6 hours, reduce steroids to 40 mg p.o. daily, continue Aldactone, restart Bystolic, continue Singulair, continue Synthroid, out of bed to chair, PT/OT, telemetry monitoring, a.m. labs, full code, DVT prophylaxis with heparin, clinical course dictate further management, discussed with nurse at the bedside.  More than 65 % of the time of this patient's encounter was performed by me, this included face-to-face time, planning and coordinating, medical decision making  and critical thinking personally done by me.    Electronically signed by Karen Raphael MD, 11/30/2024, 10:39 EST.    Portions of this documentation were transcribed electronically from a voice recognition software.  I confirm all data accurately represents the service(s) I performed at today's visit.

## 2024-12-01 LAB
ALBUMIN SERPL-MCNC: 3.6 G/DL (ref 3.5–5.2)
ALP SERPL-CCNC: 82 U/L (ref 39–117)
ALT SERPL W P-5'-P-CCNC: 20 U/L (ref 1–33)
ANION GAP SERPL CALCULATED.3IONS-SCNC: 13.7 MMOL/L (ref 5–15)
AST SERPL-CCNC: 13 U/L (ref 1–32)
BASOPHILS # BLD AUTO: 0.02 10*3/MM3 (ref 0–0.2)
BASOPHILS NFR BLD AUTO: 0.2 % (ref 0–1.5)
BILIRUB CONJ SERPL-MCNC: 0.1 MG/DL (ref 0–0.3)
BILIRUB INDIRECT SERPL-MCNC: 0.3 MG/DL
BILIRUB SERPL-MCNC: 0.4 MG/DL (ref 0–1.2)
BUN SERPL-MCNC: 73 MG/DL (ref 8–23)
BUN/CREAT SERPL: 35.1 (ref 7–25)
CALCIUM SPEC-SCNC: 10.7 MG/DL (ref 8.6–10.5)
CHLORIDE SERPL-SCNC: 101 MMOL/L (ref 98–107)
CO2 SERPL-SCNC: 27.3 MMOL/L (ref 22–29)
CREAT SERPL-MCNC: 2.08 MG/DL (ref 0.57–1)
DEPRECATED RDW RBC AUTO: 55.2 FL (ref 37–54)
EGFRCR SERPLBLD CKD-EPI 2021: 22.5 ML/MIN/1.73
EOSINOPHIL # BLD AUTO: 0.08 10*3/MM3 (ref 0–0.4)
EOSINOPHIL NFR BLD AUTO: 0.7 % (ref 0.3–6.2)
ERYTHROCYTE [DISTWIDTH] IN BLOOD BY AUTOMATED COUNT: 15.2 % (ref 12.3–15.4)
GLUCOSE SERPL-MCNC: 93 MG/DL (ref 65–99)
HCT VFR BLD AUTO: 42.1 % (ref 34–46.6)
HGB BLD-MCNC: 12.7 G/DL (ref 12–15.9)
IMM GRANULOCYTES # BLD AUTO: 0.18 10*3/MM3 (ref 0–0.05)
IMM GRANULOCYTES NFR BLD AUTO: 1.7 % (ref 0–0.5)
LYMPHOCYTES # BLD AUTO: 0.81 10*3/MM3 (ref 0.7–3.1)
LYMPHOCYTES NFR BLD AUTO: 7.5 % (ref 19.6–45.3)
MAGNESIUM SERPL-MCNC: 2.3 MG/DL (ref 1.6–2.4)
MCH RBC QN AUTO: 29.8 PG (ref 26.6–33)
MCHC RBC AUTO-ENTMCNC: 30.2 G/DL (ref 31.5–35.7)
MCV RBC AUTO: 98.8 FL (ref 79–97)
MONOCYTES # BLD AUTO: 1.26 10*3/MM3 (ref 0.1–0.9)
MONOCYTES NFR BLD AUTO: 11.7 % (ref 5–12)
NEUTROPHILS NFR BLD AUTO: 78.2 % (ref 42.7–76)
NEUTROPHILS NFR BLD AUTO: 8.45 10*3/MM3 (ref 1.7–7)
NRBC BLD AUTO-RTO: 0 /100 WBC (ref 0–0.2)
PHOSPHATE SERPL-MCNC: 4.4 MG/DL (ref 2.5–4.5)
PLATELET # BLD AUTO: 233 10*3/MM3 (ref 140–450)
PMV BLD AUTO: 11.4 FL (ref 6–12)
POTASSIUM SERPL-SCNC: 3.9 MMOL/L (ref 3.5–5.2)
PROT SERPL-MCNC: 6.6 G/DL (ref 6–8.5)
RBC # BLD AUTO: 4.26 10*6/MM3 (ref 3.77–5.28)
SODIUM SERPL-SCNC: 142 MMOL/L (ref 136–145)
WBC NRBC COR # BLD AUTO: 10.8 10*3/MM3 (ref 3.4–10.8)

## 2024-12-01 PROCEDURE — 94799 UNLISTED PULMONARY SVC/PX: CPT

## 2024-12-01 PROCEDURE — 94664 DEMO&/EVAL PT USE INHALER: CPT

## 2024-12-01 PROCEDURE — 83735 ASSAY OF MAGNESIUM: CPT | Performed by: FAMILY MEDICINE

## 2024-12-01 PROCEDURE — 25010000002 HEPARIN (PORCINE) PER 1000 UNITS: Performed by: FAMILY MEDICINE

## 2024-12-01 PROCEDURE — 84100 ASSAY OF PHOSPHORUS: CPT | Performed by: FAMILY MEDICINE

## 2024-12-01 PROCEDURE — 97530 THERAPEUTIC ACTIVITIES: CPT

## 2024-12-01 PROCEDURE — 85025 COMPLETE CBC W/AUTO DIFF WBC: CPT | Performed by: FAMILY MEDICINE

## 2024-12-01 PROCEDURE — 80048 BASIC METABOLIC PNL TOTAL CA: CPT | Performed by: FAMILY MEDICINE

## 2024-12-01 PROCEDURE — 80076 HEPATIC FUNCTION PANEL: CPT | Performed by: FAMILY MEDICINE

## 2024-12-01 PROCEDURE — 97116 GAIT TRAINING THERAPY: CPT

## 2024-12-01 PROCEDURE — 99232 SBSQ HOSP IP/OBS MODERATE 35: CPT | Performed by: FAMILY MEDICINE

## 2024-12-01 PROCEDURE — 99232 SBSQ HOSP IP/OBS MODERATE 35: CPT | Performed by: INTERNAL MEDICINE

## 2024-12-01 RX ADMIN — Medication 1 APPLICATION: at 09:28

## 2024-12-01 RX ADMIN — IPRATROPIUM BROMIDE AND ALBUTEROL SULFATE 3 ML: .5; 3 SOLUTION RESPIRATORY (INHALATION) at 13:06

## 2024-12-01 RX ADMIN — Medication 12.5 MG: at 10:11

## 2024-12-01 RX ADMIN — Medication 1000 MG: at 09:19

## 2024-12-01 RX ADMIN — NYSTATIN OINTMENT 1 APPLICATION: 100000 OINTMENT TOPICAL at 09:27

## 2024-12-01 RX ADMIN — ROSUVASTATIN CALCIUM 10 MG: 5 TABLET, FILM COATED ORAL at 21:34

## 2024-12-01 RX ADMIN — Medication 1 APPLICATION: at 21:34

## 2024-12-01 RX ADMIN — IPRATROPIUM BROMIDE AND ALBUTEROL SULFATE 3 ML: .5; 3 SOLUTION RESPIRATORY (INHALATION) at 20:08

## 2024-12-01 RX ADMIN — GUAIFENESIN 600 MG: 600 TABLET ORAL at 09:18

## 2024-12-01 RX ADMIN — BUTYROSPERMUM PARKII(SHEA BUTTER), SIMMONDSIA CHINENSIS (JOJOBA) SEED OIL, ALOE BARBADENSIS LEAF EXTRACT 1 CAPSULE: .01; 1; 3.5 LIQUID TOPICAL at 21:35

## 2024-12-01 RX ADMIN — ARFORMOTEROL TARTRATE 15 MCG: 15 SOLUTION RESPIRATORY (INHALATION) at 20:08

## 2024-12-01 RX ADMIN — GUAIFENESIN 600 MG: 600 TABLET ORAL at 21:34

## 2024-12-01 RX ADMIN — NYSTATIN OINTMENT 1 APPLICATION: 100000 OINTMENT TOPICAL at 21:36

## 2024-12-01 RX ADMIN — BUTYROSPERMUM PARKII(SHEA BUTTER), SIMMONDSIA CHINENSIS (JOJOBA) SEED OIL, ALOE BARBADENSIS LEAF EXTRACT 1 CAPSULE: .01; 1; 3.5 LIQUID TOPICAL at 09:27

## 2024-12-01 RX ADMIN — BUDESONIDE 0.5 MG: 0.5 INHALANT RESPIRATORY (INHALATION) at 20:08

## 2024-12-01 RX ADMIN — DOXYCYCLINE 100 MG: 100 CAPSULE ORAL at 21:34

## 2024-12-01 RX ADMIN — IPRATROPIUM BROMIDE AND ALBUTEROL SULFATE 3 ML: .5; 3 SOLUTION RESPIRATORY (INHALATION) at 06:36

## 2024-12-01 RX ADMIN — CLOPIDOGREL BISULFATE 75 MG: 75 TABLET ORAL at 09:18

## 2024-12-01 RX ADMIN — IPRATROPIUM BROMIDE AND ALBUTEROL SULFATE 3 ML: .5; 3 SOLUTION RESPIRATORY (INHALATION) at 00:14

## 2024-12-01 RX ADMIN — FUROSEMIDE 40 MG: 40 TABLET ORAL at 18:47

## 2024-12-01 RX ADMIN — ALLOPURINOL 100 MG: 100 TABLET ORAL at 21:34

## 2024-12-01 RX ADMIN — NEBIVOLOL 10 MG: 10 TABLET ORAL at 10:10

## 2024-12-01 RX ADMIN — PANTOPRAZOLE SODIUM 40 MG: 40 TABLET, DELAYED RELEASE ORAL at 05:01

## 2024-12-01 RX ADMIN — Medication 10 ML: at 21:36

## 2024-12-01 RX ADMIN — ARFORMOTEROL TARTRATE 15 MCG: 15 SOLUTION RESPIRATORY (INHALATION) at 06:36

## 2024-12-01 RX ADMIN — MONTELUKAST 10 MG: 10 TABLET, FILM COATED ORAL at 21:35

## 2024-12-01 RX ADMIN — AMLODIPINE BESYLATE 5 MG: 10 TABLET ORAL at 09:18

## 2024-12-01 RX ADMIN — ALLOPURINOL 100 MG: 100 TABLET ORAL at 09:18

## 2024-12-01 RX ADMIN — Medication: at 21:35

## 2024-12-01 RX ADMIN — HEPARIN SODIUM 5000 UNITS: 5000 INJECTION INTRAVENOUS; SUBCUTANEOUS at 21:34

## 2024-12-01 RX ADMIN — FUROSEMIDE 40 MG: 40 TABLET ORAL at 09:18

## 2024-12-01 RX ADMIN — BUDESONIDE 0.5 MG: 0.5 INHALANT RESPIRATORY (INHALATION) at 06:36

## 2024-12-01 RX ADMIN — Medication 10 ML: at 09:28

## 2024-12-01 RX ADMIN — HEPARIN SODIUM 5000 UNITS: 5000 INJECTION INTRAVENOUS; SUBCUTANEOUS at 09:17

## 2024-12-01 RX ADMIN — DOXYCYCLINE 100 MG: 100 CAPSULE ORAL at 09:18

## 2024-12-01 RX ADMIN — Medication 1000 MG: at 21:35

## 2024-12-01 RX ADMIN — LEVOTHYROXINE SODIUM 100 MCG: 0.1 TABLET ORAL at 05:01

## 2024-12-01 NOTE — PROGRESS NOTES
Pulmonary / Critical Care Progress Note      Patient Name: Yelena Sanchez  : 1935  MRN: [id number]  Primary Care Physician:  Kaelyn Eugene MD  Date of admission: 2024    Subjective   Subjective   Follow-up for acute hypoxic and hypercapnic respiratory failure, CHF exacerbation    No acute events overnight.     This morning,  Resting in bed  Remains on 2 L nasal cannula  Overall feeling better  Dyspnea and cough improving  Scant sputum production  Continues to desat with activity  Creatinine trending up  1.4 L UOP/24 hours  No fever or chills      Objective   Objective     Vitals:   Temp:  [97.3 °F (36.3 °C)-98.2 °F (36.8 °C)] 97.3 °F (36.3 °C)  Heart Rate:  [69-91] 83  Resp:  [16-20] 18  BP: (114-155)/(66-81) 114/71  Flow (L/min) (Oxygen Therapy):  [2] 2    Physical Exam   Vital Signs Reviewed   General: Chronically ill-appearing, elderly female, Alert, NAD, resting in bed  Chest:  good aeration, bibasilar crackles to auscultation, no work of breathing noted on 2 L NC  CV: regular rate and rhythm regular  EXT:  no clubbing, no cyanosis, no edema  Neuro:  A&Ox3, moving all 4 extremities spontaneously  Skin: No rashes or lesions noted    Result Review    Result Review:  I have personally reviewed the results from the time of this admission to 2024 12:31 EST and agree with these findings:  [x]  Laboratory  [x]  Microbiology  [x]  Radiology  [x]  EKG/Telemetry   []  Cardiology/Vascular   []  Pathology  []  Old records  []  Other:  Most notable findings include:   proBNP 15,960 --> 26,929      Lab 24  0811 24  0525 24  0529 24  0418 24  0444 24  0443 24  0506 24  2223   WBC  --  10.80 12.16* 13.15* 15.86*  --  12.97* 9.72   HEMOGLOBIN  --  12.7 11.6* 11.3* 11.5*  --  11.2* 12.7   HEMATOCRIT  --  42.1 38.1 36.9 37.5  --  36.0 41.6   PLATELETS  --  233 251 242 263  --  258 296   SODIUM 142  --  142 144  --  141 143 143   POTASSIUM 3.9  --  4.0  4.2  --  3.9 4.2 4.0   CHLORIDE 101  --  107 108*  --  104 106 105   CO2 27.3  --  21.5* 23.7  --  23.2 24.3 24.2   BUN 73*  --  65* 70*  --  60* 56* 38*   CREATININE 2.08*  --  1.68* 2.10*  --  1.79* 1.80* 1.83*   GLUCOSE 93  --  90 124*  --  118* 128* 164*   CALCIUM 10.7*  --  9.9 9.7  --  9.8 9.5 9.4   PHOSPHORUS  --  4.4 4.3 4.4  --  3.6 3.7  --    TOTAL PROTEIN 6.6  --  6.3 6.3  --  6.5 6.5 7.4   ALBUMIN 3.6  --  3.5 3.6  --  3.6 3.6 4.3   GLOBULIN  --   --   --   --   --   --   --  3.1   CT Chest Without Contrast Diagnostic    Result Date: 11/26/2024  CT CHEST WO CONTRAST DIAGNOSTIC Date of Exam: 11/26/2024 5:17 PM EST Indication: hypoxia. Comparison: Chest radiograph from November 25, 2024 and CT chest from October 8, 2019 Technique: Axial CT images were obtained of the chest without contrast administration.  Reconstructed coronal and sagittal images were also obtained. Automated exposure control and iterative construction methods were used. Findings: There are changes from left lower lobectomy, and the residual central tracheobronchial tree is clear. There are trace bilateral pleural effusions with mild bibasilar atelectasis. There is no focal consolidation. No discrete pulmonary nodule is identified. A left subclavian pacemaker is in place. The heart is enlarged, with evidence of calcified coronary artery disease. The thoracic aorta appears normal in caliber. The main pulmonary artery is enlarged, suggesting a component of pulmonary arterial hypertension. No abnormally enlarged lymph nodes are identified. There are changes from left mastectomy. Partial evaluation of the upper abdomen demonstrates a right renal cyst. No aggressive osseous lesions are identified.     Impression: Impression: 1.Trace bilateral pleural effusions with mild bibasilar atelectasis. 2.Cardiomegaly. 3.Enlarged main pulmonary artery, suggesting a component of pulmonary arterial hypertension. Electronically Signed: Manuel Anderson MD   11/26/2024 8:37 PM EST  Workstation ID: [id number]     Assessment & Plan   Assessment / Plan     Active Hospital Problems:  Active Hospital Problems    Diagnosis     **Acute on chronic respiratory failure with hypercapnia     Acute on chronic congestive heart failure     Dyspnea     History of breast cancer     COPD with acute exacerbation     HLD (hyperlipidemia)     Essential hypertension     Presence of biventricular implantable cardioverter-defibrillator (ICD)    Impression:  Acute hypoxic and hypercapnic respiratory failure requiring NIPPV  Acute on chronic congestive diastolic heart failure  Acute cardiogenic pulmonary edema  History of lung cancer with left lower lobe lobectomy in 2004  History of left breast cancer 2011 s/p left mastectomy  History of MDS  History of coronary artery disease s/p ICD placed  Never smoker    Plan:  -Continue to wean O2 to maintain SpO2 greater than 90%.  Patient is only on nocturnal oxygen at baseline.  -Completed 6 MWT requiring 5 L per nasal cannula with activity  -11/29 repeat CXR with persistent pulmonary vascular congestion with improving interstitial edema, small left pleural effusion  -Continue Brovana, Pulmicort, and DuoNebs  -Micro negative today except for positive for mycoplasma infection.  Procalcitonin and white count normal.  -Continue doxycycline x 7 days for mycoplasma pneumonia  -Completed prednisone x 5 days  -Continue Lasix 40 mg p.o. daily and Aldactone 12.5 mg oral daily  -Trend electrolytes and renal panel.  Replace electrolytes as necessary  -Continue bronchopulmonary hygiene.  Encourage I-S and flutter valve  -Encourage mobilization.  Out of bed to chair.  -Follow-up with pulmonology as scheduled on 1/7/2025    VTE Prophylaxis:  Pharmacologic VTE prophylaxis orders are present.      CODE STATUS:   Level Of Support Discussed With: Patient  Code Status (Patient has no pulse and is not breathing): CPR (Attempt to Resuscitate)  Medical Interventions (Patient  has pulse or is breathing): Full Support    Electronically signed by LUZ MARIA Walters, 12/01/24, 12:31 PM EST.    This visit was performed by BOTH a physician and an APC. I personally evaluated and examined the patient. I performed all aspects of MDM as documented. , I have reviewed and confirmed the accuracy of the patient's history as documented in this note., and I have reexamined the patient and the results are consistent with the previously documented exam. I have updated the documentation as necessary.  Electronically signed by Anastacio Rosales MD, 12/01/24, 1:23 PM EST.

## 2024-12-01 NOTE — THERAPY TREATMENT NOTE
Acute Care - Physical Therapy Progress Note  SHAYNA Brenner     Patient Name: Yelena Sanchez  : 1935  MRN: [id number]  Today's Date: 2024      Visit Dx:     ICD-10-CM ICD-9-CM   1. Acute respiratory failure with hypoxia and hypercapnia  J96.01 518.81    J96.02    2. Acute on chronic congestive heart failure, unspecified heart failure type  I50.9 428.0   3. Difficulty in walking  R26.2 719.7   4. Chronic combined systolic and diastolic congestive heart failure  I50.42 428.42     428.0   5. COPD with acute exacerbation  J44.1 491.21     Patient Active Problem List   Diagnosis    Iron deficiency anemia    Primary osteoarthritis of left knee    Chronic combined systolic and diastolic congestive heart failure    Presence of biventricular implantable cardioverter-defibrillator (ICD)    Coronary artery disease involving native coronary artery of native heart without angina pectoris    Mixed hyperlipidemia    Essential hypertension    Trochanteric bursitis of right hip    Sciatica of right side    Carotid artery disease    Acute on chronic systolic heart failure    Closed fracture of neck of right humerus    HTN (hypertension)    HLD (hyperlipidemia)    Hypothyroidism    Effusion of right elbow    Renal insufficiency    Musculoskeletal pain    Osteoarthritis of left knee    Fracture    ICD (implantable cardioverter-defibrillator) battery depletion    COPD with acute exacerbation    History of lung cancer    Dyspnea    Need for vaccination with 20-polyvalent pneumococcal conjugate vaccine    Nocturnal hypoxia    MDS (myelodysplastic syndrome)    History of breast cancer    Acute on chronic respiratory failure with hypercapnia    Acute on chronic congestive heart failure     Past Medical History:   Diagnosis Date    Anemia     Arthritis     Asthma     Bicipitoradial bursitis     Breast cancer     CHF (congestive heart failure)     Congestive heart failure     COPD (chronic obstructive pulmonary disease)      Diverticulosis 11/26/2014    GERD (gastroesophageal reflux disease)     History of tobacco abuse     HTN (hypertension)     Hyperlipemia     Hyperthyroidism     ICD (implantable cardioverter-defibrillator), biventricular, in situ     Neck mass     Seasonal allergies     SOB (shortness of breath)     TIA (transient ischemic attack)     YRS AGO, NO RESIDUALS     Past Surgical History:   Procedure Laterality Date    COLONOSCOPY  2014    ENDOSCOPY  2014    EYE SURGERY      Implant; yes     ICD GENERATOR REPLACEMENT N/A 11/14/2024    Procedure: ICD Generator Change - Bi-Ventricular -[employer];  Surgeon: JANETTE Gautam MD;  Location: [facility] CATH INVASIVE LOCATION;  Service: Cardiovascular;  Laterality: N/A;    LUNG LOBECTOMY Left     Left lower lobe    LUNG SURGERY      LUNG SURGERY Left     LEFT UPPER LOBE (2004)    MASTECTOMY      Left     MASTECTOMY Left     OTHER SURGICAL HISTORY      Joint Surgery    PACEMAKER IMPLANTATION       PT Assessment (Last 12 Hours)       PT Evaluation and Treatment       Row Name 12/01/24 1400          Physical Therapy Time and Intention    Subjective Information no complaints  -CS     Document Type therapy note (daily note)  -CS     Mode of Treatment individual therapy;physical therapy  -CS     Patient Effort good  -CS     Symptoms Noted During/After Treatment fatigue  -CS       Row Name 12/01/24 1400          Pain    Pretreatment Pain Rating 0/10 - no pain  -CS     Posttreatment Pain Rating 0/10 - no pain  -CS       Row Name 12/01/24 1400          Bed Mobility    Supine-Sit [place] (Bed Mobility) standby assist  -CS     Assistive Device (Bed Mobility) bed rails  -CS       Row Name 12/01/24 1400          Sit-Stand Transfer    Sit-Stand [place] (Transfers) standby assist  -CS     Assistive Device (Sit-Stand Transfers) walker, front-wheeled  -CS       Row Name 12/01/24 1400          Stand-Sit Transfer    Stand-Sit [place] (Transfers) standby assist  -CS     Assistive  Device (Stand-Sit Transfers) walker, front-wheeled  -CS       Row Name 12/01/24 1400          Gait/Stairs (Locomotion)    [place] Level (Gait) standby assist;1 person to manage equipment  -CS     Assistive Device (Gait) walker, front-wheeled  -CS     Distance in Feet (Gait) 750  -CS     Pattern (Gait) 4-point;step-through  -CS     Deviations/Abnormal Patterns (Gait) base of support, wide;gait speed decreased  -CS     Bilateral Gait Deviations forward flexed posture  -CS       Row Name             Wound 11/26/24 0257 Left lower arm    Wound - Properties Group Placement Date: 11/26/24  -PH Placement Time: 0257  -PH Side: Left  -PH Orientation: lower  -PH Location: arm  -PH Present on Original Admission: Y  -PH Additional Comments: Pt currently seeing dermatology for wound  -PH    Retired Wound - Properties Group Placement Date: 11/26/24  -PH Placement Time: 0257  -PH Present on Original Admission: Y  -PH Side: Left  -PH Orientation: lower  -PH Location: arm  -PH Additional Comments: Pt currently seeing dermatology for wound  -PH    Retired Wound - Properties Group Placement Date: 11/26/24  -PH Placement Time: 0257  -PH Present on Original Admission: Y  -PH Side: Left  -PH Orientation: lower  -PH Location: arm  -PH Additional Comments: Pt currently seeing dermatology for wound  -PH    Retired Wound - Properties Group Date first assessed: 11/26/24  -PH Time first assessed: 0257  -PH Present on Original Admission: Y  -PH Side: Left  -PH Location: arm  -PH Additional Comments: Pt currently seeing dermatology for wound  -PH      Row Name             Wound 11/26/24 1220 Right breast MASD (moisture associated skin damage)    Wound - Properties Group Placement Date: 11/26/24  -KE Placement Time: 1220  -KE Side: Right  -KE Location: breast  -KE Primary Wound Type: MASD  -KE Type: MASD (moisture associated skin damage)  -KE    Retired Wound - Properties Group Placement Date: 11/26/24  -KE Placement Time: 1220  -KE Side:  Right  -KE Location: breast  -KE Primary Wound Type: MASD  -KE Type: MASD (moisture associated skin damage)  -KE    Retired Wound - Properties Group Placement Date: 11/26/24  -KE Placement Time: 1220  -KE Side: Right  -KE Location: breast  -KE Primary Wound Type: MASD  -KE Type: MASD (moisture associated skin damage)  -KE    Retired Wound - Properties Group Date first assessed: 11/26/24  -KE Time first assessed: 1220  -KE Side: Right  -KE Location: breast  -KE Primary Wound Type: MASD  -KE Type: MASD (moisture associated skin damage)  -KE      Row Name 12/01/24 1400          Positioning and Restraints    Pre-Treatment Position in bed  -CS     Post Treatment Position chair  -CS     In Chair reclined;call light within reach;encouraged to call for assist;with family/caregiver  no alarms active upon entering room  -CS       Row Name 12/01/24 1400          Progress Summary (PT)    Progress Toward Functional Goals (PT) progress toward functional goals is good  -CS               User Key  (r) = Recorded By, (t) = Taken By, (c) = Cosigned By      Initials Name Provider Type    Maryam Price, RN Registered Nurse    Erik Maxwell PTA Physical Therapist Assistant    PH Jackson Evans, RN Registered Nurse                    Physical Therapy Education       Title: PT OT SLP Therapies (Not Started)       Topic: Physical Therapy (Not Started)       Point: Home exercise program (Not Started)       Learner Progress:  Not documented in this visit.              Point: Body mechanics (Not Started)       Learner Progress:  Not documented in this visit.                                  PT Recommendation and Plan     Progress Summary (PT)  Progress Toward Functional Goals (PT): progress toward functional goals is good   Outcome Measures       Row Name 12/01/24 1400 11/29/24 1600          How much help from another person do you currently need...    Turning from your back to your side while in flat bed without using bedrails? 4   -CS 4  -CS     Moving from lying on back to sitting on the side of a flat bed without bedrails? 4  -CS 3  -CS     Moving to and from a bed to a chair (including a wheelchair)? 4  -CS 4  -CS     Standing up from a chair using your arms (e.g., wheelchair, bedside chair)? 4  -CS 4  -CS     Climbing 3-5 steps with a railing? 3  -CS 3  -CS     To walk in hospital room? 3  -CS 3  -CS     AM-PAC 6 Clicks Score (PT) 22  -CS 21  -CS        Functional Assessment    Outcome Measure Options AM-PAC 6 Clicks Basic Mobility (PT)  -CS AM-PAC 6 Clicks Basic Mobility (PT)  -CS               User Key  (r) = Recorded By, (t) = Taken By, (c) = Cosigned By      Initials Name Provider Type    CS Erik Elizondo PTA Physical Therapist Assistant                     Time Calculation:    PT Charges       Row Name 12/01/24 1448             Time Calculation    Start Time 1137  -CS      PT Received On 12/01/24  -CS         Timed Charges    09974 - Gait Training Minutes  23  -CS      70221 - PT Therapeutic Activity Minutes 15  -CS         Total Minutes    Timed Charges Total Minutes 38  -CS       Total Minutes 38  -CS                User Key  (r) = Recorded By, (t) = Taken By, (c) = Cosigned By      Initials Name Provider Type    Erik Maxwell PTA Physical Therapist Assistant                  Therapy Charges for Today       Code Description Service Date Service Provider Modifiers Qty    81721533664 HC GAIT TRAINING EA 15 MIN 12/1/2024 Erik Elizondo PTA GP 2    27200370346 HC PT THERAPEUTIC ACT EA 15 MIN 12/1/2024 Erik Elizondo PTA GP 1            PT G-Codes  Outcome Measure Options: AM-PAC 6 Clicks Basic Mobility (PT)  AM-PAC 6 Clicks Score (PT): 22    Erik Elizondo PTA  12/1/2024

## 2024-12-01 NOTE — PLAN OF CARE
Goal Outcome Evaluation:  Plan of Care Reviewed With: patient        Progress: no change  Outcome Evaluation: Patient A&Ox4. Patient on 2L NC. Denied Bipap at night. Ambulates with standby assist. Continue plan of care.

## 2024-12-01 NOTE — PROGRESS NOTES
RT EQUIPMENT DEVICE RELATED - SKIN ASSESSMENT    RT Medical Equipment/Device:     NIV Mask:  Full-face    size: S    Skin Assessment:      Cheek:  Intact  Ears:  Intact  Nose:  Intact  Mouth:  Intact    Device Skin Pressure Protection:  Skin-to-device areas padded:  None Required    Nurse Notification:  Vero Delgadillo, RRT

## 2024-12-01 NOTE — PLAN OF CARE
Goal Outcome Evaluation:                 Patient refused to wear Bipap again tonight, despite encouragement and education about the benefits of wearing. Patient on 2L nasal cannula at this time.

## 2024-12-01 NOTE — PROGRESS NOTES
[facility]   Hospitalist Progress Note       Patient Name: Yelena Sanchez  : 1935  MRN: [id number]  Primary Care Physician: Kaelyn Eugene MD  Date of admission: 2024  Today's Date: 2024  Room / Bed:   [facility]  Subjective   Chief Complaint: Shortness of breath, confusion     HPI:     Yelena Sanchez is a 89 y.o. female with past medical history of hypertension, COPD, CHF, hypothyroidism, prior TIA, arthritis, and GERD presenting to the ED for evaluation of shortness of breath.  Patient states that for the last 3 to 4 days she has been having worsening shortness of breath to where she was symptomatic even at rest and prior to arrival became confused, tachypnea, and diaphoretic.  She then started a DuoNeb treatment which was ineffective.  Due to concern EMS was called to bring her to the ED for further evaluation.  En route patient was given nebulizer treatment along with Solu-Medrol.  In the ED patient was tachypneic, hypertensive, hypoxic on arrival requiring oxygen supplementation and eventually BiPAP.  Labs showed that she had above baseline proBNP, reduced renal function, and ABG showing hypercapnic respiratory failure with acidosis.  Chest x-ray showed cardiomegaly with vascular congestion and edema.  When seen patient was on BiPAP and stated that she was breathing feeling much better.  She denied any recent fevers, headaches, focal weakness, chest pain, abdominal pain, nausea, vomiting, diarrhea, constipation, dysuria, hematuria, hematochezia, melena, or anxiety.  Patient admitted for further evaluation and treatment.       Interval Followup: 2024    Continues to improve.  Very pleasant demeanor.  Tolerating up titration of diuretics.  1100 cc UOP already this morning.  Flunked her 6-minute walk test.  Will need home O2.  Tolerating prednisone taper  114/71  -  132/81  On 2 L supplemental O2 NC  Cr 2.0    REVIEW OF SYSTEMS:   SOA  Objective   Temp:  [97.3 °F (36.3 °C)-98.2  °F (36.8 °C)] 97.3 °F (36.3 °C)  Heart Rate:  [69-91] 83  Resp:  [16-20] 18  BP: (114-155)/(66-81) 114/71  Flow (L/min) (Oxygen Therapy):  [2] 2  PHYSICAL EXAM   CON: WN. WD. NAD. Good spirits  NECK:  No stridor.   RESP:  Diminished bilaterally  CV:  Rhythm regular. Rate WNL  GI:  Soft and nontender. Nondistended.    EXT: No cyanosis.  PSYCH:  Alert. Oriented. Normal affect and mood.  NEURO:  No dysarthria or aphasia.   Results from last 7 days   Lab Units 12/01/24  0525 11/30/24  0529 11/29/24 0418 11/28/24 0444 11/27/24  0506 11/25/24  2223   WBC 10*3/mm3 10.80 12.16* 13.15* 15.86* 12.97* 9.72   HEMOGLOBIN g/dL 12.7 11.6* 11.3* 11.5* 11.2* 12.7   HEMATOCRIT % 42.1 38.1 36.9 37.5 36.0 41.6   PLATELETS 10*3/mm3 233 251 242 263 258 296     Results from last 7 days   Lab Units 12/01/24  0811 11/30/24  0529 11/29/24 0418 11/28/24 0443 11/27/24  0506 11/25/24  2223   SODIUM mmol/L 142 142 144 141 143 143   POTASSIUM mmol/L 3.9 4.0 4.2 3.9 4.2 4.0   CO2 mmol/L 27.3 21.5* 23.7 23.2 24.3 24.2   CHLORIDE mmol/L 101 107 108* 104 106 105   ANION GAP mmol/L 13.7 13.5 12.3 13.8 12.7 13.8   BUN mg/dL 73* 65* 70* 60* 56* 38*   CREATININE mg/dL 2.08* 1.68* 2.10* 1.79* 1.80* 1.83*   GLUCOSE mg/dL 93 90 124* 118* 128* 164*         COMPLEXITY OF DATA / DECISION MAKING     []  Moderate: One acute illness or mild exacerbation of chronic or 2 stable chronic or tx side effects   []  High:  Severe acute illness or severe exacerbation of chronic - potential for major debility / life threatening         I have personally reviewed the results from the time of this admission to 12/1/2024 12:33 EST:  []  Laboratory:  []  Microbiology: []  Radiology:  []  Telemetry:   []  Cardiology/Vascular:  []  Pathology:  []  Prior external records:  []  Independent historian provided additional details:      []  Discussed case with specialists:    []  Independent interpretation of ECG/Imaging etc:             []  Moderate: Rx management, low risk  surgery, suboptimal social situation   []  High:  Rx with close monitoring for toxicity, mod-high risk surgery, DNR decision, Comfort initiated, IV pain meds    Assessment / Plan   Assessment:    Acute on chronic hypoxic/hypercarbic respiratory failure (Dr. Anglin)  Nocturnal hypoxia/nocturnal home O2  Pulmonary edema  Acute exacerbation COPD  Acute exacerbation systolic CHF  Mycoplasma pneumonia  Hx ischemic cardiomyopathy, status post ICD (Dr. Gautam)  CAD  Hx L breast cancer/mastectomy  HTN  Dyslipidemia  GERD  Hypothyroidism  Gout     Plan:    Disposition: Likely return home with home health after the weekend   Continue diuretics: Adjust Lasix 40 p.o. BID, spironolactone 12.5  Steroids/prednisone completed  6-minute walk test positive; requires home continuous O2  Continue to ambulate/mobilize as tolerated  Up in chair.  Wean O2 when feasible.  Baseline = nighttime O2 at home.  Pulmonology consulted  Finish doxycycline  Bronchodilator nebs scheduled and prn  Will need eval for home NIPPV    Discussed plan with RN.  VTE Prophylaxis:  Pharmacologic VTE prophylaxis orders are present.      CODE STATUS:      Level Of Support Discussed With: Patient  Code Status (Patient has no pulse and is not breathing): CPR (Attempt to Resuscitate)  Medical Interventions (Patient has pulse or is breathing): Full Support           Attending documentation:  I reviewed the above documentation and independently reviewed and rounded and evaluated the patient and discussed the care plan with CHRISTINE Ramirez PA-C, I agree with his findings and plan as documented, what I have added to the care plan and modified is as follows in my documentation and my medical decision making; 89-year-old female with history of CHF, breast cancer, CKD stage IIIb-IV COPD, hypertension, dyslipidemia, ICD biventricular in situ, hyperthyroidism, history of TIAs, hospitalized on 11/26/2024 for chief complaint of shortness of breath and confusion, found to be  hypercapnic, pulmonary consulted, patient wore BiPAP, mentation improved, decompensated CHF superimposed with COPD exacerbation, diuresis ordered, found to be mycoplasma IgM positive, on doxycycline, breathing slow to improve.  Working on diuresis while watching renal function.  Interval follow-up: Patient seen and examined this morning, no acute distress, no acute major overnight events, remains on 2 L nasal cannula oxygen with sats in the 90s, 1.4 L of urine output, creatinine up to 2.08, BUN 73, bicarb 27, potassium 3.9, sodium 140, white blood cell count 10,000.  Review of systems obtained, all systems reviewed and negative except for ongoing cough, shortness of breath, weakness, fatigue vitals reviewed, labs reviewed on physical exam elderly appearing female laying in bed wearing nasal cannula oxygen, no acute distress, diminished breath sounds throughout regular rate rhythm, soft nontender abdomen, alert and oriented x 3.  Assessment as above, acute decompensation of chronic systolic CHF with superimposed COPD exacerbation with hypercapnic and hypoxemic respiratory failure, plan, continue supplemental oxygen to maintain sats greater 92%, continue Lasix 40 mg IV twice daily, trending creatinine and electrolytes, anticipate some increase in creatinine, continue doxycycline to complete 7 days, continue Plavix, continue [place] Pulmicort nebs twice daily, strict I's and O's, daily weights, continue DuoNebs every 6 hours, stop steroids, continue Aldactone, continue Bystolic, continue Singulair, continue Synthroid, out of bed to chair, PT/OT, telemetry monitoring, a.m. labs, full code, DVT prophylaxis with heparin, clinical course dictate further management, discussed with nurse at the bedside.  More than 60 % of the time of this patient's encounter was performed by me, this included face-to-face time, planning and coordinating, medical decision making and critical thinking personally done by me.    Electronically  signed by Karen Raphael MD, 12/1/2024, 14:07 EST.    Portions of this documentation were transcribed electronically from a voice recognition software.  I confirm all data accurately represents the service(s) I performed at today's visit.

## 2024-12-02 ENCOUNTER — APPOINTMENT (OUTPATIENT)
Dept: CARDIOLOGY | Facility: HOSPITAL | Age: 89
End: 2024-12-02
Payer: MEDICARE

## 2024-12-02 LAB
ALBUMIN SERPL-MCNC: 3.6 G/DL (ref 3.5–5.2)
ALP SERPL-CCNC: 89 U/L (ref 39–117)
ALT SERPL W P-5'-P-CCNC: 16 U/L (ref 1–33)
ANION GAP SERPL CALCULATED.3IONS-SCNC: 16.4 MMOL/L (ref 5–15)
AORTIC DIMENSIONLESS INDEX: 0.65 (DI)
ASCENDING AORTA: 2.9 CM
AST SERPL-CCNC: 13 U/L (ref 1–32)
BACTERIA UR QL AUTO: ABNORMAL /HPF
BASOPHILS # BLD AUTO: 0.08 10*3/MM3 (ref 0–0.2)
BASOPHILS NFR BLD AUTO: 0.6 % (ref 0–1.5)
BH CV ECHO MEAS - AO MAX PG: 8.8 MMHG
BH CV ECHO MEAS - AO MEAN PG: 5.4 MMHG
BH CV ECHO MEAS - AO ROOT DIAM: 3.1 CM
BH CV ECHO MEAS - AO V2 MAX: 148.4 CM/SEC
BH CV ECHO MEAS - AO V2 VTI: 19.9 CM
BH CV ECHO MEAS - EDV(MOD-SP4): 147.5 ML
BH CV ECHO MEAS - EF(MOD-SP4): 34.6 %
BH CV ECHO MEAS - ESV(MOD-SP4): 96.5 ML
BH CV ECHO MEAS - FS: 16 %
BH CV ECHO MEAS - IVS/LVPW: 1 CM
BH CV ECHO MEAS - IVSD: 1.3 CM
BH CV ECHO MEAS - LA DIMENSION: 4.1 CM
BH CV ECHO MEAS - LV DIASTOLIC VOL/BSA (35-75): 91.6 CM2
BH CV ECHO MEAS - LV MAX PG: 4.8 MMHG
BH CV ECHO MEAS - LV MEAN PG: 2.3 MMHG
BH CV ECHO MEAS - LV SYSTOLIC VOL/BSA (12-30): 60 CM2
BH CV ECHO MEAS - LV V1 MAX: 110 CM/SEC
BH CV ECHO MEAS - LV V1 VTI: 13 CM
BH CV ECHO MEAS - LVIDD: 6.3 CM
BH CV ECHO MEAS - LVIDS: 5.3 CM
BH CV ECHO MEAS - LVOT DIAM: 2 CM
BH CV ECHO MEAS - LVPWD: 1.3 CM
BH CV ECHO MEAS - MV A MAX VEL: 89 CM/SEC
BH CV ECHO MEAS - MV E MAX VEL: 34.3 CM/SEC
BH CV ECHO MEAS - MV E/A: 0.39
BH CV ECHO MEAS - RAP SYSTOLE: 3 MMHG
BH CV ECHO MEAS - RVDD: 2.8 CM
BH CV ECHO MEAS - RVSP: 18.7 MMHG
BH CV ECHO MEAS - SV(MOD-SP4): 51 ML
BH CV ECHO MEAS - SVI(MOD-SP4): 31.7 ML/M2
BH CV ECHO MEAS - TR MAX PG: 15.7 MMHG
BH CV ECHO MEAS - TR MAX VEL: 198 CM/SEC
BILIRUB CONJ SERPL-MCNC: 0.1 MG/DL (ref 0–0.3)
BILIRUB INDIRECT SERPL-MCNC: 0.3 MG/DL
BILIRUB SERPL-MCNC: 0.4 MG/DL (ref 0–1.2)
BILIRUB UR QL STRIP: NEGATIVE
BUN SERPL-MCNC: 85 MG/DL (ref 8–23)
BUN/CREAT SERPL: 34.4 (ref 7–25)
CALCIUM SPEC-SCNC: 10.3 MG/DL (ref 8.6–10.5)
CHLORIDE SERPL-SCNC: 98 MMOL/L (ref 98–107)
CLARITY UR: CLEAR
CO2 SERPL-SCNC: 24.6 MMOL/L (ref 22–29)
COLOR UR: YELLOW
CREAT SERPL-MCNC: 2.47 MG/DL (ref 0.57–1)
CREAT UR-MCNC: 136 MG/DL
DEPRECATED RDW RBC AUTO: 50.9 FL (ref 37–54)
EGFRCR SERPLBLD CKD-EPI 2021: 18.3 ML/MIN/1.73
EOSINOPHIL # BLD AUTO: 0.15 10*3/MM3 (ref 0–0.4)
EOSINOPHIL NFR BLD AUTO: 1.2 % (ref 0.3–6.2)
ERYTHROCYTE [DISTWIDTH] IN BLOOD BY AUTOMATED COUNT: 14.9 % (ref 12.3–15.4)
GLUCOSE SERPL-MCNC: 102 MG/DL (ref 65–99)
GLUCOSE UR STRIP-MCNC: NEGATIVE MG/DL
HCT VFR BLD AUTO: 43.6 % (ref 34–46.6)
HGB BLD-MCNC: 13.4 G/DL (ref 12–15.9)
HGB UR QL STRIP.AUTO: NEGATIVE
HYALINE CASTS UR QL AUTO: ABNORMAL /LPF
IMM GRANULOCYTES # BLD AUTO: 0.21 10*3/MM3 (ref 0–0.05)
IMM GRANULOCYTES NFR BLD AUTO: 1.7 % (ref 0–0.5)
KETONES UR QL STRIP: NEGATIVE
LEUKOCYTE ESTERASE UR QL STRIP.AUTO: ABNORMAL
LYMPHOCYTES # BLD AUTO: 0.79 10*3/MM3 (ref 0.7–3.1)
LYMPHOCYTES NFR BLD AUTO: 6.4 % (ref 19.6–45.3)
MAGNESIUM SERPL-MCNC: 2.4 MG/DL (ref 1.6–2.4)
MCH RBC QN AUTO: 29.1 PG (ref 26.6–33)
MCHC RBC AUTO-ENTMCNC: 30.7 G/DL (ref 31.5–35.7)
MCV RBC AUTO: 94.6 FL (ref 79–97)
MONOCYTES # BLD AUTO: 1.59 10*3/MM3 (ref 0.1–0.9)
MONOCYTES NFR BLD AUTO: 12.8 % (ref 5–12)
NEUTROPHILS NFR BLD AUTO: 77.3 % (ref 42.7–76)
NEUTROPHILS NFR BLD AUTO: 9.58 10*3/MM3 (ref 1.7–7)
NITRITE UR QL STRIP: NEGATIVE
NRBC BLD AUTO-RTO: 0 /100 WBC (ref 0–0.2)
NT-PROBNP SERPL-MCNC: 9337 PG/ML (ref 0–1800)
PH UR STRIP.AUTO: <=5 [PH] (ref 5–8)
PHOSPHATE SERPL-MCNC: 5.1 MG/DL (ref 2.5–4.5)
PLATELET # BLD AUTO: 233 10*3/MM3 (ref 140–450)
PMV BLD AUTO: 11.2 FL (ref 6–12)
POTASSIUM SERPL-SCNC: 3.7 MMOL/L (ref 3.5–5.2)
PROT ?TM UR-MCNC: 132.5 MG/DL
PROT SERPL-MCNC: 6.7 G/DL (ref 6–8.5)
PROT UR QL STRIP: ABNORMAL
PROT/CREAT UR: 0.97 MG/G{CREAT}
RBC # BLD AUTO: 4.61 10*6/MM3 (ref 3.77–5.28)
RBC # UR STRIP: ABNORMAL /HPF
REF LAB TEST METHOD: ABNORMAL
SODIUM SERPL-SCNC: 139 MMOL/L (ref 136–145)
SP GR UR STRIP: 1.02 (ref 1–1.03)
SQUAMOUS #/AREA URNS HPF: ABNORMAL /HPF
UROBILINOGEN UR QL STRIP: ABNORMAL
WBC # UR STRIP: ABNORMAL /HPF
WBC NRBC COR # BLD AUTO: 12.4 10*3/MM3 (ref 3.4–10.8)

## 2024-12-02 PROCEDURE — 94799 UNLISTED PULMONARY SVC/PX: CPT

## 2024-12-02 PROCEDURE — 85025 COMPLETE CBC W/AUTO DIFF WBC: CPT | Performed by: FAMILY MEDICINE

## 2024-12-02 PROCEDURE — 25010000002 HEPARIN (PORCINE) PER 1000 UNITS: Performed by: FAMILY MEDICINE

## 2024-12-02 PROCEDURE — 80048 BASIC METABOLIC PNL TOTAL CA: CPT | Performed by: FAMILY MEDICINE

## 2024-12-02 PROCEDURE — 83735 ASSAY OF MAGNESIUM: CPT | Performed by: FAMILY MEDICINE

## 2024-12-02 PROCEDURE — 84100 ASSAY OF PHOSPHORUS: CPT | Performed by: FAMILY MEDICINE

## 2024-12-02 PROCEDURE — 99233 SBSQ HOSP IP/OBS HIGH 50: CPT | Performed by: INTERNAL MEDICINE

## 2024-12-02 PROCEDURE — 93306 TTE W/DOPPLER COMPLETE: CPT

## 2024-12-02 PROCEDURE — 99232 SBSQ HOSP IP/OBS MODERATE 35: CPT | Performed by: FAMILY MEDICINE

## 2024-12-02 PROCEDURE — 94664 DEMO&/EVAL PT USE INHALER: CPT

## 2024-12-02 PROCEDURE — 84156 ASSAY OF PROTEIN URINE: CPT | Performed by: INTERNAL MEDICINE

## 2024-12-02 PROCEDURE — 81001 URINALYSIS AUTO W/SCOPE: CPT | Performed by: FAMILY MEDICINE

## 2024-12-02 PROCEDURE — 82570 ASSAY OF URINE CREATININE: CPT | Performed by: INTERNAL MEDICINE

## 2024-12-02 PROCEDURE — 83880 ASSAY OF NATRIURETIC PEPTIDE: CPT | Performed by: FAMILY MEDICINE

## 2024-12-02 PROCEDURE — 80076 HEPATIC FUNCTION PANEL: CPT | Performed by: FAMILY MEDICINE

## 2024-12-02 PROCEDURE — 93306 TTE W/DOPPLER COMPLETE: CPT | Performed by: INTERNAL MEDICINE

## 2024-12-02 RX ORDER — FUROSEMIDE 40 MG/1
40 TABLET ORAL DAILY
Status: DISCONTINUED | OUTPATIENT
Start: 2024-12-03 | End: 2024-12-05 | Stop reason: HOSPADM

## 2024-12-02 RX ADMIN — Medication 1 APPLICATION: at 09:56

## 2024-12-02 RX ADMIN — DOXYCYCLINE 100 MG: 100 CAPSULE ORAL at 21:53

## 2024-12-02 RX ADMIN — ALLOPURINOL 100 MG: 100 TABLET ORAL at 08:14

## 2024-12-02 RX ADMIN — Medication 1000 MG: at 09:57

## 2024-12-02 RX ADMIN — ARFORMOTEROL TARTRATE 15 MCG: 15 SOLUTION RESPIRATORY (INHALATION) at 07:23

## 2024-12-02 RX ADMIN — LEVOTHYROXINE SODIUM 100 MCG: 0.1 TABLET ORAL at 06:15

## 2024-12-02 RX ADMIN — Medication 1000 MG: at 21:54

## 2024-12-02 RX ADMIN — IPRATROPIUM BROMIDE AND ALBUTEROL SULFATE 3 ML: .5; 3 SOLUTION RESPIRATORY (INHALATION) at 19:47

## 2024-12-02 RX ADMIN — IPRATROPIUM BROMIDE AND ALBUTEROL SULFATE 3 ML: .5; 3 SOLUTION RESPIRATORY (INHALATION) at 01:10

## 2024-12-02 RX ADMIN — Medication 1 APPLICATION: at 21:59

## 2024-12-02 RX ADMIN — HEPARIN SODIUM 5000 UNITS: 5000 INJECTION INTRAVENOUS; SUBCUTANEOUS at 08:14

## 2024-12-02 RX ADMIN — Medication 10 ML: at 21:55

## 2024-12-02 RX ADMIN — BUDESONIDE 0.5 MG: 0.5 INHALANT RESPIRATORY (INHALATION) at 07:23

## 2024-12-02 RX ADMIN — MONTELUKAST 10 MG: 10 TABLET, FILM COATED ORAL at 21:53

## 2024-12-02 RX ADMIN — ALLOPURINOL 100 MG: 100 TABLET ORAL at 21:53

## 2024-12-02 RX ADMIN — NYSTATIN OINTMENT 1 APPLICATION: 100000 OINTMENT TOPICAL at 22:00

## 2024-12-02 RX ADMIN — PANTOPRAZOLE SODIUM 40 MG: 40 TABLET, DELAYED RELEASE ORAL at 06:15

## 2024-12-02 RX ADMIN — BUDESONIDE 0.5 MG: 0.5 INHALANT RESPIRATORY (INHALATION) at 19:47

## 2024-12-02 RX ADMIN — CLOPIDOGREL BISULFATE 75 MG: 75 TABLET ORAL at 08:14

## 2024-12-02 RX ADMIN — Medication: at 21:55

## 2024-12-02 RX ADMIN — NEBIVOLOL 10 MG: 10 TABLET ORAL at 08:15

## 2024-12-02 RX ADMIN — HEPARIN SODIUM 5000 UNITS: 5000 INJECTION INTRAVENOUS; SUBCUTANEOUS at 21:55

## 2024-12-02 RX ADMIN — DOXYCYCLINE 100 MG: 100 CAPSULE ORAL at 08:14

## 2024-12-02 RX ADMIN — IPRATROPIUM BROMIDE AND ALBUTEROL SULFATE 3 ML: .5; 3 SOLUTION RESPIRATORY (INHALATION) at 13:41

## 2024-12-02 RX ADMIN — ARFORMOTEROL TARTRATE 15 MCG: 15 SOLUTION RESPIRATORY (INHALATION) at 19:47

## 2024-12-02 RX ADMIN — NYSTATIN OINTMENT 1 APPLICATION: 100000 OINTMENT TOPICAL at 09:57

## 2024-12-02 RX ADMIN — GUAIFENESIN 600 MG: 600 TABLET ORAL at 21:53

## 2024-12-02 RX ADMIN — BUTYROSPERMUM PARKII(SHEA BUTTER), SIMMONDSIA CHINENSIS (JOJOBA) SEED OIL, ALOE BARBADENSIS LEAF EXTRACT 1 CAPSULE: .01; 1; 3.5 LIQUID TOPICAL at 09:57

## 2024-12-02 RX ADMIN — IPRATROPIUM BROMIDE AND ALBUTEROL SULFATE 3 ML: .5; 3 SOLUTION RESPIRATORY (INHALATION) at 07:23

## 2024-12-02 RX ADMIN — GUAIFENESIN 600 MG: 600 TABLET ORAL at 08:14

## 2024-12-02 RX ADMIN — BUTYROSPERMUM PARKII(SHEA BUTTER), SIMMONDSIA CHINENSIS (JOJOBA) SEED OIL, ALOE BARBADENSIS LEAF EXTRACT 1 CAPSULE: .01; 1; 3.5 LIQUID TOPICAL at 21:54

## 2024-12-02 RX ADMIN — SENNOSIDES AND DOCUSATE SODIUM 2 TABLET: 50; 8.6 TABLET ORAL at 11:37

## 2024-12-02 RX ADMIN — ROSUVASTATIN CALCIUM 10 MG: 5 TABLET, FILM COATED ORAL at 21:53

## 2024-12-02 RX ADMIN — AMLODIPINE BESYLATE 5 MG: 10 TABLET ORAL at 08:14

## 2024-12-02 RX ADMIN — Medication 12.5 MG: at 08:15

## 2024-12-02 NOTE — CONSULTS
[facility]   Nephrology Consult Note      Patient Name: Yelena Sanchez  : 1935  MRN: [id number]  Primary Care Physician:  Kaelyn Eugene MD  Referring Physician: No Known Provider  Date of admission: 2024    Subjective   Subjective     Reason for Consult/ Chief Complaint: Acute on chronic kidney disease    HPI:  Yelena Sanchez is a 89 y.o. female with history of CKD stage IV, previous baseline creatinine 1.8-1.9, hypertension and CHF who was admitted several days ago with worsening shortness of breath.  Chest x-ray was compatible with pulmonary edema.  She was not feeling well and was very weak.  Her  called the ambulance.  She was diuresed and felt better but her creatinine has risen from 1.8 up to 2.4 today.  Her diuretics were held.  She is seeing pulmonary.  Last office visit 2022, seen Dr. Otero.  She has good appetite, no lower urinary symptoms.  Feeling much better.  She had pacemaker with battery change.  She had previous falls as well.  She tells me that up until admission she has been alternating 40 mg of Lasix with 20 mg the next day.    Review of Systems   All systems were reviewed and negative except for: What is mentioned above.    Personal History     Past Medical History:   Diagnosis Date    Anemia     Arthritis     Asthma     Bicipitoradial bursitis     Breast cancer     CHF (congestive heart failure)     Congestive heart failure     COPD (chronic obstructive pulmonary disease)     Diverticulosis 2014    GERD (gastroesophageal reflux disease)     History of tobacco abuse     HTN (hypertension)     Hyperlipemia     Hyperthyroidism     ICD (implantable cardioverter-defibrillator), biventricular, in situ     Neck mass     Seasonal allergies     SOB (shortness of breath)     TIA (transient ischemic attack)     YRS AGO, NO RESIDUALS       Past Surgical History:   Procedure Laterality Date    COLONOSCOPY      ENDOSCOPY      EYE SURGERY       Implant; yes     ICD GENERATOR REPLACEMENT N/A 11/14/2024    Procedure: ICD Generator Change - Bi-Ventricular -[employer];  Surgeon: JANETTE Gautam MD;  Location: [facility] INVASIVE LOCATION;  Service: Cardiovascular;  Laterality: N/A;    LUNG LOBECTOMY Left     Left lower lobe    LUNG SURGERY      LUNG SURGERY Left     LEFT UPPER LOBE (2004)    MASTECTOMY      Left     MASTECTOMY Left     OTHER SURGICAL HISTORY      Joint Surgery    PACEMAKER IMPLANTATION         Family History: family history includes Colon cancer in her mother; Lung cancer in her sister. Otherwise pertinent FHx was reviewed and not pertinent to current issue.    Social History:  reports that she has never smoked. She has never been exposed to tobacco smoke. She has never used smokeless tobacco. She reports that she does not drink alcohol and does not use drugs.    Home Medications:  Acidophilus/Citrus Pectin, HYDROcodone-acetaminophen, allopurinol, cholecalciferol, clopidogrel, docusate sodium, ezetimibe, ferrous sulfate, furosemide, ketoconazole, lansoprazole, levothyroxine, multivitamin with minerals, nebivolol, rosuvastatin, spironolactone, and zolpidem    Allergies:  No Known Allergies    Objective    Objective     Vitals:   Temp:  [97.2 °F (36.2 °C)-98.1 °F (36.7 °C)] 97.2 °F (36.2 °C)  Heart Rate:  [62-78] 68  Resp:  [18-20] 18  BP: (112-141)/(63-73) 118/64  Flow (L/min) (Oxygen Therapy):  [2] 2     Physical Exam    Constitutional: Awake, alert, no distress, conversant and pleasant, hard of hearing.   Eyes: sclerae anicteric, no conjunctival injection   HENT: mucous membranes moist   Neck: Supple, no thyromegaly, no lymphadenopathy, trachea midline, No JVD   Respiratory: Decreased to auscultation bilaterally, nonlabored respirations    Cardiovascular: RRR, no murmurs, rubs, or gallops.  Pacemaker present.   Gastrointestinal: Positive bowel sounds, soft, nontender, nondistended   Musculoskeletal: No edema, no clubbing or  cyanosis   Psychiatric: Appropriate affect, cooperative   Neurologic: Oriented x 3, moving all extremities, Cranial Nerves grossly intact, speech clear   Skin: warm and dry, no rashes     Result Review    Result Reviewed:  I have personally reviewed the results from the time of this admission to 12/2/2024 15:40 EST and agree with these findings:  [x]  Laboratory  []  Microbiology  [x]  Radiology  []  EKG/Telemetry   []  Cardiology/Vascular   []  Pathology  [x]  Old records  []  Other:  LAB RESULTS:    LAB RESULTS:        Lab 12/02/24  0503 12/01/24  0811 12/01/24  0525 11/30/24  0529 11/29/24  0418 11/28/24  0443 11/27/24  0506 11/25/24  2223   SODIUM 139 142  --  142 144 141 143 143   POTASSIUM 3.7 3.9  --  4.0 4.2 3.9 4.2 4.0   CHLORIDE 98 101  --  107 108* 104 106 105   CO2 24.6 27.3  --  21.5* 23.7 23.2 24.3 24.2   BUN 85* 73*  --  65* 70* 60* 56* 38*   CREATININE 2.47* 2.08*  --  1.68* 2.10* 1.79* 1.80* 1.83*   GLUCOSE 102* 93  --  90 124* 118* 128* 164*   EGFR 18.3* 22.5*  --  29.1* 22.3* 27.0* 26.8* 26.3*   ANION GAP 16.4* 13.7  --  13.5 12.3 13.8 12.7 13.8   MAGNESIUM 2.4 2.3  --  2.5* 2.4 2.4 2.3  --    PHOSPHORUS 5.1*  --  4.4 4.3 4.4 3.6 3.7  --            Most notable findings include: As above.    Chest x-ray compatible with pulmonary edema.    Assessment & Plan   Assessment / Plan     Brief Patient Summary:  Yelena Sanchez is a 89 y.o. female who has advanced CKD here with worsening shortness of breath and volume overload, with diuresis her creatinine has increased.    Active Hospital Problems:  Active Hospital Problems    Diagnosis     **Acute on chronic respiratory failure with hypercapnia     Acute on chronic congestive heart failure     Dyspnea     History of breast cancer     COPD with acute exacerbation     HLD (hyperlipidemia)     Essential hypertension     Presence of biventricular implantable cardioverter-defibrillator (ICD)        Assessment and Plan:   - Mild acute kidney injury possibly  secondary to better volume control versus decompensated heart failure.  Clinically she seems better, she is on room air today.  Volume status is adequate.  Will restart Lasix 40 mg daily in AM.  Continue current dose of spironolactone.  Check UPC random urine and bladder scan PVR.  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 Office records reviewed.  LOV 7/19/2022, Dr. Otero, baseline creatinine 1.8-1.9.  - Congestive heart failure, 2D echo is pending.  - Acute respiratory failure on admission, with COPD exacerbation and possible pneumonia, much better, per pulmonary.  - Hypertension, blood pressure is controlled, no documented hypotensive episodes in the last 3 days.  - Previous history of anemia, current hemoglobin 13.4.  The above discussed with patient.  Will follow.    Thank you very much for this consult!    Electronically signed by Otilia Orosco MD, 12/2/2024, 15:40 EST.

## 2024-12-02 NOTE — PLAN OF CARE
Goal Outcome Evaluation:  Plan of Care Reviewed With: patient        Progress: improving  Outcome Evaluation: Patient on 3L nasal cannula. No complaints of pain this shift. Has been ambulating with stand by assistance to BSC. VSS. Will continue with POC.

## 2024-12-02 NOTE — PROGRESS NOTES
[facility]   Hospitalist Progress Note       Patient Name: Yelena Sanchez  : 1935  MRN: [id number]  Primary Care Physician: Kaelyn Eugene MD  Date of admission: 2024  Today's Date: 2024  Room / Bed:   [facility]  Subjective   Chief Complaint: Shortness of breath, confusion     HPI:     Yelena Sanchez is a 89 y.o. female with past medical history of hypertension, COPD, CHF, hypothyroidism, prior TIA, arthritis, and GERD presenting to the ED for evaluation of shortness of breath.  Patient states that for the last 3 to 4 days she has been having worsening shortness of breath to where she was symptomatic even at rest and prior to arrival became confused, tachypnea, and diaphoretic.  She then started a DuoNeb treatment which was ineffective.  Due to concern EMS was called to bring her to the ED for further evaluation.  En route patient was given nebulizer treatment along with Solu-Medrol.  In the ED patient was tachypneic, hypertensive, hypoxic on arrival requiring oxygen supplementation and eventually BiPAP.  Labs showed that she had above baseline proBNP, reduced renal function, and ABG showing hypercapnic respiratory failure with acidosis.  Chest x-ray showed cardiomegaly with vascular congestion and edema.  When seen patient was on BiPAP and stated that she was breathing feeling much better.  She denied any recent fevers, headaches, focal weakness, chest pain, abdominal pain, nausea, vomiting, diarrhea, constipation, dysuria, hematuria, hematochezia, melena, or anxiety.  Patient admitted for further evaluation and treatment.       Interval Followup: 2024    Up in chair.    Having good urine output from diuretic therapy.    Down ~7 pounds.    Patient alert and interactive.   at bedside.    Feeling better overall.    Creatinine up from 1.68 to 2.47   BNP better 27K -----> 9K  On 2 L supplemental O2 NC      REVIEW OF SYSTEMS:   SOA  Objective   Temp:  [97.2 °F (36.2 °C)-98.1  °F (36.7 °C)] 97.2 °F (36.2 °C)  Heart Rate:  [62-85] 68  Resp:  [18-20] 18  BP: (105-141)/(63-77) 118/64  Flow (L/min) (Oxygen Therapy):  [2] 2  PHYSICAL EXAM   CON: WN. WD. NAD. Good spirits/pleasant demeanor  NECK:  No stridor.   RESP:  Diminished bilaterally  CV:  Rhythm regular. Rate WNL  GI:  Soft and nontender. Nondistended.    EXT: No cyanosis.  PSYCH:  Alert. Oriented. Normal affect and mood.  NEURO:  No dysarthria or aphasia.   Results from last 7 days   Lab Units 12/02/24  0503 12/01/24  0525 11/30/24  0529 11/29/24  0418 11/28/24  0444 11/27/24  0506 11/25/24  2223   WBC 10*3/mm3 12.40* 10.80 12.16* 13.15* 15.86* 12.97* 9.72   HEMOGLOBIN g/dL 13.4 12.7 11.6* 11.3* 11.5* 11.2* 12.7   HEMATOCRIT % 43.6 42.1 38.1 36.9 37.5 36.0 41.6   PLATELETS 10*3/mm3 233 233 251 242 263 258 296     Results from last 7 days   Lab Units 12/02/24  0503 12/01/24  0811 11/30/24  0529 11/29/24  0418 11/28/24  0443 11/27/24  0506 11/25/24  2223   SODIUM mmol/L 139 142 142 144 141 143 143   POTASSIUM mmol/L 3.7 3.9 4.0 4.2 3.9 4.2 4.0   CO2 mmol/L 24.6 27.3 21.5* 23.7 23.2 24.3 24.2   CHLORIDE mmol/L 98 101 107 108* 104 106 105   ANION GAP mmol/L 16.4* 13.7 13.5 12.3 13.8 12.7 13.8   BUN mg/dL 85* 73* 65* 70* 60* 56* 38*   CREATININE mg/dL 2.47* 2.08* 1.68* 2.10* 1.79* 1.80* 1.83*   GLUCOSE mg/dL 102* 93 90 124* 118* 128* 164*         COMPLEXITY OF DATA / DECISION MAKING     []  Moderate: One acute illness or mild exacerbation of chronic or 2 stable chronic or tx side effects   []  High:  Severe acute illness or severe exacerbation of chronic - potential for major debility / life threatening         I have personally reviewed the results from the time of this admission to 12/2/2024 12:01 EST:  []  Laboratory:  []  Microbiology: []  Radiology:  []  Telemetry:   []  Cardiology/Vascular:  []  Pathology:  []  Prior external records:  []  Independent historian provided additional details:      []  Discussed case with  specialists:    []  Independent interpretation of ECG/Imaging etc:             []  Moderate: Rx management, low risk surgery, suboptimal social situation   []  High:  Rx with close monitoring for toxicity, mod-high risk surgery, DNR decision, Comfort initiated, IV pain meds    Assessment / Plan   Assessment:    Acute on chronic hypoxic/hypercarbic respiratory failure (Dr. Anglin)  Nocturnal hypoxia/nocturnal home O2  Pulmonary edema  Acute exacerbation COPD  Acute exacerbation systolic CHF  Mycoplasma pneumonia  Hx ischemic cardiomyopathy, status post ICD (Dr. Talbot)  CAD  Hx L breast cancer/mastectomy  HTN  Dyslipidemia  GERD  Hypothyroidism  Gout  CKD (followed with Dr. Otero in past)     Plan:    Disposition: Home hopefully 24-48 hours.  Will get nephrology consult today.  Hold diuretics in light of increasing creatinine.  Await nephrology input  Steroids/prednisone completed  6-minute walk test noted; will need home O2 set up continuously.    Ambulate/mobilize as tolerated  Up in chair.  Wean O2 when feasible.  Baseline = nighttime O2 at home.  Pulmonology consulted  Finish doxycycline  Bronchodilator nebs scheduled and prn  Will need eval for home NIPPV    Discussed plan with RN.  VTE Prophylaxis:  Pharmacologic VTE prophylaxis orders are present.      CODE STATUS:      Level Of Support Discussed With: Patient  Code Status (Patient has no pulse and is not breathing): CPR (Attempt to Resuscitate)  Medical Interventions (Patient has pulse or is breathing): Full Support             Attending documentation:  I reviewed the above documentation and independently reviewed and rounded and evaluated the patient and discussed the care plan with CHRISTINE Ramirez PA-C, I agree with his findings and plan as documented, what I have added to the care plan and modified is as follows in my documentation and my medical decision making; 89-year-old female with history of CHF, breast cancer, CKD stage IIIb-IV COPD, hypertension,  dyslipidemia, ICD biventricular in situ, hyperthyroidism, history of TIAs, hospitalized on 11/26/2024 for chief complaint of shortness of breath and confusion, found to be hypercapnic, pulmonary consulted, patient wore BiPAP, mentation improved, decompensated CHF superimposed with COPD exacerbation, diuresis ordered, found to be mycoplasma IgM positive, on doxycycline, breathing slow to improve.  Worked on diuresis while watching renal function.  Diuresis achieved, BNP improved.  Creatinine took a hit.  Nephrology consulted.  Interval follow-up: Patient seen and examined this morning, no acute distress, no acute major overnight events, remains on 2 L nasal cannula oxygen with sats in the 90s, positive response to diuresis but creatinine bumped to 2.47, BUN up to 85, white blood cell count 12,000.  Nephrology consulted for further evaluation.  Review of systems obtained, all systems reviewed and negative except for ongoing weakness, fatigue vitals reviewed, labs reviewed on physical exam elderly appearing female laying in bed wearing nasal cannula oxygen, no acute distress, diminished breath sounds throughout regular rate rhythm, soft nontender abdomen, alert and oriented x 3.  Assessment as above, acute decompensation of chronic systolic CHF with superimposed COPD exacerbation with hypercapnic and hypoxemic respiratory failure, with JOSIE, plan, continue supplemental oxygen to maintain sats greater 92%, holding Lasix, nephrology consulted discussed with Dr. Orosco, follow-up recommendations, trending creatinine and electrolytes, anticipate some increase in creatinine, nephrology on board to help, continue doxycycline to complete 7 days, continue Plavix, continue Brovana Pulmicort nebs twice daily, strict I's and O's, daily weights, continue DuoNebs every 6 hours, stop steroids, continue Aldactone, continue Bystolic, continue Singulair, continue Synthroid, out of bed to chair, PT/OT, telemetry monitoring, a.m. labs,  full code, DVT prophylaxis with heparin, clinical course dictate further management, discussed with nurse at the bedside.  More than 70 % of the time of this patient's encounter was performed by me, this included face-to-face time, planning and coordinating, medical decision making and critical thinking personally done by me.      Electronically signed by Karen Raphael MD, 12/2/2024, 13:47 EST.    Portions of this documentation were transcribed electronically from a voice recognition software.  I confirm all data accurately represents the service(s) I performed at today's visit.

## 2024-12-02 NOTE — PROGRESS NOTES
Pulmonary / Critical Care Progress Note      Patient Name: Yelena Sanchez  : 1935  MRN: [id number]  Primary Care Physician:  Kaelyn Eugene MD  Date of admission: 2024    Subjective   Subjective   Follow-up for acute hypoxic and hypercapnic respiratory failure, CHF exacerbation    No acute events overnight.     This morning,  Sitting up in chair  Currently on room air  Overall feeling better  Dyspnea and cough improving  Scant sputum production  Continues to desat with activity  Creatinine trending up  1.1 L UOP/24 hours  No fever or chills      Objective   Objective     Vitals:   Temp:  [97.3 °F (36.3 °C)-98.1 °F (36.7 °C)] 98.1 °F (36.7 °C)  Heart Rate:  [62-85] 65  Resp:  [18-20] 18  BP: (105-141)/(63-77) 141/64  Flow (L/min) (Oxygen Therapy):  [2] 2    Physical Exam   Vital Signs Reviewed   General: Chronically ill-appearing, elderly female, Alert, NAD, sitting up in chair  Chest:  good aeration, bibasilar crackles to auscultation, no work of breathing noted on room air  CV: regular rate and rhythm regular  EXT:  no clubbing, no cyanosis, no edema  Neuro:  A&Ox3, moving all 4 extremities spontaneously  Skin: No rashes or lesions noted    Result Review    Result Review:  I have personally reviewed the results from the time of this admission to 2024 10:16 EST and agree with these findings:  [x]  Laboratory  [x]  Microbiology  [x]  Radiology  [x]  EKG/Telemetry   []  Cardiology/Vascular   []  Pathology  []  Old records  []  Other:  Most notable findings include:   proBNP 15,960 --> 26,929 --> 9337      Lab 24  0503 24  0811 24  0525 24  0529 24  0418 24  0444 24  0443 24  0506 24  2223   WBC 12.40*  --  10.80 12.16* 13.15* 15.86*  --  12.97* 9.72   HEMOGLOBIN 13.4  --  12.7 11.6* 11.3* 11.5*  --  11.2* 12.7   HEMATOCRIT 43.6  --  42.1 38.1 36.9 37.5  --  36.0 41.6   PLATELETS 233  --  233 251 242 263  --  258 296   SODIUM 139 142   --  142 144  --  141 143 143   POTASSIUM 3.7 3.9  --  4.0 4.2  --  3.9 4.2 4.0   CHLORIDE 98 101  --  107 108*  --  104 106 105   CO2 24.6 27.3  --  21.5* 23.7  --  23.2 24.3 24.2   BUN 85* 73*  --  65* 70*  --  60* 56* 38*   CREATININE 2.47* 2.08*  --  1.68* 2.10*  --  1.79* 1.80* 1.83*   GLUCOSE 102* 93  --  90 124*  --  118* 128* 164*   CALCIUM 10.3 10.7*  --  9.9 9.7  --  9.8 9.5 9.4   PHOSPHORUS 5.1*  --  4.4 4.3 4.4  --  3.6 3.7  --    TOTAL PROTEIN 6.7 6.6  --  6.3 6.3  --  6.5 6.5 7.4   ALBUMIN 3.6 3.6  --  3.5 3.6  --  3.6 3.6 4.3   GLOBULIN  --   --   --   --   --   --   --   --  3.1   CT Chest Without Contrast Diagnostic    Result Date: 11/26/2024  CT CHEST WO CONTRAST DIAGNOSTIC Date of Exam: 11/26/2024 5:17 PM EST Indication: hypoxia. Comparison: Chest radiograph from November 25, 2024 and CT chest from October 8, 2019 Technique: Axial CT images were obtained of the chest without contrast administration.  Reconstructed coronal and sagittal images were also obtained. Automated exposure control and iterative construction methods were used. Findings: There are changes from left lower lobectomy, and the residual central tracheobronchial tree is clear. There are trace bilateral pleural effusions with mild bibasilar atelectasis. There is no focal consolidation. No discrete pulmonary nodule is identified. A left subclavian pacemaker is in place. The heart is enlarged, with evidence of calcified coronary artery disease. The thoracic aorta appears normal in caliber. The main pulmonary artery is enlarged, suggesting a component of pulmonary arterial hypertension. No abnormally enlarged lymph nodes are identified. There are changes from left mastectomy. Partial evaluation of the upper abdomen demonstrates a right renal cyst. No aggressive osseous lesions are identified.     Impression: Impression: 1.Trace bilateral pleural effusions with mild bibasilar atelectasis. 2.Cardiomegaly. 3.Enlarged main pulmonary artery,  suggesting a component of pulmonary arterial hypertension. Electronically Signed: Manuel Anderson MD  11/26/2024 8:37 PM EST  Workstation ID: [id number]     Assessment & Plan   Assessment / Plan     Active Hospital Problems:  Active Hospital Problems    Diagnosis     **Acute on chronic respiratory failure with hypercapnia     Acute on chronic congestive heart failure     Dyspnea     History of breast cancer     COPD with acute exacerbation     HLD (hyperlipidemia)     Essential hypertension     Presence of biventricular implantable cardioverter-defibrillator (ICD)      Impression:  Acute hypoxic and hypercapnic respiratory failure requiring NIPPV  Acute on chronic congestive diastolic heart failure  Acute cardiogenic pulmonary edema  History of lung cancer with left lower lobe lobectomy in 2004  History of left breast cancer 2011 s/p left mastectomy  History of MDS  History of coronary artery disease s/p ICD placed  Never smoker     Plan:  -Continue to wean O2 to maintain SpO2 greater than 90%.  Patient is only on nocturnal oxygen at baseline.  -Will need repeat 6 MWT prior to discharge  -11/29 repeat CXR with persistent pulmonary vascular congestion with improving interstitial edema, small left pleural effusion  -Continue Brovana, Pulmicort, and DuoNebs  -Micro negative today except for positive for mycoplasma infection.  Procalcitonin and white count normal.  -Continue doxycycline x 7 days for mycoplasma pneumonia  -Completed prednisone x 5 days  -Continue Lasix 40 mg p.o. daily and Aldactone 12.5 mg oral daily.  Monitor creatinine closely.  -Trend electrolytes and renal panel.  Replace electrolytes as necessary  -Continue bronchopulmonary hygiene.  Encourage I-S and flutter valve  -Encourage mobilization.  Out of bed to chair.  -Follow-up with pulmonology in 1 to 2 weeks after discharge    VTE Prophylaxis:  Pharmacologic VTE prophylaxis orders are present.    CODE STATUS:   Level Of Support Discussed With:  Patient  Code Status (Patient has no pulse and is not breathing): CPR (Attempt to Resuscitate)  Medical Interventions (Patient has pulse or is breathing): Full Support      Labs, imaging, microbiology, notes and medications personally reviewed  Discussed with primary    I, Dr. Karan Hennessy, have spent more than 50% of the total time managing the patient in this encounter today.  This included personally reviewing all pertinent labs, imaging, microbiology and documentation. Also discussing the case with the patient and any available family, the admitting physician and any available ancillary staff.    Electronically signed by LUZ MARIA Walters, 12/02/24, 3:44 PM EST.  Electronically signed by Karan Hennessy MD, 12/02/24, 4:14 PM EST.

## 2024-12-03 LAB
ALBUMIN SERPL-MCNC: 3.6 G/DL (ref 3.5–5.2)
ALP SERPL-CCNC: 86 U/L (ref 39–117)
ALT SERPL W P-5'-P-CCNC: 14 U/L (ref 1–33)
ANION GAP SERPL CALCULATED.3IONS-SCNC: 16.1 MMOL/L (ref 5–15)
AST SERPL-CCNC: 11 U/L (ref 1–32)
BASOPHILS # BLD AUTO: 0.06 10*3/MM3 (ref 0–0.2)
BASOPHILS NFR BLD AUTO: 0.4 % (ref 0–1.5)
BILIRUB CONJ SERPL-MCNC: 0.1 MG/DL (ref 0–0.3)
BILIRUB INDIRECT SERPL-MCNC: 0.3 MG/DL
BILIRUB SERPL-MCNC: 0.4 MG/DL (ref 0–1.2)
BUN SERPL-MCNC: 94 MG/DL (ref 8–23)
BUN/CREAT SERPL: 35.5 (ref 7–25)
CALCIUM SPEC-SCNC: 10 MG/DL (ref 8.6–10.5)
CHLORIDE SERPL-SCNC: 98 MMOL/L (ref 98–107)
CO2 SERPL-SCNC: 23.9 MMOL/L (ref 22–29)
CREAT SERPL-MCNC: 2.65 MG/DL (ref 0.57–1)
DEPRECATED RDW RBC AUTO: 51.7 FL (ref 37–54)
EGFRCR SERPLBLD CKD-EPI 2021: 16.9 ML/MIN/1.73
EOSINOPHIL # BLD AUTO: 0.1 10*3/MM3 (ref 0–0.4)
EOSINOPHIL NFR BLD AUTO: 0.7 % (ref 0.3–6.2)
ERYTHROCYTE [DISTWIDTH] IN BLOOD BY AUTOMATED COUNT: 15.1 % (ref 12.3–15.4)
GLUCOSE SERPL-MCNC: 96 MG/DL (ref 65–99)
HCT VFR BLD AUTO: 41.7 % (ref 34–46.6)
HGB BLD-MCNC: 13.3 G/DL (ref 12–15.9)
IMM GRANULOCYTES # BLD AUTO: 0.22 10*3/MM3 (ref 0–0.05)
IMM GRANULOCYTES NFR BLD AUTO: 1.6 % (ref 0–0.5)
LYMPHOCYTES # BLD AUTO: 0.69 10*3/MM3 (ref 0.7–3.1)
LYMPHOCYTES NFR BLD AUTO: 5 % (ref 19.6–45.3)
MAGNESIUM SERPL-MCNC: 2.6 MG/DL (ref 1.6–2.4)
MCH RBC QN AUTO: 30.2 PG (ref 26.6–33)
MCHC RBC AUTO-ENTMCNC: 31.9 G/DL (ref 31.5–35.7)
MCV RBC AUTO: 94.6 FL (ref 79–97)
MONOCYTES # BLD AUTO: 1.8 10*3/MM3 (ref 0.1–0.9)
MONOCYTES NFR BLD AUTO: 13.1 % (ref 5–12)
NEUTROPHILS NFR BLD AUTO: 10.87 10*3/MM3 (ref 1.7–7)
NEUTROPHILS NFR BLD AUTO: 79.2 % (ref 42.7–76)
NRBC BLD AUTO-RTO: 0 /100 WBC (ref 0–0.2)
PHOSPHATE SERPL-MCNC: 5.2 MG/DL (ref 2.5–4.5)
PLATELET # BLD AUTO: 216 10*3/MM3 (ref 140–450)
PMV BLD AUTO: 11.6 FL (ref 6–12)
POTASSIUM SERPL-SCNC: 4.1 MMOL/L (ref 3.5–5.2)
PROT SERPL-MCNC: 6.6 G/DL (ref 6–8.5)
RBC # BLD AUTO: 4.41 10*6/MM3 (ref 3.77–5.28)
SODIUM SERPL-SCNC: 138 MMOL/L (ref 136–145)
WBC NRBC COR # BLD AUTO: 13.74 10*3/MM3 (ref 3.4–10.8)

## 2024-12-03 PROCEDURE — 94799 UNLISTED PULMONARY SVC/PX: CPT

## 2024-12-03 PROCEDURE — 84100 ASSAY OF PHOSPHORUS: CPT | Performed by: FAMILY MEDICINE

## 2024-12-03 PROCEDURE — 94760 N-INVAS EAR/PLS OXIMETRY 1: CPT

## 2024-12-03 PROCEDURE — 85025 COMPLETE CBC W/AUTO DIFF WBC: CPT | Performed by: FAMILY MEDICINE

## 2024-12-03 PROCEDURE — 80048 BASIC METABOLIC PNL TOTAL CA: CPT | Performed by: FAMILY MEDICINE

## 2024-12-03 PROCEDURE — 94664 DEMO&/EVAL PT USE INHALER: CPT

## 2024-12-03 PROCEDURE — 99233 SBSQ HOSP IP/OBS HIGH 50: CPT | Performed by: INTERNAL MEDICINE

## 2024-12-03 PROCEDURE — 83735 ASSAY OF MAGNESIUM: CPT | Performed by: FAMILY MEDICINE

## 2024-12-03 PROCEDURE — 25010000002 HEPARIN (PORCINE) PER 1000 UNITS: Performed by: FAMILY MEDICINE

## 2024-12-03 PROCEDURE — 80076 HEPATIC FUNCTION PANEL: CPT | Performed by: FAMILY MEDICINE

## 2024-12-03 PROCEDURE — 99232 SBSQ HOSP IP/OBS MODERATE 35: CPT | Performed by: INTERNAL MEDICINE

## 2024-12-03 RX ORDER — IPRATROPIUM BROMIDE AND ALBUTEROL SULFATE 2.5; .5 MG/3ML; MG/3ML
3 SOLUTION RESPIRATORY (INHALATION) EVERY 6 HOURS PRN
Status: DISCONTINUED | OUTPATIENT
Start: 2024-12-03 | End: 2024-12-05 | Stop reason: HOSPADM

## 2024-12-03 RX ADMIN — Medication: at 20:58

## 2024-12-03 RX ADMIN — Medication 1 APPLICATION: at 21:00

## 2024-12-03 RX ADMIN — NYSTATIN OINTMENT 1 APPLICATION: 100000 OINTMENT TOPICAL at 21:00

## 2024-12-03 RX ADMIN — ALLOPURINOL 100 MG: 100 TABLET ORAL at 09:00

## 2024-12-03 RX ADMIN — ROSUVASTATIN CALCIUM 10 MG: 5 TABLET, FILM COATED ORAL at 20:56

## 2024-12-03 RX ADMIN — AMLODIPINE BESYLATE 5 MG: 10 TABLET ORAL at 09:00

## 2024-12-03 RX ADMIN — GUAIFENESIN 600 MG: 600 TABLET ORAL at 20:56

## 2024-12-03 RX ADMIN — MONTELUKAST 10 MG: 10 TABLET, FILM COATED ORAL at 20:56

## 2024-12-03 RX ADMIN — HEPARIN SODIUM 5000 UNITS: 5000 INJECTION INTRAVENOUS; SUBCUTANEOUS at 08:59

## 2024-12-03 RX ADMIN — GUAIFENESIN 600 MG: 600 TABLET ORAL at 09:00

## 2024-12-03 RX ADMIN — Medication: at 09:27

## 2024-12-03 RX ADMIN — Medication 10 ML: at 20:57

## 2024-12-03 RX ADMIN — NEBIVOLOL 10 MG: 10 TABLET ORAL at 09:00

## 2024-12-03 RX ADMIN — Medication 1000 MG: at 09:03

## 2024-12-03 RX ADMIN — ALLOPURINOL 100 MG: 100 TABLET ORAL at 20:56

## 2024-12-03 RX ADMIN — Medication 10 ML: at 09:04

## 2024-12-03 RX ADMIN — BUTYROSPERMUM PARKII(SHEA BUTTER), SIMMONDSIA CHINENSIS (JOJOBA) SEED OIL, ALOE BARBADENSIS LEAF EXTRACT 1 CAPSULE: .01; 1; 3.5 LIQUID TOPICAL at 09:03

## 2024-12-03 RX ADMIN — Medication 1 APPLICATION: at 10:16

## 2024-12-03 RX ADMIN — ARFORMOTEROL TARTRATE 15 MCG: 15 SOLUTION RESPIRATORY (INHALATION) at 07:16

## 2024-12-03 RX ADMIN — BUTYROSPERMUM PARKII(SHEA BUTTER), SIMMONDSIA CHINENSIS (JOJOBA) SEED OIL, ALOE BARBADENSIS LEAF EXTRACT 1 CAPSULE: .01; 1; 3.5 LIQUID TOPICAL at 20:57

## 2024-12-03 RX ADMIN — BUDESONIDE 0.5 MG: 0.5 INHALANT RESPIRATORY (INHALATION) at 07:16

## 2024-12-03 RX ADMIN — Medication 1000 MG: at 20:57

## 2024-12-03 RX ADMIN — FUROSEMIDE 40 MG: 40 TABLET ORAL at 09:00

## 2024-12-03 RX ADMIN — BUDESONIDE 0.5 MG: 0.5 INHALANT RESPIRATORY (INHALATION) at 19:24

## 2024-12-03 RX ADMIN — NYSTATIN OINTMENT 1 APPLICATION: 100000 OINTMENT TOPICAL at 10:16

## 2024-12-03 RX ADMIN — LEVOTHYROXINE SODIUM 100 MCG: 0.1 TABLET ORAL at 06:33

## 2024-12-03 RX ADMIN — Medication 12.5 MG: at 09:00

## 2024-12-03 RX ADMIN — ARFORMOTEROL TARTRATE 15 MCG: 15 SOLUTION RESPIRATORY (INHALATION) at 19:24

## 2024-12-03 RX ADMIN — PANTOPRAZOLE SODIUM 40 MG: 40 TABLET, DELAYED RELEASE ORAL at 06:33

## 2024-12-03 RX ADMIN — CLOPIDOGREL BISULFATE 75 MG: 75 TABLET ORAL at 09:00

## 2024-12-03 RX ADMIN — IPRATROPIUM BROMIDE AND ALBUTEROL SULFATE 3 ML: .5; 3 SOLUTION RESPIRATORY (INHALATION) at 07:16

## 2024-12-03 NOTE — PROGRESS NOTES
" LOS: 7 days   Patient Care Team:  Kaelyn Eugene MD as PCP - General (Family Medicine)  Simon Talbot MD as Consulting Physician (Cardiology)  Kaelyn Eugene MD (Family Medicine)    Chief Complaint: JOSIE    Subjective     History of Present Illness  Pt resting in bed without any problems.    Subjective    History taken from: patient chart RN    Objective     Vital Sign Min/Max for last 24 hours  Temp  Min: 97.2 °F (36.2 °C)  Max: 98.6 °F (37 °C)   BP  Min: 111/71  Max: 144/75   Pulse  Min: 61  Max: 86   Resp  Min: 18  Max: 19   SpO2  Min: 90 %  Max: 96 %   Flow (L/min) (Oxygen Therapy)  Min: 2  Max: 2   Weight  Min: 66.5 kg (146 lb 9.7 oz)  Max: 66.5 kg (146 lb 9.7 oz)     Flowsheet Rows      Flowsheet Row First Filed Value   Admission Height 149.9 cm (59.02\") Documented at 11/25/2024 2215   Admission Weight 70.4 kg (155 lb 3.3 oz) Documented at 11/25/2024 2215            No intake/output data recorded.  I/O last 3 completed shifts:  In: 240 [P.O.:240]  Out: 375 [Urine:375]    Objective:  General Appearance:  Comfortable.    Vital signs: (most recent): Blood pressure 129/65, pulse 69, temperature 97.3 °F (36.3 °C), temperature source Oral, resp. rate 18, height 149.9 cm (59.02\"), weight 66.5 kg (146 lb 9.7 oz), SpO2 95%.  Vital signs are normal.    HEENT: Normal HEENT exam.    Lungs:  Normal effort and normal respiratory rate.    Heart: Normal rate.  Regular rhythm.    Abdomen: Bowel sounds are normal.     Extremities: Normal range of motion.  There is no dependent edema.    Pulses: Distal pulses are intact.    Neurological: Patient is alert and oriented to person, place and time.    Pupils:  Pupils are equal, round, and reactive to light.    Skin:  Dry.                Results Review:     I reviewed the patient's new clinical results.  I reviewed the patient's new imaging results and agree with the interpretation.  I reviewed the patient's other test results and agree with the " "interpretation    WBC WBC   Date Value Ref Range Status   12/03/2024 13.74 (H) 3.40 - 10.80 10*3/mm3 Final   12/02/2024 12.40 (H) 3.40 - 10.80 10*3/mm3 Final   12/01/2024 10.80 3.40 - 10.80 10*3/mm3 Final      HGB Hemoglobin   Date Value Ref Range Status   12/03/2024 13.3 12.0 - 15.9 g/dL Final   12/02/2024 13.4 12.0 - 15.9 g/dL Final   12/01/2024 12.7 12.0 - 15.9 g/dL Final      HCT Hematocrit   Date Value Ref Range Status   12/03/2024 41.7 34.0 - 46.6 % Final   12/02/2024 43.6 34.0 - 46.6 % Final   12/01/2024 42.1 34.0 - 46.6 % Final      Platlets No results found for: \"LABPLAT\"   MCV MCV   Date Value Ref Range Status   12/03/2024 94.6 79.0 - 97.0 fL Final   12/02/2024 94.6 79.0 - 97.0 fL Final   12/01/2024 98.8 (H) 79.0 - 97.0 fL Final          Sodium Sodium   Date Value Ref Range Status   12/03/2024 138 136 - 145 mmol/L Final   12/02/2024 139 136 - 145 mmol/L Final   12/01/2024 142 136 - 145 mmol/L Final      Potassium Potassium   Date Value Ref Range Status   12/03/2024 4.1 3.5 - 5.2 mmol/L Final   12/02/2024 3.7 3.5 - 5.2 mmol/L Final   12/01/2024 3.9 3.5 - 5.2 mmol/L Final      Chloride Chloride   Date Value Ref Range Status   12/03/2024 98 98 - 107 mmol/L Final   12/02/2024 98 98 - 107 mmol/L Final   12/01/2024 101 98 - 107 mmol/L Final      CO2 CO2   Date Value Ref Range Status   12/03/2024 23.9 22.0 - 29.0 mmol/L Final   12/02/2024 24.6 22.0 - 29.0 mmol/L Final   12/01/2024 27.3 22.0 - 29.0 mmol/L Final      BUN BUN   Date Value Ref Range Status   12/03/2024 94 (H) 8 - 23 mg/dL Final   12/02/2024 85 (H) 8 - 23 mg/dL Final   12/01/2024 73 (H) 8 - 23 mg/dL Final      Creatinine Creatinine   Date Value Ref Range Status   12/03/2024 2.65 (H) 0.57 - 1.00 mg/dL Final   12/02/2024 2.47 (H) 0.57 - 1.00 mg/dL Final   12/01/2024 2.08 (H) 0.57 - 1.00 mg/dL Final      Calcium Calcium   Date Value Ref Range Status   12/03/2024 10.0 8.6 - 10.5 mg/dL Final   12/02/2024 10.3 8.6 - 10.5 mg/dL Final   12/01/2024 10.7 (H) " "8.6 - 10.5 mg/dL Final      PO4 No results found for: \"CAPO4\"   Albumin Albumin   Date Value Ref Range Status   12/03/2024 3.6 3.5 - 5.2 g/dL Final   12/02/2024 3.6 3.5 - 5.2 g/dL Final   12/01/2024 3.6 3.5 - 5.2 g/dL Final      Magnesium Magnesium   Date Value Ref Range Status   12/03/2024 2.6 (H) 1.6 - 2.4 mg/dL Final   12/02/2024 2.4 1.6 - 2.4 mg/dL Final   12/01/2024 2.3 1.6 - 2.4 mg/dL Final      Uric Acid No results found for: \"URICACID\"     Medication Review:   !Patient Home Medications Stored on Unit, , Not Applicable, BID  allopurinol, 100 mg, Oral, BID  amLODIPine, 5 mg, Oral, Q24H  arformoterol, 15 mcg, Nebulization, BID - RT  ascorbic acid, 1,000 mg, Oral, BID  budesonide, 0.5 mg, Nebulization, BID - RT  clopidogrel, 75 mg, Oral, Daily  dimethicone, 1 Application, Topical, 2 times per day  furosemide, 40 mg, Oral, Daily  guaiFENesin, 600 mg, Oral, Q12H  heparin (porcine), 5,000 Units, Subcutaneous, Q12H  ipratropium-albuterol, 3 mL, Nebulization, Q6H - RT  Iron Glycinate, 1 capsule, Oral, BID  levothyroxine, 100 mcg, Oral, Q AM  montelukast, 10 mg, Oral, Nightly  nebivolol, 10 mg, Oral, Daily  nystatin, 1 Application, Topical, 2 times per day  pantoprazole, 40 mg, Oral, Q AM  rosuvastatin, 10 mg, Oral, Nightly  sodium chloride, 10 mL, Intravenous, Q12H  spironolactone, 12.5 mg, Oral, Daily          Assessment & Plan       Acute on chronic respiratory failure with hypercapnia    Presence of biventricular implantable cardioverter-defibrillator (ICD)    Essential hypertension    HLD (hyperlipidemia)    COPD with acute exacerbation    Dyspnea    History of breast cancer    Acute on chronic congestive heart failure      Assessment & Plan  - JOSIE-  Clinically she seems better.  Restarted Lasix 40 mg daily in AM.  Continue current dose of spironolactone.  Cr at 2.6 today.  Monitor.    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 Office records reviewed.  LOV 7/19/2022, " baseline creatinine 1.8-1.9 previously.  May have had some progression of CKD in that interval.    - Congestive heart failure,     - Acute respiratory failure on admission, with COPD exacerbation and possible pneumonia, much better, per pulmonary.    - Hypertension, blood pressure is controlled, no documented hypotensive episodes in the last 3 days.    - Previous history of anemia, current hemoglobin 13.4.    Oliver Otero MD  12/03/24  08:41 EST

## 2024-12-03 NOTE — PROGRESS NOTES
Pulmonary / Critical Care Progress Note      Patient Name: Yelena Sanchez  : 1935  MRN: [id number]  Primary Care Physician:  Kaelyn Eugene MD  Date of admission: 2024    Subjective   Subjective   Follow-up for acute hypoxic and hypercapnic respiratory failure, CHF exacerbation    On room air  Dyspnea improved  Scant dry cough improved  No sputum production or hemoptysis  No chest pain  No fever or chills      Objective   Objective     Vitals:   Temp:  [97.2 °F (36.2 °C)-98.6 °F (37 °C)] 97.3 °F (36.3 °C)  Heart Rate:  [61-86] 69  Resp:  [18-19] 18  BP: (111-144)/(64-75) 129/65  Flow (L/min) (Oxygen Therapy):  [2] 2    Physical Exam   Vital Signs Reviewed   General: Chronically ill-appearing, elderly female, Alert, NAD, sitting up in chair  Chest:  good aeration, scant crackles to auscultation, no work of breathing noted on room air  CV: regular rate and rhythm regular  EXT:  no clubbing, no cyanosis, no edema  Neuro:  A&Ox3, moving all 4 extremities spontaneously  Skin: No rashes or lesions noted    Result Review    Result Review:  I have personally reviewed the results from the time of this admission to 12/3/2024 08:27 EST and agree with these findings:  [x]  Laboratory  [x]  Microbiology  [x]  Radiology  [x]  EKG/Telemetry   []  Cardiology/Vascular   []  Pathology  []  Old records  []  Other:  Most notable findings include:   proBNP 15,960 --> 26,929 --> 9337      Lab 24  0502 24  0503 24  0811 24  0525 24  0529 24  0418 24  0444 24  0443 24  0506   WBC 13.74* 12.40*  --  10.80 12.16* 13.15* 15.86*  --  12.97*   HEMOGLOBIN 13.3 13.4  --  12.7 11.6* 11.3* 11.5*  --  11.2*   HEMATOCRIT 41.7 43.6  --  42.1 38.1 36.9 37.5  --  36.0   PLATELETS 216 233  --  233 251 242 263  --  258   SODIUM 138 139 142  --  142 144  --  141 143   POTASSIUM 4.1 3.7 3.9  --  4.0 4.2  --  3.9 4.2   CHLORIDE 98 98 101  --  107 108*  --  104 106   CO2 23.9 24.6  27.3  --  21.5* 23.7  --  23.2 24.3   BUN 94* 85* 73*  --  65* 70*  --  60* 56*   CREATININE 2.65* 2.47* 2.08*  --  1.68* 2.10*  --  1.79* 1.80*   GLUCOSE 96 102* 93  --  90 124*  --  118* 128*   CALCIUM 10.0 10.3 10.7*  --  9.9 9.7  --  9.8 9.5   PHOSPHORUS 5.2* 5.1*  --  4.4 4.3 4.4  --  3.6 3.7   TOTAL PROTEIN 6.6 6.7 6.6  --  6.3 6.3  --  6.5 6.5   ALBUMIN 3.6 3.6 3.6  --  3.5 3.6  --  3.6 3.6   CT Chest Without Contrast Diagnostic    Result Date: 11/26/2024  CT CHEST WO CONTRAST DIAGNOSTIC Date of Exam: 11/26/2024 5:17 PM EST Indication: hypoxia. Comparison: Chest radiograph from November 25, 2024 and CT chest from October 8, 2019 Technique: Axial CT images were obtained of the chest without contrast administration.  Reconstructed coronal and sagittal images were also obtained. Automated exposure control and iterative construction methods were used. Findings: There are changes from left lower lobectomy, and the residual central tracheobronchial tree is clear. There are trace bilateral pleural effusions with mild bibasilar atelectasis. There is no focal consolidation. No discrete pulmonary nodule is identified. A left subclavian pacemaker is in place. The heart is enlarged, with evidence of calcified coronary artery disease. The thoracic aorta appears normal in caliber. The main pulmonary artery is enlarged, suggesting a component of pulmonary arterial hypertension. No abnormally enlarged lymph nodes are identified. There are changes from left mastectomy. Partial evaluation of the upper abdomen demonstrates a right renal cyst. No aggressive osseous lesions are identified.     Impression: Impression: 1.Trace bilateral pleural effusions with mild bibasilar atelectasis. 2.Cardiomegaly. 3.Enlarged main pulmonary artery, suggesting a component of pulmonary arterial hypertension. Electronically Signed: Manuel Anderson MD  11/26/2024 8:37 PM EST  Workstation ID: [id number]     Assessment & Plan   Assessment / Plan      Active Hospital Problems:  Active Hospital Problems    Diagnosis     **Acute on chronic respiratory failure with hypercapnia     Acute on chronic congestive heart failure     Dyspnea     History of breast cancer     COPD with acute exacerbation     HLD (hyperlipidemia)     Essential hypertension     Presence of biventricular implantable cardioverter-defibrillator (ICD)      Impression:  Acute hypoxic and hypercapnic respiratory failure requiring NIPPV  Acute on chronic congestive diastolic heart failure  Acute cardiogenic pulmonary edema  History of lung cancer with left lower lobe lobectomy in 2004  History of left breast cancer 2011 s/p left mastectomy  History of MDS  History of coronary artery disease s/p ICD placed  Never smoker     Plan:  -Continue to wean O2 to maintain SpO2 greater than 90%.  Patient is only on nocturnal oxygen at baseline.  -6 MWT prior to discharge  -Continue Brovana, Pulmicort, and DuoNebs  -Continue course of prednisone and doxycycline  -Continue Lasix 40 mg p.o. daily and Aldactone 12.5 mg oral daily.  Monitor creatinine closely.  -Trend electrolytes and renal panel.  Replace electrolytes as necessary  -Continue bronchopulmonary hygiene.  Encourage I-S and flutter valve  -Encourage mobilization.  Out of bed to chair.  -Follow-up with pulmonology in 1 to 2 weeks after discharge    VTE Prophylaxis:  Pharmacologic VTE prophylaxis orders are present.    CODE STATUS:   Level Of Support Discussed With: Patient  Code Status (Patient has no pulse and is not breathing): CPR (Attempt to Resuscitate)  Medical Interventions (Patient has pulse or is breathing): Full Support      Labs, imaging, microbiology, notes and medications personally reviewed  Discussed with primary    Electronically signed by Karan Hennessy MD, 12/03/24, 12:26 PM EST.

## 2024-12-03 NOTE — PLAN OF CARE
Goal Outcome Evaluation:   VSS. 02 at 2 LPM. Up to chair today. Complains of tenderness to abdomen from heparin injections. Lungs with scattered wheezes noted. Continue with current plan of care.

## 2024-12-03 NOTE — PLAN OF CARE
Goal Outcome Evaluation:              Outcome Evaluation: Patient is on 2L per NC.  She denies any pain or discomfort.  She does report feeling like she needs to go void more often than normal.  Vitals are stable.  She has been ambulating to .  Will continue to monitor.

## 2024-12-03 NOTE — NURSING NOTE
Patient a&o x4, on 2L NC intermittently. X1 to BSC. No acute events throughout shift. All needs met at this time.

## 2024-12-04 LAB
ALBUMIN SERPL-MCNC: 3.6 G/DL (ref 3.5–5.2)
ANION GAP SERPL CALCULATED.3IONS-SCNC: 13.7 MMOL/L (ref 5–15)
BASOPHILS # BLD AUTO: 0.02 10*3/MM3 (ref 0–0.2)
BASOPHILS NFR BLD AUTO: 0.1 % (ref 0–1.5)
BUN SERPL-MCNC: 108 MG/DL (ref 8–23)
BUN/CREAT SERPL: 38.7 (ref 7–25)
CALCIUM SPEC-SCNC: 10 MG/DL (ref 8.6–10.5)
CHLORIDE SERPL-SCNC: 98 MMOL/L (ref 98–107)
CO2 SERPL-SCNC: 24.3 MMOL/L (ref 22–29)
CREAT SERPL-MCNC: 2.79 MG/DL (ref 0.57–1)
DEPRECATED RDW RBC AUTO: 52.1 FL (ref 37–54)
EGFRCR SERPLBLD CKD-EPI 2021: 15.9 ML/MIN/1.73
EOSINOPHIL # BLD AUTO: 0.11 10*3/MM3 (ref 0–0.4)
EOSINOPHIL NFR BLD AUTO: 0.8 % (ref 0.3–6.2)
ERYTHROCYTE [DISTWIDTH] IN BLOOD BY AUTOMATED COUNT: 15.3 % (ref 12.3–15.4)
GLUCOSE SERPL-MCNC: 102 MG/DL (ref 65–99)
HCT VFR BLD AUTO: 41.9 % (ref 34–46.6)
HGB BLD-MCNC: 13 G/DL (ref 12–15.9)
IMM GRANULOCYTES # BLD AUTO: 0.19 10*3/MM3 (ref 0–0.05)
IMM GRANULOCYTES NFR BLD AUTO: 1.3 % (ref 0–0.5)
LYMPHOCYTES # BLD AUTO: 0.68 10*3/MM3 (ref 0.7–3.1)
LYMPHOCYTES NFR BLD AUTO: 4.8 % (ref 19.6–45.3)
MCH RBC QN AUTO: 29.5 PG (ref 26.6–33)
MCHC RBC AUTO-ENTMCNC: 31 G/DL (ref 31.5–35.7)
MCV RBC AUTO: 95.2 FL (ref 79–97)
MONOCYTES # BLD AUTO: 2.06 10*3/MM3 (ref 0.1–0.9)
MONOCYTES NFR BLD AUTO: 14.5 % (ref 5–12)
NEUTROPHILS NFR BLD AUTO: 11.1 10*3/MM3 (ref 1.7–7)
NEUTROPHILS NFR BLD AUTO: 78.5 % (ref 42.7–76)
NRBC BLD AUTO-RTO: 0 /100 WBC (ref 0–0.2)
PHOSPHATE SERPL-MCNC: 5.3 MG/DL (ref 2.5–4.5)
PLATELET # BLD AUTO: 223 10*3/MM3 (ref 140–450)
PMV BLD AUTO: 11.6 FL (ref 6–12)
POTASSIUM SERPL-SCNC: 3.6 MMOL/L (ref 3.5–5.2)
RBC # BLD AUTO: 4.4 10*6/MM3 (ref 3.77–5.28)
SODIUM SERPL-SCNC: 136 MMOL/L (ref 136–145)
WBC NRBC COR # BLD AUTO: 14.16 10*3/MM3 (ref 3.4–10.8)

## 2024-12-04 PROCEDURE — 99222 1ST HOSP IP/OBS MODERATE 55: CPT | Performed by: INTERNAL MEDICINE

## 2024-12-04 PROCEDURE — 94664 DEMO&/EVAL PT USE INHALER: CPT

## 2024-12-04 PROCEDURE — 97530 THERAPEUTIC ACTIVITIES: CPT

## 2024-12-04 PROCEDURE — 80069 RENAL FUNCTION PANEL: CPT | Performed by: INTERNAL MEDICINE

## 2024-12-04 PROCEDURE — 94799 UNLISTED PULMONARY SVC/PX: CPT

## 2024-12-04 PROCEDURE — 97116 GAIT TRAINING THERAPY: CPT

## 2024-12-04 PROCEDURE — 99232 SBSQ HOSP IP/OBS MODERATE 35: CPT | Performed by: INTERNAL MEDICINE

## 2024-12-04 PROCEDURE — 99233 SBSQ HOSP IP/OBS HIGH 50: CPT | Performed by: INTERNAL MEDICINE

## 2024-12-04 PROCEDURE — 94760 N-INVAS EAR/PLS OXIMETRY 1: CPT

## 2024-12-04 PROCEDURE — 85025 COMPLETE CBC W/AUTO DIFF WBC: CPT | Performed by: INTERNAL MEDICINE

## 2024-12-04 RX ORDER — AMLODIPINE BESYLATE 5 MG/1
2.5 TABLET ORAL
Status: DISCONTINUED | OUTPATIENT
Start: 2024-12-05 | End: 2024-12-05 | Stop reason: HOSPADM

## 2024-12-04 RX ADMIN — NYSTATIN OINTMENT 1 APPLICATION: 100000 OINTMENT TOPICAL at 20:58

## 2024-12-04 RX ADMIN — BUTYROSPERMUM PARKII(SHEA BUTTER), SIMMONDSIA CHINENSIS (JOJOBA) SEED OIL, ALOE BARBADENSIS LEAF EXTRACT 1 CAPSULE: .01; 1; 3.5 LIQUID TOPICAL at 08:27

## 2024-12-04 RX ADMIN — BUTYROSPERMUM PARKII(SHEA BUTTER), SIMMONDSIA CHINENSIS (JOJOBA) SEED OIL, ALOE BARBADENSIS LEAF EXTRACT 1 CAPSULE: .01; 1; 3.5 LIQUID TOPICAL at 20:56

## 2024-12-04 RX ADMIN — Medication 12.5 MG: at 08:43

## 2024-12-04 RX ADMIN — NYSTATIN OINTMENT 1 APPLICATION: 100000 OINTMENT TOPICAL at 10:20

## 2024-12-04 RX ADMIN — ROSUVASTATIN CALCIUM 10 MG: 5 TABLET, FILM COATED ORAL at 20:55

## 2024-12-04 RX ADMIN — Medication 1 APPLICATION: at 20:58

## 2024-12-04 RX ADMIN — Medication 10 ML: at 08:29

## 2024-12-04 RX ADMIN — ARFORMOTEROL TARTRATE 15 MCG: 15 SOLUTION RESPIRATORY (INHALATION) at 19:07

## 2024-12-04 RX ADMIN — Medication 1 APPLICATION: at 10:19

## 2024-12-04 RX ADMIN — BUDESONIDE 0.5 MG: 0.5 INHALANT RESPIRATORY (INHALATION) at 19:08

## 2024-12-04 RX ADMIN — GUAIFENESIN 600 MG: 600 TABLET ORAL at 08:26

## 2024-12-04 RX ADMIN — Medication 1000 MG: at 08:27

## 2024-12-04 RX ADMIN — BUDESONIDE 0.5 MG: 0.5 INHALANT RESPIRATORY (INHALATION) at 08:00

## 2024-12-04 RX ADMIN — Medication: at 08:42

## 2024-12-04 RX ADMIN — PANTOPRAZOLE SODIUM 40 MG: 40 TABLET, DELAYED RELEASE ORAL at 05:33

## 2024-12-04 RX ADMIN — GUAIFENESIN 600 MG: 600 TABLET ORAL at 20:55

## 2024-12-04 RX ADMIN — CLOPIDOGREL BISULFATE 75 MG: 75 TABLET ORAL at 08:26

## 2024-12-04 RX ADMIN — LEVOTHYROXINE SODIUM 100 MCG: 0.1 TABLET ORAL at 05:33

## 2024-12-04 RX ADMIN — NEBIVOLOL 10 MG: 10 TABLET ORAL at 08:26

## 2024-12-04 RX ADMIN — Medication 10 ML: at 20:57

## 2024-12-04 RX ADMIN — IPRATROPIUM BROMIDE AND ALBUTEROL SULFATE 3 ML: .5; 3 SOLUTION RESPIRATORY (INHALATION) at 08:00

## 2024-12-04 RX ADMIN — ALLOPURINOL 100 MG: 100 TABLET ORAL at 20:55

## 2024-12-04 RX ADMIN — ARFORMOTEROL TARTRATE 15 MCG: 15 SOLUTION RESPIRATORY (INHALATION) at 08:00

## 2024-12-04 RX ADMIN — ALLOPURINOL 100 MG: 100 TABLET ORAL at 08:26

## 2024-12-04 RX ADMIN — AMLODIPINE BESYLATE 5 MG: 10 TABLET ORAL at 08:26

## 2024-12-04 RX ADMIN — Medication: at 20:57

## 2024-12-04 RX ADMIN — MONTELUKAST 10 MG: 10 TABLET, FILM COATED ORAL at 20:55

## 2024-12-04 RX ADMIN — Medication 1000 MG: at 20:56

## 2024-12-04 NOTE — PROGRESS NOTES
" LOS: 8 days   Patient Care Team:  Kaelyn Eugene MD as PCP - General (Family Medicine)  Simon Talbot MD as Consulting Physician (Cardiology)  Kaelyn Eugene MD (Family Medicine)    Chief Complaint: JOSIE    Subjective     History of Present Illness  Pt resting in bed, no distress tolerating p.o.  Hurting all over.  Urine output not recorded.    Subjective:  Symptoms:  Improved.  No shortness of breath or chest pain.    Diet:  Adequate intake.  No nausea or vomiting.    Pain:  She complains of pain that is mild.        History taken from: patient chart RN    Objective     Vital Sign Min/Max for last 24 hours  Temp  Min: 97.2 °F (36.2 °C)  Max: 98.1 °F (36.7 °C)   BP  Min: 104/72  Max: 135/61   Pulse  Min: 61  Max: 86   Resp  Min: 18  Max: 18   SpO2  Min: 90 %  Max: 99 %   Flow (L/min) (Oxygen Therapy)  Min: 2  Max: 2   Weight  Min: 67.3 kg (148 lb 5.9 oz)  Max: 67.3 kg (148 lb 5.9 oz)     Flowsheet Rows      Flowsheet Row First Filed Value   Admission Height 149.9 cm (59.02\") Documented at 11/25/2024 2215   Admission Weight 70.4 kg (155 lb 3.3 oz) Documented at 11/25/2024 2215            I/O this shift:  In: 480 [P.O.:480]  Out: -   I/O last 3 completed shifts:  In: 1080 [P.O.:1080]  Out: 375 [Urine:375]    Objective:  General Appearance:  Uncomfortable, in no acute distress and in pain.    Vital signs: (most recent): Blood pressure 117/63, pulse 62, temperature 97.2 °F (36.2 °C), temperature source Oral, resp. rate 18, height 149.9 cm (59.02\"), weight 67.3 kg (148 lb 5.9 oz), SpO2 99%.  Vital signs are normal.  No fever.    Output: Producing urine.    HEENT: Normal HEENT exam.    Lungs:  Normal effort and normal respiratory rate.    Heart: Normal rate.  Regular rhythm.    Chest: (Left upper anterior chest pacemaker)  Abdomen: Bowel sounds are normal.     Extremities: Normal range of motion.  There is no dependent edema.    Pulses: Distal pulses are intact.    Neurological: Patient is alert " "and oriented to person, place and time.    Pupils:  Pupils are equal, round, and reactive to light.    Skin:  Dry.                Results Review:     I reviewed the patient's new clinical results.  I reviewed the patient's new imaging results and agree with the interpretation.  I reviewed the patient's other test results and agree with the interpretation    WBC WBC   Date Value Ref Range Status   12/04/2024 14.16 (H) 3.40 - 10.80 10*3/mm3 Final   12/03/2024 13.74 (H) 3.40 - 10.80 10*3/mm3 Final   12/02/2024 12.40 (H) 3.40 - 10.80 10*3/mm3 Final      HGB Hemoglobin   Date Value Ref Range Status   12/04/2024 13.0 12.0 - 15.9 g/dL Final   12/03/2024 13.3 12.0 - 15.9 g/dL Final   12/02/2024 13.4 12.0 - 15.9 g/dL Final      HCT Hematocrit   Date Value Ref Range Status   12/04/2024 41.9 34.0 - 46.6 % Final   12/03/2024 41.7 34.0 - 46.6 % Final   12/02/2024 43.6 34.0 - 46.6 % Final      Platlets No results found for: \"LABPLAT\"   MCV MCV   Date Value Ref Range Status   12/04/2024 95.2 79.0 - 97.0 fL Final   12/03/2024 94.6 79.0 - 97.0 fL Final   12/02/2024 94.6 79.0 - 97.0 fL Final          Sodium Sodium   Date Value Ref Range Status   12/04/2024 136 136 - 145 mmol/L Final   12/03/2024 138 136 - 145 mmol/L Final   12/02/2024 139 136 - 145 mmol/L Final      Potassium Potassium   Date Value Ref Range Status   12/04/2024 3.6 3.5 - 5.2 mmol/L Final   12/03/2024 4.1 3.5 - 5.2 mmol/L Final   12/02/2024 3.7 3.5 - 5.2 mmol/L Final      Chloride Chloride   Date Value Ref Range Status   12/04/2024 98 98 - 107 mmol/L Final   12/03/2024 98 98 - 107 mmol/L Final   12/02/2024 98 98 - 107 mmol/L Final      CO2 CO2   Date Value Ref Range Status   12/04/2024 24.3 22.0 - 29.0 mmol/L Final   12/03/2024 23.9 22.0 - 29.0 mmol/L Final   12/02/2024 24.6 22.0 - 29.0 mmol/L Final      BUN BUN   Date Value Ref Range Status   12/04/2024 108 (H) 8 - 23 mg/dL Final   12/03/2024 94 (H) 8 - 23 mg/dL Final   12/02/2024 85 (H) 8 - 23 mg/dL Final    " "  Creatinine Creatinine   Date Value Ref Range Status   12/04/2024 2.79 (H) 0.57 - 1.00 mg/dL Final   12/03/2024 2.65 (H) 0.57 - 1.00 mg/dL Final   12/02/2024 2.47 (H) 0.57 - 1.00 mg/dL Final      Calcium Calcium   Date Value Ref Range Status   12/04/2024 10.0 8.6 - 10.5 mg/dL Final   12/03/2024 10.0 8.6 - 10.5 mg/dL Final   12/02/2024 10.3 8.6 - 10.5 mg/dL Final      PO4 No results found for: \"CAPO4\"   Albumin Albumin   Date Value Ref Range Status   12/04/2024 3.6 3.5 - 5.2 g/dL Final   12/03/2024 3.6 3.5 - 5.2 g/dL Final   12/02/2024 3.6 3.5 - 5.2 g/dL Final      Magnesium Magnesium   Date Value Ref Range Status   12/03/2024 2.6 (H) 1.6 - 2.4 mg/dL Final   12/02/2024 2.4 1.6 - 2.4 mg/dL Final      Uric Acid No results found for: \"URICACID\"     Medication Review:   !Patient Home Medications Stored on Unit, , Not Applicable, BID  allopurinol, 100 mg, Oral, BID  [START ON 12/5/2024] amLODIPine, 2.5 mg, Oral, Q24H  arformoterol, 15 mcg, Nebulization, BID - RT  ascorbic acid, 1,000 mg, Oral, BID  budesonide, 0.5 mg, Nebulization, BID - RT  clopidogrel, 75 mg, Oral, Daily  dimethicone, 1 Application, Topical, 2 times per day  furosemide, 40 mg, Oral, Daily  guaiFENesin, 600 mg, Oral, Q12H  heparin (porcine), 5,000 Units, Subcutaneous, Q12H  Iron Glycinate, 1 capsule, Oral, BID  levothyroxine, 100 mcg, Oral, Q AM  montelukast, 10 mg, Oral, Nightly  nebivolol, 10 mg, Oral, Daily  nystatin, 1 Application, Topical, 2 times per day  pantoprazole, 40 mg, Oral, Q AM  rosuvastatin, 10 mg, Oral, Nightly  sodium chloride, 10 mL, Intravenous, Q12H  [Held by provider] spironolactone, 12.5 mg, Oral, Daily          Assessment & Plan       Acute on chronic respiratory failure with hypercapnia    Presence of biventricular implantable cardioverter-defibrillator (ICD)    Essential hypertension    HLD (hyperlipidemia)    COPD with acute exacerbation    Dyspnea    History of breast cancer    Acute on chronic congestive heart " failure      Assessment & Plan  - JOSIE-  Clinically she seems better.  On Lasix 40 mg daily in AM, dose today held.  Continue current dose of spironolactone.  Cr at 2.7 today.  Monitor.    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 Office records reviewed.  LOV 7/19/2022, baseline creatinine 1.8-1.9 previously.  May have had some progression of CKD in that interval.    - Congestive heart failure, EF 26 to 30% with grade 1 diastolic dysfunction and LVH.  Needs probably to except higher creatinine in order to keep her euvolemic.  Consider SGLT2 inhibitors.    - Acute respiratory failure on admission, with COPD exacerbation and possible pneumonia, much better, per pulmonary.    - Hypertension, blood pressure is controlled, no documented hypotensive episodes in the last 3 days.  Will decrease amlodipine dose with holding parameters.    - Previous history of anemia, current hemoglobin 13.    Discussed with her  and with primary.  Will follow    Otilia Orosco MD  12/04/24  18:05 EST

## 2024-12-04 NOTE — PLAN OF CARE
Goal Outcome Evaluation:   VSS. Paced rhythm. Patient states that she felt like her pacemaker shocked her earlier today, cardiology consulted. Ongoing complaints of lower abdomen being tender from heparin injections and she refused injection today. Lasix and  Spironolactone on hold today. Continue with current POC.

## 2024-12-04 NOTE — PROGRESS NOTES
Pulmonary / Critical Care Progress Note      Patient Name: Yelena Sanchez  : 1935  MRN: [id number]  Primary Care Physician:  Kaelyn Eugene MD  Date of admission: 2024    Subjective   Subjective   Follow-up for acute hypoxic and hypercapnic respiratory failure, CHF exacerbation    On room air  Dyspnea improved  Scant dry cough improved  No sputum production or hemoptysis  No chest pain  No fever or chills      Objective   Objective     Vitals:   Temp:  [97.3 °F (36.3 °C)-98.1 °F (36.7 °C)] 97.3 °F (36.3 °C)  Heart Rate:  [61-90] 61  Resp:  [16-18] 18  BP: (115-143)/(60-74) 133/74  Flow (L/min) (Oxygen Therapy):  [2] 2    Physical Exam   Vital Signs Reviewed   General: Chronically ill-appearing, elderly female, Alert, NAD, sitting up in chair  Chest:  good aeration, scant crackles to auscultation, no work of breathing noted on room air  CV: regular rate and rhythm regular  EXT:  no clubbing, no cyanosis, no edema  Neuro:  A&Ox3, moving all 4 extremities spontaneously  Skin: No rashes or lesions noted    Result Review    Result Review:  I have personally reviewed the results from the time of this admission to 2024 07:51 EST and agree with these findings:  [x]  Laboratory  [x]  Microbiology  [x]  Radiology  [x]  EKG/Telemetry   []  Cardiology/Vascular   []  Pathology  []  Old records  []  Other:  Most notable findings include:   proBNP 15,960 --> 26,929 --> 9337      Lab 24  0447 24  0502 24  0503 24  0811 24  0525 24  0529 24  0418 24  0444 24  0443   WBC 14.16* 13.74* 12.40*  --  10.80 12.16* 13.15* 15.86*  --    HEMOGLOBIN 13.0 13.3 13.4  --  12.7 11.6* 11.3* 11.5*  --    HEMATOCRIT 41.9 41.7 43.6  --  42.1 38.1 36.9 37.5  --    PLATELETS 223 216 233  --  233 251 242 263  --    SODIUM 136 138 139 142  --  142 144  --  141   POTASSIUM 3.6 4.1 3.7 3.9  --  4.0 4.2  --  3.9   CHLORIDE 98 98 98 101  --  107 108*  --  104   CO2 24.3 23.9  24.6 27.3  --  21.5* 23.7  --  23.2   * 94* 85* 73*  --  65* 70*  --  60*   CREATININE 2.79* 2.65* 2.47* 2.08*  --  1.68* 2.10*  --  1.79*   GLUCOSE 102* 96 102* 93  --  90 124*  --  118*   CALCIUM 10.0 10.0 10.3 10.7*  --  9.9 9.7  --  9.8   PHOSPHORUS 5.3* 5.2* 5.1*  --  4.4 4.3 4.4  --  3.6   TOTAL PROTEIN  --  6.6 6.7 6.6  --  6.3 6.3  --  6.5   ALBUMIN 3.6 3.6 3.6 3.6  --  3.5 3.6  --  3.6   CT Chest Without Contrast Diagnostic    Result Date: 11/26/2024  CT CHEST WO CONTRAST DIAGNOSTIC Date of Exam: 11/26/2024 5:17 PM EST Indication: hypoxia. Comparison: Chest radiograph from November 25, 2024 and CT chest from October 8, 2019 Technique: Axial CT images were obtained of the chest without contrast administration.  Reconstructed coronal and sagittal images were also obtained. Automated exposure control and iterative construction methods were used. Findings: There are changes from left lower lobectomy, and the residual central tracheobronchial tree is clear. There are trace bilateral pleural effusions with mild bibasilar atelectasis. There is no focal consolidation. No discrete pulmonary nodule is identified. A left subclavian pacemaker is in place. The heart is enlarged, with evidence of calcified coronary artery disease. The thoracic aorta appears normal in caliber. The main pulmonary artery is enlarged, suggesting a component of pulmonary arterial hypertension. No abnormally enlarged lymph nodes are identified. There are changes from left mastectomy. Partial evaluation of the upper abdomen demonstrates a right renal cyst. No aggressive osseous lesions are identified.     Impression: Impression: 1.Trace bilateral pleural effusions with mild bibasilar atelectasis. 2.Cardiomegaly. 3.Enlarged main pulmonary artery, suggesting a component of pulmonary arterial hypertension. Electronically Signed: Manuel Anderson MD  11/26/2024 8:37 PM EST  Workstation ID: [id number]     Assessment & Plan   Assessment / Plan      Active Hospital Problems:  Active Hospital Problems    Diagnosis     **Acute on chronic respiratory failure with hypercapnia     Acute on chronic congestive heart failure     Dyspnea     History of breast cancer     COPD with acute exacerbation     HLD (hyperlipidemia)     Essential hypertension     Presence of biventricular implantable cardioverter-defibrillator (ICD)      Impression:  Acute hypoxic and hypercapnic respiratory failure requiring NIPPV  Acute on chronic congestive diastolic heart failure  Acute cardiogenic pulmonary edema  History of lung cancer with left lower lobe lobectomy in 2004  History of left breast cancer 2011 s/p left mastectomy  History of MDS  History of coronary artery disease s/p ICD placed  Never smoker     Plan:  -Continue to wean O2 to maintain SpO2 greater than 90%.  Patient is only on nocturnal oxygen at baseline.  -6 MWT prior to discharge  -Continue Brovana, Pulmicort, and DuoNebs  -Continue course of prednisone and doxycycline  -Continue Lasix 40 mg p.o. daily and Aldactone 12.5 mg oral daily.  Monitor creatinine closely.  -Trend electrolytes and renal panel.  Replace electrolytes as necessary  -Continue bronchopulmonary hygiene.  Encourage I-S and flutter valve  -Encourage mobilization.  Out of bed to chair.  -Follow-up with pulmonology in 1 to 2 weeks after discharge    VTE Prophylaxis:  Pharmacologic VTE prophylaxis orders are present.    CODE STATUS:   Level Of Support Discussed With: Patient  Code Status (Patient has no pulse and is not breathing): CPR (Attempt to Resuscitate)  Medical Interventions (Patient has pulse or is breathing): Full Support      Labs, imaging, microbiology, notes and medications personally reviewed  Discussed with primary    I, Dr. Karan Hennessy, have spent more than 50% of the total time managing the patient in this encounter today.  This included personally reviewing all pertinent labs, imaging, microbiology and documentation. Also  discussing the case with the patient and any available family, the admitting physician and any available ancillary staff.    Electronically signed by LUZ MARIA Augustine, 12/04/24, 7:51 AM EST.  Electronically signed by Karan Hennessy MD, 12/04/24, 12:57 PM EST.

## 2024-12-04 NOTE — PLAN OF CARE
Goal Outcome Evaluation:  Plan of Care Reviewed With: patient        Progress: improving  Outcome Evaluation: A&O x4, Room air, denies pain/discomfort, able to communicate needs, no concerns, continue POC.                              ICC VISIT NOTE       Chief Complaint   Patient presents with   • Vomiting   • Abdominal Cramping       History Of Present Illness  Rosalia is a 15 year old female presenting with abd pain/back pain and was throwing up last night. Atthis time, she h as no complanits       Past Medical History  No past medical history on file.     Surgical History  No past surgical history on file.     Social History  Social History     Tobacco Use   • Smoking status: Never Smoker   • Smokeless tobacco: Never Used   Vaping Use   • Vaping Use: never used       Family History    Family History   Problem Relation Age of Onset   • Diabetes Father         FH reviewed and negative for any heart or lung disease, bleeding disorders, or issues related to this complaint.     Allergies  ALLERGIES:  Patient has no known allergies.    Medications  No current outpatient medications on file.     No current facility-administered medications for this visit.        Review of Systems  Constitutional: Negative for fever and chills.  Skin: Negative for rash.  HEENT: Negative for eye drainage, ear pain, rhinorrhea, sinus congestion, or sore throat.  Respiratory: Negative wheezing, shortness of breath, or cough.  Cardiovascular: Negative for chest pain, chest pressure, palpitations or diaphoresis.  Gastrointestinal: +ve for nausea, vomiting, diarrhea or abdominal pain.  Genitourinary: Negative for dysuria, urgency, frequency, hematuria or flank pain.  Extremities:  Negative for joint swelling or joint pain.  Neurologic:  Negative for change in sensory or motor function.  Negative for headache.  Endocrine: Negative for heat or cold intolerance, weight loss or gain.  Hematological: Negative for bleeding, bruising or adenopathy.  Psychiatric: Negative for change in affect, change in mentation or sleep disturbance.  All other systems reviewed and otherwise negative unless noted.      Last Recorded Vitals  Vitals:    11/03/21 1008   BP: 97/63   BP Location:  LUE - Left upper extremity   Patient Position: Sitting   Cuff Size: Regular   Pulse: 90   Resp: 16   Temp: 97.6 °F (36.4 °C)   TempSrc: Oral   SpO2: 100%   Weight: 57.5 kg (126 lb 12.2 oz)   Height: 5' 4\" (1.626 m)   LMP: 10/31/2021        Physical Exam  Vitals and nursing note reviewed.   HENT:      Head: Normocephalic and atraumatic.   Eyes:      Pupils: Pupils are equal, round, and reactive to light.   Cardiovascular:      Rate and Rhythm: Normal rate and regular rhythm.      Heart sounds: Normal heart sounds.   Pulmonary:      Effort: Pulmonary effort is normal. No respiratory distress.      Breath sounds: Normal breath sounds. No wheezing.   Musculoskeletal:         General: No tenderness or deformity. Normal range of motion.      Cervical back: Normal range of motion and neck supple.   Skin:     General: Skin is warm and dry.   Neurological:      Mental Status: She is alert.      Gait: Gait is intact.   Psychiatric:         Mood and Affect: Mood and affect normal.         Judgment: Judgment normal.        At this tiept has no complanits. I have a normal exam on the pt including RLQ pain - she has none and no RLQ T/no rovsigs and no ontirator. I spoke mother an dad. I feel it could come from menstrual cramps or a viral syndrome. Will treat as such      Imaging:    No image results found.        Labs  Results for orders placed or performed in visit on 11/03/21   POCT URINE DIP NON-AUTO   Result Value Ref Range    POCT Color Brown     POCT Appearance Hazy     POCT Glucose Urine Negative Negative mg/dL    POCT Bilirubin Negative Negative    POCT Ketones 40 mg/dL Negative mg/dL    POCT Specific Gravity 1.025 1.005 - 1.030    POCT Occult Blood Moderate (A) Negative, 10 Kalyan/uL, 25 Kalyan/uL, 80 Kalyan/uL, 200 Kalyan/uL, 15 William/uL, 70  William/uL, 125  William/uL, 500  William/uL    POCT pH 7.0 5 - 7    POCT Protein 100 mg/dL Negative mg/dL    POCT Urobilinogen 2.0 0.0 - 1.0 mg/dL    Urine Nitrite Negative Negative    WBC (Leukocyte)  Esterase POC Negative Negative, 10 Kalyan/uL, 25 Kalyan/uL, 80 Kalyan/uL, 200 Kalyan/uL, 15 William/uL, 70  William/uL, 125  William/uL, 500  William/uL   POCT URINE PREGNANCY   Result Value Ref Range    URINE PREGNANCY,QUAL Negative Negative    Internal Procedural Controls Acceptable Yes         Differential Diagnosis/MDM:  Diagnoses that have been ruled out:   None   Diagnoses that are still under consideration:   None   Final diagnoses:   1. Nausea and vomiting, intractability of vomiting not specified, unspecified vomiting type    2. Viral syndrome    3. Menstrual cramps            Diagnosis:   (R11.2) Nausea and vomiting, intractability of vomiting not specified, unspecified vomiting type  (primary encounter diagnosis)  Plan: POCT URINE DIP NON-AUTO, 2019 NOVEL CORONAVIRUS        (SARS-COV-2), POCT URINE PREGNANCY    (B34.9) Viral syndrome    (N94.6) Menstrual cramps      New Prescriptions    No medications on file       Follow-up Information    None           Patient Instructions     Patient Education     Coronavirus Disease 2019 (COVID-19): Overview  Coronavirus disease 2019 (COVID-19) is an illness that infects the lungs. It's caused by a type of coronavirus called SARS-CoV-2. There are many types of coronaviruses. They are a very common cause of colds and bronchitis. They can cause a lung infection called pneumonia. Symptoms can range from mild to severe. Some people have no symptoms. These viruses are also found in some animals.  The virus that causes COVID-19 changes (mutates) all the time. This is what all viruses do. It leads to different versions of a virus. These are called variants. COVID-19 variants may spread more easily from person to person. They may cause milder or more severe symptoms.   How the virus spreads is not yet fully known. It seems to spread and infect people easily. Some people may not know how they were infected. The virus may spread through droplets of fluid that a person coughs or sneezes into the air. It  may be spread if you touch a surface with the virus on it and then touch your eyes, nose, or mouth. Handles, knobs, and objects may have the virus on them.     To help prevent spreading the infection, wash your hands often, or use an alcohol-based hand .   For the latest information from the CDC:     · Go to the CDC website  · Call 564-JOU-VPUL ([phone])  What are the symptoms of COVID-19?  Some people have no symptoms. Some have mild symptoms. And other people may have severe symptoms. Types of symptoms can vary from person to person. They may appear 2 to 14 days after contact with the virus. They can include:  · Fever  · Chills  · Coughing  · Trouble breathing or feeling short of breath  · Sore throat  · Stuffy or runny nose  · Headache  · Body aches  · Tiredness  · Nausea, vomiting, diarrhea, or abdominal pain  · New loss of sense of smell or taste  Check your symptoms with the CDC’s Coronavirus Self-.  What are possible complications of COVID-19?  The virus can cause an infection in both lungs called pneumonia. In some cases, this can lead to death. Experts are still learning more about COVID-19 complications. More may be linked to the virus over time.  Some people are at higher risk for complications. This includes:  · Older adults  · People with heart or lung disease  · People with diabetes or kidney disease  · People with health conditions that suppress the immune system  · People who take medicines that suppress the immune system  Rarely, a child may have a severe complication. This is called multisystem inflammatory syndrome in children (MIS-C). MIS-C seems to be like Kawasaki disease. This is a rare illness that causes inflammation of blood vessels and body organs. It's not yet known if MIS-C happens only in children. Experts don’t know if adults are also at risk. It's also not known if it's related to COVID-19. Many children with MIS-C have tested positive for COVID-19. But not all  have tested positive. Experts continue to study MIS-C.   How is COVID-19 diagnosed?  Your healthcare provider will ask:  · What symptoms you have  · Where you live  · If you’ve traveled recently  · If you’ve had contact with sick people   You may have 1 of these tests for COVID-19:  · Viral (molecular) test. You may also hear this called an RT-PCR test. Viral tests are very accurate. A viral test looks for the DNA of the SARS-CoV-2 virus. A viral test can also find COVID-19 variants. There are a few ways to do this. A swab may be wiped inside your nose or throat. Or a long swab may be put into your nose down to the back of your throat. Or a sample of your saliva may be taken. Your test results may be back in about 30 minutes. This depends on the type of test. Some tests must be sent to a lab. These can take several days for the results. Home test kits are now available. Some of these need a prescription. If you use a home kit, follow the instructions in the kit closely. Some kits show results quickly at home. Others must be sent to a lab for the results.  · Antigen test. This can find proteins from the SARS-CoV-2 virus. A swab may be wiped inside your nose or throat. Or a long swab may be put into your nose down to the back of your throat. Some results are back within 1 hour. This depends on the type of test. Positive results are very accurate. But false positive results can happen. This is more common in places where not many people have the virus. Antigen tests are more likely to miss a COVID-19 infection than a viral (molecular) test. If your antigen test is negative but you have symptoms of COVID-19, you may need to have a viral test.  If your provider thinks or confirms that you have COVID-19, you may have other tests. These tests may include:  · Antibody blood test. This type of test can show if you were infected with the virus in the past. It shows antibodies in the blood that give some immunity. The accuracy  and availability of these tests vary. An antibody test may not be able to show if you have an infection right now. This is because it can take up to a few weeks for your body to make antibodies.  · Sputum culture. If you have a wet cough, you may be asked to cough up a small sample of mucus (sputum) from your lungs. This is tested for the virus. It may be tested for pneumonia.  · Imaging tests. You may have a chest X-ray or CT scan.  Can you get COVID-19 again?  At this time, it's not clear if people can get COVID-19 more than once. The CDC notes that if a person has recovered from COVID-19 and is tested again within 3 months, they may still have low levels of the virus in their body. This means they test positive for COVID-19, but are not spreading it. Experts just don’t yet know how long immunity lasts after you have the virus. They don’t know if you can get COVID-19 again.  Vaccines for COVID-19  The FDA has approved several vaccines to help prevent COVID-19 or reduce its severity. No vaccine is 100% effective at preventing an illness. Getting a vaccine is important. It can help prevent the spread of COVID-19 and its variants. It can help reduce the severity of COVID-19 if you get it. This can stop you from getting seriously ill. It can keep you from needing to go to the hospital. One vaccine has been approved for people as young as 12. Pregnant or breastfeeding people can be vaccinated. Ask your healthcare provider which vaccine may be best for you.  The vaccines are given as a shot (injection). This is most often done in a muscle in the upper arm. There are 1-dose and 2-dose vaccines. For the 2-dose vaccine, the second dose is given several weeks after the first. An extra dose of the 2-dose vaccine may be needed for some people who have had a solid organ transplant or who have a very weak immune system. It's given at least 28 days after the second dose. Talk with your healthcare provider about your situation  and risk.  For normally healthy people who have gotten the vaccine, data show that protection may lessen over time. Health experts are exploring the need for a booster shot about 8 months after getting the 1-dose vaccine or 2nd shot of the 2-dose vaccine. Talk to your healthcare provider.  How is COVID-19 treated?  The most proven treatments right now are those to help your body while it fights the virus. This is known as supportive care. It includes:  · Getting rest. This helps your body fight the illness.  · Drinking fluids. Try to drink 6 to 8 glasses of fluids every day. Ask your provider which drinks are best for you. Don't have drinks with caffeine or alcohol.  · Taking over-the-counter (OTC) medicine. These are used to help ease pain and reduce fever. Ask your provider which OTC medicine is safe for you to use.  For severe illness, you may need to stay in the hospital. Your care may include:  · IV (intravenous) fluids. These are given through a vein. This helps to replace fluids in your body.  · Oxygen. You may be given supplemental oxygen. Or you may be put on a breathing machine (ventilator). This is done so you get enough oxygen in your body.  · Prone positioning. Your healthcare team may regularly turn you on your stomach. This is called prone positioning. It helps increase the amount of oxygen you get to your lungs. Follow their instructions on position changes while you're in the hospital. Also follow their advice on the best positions to help your breathing once you go home.  · Remdesivir. This is an antiviral medicine. The FDA has approved it for use on COVID-19. It works by stopping the spread of the SARS-CoV-2 virus in the body. It's approved only for people who are in the hospital. It's for people 12 years and older who weigh at least 88 pounds (40 kgs). In some cases, it may also be used for people younger than 12 years or who weigh less than 88 pounds (40 kgs).  Experts are researching other  types of treatment. These are still being tested. They are not widely used. These include:  · COVID-19 convalescent plasma. Plasma is the liquid part of blood. People who had COVID-19 may be asked to donate plasma. This is called COVID-19 convalescent plasma donation. The plasma may have antibodies that can help fight COVID-19 in people who are very ill with it. Experts don't know how well the plasma will work. They continue to research it. The FDA has approved it for emergency use in some people with severe COVID-19. Ask your provider if you qualify to donate.  · Monoclonal antibody therapy. The FDA approved this for emergency use in some people. They must have a positive COVID-19 viral test and have mild to moderate symptoms. They can’t be in the hospital. It's approved for people 12 years and older who weigh at least 88 pounds (40 kgs) and are at high risk for severe COVID-19 and a hospital stay. This includes people who are 65 years and older and people with some chronic conditions. This therapy is not approved for people who are in the hospital with COVID-19 or need oxygen. Your healthcare team will tell you if you qualify.  Are you at risk for COVID-19?  You are at risk for COVID-19 if any of these apply to you:  · You live in an area with cases of COVID-19  · You traveled to an area with cases of COVID-19  · You had close contact with someone who had COVID-19  Close contact means being within 6 feet. This contact happens for 15 minutes or more. It may be short times of contact that add up to at least 15 minutes over a 24-hour period.  Keep in mind that COVID-19 may be spread by people who do not show symptoms.  Last updated: 8/25/2021  [employer] last reviewed this educational content on 1/1/2020  © 5941-2384 [employer]. All rights reserved. This information is not intended as a substitute for professional medical care. Always follow your healthcare professional's instructions.    Patient  Education     Painful Menstrual Periods (Dysmenorrhea)    Dysmenorrhea is the term used to describe painful menstrual periods.  The uterus is a muscle. Normally, chemicals called prostaglandins cause the uterus to contract during your period. The contractions push out the build-up of tissue that occurs each month inside the uterus. If the contraction is very strong, it can cause pain. The pain may feel like cramping in the lower belly (abdomen), lower back, or thighs. In severe cases, you may have other symptoms as well. These can include nausea, vomiting, loose stools, sweating, or dizziness.   There are 2 types of dysmenorrhea:  Primary dysmenorrhea.  This is common menstrual cramps. It may begin 1 or 2 years after you first get your period. It may get better or go away as you get older or when you have a baby. The cramps are most often felt just before, or on the first day of your period. They may last 1 to 3 days. Treatment is with medicines and comfort measures as described below (see the “Home care” section).   Secondary dysmenorrhea . This may start later in life. It describes menstrual pain that occurs due to an underlying health problem. The pain may last longer than common menstrual cramps. It may also get worse over time. Some problems that can lead to secondary dysmenorrhea include:    · Pelvic inflammatory disease (PID). Infection that involves the female reproductive organs, such as the uterus and fallopian tubes  · Fibroids. Noncancer (benign) growths within the wall of the uterus.  · Endometriosis. Tissue that normally only lines the uterus also grows outside of it (because the abnormal tissue also swells and bleeds each month, it can cause pain).  Once the cause of secondary dysmenorrhea is found, it can be treated. Your healthcare provider will discuss options with you as needed. Your care may also include some of the treatments described below (see the “Home care” section).   Home  care  Medicines  Certain medicines can help ease or prevent menstrual pain and cramping. These can include:   · Nonsteroidal anti-inflammatory drugs (NSAIDs), such as ibuprofen  · Prescription pain medicine, if needed  · Hormone therapy (this includes most methods of hormonal birth control such as pills, vaginal rings, patches, shots, or a hormone-releasing IUD)  General care  To help ease pain and cramping, try these tips:   · Rest as needed.  · Apply a heating pad to the lower belly or back as directed. A warm bath or massage to these areas may also help.  · Exercise regularly. Many women find that being more active each week helps reduce pain and cramping.  · Ask your healthcare provider for advice about other treatments you can try to help control pain and cramping.  Follow-up care  Follow up with your healthcare provider, or as advised.  When to get medical advice  Call your healthcare provider right away if any of these occur:  · Fever of 100.4°F (38°C) or higher, or as directed by your provider  · Pain or cramping gets worse or doesn’t get better with medicine  · Pain or cramping lasts longer than normal or occurs between periods  · Abnormal vaginal discharge between periods  · Bleeding becomes heavy (soaking more than 1 pad or tampon every hour for 3 hours)  · Pink or gray tissue passes from the vagina  [employer] last reviewed this educational content on 6/1/2020  © 2716-5876 [employer]. All rights reserved. This information is not intended as a substitute for professional medical care. Always follow your healthcare professional's instructions.    Patient Education     Painful Menstrual Periods (Dysmenorrhea)    Dysmenorrhea is the term used to describe painful menstrual periods.  The uterus is a muscle. Normally, chemicals called prostaglandins cause the uterus to contract during your period. The contractions push out the build-up of tissue that occurs each month inside the uterus. If the  contraction is very strong, it can cause pain. The pain may feel like cramping in the lower belly (abdomen), lower back, or thighs. In severe cases, you may have other symptoms as well. These can include nausea, vomiting, loose stools, sweating, or dizziness.   There are 2 types of dysmenorrhea:  Primary dysmenorrhea.  This is common menstrual cramps. It may begin 1 or 2 years after you first get your period. It may get better or go away as you get older or when you have a baby. The cramps are most often felt just before, or on the first day of your period. They may last 1 to 3 days. Treatment is with medicines and comfort measures as described below (see the “Home care” section).   Secondary dysmenorrhea . This may start later in life. It describes menstrual pain that occurs due to an underlying health problem. The pain may last longer than common menstrual cramps. It may also get worse over time. Some problems that can lead to secondary dysmenorrhea include:    · Pelvic inflammatory disease (PID). Infection that involves the female reproductive organs, such as the uterus and fallopian tubes  · Fibroids. Noncancer (benign) growths within the wall of the uterus.  · Endometriosis. Tissue that normally only lines the uterus also grows outside of it (because the abnormal tissue also swells and bleeds each month, it can cause pain).  Once the cause of secondary dysmenorrhea is found, it can be treated. Your healthcare provider will discuss options with you as needed. Your care may also include some of the treatments described below (see the “Home care” section).   Home care  Medicines  Certain medicines can help ease or prevent menstrual pain and cramping. These can include:   · Nonsteroidal anti-inflammatory drugs (NSAIDs), such as ibuprofen  · Prescription pain medicine, if needed  · Hormone therapy (this includes most methods of hormonal birth control such as pills, vaginal rings, patches, shots, or a  hormone-releasing IUD)  General care  To help ease pain and cramping, try these tips:   · Rest as needed.  · Apply a heating pad to the lower belly or back as directed. A warm bath or massage to these areas may also help.  · Exercise regularly. Many women find that being more active each week helps reduce pain and cramping.  · Ask your healthcare provider for advice about other treatments you can try to help control pain and cramping.  Follow-up care  Follow up with your healthcare provider, or as advised.  When to get medical advice  Call your healthcare provider right away if any of these occur:  · Fever of 100.4°F (38°C) or higher, or as directed by your provider  · Pain or cramping gets worse or doesn’t get better with medicine  · Pain or cramping lasts longer than normal or occurs between periods  · Abnormal vaginal discharge between periods  · Bleeding becomes heavy (soaking more than 1 pad or tampon every hour for 3 hours)  · Pink or gray tissue passes from the vagina  [employer] last reviewed this educational content on 6/1/2020 © 2000-2021 [employer]. All rights reserved. This information is not intended as a substitute for professional medical care. Always follow your healthcare professional's instructions.         Patient Education     Viral Syndrome (Child)  A virus is the most common cause of illness among children. This may cause a number of different symptoms, depending on what part of the body is affected. If the virus settles in the nose, throat, and lungs, it causes cough, congestion, and sometimes headache. If it settles in the stomach and intestinal tract, it causes vomiting and diarrhea. Sometimes it causes vague symptoms of \"feeling bad all over,\" with fussiness, poor appetite, poor sleeping, and lots of crying. A light rash may also appear for the first few days, then fade away.  A viral illness usually lasts 3 to 5 days, but sometimes it lasts longer, even up to 1 to 2 weeks.  Home measures are all that are needed to treat a viral illness. Antibiotics don't help. Occasionally, a more serious bacterial infection can look like a viral syndrome in the first few days of the illness.   Home care  Follow these guidelines to care for your child at home:  · Fluids. Fever increases water loss from the body. For infants under 1 year old, continue regular feedings (formula or breast). Between feedings give oral rehydration solution, which is available from groceries and drugstores without a prescription. For children older than 1 year, give plenty of fluids like water, juice, ginger ale, lemonade, fruit-based drinks, or popsicles.    · Food. If your child doesn't want to eat solid foods, it's OK for a few days, as long as he or she drinks lots of fluid. (If your child has been diagnosed with a kidney disease, ask your child’s doctor how much and what types of fluids your child should drink to prevent dehydration. If your child has kidney disease, drinking too much fluid can cause it build up in the body and be dangerous to your child’s health.)  · Activity. Keep children with a fever at home resting or playing quietly. Encourage frequent naps. Your child may return to day care or school when the fever is gone and he or she is eating well and feeling better.  · Sleep. Periods of sleeplessness and irritability are common. Give your child plenty of time to sleep.  ? For children 1 year and older: Have your child sleep in a slightly upright position. This is to help make breathing easier. If possible, raise the head of the bed slightly. Or raise your older child’s head and upper body up with extra pillows. Talk with your healthcare provider about how far to raise your child's head.  ? For babies younger than 12 months:  Never use pillows or put your baby to sleep on their stomach or side. Babies younger than 12 months should sleep on a flat, firm surface on their back. Don't use car seats, strollers,  swings, baby carriers, or baby slings for sleep. If your baby falls asleep in one of these, move them to a flat, firm surface as soon as you can.  · Cough. Coughing is a normal part of this illness. A cool mist humidifier at the bedside may be helpful. Over-the-counter (OTC) cough and cold medicine has not been proved to be any more helpful than sweet syrup with no medicine in it. But these medicines can produce serious side effects, especially in infants younger than 2 years. Don’t give OTC cough and cold medicines to children under age 6 years unless your healthcare provider has specifically advised you to do so. Also, don’t expose your child to cigarette smoke. It can make the cough worse.  · Nasal congestion. Suction the nose of infants with a rubber bulb syringe. You may put 2 to 3 drops of saltwater (saline) nose drops in each nostril before suctioning to help remove secretions. Saline nose drops are available without a prescription. You can make it by adding 1/4 teaspoon table salt in 1 cup of water.  · Fever. You may give your child acetaminophen or ibuprofen to control pain and fever, unless another medicine was prescribed for this. If your child has chronic liver or kidney disease or ever had a stomach ulcer or gastrointestinal bleeding, talk with your healthcare provider before using these medicines. Don't give aspirin to anyone younger than 18 years who is ill with a fever. It may cause severe disease or death.  · Prevention. Wash your hands before and after touching your sick child to help prevent giving a new illness to your child and to prevent spreading this viral illness to yourself and to other children.  Follow-up care  Follow up with your child's healthcare provider as advised.  When to seek medical advice  Unless your child's healthcare provider advises otherwise, call the provider right away if:  · Your child has a fever (see Fever and children, below)  · Your child is fussy or crying and  cannot be soothed  · Your child has an earache, sinus pain, stiff or painful neck, or headache  · Your child has increasing abdominal pain or pain that is not getting better after 8 hours  · Your child has repeated diarrhea or vomiting  · A new rash appears  · Your child has signs of dehydration: No wet diapers for 8 hours in infants, little or no urine older children, very dark urine, sunken eyes  · Your child has burning when urinating  Call 911  Call 911 if any of the following occur:  · Lips or skin that turn blue, purple, or gray  · Neck stiffness or rash with a fever  · Convulsion (seizure)  · Wheezing or trouble breathing  · Unusual fussiness or drowsiness  · Confusion  Fever and children  Always use a digital thermometer to check your child’s temperature. Never use a mercury thermometer.  For infants and toddlers, be sure to use a rectal thermometer correctly. A rectal thermometer may accidentally poke a hole in (perforate) the rectum. It may also pass on germs from the stool. Always follow the product maker’s directions for proper use. If you don’t feel comfortable taking a rectal temperature, use another method. When you talk to your child’s healthcare provider, tell him or her which method you used to take your child’s temperature.  Here are guidelines for fever temperature. Ear temperatures aren’t accurate before 6 months of age. Don’t take an oral temperature until your child is at least 4 years old.  Infant under 3 months old:  · Ask your child’s healthcare provider how you should take the temperature.  · Rectal or forehead (temporal artery) temperature of 100.4°F (38°C) or higher, or as directed by the provider  · Armpit temperature of 99°F (37.2°C) or higher, or as directed by the provider  Child age 3 to 36 months:  · Rectal, forehead (temporal artery), or ear temperature of 102°F (38.9°C) or higher, or as directed by the provider  · Armpit temperature of 101°F (38.3°C) or higher, or as directed by  the provider  Child of any age:  · Repeated temperature of 104°F (40°C) or higher, or as directed by the provider  · Fever that lasts more than 24 hours in a child under 2 years old. Or a fever that lasts for 3 days in a child 2 years or older.  [employer] last reviewed this educational content on 4/1/2018 © 2000-2021 [employer]. All rights reserved. This information is not intended as a substitute for professional medical care. Always follow your healthcare professional's instructions.                         Primary Care Physician  MD Terrance Quintana,

## 2024-12-04 NOTE — THERAPY TREATMENT NOTE
Acute Care - Physical Therapy Treatment Note  SHAYNA Brenner     Patient Name: Yelena Sanchez  : 1935  MRN: [id number]  Today's Date: 2024      Visit Dx:     ICD-10-CM ICD-9-CM   1. Acute respiratory failure with hypoxia and hypercapnia  J96.01 518.81    J96.02    2. Acute on chronic congestive heart failure, unspecified heart failure type  I50.9 428.0   3. Difficulty in walking  R26.2 719.7   4. Chronic combined systolic and diastolic congestive heart failure  I50.42 428.42     428.0   5. COPD with acute exacerbation  J44.1 491.21     Patient Active Problem List   Diagnosis    Iron deficiency anemia    Primary osteoarthritis of left knee    Chronic combined systolic and diastolic congestive heart failure    Presence of biventricular implantable cardioverter-defibrillator (ICD)    Coronary artery disease involving native coronary artery of native heart without angina pectoris    Mixed hyperlipidemia    Essential hypertension    Trochanteric bursitis of right hip    Sciatica of right side    Carotid artery disease    Acute on chronic systolic heart failure    Closed fracture of neck of right humerus    HTN (hypertension)    HLD (hyperlipidemia)    Hypothyroidism    Effusion of right elbow    Renal insufficiency    Musculoskeletal pain    Osteoarthritis of left knee    Fracture    ICD (implantable cardioverter-defibrillator) battery depletion    COPD with acute exacerbation    History of lung cancer    Dyspnea    Need for vaccination with 20-polyvalent pneumococcal conjugate vaccine    Nocturnal hypoxia    MDS (myelodysplastic syndrome)    History of breast cancer    Acute on chronic respiratory failure with hypercapnia    Acute on chronic congestive heart failure     Past Medical History:   Diagnosis Date    Anemia     Arthritis     Asthma     Bicipitoradial bursitis     Breast cancer     CHF (congestive heart failure)     Congestive heart failure     COPD (chronic obstructive pulmonary disease)      Diverticulosis 11/26/2014    GERD (gastroesophageal reflux disease)     History of tobacco abuse     HTN (hypertension)     Hyperlipemia     Hyperthyroidism     ICD (implantable cardioverter-defibrillator), biventricular, in situ     Neck mass     Seasonal allergies     SOB (shortness of breath)     TIA (transient ischemic attack)     YRS AGO, NO RESIDUALS     Past Surgical History:   Procedure Laterality Date    COLONOSCOPY  2014    ENDOSCOPY  2014    EYE SURGERY      Implant; yes     ICD GENERATOR REPLACEMENT N/A 11/14/2024    Procedure: ICD Generator Change - Bi-Ventricular -[employer];  Surgeon: JANETTE Gautam MD;  Location: [facility] CATH INVASIVE LOCATION;  Service: Cardiovascular;  Laterality: N/A;    LUNG LOBECTOMY Left     Left lower lobe    LUNG SURGERY      LUNG SURGERY Left     LEFT UPPER LOBE (2004)    MASTECTOMY      Left     MASTECTOMY Left     OTHER SURGICAL HISTORY      Joint Surgery    PACEMAKER IMPLANTATION       PT Assessment (Last 12 Hours)       PT Evaluation and Treatment       Row Name 12/04/24 1557          Physical Therapy Time and Intention    Subjective Information complains of;pain  -SM     Document Type therapy note (daily note)  -     Mode of Treatment individual therapy;physical therapy  -SM     Patient Effort good  -SM     Symptoms Noted During/After Treatment none  -SM       Row Name 12/04/24 7829          Pain    Pretreatment Pain Rating 2/10  -     Posttreatment Pain Rating 2/10  -     Pain Location abdomen  -     Pain Management Interventions nursing notified;positioning techniques utilized  -       Row Name 12/04/24 4215          Bed Mobility    Supine-Sit [place] (Bed Mobility) standby assist  -       Row Name 12/04/24 1556          Sit-Stand Transfer    Sit-Stand [place] (Transfers) standby assist  -     Assistive Device (Sit-Stand Transfers) walker, front-wheeled  -       Row Name 12/04/24 9215          Stand-Sit Transfer    Stand-Sit  [place] (Transfers) standby assist  -     Assistive Device (Stand-Sit Transfers) walker, front-wheeled  -       Row Name 12/04/24 1555          Gait/Stairs (Locomotion)    Gait/Stairs Locomotion gait/ambulation assistive device  -     [place] Level (Gait) standby assist  -     Assistive Device (Gait) walker, front-wheeled  -     Patient was able to Ambulate yes  -     Distance in Feet (Gait) 275  -     Pattern (Gait) 3-point  -     Deviations/Abnormal Patterns (Gait) base of support, wide;gait speed decreased  -SM     Bilateral Gait Deviations forward flexed posture  -       Row Name 12/04/24 1555          Balance    Dynamic Standing Balance standby assist  -     Position/Device Used, Standing Balance walker, front-wheeled  -       Row Name             Wound 11/26/24 0257 Left lower arm    Wound - Properties Group Placement Date: 11/26/24  -PH Placement Time: 0257  -PH Side: Left  -PH Orientation: lower  -PH Location: arm  -PH Present on Original Admission: Y  -PH Additional Comments: Pt currently seeing dermatology for wound  -PH    Retired Wound - Properties Group Placement Date: 11/26/24  -PH Placement Time: 0257  -PH Present on Original Admission: Y  -PH Side: Left  -PH Orientation: lower  -PH Location: arm  -PH Additional Comments: Pt currently seeing dermatology for wound  -PH    Retired Wound - Properties Group Placement Date: 11/26/24  -PH Placement Time: 0257  -PH Present on Original Admission: Y  -PH Side: Left  -PH Orientation: lower  -PH Location: arm  -PH Additional Comments: Pt currently seeing dermatology for wound  -PH    Retired Wound - Properties Group Date first assessed: 11/26/24  -PH Time first assessed: 0257  -PH Present on Original Admission: Y  -PH Side: Left  -PH Location: arm  -PH Additional Comments: Pt currently seeing dermatology for wound  -PH      Row Name             Wound 11/26/24 1220 Right breast MASD (moisture associated skin damage)    Wound -  Properties Group Placement Date: 11/26/24  -KE Placement Time: 1220  -KE Side: Right  -KE Location: breast  -KE Primary Wound Type: MASD  -KE Type: MASD (moisture associated skin damage)  -KE    Retired Wound - Properties Group Placement Date: 11/26/24  -KE Placement Time: 1220  -KE Side: Right  -KE Location: breast  -KE Primary Wound Type: MASD  -KE Type: MASD (moisture associated skin damage)  -KE    Retired Wound - Properties Group Placement Date: 11/26/24  -KE Placement Time: 1220  -KE Side: Right  -KE Location: breast  -KE Primary Wound Type: MASD  -KE Type: MASD (moisture associated skin damage)  -KE    Retired Wound - Properties Group Date first assessed: 11/26/24  -KE Time first assessed: 1220  -KE Side: Right  -KE Location: breast  -KE Primary Wound Type: MASD  -KE Type: MASD (moisture associated skin damage)  -KE      Row Name 12/04/24 1555          Positioning and Restraints    Pre-Treatment Position in bed  -     Post Treatment Position chair  -SM     In Chair sitting;call light within reach;encouraged to call for assist  -       Row Name 12/04/24 1556          Progress Summary (PT)    Progress Toward Functional Goals (PT) progress toward functional goals is good  -               User Key  (r) = Recorded By, (t) = Taken By, (c) = Cosigned By      Initials Name Provider Type    Maryam Price RN Registered Nurse    Maribel Monaco PTA Physical Therapist Assistant    Jackson Cowan RN Registered Nurse                    Physical Therapy Education       Title: PT OT SLP Therapies (Not Started)       Topic: Physical Therapy (Not Started)       Point: Home exercise program (Not Started)       Learner Progress:  Not documented in this visit.              Point: Body mechanics (Not Started)       Learner Progress:  Not documented in this visit.                                  PT Recommendation and Plan     Progress Summary (PT)  Progress Toward Functional Goals (PT): progress toward functional  goals is good   Outcome Measures       Row Name 12/04/24 9622             How much help from another person do you currently need...    Turning from your back to your side while in flat bed without using bedrails? 4  -SM      Moving from lying on back to sitting on the side of a flat bed without bedrails? 4  -SM      Moving to and from a bed to a chair (including a wheelchair)? 4  -SM      Standing up from a chair using your arms (e.g., wheelchair, bedside chair)? 4  -SM      Climbing 3-5 steps with a railing? 3  -SM      To walk in hospital room? 4  -SM      AM-PAC 6 Clicks Score (PT) 23  -SM                User Key  (r) = Recorded By, (t) = Taken By, (c) = Cosigned By      Initials Name Provider Type    Maribel Monaco PTA Physical Therapist Assistant                     Time Calculation:    PT Charges       Row Name 12/04/24 7624             Time Calculation    PT Received On 12/04/24  -SM         Timed Charges    02045 - Gait Training Minutes  15  -SM      55515 - PT Therapeutic Activity Minutes 10  -SM         Total Minutes    Timed Charges Total Minutes 25  -SM       Total Minutes 25  -SM                User Key  (r) = Recorded By, (t) = Taken By, (c) = Cosigned By      Initials Name Provider Type    Maribel Monaco PTA Physical Therapist Assistant                      PT G-Codes  Outcome Measure Options: AM-PAC 6 Clicks Basic Mobility (PT)  AM-PAC 6 Clicks Score (PT): 23    Maribel Urena PTA  12/4/2024

## 2024-12-04 NOTE — PROGRESS NOTES
[facility]   Hospitalist Progress Note       Patient Name: Yelena Sanchez  : 1935  MRN: [id number]  Primary Care Physician: Kaelyn Eugene MD  Date of admission: 2024  Today's Date: 2024  Room / Bed:   [facility] [place]  Subjective   Chief Complaint: Shortness of breath, confusion     HPI:     Yelena Sanchez is a 89 y.o. female with past medical history of hypertension, COPD, CHF, hypothyroidism, prior TIA, arthritis, and GERD presenting to the ED for evaluation of shortness of breath.  Patient states that for the last 3 to 4 days she has been having worsening shortness of breath to where she was symptomatic even at rest and prior to arrival became confused, tachypnea, and diaphoretic.  She then started a DuoNeb treatment which was ineffective.  Due to concern EMS was called to bring her to the ED for further evaluation.  En route patient was given nebulizer treatment along with Solu-Medrol.  In the ED patient was tachypneic, hypertensive, hypoxic on arrival requiring oxygen supplementation and eventually BiPAP.  Labs showed that she had above baseline proBNP, reduced renal function, and ABG showing hypercapnic respiratory failure with acidosis.  Chest x-ray showed cardiomegaly with vascular congestion and edema.  When seen patient was on BiPAP and stated that she was breathing feeling much better.  She denied any recent fevers, headaches, focal weakness, chest pain, abdominal pain, nausea, vomiting, diarrhea, constipation, dysuria, hematuria, hematochezia, melena, or anxiety.  Patient admitted for further evaluation and treatment.       Interval Followup: 2024 patient seen and examined resting comfortably volume status appears stable creatinine up a little today diuretics remain on hold defer to nephrology as to when to resume.  Patient requesting to see her cardiologist      REVIEW OF SYSTEMS:   SOA  Objective   Temp:  [97.3 °F (36.3 °C)-98.1 °F (36.7 °C)] 97.3 °F (36.3 °C)  Heart  Rate:  [61-90] 66  Resp:  [18] 18  BP: (104-135)/(60-74) 135/61  Flow (L/min) (Oxygen Therapy):  [2] 2  PHYSICAL EXAM   CON: WN. WD. NAD.NECK:  No stridor.   RESP:  Diminished bilaterally    CV:  Rhythm regular. Rate WNL  GI:  Soft and nontender. Nondistended.    EXT: No cyanosis.  PSYCH:  Alert. Oriented. Normal affect and mood.  NEURO:  No dysarthria or aphasia.   Results from last 7 days   Lab Units 12/04/24 0447 12/03/24  0502 12/02/24  0503 12/01/24  0525 11/30/24  0529 11/29/24  0418 11/28/24  0444   WBC 10*3/mm3 14.16* 13.74* 12.40* 10.80 12.16* 13.15* 15.86*   HEMOGLOBIN g/dL 13.0 13.3 13.4 12.7 11.6* 11.3* 11.5*   HEMATOCRIT % 41.9 41.7 43.6 42.1 38.1 36.9 37.5   PLATELETS 10*3/mm3 223 216 233 233 251 242 263     Results from last 7 days   Lab Units 12/04/24  0447 12/03/24  0502 12/02/24  0503 12/01/24  0811 11/30/24  0529 11/29/24  0418 11/28/24  0443   SODIUM mmol/L 136 138 139 142 142 144 141   POTASSIUM mmol/L 3.6 4.1 3.7 3.9 4.0 4.2 3.9   CO2 mmol/L 24.3 23.9 24.6 27.3 21.5* 23.7 23.2   CHLORIDE mmol/L 98 98 98 101 107 108* 104   ANION GAP mmol/L 13.7 16.1* 16.4* 13.7 13.5 12.3 13.8   BUN mg/dL 108* 94* 85* 73* 65* 70* 60*   CREATININE mg/dL 2.79* 2.65* 2.47* 2.08* 1.68* 2.10* 1.79*   GLUCOSE mg/dL 102* 96 102* 93 90 124* 118*         Assessment / Plan   Assessment:    Acute on chronic hypoxic/hypercarbic respiratory failure (Dr. Anglin)  Nocturnal hypoxia/nocturnal home O2  Pulmonary edema  Acute exacerbation COPD  Acute exacerbation systolic CHF  Mycoplasma pneumonia  Hx ischemic cardiomyopathy, status post ICD (Dr. Talbot)  CAD  Hx L breast cancer/mastectomy  HTN  Dyslipidemia  GERD  Hypothyroidism  Gout  CKD (followed with Dr. Otero in past)     Plan:    Diuretics on hold  Consult patient's cardiologist  Repeat labs in am  Steroids/prednisone completed  6-minute walk test noted; will need home O2 set up continuously.    Ambulate/mobilize as tolerated  Up in chair.  Wean O2 when feasible.   Baseline = nighttime O2 at home.  Pulmonology consulted  Finish doxycycline  Bronchodilator nebs scheduled and prn  Will need eval for home NIPPV    Discussed plan with RN.  VTE Prophylaxis:  Pharmacologic VTE prophylaxis orders are present.      CODE STATUS:      Level Of Support Discussed With: Patient  Code Status (Patient has no pulse and is not breathing): CPR (Attempt to Resuscitate)  Medical Interventions (Patient has pulse or is breathing): Full Support  Electronically signed by ROSY Castro, 12/04/24, 2:56 PM EST.    Attending Documentation:  Patient independently seen and evaluated, above documentation reflects plan put forth during bedside rounds.  More than 51% of the time of this patient encounter was performed by me. I discussed the care plan with RAVI Souza PA-C, I agree with his findings and plan as documented, what I have added to the care plan and modified is as follows in my documentation and my medical decision making; 89-year-old female with hypertension, COPD and other medical problems here with shortness of breath and confusion, CHF exacerbation, JOSIE on CKD.  Held Lasix this morning. Discussed with Dr. Orosco.  Electronically signed by Ang Laurent MD, 12/04/24, 7:00 PM EST.

## 2024-12-04 NOTE — CONSULTS
"Nutrition Services    Patient Name: Yelena Sanchez  YOB: 1935  MRN: [id number]  Admission date: 11/25/2024      CLINICAL NUTRITION ASSESSMENT      Reason for Assessment  LOS     H&P:  Past Medical History:   Diagnosis Date    Anemia     Arthritis     Asthma     Bicipitoradial bursitis     Breast cancer     CHF (congestive heart failure)     Congestive heart failure     COPD (chronic obstructive pulmonary disease)     Diverticulosis 11/26/2014    GERD (gastroesophageal reflux disease)     History of tobacco abuse     HTN (hypertension)     Hyperlipemia     Hyperthyroidism     ICD (implantable cardioverter-defibrillator), biventricular, in situ     Neck mass     Seasonal allergies     SOB (shortness of breath)     TIA (transient ischemic attack)     YRS AGO, NO RESIDUALS        Current Problems:   Active Hospital Problems    Diagnosis     **Acute on chronic respiratory failure with hypercapnia     Acute on chronic congestive heart failure     Dyspnea     History of breast cancer     COPD with acute exacerbation     HLD (hyperlipidemia)     Essential hypertension     Presence of biventricular implantable cardioverter-defibrillator (ICD)         Nutrition/Diet History         Narrative     Pt followed by RD for LOS x 9 days. Pt is at low risk per nutrition risk screening. Pt eating % meals; per recall, eating a variety of foods provided. Adequate protein intake, per flowsheets/menu selection. No significant wt loss, fluctuations r/t fluid fluctuations, Lasix. No acute nutrition concerns or interventions at this time. RD will continue to follow and monitor per protocol.       Anthropometrics        Current Height, Weight Height: 149.9 cm (59.02\")  Weight: 67.3 kg (148 lb 5.9 oz)   Current BMI Body mass index is 29.95 kg/m².   BMI Classification Overweight   % %, IBW 43.2 kg   Adjusted Body Weight (ABW)    Weight Hx  Wt Readings from Last 10 Encounters:   12/04/24 0512 67.3 kg (148 lb 5.9 oz) "   12/03/24 0407 66.5 kg (146 lb 9.7 oz)   12/02/24 0449 65.8 kg (145 lb 1 oz)   12/01/24 0300 68.7 kg (151 lb 7.3 oz)   11/30/24 0502 69.1 kg (152 lb 5.4 oz)   11/30/24 0500 69 kg (152 lb 1.9 oz)   11/29/24 0300 69.7 kg (153 lb 10.6 oz)   11/28/24 0700 68.1 kg (150 lb 2.1 oz)   11/27/24 0400 69 kg (152 lb 1.9 oz)   11/26/24 0211 70.5 kg (155 lb 6.8 oz)   11/25/24 2215 70.4 kg (155 lb 3.3 oz)   11/14/24 0721 70.4 kg (155 lb 3.3 oz)   10/30/24 1118 69.9 kg (154 lb)   09/11/24 1250 68.5 kg (151 lb)   08/08/24 1132 68.5 kg (151 lb)   06/25/24 1056 68.7 kg (151 lb 7.3 oz)   05/15/24 1105 68.4 kg (150 lb 12.7 oz)   04/26/24 0910 68 kg (150 lb)   02/14/24 1519 69.2 kg (152 lb 8.9 oz)   01/23/24 1017 69.4 kg (153 lb)            Wt Change Observation Per EMR, wt stable     Estimated/Assessed Needs  Estimated Needs based on: Current Body Weight 67.3 kg       Energy Requirements 22-25 kcal/kg    EST Needs (kcal/day) 0618-1318 kcal       Protein Requirements 1.2-1.5 g.kg   EST Daily Needs (g/day)  g protein       Fluid Requirements Fluid Restriction    Estimated Needs (mL/day) 1500 mL     Labs/Medications         Pertinent Labs Reviewed.   Results from last 7 days   Lab Units 12/04/24  0447 12/03/24  0502 12/02/24  0503 12/01/24  0811   SODIUM mmol/L 136 138 139 142   POTASSIUM mmol/L 3.6 4.1 3.7 3.9   CHLORIDE mmol/L 98 98 98 101   CO2 mmol/L 24.3 23.9 24.6 27.3   BUN mg/dL 108* 94* 85* 73*   CREATININE mg/dL 2.79* 2.65* 2.47* 2.08*   CALCIUM mg/dL 10.0 10.0 10.3 10.7*   BILIRUBIN mg/dL  --  0.4 0.4 0.4   ALK PHOS U/L  --  86 89 82   ALT (SGPT) U/L  --  14 16 20   AST (SGOT) U/L  --  11 13 13   GLUCOSE mg/dL 102* 96 102* 93     Results from last 7 days   Lab Units 12/04/24  0447 12/03/24  0502 12/02/24  0503 12/01/24  0811   MAGNESIUM mg/dL  --  2.6* 2.4 2.3   PHOSPHORUS mg/dL 5.3* 5.2* 5.1*  --    HEMOGLOBIN g/dL 13.0 13.3 13.4  --    HEMATOCRIT % 41.9 41.7 43.6  --      COVID19   Date Value Ref Range Status    11/25/2024 Not Detected Not Detected - Ref. Range Final     Lab Results   Component Value Date    HGBA1C 4.60 (L) 01/02/2023         Pertinent Medications Reviewed.     Malnutrition Severity Assessment              Nutrition Diagnosis         Nutrition Dx Problem 1 No nutrition diagnosis at this time.       Nutrition Intervention        Current Nutrition Orders & Evaluation of Intake     Current Nutrition Orders & Evaluation of Intake       Current PO Diet Diet: Cardiac, Fluid Restriction (240 mL/tray); Healthy Heart (2-3 Na+); 1500 mL/day; Fluid Consistency: Thin (IDDSI 0)   Supplement No active supplement orders       Nutrition Intervention/Prescription        No further nutrition intervention indicated.       Medical Nutrition Therapy/Nutrition Education          Learner     Readiness Patient  Education not indicated.      Method     Response N/A  N/A     Monitor/Evaluation        Monitor Per protocol     Nutrition Discharge Plan         No discharge nutrition needs identified.      Electronically signed by:  Zuly Lewis RD    12/04/24 09:33 EST

## 2024-12-04 NOTE — CONSULTS
"  [facility]   Cardiology Consult Note    Patient Name: Yelena Sanchez  : 1935  MRN: [id number]  Primary Care Physician:  Kaelyn Eugene MD  Referring Physician: Ang Laurent MD    Date of admission: 2024    Subjective   Subjective     Reason for Consultation : Shortness of breath, discomfort with ICD    Chief Complaint : Shortness of breath, altered mental status    HPI:  Yelean Sanchez is a 89 y.o. female with a dilated cardiomyopathy, status post BiV ICD placement, chronic kidney disease, hypothyroidism, peripheral vascular disease status post carotid stenting on Plavix, single-vessel coronary artery disease on medical management iron deficiency anemia.  She had ICD generator change on 2024.  She was admitted to the hospital on 2024 because of shortness of breath, and mild confusion.  She was in acute hypoxic and hypercarbic respiratory failure requiring NIPPV.  Since admission, she was treated with IV diuretics and also steroids with bronchodilators.  Patient's shortness of breath improved.  Diuretics on hold at this time due to JOSIE on CKD.  Today, patient had \" shocklike sensation\" on the ICD.  There is no pain or discharge at the insertion site.      Review of Systems   All systems were reviewed and negative except for: As of breath, cough, currently improving.  Also reports pain in the ICD insertion site    Personal History     Past Medical History:   Diagnosis Date    Anemia     Arthritis     Asthma     Bicipitoradial bursitis     Breast cancer     CHF (congestive heart failure)     Congestive heart failure     COPD (chronic obstructive pulmonary disease)     Diverticulosis 2014    GERD (gastroesophageal reflux disease)     History of tobacco abuse     HTN (hypertension)     Hyperlipemia     Hyperthyroidism     ICD (implantable cardioverter-defibrillator), biventricular, in situ     Neck mass     Seasonal allergies     SOB (shortness of breath)     TIA " (transient ischemic attack)     YRS AGO, NO RESIDUALS        Family History: family history includes Colon cancer in her mother; Lung cancer in her sister. Otherwise pertinent FHx was reviewed and not pertinent to current issue.    Social History:  reports that she has never smoked. She has never been exposed to tobacco smoke. She has never used smokeless tobacco. She reports that she does not drink alcohol and does not use drugs.    Home Medications:  Acidophilus/Citrus Pectin, HYDROcodone-acetaminophen, allopurinol, cholecalciferol, clopidogrel, docusate sodium, ezetimibe, ferrous sulfate, furosemide, ketoconazole, lansoprazole, levothyroxine, multivitamin with minerals, nebivolol, rosuvastatin, spironolactone, and zolpidem    Allergies:  No Known Allergies    Objective    Objective     Vitals:   Temp:  [97.2 °F (36.2 °C)-98.1 °F (36.7 °C)] 97.2 °F (36.2 °C)  Heart Rate:  [61-86] 62  Resp:  [18] 18  BP: (104-135)/(60-74) 117/63  Flow (L/min) (Oxygen Therapy):  [2] 2      Physical Exam:   Constitutional: Awake, alert, No acute distress, pleasant   Eyes: PERRLA, sclerae anicteric, no conjunctival injection   HENT: NCAT, mucous membranes moist   Neck: Supple, no thyromegaly, no lymphadenopathy, trachea midline   Respiratory: Bilateral faint wheezing heard, occasional crackles present   Chest wall : Pacemaker insertion site healing, no discharge, no hematoma, skin bruising present   Cardiovascular: RRR, no murmurs, rubs, or gallops, palpable pedal pulses bilaterally   Gastrointestinal: Positive bowel sounds, soft, nontender, nondistended   Musculoskeletal: No bilateral ankle edema, no clubbing or cyanosis to extremities   Psychiatric: Appropriate affect, cooperative   Neurologic: Oriented x 3,speech clear   Skin: No rashes     Result Review    Result Review:  I have personally reviewed the results from the time of this admission to 12/4/2024 17:06 EST and agree with these findings:  [x]  Laboratory  []   Microbiology  [x]  Radiology  [x]  EKG/Telemetry   [x]  Cardiology/Vascular   []  Pathology  [x]  Old records  []  Other:  Most notable findings include:     CMP          12/2/2024    05:03 12/3/2024    05:02 12/4/2024    04:47   CMP   Glucose 102  96  102    BUN 85  94  108    Creatinine 2.47  2.65  2.79    EGFR 18.3  16.9  15.9    Sodium 139  138  136    Potassium 3.7  4.1  3.6    Chloride 98  98  98    Calcium 10.3  10.0  10.0    Total Protein 6.7  6.6     Albumin 3.6  3.6  3.6    Total Bilirubin 0.4  0.4     Alkaline Phosphatase 89  86     AST (SGOT) 13  11     ALT (SGPT) 16  14     BUN/Creatinine Ratio 34.4  35.5  38.7    Anion Gap 16.4  16.1  13.7       CBC          12/2/2024    05:03 12/3/2024    05:02 12/4/2024    04:47   CBC   WBC 12.40  13.74  14.16    RBC 4.61  4.41  4.40    Hemoglobin 13.4  13.3  13.0    Hematocrit 43.6  41.7  41.9    MCV 94.6  94.6  95.2    MCH 29.1  30.2  29.5    MCHC 30.7  31.9  31.0    RDW 14.9  15.1  15.3    Platelets 233  216  223        Latest Reference Range & Units 11/25/24 22:23 11/29/24 04:18 12/02/24 05:03   HS Troponin T <14 ng/L 37 (H)     proBNP 0.0 - 1,800.0 pg/mL 15,960.0 (H) 26,929.0 (H) 9,337.0 (H)     Results for orders placed during the hospital encounter of 11/25/24    Adult Transthoracic Echo Complete W/ Cont if Necessary Per Protocol    Interpretation Summary    Moderately dilated left ventricular cavity with severe global hypokinesis.  Left ventricular ejection fraction appears to be 26 - 30%.    Left ventricular wall thickness is consistent with mild concentric hypertrophy.    Left ventricular diastolic function is consistent with (grade I) impaired relaxation.    The left atrial cavity is mildly dilated.    There is mild aortic insufficiency.    Estimated right ventricular systolic pressure from tricuspid regurgitation is normal (<35 mmHg).    Electronic pacemaker lead noted.       Assessment & Plan   Assessment / Plan     Brief Patient Summary:  Yelena  Daniel is a 89 y.o. female with dilated cardiomyopathy, status post BiV ICD placement, chronic kidney disease, hypothyroidism, peripheral vascular disease status post carotid stenting on Plavix, single-vessel coronary artery disease on medical management iron deficiency anemia.  Admitted on 11/26/2024 because of shortness of breath and altered mental status    Active Hospital Problems:  Active Hospital Problems    Diagnosis     **Acute on chronic respiratory failure with hypercapnia     Presence of biventricular implantable cardioverter-defibrillator (ICD)     Essential hypertension     Acute on chronic congestive heart failure     Dyspnea     History of breast cancer     COPD with acute exacerbation     HLD (hyperlipidemia)      Dilated cardiomyopathy ; post BiV ICD placement, LVEF 26 to 30%    Status post recent ICD generator change : Sedation sites healing well, with no discharge or hematoma, patient reports shocklike sensation at the site today    Acute on chronic combined heart failure  : Status improved since admission, diuretics on hold due to worsening renal functions    Plan:     Will have the [employer] pacemaker interrogated  Continue diuretics per nephrology  Continue Bystolic, amlodipine  Continue statins    We will follow    Electronically signed by Simon Talbot MD, 12/04/24, 5:06 PM EST.

## 2024-12-04 NOTE — PROGRESS NOTES
[facility]   Hospitalist Progress Note       Patient Name: Yelena Sanchez  : 1935  MRN: [id number]  Primary Care Physician: Kaelyn Eugene MD  Date of admission: 2024  Today's Date: 12/3/2024  Room / Bed:   [facility]  Subjective   Chief Complaint: Shortness of breath, confusion     HPI:     Yelena Sanchez is a 89 y.o. female with past medical history of hypertension, COPD, CHF, hypothyroidism, prior TIA, arthritis, and GERD presenting to the ED for evaluation of shortness of breath.  Patient states that for the last 3 to 4 days she has been having worsening shortness of breath to where she was symptomatic even at rest and prior to arrival became confused, tachypnea, and diaphoretic.  She then started a DuoNeb treatment which was ineffective.  Due to concern EMS was called to bring her to the ED for further evaluation.  En route patient was given nebulizer treatment along with Solu-Medrol.  In the ED patient was tachypneic, hypertensive, hypoxic on arrival requiring oxygen supplementation and eventually BiPAP.  Labs showed that she had above baseline proBNP, reduced renal function, and ABG showing hypercapnic respiratory failure with acidosis.  Chest x-ray showed cardiomegaly with vascular congestion and edema.  When seen patient was on BiPAP and stated that she was breathing feeling much better.  She denied any recent fevers, headaches, focal weakness, chest pain, abdominal pain, nausea, vomiting, diarrhea, constipation, dysuria, hematuria, hematochezia, melena, or anxiety.  Patient admitted for further evaluation and treatment.       Interval Followup: 12/3/2024  No new complaints today      REVIEW OF SYSTEMS:   SOA  Objective   Temp:  [97.3 °F (36.3 °C)-98.6 °F (37 °C)] 97.7 °F (36.5 °C)  Heart Rate:  [61-90] 90  Resp:  [16-19] 18  BP: (115-144)/(64-75) 115/64  Flow (L/min) (Oxygen Therapy):  [2] 2  PHYSICAL EXAM   CON: WN. WD. NAD.NECK:  No stridor.   RESP:  Diminished  bilaterally    CV:  Rhythm regular. Rate WNL  GI:  Soft and nontender. Nondistended.    EXT: No cyanosis.  PSYCH:  Alert. Oriented. Normal affect and mood.  NEURO:  No dysarthria or aphasia.   Results from last 7 days   Lab Units 12/03/24  0502 12/02/24  0503 12/01/24  0525 11/30/24  0529 11/29/24  0418 11/28/24  0444 11/27/24  0506   WBC 10*3/mm3 13.74* 12.40* 10.80 12.16* 13.15* 15.86* 12.97*   HEMOGLOBIN g/dL 13.3 13.4 12.7 11.6* 11.3* 11.5* 11.2*   HEMATOCRIT % 41.7 43.6 42.1 38.1 36.9 37.5 36.0   PLATELETS 10*3/mm3 216 233 233 251 242 263 258     Results from last 7 days   Lab Units 12/03/24  0502 12/02/24  0503 12/01/24  0811 11/30/24  0529 11/29/24  0418 11/28/24  0443 11/27/24  0506   SODIUM mmol/L 138 139 142 142 144 141 143   POTASSIUM mmol/L 4.1 3.7 3.9 4.0 4.2 3.9 4.2   CO2 mmol/L 23.9 24.6 27.3 21.5* 23.7 23.2 24.3   CHLORIDE mmol/L 98 98 101 107 108* 104 106   ANION GAP mmol/L 16.1* 16.4* 13.7 13.5 12.3 13.8 12.7   BUN mg/dL 94* 85* 73* 65* 70* 60* 56*   CREATININE mg/dL 2.65* 2.47* 2.08* 1.68* 2.10* 1.79* 1.80*   GLUCOSE mg/dL 96 102* 93 90 124* 118* 128*         Assessment / Plan   Assessment:    Acute on chronic hypoxic/hypercarbic respiratory failure (Dr. Anglin)  Nocturnal hypoxia/nocturnal home O2  Pulmonary edema  Acute exacerbation COPD  Acute exacerbation systolic CHF  Mycoplasma pneumonia  Hx ischemic cardiomyopathy, status post ICD (Dr. Talbot)  CAD  Hx L breast cancer/mastectomy  HTN  Dyslipidemia  GERD  Hypothyroidism  Gout  CKD (followed with Dr. Otero in past)     Plan:    Disposition: Home hopefully 24-48 hours.  Will get nephrology consult today.  Hold diuretics in light of increasing creatinine.    Repeat labs in am  Steroids/prednisone completed  6-minute walk test noted; will need home O2 set up continuously.    Ambulate/mobilize as tolerated  Up in chair.  Wean O2 when feasible.  Baseline = nighttime O2 at home.  Pulmonology consulted  Finish doxycycline  Bronchodilator nebs  scheduled and prn  Will need eval for home NIPPV    Discussed plan with RN.  VTE Prophylaxis:  Pharmacologic VTE prophylaxis orders are present.      CODE STATUS:      Level Of Support Discussed With: Patient  Code Status (Patient has no pulse and is not breathing): CPR (Attempt to Resuscitate)  Medical Interventions (Patient has pulse or is breathing): Full Support    Electronically signed by Ang Laurent MD, 12/03/24, 7:15 PM EST.

## 2024-12-05 ENCOUNTER — READMISSION MANAGEMENT (OUTPATIENT)
Dept: CALL CENTER | Facility: HOSPITAL | Age: 89
End: 2024-12-05
Payer: MEDICARE

## 2024-12-05 VITALS
WEIGHT: 147.93 LBS | BODY MASS INDEX: 29.82 KG/M2 | HEART RATE: 66 BPM | HEIGHT: 59 IN | RESPIRATION RATE: 20 BRPM | TEMPERATURE: 97.6 F | SYSTOLIC BLOOD PRESSURE: 125 MMHG | DIASTOLIC BLOOD PRESSURE: 61 MMHG | OXYGEN SATURATION: 99 %

## 2024-12-05 LAB
ALBUMIN SERPL-MCNC: 3.3 G/DL (ref 3.5–5.2)
ALP SERPL-CCNC: 89 U/L (ref 39–117)
ALT SERPL W P-5'-P-CCNC: 9 U/L (ref 1–33)
ANION GAP SERPL CALCULATED.3IONS-SCNC: 15.9 MMOL/L (ref 5–15)
AST SERPL-CCNC: 10 U/L (ref 1–32)
BASOPHILS # BLD AUTO: 0.03 10*3/MM3 (ref 0–0.2)
BASOPHILS NFR BLD AUTO: 0.3 % (ref 0–1.5)
BILIRUB CONJ SERPL-MCNC: 0.1 MG/DL (ref 0–0.3)
BILIRUB INDIRECT SERPL-MCNC: 0.3 MG/DL
BILIRUB SERPL-MCNC: 0.4 MG/DL (ref 0–1.2)
BUN SERPL-MCNC: 98 MG/DL (ref 8–23)
BUN/CREAT SERPL: 36.7 (ref 7–25)
CALCIUM SPEC-SCNC: 9.8 MG/DL (ref 8.6–10.5)
CHLORIDE SERPL-SCNC: 101 MMOL/L (ref 98–107)
CO2 SERPL-SCNC: 21.1 MMOL/L (ref 22–29)
CREAT SERPL-MCNC: 2.67 MG/DL (ref 0.57–1)
DEPRECATED RDW RBC AUTO: 51.8 FL (ref 37–54)
EGFRCR SERPLBLD CKD-EPI 2021: 16.7 ML/MIN/1.73
EOSINOPHIL # BLD AUTO: 0.09 10*3/MM3 (ref 0–0.4)
EOSINOPHIL NFR BLD AUTO: 0.8 % (ref 0.3–6.2)
ERYTHROCYTE [DISTWIDTH] IN BLOOD BY AUTOMATED COUNT: 15.2 % (ref 12.3–15.4)
GLUCOSE SERPL-MCNC: 99 MG/DL (ref 65–99)
HCT VFR BLD AUTO: 40.6 % (ref 34–46.6)
HGB BLD-MCNC: 12.8 G/DL (ref 12–15.9)
IMM GRANULOCYTES # BLD AUTO: 0.14 10*3/MM3 (ref 0–0.05)
IMM GRANULOCYTES NFR BLD AUTO: 1.3 % (ref 0–0.5)
LYMPHOCYTES # BLD AUTO: 0.55 10*3/MM3 (ref 0.7–3.1)
LYMPHOCYTES NFR BLD AUTO: 5.1 % (ref 19.6–45.3)
MAGNESIUM SERPL-MCNC: 2.9 MG/DL (ref 1.6–2.4)
MCH RBC QN AUTO: 29.6 PG (ref 26.6–33)
MCHC RBC AUTO-ENTMCNC: 31.5 G/DL (ref 31.5–35.7)
MCV RBC AUTO: 94 FL (ref 79–97)
MONOCYTES # BLD AUTO: 1.84 10*3/MM3 (ref 0.1–0.9)
MONOCYTES NFR BLD AUTO: 17 % (ref 5–12)
NEUTROPHILS NFR BLD AUTO: 75.5 % (ref 42.7–76)
NEUTROPHILS NFR BLD AUTO: 8.18 10*3/MM3 (ref 1.7–7)
NRBC BLD AUTO-RTO: 0 /100 WBC (ref 0–0.2)
NT-PROBNP SERPL-MCNC: 2644 PG/ML (ref 0–1800)
PHOSPHATE SERPL-MCNC: 5.2 MG/DL (ref 2.5–4.5)
PLATELET # BLD AUTO: 212 10*3/MM3 (ref 140–450)
PMV BLD AUTO: 11.6 FL (ref 6–12)
POTASSIUM SERPL-SCNC: 3.6 MMOL/L (ref 3.5–5.2)
PROT SERPL-MCNC: 6.3 G/DL (ref 6–8.5)
RBC # BLD AUTO: 4.32 10*6/MM3 (ref 3.77–5.28)
SODIUM SERPL-SCNC: 138 MMOL/L (ref 136–145)
WBC NRBC COR # BLD AUTO: 10.83 10*3/MM3 (ref 3.4–10.8)

## 2024-12-05 PROCEDURE — 94664 DEMO&/EVAL PT USE INHALER: CPT

## 2024-12-05 PROCEDURE — 94618 PULMONARY STRESS TESTING: CPT

## 2024-12-05 PROCEDURE — 83735 ASSAY OF MAGNESIUM: CPT | Performed by: PHYSICIAN ASSISTANT

## 2024-12-05 PROCEDURE — 94799 UNLISTED PULMONARY SVC/PX: CPT

## 2024-12-05 PROCEDURE — 85025 COMPLETE CBC W/AUTO DIFF WBC: CPT | Performed by: PHYSICIAN ASSISTANT

## 2024-12-05 PROCEDURE — 80048 BASIC METABOLIC PNL TOTAL CA: CPT | Performed by: PHYSICIAN ASSISTANT

## 2024-12-05 PROCEDURE — 99232 SBSQ HOSP IP/OBS MODERATE 35: CPT | Performed by: INTERNAL MEDICINE

## 2024-12-05 PROCEDURE — 80076 HEPATIC FUNCTION PANEL: CPT | Performed by: PHYSICIAN ASSISTANT

## 2024-12-05 PROCEDURE — 99239 HOSP IP/OBS DSCHRG MGMT >30: CPT | Performed by: INTERNAL MEDICINE

## 2024-12-05 PROCEDURE — 83880 ASSAY OF NATRIURETIC PEPTIDE: CPT | Performed by: PHYSICIAN ASSISTANT

## 2024-12-05 PROCEDURE — 84100 ASSAY OF PHOSPHORUS: CPT | Performed by: PHYSICIAN ASSISTANT

## 2024-12-05 RX ORDER — AMLODIPINE BESYLATE 2.5 MG/1
2.5 TABLET ORAL
Qty: 30 TABLET | Refills: 0 | Status: SHIPPED | OUTPATIENT
Start: 2024-12-06 | End: 2025-01-05

## 2024-12-05 RX ORDER — POTASSIUM CHLORIDE 750 MG/1
40 CAPSULE, EXTENDED RELEASE ORAL ONCE
Status: COMPLETED | OUTPATIENT
Start: 2024-12-05 | End: 2024-12-05

## 2024-12-05 RX ORDER — BUDESONIDE AND FORMOTEROL FUMARATE DIHYDRATE 160; 4.5 UG/1; UG/1
2 AEROSOL RESPIRATORY (INHALATION)
Qty: 6 G | Refills: 12 | Status: SHIPPED | OUTPATIENT
Start: 2024-12-05

## 2024-12-05 RX ORDER — ALBUTEROL SULFATE 90 UG/1
2 INHALANT RESPIRATORY (INHALATION) EVERY 4 HOURS PRN
Qty: 8 G | Refills: 0 | Status: SHIPPED | OUTPATIENT
Start: 2024-12-05 | End: 2025-01-04

## 2024-12-05 RX ADMIN — CLOPIDOGREL BISULFATE 75 MG: 75 TABLET ORAL at 09:35

## 2024-12-05 RX ADMIN — ARFORMOTEROL TARTRATE 15 MCG: 15 SOLUTION RESPIRATORY (INHALATION) at 07:08

## 2024-12-05 RX ADMIN — Medication 1 APPLICATION: at 09:37

## 2024-12-05 RX ADMIN — BUTYROSPERMUM PARKII(SHEA BUTTER), SIMMONDSIA CHINENSIS (JOJOBA) SEED OIL, ALOE BARBADENSIS LEAF EXTRACT 1 CAPSULE: .01; 1; 3.5 LIQUID TOPICAL at 09:37

## 2024-12-05 RX ADMIN — POTASSIUM CHLORIDE 40 MEQ: 750 CAPSULE, EXTENDED RELEASE ORAL at 09:32

## 2024-12-05 RX ADMIN — Medication 10 ML: at 09:38

## 2024-12-05 RX ADMIN — Medication 1000 MG: at 09:37

## 2024-12-05 RX ADMIN — GUAIFENESIN 600 MG: 600 TABLET ORAL at 09:35

## 2024-12-05 RX ADMIN — AMLODIPINE BESYLATE 2.5 MG: 5 TABLET ORAL at 09:35

## 2024-12-05 RX ADMIN — ALLOPURINOL 100 MG: 100 TABLET ORAL at 09:35

## 2024-12-05 RX ADMIN — NEBIVOLOL 10 MG: 10 TABLET ORAL at 09:36

## 2024-12-05 RX ADMIN — FUROSEMIDE 40 MG: 40 TABLET ORAL at 09:34

## 2024-12-05 RX ADMIN — APIXABAN 2.5 MG: 2.5 TABLET, FILM COATED ORAL at 14:46

## 2024-12-05 RX ADMIN — LEVOTHYROXINE SODIUM 100 MCG: 0.1 TABLET ORAL at 05:38

## 2024-12-05 RX ADMIN — PANTOPRAZOLE SODIUM 40 MG: 40 TABLET, DELAYED RELEASE ORAL at 05:38

## 2024-12-05 RX ADMIN — BUDESONIDE 0.5 MG: 0.5 INHALANT RESPIRATORY (INHALATION) at 07:08

## 2024-12-05 RX ADMIN — NYSTATIN OINTMENT 1 APPLICATION: 100000 OINTMENT TOPICAL at 09:37

## 2024-12-05 NOTE — DISCHARGE INSTR - LAB
Please contact Dr. Eugene, to schedule follow-up appointment.    Follow up appointment with LUZ MARIA Desai on Tuesday, December 31st at 2:30 pm.    Please contact your primary cardiologist and nephrologist to schedule follow-up appointments.

## 2024-12-05 NOTE — PROGRESS NOTES
[facility]     Cardiology Progress Note    Patient Name: Yelena Sanchez  : 1935  MRN: [id number]  Primary Care Physician:  Kaelyn Eugene MD  Date of admission: 2024    Subjective   Subjective     Chief Complaint: Follow-up visit, congestive heart failure, cardiomyopathy, status post BiV ICD    Interval HPI:    Patient reports feeling better.  Shortness of breath resolved and back to her baseline.  Continues to report fatigue.  Telemetry monitor shows paced rhythm and occasional NSVT    Review of Systems   All systems were reviewed and negative except for: Fatigue, shortness of breath    Objective   Objective     Vitals:   Temp:  [97.2 °F (36.2 °C)-98.6 °F (37 °C)] 97.6 °F (36.4 °C)  Heart Rate:  [60-72] 66  Resp:  [16-20] 20  BP: (114-128)/(53-63) 125/61  Flow (L/min) (Oxygen Therapy):  [2] 2  Physical Exam      General : Alert, awake, no acute distress  CVS : Regular rate and rhythm, no murmur, rubs or gallops  Lungs: Clear to auscultation bilaterally, no crackles or rhonchi  Abdomen: Soft, nontender, bowel sounds heard in all 4 quadrants  Extremities: Warm, well-perfused, trace edema bilaterally    Scheduled Meds:!Patient Home Medications Stored on Unit, , Not Applicable, BID  allopurinol, 100 mg, Oral, BID  amLODIPine, 2.5 mg, Oral, Q24H  apixaban, 2.5 mg, Oral, Q12H  arformoterol, 15 mcg, Nebulization, BID - RT  ascorbic acid, 1,000 mg, Oral, BID  budesonide, 0.5 mg, Nebulization, BID - RT  clopidogrel, 75 mg, Oral, Daily  dimethicone, 1 Application, Topical, 2 times per day  furosemide, 40 mg, Oral, Daily  guaiFENesin, 600 mg, Oral, Q12H  Iron Glycinate, 1 capsule, Oral, BID  levothyroxine, 100 mcg, Oral, Q AM  montelukast, 10 mg, Oral, Nightly  nebivolol, 10 mg, Oral, Daily  nystatin, 1 Application, Topical, 2 times per day  pantoprazole, 40 mg, Oral, Q AM  rosuvastatin, 10 mg, Oral, Nightly  sodium chloride, 10 mL, Intravenous, Q12H  [Held by provider] spironolactone, 12.5 mg,  Oral, Daily           Result Review    Result Review:  I have personally reviewed the results from the time of this admission to 12/5/2024 12:52 EST and agree with these findings:  [x]  Laboratory  []  Microbiology  [x]  Radiology  [x]  EKG/Telemetry   [x]  Cardiology/Vascular   []  Pathology  []  Old records  []  Other:  Most notable findings include:     CBC          12/3/2024    05:02 12/4/2024    04:47 12/5/2024    05:35   CBC   WBC 13.74  14.16  10.83    RBC 4.41  4.40  4.32    Hemoglobin 13.3  13.0  12.8    Hematocrit 41.7  41.9  40.6    MCV 94.6  95.2  94.0    MCH 30.2  29.5  29.6    MCHC 31.9  31.0  31.5    RDW 15.1  15.3  15.2    Platelets 216  223  212      CMP          12/3/2024    05:02 12/4/2024    04:47 12/5/2024    05:35   CMP   Glucose 96  102  99    BUN 94  108  98    Creatinine 2.65  2.79  2.67    EGFR 16.9  15.9  16.7    Sodium 138  136  138    Potassium 4.1  3.6  3.6    Chloride 98  98  101    Calcium 10.0  10.0  9.8    Total Protein 6.6   6.3    Albumin 3.6  3.6  3.3    Total Bilirubin 0.4   0.4    Alkaline Phosphatase 86   89    AST (SGOT) 11   10    ALT (SGPT) 14   9    BUN/Creatinine Ratio 35.5  38.7  36.7    Anion Gap 16.1  13.7  15.9       CARDIAC LABS:      Lab 12/05/24  0535 12/02/24  0503 11/29/24  0418   PROBNP 2,644.0* 9,337.0* 26,929.0*        Assessment & Plan   Assessment / Plan     Brief Patient Summary:  Yelena Sanchez is a 89 y.o. female with dilated cardiomyopathy, status post BiV ICD placement, chronic kidney disease, hypothyroidism, peripheral vascular disease status post carotid stenting on Plavix, single-vessel coronary artery disease on medical management iron deficiency anemia. Admitted on 11/26/2024 because of shortness of breath and altered mental status     Active Hospital Problems:  Active Hospital Problems    Diagnosis     **Acute on chronic respiratory failure with hypercapnia     Presence of biventricular implantable cardioverter-defibrillator (ICD)     Essential  hypertension     Acute on chronic congestive heart failure     Dyspnea     History of breast cancer     COPD with acute exacerbation     HLD (hyperlipidemia)      Acute on chronic combined heart failure  : Status improved since admission, diuretics on hold due to worsening renal functions.  Symptoms improved, near euvolemic on physical examination today.    Dilated cardiomyopathy ; post BiV ICD placement, LVEF 26 to 30%     Status post recent ICD generator change : Sedation sites healing well, with no discharge or hematoma, patient reports shocklike sensation at the site today.  ICD interrogated today, normally functioning device.  No programming changes were made.  She was noted to have paroxysmal atrial fibrillation during the interrogation    Paroxysmal atrial fibrillation : New diagnosis, detected during pacemaker/ICD interrogation, recurrent episodes noted.  Will need anticoagulation    Plan:     Starting Eliquis 2.5 mg twice daily for primary stroke prevention, due to paroxysmal atrial fibrillation  Continue Bystolic, amlodipine  Continue statins  Diuretics restarted today per nephrology, appreciate recommendations  She is also on Plavix as advised by vascular surgeons     Stable for discharge from cardiac standpoint  Follow-up in cardiology clinic in 2 weeks    CODE STATUS:   Level Of Support Discussed With: Patient  Code Status (Patient has no pulse and is not breathing): CPR (Attempt to Resuscitate)  Medical Interventions (Patient has pulse or is breathing): Full Support      Electronically signed by Simon Talbot MD, 12/05/24, 12:52 PM EST.

## 2024-12-05 NOTE — PLAN OF CARE
Goal Outcome Evaluation:  Plan of Care Reviewed With: patient        Progress: improving  Outcome Evaluation: A&O x4, Room air, denies pain/discomfort, able to communicate needs, no concerns, continue POC.

## 2024-12-05 NOTE — DISCHARGE SUMMARY
[facility]         HOSPITALIST  DISCHARGE SUMMARY    Patient Name: Yelena Sanchez  : 1935  MRN: [id number]    Date of Admission: 2024  Date of Discharge: 2024  Primary Care Physician: Kaelyn Eugene MD  Reason for admission:  Shortness of breath confusion    Final diagnosis:  Acute on chronic hypoxic/hypercarbic respiratory failure (Dr. Anglin)  Nocturnal hypoxia/nocturnal home O2  Pulmonary edema  Acute exacerbation COPD  Acute exacerbation systolic CHF  Mycoplasma pneumonia  Hx ischemic cardiomyopathy, status post ICD (Dr. Talbot)  CAD  Hx L breast cancer/mastectomy  HTN  Dyslipidemia  GERD  Hypothyroidism  Gout  CKD (followed with Dr. Otero in past)      Consults       Date and Time Order Name Status Description    2024 12:36 PM Inpatient Cardiology Consult Completed     2024  6:54 AM Inpatient Nephrology Consult Completed     2024  6:50 AM Inpatient Pulmonology Consult      2024 11:39 PM Inpatient Hospitalist Consult              Active and Resolved Hospital Problems:  Active Hospital Problems    Diagnosis POA    **Acute on chronic respiratory failure with hypercapnia [J96.22] Yes    Acute on chronic congestive heart failure [I50.9] Unknown    Dyspnea [R06.00] Yes    History of breast cancer [Z85.3] Not Applicable    COPD with acute exacerbation [J44.1] Yes    HLD (hyperlipidemia) [E78.5] Yes    Essential hypertension [I10] Yes    Presence of biventricular implantable cardioverter-defibrillator (ICD) [Z95.810] Yes      Resolved Hospital Problems   No resolved problems to display.       Hospital Course     Hospital Course:  Yelena Sanchez is a 89 y.o. female  with past medical history of hypertension, COPD, CHF, hypothyroidism, prior TIA, arthritis, and GERD presenting to the ED for evaluation of shortness of breath.  Patient states that for the last 3 to 4 days she has been having worsening shortness of breath to where she was symptomatic  even at rest and prior to arrival became confused, tachypnea, and diaphoretic.  She then started a DuoNeb treatment which was ineffective.  Due to concern EMS was called to bring her to the ED for further evaluation.  En route patient was given nebulizer treatment along with Solu-Medrol.  In the ED patient was tachypneic, hypertensive, hypoxic on arrival requiring oxygen supplementation and eventually BiPAP.  Labs showed that she had above baseline proBNP, reduced renal function, and ABG showing hypercapnic respiratory failure with acidosis.  Chest x-ray showed cardiomegaly with vascular congestion and edema.    Patient admitted for further evaluation and treatment.  Started on nebulizer treatments diuretics steroids antibiotics with improvement of respiratory status seen in consultation by her nephrologist she will follow-up with nephrology in 2 weeks additionally was seen by pulmonary critical care can follow-up with the pulmonary clinic in 2 weeks patient with recent AICD placement concern for malfunctioning device cardiology consulted device interrogated and it appeared to be operating appropriately.  Patient was however noted to have underlying rhythm of A-fib patient has since been started on chronic anticoagulation with Eliquis.  Walking oximetry completed at time of discharge does not currently qualify for continuous supplemental oxygen continue nighttime oxygen.  Patient seen and examined 12/5/2024 hemodynamically stable patient to follow-up with her primary care provider in a week follow-up with multiple specialist as directed           DISCHARGE Follow Up Recommendations for labs and diagnostics: As above      Day of Discharge     Vital Signs:  Temp:  [97.2 °F (36.2 °C)-98.6 °F (37 °C)] 97.6 °F (36.4 °C)  Heart Rate:  [60-72] 66  Resp:  [16-20] 20  BP: (114-128)/(53-63) 125/61  Flow (L/min) (Oxygen Therapy):  [2] 2  Physical Exam:   Constitutional: Awake alert oriented no acute distress  Respiratory:  Clear  Cardiovascular RRR  GI: Abdomen soft nontender      Discharge Details        Discharge Medications        New Medications        Instructions Start Date   albuterol sulfate  (90 Base) MCG/ACT inhaler  Commonly known as: PROVENTIL HFA;VENTOLIN HFA;PROAIR HFA   2 puffs, Inhalation, Every 4 Hours PRN      amLODIPine 2.5 MG tablet  Commonly known as: NORVASC   2.5 mg, Oral, Every 24 Hours Scheduled   Start Date: December 6, 2024     apixaban 2.5 MG tablet tablet  Commonly known as: ELIQUIS   2.5 mg, Oral, Every 12 Hours Scheduled      budesonide-formoterol 160-4.5 MCG/ACT inhaler  Commonly known as: Symbicort   2 puffs, Inhalation, 2 Times Daily - RT             Continue These Medications        Instructions Start Date   Acidophilus/Citrus Pectin tablet tablet   Take 1 tablet by mouth Daily.      allopurinol 100 MG tablet  Commonly known as: ZYLOPRIM   100 mg, 2 Times Daily      cholecalciferol 25 MCG (1000 UT) tablet  Commonly known as: VITAMIN D3   2,000 Units, 2 Times Daily      clopidogrel 75 MG tablet  Commonly known as: PLAVIX   75 mg, Oral, Daily      docusate sodium 100 MG capsule  Commonly known as: COLACE   100 mg, Daily      ferrous sulfate 325 (65 FE) MG tablet   325 mg, 2 Times Daily      furosemide 40 MG tablet  Commonly known as: LASIX   40 mg, Daily      furosemide 40 MG tablet  Commonly known as: LASIX   20 mg, Every Other Day      HYDROcodone-acetaminophen 5-325 MG per tablet  Commonly known as: NORCO   Take 1 tablet by mouth Every 6 (Six) Hours As Needed.      ketoconazole 2 % cream  Commonly known as: NIZORAL   Apply 1 Application topically to the appropriate area as directed Daily.      ketoconazole 2 % shampoo  Commonly known as: NIZORAL   Apply 1 Application topically to the appropriate area as directed 1 (One) Time Per Week.      lansoprazole 30 MG capsule  Commonly known as: PREVACID   Take 1 capsule by mouth Daily.      levothyroxine 100 MCG tablet  Commonly known as: SYNTHROID,  LEVOTHROID   100 mcg, Daily      multivitamin with minerals tablet tablet   1 tablet, Daily      multivitamin with minerals tablet tablet   1 tablet, 2 Times Daily      nebivolol 10 MG tablet  Commonly known as: Bystolic   10 mg, Oral, Daily      rosuvastatin 10 MG tablet  Commonly known as: Crestor   10 mg, Oral, Nightly      spironolactone 25 MG tablet  Commonly known as: ALDACTONE   12.5 mg, Oral, Daily      Zetia 10 MG tablet  Generic drug: ezetimibe   10 mg, Daily      zolpidem 5 MG tablet  Commonly known as: AMBIEN   Take 1 tablet by mouth At Night As Needed.               No Known Allergies    Discharge Disposition:  Home-Health Care [facility]    Diet:  Hospital:  Diet Order   Procedures    Diet: Cardiac, Fluid Restriction (240 mL/tray); Healthy Heart (2-3 Na+); 1500 mL/day; Fluid Consistency: Thin (IDDSI 0)       Discharge Activity: As tolerated      CODE STATUS:  Code Status and Medical Interventions: CPR (Attempt to Resuscitate); Full Support   Ordered at: 11/26/24 0026     Level Of Support Discussed With:    Patient     Code Status (Patient has no pulse and is not breathing):    CPR (Attempt to Resuscitate)     Medical Interventions (Patient has pulse or is breathing):    Full Support         Future Appointments   Date Time Provider Department Center   12/30/2024  1:30 PM [facility] VANE KENNEDY 2 [facility] ETWMM [facility]   1/2/2025 11:00 AM Enma Wagner MD [facility] ONC ETWN [facility]   1/7/2025  1:00 PM Theodore Marquez MD [facility] PCC ETW [facility]   1/23/2025 11:15 AM [facility] 2 [facility] CT [facility]   1/23/2025 11:30 AM [facility] WALK TEST ROOM [facility] PFT [facility]   1/23/2025 12:30 PM [facility] PULM LAB ROOM 1 [facility] PFT [facility]   2/10/2025 10:45 AM Simon Talbot MD [facility] CD ETOWN [facility]       Additional Instructions for the Follow-ups that You Need to Schedule       Discharge Follow-up with PCP   As directed       Currently Documented PCP:    Kaelyn Eugene MD    PCP Phone Number:    [phone]     Follow Up Details: 1 week        Discharge Follow-up  with Specified Provider: [facility] pulmonary clinic; 2 Weeks   As directed      To: [facility] pulmonary clinic   Follow Up: 2 Weeks        Discharge Follow-up with Specified Provider: Primary cardiologist; 2 Weeks   As directed      To: Primary cardiologist   Follow Up: 2 Weeks        Discharge Follow-up with Specified Provider: Primary nephrologist 1 week; 1 Week   As directed      To: Primary nephrologist 1 week   Follow Up: 1 Week                Pertinent  and/or Most Recent Results     PROCEDURES:   None    LAB RESULTS:      Lab 12/05/24  0535 12/04/24  0447 12/03/24  0502 12/02/24  0503 12/01/24  0525   WBC 10.83* 14.16* 13.74* 12.40* 10.80   HEMOGLOBIN 12.8 13.0 13.3 13.4 12.7   HEMATOCRIT 40.6 41.9 41.7 43.6 42.1   PLATELETS 212 223 216 233 233   NEUTROS ABS 8.18* 11.10* 10.87* 9.58* 8.45*   IMMATURE GRANS (ABS) 0.14* 0.19* 0.22* 0.21* 0.18*   LYMPHS ABS 0.55* 0.68* 0.69* 0.79 0.81   MONOS ABS 1.84* 2.06* 1.80* 1.59* 1.26*   EOS ABS 0.09 0.11 0.10 0.15 0.08   MCV 94.0 95.2 94.6 94.6 98.8*         Lab 12/05/24  0535 12/04/24  0447 12/03/24  0502 12/02/24  0503 12/01/24  0811 12/01/24  0525 11/30/24  0529   SODIUM 138 136 138 139 142  --  142   POTASSIUM 3.6 3.6 4.1 3.7 3.9  --  4.0   CHLORIDE 101 98 98 98 101  --  107   CO2 21.1* 24.3 23.9 24.6 27.3  --  21.5*   ANION GAP 15.9* 13.7 16.1* 16.4* 13.7  --  13.5   BUN 98* 108* 94* 85* 73*  --  65*   CREATININE 2.67* 2.79* 2.65* 2.47* 2.08*  --  1.68*   EGFR 16.7* 15.9* 16.9* 18.3* 22.5*  --  29.1*   GLUCOSE 99 102* 96 102* 93  --  90   CALCIUM 9.8 10.0 10.0 10.3 10.7*  --  9.9   MAGNESIUM 2.9*  --  2.6* 2.4 2.3  --  2.5*   PHOSPHORUS 5.2* 5.3* 5.2* 5.1*  --  4.4 4.3         Lab 12/05/24  0535 12/04/24  0447 12/03/24  0502 12/02/24  0503 12/01/24  0811 11/30/24  0529   TOTAL PROTEIN 6.3  --  6.6 6.7 6.6 6.3   ALBUMIN 3.3* 3.6 3.6 3.6 3.6 3.5   ALT (SGPT) 9  --  14 16 20 21   AST (SGOT) 10  --  11 13 13 15   BILIRUBIN 0.4  --  0.4 0.4 0.4 0.2   INDIRECT BILIRUBIN 0.3  --  0.3  0.3 0.3 0.1   BILIRUBIN DIRECT 0.1  --  0.1 0.1 0.1 0.1   ALK PHOS 89  --  86 89 82 81         Lab 12/05/24  0535 12/02/24  0503 11/29/24  0418   PROBNP 2,644.0* 9,337.0* 26,929.0*                 Brief Urine Lab Results  (Last result in the past 365 days)        Color   Clarity   Blood   Leuk Est   Nitrite   Protein   CREAT   Urine HCG        12/02/24 1133             136.0         12/02/24 1133 Yellow   Clear   Negative   Trace   Negative   >=300 mg/dL (3+)                 Microbiology Results (last 10 days)       Procedure Component Value - Date/Time    COVID PRE-OP / PRE-PROCEDURE SCREENING ORDER (NO ISOLATION) - Swab, Nasal Cavity [[id number]]  (Normal) Collected: 11/25/24 2223    Lab Status: Final result Specimen: Swab from Nasal Cavity Updated: 11/25/24 2319    Narrative:      The following orders were created for panel order COVID PRE-OP / PRE-PROCEDURE SCREENING ORDER (NO ISOLATION) - Swab, Nasal Cavity.  Procedure                               Abnormality         Status                     ---------                               -----------         ------                     COVID-19,CEPHEID/LARON,CO...[[id number]]  Normal              Final result                 Please view results for these tests on the individual orders.    COVID-19,CEPHEID/LARON,COR/HECTOR/PAD/ADELE/LAG/NITHYA IN-HOUSE,NP SWAB IN TRANSPORT MEDIA 1 HR TAT, RT-PCR - Swab, Nasopharynx [[id number]]  (Normal) Collected: 11/25/24 2223    Lab Status: Final result Specimen: Swab from Nasopharynx Updated: 11/25/24 2319     COVID19 Not Detected    Narrative:      Fact sheet for providers: https://www.fda.gov/media/898290/download     Fact sheet for patients: https://www.fda.gov/media/723794/download  Fact sheet for providers: https://www.fda.gov/media/424458/download     Fact sheet for patients: https://www.fda.gov/media/478541/download    Mycoplasma Pneumoniae Antibody, IgM - Blood, [[id number]]  (Abnormal) Collected: 11/25/24 2223    Lab Status: Final  result Specimen: Blood Updated: 11/26/24 0756     Mycoplasma pneumo IgM Positive            XR Chest 1 View    Result Date: 11/29/2024  Impression: Impression: 1.Persistent pulmonary vascular congestion with improving interstitial edema. 2.Small left pleural effusion. Electronically Signed: Benson Sanchez MD  11/29/2024 9:57 AM EST  Workstation ID: [id number]    XR Chest 1 View    Result Date: 11/26/2024  Impression: Persistent cardiac enlargement with evidence of pulmonary edema. Persistent small left pleural effusion with postoperative changes in the left hemithorax. Electronically Signed: Bartolo Garcia MD  11/26/2024 10:58 PM EST  Workstation ID: [id number]    CT Chest Without Contrast Diagnostic    Result Date: 11/26/2024  Impression: Impression: 1.Trace bilateral pleural effusions with mild bibasilar atelectasis. 2.Cardiomegaly. 3.Enlarged main pulmonary artery, suggesting a component of pulmonary arterial hypertension. Electronically Signed: Manuel Anderson MD  11/26/2024 8:37 PM EST  Workstation ID: [id number]    XR Chest 1 View    Result Date: 11/25/2024  Impression: Cardiomegaly with vascular congestion and pulmonary edema. Electronically Signed: Bartolo Garcia MD  11/25/2024 10:40 PM EST  Workstation ID: [id number]     Results for orders placed during the hospital encounter of 09/09/24    Duplex Carotid Ultrasound CAR    Interpretation Summary    Right internal carotid artery demonstrates a 50-69% stenosis.    Left carotid stent imaging indicates moderate (50-79%) restenosis.    Antegrade right vertebral flow.    Antegrade left vertebral flow.    No significant change from study dated 2/12/2024.      Results for orders placed during the hospital encounter of 09/09/24    Duplex Carotid Ultrasound CAR    Interpretation Summary    Right internal carotid artery demonstrates a 50-69% stenosis.    Left carotid stent imaging indicates moderate (50-79%) restenosis.    Antegrade right vertebral flow.    Antegrade left  vertebral flow.    No significant change from study dated 2/12/2024.      Results for orders placed during the hospital encounter of 11/25/24    Adult Transthoracic Echo Complete W/ Cont if Necessary Per Protocol    Interpretation Summary    Moderately dilated left ventricular cavity with severe global hypokinesis.  Left ventricular ejection fraction appears to be 26 - 30%.    Left ventricular wall thickness is consistent with mild concentric hypertrophy.    Left ventricular diastolic function is consistent with (grade I) impaired relaxation.    The left atrial cavity is mildly dilated.    There is mild aortic insufficiency.    Estimated right ventricular systolic pressure from tricuspid regurgitation is normal (<35 mmHg).    Electronic pacemaker lead noted.      Labs Pending at Discharge:        Time spent on Discharge including face to face service: 35 minutes    Electronically signed by ROSY Castro, 12/05/24, 2:10 PM EST.      Attending Documentation:  Patient independently seen and evaluated, above documentation reflects plan put forth during bedside rounds.  More than 51% of the time of this patient encounter was performed by me. I discussed the care plan with RAVI Souza PA-C, I agree with his findings and plan as documented, what I have added to the care plan and modified is as follows in my documentation and my medical decision making; 89-year-old female here with respiratory failure, CHF exacerbation, doing well, renal function stable, ready for discharge home today.  Pacemaker interrogated, no issues.  Passed walk test.  Electronically signed by Ang Laurent MD, 12/05/24, 5:38 PM EST.

## 2024-12-05 NOTE — SIGNIFICANT NOTE
Wound Eval / Progress Noted     Jordin     Patient Name: Yelena Sanchez  : 1935  MRN: [id number]  Today's Date: 2024                 Admit Date: 2024    Visit Dx:    ICD-10-CM ICD-9-CM   1. Acute respiratory failure with hypoxia and hypercapnia  J96.01 518.81    J96.02    2. Acute on chronic congestive heart failure, unspecified heart failure type  I50.9 428.0   3. Difficulty in walking  R26.2 719.7   4. Chronic combined systolic and diastolic congestive heart failure  I50.42 428.42     428.0   5. COPD with acute exacerbation  J44.1 491.21         Acute on chronic respiratory failure with hypercapnia    Presence of biventricular implantable cardioverter-defibrillator (ICD)    Essential hypertension    HLD (hyperlipidemia)    COPD with acute exacerbation    Dyspnea    History of breast cancer    Acute on chronic congestive heart failure        Past Medical History:   Diagnosis Date    Anemia     Arthritis     Asthma     Bicipitoradial bursitis     Breast cancer     CHF (congestive heart failure)     Congestive heart failure     COPD (chronic obstructive pulmonary disease)     Diverticulosis 2014    GERD (gastroesophageal reflux disease)     History of tobacco abuse     HTN (hypertension)     Hyperlipemia     Hyperthyroidism     ICD (implantable cardioverter-defibrillator), biventricular, in situ     Neck mass     Seasonal allergies     SOB (shortness of breath)     TIA (transient ischemic attack)     YRS AGO, NO RESIDUALS        Past Surgical History:   Procedure Laterality Date    COLONOSCOPY      ENDOSCOPY      EYE SURGERY      Implant; yes     ICD GENERATOR REPLACEMENT N/A 2024    Procedure: ICD Generator Change - Bi-Ventricular -[employer];  Surgeon: JANETTE Gautam MD;  Location: [facility] INVASIVE LOCATION;  Service: Cardiovascular;  Laterality: N/A;    LUNG LOBECTOMY Left     Left lower lobe    LUNG SURGERY      LUNG SURGERY Left     LEFT UPPER LOBE ()     MASTECTOMY      Left     MASTECTOMY Left     OTHER SURGICAL HISTORY      Joint Surgery    PACEMAKER IMPLANTATION           Physical Assessment:  Wound 11/26/24 0257 Left lower arm (Active)   Wound Image   12/05/24 1334   Dressing Appearance dry;intact 12/05/24 1334   Closure None 12/05/24 1334   Base dry;scab 12/05/24 1334   Periwound dry;redness;moist 12/05/24 1334   Periwound Temperature warm 12/05/24 1334   Edges rolled/closed 12/05/24 1334   Drainage Amount none 12/05/24 1334   Care, Wound cleansed with;sterile normal saline 12/05/24 1334   Dressing Care dressing removed;dressing applied;non-adherent;petroleum-based;gauze;tubular wrap 12/05/24 1334   Periwound Care absorptive dressing applied 12/05/24 1334       Wound 11/26/24 1220 Right breast MASD (moisture associated skin damage) (Active)   Wound Image   12/05/24 1334   Dressing Appearance open to air 12/05/24 1334   Closure None 12/05/24 1334   Base moist;red 12/05/24 1334   Red (%), Wound Tissue Color 100 12/05/24 1334   Periwound dry;pink 12/05/24 1334   Periwound Temperature warm 12/05/24 1334   Periwound Skin Turgor soft 12/05/24 1334   Edges open 12/05/24 1334   Drainage Characteristics/Odor serosanguineous 12/05/24 1334   Drainage Amount scant 12/05/24 1334   Care, Wound cleansed with;sterile normal saline 12/05/24 1334   Dressing Care open to air 12/05/24 1334   Periwound Care dry periwound area maintained 12/05/24 1334        Wound Check / Follow-up:   Patient seen today for wound follow-up. Patient is awake, alert and oriented. Patient reports she had a bump to her left arm that was biopsied by dermatology. Patient states dermatology reported the site to be skin cancer and that she was suppose to have removed per dermatology today; however, the appointment had to be rescheduled for the beginning of January due to patient still being inpatient. Patient with plan to discharge today. Discussed at home wound care with patient and patient's .  Patient and patient's  verbalized understanding.     Left lower arm with skin cancer lesion per patient report. Area of raised tissue with dry tan/brown tissue covering lesion. Odor and moisture noted during previous assessment have resolved. Periwound tissue is primarily dry with areas of moist red tissue noted under areas of dried, crusted tissue which was dislodged while cleansing. Patient attributes periwound presentation to her psoriasis. Cleansed with normal saline and gauze, blotted dry. Applied a thin layer of Aveeno moisturizer to intact tissue. Recommending every other day dressing changes with non-adherent petroleum based gauze to areas of moist red tissue, dry gauze over raised lesion, and gauze roll securement.      Right breast crease presents with moisture associated skin damage and a fungal presentation with areas of erosion. Erosion presents with moist redness with dry pink tissue and a fungal presentation surrounding. Cleansed with normal saline and gauze, blotted dry. Recommending to maintain twice a day wound care with application of nystatin ointment. Encouraged patient to discuss persistent fungal presentation and erosion with Dermatologist, as she reports this is a persistent issue for her.     Scattered areas of dry, scaly patches. Patient reports diagnosis of psoriasis. Recommending to maintain quality skin care and hygiene with application of Aveeno lotion twice a day.     Patient denies any additional areas of skin breakdown at this time.     Impression: Skin cancer lesion to left lower arm. MASD with fungal presentation and erosion to right breast crease. Dry, scaly patches.     Short term goals: Regain skin integrity, skin protection, every other day dressing change, topical treatment.    Maryam Olivera RN    12/5/2024    14:59 EST

## 2024-12-05 NOTE — PLAN OF CARE
Goal Outcome Evaluation:  Plan of Care Reviewed With: patient, spouse        Progress: improving  Outcome Evaluation: Pt. resolved for discharge.

## 2024-12-05 NOTE — PROGRESS NOTES
" LOS: 9 days   Patient Care Team:  Kaelyn Eugene MD as PCP - General (Family Medicine)  Simon Talbot MD as Consulting Physician (Cardiology)  Kaelyn Eugene MD (Family Medicine)    Chief Complaint: JOSIE    Subjective     History of Present Illness  Pt resting in bed, no distress tolerating p.o.   at bedside.    Subjective:  Symptoms:  Improved.  No shortness of breath or chest pain.    Diet:  Adequate intake.  No nausea or vomiting.    Pain:  She complains of pain that is mild.        History taken from: patient chart RN    Objective     Vital Sign Min/Max for last 24 hours  Temp  Min: 97.2 °F (36.2 °C)  Max: 98.6 °F (37 °C)   BP  Min: 104/72  Max: 135/61   Pulse  Min: 60  Max: 86   Resp  Min: 16  Max: 20   SpO2  Min: 91 %  Max: 99 %   Flow (L/min) (Oxygen Therapy)  Min: 2  Max: 2   Weight  Min: 67.1 kg (147 lb 14.9 oz)  Max: 67.1 kg (147 lb 14.9 oz)     Flowsheet Rows      Flowsheet Row First Filed Value   Admission Height 149.9 cm (59.02\") Documented at 11/25/2024 2215   Admission Weight 70.4 kg (155 lb 3.3 oz) Documented at 11/25/2024 2215            No intake/output data recorded.  I/O last 3 completed shifts:  In: 960 [P.O.:960]  Out: 250 [Urine:250]    Objective:  General Appearance:  Uncomfortable, in no acute distress and in pain.    Vital signs: (most recent): Blood pressure 114/63, pulse 70, temperature 98.1 °F (36.7 °C), temperature source Oral, resp. rate 20, height 149.9 cm (59.02\"), weight 67.1 kg (147 lb 14.9 oz), SpO2 100%.  Vital signs are normal.  No fever.    Output: Producing urine.    HEENT: Normal HEENT exam.    Lungs:  Normal effort and normal respiratory rate.    Heart: Normal rate.  Regular rhythm.    Chest: (Left upper anterior chest pacemaker)  Abdomen: Bowel sounds are normal.     Extremities: Normal range of motion.  There is no dependent edema.    Pulses: Distal pulses are intact.    Neurological: Patient is alert and oriented to person, place and time.  " "  Pupils:  Pupils are equal, round, and reactive to light.    Skin:  Dry.                Results Review:     I reviewed the patient's new clinical results.  I reviewed the patient's new imaging results and agree with the interpretation.  I reviewed the patient's other test results and agree with the interpretation    WBC WBC   Date Value Ref Range Status   12/05/2024 10.83 (H) 3.40 - 10.80 10*3/mm3 Final   12/04/2024 14.16 (H) 3.40 - 10.80 10*3/mm3 Final   12/03/2024 13.74 (H) 3.40 - 10.80 10*3/mm3 Final      HGB Hemoglobin   Date Value Ref Range Status   12/05/2024 12.8 12.0 - 15.9 g/dL Final   12/04/2024 13.0 12.0 - 15.9 g/dL Final   12/03/2024 13.3 12.0 - 15.9 g/dL Final      HCT Hematocrit   Date Value Ref Range Status   12/05/2024 40.6 34.0 - 46.6 % Final   12/04/2024 41.9 34.0 - 46.6 % Final   12/03/2024 41.7 34.0 - 46.6 % Final      Platlets No results found for: \"LABPLAT\"   MCV MCV   Date Value Ref Range Status   12/05/2024 94.0 79.0 - 97.0 fL Final   12/04/2024 95.2 79.0 - 97.0 fL Final   12/03/2024 94.6 79.0 - 97.0 fL Final          Sodium Sodium   Date Value Ref Range Status   12/05/2024 138 136 - 145 mmol/L Final   12/04/2024 136 136 - 145 mmol/L Final   12/03/2024 138 136 - 145 mmol/L Final      Potassium Potassium   Date Value Ref Range Status   12/05/2024 3.6 3.5 - 5.2 mmol/L Final   12/04/2024 3.6 3.5 - 5.2 mmol/L Final   12/03/2024 4.1 3.5 - 5.2 mmol/L Final      Chloride Chloride   Date Value Ref Range Status   12/05/2024 101 98 - 107 mmol/L Final   12/04/2024 98 98 - 107 mmol/L Final   12/03/2024 98 98 - 107 mmol/L Final      CO2 CO2   Date Value Ref Range Status   12/05/2024 21.1 (L) 22.0 - 29.0 mmol/L Final   12/04/2024 24.3 22.0 - 29.0 mmol/L Final   12/03/2024 23.9 22.0 - 29.0 mmol/L Final      BUN BUN   Date Value Ref Range Status   12/05/2024 98 (H) 8 - 23 mg/dL Final   12/04/2024 108 (H) 8 - 23 mg/dL Final   12/03/2024 94 (H) 8 - 23 mg/dL Final      Creatinine Creatinine   Date Value Ref " "Range Status   12/05/2024 2.67 (H) 0.57 - 1.00 mg/dL Final   12/04/2024 2.79 (H) 0.57 - 1.00 mg/dL Final   12/03/2024 2.65 (H) 0.57 - 1.00 mg/dL Final      Calcium Calcium   Date Value Ref Range Status   12/05/2024 9.8 8.6 - 10.5 mg/dL Final   12/04/2024 10.0 8.6 - 10.5 mg/dL Final   12/03/2024 10.0 8.6 - 10.5 mg/dL Final      PO4 No results found for: \"CAPO4\"   Albumin Albumin   Date Value Ref Range Status   12/05/2024 3.3 (L) 3.5 - 5.2 g/dL Final   12/04/2024 3.6 3.5 - 5.2 g/dL Final   12/03/2024 3.6 3.5 - 5.2 g/dL Final      Magnesium Magnesium   Date Value Ref Range Status   12/05/2024 2.9 (H) 1.6 - 2.4 mg/dL Final   12/03/2024 2.6 (H) 1.6 - 2.4 mg/dL Final      Uric Acid No results found for: \"URICACID\"     Medication Review:   !Patient Home Medications Stored on Unit, , Not Applicable, BID  allopurinol, 100 mg, Oral, BID  amLODIPine, 2.5 mg, Oral, Q24H  arformoterol, 15 mcg, Nebulization, BID - RT  ascorbic acid, 1,000 mg, Oral, BID  budesonide, 0.5 mg, Nebulization, BID - RT  clopidogrel, 75 mg, Oral, Daily  dimethicone, 1 Application, Topical, 2 times per day  furosemide, 40 mg, Oral, Daily  guaiFENesin, 600 mg, Oral, Q12H  heparin (porcine), 5,000 Units, Subcutaneous, Q12H  Iron Glycinate, 1 capsule, Oral, BID  levothyroxine, 100 mcg, Oral, Q AM  montelukast, 10 mg, Oral, Nightly  nebivolol, 10 mg, Oral, Daily  nystatin, 1 Application, Topical, 2 times per day  pantoprazole, 40 mg, Oral, Q AM  potassium chloride, 40 mEq, Oral, Once  rosuvastatin, 10 mg, Oral, Nightly  sodium chloride, 10 mL, Intravenous, Q12H  [Held by provider] spironolactone, 12.5 mg, Oral, Daily          Assessment & Plan       Acute on chronic respiratory failure with hypercapnia    Presence of biventricular implantable cardioverter-defibrillator (ICD)    Essential hypertension    HLD (hyperlipidemia)    COPD with acute exacerbation    Dyspnea    History of breast cancer    Acute on chronic congestive heart failure      Assessment & " Plan  - JOSIE-  Clinically she seems better.  On Lasix 40 mg daily in AM, dose today held.  Continue current dose of spironolactone.  Cr at 2.7->2.6 today.  Ok for d/c from renal standpoint.  Would discharge on lasix 40mg bid and f/u in our office 1-2 weeks.    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 Office records reviewed.  LOV 7/19/2022, baseline creatinine 1.8-1.9 previously.  May have had some progression of CKD in that interval.    - Congestive heart failure, EF 26 to 30% with grade 1 diastolic dysfunction and LVH.  Needs probably to except higher creatinine in order to keep her euvolemic.  Consider SGLT2 inhibitors.    - Acute respiratory failure on admission, with COPD exacerbation and possible pneumonia, much better, per pulmonary.    - Hypertension, blood pressure is controlled,     -Hypokalemia-  replaced po today.    Oliver Otero MD  12/05/24  07:47 EST

## 2024-12-05 NOTE — PROCEDURES
Walking Oximetry Progress Note      Patient Name:  Yelena Sanchez  YOB: 1935  Date of Procedure: 12/05/24              ROOM AIR BASELINE   SpO2% 95   Heart Rate 69     EXERCISE ON ROOM AIR SpO2% EXERCISE ON O2 LPM SpO2%   1 MINUTE 93 1 MINUTE     2 MINUTES 93 2 MINUTES     3 MINUTES 91 3 MINUTES     4 MINUTES 92 4 MINUTES     5 MINUTES 89 5 MINUTES     6 MINUTES 91 6 MINUTES                Time to Recovery  1 min   SpO2% Post Exercise  91 on RA.    HR Post Exercise  72     Comments:           Electronically signed by Letitia Grimaldo, RRT, 12/05/24, 12:32 PM EST.

## 2024-12-05 NOTE — PROGRESS NOTES
Pulmonary / Critical Care Progress Note      Patient Name: Yelena Sanchez  : 1935  MRN: [id number]  Primary Care Physician:  Kaelyn Eugene MD  Date of admission: 2024    Subjective   Subjective   Follow-up for acute hypoxic and hypercapnic respiratory failure, CHF exacerbation    No dyspnea today  No cough  No sputum production or hemoptysis  No chest pain  No fever or chills      Objective   Objective     Vitals:   Temp:  [97.2 °F (36.2 °C)-98.6 °F (37 °C)] 97.6 °F (36.4 °C)  Heart Rate:  [60-72] 66  Resp:  [16-20] 20  BP: (114-128)/(53-63) 125/61  Flow (L/min) (Oxygen Therapy):  [2] 2    Physical Exam   Vital Signs Reviewed   General: Chronically ill-appearing, elderly female, Alert, NAD, sitting up in chair  Chest:  good aeration, scant crackles to auscultation, no work of breathing noted on room air  CV: regular rate and rhythm regular  EXT:  no clubbing, no cyanosis, no edema  Neuro:  A&Ox3, moving all 4 extremities spontaneously  Skin: No rashes or lesions noted    Result Review    Result Review:  I have personally reviewed the results from the time of this admission to 2024 14:00 EST and agree with these findings:  [x]  Laboratory  [x]  Microbiology  [x]  Radiology  [x]  EKG/Telemetry   []  Cardiology/Vascular   []  Pathology  []  Old records  []  Other:  Most notable findings include:   proBNP 15,960 --> 26,929 --> 9337      Lab 24  0535 24  0447 24  0502 24  0503 24  0811 24  0525 24  0529 24  0418   WBC 10.83* 14.16* 13.74* 12.40*  --  10.80 12.16* 13.15*   HEMOGLOBIN 12.8 13.0 13.3 13.4  --  12.7 11.6* 11.3*   HEMATOCRIT 40.6 41.9 41.7 43.6  --  42.1 38.1 36.9   PLATELETS 212 223 216 233  --  233 251 242   SODIUM 138 136 138 139 142  --  142 144   POTASSIUM 3.6 3.6 4.1 3.7 3.9  --  4.0 4.2   CHLORIDE 101 98 98 98 101  --  107 108*   CO2 21.1* 24.3 23.9 24.6 27.3  --  21.5* 23.7   BUN 98* 108* 94* 85* 73*  --  65* 70*    CREATININE 2.67* 2.79* 2.65* 2.47* 2.08*  --  1.68* 2.10*   GLUCOSE 99 102* 96 102* 93  --  90 124*   CALCIUM 9.8 10.0 10.0 10.3 10.7*  --  9.9 9.7   PHOSPHORUS 5.2* 5.3* 5.2* 5.1*  --  4.4 4.3 4.4   TOTAL PROTEIN 6.3  --  6.6 6.7 6.6  --  6.3 6.3   ALBUMIN 3.3* 3.6 3.6 3.6 3.6  --  3.5 3.6   CT Chest Without Contrast Diagnostic    Result Date: 11/26/2024  CT CHEST WO CONTRAST DIAGNOSTIC Date of Exam: 11/26/2024 5:17 PM EST Indication: hypoxia. Comparison: Chest radiograph from November 25, 2024 and CT chest from October 8, 2019 Technique: Axial CT images were obtained of the chest without contrast administration.  Reconstructed coronal and sagittal images were also obtained. Automated exposure control and iterative construction methods were used. Findings: There are changes from left lower lobectomy, and the residual central tracheobronchial tree is clear. There are trace bilateral pleural effusions with mild bibasilar atelectasis. There is no focal consolidation. No discrete pulmonary nodule is identified. A left subclavian pacemaker is in place. The heart is enlarged, with evidence of calcified coronary artery disease. The thoracic aorta appears normal in caliber. The main pulmonary artery is enlarged, suggesting a component of pulmonary arterial hypertension. No abnormally enlarged lymph nodes are identified. There are changes from left mastectomy. Partial evaluation of the upper abdomen demonstrates a right renal cyst. No aggressive osseous lesions are identified.     Impression: Impression: 1.Trace bilateral pleural effusions with mild bibasilar atelectasis. 2.Cardiomegaly. 3.Enlarged main pulmonary artery, suggesting a component of pulmonary arterial hypertension. Electronically Signed: Manuel Anderson MD  11/26/2024 8:37 PM EST  Workstation ID: [id number]     Assessment & Plan   Assessment / Plan     Active Hospital Problems:  Active Hospital Problems    Diagnosis     **Acute on chronic respiratory failure  with hypercapnia     Acute on chronic congestive heart failure     Dyspnea     History of breast cancer     COPD with acute exacerbation     HLD (hyperlipidemia)     Essential hypertension     Presence of biventricular implantable cardioverter-defibrillator (ICD)      Impression:  Acute hypoxic and hypercapnic respiratory failure requiring NIPPV  Acute on chronic congestive diastolic heart failure  Acute cardiogenic pulmonary edema  History of lung cancer with left lower lobe lobectomy in 2004  History of left breast cancer 2011 s/p left mastectomy  History of MDS  History of coronary artery disease s/p ICD placed  Never smoker     Plan:  -Respiratory status near baseline.  Patient is only on nocturnal oxygen at baseline.  - no desaturations noted on 6-minute walk test on room air today  -Continue nebulizers and bronchopulmonary hygiene  -Completed prednisone and doxycycline  -Continue oral diuretics.    -Anticoagulation per cardiology  -Trend electrolytes and renal panel.  Replace potassium orally  -Continue bronchopulmonary hygiene.  Encourage I-S and flutter valve  -Encourage mobilization.  Out of bed to chair.  -Follow-up with pulmonology in 1 to 2 weeks after discharge    From a pulmonary perspective, stable to discharge    VTE Prophylaxis:  Pharmacologic VTE prophylaxis orders are present.    CODE STATUS:   Level Of Support Discussed With: Patient  Code Status (Patient has no pulse and is not breathing): CPR (Attempt to Resuscitate)  Medical Interventions (Patient has pulse or is breathing): Full Support      Labs, imaging, microbiology, notes and medications personally reviewed  Discussed with primary    Electronically signed by Karan Hennessy MD, 12/05/24, 2:02 PM EST.

## 2024-12-06 NOTE — OUTREACH NOTE
Prep Survey      Flowsheet Row Responses   [facility] facility patient discharged from? [facility]   Is LACE score < 7 ? No   Eligibility Readm Mgmt   Discharge diagnosis a/c Respiratory failure   Does the patient have one of the following disease processes/diagnoses(primary or secondary)? Other   Does the patient have Home health ordered? Yes   What is the Home health agency?  [facility]   Is there a DME ordered? Yes   What DME was ordered? [facility] for DME   Prep survey completed? Yes            DAYO SHEA - Registered Nurse

## 2024-12-09 ENCOUNTER — READMISSION MANAGEMENT (OUTPATIENT)
Dept: CALL CENTER | Facility: HOSPITAL | Age: 89
End: 2024-12-09
Payer: MEDICARE

## 2024-12-09 DIAGNOSIS — E78.2 MIXED HYPERLIPIDEMIA: ICD-10-CM

## 2024-12-09 DIAGNOSIS — I25.10 CORONARY ARTERY DISEASE INVOLVING NATIVE CORONARY ARTERY OF NATIVE HEART WITHOUT ANGINA PECTORIS: ICD-10-CM

## 2024-12-09 RX ORDER — ROSUVASTATIN CALCIUM 10 MG/1
10 TABLET, COATED ORAL NIGHTLY
Qty: 90 TABLET | Refills: 3 | OUTPATIENT
Start: 2024-12-09

## 2024-12-09 NOTE — TELEPHONE ENCOUNTER
Caller: JAYRO ARROYOLEY    Relationship: Emergency Contact    Best call back number: [phone]     Requested Prescriptions:   Requested Prescriptions     Pending Prescriptions Disp Refills    rosuvastatin (Crestor) 10 MG tablet 90 tablet 3     Sig: Take 1 tablet by mouth Every Night.        Pharmacy where request should be sent: [address] [phone] [facility] [phone]      Last office visit with prescribing clinician: 7/17/2023   Last telemedicine visit with prescribing clinician: Visit date not found   Next office visit with prescribing clinician: 12/18/2024     Additional details provided by patient: 1 WEEK LEFT    Does the patient have less than a 3 day supply:  [] Yes  [x] No    Would you like a call back once the refill request has been completed: [x] Yes [] No    If the office needs to give you a call back, can they leave a voicemail: [] Yes [x] No    Anuja Langston Rep   12/09/24 09:21 EST

## 2024-12-09 NOTE — OUTREACH NOTE
"Medical Week 1 Survey      Flowsheet Row Responses   [facility] patient discharged from? [facility]   Does the patient have one of the following disease processes/diagnoses(primary or secondary)? Other   Week 1 attempt successful? Yes   Call start time 0903   Call end time 0906   Discharge diagnosis a/c Respiratory failure   Is patient permission given to speak with other caregiver? Yes   List who call center can speak with Yonathan spouse   Person spoke with today (if not patient) and relationship Yonathan spouse   Meds reviewed with patient/caregiver? Yes   Is the patient having any side effects they believe may be caused by any medication additions or changes? No   Does the patient have all medications ordered at discharge? Yes   Is the patient taking all medications as directed (includes completed medication regime)? Yes   Comments regarding appointments Pulm apt 12/31/24   Does the patient have a primary care provider?  Yes   Has the patient kept scheduled appointments due by today? N/A   What is the Home health agency?  [facility]    Home health comments  has visited   What DME was ordered? [employer] for DME   Has all DME been delivered? Yes   Did the patient receive a copy of their discharge instructions? Yes   Nursing interventions Reviewed instructions with patient   What is the patient's perception of their health status since discharge? Same  [\"tired\"]   Is the patient/caregiver able to teach back signs and symptoms related to disease process for when to call PCP? Yes   Is the patient/caregiver able to teach back signs and symptoms related to disease process for when to call 911? Yes   Is the patient/caregiver able to teach back the hierarchy of who to call/visit for symptoms/problems? PCP, Specialist, Home health nurse, Urgent Care, ED, 911 Yes   If the patient is a current smoker, are they able to teach back resources for cessation? Not a smoker   Week 1 call completed? Yes   Call end time 0906      "       Nelida WORKMAN - Registered Nurse

## 2024-12-18 ENCOUNTER — READMISSION MANAGEMENT (OUTPATIENT)
Dept: CALL CENTER | Facility: HOSPITAL | Age: 89
End: 2024-12-18
Payer: MEDICARE

## 2024-12-18 ENCOUNTER — OFFICE VISIT (OUTPATIENT)
Dept: CARDIOLOGY | Facility: CLINIC | Age: 89
End: 2024-12-18
Payer: MEDICARE

## 2024-12-18 VITALS
HEIGHT: 59 IN | SYSTOLIC BLOOD PRESSURE: 147 MMHG | BODY MASS INDEX: 29.64 KG/M2 | HEART RATE: 59 BPM | WEIGHT: 147 LBS | DIASTOLIC BLOOD PRESSURE: 55 MMHG

## 2024-12-18 DIAGNOSIS — E78.2 MIXED HYPERLIPIDEMIA: ICD-10-CM

## 2024-12-18 DIAGNOSIS — I65.23 BILATERAL CAROTID ARTERY STENOSIS: ICD-10-CM

## 2024-12-18 DIAGNOSIS — I10 ESSENTIAL HYPERTENSION: ICD-10-CM

## 2024-12-18 DIAGNOSIS — I25.10 CORONARY ARTERY DISEASE INVOLVING NATIVE CORONARY ARTERY OF NATIVE HEART WITHOUT ANGINA PECTORIS: ICD-10-CM

## 2024-12-18 DIAGNOSIS — I50.42 CHRONIC COMBINED SYSTOLIC AND DIASTOLIC CONGESTIVE HEART FAILURE: ICD-10-CM

## 2024-12-18 DIAGNOSIS — Z95.810 PRESENCE OF BIVENTRICULAR IMPLANTABLE CARDIOVERTER-DEFIBRILLATOR (ICD): Primary | ICD-10-CM

## 2024-12-18 DIAGNOSIS — I77.9 CAROTID ARTERY DISEASE, UNSPECIFIED LATERALITY, UNSPECIFIED TYPE: ICD-10-CM

## 2024-12-18 RX ORDER — SPIRONOLACTONE 25 MG/1
12.5 TABLET ORAL DAILY
Qty: 45 TABLET | Refills: 3 | Status: SHIPPED | OUTPATIENT
Start: 2024-12-18

## 2024-12-18 RX ORDER — NEBIVOLOL 10 MG/1
10 TABLET ORAL DAILY
Qty: 90 TABLET | Refills: 3 | Status: SHIPPED | OUTPATIENT
Start: 2024-12-18

## 2024-12-18 RX ORDER — FUROSEMIDE 40 MG/1
40 TABLET ORAL 2 TIMES DAILY
Qty: 180 TABLET | Refills: 3 | Status: SHIPPED | OUTPATIENT
Start: 2024-12-18

## 2024-12-18 RX ORDER — EZETIMIBE 10 MG/1
10 TABLET ORAL DAILY
Qty: 90 TABLET | Refills: 3 | Status: SHIPPED | OUTPATIENT
Start: 2024-12-18 | End: 2024-12-18

## 2024-12-18 RX ORDER — AMLODIPINE BESYLATE 2.5 MG/1
2.5 TABLET ORAL
Qty: 90 TABLET | Refills: 3 | Status: SHIPPED | OUTPATIENT
Start: 2024-12-18 | End: 2024-12-18

## 2024-12-18 RX ORDER — EZETIMIBE 10 MG/1
10 TABLET ORAL DAILY
Qty: 90 TABLET | Refills: 3 | Status: SHIPPED | OUTPATIENT
Start: 2024-12-18

## 2024-12-18 RX ORDER — AMLODIPINE BESYLATE 2.5 MG/1
2.5 TABLET ORAL
Qty: 90 TABLET | Refills: 3 | Status: SHIPPED | OUTPATIENT
Start: 2024-12-18

## 2024-12-18 RX ORDER — CLOPIDOGREL BISULFATE 75 MG/1
75 TABLET ORAL DAILY
Qty: 90 TABLET | Refills: 3 | Status: SHIPPED | OUTPATIENT
Start: 2024-12-18 | End: 2024-12-20

## 2024-12-18 NOTE — PROGRESS NOTES
Chief Complaint  Follow-up and Congestive Heart Failure    Subjective        History of Present Illness  Yelena Sanchez presents to [facility] CARDIOLOGY   Ms. Sanchez is an 89-year-old female coming in today for routine cardiac follow-up.  She is accompanied by her .  Since last seen in our office, she underwent AICD generator change in November.  In the end of November she ended up admitted to the hospital with acute respiratory failure, and required diuresing in addition to nebulizers steroids and oxygen support.  She was noted to have atrial fibrillation, was started on anticoagulation.  Discussed with Dr. Rosalina medina, at this point we will discontinue Plavix.  Overall cardiac wise right now she is doing fairly well.  No major symptoms of shortness of breath and pedal edema has been stable.  She is currently taking furosemide 40 mg in the morning, and alternating afternoon doses with 40 and 20 mg.  She currently has a dressing on her left arm after having some skin cancer lesions removed.  She complains of significant leg aches.          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 Cardiac catheterization in 2014 showed 70% mid RCA lesion with 30% left circumflex lesion. No angioplasty was performed. 4) chronic kidney disease 6) iron deficiency anemia         Past Medical History:   Diagnosis Date    Anemia     Arthritis     Asthma     Bicipitoradial bursitis     Breast cancer     CHF (congestive heart failure)     Congestive heart failure     COPD (chronic obstructive pulmonary disease)     Diverticulosis 11/26/2014    GERD (gastroesophageal reflux disease)     History of tobacco abuse     HTN (hypertension)     Hyperlipemia     Hyperthyroidism     ICD (implantable  cardioverter-defibrillator), biventricular, in situ     Neck mass     Seasonal allergies     SOB (shortness of breath)     TIA (transient ischemic attack)        No Known Allergies     Past Surgical History:   Procedure Laterality Date    COLONOSCOPY  2014    ENDOSCOPY  2014    EYE SURGERY      Implant; yes     ICD GENERATOR REPLACEMENT N/A 11/14/2024    Procedure: ICD Generator Change - Bi-Ventricular -[employer];  Surgeon: JANETTE Gautam MD;  Location: [facility] INVASIVE LOC[facility];  Service: Cardiovascular;  Laterality: N/A;    LUNG LOBECTOMY Left     Left lower lobe    LUNG SURGERY      LUNG SURGERY Left     LEFT UPPER LOBE (2004)    MASTECTOMY      Left     MASTECTOMY Left     OTHER SURGICAL HISTORY      Joint Surgery    PACEMAKER IMPLANTATION          Social History  She  reports that she has never smoked. She has never been exposed to tobacco smoke. She has never used smokeless tobacco. She reports that she does not drink alcohol and does not use drugs.    Family History  Her family history includes Colon cancer in her mother; Lung cancer in her sister.       Current Outpatient Medications on File Prior to Visit   Medication Sig    albuterol sulfate  (90 Base) MCG/ACT inhaler Inhale 2 puffs Every 4 (Four) Hours As Needed for Wheezing for up to 30 days.    allopurinol (ZYLOPRIM) 100 MG tablet Take 1 tablet by mouth 2 (Two) Times a Day.    budesonide-formoterol (Symbicort) 160-4.5 MCG/ACT inhaler Inhale 2 puffs 2 (Two) Times a Day.    cholecalciferol (VITAMIN D3) 25 MCG (1000 UT) tablet Take 2 tablets by mouth 2 (Two) Times a Day.    docusate sodium (COLACE) 100 MG capsule Take 1 capsule by mouth Daily.    ferrous sulfate 325 (65 FE) MG tablet Take 1 tablet by mouth 2 (Two) Times a Day.    HYDROcodone-acetaminophen (NORCO) 5-325 MG per tablet Take 1 tablet by mouth Every 6 (Six) Hours As Needed.    ketoconazole (NIZORAL) 2 % cream Apply 1 Application topically to the appropriate area as directed  "Daily.    ketoconazole (NIZORAL) 2 % shampoo Apply 1 Application topically to the appropriate area as directed 1 (One) Time Per Week. (Patient not taking: Reported on 1/2/2025)    Lactobacillus Acid-Pectin (Acidophilus/Citrus Pectin) tablet tablet Take 1 tablet by mouth Daily.    lansoprazole (PREVACID) 30 MG capsule Take 1 capsule by mouth Daily.    levothyroxine (SYNTHROID, LEVOTHROID) 100 MCG tablet Take 1 tablet by mouth Daily.    multivitamin with minerals (PRESERVISION AREDS PO) Take 1 tablet by mouth 2 (Two) Times a Day.    multivitamin with minerals tablet tablet Take 1 tablet by mouth Daily.    rosuvastatin (Crestor) 10 MG tablet Take 1 tablet by mouth Every Night.    zolpidem (AMBIEN) 5 MG tablet Take 1 tablet by mouth At Night As Needed.     No current facility-administered medications on file prior to visit.         Review of Systems   See HPI      Objective   Vitals:    12/18/24 1307   BP: 147/55   Pulse: 59   Weight: 66.7 kg (147 lb)   Height: 149.9 cm (59\")         Physical Exam  General : Alert, awake, no acute distress  Neck : Supple, no carotid bruit, no jugular venous distention  CVS : Regular rate and rhythm, no murmur, no rubs or gallops  Lungs: Clear to auscultation bilaterally, no crackles or rhonchi  Abdomen: Soft, nontender, bowel sounds active  Extremities: Warm, well-perfused, no pedal edema      Result Review     The following data was reviewed by LUZ MARIA Cerna  proBNP   Date Value Ref Range Status   12/05/2024 2,644.0 (H) 0.0 - 1,800.0 pg/mL Final     CMP          12/3/2024    05:02 12/4/2024    04:47 12/5/2024    05:35   CMP   Glucose 96  102  99    BUN 94  108  98    Creatinine 2.65  2.79  2.67    EGFR 16.9  15.9  16.7    Sodium 138  136  138    Potassium 4.1  3.6  3.6    Chloride 98  98  101    Calcium 10.0  10.0  9.8    Total Protein 6.6   6.3    Albumin 3.6  3.6  3.3    Total Bilirubin 0.4   0.4    Alkaline Phosphatase 86   89    AST (SGOT) 11   10    ALT (SGPT) 14   9  " "  BUN/Creatinine Ratio 35.5  38.7  36.7    Anion Gap 16.1  13.7  15.9      CBC w/diff          12/3/2024    05:02 12/4/2024    04:47 12/5/2024    05:35   CBC w/Diff   WBC 13.74  14.16  10.83    RBC 4.41  4.40  4.32    Hemoglobin 13.3  13.0  12.8    Hematocrit 41.7  41.9  40.6    MCV 94.6  95.2  94.0    MCH 30.2  29.5  29.6    MCHC 31.9  31.0  31.5    RDW 15.1  15.3  15.2    Platelets 216  223  212    Neutrophil Rel % 79.2  78.5  75.5    Immature Granulocyte Rel % 1.6  1.3  1.3    Lymphocyte Rel % 5.0  4.8  5.1    Monocyte Rel % 13.1  14.5  17.0    Eosinophil Rel % 0.7  0.8  0.8    Basophil Rel % 0.4  0.1  0.3       Lab Results   Component Value Date    TSH 0.795 12/31/2024      Lab Results   Component Value Date    FREET4 1.19 12/31/2024      No results found for: \"DDIMERQUANT\"  Magnesium   Date Value Ref Range Status   12/31/2024 2.7 (H) 1.6 - 2.4 mg/dL Final      No results found for: \"DIGOXIN\"   Lab Results   Component Value Date    TROPONINT 37 (H) 11/25/2024           Lipid Panel          7/9/2024    07:45 10/4/2024    08:05 12/31/2024    09:04   Lipid Panel   Total Cholesterol 175  158  263    Triglycerides 63  60  121    HDL Cholesterol 62  52  51    VLDL Cholesterol 12  12  22    LDL Cholesterol  101  94  190    LDL/HDL Ratio 1.62  1.81  3.68          Results for orders placed during the hospital encounter of 11/25/24    Adult Transthoracic Echo Complete W/ Cont if Necessary Per Protocol    Interpretation Summary    Moderately dilated left ventricular cavity with severe global hypokinesis.  Left ventricular ejection fraction appears to be 26 - 30%.    Left ventricular wall thickness is consistent with mild concentric hypertrophy.    Left ventricular diastolic function is consistent with (grade I) impaired relaxation.    The left atrial cavity is mildly dilated.    There is mild aortic insufficiency.    Estimated right ventricular systolic pressure from tricuspid regurgitation is normal (<35 mmHg).    " Electronic pacemaker lead noted.             Assessment and Plan   Diagnoses and all orders for this visit:    1. Presence of biventricular implantable cardioverter-defibrillator (ICD) (Primary)  Assessment & Plan:  She had generator change in November, and normal device interrogation during recent hospitalization.  Continue home about monitoring.      2. Chronic combined systolic and diastolic congestive heart failure  Assessment & Plan:  She is currently stable from a volume standpoint.  Continue alternating days of diuretics as currently daily, 40 mg in the a.m., with further management in 1 day, and 40 mg twice daily the office today.  At baseline she has chronic kidney disease, we will recheck her electrolyte and renal function.  Continue current dose Bystolic, spironolactone, unable to add ACE or ARB due to her renal status.    Orders:  -     Basic Metabolic Panel; Future  -     proBNP; Future  -     nebivolol (Bystolic) 10 MG tablet; Take 1 tablet by mouth Daily.  Dispense: 90 tablet; Refill: 3  -     spironolactone (ALDACTONE) 25 MG tablet; Take 0.5 tablets by mouth Daily.  Dispense: 45 tablet; Refill: 3    3. Essential hypertension  Assessment & Plan:  Blood pressure is reasonably well-controlled for her age range.  Continue current medications including amlodipine which was, low-dose amlodipine which was added during recent hospitalization.    Orders:  -     nebivolol (Bystolic) 10 MG tablet; Take 1 tablet by mouth Daily.  Dispense: 90 tablet; Refill: 3    4. Coronary artery disease involving native coronary artery of native heart without angina pectoris  Assessment & Plan:   she is stable without symptoms of angina.  Plavix being discontinued, will have her take aspirin and beta-blocker.      5. Bilateral carotid artery stenosis  Assessment & Plan:  She previous carotid artery stenting, did reach out and discussed with Dr. Romano, since we have added anticoagulation with Eliquis considering her advanced age  increased risk of bleeding we will discontinue Plavix and only take aspirin.      6. Mixed hyperlipidemia  Assessment & Plan:   Most recent LDL moderately improved at 94.  She is complaining of significant leg aches at this time, recommend she hold the rosuvastatin for the next 2 weeks, if this improves her symptoms she can let us know we can consider alternative, if it does not make any change, she may resume.  She can continue current dose Zetia.      7. Carotid artery disease, unspecified laterality, unspecified type  Assessment & Plan:  She previous carotid artery stenting, did reach out and discussed with Dr. Romano, since we have added anticoagulation with Eliquis considering her advanced age increased risk of bleeding we will discontinue Plavix and only take aspirin.    Orders:  -     Discontinue: clopidogrel (PLAVIX) 75 MG tablet; Take 1 tablet by mouth Daily.  Dispense: 90 tablet; Refill: 3    Other orders (maintenance refills)  -     amLODIPine (NORVASC) 2.5 MG tablet; Take 1 tablet by mouth Daily.  Dispense: 90 tablet; Refill: 3  -     apixaban (ELIQUIS) 2.5 MG tablet tablet; Take 1 tablet by mouth Every 12 (Twelve) Hours. Indications: Atrial Fibrillation  Dispense: 180 tablet; Refill: 3  -     ezetimibe (Zetia) 10 MG tablet; Take 1 tablet by mouth Daily.  Dispense: 90 tablet; Refill: 3                Follow Up   Return for with Dr. Talbot, As already scheduled in Feb.    Patient was given instructions and counseling regarding her condition or for health maintenance advice. Please see specific information pulled into the AVS if appropriate.     Signed,  Mariana Mitchell, APRN  12/18/2024     Dictated Utilizing Dragon Dictation: Please note that portions of this note were completed with a voice recognition program.  Part of this note may be an electronic transcription/translation of spoken language to printed text using the Dragon Dictation System.

## 2024-12-26 ENCOUNTER — TELEPHONE (OUTPATIENT)
Dept: PULMONOLOGY | Facility: CLINIC | Age: 89
End: 2024-12-26

## 2024-12-26 NOTE — TELEPHONE ENCOUNTER
Name: MICAELA ARROYO    Relationship: Emergency Contact    Best Callback Number: [phone]     Patient would like to Reschedule their appointment. Unable to schedule within the 3 week timeframe. PATIENT NEEDS TO RESCHEDULE APPT ON 1/7/2025-PFT AND CT SCAN ISN'T SCHEDULED UNTIL 1/23/2024. PLEASE CALL TO RESCHEDULE     THERE IS NO AVAILABILITY  WITHIN 3 WEEK TIME FAME WITH DR. WHITFIELD

## 2024-12-30 ENCOUNTER — HOSPITAL ENCOUNTER (OUTPATIENT)
Dept: MAMMOGRAPHY | Facility: HOSPITAL | Age: 89
Discharge: HOME | End: 2024-12-30
Admitting: INTERNAL MEDICINE
Payer: MEDICARE

## 2024-12-30 DIAGNOSIS — C50.912 MALIGNANT NEOPLASM OF LEFT FEMALE BREAST, UNSPECIFIED ESTROGEN RECEPTOR STATUS, UNSPECIFIED SITE OF BREAST: ICD-10-CM

## 2024-12-30 DIAGNOSIS — Z12.31 ENCOUNTER FOR SCREENING MAMMOGRAM FOR MALIGNANT NEOPLASM OF BREAST: ICD-10-CM

## 2024-12-30 PROCEDURE — 77063 BREAST TOMOSYNTHESIS BI: CPT

## 2024-12-30 PROCEDURE — 77067 SCR MAMMO BI INCL CAD: CPT

## 2024-12-31 ENCOUNTER — LAB (OUTPATIENT)
Facility: HOSPITAL | Age: 89
End: 2024-12-31
Payer: MEDICARE

## 2024-12-31 ENCOUNTER — TRANSCRIBE ORDERS (OUTPATIENT)
Dept: ADMINISTRATIVE | Facility: HOSPITAL | Age: 89
End: 2024-12-31
Payer: MEDICARE

## 2024-12-31 DIAGNOSIS — I50.42 CHRONIC COMBINED SYSTOLIC AND DIASTOLIC CONGESTIVE HEART FAILURE: ICD-10-CM

## 2024-12-31 DIAGNOSIS — I50.9 HEART FAILURE, UNSPECIFIED HF CHRONICITY, UNSPECIFIED HEART FAILURE TYPE: ICD-10-CM

## 2024-12-31 DIAGNOSIS — D64.9 ANEMIA, UNSPECIFIED TYPE: ICD-10-CM

## 2024-12-31 DIAGNOSIS — E78.5 HYPERLIPIDEMIA, UNSPECIFIED HYPERLIPIDEMIA TYPE: ICD-10-CM

## 2024-12-31 DIAGNOSIS — M06.9 RHEUMATOID ARTHRITIS, INVOLVING UNSPECIFIED SITE, UNSPECIFIED WHETHER RHEUMATOID FACTOR PRESENT: ICD-10-CM

## 2024-12-31 DIAGNOSIS — E61.1 IRON DEFICIENCY: ICD-10-CM

## 2024-12-31 DIAGNOSIS — I10 HTN (HYPERTENSION), BENIGN: ICD-10-CM

## 2024-12-31 DIAGNOSIS — M10.9 GOUT, UNSPECIFIED CAUSE, UNSPECIFIED CHRONICITY, UNSPECIFIED SITE: ICD-10-CM

## 2024-12-31 DIAGNOSIS — E03.9 HYPOTHYROIDISM, UNSPECIFIED TYPE: ICD-10-CM

## 2024-12-31 DIAGNOSIS — Z79.899 ENCOUNTER FOR LONG-TERM (CURRENT) USE OF MEDICATIONS: ICD-10-CM

## 2024-12-31 DIAGNOSIS — E55.9 VITAMIN D DEFICIENCY: ICD-10-CM

## 2024-12-31 DIAGNOSIS — R73.09 OTHER ABNORMAL GLUCOSE: ICD-10-CM

## 2024-12-31 DIAGNOSIS — N19 RENAL FAILURE, UNSPECIFIED CHRONICITY: ICD-10-CM

## 2024-12-31 DIAGNOSIS — M10.9 GOUT, UNSPECIFIED CAUSE, UNSPECIFIED CHRONICITY, UNSPECIFIED SITE: Primary | ICD-10-CM

## 2024-12-31 LAB
25(OH)D3 SERPL-MCNC: 50.7 NG/ML (ref 30–100)
ALBUMIN UR-MCNC: 56.1 MG/DL
ALT SERPL W P-5'-P-CCNC: 11 U/L (ref 1–33)
ANION GAP SERPL CALCULATED.3IONS-SCNC: 8.7 MMOL/L (ref 5–15)
AST SERPL-CCNC: 18 U/L (ref 1–32)
BACTERIA UR QL AUTO: ABNORMAL /HPF
BASOPHILS # BLD AUTO: 0.03 10*3/MM3 (ref 0–0.2)
BASOPHILS NFR BLD AUTO: 0.5 % (ref 0–1.5)
BILIRUB UR QL STRIP: NEGATIVE
BILIRUB UR QL STRIP: NEGATIVE
BUN SERPL-MCNC: 44 MG/DL (ref 8–23)
BUN/CREAT SERPL: 23.2 (ref 7–25)
CALCIUM SPEC-SCNC: 9.9 MG/DL (ref 8.6–10.5)
CHLORIDE SERPL-SCNC: 106 MMOL/L (ref 98–107)
CHOLEST SERPL-MCNC: 263 MG/DL (ref 0–200)
CLARITY UR: CLEAR
CLARITY UR: CLEAR
CO2 SERPL-SCNC: 24.3 MMOL/L (ref 22–29)
COLOR UR: YELLOW
COLOR UR: YELLOW
CREAT SERPL-MCNC: 1.9 MG/DL (ref 0.57–1)
CRP SERPL-MCNC: 0.83 MG/DL (ref 0–0.5)
DEPRECATED RDW RBC AUTO: 49.6 FL (ref 37–54)
EGFRCR SERPLBLD CKD-EPI 2021: 25.1 ML/MIN/1.73
EOSINOPHIL # BLD AUTO: 0.13 10*3/MM3 (ref 0–0.4)
EOSINOPHIL NFR BLD AUTO: 2.3 % (ref 0.3–6.2)
ERYTHROCYTE [DISTWIDTH] IN BLOOD BY AUTOMATED COUNT: 14.5 % (ref 12.3–15.4)
FERRITIN SERPL-MCNC: 774 NG/ML (ref 13–150)
FOLATE SERPL-MCNC: >20 NG/ML (ref 4.78–24.2)
GLUCOSE SERPL-MCNC: 86 MG/DL (ref 65–99)
GLUCOSE UR STRIP-MCNC: NEGATIVE MG/DL
GLUCOSE UR STRIP-MCNC: NEGATIVE MG/DL
HCT VFR BLD AUTO: 36.4 % (ref 34–46.6)
HDLC SERPL-MCNC: 51 MG/DL (ref 40–60)
HGB BLD-MCNC: 11.8 G/DL (ref 12–15.9)
HGB UR QL STRIP.AUTO: NEGATIVE
HGB UR QL STRIP.AUTO: NEGATIVE
HOLD SPECIMEN: NORMAL
HYALINE CASTS UR QL AUTO: ABNORMAL /LPF
IMM GRANULOCYTES # BLD AUTO: 0.04 10*3/MM3 (ref 0–0.05)
IMM GRANULOCYTES NFR BLD AUTO: 0.7 % (ref 0–0.5)
IRON 24H UR-MRATE: 61 MCG/DL (ref 37–145)
IRON SATN MFR SERPL: 20 % (ref 20–50)
KETONES UR QL STRIP: NEGATIVE
KETONES UR QL STRIP: NEGATIVE
LDLC SERPL CALC-MCNC: 190 MG/DL (ref 0–100)
LDLC/HDLC SERPL: 3.68 {RATIO}
LEUKOCYTE ESTERASE UR QL STRIP.AUTO: ABNORMAL
LEUKOCYTE ESTERASE UR QL STRIP.AUTO: ABNORMAL
LYMPHOCYTES # BLD AUTO: 0.58 10*3/MM3 (ref 0.7–3.1)
LYMPHOCYTES NFR BLD AUTO: 10.2 % (ref 19.6–45.3)
MAGNESIUM SERPL-MCNC: 2.7 MG/DL (ref 1.6–2.4)
MCH RBC QN AUTO: 30.8 PG (ref 26.6–33)
MCHC RBC AUTO-ENTMCNC: 32.4 G/DL (ref 31.5–35.7)
MCV RBC AUTO: 95 FL (ref 79–97)
MONOCYTES # BLD AUTO: 1.13 10*3/MM3 (ref 0.1–0.9)
MONOCYTES NFR BLD AUTO: 19.9 % (ref 5–12)
NEUTROPHILS NFR BLD AUTO: 3.77 10*3/MM3 (ref 1.7–7)
NEUTROPHILS NFR BLD AUTO: 66.4 % (ref 42.7–76)
NITRITE UR QL STRIP: NEGATIVE
NITRITE UR QL STRIP: NEGATIVE
NRBC BLD AUTO-RTO: 0 /100 WBC (ref 0–0.2)
NT-PROBNP SERPL-MCNC: 4408 PG/ML (ref 0–1800)
PH UR STRIP.AUTO: 5.5 [PH] (ref 5–8)
PH UR STRIP.AUTO: 5.5 [PH] (ref 5–8)
PLATELET # BLD AUTO: 317 10*3/MM3 (ref 140–450)
PMV BLD AUTO: 10.7 FL (ref 6–12)
POTASSIUM SERPL-SCNC: 4.7 MMOL/L (ref 3.5–5.2)
PROT UR QL STRIP: ABNORMAL
PROT UR QL STRIP: ABNORMAL
RBC # BLD AUTO: 3.83 10*6/MM3 (ref 3.77–5.28)
RBC # UR STRIP: ABNORMAL /HPF
REF LAB TEST METHOD: ABNORMAL
SODIUM SERPL-SCNC: 139 MMOL/L (ref 136–145)
SP GR UR STRIP: 1.02 (ref 1–1.03)
SP GR UR STRIP: 1.02 (ref 1–1.03)
SQUAMOUS #/AREA URNS HPF: ABNORMAL /HPF
T4 FREE SERPL-MCNC: 1.19 NG/DL (ref 0.92–1.68)
TIBC SERPL-MCNC: 310 MCG/DL (ref 298–536)
TRANSFERRIN SERPL-MCNC: 208 MG/DL (ref 200–360)
TRIGL SERPL-MCNC: 121 MG/DL (ref 0–150)
TSH SERPL DL<=0.05 MIU/L-ACNC: 0.8 UIU/ML (ref 0.27–4.2)
URATE SERPL-MCNC: 5.4 MG/DL (ref 2.4–5.7)
UROBILINOGEN UR QL STRIP: ABNORMAL
UROBILINOGEN UR QL STRIP: ABNORMAL
VIT B12 BLD-MCNC: 1606 PG/ML (ref 211–946)
VLDLC SERPL-MCNC: 22 MG/DL (ref 5–40)
WBC # UR STRIP: ABNORMAL /HPF
WBC NRBC COR # BLD AUTO: 5.68 10*3/MM3 (ref 3.4–10.8)

## 2024-12-31 PROCEDURE — 84460 ALANINE AMINO (ALT) (SGPT): CPT

## 2024-12-31 PROCEDURE — 84550 ASSAY OF BLOOD/URIC ACID: CPT

## 2024-12-31 PROCEDURE — 84450 TRANSFERASE (AST) (SGOT): CPT

## 2024-12-31 PROCEDURE — 80061 LIPID PANEL: CPT

## 2024-12-31 PROCEDURE — 82728 ASSAY OF FERRITIN: CPT

## 2024-12-31 PROCEDURE — 84443 ASSAY THYROID STIM HORMONE: CPT

## 2024-12-31 PROCEDURE — 83540 ASSAY OF IRON: CPT

## 2024-12-31 PROCEDURE — 82306 VITAMIN D 25 HYDROXY: CPT

## 2024-12-31 PROCEDURE — 82746 ASSAY OF FOLIC ACID SERUM: CPT

## 2024-12-31 PROCEDURE — 36415 COLL VENOUS BLD VENIPUNCTURE: CPT

## 2024-12-31 PROCEDURE — 81001 URINALYSIS AUTO W/SCOPE: CPT

## 2024-12-31 PROCEDURE — 84439 ASSAY OF FREE THYROXINE: CPT

## 2024-12-31 PROCEDURE — 85025 COMPLETE CBC W/AUTO DIFF WBC: CPT

## 2024-12-31 PROCEDURE — 83880 ASSAY OF NATRIURETIC PEPTIDE: CPT

## 2024-12-31 PROCEDURE — 82043 UR ALBUMIN QUANTITATIVE: CPT

## 2024-12-31 PROCEDURE — 82607 VITAMIN B-12: CPT

## 2024-12-31 PROCEDURE — 86140 C-REACTIVE PROTEIN: CPT

## 2024-12-31 PROCEDURE — 84466 ASSAY OF TRANSFERRIN: CPT

## 2024-12-31 PROCEDURE — 80048 BASIC METABOLIC PNL TOTAL CA: CPT

## 2024-12-31 PROCEDURE — 83735 ASSAY OF MAGNESIUM: CPT

## 2025-01-01 ENCOUNTER — APPOINTMENT (OUTPATIENT)
Dept: GENERAL RADIOLOGY | Facility: HOSPITAL | Age: 89
DRG: 177 | End: 2025-01-01
Payer: MEDICARE

## 2025-01-01 ENCOUNTER — HOSPITAL ENCOUNTER (INPATIENT)
Facility: HOSPITAL | Age: 89
LOS: 9 days | DRG: 177 | End: 2025-04-22
Attending: EMERGENCY MEDICINE | Admitting: INTERNAL MEDICINE
Payer: MEDICARE

## 2025-01-01 ENCOUNTER — APPOINTMENT (OUTPATIENT)
Dept: CT IMAGING | Facility: HOSPITAL | Age: 89
DRG: 177 | End: 2025-01-01
Payer: MEDICARE

## 2025-01-01 VITALS
SYSTOLIC BLOOD PRESSURE: 112 MMHG | RESPIRATION RATE: 16 BRPM | WEIGHT: 151.24 LBS | BODY MASS INDEX: 30.49 KG/M2 | OXYGEN SATURATION: 72 % | HEIGHT: 59 IN | HEART RATE: 72 BPM | DIASTOLIC BLOOD PRESSURE: 60 MMHG | TEMPERATURE: 98.1 F

## 2025-01-01 DIAGNOSIS — Z74.09 IMPAIRED MOBILITY AND ADLS: ICD-10-CM

## 2025-01-01 DIAGNOSIS — J44.1 COPD EXACERBATION: ICD-10-CM

## 2025-01-01 DIAGNOSIS — R26.2 DIFFICULTY WALKING: ICD-10-CM

## 2025-01-01 DIAGNOSIS — B34.2 CORONAVIRUS INFECTION: ICD-10-CM

## 2025-01-01 DIAGNOSIS — R09.02 HYPOXIA: ICD-10-CM

## 2025-01-01 DIAGNOSIS — Z78.9 IMPAIRED MOBILITY AND ADLS: ICD-10-CM

## 2025-01-01 DIAGNOSIS — I50.9 ACUTE CONGESTIVE HEART FAILURE, UNSPECIFIED HEART FAILURE TYPE: Primary | ICD-10-CM

## 2025-01-01 DIAGNOSIS — R13.12 OROPHARYNGEAL DYSPHAGIA: ICD-10-CM

## 2025-01-01 DIAGNOSIS — J96.21 ACUTE ON CHRONIC RESPIRATORY FAILURE WITH HYPOXIA: ICD-10-CM

## 2025-01-01 LAB
ACETONE BLD QL: NEGATIVE
ALBUMIN SERPL-MCNC: 2.8 G/DL (ref 3.5–5.2)
ALBUMIN SERPL-MCNC: 2.8 G/DL (ref 3.5–5.2)
ALBUMIN SERPL-MCNC: 3.6 G/DL (ref 3.5–5.2)
ALBUMIN/GLOB SERPL: 1.2 G/DL
ALP SERPL-CCNC: 88 U/L (ref 39–117)
ALT SERPL W P-5'-P-CCNC: 10 U/L (ref 1–33)
ANION GAP SERPL CALCULATED.3IONS-SCNC: 12 MMOL/L (ref 5–15)
ANION GAP SERPL CALCULATED.3IONS-SCNC: 12.7 MMOL/L (ref 5–15)
ANION GAP SERPL CALCULATED.3IONS-SCNC: 13.4 MMOL/L (ref 5–15)
ANION GAP SERPL CALCULATED.3IONS-SCNC: 13.5 MMOL/L (ref 5–15)
ANION GAP SERPL CALCULATED.3IONS-SCNC: 14.5 MMOL/L (ref 5–15)
ANION GAP SERPL CALCULATED.3IONS-SCNC: 15.6 MMOL/L (ref 5–15)
ANION GAP SERPL CALCULATED.3IONS-SCNC: 15.8 MMOL/L (ref 5–15)
ANION GAP SERPL CALCULATED.3IONS-SCNC: 16.1 MMOL/L (ref 5–15)
ANION GAP SERPL CALCULATED.3IONS-SCNC: 19.1 MMOL/L (ref 5–15)
ANION GAP SERPL CALCULATED.3IONS-SCNC: 9.8 MMOL/L (ref 5–15)
ANISOCYTOSIS BLD QL: NORMAL
ARTERIAL PATENCY WRIST A: POSITIVE
AST SERPL-CCNC: 19 U/L (ref 1–32)
ATMOSPHERIC PRESS: 740 MMHG
BASE EXCESS BLDA CALC-SCNC: -0.9 MMOL/L (ref -2–2)
BASOPHILS # BLD AUTO: 0 10*3/MM3 (ref 0–0.2)
BASOPHILS # BLD AUTO: 0.01 10*3/MM3 (ref 0–0.2)
BASOPHILS # BLD AUTO: 0.01 10*3/MM3 (ref 0–0.2)
BASOPHILS # BLD AUTO: 0.02 10*3/MM3 (ref 0–0.2)
BASOPHILS NFR BLD AUTO: 0 % (ref 0–1.5)
BASOPHILS NFR BLD AUTO: 0.1 % (ref 0–1.5)
BASOPHILS NFR BLD AUTO: 0.2 % (ref 0–1.5)
BASOPHILS NFR BLD AUTO: 0.2 % (ref 0–1.5)
BDY SITE: ABNORMAL
BILIRUB SERPL-MCNC: 0.3 MG/DL (ref 0–1.2)
BUN SERPL-MCNC: 103 MG/DL (ref 8–23)
BUN SERPL-MCNC: 69 MG/DL (ref 8–23)
BUN SERPL-MCNC: 72 MG/DL (ref 8–23)
BUN SERPL-MCNC: 75 MG/DL (ref 8–23)
BUN SERPL-MCNC: 78 MG/DL (ref 8–23)
BUN SERPL-MCNC: 79 MG/DL (ref 8–23)
BUN SERPL-MCNC: 92 MG/DL (ref 8–23)
BUN SERPL-MCNC: 94 MG/DL (ref 8–23)
BUN/CREAT SERPL: 28.9 (ref 7–25)
BUN/CREAT SERPL: 29 (ref 7–25)
BUN/CREAT SERPL: 31.1 (ref 7–25)
BUN/CREAT SERPL: 32.2 (ref 7–25)
BUN/CREAT SERPL: 33.5 (ref 7–25)
BUN/CREAT SERPL: 34.1 (ref 7–25)
BUN/CREAT SERPL: 35.2 (ref 7–25)
BUN/CREAT SERPL: 36.1 (ref 7–25)
BUN/CREAT SERPL: 38.1 (ref 7–25)
BUN/CREAT SERPL: 39.3 (ref 7–25)
CALCIUM SPEC-SCNC: 8.4 MG/DL (ref 8.6–10.5)
CALCIUM SPEC-SCNC: 8.6 MG/DL (ref 8.6–10.5)
CALCIUM SPEC-SCNC: 8.7 MG/DL (ref 8.6–10.5)
CALCIUM SPEC-SCNC: 8.8 MG/DL (ref 8.6–10.5)
CALCIUM SPEC-SCNC: 9.1 MG/DL (ref 8.6–10.5)
CALCIUM SPEC-SCNC: 9.2 MG/DL (ref 8.6–10.5)
CALCIUM SPEC-SCNC: 9.5 MG/DL (ref 8.6–10.5)
CALCIUM SPEC-SCNC: 9.6 MG/DL (ref 8.6–10.5)
CALCIUM SPEC-SCNC: 9.6 MG/DL (ref 8.6–10.5)
CALCIUM SPEC-SCNC: 9.8 MG/DL (ref 8.6–10.5)
CHLORIDE SERPL-SCNC: 101 MMOL/L (ref 98–107)
CHLORIDE SERPL-SCNC: 102 MMOL/L (ref 98–107)
CHLORIDE SERPL-SCNC: 103 MMOL/L (ref 98–107)
CHLORIDE SERPL-SCNC: 104 MMOL/L (ref 98–107)
CHLORIDE SERPL-SCNC: 106 MMOL/L (ref 98–107)
CHLORIDE SERPL-SCNC: 107 MMOL/L (ref 98–107)
CLUMPED PLATELETS: PRESENT
CO2 SERPL-SCNC: 15.9 MMOL/L (ref 22–29)
CO2 SERPL-SCNC: 16.2 MMOL/L (ref 22–29)
CO2 SERPL-SCNC: 18.5 MMOL/L (ref 22–29)
CO2 SERPL-SCNC: 19.9 MMOL/L (ref 22–29)
CO2 SERPL-SCNC: 20 MMOL/L (ref 22–29)
CO2 SERPL-SCNC: 20.4 MMOL/L (ref 22–29)
CO2 SERPL-SCNC: 20.6 MMOL/L (ref 22–29)
CO2 SERPL-SCNC: 21.3 MMOL/L (ref 22–29)
CO2 SERPL-SCNC: 23.5 MMOL/L (ref 22–29)
CO2 SERPL-SCNC: 26.2 MMOL/L (ref 22–29)
CREAT SERPL-MCNC: 2.08 MG/DL (ref 0.57–1)
CREAT SERPL-MCNC: 2.13 MG/DL (ref 0.57–1)
CREAT SERPL-MCNC: 2.2 MG/DL (ref 0.57–1)
CREAT SERPL-MCNC: 2.33 MG/DL (ref 0.57–1)
CREAT SERPL-MCNC: 2.38 MG/DL (ref 0.57–1)
CREAT SERPL-MCNC: 2.39 MG/DL (ref 0.57–1)
CREAT SERPL-MCNC: 2.45 MG/DL (ref 0.57–1)
CREAT SERPL-MCNC: 2.49 MG/DL (ref 0.57–1)
CREAT SERPL-MCNC: 2.7 MG/DL (ref 0.57–1)
CREAT SERPL-MCNC: 2.96 MG/DL (ref 0.57–1)
CRP SERPL-MCNC: 17.39 MG/DL (ref 0–0.5)
D-LACTATE SERPL-SCNC: 1.4 MMOL/L (ref 0.5–2)
DEPRECATED RDW RBC AUTO: 54.3 FL (ref 37–54)
DEPRECATED RDW RBC AUTO: 54.5 FL (ref 37–54)
DEPRECATED RDW RBC AUTO: 54.8 FL (ref 37–54)
DEPRECATED RDW RBC AUTO: 55.1 FL (ref 37–54)
DEPRECATED RDW RBC AUTO: 56.8 FL (ref 37–54)
DEPRECATED RDW RBC AUTO: 57.2 FL (ref 37–54)
DEPRECATED RDW RBC AUTO: 57.9 FL (ref 37–54)
DEPRECATED RDW RBC AUTO: 58.3 FL (ref 37–54)
EGFRCR SERPLBLD CKD-EPI 2021: 14.8 ML/MIN/1.73
EGFRCR SERPLBLD CKD-EPI 2021: 16.5 ML/MIN/1.73
EGFRCR SERPLBLD CKD-EPI 2021: 18.2 ML/MIN/1.73
EGFRCR SERPLBLD CKD-EPI 2021: 18.5 ML/MIN/1.73
EGFRCR SERPLBLD CKD-EPI 2021: 19.1 ML/MIN/1.73
EGFRCR SERPLBLD CKD-EPI 2021: 19.2 ML/MIN/1.73
EGFRCR SERPLBLD CKD-EPI 2021: 19.7 ML/MIN/1.73
EGFRCR SERPLBLD CKD-EPI 2021: 21.1 ML/MIN/1.73
EGFRCR SERPLBLD CKD-EPI 2021: 21.9 ML/MIN/1.73
EGFRCR SERPLBLD CKD-EPI 2021: 22.5 ML/MIN/1.73
EOSINOPHIL # BLD AUTO: 0 10*3/MM3 (ref 0–0.4)
EOSINOPHIL # BLD AUTO: 0.01 10*3/MM3 (ref 0–0.4)
EOSINOPHIL NFR BLD AUTO: 0 % (ref 0.3–6.2)
EOSINOPHIL NFR BLD AUTO: 0.2 % (ref 0.3–6.2)
ERYTHROCYTE [DISTWIDTH] IN BLOOD BY AUTOMATED COUNT: 15.8 % (ref 12.3–15.4)
ERYTHROCYTE [DISTWIDTH] IN BLOOD BY AUTOMATED COUNT: 15.9 % (ref 12.3–15.4)
ERYTHROCYTE [DISTWIDTH] IN BLOOD BY AUTOMATED COUNT: 15.9 % (ref 12.3–15.4)
ERYTHROCYTE [DISTWIDTH] IN BLOOD BY AUTOMATED COUNT: 16.1 % (ref 12.3–15.4)
ERYTHROCYTE [DISTWIDTH] IN BLOOD BY AUTOMATED COUNT: 16.2 % (ref 12.3–15.4)
ERYTHROCYTE [DISTWIDTH] IN BLOOD BY AUTOMATED COUNT: 16.2 % (ref 12.3–15.4)
ERYTHROCYTE [DISTWIDTH] IN BLOOD BY AUTOMATED COUNT: 16.3 % (ref 12.3–15.4)
ERYTHROCYTE [DISTWIDTH] IN BLOOD BY AUTOMATED COUNT: 16.3 % (ref 12.3–15.4)
FERRITIN SERPL-MCNC: 3142 NG/ML (ref 13–150)
FLUAV RNA RESP QL NAA+PROBE: NOT DETECTED
FLUBV RNA RESP QL NAA+PROBE: NOT DETECTED
GAS FLOW AIRWAY: 15 LPM
GEN 5 1HR TROPONIN T REFLEX: 50 NG/L
GLOBULIN UR ELPH-MCNC: 3.1 GM/DL
GLUCOSE BLDC GLUCOMTR-MCNC: 100 MG/DL (ref 70–99)
GLUCOSE BLDC GLUCOMTR-MCNC: 101 MG/DL (ref 70–99)
GLUCOSE BLDC GLUCOMTR-MCNC: 107 MG/DL (ref 70–99)
GLUCOSE BLDC GLUCOMTR-MCNC: 112 MG/DL (ref 70–99)
GLUCOSE BLDC GLUCOMTR-MCNC: 115 MG/DL (ref 70–99)
GLUCOSE BLDC GLUCOMTR-MCNC: 120 MG/DL (ref 70–99)
GLUCOSE BLDC GLUCOMTR-MCNC: 122 MG/DL (ref 70–99)
GLUCOSE BLDC GLUCOMTR-MCNC: 122 MG/DL (ref 70–99)
GLUCOSE BLDC GLUCOMTR-MCNC: 125 MG/DL (ref 70–99)
GLUCOSE BLDC GLUCOMTR-MCNC: 126 MG/DL (ref 70–99)
GLUCOSE BLDC GLUCOMTR-MCNC: 127 MG/DL (ref 70–99)
GLUCOSE BLDC GLUCOMTR-MCNC: 131 MG/DL (ref 70–99)
GLUCOSE BLDC GLUCOMTR-MCNC: 133 MG/DL (ref 70–99)
GLUCOSE BLDC GLUCOMTR-MCNC: 133 MG/DL (ref 70–99)
GLUCOSE BLDC GLUCOMTR-MCNC: 147 MG/DL (ref 70–99)
GLUCOSE BLDC GLUCOMTR-MCNC: 147 MG/DL (ref 70–99)
GLUCOSE BLDC GLUCOMTR-MCNC: 148 MG/DL (ref 70–99)
GLUCOSE BLDC GLUCOMTR-MCNC: 155 MG/DL (ref 70–99)
GLUCOSE BLDC GLUCOMTR-MCNC: 162 MG/DL (ref 70–99)
GLUCOSE BLDC GLUCOMTR-MCNC: 168 MG/DL (ref 70–99)
GLUCOSE BLDC GLUCOMTR-MCNC: 174 MG/DL (ref 70–99)
GLUCOSE BLDC GLUCOMTR-MCNC: 179 MG/DL (ref 70–99)
GLUCOSE BLDC GLUCOMTR-MCNC: 190 MG/DL (ref 70–99)
GLUCOSE BLDC GLUCOMTR-MCNC: 191 MG/DL (ref 70–99)
GLUCOSE BLDC GLUCOMTR-MCNC: 202 MG/DL (ref 70–99)
GLUCOSE BLDC GLUCOMTR-MCNC: 214 MG/DL (ref 70–99)
GLUCOSE BLDC GLUCOMTR-MCNC: 223 MG/DL (ref 70–99)
GLUCOSE BLDC GLUCOMTR-MCNC: 226 MG/DL (ref 70–99)
GLUCOSE BLDC GLUCOMTR-MCNC: 232 MG/DL (ref 70–99)
GLUCOSE BLDC GLUCOMTR-MCNC: 75 MG/DL (ref 70–99)
GLUCOSE BLDC GLUCOMTR-MCNC: 77 MG/DL (ref 70–99)
GLUCOSE BLDC GLUCOMTR-MCNC: 79 MG/DL (ref 70–99)
GLUCOSE BLDC GLUCOMTR-MCNC: 81 MG/DL (ref 70–99)
GLUCOSE SERPL-MCNC: 103 MG/DL (ref 65–99)
GLUCOSE SERPL-MCNC: 105 MG/DL (ref 65–99)
GLUCOSE SERPL-MCNC: 107 MG/DL (ref 65–99)
GLUCOSE SERPL-MCNC: 132 MG/DL (ref 65–99)
GLUCOSE SERPL-MCNC: 138 MG/DL (ref 65–99)
GLUCOSE SERPL-MCNC: 155 MG/DL (ref 65–99)
GLUCOSE SERPL-MCNC: 167 MG/DL (ref 65–99)
GLUCOSE SERPL-MCNC: 185 MG/DL (ref 65–99)
GLUCOSE SERPL-MCNC: 74 MG/DL (ref 65–99)
GLUCOSE SERPL-MCNC: 82 MG/DL (ref 65–99)
HBA1C MFR BLD: 5.4 % (ref 4.8–5.6)
HCO3 BLDA-SCNC: 24.1 MMOL/L (ref 22–26)
HCT VFR BLD AUTO: 29.3 % (ref 34–46.6)
HCT VFR BLD AUTO: 34.8 % (ref 34–46.6)
HCT VFR BLD AUTO: 34.8 % (ref 34–46.6)
HCT VFR BLD AUTO: 35.3 % (ref 34–46.6)
HCT VFR BLD AUTO: 36 % (ref 34–46.6)
HCT VFR BLD AUTO: 37.2 % (ref 34–46.6)
HCT VFR BLD AUTO: 38.6 % (ref 34–46.6)
HCT VFR BLD AUTO: 39.4 % (ref 34–46.6)
HCT VFR BLD CALC: 32 % (ref 38–51)
HEMODILUTION: NO
HGB BLD-MCNC: 10.7 G/DL (ref 12–15.9)
HGB BLD-MCNC: 11 G/DL (ref 12–15.9)
HGB BLD-MCNC: 11.4 G/DL (ref 12–15.9)
HGB BLD-MCNC: 11.5 G/DL (ref 12–15.9)
HGB BLD-MCNC: 11.6 G/DL (ref 12–15.9)
HGB BLD-MCNC: 11.8 G/DL (ref 12–15.9)
HGB BLD-MCNC: 12.2 G/DL (ref 12–15.9)
HGB BLD-MCNC: 9.2 G/DL (ref 12–15.9)
HGB BLDA-MCNC: 11 G/DL (ref 12–18)
HOLD SPECIMEN: NORMAL
HOLD SPECIMEN: NORMAL
IMM GRANULOCYTES # BLD AUTO: 0.03 10*3/MM3 (ref 0–0.05)
IMM GRANULOCYTES # BLD AUTO: 0.04 10*3/MM3 (ref 0–0.05)
IMM GRANULOCYTES # BLD AUTO: 0.13 10*3/MM3 (ref 0–0.05)
IMM GRANULOCYTES # BLD AUTO: 0.14 10*3/MM3 (ref 0–0.05)
IMM GRANULOCYTES NFR BLD AUTO: 0.7 % (ref 0–0.5)
IMM GRANULOCYTES NFR BLD AUTO: 1.1 % (ref 0–0.5)
IMM GRANULOCYTES NFR BLD AUTO: 1.1 % (ref 0–0.5)
IMM GRANULOCYTES NFR BLD AUTO: 1.2 % (ref 0–0.5)
INHALED O2 CONCENTRATION: 100 %
L PNEUMO1 AG UR QL IA: NEGATIVE
LYMPHOCYTES # BLD AUTO: 0.18 10*3/MM3 (ref 0.7–3.1)
LYMPHOCYTES # BLD AUTO: 0.21 10*3/MM3 (ref 0.7–3.1)
LYMPHOCYTES # BLD AUTO: 0.25 10*3/MM3 (ref 0.7–3.1)
LYMPHOCYTES # BLD AUTO: 0.41 10*3/MM3 (ref 0.7–3.1)
LYMPHOCYTES NFR BLD AUTO: 1.7 % (ref 19.6–45.3)
LYMPHOCYTES NFR BLD AUTO: 2.1 % (ref 19.6–45.3)
LYMPHOCYTES NFR BLD AUTO: 6.9 % (ref 19.6–45.3)
LYMPHOCYTES NFR BLD AUTO: 7.5 % (ref 19.6–45.3)
MAGNESIUM SERPL-MCNC: 2.3 MG/DL (ref 1.6–2.4)
MAGNESIUM SERPL-MCNC: 2.3 MG/DL (ref 1.6–2.4)
MAGNESIUM SERPL-MCNC: 2.4 MG/DL (ref 1.6–2.4)
MAGNESIUM SERPL-MCNC: 2.5 MG/DL (ref 1.6–2.4)
MCH RBC QN AUTO: 29.5 PG (ref 26.6–33)
MCH RBC QN AUTO: 29.6 PG (ref 26.6–33)
MCH RBC QN AUTO: 29.9 PG (ref 26.6–33)
MCH RBC QN AUTO: 29.9 PG (ref 26.6–33)
MCH RBC QN AUTO: 30 PG (ref 26.6–33)
MCH RBC QN AUTO: 30 PG (ref 26.6–33)
MCHC RBC AUTO-ENTMCNC: 30.6 G/DL (ref 31.5–35.7)
MCHC RBC AUTO-ENTMCNC: 30.7 G/DL (ref 31.5–35.7)
MCHC RBC AUTO-ENTMCNC: 31 G/DL (ref 31.5–35.7)
MCHC RBC AUTO-ENTMCNC: 31.2 G/DL (ref 31.5–35.7)
MCHC RBC AUTO-ENTMCNC: 31.4 G/DL (ref 31.5–35.7)
MCHC RBC AUTO-ENTMCNC: 31.6 G/DL (ref 31.5–35.7)
MCHC RBC AUTO-ENTMCNC: 31.9 G/DL (ref 31.5–35.7)
MCHC RBC AUTO-ENTMCNC: 32.3 G/DL (ref 31.5–35.7)
MCV RBC AUTO: 92.9 FL (ref 79–97)
MCV RBC AUTO: 93.3 FL (ref 79–97)
MCV RBC AUTO: 93.8 FL (ref 79–97)
MCV RBC AUTO: 94.2 FL (ref 79–97)
MCV RBC AUTO: 94.9 FL (ref 79–97)
MCV RBC AUTO: 96.1 FL (ref 79–97)
MCV RBC AUTO: 97 FL (ref 79–97)
MCV RBC AUTO: 98 FL (ref 79–97)
MODALITY: ABNORMAL
MONOCYTES # BLD AUTO: 0.32 10*3/MM3 (ref 0.1–0.9)
MONOCYTES # BLD AUTO: 1.34 10*3/MM3 (ref 0.1–0.9)
MONOCYTES # BLD AUTO: 1.43 10*3/MM3 (ref 0.1–0.9)
MONOCYTES # BLD AUTO: 1.49 10*3/MM3 (ref 0.1–0.9)
MONOCYTES NFR BLD AUTO: 11.5 % (ref 5–12)
MONOCYTES NFR BLD AUTO: 12.2 % (ref 5–12)
MONOCYTES NFR BLD AUTO: 12.3 % (ref 5–12)
MONOCYTES NFR BLD AUTO: 26 % (ref 5–12)
NEUTROPHILS NFR BLD AUTO: 10.38 10*3/MM3 (ref 1.7–7)
NEUTROPHILS NFR BLD AUTO: 2.08 10*3/MM3 (ref 1.7–7)
NEUTROPHILS NFR BLD AUTO: 3.6 10*3/MM3 (ref 1.7–7)
NEUTROPHILS NFR BLD AUTO: 65.4 % (ref 42.7–76)
NEUTROPHILS NFR BLD AUTO: 79.7 % (ref 42.7–76)
NEUTROPHILS NFR BLD AUTO: 84.8 % (ref 42.7–76)
NEUTROPHILS NFR BLD AUTO: 85.1 % (ref 42.7–76)
NEUTROPHILS NFR BLD AUTO: 9.95 10*3/MM3 (ref 1.7–7)
NRBC BLD AUTO-RTO: 0 /100 WBC (ref 0–0.2)
NT-PROBNP SERPL-MCNC: 3610 PG/ML (ref 0–1800)
NT-PROBNP SERPL-MCNC: ABNORMAL PG/ML (ref 0–1800)
PCO2 BLDA: 40.5 MM HG (ref 35–45)
PH BLDA: 7.38 PH UNITS (ref 7.35–7.45)
PHOSPHATE SERPL-MCNC: 3.6 MG/DL (ref 2.5–4.5)
PHOSPHATE SERPL-MCNC: 4.2 MG/DL (ref 2.5–4.5)
PHOSPHATE SERPL-MCNC: 4.3 MG/DL (ref 2.5–4.5)
PHOSPHATE SERPL-MCNC: 4.3 MG/DL (ref 2.5–4.5)
PHOSPHATE SERPL-MCNC: 4.8 MG/DL (ref 2.5–4.5)
PHOSPHATE SERPL-MCNC: 4.9 MG/DL (ref 2.5–4.5)
PLATELET # BLD AUTO: 181 10*3/MM3 (ref 140–450)
PLATELET # BLD AUTO: 211 10*3/MM3 (ref 140–450)
PLATELET # BLD AUTO: 232 10*3/MM3 (ref 140–450)
PLATELET # BLD AUTO: 243 10*3/MM3 (ref 140–450)
PLATELET # BLD AUTO: 249 10*3/MM3 (ref 140–450)
PLATELET # BLD AUTO: 258 10*3/MM3 (ref 140–450)
PLATELET # BLD AUTO: 259 10*3/MM3 (ref 140–450)
PLATELET # BLD AUTO: 260 10*3/MM3 (ref 140–450)
PMV BLD AUTO: 10.6 FL (ref 6–12)
PMV BLD AUTO: 10.6 FL (ref 6–12)
PMV BLD AUTO: 10.8 FL (ref 6–12)
PMV BLD AUTO: 11.1 FL (ref 6–12)
PMV BLD AUTO: 11.2 FL (ref 6–12)
PMV BLD AUTO: 11.3 FL (ref 6–12)
PMV BLD AUTO: 11.4 FL (ref 6–12)
PMV BLD AUTO: 11.4 FL (ref 6–12)
PO2 BLD: 51 MM[HG] (ref 0–500)
PO2 BLDA: 51 MM HG (ref 80–100)
POTASSIUM SERPL-SCNC: 4.1 MMOL/L (ref 3.5–5.2)
POTASSIUM SERPL-SCNC: 4.2 MMOL/L (ref 3.5–5.2)
POTASSIUM SERPL-SCNC: 4.2 MMOL/L (ref 3.5–5.2)
POTASSIUM SERPL-SCNC: 4.3 MMOL/L (ref 3.5–5.2)
POTASSIUM SERPL-SCNC: 4.6 MMOL/L (ref 3.5–5.2)
POTASSIUM SERPL-SCNC: 4.6 MMOL/L (ref 3.5–5.2)
POTASSIUM SERPL-SCNC: 4.9 MMOL/L (ref 3.5–5.2)
POTASSIUM SERPL-SCNC: 5.1 MMOL/L (ref 3.5–5.2)
POTASSIUM SERPL-SCNC: 5.2 MMOL/L (ref 3.5–5.2)
POTASSIUM SERPL-SCNC: 5.4 MMOL/L (ref 3.5–5.2)
PROCALCITONIN SERPL-MCNC: 0.15 NG/ML (ref 0–0.25)
PROCALCITONIN SERPL-MCNC: 0.26 NG/ML (ref 0–0.25)
PROCALCITONIN SERPL-MCNC: 1.42 NG/ML (ref 0–0.25)
PROT SERPL-MCNC: 6.7 G/DL (ref 6–8.5)
QT INTERVAL: 457 MS
QTC INTERVAL: 508 MS
RBC # BLD AUTO: 3.11 10*6/MM3 (ref 3.77–5.28)
RBC # BLD AUTO: 3.62 10*6/MM3 (ref 3.77–5.28)
RBC # BLD AUTO: 3.73 10*6/MM3 (ref 3.77–5.28)
RBC # BLD AUTO: 3.8 10*6/MM3 (ref 3.77–5.28)
RBC # BLD AUTO: 3.84 10*6/MM3 (ref 3.77–5.28)
RBC # BLD AUTO: 3.92 10*6/MM3 (ref 3.77–5.28)
RBC # BLD AUTO: 3.94 10*6/MM3 (ref 3.77–5.28)
RBC # BLD AUTO: 4.06 10*6/MM3 (ref 3.77–5.28)
RSV RNA RESP QL NAA+PROBE: NOT DETECTED
S PNEUM AG SPEC QL LA: NEGATIVE
SAO2 % BLDCOA: 85.1 % (ref 95–99)
SARS-COV-2 RNA RESP QL NAA+PROBE: DETECTED
SMALL PLATELETS BLD QL SMEAR: ADEQUATE
SODIUM SERPL-SCNC: 135 MMOL/L (ref 136–145)
SODIUM SERPL-SCNC: 136 MMOL/L (ref 136–145)
SODIUM SERPL-SCNC: 136 MMOL/L (ref 136–145)
SODIUM SERPL-SCNC: 137 MMOL/L (ref 136–145)
SODIUM SERPL-SCNC: 138 MMOL/L (ref 136–145)
SODIUM SERPL-SCNC: 140 MMOL/L (ref 136–145)
SODIUM SERPL-SCNC: 141 MMOL/L (ref 136–145)
SODIUM SERPL-SCNC: 143 MMOL/L (ref 136–145)
TROPONIN T % DELTA: -7
TROPONIN T NUMERIC DELTA: -4 NG/L
TROPONIN T SERPL HS-MCNC: 44 NG/L
TROPONIN T SERPL HS-MCNC: 54 NG/L
WBC MORPH BLD: NORMAL
WBC NRBC COR # BLD AUTO: 10.19 10*3/MM3 (ref 3.4–10.8)
WBC NRBC COR # BLD AUTO: 11.69 10*3/MM3 (ref 3.4–10.8)
WBC NRBC COR # BLD AUTO: 12.23 10*3/MM3 (ref 3.4–10.8)
WBC NRBC COR # BLD AUTO: 13.64 10*3/MM3 (ref 3.4–10.8)
WBC NRBC COR # BLD AUTO: 18.7 10*3/MM3 (ref 3.4–10.8)
WBC NRBC COR # BLD AUTO: 2.61 10*3/MM3 (ref 3.4–10.8)
WBC NRBC COR # BLD AUTO: 5.5 10*3/MM3 (ref 3.4–10.8)
WBC NRBC COR # BLD AUTO: 7.41 10*3/MM3 (ref 3.4–10.8)
WHOLE BLOOD HOLD COAG: NORMAL
WHOLE BLOOD HOLD SPECIMEN: NORMAL
WHOLE BLOOD HOLD SPECIMEN: NORMAL

## 2025-01-01 PROCEDURE — 94660 CPAP INITIATION&MGMT: CPT

## 2025-01-01 PROCEDURE — 84145 PROCALCITONIN (PCT): CPT | Performed by: INTERNAL MEDICINE

## 2025-01-01 PROCEDURE — 83735 ASSAY OF MAGNESIUM: CPT | Performed by: PHYSICIAN ASSISTANT

## 2025-01-01 PROCEDURE — 63710000001 DEXAMETHASONE PER 0.25 MG: Performed by: INTERNAL MEDICINE

## 2025-01-01 PROCEDURE — 80048 BASIC METABOLIC PNL TOTAL CA: CPT | Performed by: INTERNAL MEDICINE

## 2025-01-01 PROCEDURE — 87449 NOS EACH ORGANISM AG IA: CPT | Performed by: HOSPITALIST

## 2025-01-01 PROCEDURE — 25010000002 AMPICILLIN-SULBACTAM PER 1.5 G: Performed by: INTERNAL MEDICINE

## 2025-01-01 PROCEDURE — 82803 BLOOD GASES ANY COMBINATION: CPT | Performed by: INTERNAL MEDICINE

## 2025-01-01 PROCEDURE — 25010000002 MORPHINE PER 10 MG: Performed by: PHYSICIAN ASSISTANT

## 2025-01-01 PROCEDURE — 80069 RENAL FUNCTION PANEL: CPT | Performed by: INTERNAL MEDICINE

## 2025-01-01 PROCEDURE — 71045 X-RAY EXAM CHEST 1 VIEW: CPT

## 2025-01-01 PROCEDURE — 94799 UNLISTED PULMONARY SVC/PX: CPT

## 2025-01-01 PROCEDURE — 25010000002 DEXAMETHASONE SODIUM PHOSPHATE 10 MG/ML SOLUTION: Performed by: HOSPITALIST

## 2025-01-01 PROCEDURE — 87899 AGENT NOS ASSAY W/OPTIC: CPT | Performed by: HOSPITALIST

## 2025-01-01 PROCEDURE — 99285 EMERGENCY DEPT VISIT HI MDM: CPT

## 2025-01-01 PROCEDURE — 63710000001 INSULIN LISPRO (HUMAN) PER 5 UNITS: Performed by: FAMILY MEDICINE

## 2025-01-01 PROCEDURE — 83880 ASSAY OF NATRIURETIC PEPTIDE: CPT | Performed by: INTERNAL MEDICINE

## 2025-01-01 PROCEDURE — 82948 REAGENT STRIP/BLOOD GLUCOSE: CPT

## 2025-01-01 PROCEDURE — 92610 EVALUATE SWALLOWING FUNCTION: CPT

## 2025-01-01 PROCEDURE — 99233 SBSQ HOSP IP/OBS HIGH 50: CPT | Performed by: INTERNAL MEDICINE

## 2025-01-01 PROCEDURE — 82009 KETONE BODYS QUAL: CPT | Performed by: INTERNAL MEDICINE

## 2025-01-01 PROCEDURE — 94761 N-INVAS EAR/PLS OXIMETRY MLT: CPT

## 2025-01-01 PROCEDURE — 85025 COMPLETE CBC W/AUTO DIFF WBC: CPT | Performed by: HOSPITALIST

## 2025-01-01 PROCEDURE — 84145 PROCALCITONIN (PCT): CPT | Performed by: HOSPITALIST

## 2025-01-01 PROCEDURE — 84100 ASSAY OF PHOSPHORUS: CPT | Performed by: HOSPITALIST

## 2025-01-01 PROCEDURE — 86140 C-REACTIVE PROTEIN: CPT | Performed by: INTERNAL MEDICINE

## 2025-01-01 PROCEDURE — 99223 1ST HOSP IP/OBS HIGH 75: CPT | Performed by: HOSPITALIST

## 2025-01-01 PROCEDURE — 83036 HEMOGLOBIN GLYCOSYLATED A1C: CPT | Performed by: FAMILY MEDICINE

## 2025-01-01 PROCEDURE — 82948 REAGENT STRIP/BLOOD GLUCOSE: CPT | Performed by: FAMILY MEDICINE

## 2025-01-01 PROCEDURE — 25010000002 MORPHINE PER 10 MG: Performed by: INTERNAL MEDICINE

## 2025-01-01 PROCEDURE — 94664 DEMO&/EVAL PT USE INHALER: CPT

## 2025-01-01 PROCEDURE — 25010000002 FUROSEMIDE PER 20 MG: Performed by: EMERGENCY MEDICINE

## 2025-01-01 PROCEDURE — 83605 ASSAY OF LACTIC ACID: CPT | Performed by: INTERNAL MEDICINE

## 2025-01-01 PROCEDURE — 99291 CRITICAL CARE FIRST HOUR: CPT | Performed by: INTERNAL MEDICINE

## 2025-01-01 PROCEDURE — 36600 WITHDRAWAL OF ARTERIAL BLOOD: CPT | Performed by: INTERNAL MEDICINE

## 2025-01-01 PROCEDURE — 85025 COMPLETE CBC W/AUTO DIFF WBC: CPT | Performed by: EMERGENCY MEDICINE

## 2025-01-01 PROCEDURE — 25010000002 DIAZEPAM PER 5 MG: Performed by: INTERNAL MEDICINE

## 2025-01-01 PROCEDURE — 99232 SBSQ HOSP IP/OBS MODERATE 35: CPT | Performed by: INTERNAL MEDICINE

## 2025-01-01 PROCEDURE — 80048 BASIC METABOLIC PNL TOTAL CA: CPT | Performed by: HOSPITALIST

## 2025-01-01 PROCEDURE — 94640 AIRWAY INHALATION TREATMENT: CPT

## 2025-01-01 PROCEDURE — 25010000002 FUROSEMIDE PER 20 MG: Performed by: HOSPITALIST

## 2025-01-01 PROCEDURE — 87637 SARSCOV2&INF A&B&RSV AMP PRB: CPT | Performed by: EMERGENCY MEDICINE

## 2025-01-01 PROCEDURE — 85027 COMPLETE CBC AUTOMATED: CPT | Performed by: INTERNAL MEDICINE

## 2025-01-01 PROCEDURE — 82728 ASSAY OF FERRITIN: CPT | Performed by: INTERNAL MEDICINE

## 2025-01-01 PROCEDURE — 36415 COLL VENOUS BLD VENIPUNCTURE: CPT

## 2025-01-01 PROCEDURE — 92526 ORAL FUNCTION THERAPY: CPT

## 2025-01-01 PROCEDURE — 83880 ASSAY OF NATRIURETIC PEPTIDE: CPT | Performed by: EMERGENCY MEDICINE

## 2025-01-01 PROCEDURE — 71250 CT THORAX DX C-: CPT

## 2025-01-01 PROCEDURE — 93010 ELECTROCARDIOGRAM REPORT: CPT | Performed by: INTERNAL MEDICINE

## 2025-01-01 PROCEDURE — 83735 ASSAY OF MAGNESIUM: CPT | Performed by: HOSPITALIST

## 2025-01-01 PROCEDURE — 94760 N-INVAS EAR/PLS OXIMETRY 1: CPT

## 2025-01-01 PROCEDURE — 93005 ELECTROCARDIOGRAM TRACING: CPT | Performed by: EMERGENCY MEDICINE

## 2025-01-01 PROCEDURE — 5A09357 ASSISTANCE WITH RESPIRATORY VENTILATION, LESS THAN 24 CONSECUTIVE HOURS, CONTINUOUS POSITIVE AIRWAY PRESSURE: ICD-10-PCS | Performed by: INTERNAL MEDICINE

## 2025-01-01 PROCEDURE — 99233 SBSQ HOSP IP/OBS HIGH 50: CPT | Performed by: FAMILY MEDICINE

## 2025-01-01 PROCEDURE — 25010000002 FUROSEMIDE PER 20 MG: Performed by: INTERNAL MEDICINE

## 2025-01-01 PROCEDURE — 85007 BL SMEAR W/DIFF WBC COUNT: CPT | Performed by: EMERGENCY MEDICINE

## 2025-01-01 PROCEDURE — 25810000003 SODIUM CHLORIDE 0.9 % SOLUTION 250 ML FLEX CONT: Performed by: HOSPITALIST

## 2025-01-01 PROCEDURE — 83735 ASSAY OF MAGNESIUM: CPT | Performed by: INTERNAL MEDICINE

## 2025-01-01 PROCEDURE — 84484 ASSAY OF TROPONIN QUANT: CPT | Performed by: EMERGENCY MEDICINE

## 2025-01-01 PROCEDURE — 84100 ASSAY OF PHOSPHORUS: CPT | Performed by: PHYSICIAN ASSISTANT

## 2025-01-01 PROCEDURE — 85025 COMPLETE CBC W/AUTO DIFF WBC: CPT | Performed by: INTERNAL MEDICINE

## 2025-01-01 PROCEDURE — 99291 CRITICAL CARE FIRST HOUR: CPT

## 2025-01-01 PROCEDURE — 93005 ELECTROCARDIOGRAM TRACING: CPT | Performed by: PHYSICIAN ASSISTANT

## 2025-01-01 PROCEDURE — 97161 PT EVAL LOW COMPLEX 20 MIN: CPT

## 2025-01-01 PROCEDURE — 93005 ELECTROCARDIOGRAM TRACING: CPT | Performed by: INTERNAL MEDICINE

## 2025-01-01 PROCEDURE — 63710000001 DIPHENHYDRAMINE PER 50 MG: Performed by: PHYSICIAN ASSISTANT

## 2025-01-01 PROCEDURE — 84484 ASSAY OF TROPONIN QUANT: CPT | Performed by: INTERNAL MEDICINE

## 2025-01-01 PROCEDURE — 25010000002 ONDANSETRON PER 1 MG: Performed by: HOSPITALIST

## 2025-01-01 PROCEDURE — 25010000002 ACETAMINOPHEN 10 MG/ML SOLUTION: Performed by: PHYSICIAN ASSISTANT

## 2025-01-01 PROCEDURE — 97165 OT EVAL LOW COMPLEX 30 MIN: CPT

## 2025-01-01 PROCEDURE — 80053 COMPREHEN METABOLIC PANEL: CPT | Performed by: EMERGENCY MEDICINE

## 2025-01-01 PROCEDURE — 25010000002 AZITHROMYCIN PER 500 MG: Performed by: HOSPITALIST

## 2025-01-01 RX ORDER — SODIUM CHLORIDE 9 MG/ML
40 INJECTION, SOLUTION INTRAVENOUS AS NEEDED
Status: DISCONTINUED | OUTPATIENT
Start: 2025-01-01 | End: 2025-01-01 | Stop reason: HOSPADM

## 2025-01-01 RX ORDER — ONDANSETRON 2 MG/ML
4 INJECTION INTRAMUSCULAR; INTRAVENOUS EVERY 6 HOURS PRN
Status: DISCONTINUED | OUTPATIENT
Start: 2025-01-01 | End: 2025-01-01 | Stop reason: HOSPADM

## 2025-01-01 RX ORDER — IPRATROPIUM BROMIDE AND ALBUTEROL SULFATE 2.5; .5 MG/3ML; MG/3ML
3 SOLUTION RESPIRATORY (INHALATION)
Status: DISCONTINUED | OUTPATIENT
Start: 2025-01-01 | End: 2025-01-01

## 2025-01-01 RX ORDER — MORPHINE SULFATE 20 MG/ML
10 SOLUTION ORAL
Refills: 0 | Status: DISCONTINUED | OUTPATIENT
Start: 2025-01-01 | End: 2025-01-01 | Stop reason: HOSPADM

## 2025-01-01 RX ORDER — CALCIUM CARBONATE 500 MG/1
1 TABLET, CHEWABLE ORAL 3 TIMES DAILY PRN
Status: DISCONTINUED | OUTPATIENT
Start: 2025-01-01 | End: 2025-01-01

## 2025-01-01 RX ORDER — FUROSEMIDE 10 MG/ML
40 INJECTION INTRAMUSCULAR; INTRAVENOUS ONCE
Status: COMPLETED | OUTPATIENT
Start: 2025-01-01 | End: 2025-01-01

## 2025-01-01 RX ORDER — MORPHINE SULFATE 2 MG/ML
2 INJECTION, SOLUTION INTRAMUSCULAR; INTRAVENOUS
Status: DISCONTINUED | OUTPATIENT
Start: 2025-01-01 | End: 2025-01-01 | Stop reason: HOSPADM

## 2025-01-01 RX ORDER — ROSUVASTATIN CALCIUM 20 MG/1
10 TABLET, COATED ORAL NIGHTLY
Status: DISCONTINUED | OUTPATIENT
Start: 2025-01-01 | End: 2025-01-01

## 2025-01-01 RX ORDER — BISACODYL 5 MG/1
5 TABLET, DELAYED RELEASE ORAL DAILY PRN
Status: DISCONTINUED | OUTPATIENT
Start: 2025-01-01 | End: 2025-01-01

## 2025-01-01 RX ORDER — LEVOTHYROXINE SODIUM 50 UG/1
100 TABLET ORAL
Status: DISCONTINUED | OUTPATIENT
Start: 2025-01-01 | End: 2025-01-01

## 2025-01-01 RX ORDER — SODIUM CHLORIDE 0.9 % (FLUSH) 0.9 %
10 SYRINGE (ML) INJECTION AS NEEDED
Status: DISCONTINUED | OUTPATIENT
Start: 2025-01-01 | End: 2025-01-01 | Stop reason: HOSPADM

## 2025-01-01 RX ORDER — HYDROCODONE BITARTRATE AND ACETAMINOPHEN 5; 325 MG/1; MG/1
1 TABLET ORAL EVERY 6 HOURS PRN
Status: DISCONTINUED | OUTPATIENT
Start: 2025-01-01 | End: 2025-01-01 | Stop reason: HOSPADM

## 2025-01-01 RX ORDER — DIAZEPAM 10 MG/2ML
5 INJECTION, SOLUTION INTRAMUSCULAR; INTRAVENOUS
Status: DISCONTINUED | OUTPATIENT
Start: 2025-01-01 | End: 2025-01-01 | Stop reason: HOSPADM

## 2025-01-01 RX ORDER — FUROSEMIDE 10 MG/ML
40 INJECTION INTRAMUSCULAR; INTRAVENOUS
Status: DISCONTINUED | OUTPATIENT
Start: 2025-01-01 | End: 2025-01-01

## 2025-01-01 RX ORDER — PANTOPRAZOLE SODIUM 40 MG/1
40 TABLET, DELAYED RELEASE ORAL
Status: DISCONTINUED | OUTPATIENT
Start: 2025-01-01 | End: 2025-01-01

## 2025-01-01 RX ORDER — POLYETHYLENE GLYCOL 3350 17 G/17G
17 POWDER, FOR SOLUTION ORAL ONCE
Status: COMPLETED | OUTPATIENT
Start: 2025-01-01 | End: 2025-01-01

## 2025-01-01 RX ORDER — ALBUTEROL SULFATE 0.83 MG/ML
2.5 SOLUTION RESPIRATORY (INHALATION)
Status: DISCONTINUED | OUTPATIENT
Start: 2025-01-01 | End: 2025-01-01 | Stop reason: HOSPADM

## 2025-01-01 RX ORDER — DEXAMETHASONE 4 MG/1
6 TABLET ORAL
Status: DISCONTINUED | OUTPATIENT
Start: 2025-01-01 | End: 2025-01-01

## 2025-01-01 RX ORDER — SODIUM CHLORIDE 0.9 % (FLUSH) 0.9 %
10 SYRINGE (ML) INJECTION EVERY 12 HOURS SCHEDULED
Status: DISCONTINUED | OUTPATIENT
Start: 2025-01-01 | End: 2025-01-01 | Stop reason: HOSPADM

## 2025-01-01 RX ORDER — FLUTICASONE PROPIONATE 50 MCG
2 SPRAY, SUSPENSION (ML) NASAL DAILY
Status: DISCONTINUED | OUTPATIENT
Start: 2025-01-01 | End: 2025-01-01

## 2025-01-01 RX ORDER — BUDESONIDE 0.5 MG/2ML
0.5 INHALANT ORAL
Status: DISCONTINUED | OUTPATIENT
Start: 2025-01-01 | End: 2025-01-01 | Stop reason: HOSPADM

## 2025-01-01 RX ORDER — IPRATROPIUM BROMIDE AND ALBUTEROL SULFATE 2.5; .5 MG/3ML; MG/3ML
3 SOLUTION RESPIRATORY (INHALATION) EVERY 6 HOURS PRN
Status: DISCONTINUED | OUTPATIENT
Start: 2025-01-01 | End: 2025-01-01

## 2025-01-01 RX ORDER — AMOXICILLIN 250 MG
2 CAPSULE ORAL 2 TIMES DAILY PRN
Status: DISCONTINUED | OUTPATIENT
Start: 2025-01-01 | End: 2025-01-01

## 2025-01-01 RX ORDER — FUROSEMIDE 40 MG/1
40 TABLET ORAL
Status: DISCONTINUED | OUTPATIENT
Start: 2025-01-01 | End: 2025-01-01

## 2025-01-01 RX ORDER — BISACODYL 10 MG
10 SUPPOSITORY, RECTAL RECTAL DAILY PRN
Status: DISCONTINUED | OUTPATIENT
Start: 2025-01-01 | End: 2025-01-01

## 2025-01-01 RX ORDER — ACETAMINOPHEN 650 MG/1
650 SUPPOSITORY RECTAL EVERY 4 HOURS PRN
Status: DISCONTINUED | OUTPATIENT
Start: 2025-01-01 | End: 2025-01-01 | Stop reason: HOSPADM

## 2025-01-01 RX ORDER — ASCORBIC ACID 500 MG
500 TABLET ORAL DAILY
Status: DISCONTINUED | OUTPATIENT
Start: 2025-01-01 | End: 2025-01-01

## 2025-01-01 RX ORDER — MIDAZOLAM HYDROCHLORIDE 2 MG/2ML
2 INJECTION, SOLUTION INTRAMUSCULAR; INTRAVENOUS
Status: DISCONTINUED | OUTPATIENT
Start: 2025-01-01 | End: 2025-01-01 | Stop reason: HOSPADM

## 2025-01-01 RX ORDER — NICOTINE POLACRILEX 4 MG
15 LOZENGE BUCCAL
Status: DISCONTINUED | OUTPATIENT
Start: 2025-01-01 | End: 2025-01-01 | Stop reason: HOSPADM

## 2025-01-01 RX ORDER — ACETAMINOPHEN 325 MG/1
650 TABLET ORAL EVERY 4 HOURS PRN
Status: DISCONTINUED | OUTPATIENT
Start: 2025-01-01 | End: 2025-01-01 | Stop reason: HOSPADM

## 2025-01-01 RX ORDER — AZITHROMYCIN 250 MG/1
500 TABLET, FILM COATED ORAL
Status: COMPLETED | OUTPATIENT
Start: 2025-01-01 | End: 2025-01-01

## 2025-01-01 RX ORDER — BENZONATATE 100 MG/1
100 CAPSULE ORAL 3 TIMES DAILY PRN
Status: DISCONTINUED | OUTPATIENT
Start: 2025-01-01 | End: 2025-01-01

## 2025-01-01 RX ORDER — FERROUS SULFATE 325(65) MG
325 TABLET ORAL 2 TIMES DAILY WITH MEALS
Status: DISCONTINUED | OUTPATIENT
Start: 2025-01-01 | End: 2025-01-01

## 2025-01-01 RX ORDER — DEXTROSE MONOHYDRATE 25 G/50ML
25 INJECTION, SOLUTION INTRAVENOUS
Status: DISCONTINUED | OUTPATIENT
Start: 2025-01-01 | End: 2025-01-01 | Stop reason: HOSPADM

## 2025-01-01 RX ORDER — SODIUM PHOSPHATE,MONO-DIBASIC 19G-7G/118
1 ENEMA (ML) RECTAL ONCE
Status: COMPLETED | OUTPATIENT
Start: 2025-01-01 | End: 2025-01-01

## 2025-01-01 RX ORDER — GUAIFENESIN/DEXTROMETHORPHAN 100-10MG/5
5 SYRUP ORAL EVERY 4 HOURS PRN
Status: DISCONTINUED | OUTPATIENT
Start: 2025-01-01 | End: 2025-01-01

## 2025-01-01 RX ORDER — ACETAMINOPHEN 160 MG/5ML
650 SOLUTION ORAL EVERY 4 HOURS PRN
Status: DISCONTINUED | OUTPATIENT
Start: 2025-01-01 | End: 2025-01-01 | Stop reason: HOSPADM

## 2025-01-01 RX ORDER — INSULIN LISPRO 100 [IU]/ML
2-7 INJECTION, SOLUTION INTRAVENOUS; SUBCUTANEOUS
Status: DISCONTINUED | OUTPATIENT
Start: 2025-01-01 | End: 2025-01-01

## 2025-01-01 RX ORDER — MORPHINE SULFATE 2 MG/ML
2 INJECTION, SOLUTION INTRAMUSCULAR; INTRAVENOUS ONCE
Status: COMPLETED | OUTPATIENT
Start: 2025-01-01 | End: 2025-01-01

## 2025-01-01 RX ORDER — IBUPROFEN 600 MG/1
1 TABLET ORAL
Status: DISCONTINUED | OUTPATIENT
Start: 2025-01-01 | End: 2025-01-01

## 2025-01-01 RX ORDER — IPRATROPIUM BROMIDE AND ALBUTEROL SULFATE 2.5; .5 MG/3ML; MG/3ML
3 SOLUTION RESPIRATORY (INHALATION)
Status: DISCONTINUED | OUTPATIENT
Start: 2025-01-01 | End: 2025-01-01 | Stop reason: HOSPADM

## 2025-01-01 RX ORDER — ONDANSETRON 4 MG/1
4 TABLET, ORALLY DISINTEGRATING ORAL EVERY 6 HOURS PRN
Status: DISCONTINUED | OUTPATIENT
Start: 2025-01-01 | End: 2025-01-01 | Stop reason: HOSPADM

## 2025-01-01 RX ORDER — DIPHENHYDRAMINE HCL 25 MG
25 CAPSULE ORAL ONCE AS NEEDED
Status: COMPLETED | OUTPATIENT
Start: 2025-01-01 | End: 2025-01-01

## 2025-01-01 RX ORDER — IPRATROPIUM BROMIDE AND ALBUTEROL SULFATE 2.5; .5 MG/3ML; MG/3ML
3 SOLUTION RESPIRATORY (INHALATION)
Status: COMPLETED | OUTPATIENT
Start: 2025-01-01 | End: 2025-01-01

## 2025-01-01 RX ORDER — ALLOPURINOL 100 MG/1
100 TABLET ORAL 2 TIMES DAILY
Status: DISCONTINUED | OUTPATIENT
Start: 2025-01-01 | End: 2025-01-01

## 2025-01-01 RX ORDER — DEXAMETHASONE SODIUM PHOSPHATE 10 MG/ML
6 INJECTION, SOLUTION INTRAMUSCULAR; INTRAVENOUS DAILY
Status: DISCONTINUED | OUTPATIENT
Start: 2025-01-01 | End: 2025-01-01

## 2025-01-01 RX ORDER — AMOXICILLIN 250 MG
1 CAPSULE ORAL 2 TIMES DAILY
Status: DISCONTINUED | OUTPATIENT
Start: 2025-01-01 | End: 2025-01-01

## 2025-01-01 RX ORDER — LORAZEPAM 2 MG/ML
1 CONCENTRATE ORAL
Status: DISCONTINUED | OUTPATIENT
Start: 2025-01-01 | End: 2025-01-01 | Stop reason: HOSPADM

## 2025-01-01 RX ORDER — FUROSEMIDE 10 MG/ML
40 INJECTION INTRAMUSCULAR; INTRAVENOUS EVERY 12 HOURS
Status: DISCONTINUED | OUTPATIENT
Start: 2025-01-01 | End: 2025-01-01

## 2025-01-01 RX ORDER — POLYETHYLENE GLYCOL 3350 17 G/17G
17 POWDER, FOR SOLUTION ORAL DAILY PRN
Status: DISCONTINUED | OUTPATIENT
Start: 2025-01-01 | End: 2025-01-01

## 2025-01-01 RX ORDER — SODIUM BICARBONATE 650 MG/1
1300 TABLET ORAL 3 TIMES DAILY
Status: DISCONTINUED | OUTPATIENT
Start: 2025-01-01 | End: 2025-01-01

## 2025-01-01 RX ORDER — ACETAMINOPHEN 10 MG/ML
1000 INJECTION, SOLUTION INTRAVENOUS EVERY 8 HOURS PRN
Status: DISCONTINUED | OUTPATIENT
Start: 2025-01-01 | End: 2025-01-01 | Stop reason: HOSPADM

## 2025-01-01 RX ORDER — FUROSEMIDE 10 MG/ML
80 INJECTION INTRAMUSCULAR; INTRAVENOUS ONCE
Status: COMPLETED | OUTPATIENT
Start: 2025-01-01 | End: 2025-01-01

## 2025-01-01 RX ORDER — GUAIFENESIN 600 MG/1
1200 TABLET, EXTENDED RELEASE ORAL EVERY 12 HOURS SCHEDULED
Status: DISCONTINUED | OUTPATIENT
Start: 2025-01-01 | End: 2025-01-01

## 2025-01-01 RX ORDER — METOPROLOL TARTRATE 25 MG/1
12.5 TABLET, FILM COATED ORAL EVERY 12 HOURS SCHEDULED
Status: DISCONTINUED | OUTPATIENT
Start: 2025-01-01 | End: 2025-01-01

## 2025-01-01 RX ORDER — ARFORMOTEROL TARTRATE 15 UG/2ML
15 SOLUTION RESPIRATORY (INHALATION)
Status: DISCONTINUED | OUTPATIENT
Start: 2025-01-01 | End: 2025-01-01 | Stop reason: HOSPADM

## 2025-01-01 RX ADMIN — APIXABAN 2.5 MG: 2.5 TABLET, FILM COATED ORAL at 20:30

## 2025-01-01 RX ADMIN — ALLOPURINOL 100 MG: 100 TABLET ORAL at 09:10

## 2025-01-01 RX ADMIN — ACETAMINOPHEN 650 MG: 325 TABLET ORAL at 07:55

## 2025-01-01 RX ADMIN — IPRATROPIUM BROMIDE AND ALBUTEROL SULFATE 3 ML: .5; 3 SOLUTION RESPIRATORY (INHALATION) at 06:46

## 2025-01-01 RX ADMIN — HYDROCODONE BITARTRATE AND ACETAMINOPHEN 1 TABLET: 5; 325 TABLET ORAL at 11:51

## 2025-01-01 RX ADMIN — LEVOTHYROXINE SODIUM 100 MCG: 0.05 TABLET ORAL at 07:56

## 2025-01-01 RX ADMIN — IPRATROPIUM BROMIDE AND ALBUTEROL SULFATE 3 ML: .5; 3 SOLUTION RESPIRATORY (INHALATION) at 11:25

## 2025-01-01 RX ADMIN — AMPICILLIN SODIUM, SULBACTAM SODIUM 3 G: 2; 1 INJECTION, POWDER, FOR SOLUTION INTRAMUSCULAR; INTRAVENOUS at 04:27

## 2025-01-01 RX ADMIN — Medication 10 ML: at 10:43

## 2025-01-01 RX ADMIN — FERROUS SULFATE TAB 325 MG (65 MG ELEMENTAL FE) 325 MG: 325 (65 FE) TAB at 08:20

## 2025-01-01 RX ADMIN — Medication 10 ML: at 09:05

## 2025-01-01 RX ADMIN — AZITHROMYCIN DIHYDRATE 500 MG: 250 TABLET ORAL at 09:05

## 2025-01-01 RX ADMIN — Medication 10 ML: at 21:38

## 2025-01-01 RX ADMIN — APIXABAN 2.5 MG: 2.5 TABLET, FILM COATED ORAL at 21:09

## 2025-01-01 RX ADMIN — GUAIFENESIN 1200 MG: 600 TABLET ORAL at 08:13

## 2025-01-01 RX ADMIN — GUAIFENESIN 1200 MG: 600 TABLET ORAL at 20:30

## 2025-01-01 RX ADMIN — Medication 10 ML: at 21:41

## 2025-01-01 RX ADMIN — SENNOSIDES AND DOCUSATE SODIUM 2 TABLET: 50; 8.6 TABLET ORAL at 21:08

## 2025-01-01 RX ADMIN — APIXABAN 2.5 MG: 2.5 TABLET, FILM COATED ORAL at 21:37

## 2025-01-01 RX ADMIN — BUDESONIDE 0.5 MG: 0.5 INHALANT RESPIRATORY (INHALATION) at 19:10

## 2025-01-01 RX ADMIN — BUDESONIDE 0.5 MG: 0.5 INHALANT RESPIRATORY (INHALATION) at 21:26

## 2025-01-01 RX ADMIN — BENZONATATE 100 MG: 100 CAPSULE ORAL at 20:50

## 2025-01-01 RX ADMIN — IPRATROPIUM BROMIDE AND ALBUTEROL SULFATE 3 ML: .5; 3 SOLUTION RESPIRATORY (INHALATION) at 15:41

## 2025-01-01 RX ADMIN — METOPROLOL TARTRATE 12.5 MG: 25 TABLET, FILM COATED ORAL at 08:20

## 2025-01-01 RX ADMIN — FERROUS SULFATE TAB 325 MG (65 MG ELEMENTAL FE) 325 MG: 325 (65 FE) TAB at 17:37

## 2025-01-01 RX ADMIN — ROSUVASTATIN CALCIUM 10 MG: 5 TABLET, FILM COATED ORAL at 21:32

## 2025-01-01 RX ADMIN — INSULIN LISPRO 2 UNITS: 100 INJECTION, SOLUTION INTRAVENOUS; SUBCUTANEOUS at 17:05

## 2025-01-01 RX ADMIN — Medication 10 ML: at 20:30

## 2025-01-01 RX ADMIN — GUAIFENESIN 1200 MG: 600 TABLET ORAL at 09:05

## 2025-01-01 RX ADMIN — IPRATROPIUM BROMIDE AND ALBUTEROL SULFATE 3 ML: .5; 3 SOLUTION RESPIRATORY (INHALATION) at 07:40

## 2025-01-01 RX ADMIN — SENNOSIDES AND DOCUSATE SODIUM 2 TABLET: 50; 8.6 TABLET ORAL at 02:25

## 2025-01-01 RX ADMIN — INSULIN LISPRO 2 UNITS: 100 INJECTION, SOLUTION INTRAVENOUS; SUBCUTANEOUS at 17:30

## 2025-01-01 RX ADMIN — IPRATROPIUM BROMIDE AND ALBUTEROL SULFATE 3 ML: .5; 3 SOLUTION RESPIRATORY (INHALATION) at 20:27

## 2025-01-01 RX ADMIN — Medication 10 ML: at 09:16

## 2025-01-01 RX ADMIN — IPRATROPIUM BROMIDE AND ALBUTEROL SULFATE 3 ML: .5; 3 SOLUTION RESPIRATORY (INHALATION) at 11:18

## 2025-01-01 RX ADMIN — OXYCODONE HYDROCHLORIDE AND ACETAMINOPHEN 500 MG: 500 TABLET ORAL at 09:16

## 2025-01-01 RX ADMIN — FLUTICASONE PROPIONATE 2 SPRAY: 50 SPRAY, METERED NASAL at 11:47

## 2025-01-01 RX ADMIN — INSULIN LISPRO 4 UNITS: 100 INJECTION, SOLUTION INTRAVENOUS; SUBCUTANEOUS at 22:18

## 2025-01-01 RX ADMIN — IPRATROPIUM BROMIDE AND ALBUTEROL SULFATE 3 ML: .5; 3 SOLUTION RESPIRATORY (INHALATION) at 03:06

## 2025-01-01 RX ADMIN — OXYCODONE HYDROCHLORIDE AND ACETAMINOPHEN 500 MG: 500 TABLET ORAL at 09:06

## 2025-01-01 RX ADMIN — GUAIFENESIN 1200 MG: 600 TABLET ORAL at 10:19

## 2025-01-01 RX ADMIN — OXYCODONE HYDROCHLORIDE AND ACETAMINOPHEN 500 MG: 500 TABLET ORAL at 10:18

## 2025-01-01 RX ADMIN — APIXABAN 2.5 MG: 2.5 TABLET, FILM COATED ORAL at 09:10

## 2025-01-01 RX ADMIN — IPRATROPIUM BROMIDE AND ALBUTEROL SULFATE 3 ML: .5; 3 SOLUTION RESPIRATORY (INHALATION) at 10:39

## 2025-01-01 RX ADMIN — MUPIROCIN 1 APPLICATION: 20 OINTMENT TOPICAL at 13:17

## 2025-01-01 RX ADMIN — AMPICILLIN SODIUM, SULBACTAM SODIUM 3 G: 2; 1 INJECTION, POWDER, FOR SOLUTION INTRAMUSCULAR; INTRAVENOUS at 04:44

## 2025-01-01 RX ADMIN — SODIUM BICARBONATE 650 MG TABLET 1300 MG: at 08:14

## 2025-01-01 RX ADMIN — AMPICILLIN SODIUM, SULBACTAM SODIUM 3 G: 2; 1 INJECTION, POWDER, FOR SOLUTION INTRAMUSCULAR; INTRAVENOUS at 03:56

## 2025-01-01 RX ADMIN — IPRATROPIUM BROMIDE AND ALBUTEROL SULFATE 3 ML: .5; 3 SOLUTION RESPIRATORY (INHALATION) at 10:59

## 2025-01-01 RX ADMIN — ACETAMINOPHEN 1000 MG: 1000 INJECTION, SOLUTION INTRAVENOUS at 14:04

## 2025-01-01 RX ADMIN — APIXABAN 2.5 MG: 2.5 TABLET, FILM COATED ORAL at 09:16

## 2025-01-01 RX ADMIN — MORPHINE SULFATE 2 MG: 2 INJECTION, SOLUTION INTRAMUSCULAR; INTRAVENOUS at 06:30

## 2025-01-01 RX ADMIN — FUROSEMIDE 40 MG: 10 INJECTION, SOLUTION INTRAMUSCULAR; INTRAVENOUS at 13:17

## 2025-01-01 RX ADMIN — Medication 10 ML: at 21:09

## 2025-01-01 RX ADMIN — DIAZEPAM 5 MG: 5 INJECTION, SOLUTION INTRAMUSCULAR; INTRAVENOUS at 13:57

## 2025-01-01 RX ADMIN — AMPICILLIN SODIUM, SULBACTAM SODIUM 3 G: 2; 1 INJECTION, POWDER, FOR SOLUTION INTRAMUSCULAR; INTRAVENOUS at 18:35

## 2025-01-01 RX ADMIN — SODIUM BICARBONATE 650 MG TABLET 1300 MG: at 10:43

## 2025-01-01 RX ADMIN — BUDESONIDE 0.5 MG: 0.5 INHALANT RESPIRATORY (INHALATION) at 07:20

## 2025-01-01 RX ADMIN — BUDESONIDE 0.5 MG: 0.5 INHALANT RESPIRATORY (INHALATION) at 06:46

## 2025-01-01 RX ADMIN — FLUTICASONE PROPIONATE 2 SPRAY: 50 SPRAY, METERED NASAL at 12:10

## 2025-01-01 RX ADMIN — ROSUVASTATIN CALCIUM 10 MG: 5 TABLET, FILM COATED ORAL at 21:52

## 2025-01-01 RX ADMIN — FERROUS SULFATE TAB 325 MG (65 MG ELEMENTAL FE) 325 MG: 325 (65 FE) TAB at 18:08

## 2025-01-01 RX ADMIN — METOPROLOL TARTRATE 12.5 MG: 25 TABLET, FILM COATED ORAL at 22:19

## 2025-01-01 RX ADMIN — GUAIFENESIN 1200 MG: 600 TABLET ORAL at 09:10

## 2025-01-01 RX ADMIN — LEVOTHYROXINE SODIUM 100 MCG: 0.05 TABLET ORAL at 08:15

## 2025-01-01 RX ADMIN — HYDROCODONE BITARTRATE AND ACETAMINOPHEN 1 TABLET: 5; 325 TABLET ORAL at 05:46

## 2025-01-01 RX ADMIN — FERROUS SULFATE TAB 325 MG (65 MG ELEMENTAL FE) 325 MG: 325 (65 FE) TAB at 17:01

## 2025-01-01 RX ADMIN — INSULIN LISPRO 3 UNITS: 100 INJECTION, SOLUTION INTRAVENOUS; SUBCUTANEOUS at 12:12

## 2025-01-01 RX ADMIN — APIXABAN 2.5 MG: 2.5 TABLET, FILM COATED ORAL at 21:52

## 2025-01-01 RX ADMIN — Medication 10 ML: at 21:53

## 2025-01-01 RX ADMIN — Medication 5 MG: at 22:31

## 2025-01-01 RX ADMIN — SENNOSIDES AND DOCUSATE SODIUM 1 TABLET: 50; 8.6 TABLET ORAL at 21:31

## 2025-01-01 RX ADMIN — APIXABAN 2.5 MG: 2.5 TABLET, FILM COATED ORAL at 08:20

## 2025-01-01 RX ADMIN — ARFORMOTEROL TARTRATE 15 MCG: 15 SOLUTION RESPIRATORY (INHALATION) at 18:53

## 2025-01-01 RX ADMIN — BISACODYL 5 MG: 5 TABLET, COATED ORAL at 09:15

## 2025-01-01 RX ADMIN — APIXABAN 2.5 MG: 2.5 TABLET, FILM COATED ORAL at 10:18

## 2025-01-01 RX ADMIN — LEVOTHYROXINE SODIUM 100 MCG: 0.05 TABLET ORAL at 09:16

## 2025-01-01 RX ADMIN — APIXABAN 2.5 MG: 2.5 TABLET, FILM COATED ORAL at 09:06

## 2025-01-01 RX ADMIN — GUAIFENESIN 1200 MG: 600 TABLET ORAL at 21:16

## 2025-01-01 RX ADMIN — ARFORMOTEROL TARTRATE 15 MCG: 15 SOLUTION RESPIRATORY (INHALATION) at 21:26

## 2025-01-01 RX ADMIN — APIXABAN 2.5 MG: 2.5 TABLET, FILM COATED ORAL at 20:49

## 2025-01-01 RX ADMIN — Medication 5 MG: at 21:45

## 2025-01-01 RX ADMIN — ALLOPURINOL 100 MG: 100 TABLET ORAL at 08:13

## 2025-01-01 RX ADMIN — APIXABAN 2.5 MG: 2.5 TABLET, FILM COATED ORAL at 08:14

## 2025-01-01 RX ADMIN — IPRATROPIUM BROMIDE AND ALBUTEROL SULFATE 3 ML: .5; 3 SOLUTION RESPIRATORY (INHALATION) at 07:47

## 2025-01-01 RX ADMIN — INSULIN LISPRO 2 UNITS: 100 INJECTION, SOLUTION INTRAVENOUS; SUBCUTANEOUS at 20:29

## 2025-01-01 RX ADMIN — MORPHINE SULFATE 2 MG: 2 INJECTION, SOLUTION INTRAMUSCULAR; INTRAVENOUS at 13:57

## 2025-01-01 RX ADMIN — DEXAMETHASONE SODIUM PHOSPHATE 6 MG: 10 INJECTION INTRAMUSCULAR; INTRAVENOUS at 09:10

## 2025-01-01 RX ADMIN — ARFORMOTEROL TARTRATE 15 MCG: 15 SOLUTION RESPIRATORY (INHALATION) at 07:44

## 2025-01-01 RX ADMIN — Medication 5 MG: at 02:24

## 2025-01-01 RX ADMIN — Medication 10 ML: at 10:18

## 2025-01-01 RX ADMIN — FLUTICASONE PROPIONATE 2 SPRAY: 50 SPRAY, METERED NASAL at 10:20

## 2025-01-01 RX ADMIN — BUDESONIDE 0.5 MG: 0.5 INHALANT RESPIRATORY (INHALATION) at 18:53

## 2025-01-01 RX ADMIN — ARFORMOTEROL TARTRATE 15 MCG: 15 SOLUTION RESPIRATORY (INHALATION) at 18:54

## 2025-01-01 RX ADMIN — GUAIFENESIN 1200 MG: 600 TABLET ORAL at 20:16

## 2025-01-01 RX ADMIN — ALLOPURINOL 100 MG: 100 TABLET ORAL at 10:17

## 2025-01-01 RX ADMIN — PANTOPRAZOLE SODIUM 40 MG: 40 TABLET, DELAYED RELEASE ORAL at 05:11

## 2025-01-01 RX ADMIN — SENNOSIDES AND DOCUSATE SODIUM 1 TABLET: 50; 8.6 TABLET ORAL at 08:14

## 2025-01-01 RX ADMIN — IPRATROPIUM BROMIDE AND ALBUTEROL SULFATE 3 ML: .5; 3 SOLUTION RESPIRATORY (INHALATION) at 07:13

## 2025-01-01 RX ADMIN — FUROSEMIDE 40 MG: 10 INJECTION, SOLUTION INTRAMUSCULAR; INTRAVENOUS at 10:19

## 2025-01-01 RX ADMIN — BUDESONIDE 0.5 MG: 0.5 INHALANT RESPIRATORY (INHALATION) at 19:43

## 2025-01-01 RX ADMIN — HYDROCODONE BITARTRATE AND ACETAMINOPHEN 1 TABLET: 5; 325 TABLET ORAL at 21:08

## 2025-01-01 RX ADMIN — Medication 10 ML: at 09:11

## 2025-01-01 RX ADMIN — ONDANSETRON 4 MG: 2 INJECTION INTRAMUSCULAR; INTRAVENOUS at 05:52

## 2025-01-01 RX ADMIN — Medication 10 ML: at 10:20

## 2025-01-01 RX ADMIN — OXYCODONE HYDROCHLORIDE AND ACETAMINOPHEN 500 MG: 500 TABLET ORAL at 08:13

## 2025-01-01 RX ADMIN — FUROSEMIDE 40 MG: 10 INJECTION, SOLUTION INTRAMUSCULAR; INTRAVENOUS at 21:51

## 2025-01-01 RX ADMIN — SENNOSIDES AND DOCUSATE SODIUM 1 TABLET: 50; 8.6 TABLET ORAL at 08:13

## 2025-01-01 RX ADMIN — SODIUM BICARBONATE 650 MG TABLET 1300 MG: at 16:41

## 2025-01-01 RX ADMIN — ALLOPURINOL 100 MG: 100 TABLET ORAL at 08:15

## 2025-01-01 RX ADMIN — ARFORMOTEROL TARTRATE 15 MCG: 15 SOLUTION RESPIRATORY (INHALATION) at 19:13

## 2025-01-01 RX ADMIN — Medication 10 ML: at 08:17

## 2025-01-01 RX ADMIN — BUDESONIDE 0.5 MG: 0.5 INHALANT RESPIRATORY (INHALATION) at 20:27

## 2025-01-01 RX ADMIN — LEVOTHYROXINE SODIUM 100 MCG: 0.05 TABLET ORAL at 08:14

## 2025-01-01 RX ADMIN — PANTOPRAZOLE SODIUM 40 MG: 40 TABLET, DELAYED RELEASE ORAL at 05:21

## 2025-01-01 RX ADMIN — ARFORMOTEROL TARTRATE 15 MCG: 15 SOLUTION RESPIRATORY (INHALATION) at 06:08

## 2025-01-01 RX ADMIN — PANTOPRAZOLE SODIUM 40 MG: 40 TABLET, DELAYED RELEASE ORAL at 05:36

## 2025-01-01 RX ADMIN — SODIUM BICARBONATE 650 MG TABLET 1300 MG: at 21:37

## 2025-01-01 RX ADMIN — OXYCODONE HYDROCHLORIDE AND ACETAMINOPHEN 500 MG: 500 TABLET ORAL at 10:00

## 2025-01-01 RX ADMIN — BUDESONIDE 0.5 MG: 0.5 INHALANT RESPIRATORY (INHALATION) at 19:34

## 2025-01-01 RX ADMIN — PANTOPRAZOLE SODIUM 40 MG: 40 TABLET, DELAYED RELEASE ORAL at 05:16

## 2025-01-01 RX ADMIN — IPRATROPIUM BROMIDE AND ALBUTEROL SULFATE 3 ML: .5; 3 SOLUTION RESPIRATORY (INHALATION) at 06:57

## 2025-01-01 RX ADMIN — DEXAMETHASONE 6 MG: 4 TABLET ORAL at 10:17

## 2025-01-01 RX ADMIN — IPRATROPIUM BROMIDE AND ALBUTEROL SULFATE 3 ML: .5; 3 SOLUTION RESPIRATORY (INHALATION) at 18:54

## 2025-01-01 RX ADMIN — ARFORMOTEROL TARTRATE 15 MCG: 15 SOLUTION RESPIRATORY (INHALATION) at 06:54

## 2025-01-01 RX ADMIN — APIXABAN 2.5 MG: 2.5 TABLET, FILM COATED ORAL at 10:43

## 2025-01-01 RX ADMIN — GUAIFENESIN 1200 MG: 600 TABLET ORAL at 21:52

## 2025-01-01 RX ADMIN — ALBUTEROL SULFATE 2.5 MG: 2.5 SOLUTION RESPIRATORY (INHALATION) at 03:50

## 2025-01-01 RX ADMIN — GUAIFENESIN 1200 MG: 600 TABLET ORAL at 09:16

## 2025-01-01 RX ADMIN — ROSUVASTATIN CALCIUM 10 MG: 5 TABLET, FILM COATED ORAL at 21:08

## 2025-01-01 RX ADMIN — ARFORMOTEROL TARTRATE 15 MCG: 15 SOLUTION RESPIRATORY (INHALATION) at 06:57

## 2025-01-01 RX ADMIN — LEVOTHYROXINE SODIUM 100 MCG: 0.05 TABLET ORAL at 08:20

## 2025-01-01 RX ADMIN — ALLOPURINOL 100 MG: 100 TABLET ORAL at 08:20

## 2025-01-01 RX ADMIN — FUROSEMIDE 40 MG: 40 TABLET ORAL at 08:20

## 2025-01-01 RX ADMIN — Medication 10 ML: at 21:17

## 2025-01-01 RX ADMIN — FERROUS SULFATE TAB 325 MG (65 MG ELEMENTAL FE) 325 MG: 325 (65 FE) TAB at 09:06

## 2025-01-01 RX ADMIN — ALLOPURINOL 100 MG: 100 TABLET ORAL at 09:16

## 2025-01-01 RX ADMIN — INSULIN LISPRO 2 UNITS: 100 INJECTION, SOLUTION INTRAVENOUS; SUBCUTANEOUS at 21:52

## 2025-01-01 RX ADMIN — ALLOPURINOL 100 MG: 100 TABLET ORAL at 21:16

## 2025-01-01 RX ADMIN — FERROUS SULFATE TAB 325 MG (65 MG ELEMENTAL FE) 325 MG: 325 (65 FE) TAB at 08:15

## 2025-01-01 RX ADMIN — HYDROCODONE BITARTRATE AND ACETAMINOPHEN 1 TABLET: 5; 325 TABLET ORAL at 17:15

## 2025-01-01 RX ADMIN — FERROUS SULFATE TAB 325 MG (65 MG ELEMENTAL FE) 325 MG: 325 (65 FE) TAB at 08:14

## 2025-01-01 RX ADMIN — Medication 10 ML: at 08:20

## 2025-01-01 RX ADMIN — Medication 5 MG: at 22:19

## 2025-01-01 RX ADMIN — PANTOPRAZOLE SODIUM 40 MG: 40 TABLET, DELAYED RELEASE ORAL at 05:10

## 2025-01-01 RX ADMIN — FERROUS SULFATE TAB 325 MG (65 MG ELEMENTAL FE) 325 MG: 325 (65 FE) TAB at 10:19

## 2025-01-01 RX ADMIN — APIXABAN 2.5 MG: 2.5 TABLET, FILM COATED ORAL at 21:16

## 2025-01-01 RX ADMIN — ARFORMOTEROL TARTRATE 15 MCG: 15 SOLUTION RESPIRATORY (INHALATION) at 20:26

## 2025-01-01 RX ADMIN — ARFORMOTEROL TARTRATE 15 MCG: 15 SOLUTION RESPIRATORY (INHALATION) at 07:13

## 2025-01-01 RX ADMIN — BUDESONIDE 0.5 MG: 0.5 INHALANT RESPIRATORY (INHALATION) at 06:54

## 2025-01-01 RX ADMIN — FUROSEMIDE 40 MG: 10 INJECTION, SOLUTION INTRAMUSCULAR; INTRAVENOUS at 04:53

## 2025-01-01 RX ADMIN — DIPHENHYDRAMINE HYDROCHLORIDE 25 MG: 25 CAPSULE ORAL at 21:37

## 2025-01-01 RX ADMIN — FERROUS SULFATE TAB 325 MG (65 MG ELEMENTAL FE) 325 MG: 325 (65 FE) TAB at 17:05

## 2025-01-01 RX ADMIN — BUDESONIDE 0.5 MG: 0.5 INHALANT RESPIRATORY (INHALATION) at 07:13

## 2025-01-01 RX ADMIN — PANTOPRAZOLE SODIUM 40 MG: 40 TABLET, DELAYED RELEASE ORAL at 04:54

## 2025-01-01 RX ADMIN — GUAIFENESIN 1200 MG: 600 TABLET ORAL at 20:51

## 2025-01-01 RX ADMIN — GUAIFENESIN 1200 MG: 600 TABLET ORAL at 21:31

## 2025-01-01 RX ADMIN — POLYETHYLENE GLYCOL 3350 17 G: 17 POWDER, FOR SOLUTION ORAL at 09:15

## 2025-01-01 RX ADMIN — INSULIN LISPRO 2 UNITS: 100 INJECTION, SOLUTION INTRAVENOUS; SUBCUTANEOUS at 18:31

## 2025-01-01 RX ADMIN — IPRATROPIUM BROMIDE AND ALBUTEROL SULFATE 3 ML: .5; 3 SOLUTION RESPIRATORY (INHALATION) at 01:58

## 2025-01-01 RX ADMIN — Medication 5 MG: at 21:37

## 2025-01-01 RX ADMIN — ALLOPURINOL 100 MG: 100 TABLET ORAL at 21:36

## 2025-01-01 RX ADMIN — IPRATROPIUM BROMIDE AND ALBUTEROL SULFATE 3 ML: .5; 3 SOLUTION RESPIRATORY (INHALATION) at 13:35

## 2025-01-01 RX ADMIN — AMPICILLIN SODIUM, SULBACTAM SODIUM 3 G: 2; 1 INJECTION, POWDER, FOR SOLUTION INTRAMUSCULAR; INTRAVENOUS at 16:41

## 2025-01-01 RX ADMIN — HYDROCODONE BITARTRATE AND ACETAMINOPHEN 1 TABLET: 5; 325 TABLET ORAL at 04:54

## 2025-01-01 RX ADMIN — MUPIROCIN 1 APPLICATION: 20 OINTMENT TOPICAL at 08:17

## 2025-01-01 RX ADMIN — FUROSEMIDE 40 MG: 10 INJECTION, SOLUTION INTRAMUSCULAR; INTRAVENOUS at 17:30

## 2025-01-01 RX ADMIN — APIXABAN 2.5 MG: 2.5 TABLET, FILM COATED ORAL at 21:31

## 2025-01-01 RX ADMIN — HYDROCODONE BITARTRATE AND ACETAMINOPHEN 1 TABLET: 5; 325 TABLET ORAL at 22:31

## 2025-01-01 RX ADMIN — SENNOSIDES AND DOCUSATE SODIUM 1 TABLET: 50; 8.6 TABLET ORAL at 09:15

## 2025-01-01 RX ADMIN — ARFORMOTEROL TARTRATE 15 MCG: 15 SOLUTION RESPIRATORY (INHALATION) at 06:39

## 2025-01-01 RX ADMIN — ROSUVASTATIN CALCIUM 10 MG: 5 TABLET, FILM COATED ORAL at 21:51

## 2025-01-01 RX ADMIN — IPRATROPIUM BROMIDE AND ALBUTEROL SULFATE 3 ML: .5; 3 SOLUTION RESPIRATORY (INHALATION) at 19:34

## 2025-01-01 RX ADMIN — IPRATROPIUM BROMIDE AND ALBUTEROL SULFATE 3 ML: .5; 3 SOLUTION RESPIRATORY (INHALATION) at 07:20

## 2025-01-01 RX ADMIN — SENNOSIDES AND DOCUSATE SODIUM 1 TABLET: 50; 8.6 TABLET ORAL at 21:53

## 2025-01-01 RX ADMIN — IPRATROPIUM BROMIDE AND ALBUTEROL SULFATE 3 ML: .5; 3 SOLUTION RESPIRATORY (INHALATION) at 01:14

## 2025-01-01 RX ADMIN — Medication 10 ML: at 22:20

## 2025-01-01 RX ADMIN — LEVOTHYROXINE SODIUM 100 MCG: 0.05 TABLET ORAL at 06:00

## 2025-01-01 RX ADMIN — ACETAMINOPHEN 650 MG: 325 TABLET ORAL at 09:43

## 2025-01-01 RX ADMIN — GUAIFENESIN 1200 MG: 600 TABLET ORAL at 08:20

## 2025-01-01 RX ADMIN — FERROUS SULFATE TAB 325 MG (65 MG ELEMENTAL FE) 325 MG: 325 (65 FE) TAB at 17:30

## 2025-01-01 RX ADMIN — ROSUVASTATIN CALCIUM 10 MG: 5 TABLET, FILM COATED ORAL at 20:30

## 2025-01-01 RX ADMIN — LEVOTHYROXINE SODIUM 100 MCG: 0.05 TABLET ORAL at 10:19

## 2025-01-01 RX ADMIN — FERROUS SULFATE TAB 325 MG (65 MG ELEMENTAL FE) 325 MG: 325 (65 FE) TAB at 09:16

## 2025-01-01 RX ADMIN — METOPROLOL TARTRATE 12.5 MG: 25 TABLET, FILM COATED ORAL at 10:43

## 2025-01-01 RX ADMIN — IPRATROPIUM BROMIDE AND ALBUTEROL SULFATE 3 ML: .5; 3 SOLUTION RESPIRATORY (INHALATION) at 06:08

## 2025-01-01 RX ADMIN — SODIUM BICARBONATE 650 MG TABLET 1300 MG: at 08:20

## 2025-01-01 RX ADMIN — SODIUM BICARBONATE 650 MG TABLET 1300 MG: at 18:34

## 2025-01-01 RX ADMIN — IPRATROPIUM BROMIDE AND ALBUTEROL SULFATE 3 ML: .5; 3 SOLUTION RESPIRATORY (INHALATION) at 13:52

## 2025-01-01 RX ADMIN — ROSUVASTATIN CALCIUM 10 MG: 5 TABLET, FILM COATED ORAL at 20:16

## 2025-01-01 RX ADMIN — GUAIFENESIN 1200 MG: 600 TABLET ORAL at 21:36

## 2025-01-01 RX ADMIN — Medication 5 MG: at 21:52

## 2025-01-01 RX ADMIN — ALLOPURINOL 100 MG: 100 TABLET ORAL at 22:19

## 2025-01-01 RX ADMIN — DEXAMETHASONE 6 MG: 4 TABLET ORAL at 10:43

## 2025-01-01 RX ADMIN — ARFORMOTEROL TARTRATE 15 MCG: 15 SOLUTION RESPIRATORY (INHALATION) at 19:34

## 2025-01-01 RX ADMIN — DEXAMETHASONE 6 MG: 4 TABLET ORAL at 10:19

## 2025-01-01 RX ADMIN — ARFORMOTEROL TARTRATE 15 MCG: 15 SOLUTION RESPIRATORY (INHALATION) at 19:10

## 2025-01-01 RX ADMIN — HYDROCODONE BITARTRATE AND ACETAMINOPHEN 1 TABLET: 5; 325 TABLET ORAL at 05:45

## 2025-01-01 RX ADMIN — ARFORMOTEROL TARTRATE 15 MCG: 15 SOLUTION RESPIRATORY (INHALATION) at 07:40

## 2025-01-01 RX ADMIN — IPRATROPIUM BROMIDE AND ALBUTEROL SULFATE 3 ML: .5; 3 SOLUTION RESPIRATORY (INHALATION) at 11:54

## 2025-01-01 RX ADMIN — Medication 10 ML: at 20:16

## 2025-01-01 RX ADMIN — DEXAMETHASONE SODIUM PHOSPHATE 6 MG: 10 INJECTION INTRAMUSCULAR; INTRAVENOUS at 16:33

## 2025-01-01 RX ADMIN — SENNOSIDES AND DOCUSATE SODIUM 1 TABLET: 50; 8.6 TABLET ORAL at 21:36

## 2025-01-01 RX ADMIN — GUAIFENESIN AND DEXTROMETHORPHAN 5 ML: 100; 10 SYRUP ORAL at 21:36

## 2025-01-01 RX ADMIN — DEXAMETHASONE SODIUM PHOSPHATE 6 MG: 10 INJECTION INTRAMUSCULAR; INTRAVENOUS at 09:05

## 2025-01-01 RX ADMIN — ROSUVASTATIN CALCIUM 10 MG: 5 TABLET, FILM COATED ORAL at 21:36

## 2025-01-01 RX ADMIN — ROSUVASTATIN CALCIUM 10 MG: 5 TABLET, FILM COATED ORAL at 22:19

## 2025-01-01 RX ADMIN — GUAIFENESIN 1200 MG: 600 TABLET ORAL at 10:17

## 2025-01-01 RX ADMIN — ROSUVASTATIN CALCIUM 10 MG: 5 TABLET, FILM COATED ORAL at 20:50

## 2025-01-01 RX ADMIN — SODIUM BICARBONATE 650 MG TABLET 1300 MG: at 22:18

## 2025-01-01 RX ADMIN — Medication 5 MG: at 23:10

## 2025-01-01 RX ADMIN — BUDESONIDE 0.5 MG: 0.5 INHALANT RESPIRATORY (INHALATION) at 07:40

## 2025-01-01 RX ADMIN — ALLOPURINOL 100 MG: 100 TABLET ORAL at 20:50

## 2025-01-01 RX ADMIN — HYDROCODONE BITARTRATE AND ACETAMINOPHEN 1 TABLET: 5; 325 TABLET ORAL at 09:20

## 2025-01-01 RX ADMIN — AMPICILLIN SODIUM, SULBACTAM SODIUM 3 G: 2; 1 INJECTION, POWDER, FOR SOLUTION INTRAMUSCULAR; INTRAVENOUS at 18:23

## 2025-01-01 RX ADMIN — ARFORMOTEROL TARTRATE 15 MCG: 15 SOLUTION RESPIRATORY (INHALATION) at 07:20

## 2025-01-01 RX ADMIN — SENNOSIDES AND DOCUSATE SODIUM 1 TABLET: 50; 8.6 TABLET ORAL at 22:19

## 2025-01-01 RX ADMIN — APIXABAN 2.5 MG: 2.5 TABLET, FILM COATED ORAL at 22:19

## 2025-01-01 RX ADMIN — PANTOPRAZOLE SODIUM 40 MG: 40 TABLET, DELAYED RELEASE ORAL at 05:39

## 2025-01-01 RX ADMIN — ALLOPURINOL 100 MG: 100 TABLET ORAL at 20:16

## 2025-01-01 RX ADMIN — FERROUS SULFATE TAB 325 MG (65 MG ELEMENTAL FE) 325 MG: 325 (65 FE) TAB at 09:10

## 2025-01-01 RX ADMIN — ARFORMOTEROL TARTRATE 15 MCG: 15 SOLUTION RESPIRATORY (INHALATION) at 06:46

## 2025-01-01 RX ADMIN — METOPROLOL TARTRATE 12.5 MG: 25 TABLET, FILM COATED ORAL at 08:14

## 2025-01-01 RX ADMIN — BUDESONIDE 0.5 MG: 0.5 INHALANT RESPIRATORY (INHALATION) at 06:57

## 2025-01-01 RX ADMIN — FUROSEMIDE 80 MG: 10 INJECTION, SOLUTION INTRAMUSCULAR; INTRAVENOUS at 14:04

## 2025-01-01 RX ADMIN — OXYCODONE HYDROCHLORIDE AND ACETAMINOPHEN 500 MG: 500 TABLET ORAL at 08:20

## 2025-01-01 RX ADMIN — DEXAMETHASONE 6 MG: 4 TABLET ORAL at 08:20

## 2025-01-01 RX ADMIN — ALLOPURINOL 100 MG: 100 TABLET ORAL at 21:31

## 2025-01-01 RX ADMIN — IPRATROPIUM BROMIDE AND ALBUTEROL SULFATE 3 ML: .5; 3 SOLUTION RESPIRATORY (INHALATION) at 11:06

## 2025-01-01 RX ADMIN — BUDESONIDE 0.5 MG: 0.5 INHALANT RESPIRATORY (INHALATION) at 18:54

## 2025-01-01 RX ADMIN — SODIUM PHOSPHATE, DIBASIC AND SODIUM PHOSPHATE, MONOBASIC 1 ENEMA: 7; 19 ENEMA RECTAL at 00:37

## 2025-01-01 RX ADMIN — INSULIN LISPRO 3 UNITS: 100 INJECTION, SOLUTION INTRAVENOUS; SUBCUTANEOUS at 17:37

## 2025-01-01 RX ADMIN — GUAIFENESIN 1200 MG: 600 TABLET ORAL at 08:15

## 2025-01-01 RX ADMIN — IPRATROPIUM BROMIDE AND ALBUTEROL SULFATE 3 ML: .5; 3 SOLUTION RESPIRATORY (INHALATION) at 19:10

## 2025-01-01 RX ADMIN — GUAIFENESIN AND DEXTROMETHORPHAN 5 ML: 100; 10 SYRUP ORAL at 04:54

## 2025-01-01 RX ADMIN — ALLOPURINOL 100 MG: 100 TABLET ORAL at 20:30

## 2025-01-01 RX ADMIN — FUROSEMIDE 40 MG: 40 TABLET ORAL at 18:31

## 2025-01-01 RX ADMIN — PANTOPRAZOLE SODIUM 40 MG: 40 TABLET, DELAYED RELEASE ORAL at 06:00

## 2025-01-01 RX ADMIN — ALBUTEROL SULFATE 2.5 MG: 2.5 SOLUTION RESPIRATORY (INHALATION) at 20:32

## 2025-01-01 RX ADMIN — Medication 10 ML: at 21:32

## 2025-01-01 RX ADMIN — APIXABAN 2.5 MG: 2.5 TABLET, FILM COATED ORAL at 20:16

## 2025-01-01 RX ADMIN — AMPICILLIN SODIUM, SULBACTAM SODIUM 3 G: 2; 1 INJECTION, POWDER, FOR SOLUTION INTRAMUSCULAR; INTRAVENOUS at 17:16

## 2025-01-01 RX ADMIN — SENNOSIDES AND DOCUSATE SODIUM 1 TABLET: 50; 8.6 TABLET ORAL at 10:19

## 2025-01-01 RX ADMIN — ACETAMINOPHEN 650 MG: 325 TABLET ORAL at 20:50

## 2025-01-01 RX ADMIN — GUAIFENESIN 1200 MG: 600 TABLET ORAL at 22:19

## 2025-01-01 RX ADMIN — ALLOPURINOL 100 MG: 100 TABLET ORAL at 21:08

## 2025-01-01 RX ADMIN — BUDESONIDE 0.5 MG: 0.5 INHALANT RESPIRATORY (INHALATION) at 19:13

## 2025-01-01 RX ADMIN — APIXABAN 2.5 MG: 2.5 TABLET, FILM COATED ORAL at 10:19

## 2025-01-01 RX ADMIN — INSULIN LISPRO 3 UNITS: 100 INJECTION, SOLUTION INTRAVENOUS; SUBCUTANEOUS at 21:01

## 2025-01-01 RX ADMIN — IPRATROPIUM BROMIDE AND ALBUTEROL SULFATE 3 ML: .5; 3 SOLUTION RESPIRATORY (INHALATION) at 18:53

## 2025-01-01 RX ADMIN — SENNOSIDES AND DOCUSATE SODIUM 1 TABLET: 50; 8.6 TABLET ORAL at 08:20

## 2025-01-01 RX ADMIN — DEXAMETHASONE 6 MG: 4 TABLET ORAL at 09:16

## 2025-01-01 RX ADMIN — FUROSEMIDE 40 MG: 40 TABLET ORAL at 08:14

## 2025-01-01 RX ADMIN — FUROSEMIDE 40 MG: 40 TABLET ORAL at 18:09

## 2025-01-01 RX ADMIN — FERROUS SULFATE TAB 325 MG (65 MG ELEMENTAL FE) 325 MG: 325 (65 FE) TAB at 10:18

## 2025-01-01 RX ADMIN — AZITHROMYCIN DIHYDRATE 500 MG: 500 INJECTION, POWDER, LYOPHILIZED, FOR SOLUTION INTRAVENOUS at 16:36

## 2025-01-01 RX ADMIN — OXYCODONE HYDROCHLORIDE AND ACETAMINOPHEN 500 MG: 500 TABLET ORAL at 10:19

## 2025-01-01 RX ADMIN — BUDESONIDE 0.5 MG: 0.5 INHALANT RESPIRATORY (INHALATION) at 06:39

## 2025-01-01 RX ADMIN — SODIUM BICARBONATE 650 MG TABLET 1300 MG: at 17:16

## 2025-01-01 RX ADMIN — ALLOPURINOL 100 MG: 100 TABLET ORAL at 10:19

## 2025-01-01 RX ADMIN — METOPROLOL TARTRATE 12.5 MG: 25 TABLET, FILM COATED ORAL at 20:51

## 2025-01-01 RX ADMIN — INSULIN LISPRO 2 UNITS: 100 INJECTION, SOLUTION INTRAVENOUS; SUBCUTANEOUS at 12:10

## 2025-01-01 RX ADMIN — FLUTICASONE PROPIONATE 2 SPRAY: 50 SPRAY, METERED NASAL at 08:15

## 2025-01-01 RX ADMIN — IPRATROPIUM BROMIDE AND ALBUTEROL SULFATE 3 ML: .5; 3 SOLUTION RESPIRATORY (INHALATION) at 13:18

## 2025-01-01 RX ADMIN — ALLOPURINOL 100 MG: 100 TABLET ORAL at 21:52

## 2025-01-01 RX ADMIN — BUDESONIDE 0.5 MG: 0.5 INHALANT RESPIRATORY (INHALATION) at 06:08

## 2025-01-01 RX ADMIN — ALLOPURINOL 100 MG: 100 TABLET ORAL at 09:06

## 2025-01-01 RX ADMIN — GUAIFENESIN 1200 MG: 600 TABLET ORAL at 21:09

## 2025-01-01 RX ADMIN — INSULIN LISPRO 2 UNITS: 100 INJECTION, SOLUTION INTRAVENOUS; SUBCUTANEOUS at 20:20

## 2025-01-01 RX ADMIN — IPRATROPIUM BROMIDE AND ALBUTEROL SULFATE 3 ML: .5; 3 SOLUTION RESPIRATORY (INHALATION) at 21:26

## 2025-01-01 RX ADMIN — INSULIN LISPRO 2 UNITS: 100 INJECTION, SOLUTION INTRAVENOUS; SUBCUTANEOUS at 17:02

## 2025-01-01 RX ADMIN — FERROUS SULFATE TAB 325 MG (65 MG ELEMENTAL FE) 325 MG: 325 (65 FE) TAB at 18:31

## 2025-01-01 RX ADMIN — IPRATROPIUM BROMIDE AND ALBUTEROL SULFATE 3 ML: .5; 3 SOLUTION RESPIRATORY (INHALATION) at 15:48

## 2025-01-01 RX ADMIN — OXYCODONE HYDROCHLORIDE AND ACETAMINOPHEN 500 MG: 500 TABLET ORAL at 17:30

## 2025-01-01 RX ADMIN — HYDROCODONE BITARTRATE AND ACETAMINOPHEN 1 TABLET: 5; 325 TABLET ORAL at 22:19

## 2025-01-01 RX ADMIN — DEXAMETHASONE 6 MG: 4 TABLET ORAL at 08:14

## 2025-01-01 RX ADMIN — ARFORMOTEROL TARTRATE 15 MCG: 15 SOLUTION RESPIRATORY (INHALATION) at 19:43

## 2025-01-01 RX ADMIN — Medication 5 MG: at 20:50

## 2025-01-01 RX ADMIN — SODIUM BICARBONATE 650 MG TABLET 1300 MG: at 21:41

## 2025-01-01 RX ADMIN — Medication 10 ML: at 08:14

## 2025-01-01 RX ADMIN — AZITHROMYCIN DIHYDRATE 500 MG: 250 TABLET ORAL at 11:32

## 2025-01-01 RX ADMIN — FERROUS SULFATE TAB 325 MG (65 MG ELEMENTAL FE) 325 MG: 325 (65 FE) TAB at 17:15

## 2025-01-01 RX ADMIN — AMPICILLIN SODIUM, SULBACTAM SODIUM 3 G: 2; 1 INJECTION, POWDER, FOR SOLUTION INTRAMUSCULAR; INTRAVENOUS at 05:10

## 2025-01-01 RX ADMIN — BUDESONIDE 0.5 MG: 0.5 INHALANT RESPIRATORY (INHALATION) at 07:47

## 2025-01-01 RX ADMIN — IPRATROPIUM BROMIDE AND ALBUTEROL SULFATE 3 ML: .5; 3 SOLUTION RESPIRATORY (INHALATION) at 13:30

## 2025-01-01 RX ADMIN — LEVOTHYROXINE SODIUM 100 MCG: 0.05 TABLET ORAL at 10:43

## 2025-01-01 NOTE — PROGRESS NOTES
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 Noel and pt transitioned care to Dr. Wagner on 12/19/2023.   Lung Cancer- Stage I, diagnosed 2004, s/p lobectomy by Dr. West, pt denies receiving chemotherapy or radiation treatment    Left Breast Cancer- diagnosed 6/2011, s/p left mastectomy on 7/2011, pt denied receiving chemotherapy or RT, received 7-8 years of adjuvant endocrine therapy, reports stopping by 2020, currently under active surveillance    MDS with multilineage dysplasia    Yelena Sanchez is a 89 y.o. female who presents to [facility] for follow up regarding lung cancer, breast cancer history, and MDS.  Patient currently active surveillance for both lung cancer and breast cancer.  Patient denies any breast lesions or concerns today.  Regarding MDS patient reports last Epogen injection was in October 2023, reports she has been receiving this injections periodically for the past few years.  Ported fever, chills, night sweats, unintentional weight loss or new lymphadenopathy.    FH: Sister with Ovarian cancer    Interval history: Patient here with her  to follow-up on labs for MDS and breast cancer.  Patient here to see if she requires Epogen injection.  She reports rash under her right breast for which she has been prescribed nystatin powder and nystatin cream for which she reports has not improved her rash. She was recently evaluated by dermatology who has also prescribed creams for this lesion.  "Patient reports she increased her iron pill twice a day, tolerating slow iron and capsule better than prescribed iron.  She has gout and is on allopurinol prescribed by Dr. Leach, she denies any alcohol intake, but does report intake of beef.  No report of any pica, fatigue, lightheadedness, dizziness, blood loss, melena, or new breast concerns.      Review of Systems   Constitutional:  Positive for fatigue. Negative for appetite change, diaphoresis, fever, unexpected weight gain and unexpected weight loss.   HENT:  Positive for ear pain (RIGHT EAR). Negative for hearing loss, mouth sores, sore throat, swollen glands, trouble swallowing and voice change.    Eyes:  Negative for blurred vision.   Respiratory:  Positive for choking. Negative for cough, shortness of breath and wheezing.    Cardiovascular:  Negative for chest pain and palpitations.   Gastrointestinal:  Negative for abdominal pain, blood in stool, constipation, diarrhea, nausea and vomiting.   Endocrine: Negative for cold intolerance and heat intolerance.   Genitourinary:  Negative for difficulty urinating, dysuria, frequency, hematuria and urinary incontinence.   Musculoskeletal:  Positive for arthralgias. Negative for back pain and myalgias.   Skin:  Negative for rash, skin lesions and wound.   Neurological:  Negative for dizziness, seizures, weakness, numbness and headache.   Hematological:  Does not bruise/bleed easily.   Psychiatric/Behavioral:  Negative for depressed mood. The patient is not nervous/anxious.    All other systems reviewed and are negative.       Oncology/Hematology History    No history exists.       Objective     Vitals:    01/02/25 1112   BP: 150/66   Pulse: 61   Resp: 18   Temp: 98.2 °F (36.8 °C)   TempSrc: Temporal   SpO2: 95%   Weight: 70 kg (154 lb 6.4 oz)   Height: 149.9 cm (59\")   PainSc:   7   PainLoc: Leg             ECOG score: 0         PHQ-9 Total Score:         Physical Exam  Vitals reviewed. Exam conducted with a " chaperone present.   Constitutional:       General: She is not in acute distress.     Appearance: Normal appearance.   HENT:      Head: Normocephalic and atraumatic.      Right Ear: There is impacted cerumen.      Left Ear: Tympanic membrane normal. There is no impacted cerumen.      Ears:      Comments: Right ear canal was unable to be visualized secondary to cerumen obstructing view  Eyes:      Extraocular Movements: Extraocular movements intact.      Conjunctiva/sclera: Conjunctivae normal.   Pulmonary:      Effort: Pulmonary effort is normal.   Musculoskeletal:      Cervical back: Normal range of motion and neck supple.   Skin:     General: Skin is warm and dry.      Findings: No bruising.      Comments: Candidal intertrigo under right breast   Neurological:      Mental Status: She is oriented to person, place, and time.            Past Medical History     Past Medical History:   Diagnosis Date    Anemia     Arthritis     Asthma     Bicipitoradial bursitis     Breast cancer     CHF (congestive heart failure)     Congestive heart failure     COPD (chronic obstructive pulmonary disease)     Diverticulosis 11/26/2014    GERD (gastroesophageal reflux disease)     History of tobacco abuse     HTN (hypertension)     Hyperlipemia     Hyperthyroidism     ICD (implantable cardioverter-defibrillator), biventricular, in situ     Neck mass     Seasonal allergies     SOB (shortness of breath)     TIA (transient ischemic attack)     YRS AGO, NO RESIDUALS     Current Outpatient Medications on File Prior to Visit   Medication Sig Dispense Refill    albuterol sulfate  (90 Base) MCG/ACT inhaler Inhale 2 puffs Every 4 (Four) Hours As Needed for Wheezing for up to 30 days. 8 g 0    allopurinol (ZYLOPRIM) 100 MG tablet Take 1 tablet by mouth 2 (Two) Times a Day.      amLODIPine (NORVASC) 2.5 MG tablet Take 1 tablet by mouth Daily. 90 tablet 3    apixaban (ELIQUIS) 2.5 MG tablet tablet Take 1 tablet by mouth Every 12 (Twelve)  Hours. Indications: Atrial Fibrillation 180 tablet 3    budesonide-formoterol (Symbicort) 160-4.5 MCG/ACT inhaler Inhale 2 puffs 2 (Two) Times a Day. 6 g 12    cholecalciferol (VITAMIN D3) 25 MCG (1000 UT) tablet Take 2 tablets by mouth 2 (Two) Times a Day.      docusate sodium (COLACE) 100 MG capsule Take 1 capsule by mouth Daily.      ezetimibe (Zetia) 10 MG tablet Take 1 tablet by mouth Daily. 90 tablet 3    ferrous sulfate 325 (65 FE) MG tablet Take 1 tablet by mouth 2 (Two) Times a Day.      furosemide (LASIX) 40 MG tablet Take 1 tablet by mouth 2 (Two) Times a Day. 180 tablet 3    HYDROcodone-acetaminophen (NORCO) 5-325 MG per tablet Take 1 tablet by mouth Every 6 (Six) Hours As Needed.      ketoconazole (NIZORAL) 2 % cream Apply 1 Application topically to the appropriate area as directed Daily.      Lactobacillus Acid-Pectin (Acidophilus/Citrus Pectin) tablet tablet Take 1 tablet by mouth Daily.      lansoprazole (PREVACID) 30 MG capsule Take 1 capsule by mouth Daily.      levothyroxine (SYNTHROID, LEVOTHROID) 100 MCG tablet Take 1 tablet by mouth Daily.      multivitamin with minerals (PRESERVISION AREDS PO) Take 1 tablet by mouth 2 (Two) Times a Day.      multivitamin with minerals tablet tablet Take 1 tablet by mouth Daily.      nebivolol (Bystolic) 10 MG tablet Take 1 tablet by mouth Daily. 90 tablet 3    rosuvastatin (Crestor) 10 MG tablet Take 1 tablet by mouth Every Night. 90 tablet 3    spironolactone (ALDACTONE) 25 MG tablet Take 0.5 tablets by mouth Daily. 45 tablet 3    zolpidem (AMBIEN) 5 MG tablet Take 1 tablet by mouth At Night As Needed.      ketoconazole (NIZORAL) 2 % shampoo Apply 1 Application topically to the appropriate area as directed 1 (One) Time Per Week. (Patient not taking: Reported on 1/2/2025)       No current facility-administered medications on file prior to visit.      No Known Allergies  Past Surgical History:   Procedure Laterality Date    COLONOSCOPY  2014    ENDOSCOPY  2014     EYE SURGERY      Implant; yes     ICD GENERATOR REPLACEMENT N/A 11/14/2024    Procedure: ICD Generator Change - Bi-Ventricular -[employer];  Surgeon: JANETTE Gautam MD;  Location: [facility] INVASIVE LOC[facility];  Service: Cardiovascular;  Laterality: N/A;    LUNG LOBECTOMY Left     Left lower lobe    LUNG SURGERY      LUNG SURGERY Left     LEFT UPPER LOBE (2004)    MASTECTOMY      Left     MASTECTOMY Left     OTHER SURGICAL HISTORY      Joint Surgery    PACEMAKER IMPLANTATION       Social History     Socioeconomic History    Marital status:    Tobacco Use    Smoking status: Never     Passive exposure: Never    Smokeless tobacco: Never   Vaping Use    Vaping status: Never Used   Substance and Sexual Activity    Alcohol use: Never    Drug use: Never    Sexual activity: Defer     Family History   Problem Relation Age of Onset    Colon cancer Mother         60s    Lung cancer Sister        Results     Result Review   The following data was reviewed by: Enma Wagner MD on 12/19/2023:  Lab Results   Component Value Date    HGB 11.8 (L) 12/31/2024    HCT 36.4 12/31/2024    MCV 95.0 12/31/2024     12/31/2024    WBC 5.68 12/31/2024    NEUTROABS 3.77 12/31/2024    LYMPHSABS 0.58 (L) 12/31/2024    MONOSABS 1.13 (H) 12/31/2024    EOSABS 0.13 12/31/2024    BASOSABS 0.03 12/31/2024     Lab Results   Component Value Date    GLUCOSE 86 12/31/2024    BUN 44 (H) 12/31/2024    CREATININE 1.90 (H) 12/31/2024     12/31/2024    K 4.7 12/31/2024     12/31/2024    CO2 24.3 12/31/2024    CALCIUM 9.9 12/31/2024    PROTEINTOT 6.3 12/05/2024    ALBUMIN 3.3 (L) 12/05/2024    BILITOT 0.4 12/05/2024    ALKPHOS 89 12/05/2024    AST 18 12/31/2024    ALT 11 12/31/2024     Lab Results   Component Value Date    MG 2.7 (H) 12/31/2024    PHOS 5.2 (H) 12/05/2024    FREET4 1.19 12/31/2024    TSH 0.795 12/31/2024           No radiology results for the last day  Mammo Screening Modified With Tomosynthesis Right With  CAD    Result Date: 12/30/2024  Impression: No mammographic evidence of malignancy.  Recommend annual screening mammography.  BI-RADS ASSESSMENT: Category 1: Negative  Note: It has been reported that there is approximately a 15% false negative rate in mammography.  Therefore, management of a palpable abnormality should not be deferred because of a negative mammogram.  Electronically Signed By-Mauricio Taylor On:12/30/2024 5:38 PM       Assessment & Plan     Diagnoses and all orders for this visit:    1. Malignant neoplasm of left female breast, unspecified estrogen receptor status, unspecified site of breast (Primary)  -     CBC & Differential; Future  -     Comprehensive Metabolic Panel; Future    2. MDS (myelodysplastic syndrome)    3. Primary adenocarcinoma of lung, unspecified laterality    4. Encounter for screening mammogram for malignant neoplasm of breast    5. Iron deficiency anemia, unspecified iron deficiency anemia type                Yelena Sanchez is a 89 y.o. female who presents to [facility] for follow-up regarding lung cancer, breast cancer, and MDS.    Lung Cancer- Stage I, diagnosed 2004, s/p lobectomy by Dr. West, pt denies receiving chemotherapy or radiation treatment, since patient is almost 20 years since her initial diagnosis and treatment, pt remains asymptomatic, will obtain imaging if patient has symptoms done for disease recurrence.    Left Breast Cancer- diagnosed 6/2011, s/p left mastectomy on 7/2011, pt denied receiving chemotherapy or RT, received 7-8 years of adjuvant endocrine therapy, reports stopping by 2020, currently under active surveillance, mammogram from 12/29/2024 was BI-RADS 1 negative, repeat mammogram due in December 2025.    MDS with multilineage dysplasia-seen per bone marrow biopsy from 3/31/2022, discussed diagnosis and prognosis today with patient , patient receiving Epogen injections periodically if hemoglobin is  less than 10, will repeat labs and assess for need for Epogen injection  -Discussed results of most recent CBC, CMP, iron profile, ferritin, B12, folate levels  -Since patient's iron studies were within normal limits, patient does not require IV iron infusion, and since her hemoglobin is over 10 on most recent CBC patient does not require Epogen injection, plan to recheck in 3 months.      Candidal intertrigo  -Since patient has not had improvement with rash with nystatin powder, or nystatin cream today and recommend evaluation by her dermatologist Dr. Mcgregor    Plan for patient follow-up in 3 months to recheck labs CBC, CMP, iron profile, ferritin.    Please note that portions of this note were completed with a voice recognition program.      Electronically signed by Enma Wagner MD, 01/02/25, 5:39 PM EST.      Follow Up     I spent 30 minutes caring for Yelena on this date of service. This time includes time spent by me in the following activities:preparing for the visit, reviewing tests, obtaining and/or reviewing a separately obtained history, performing a medically appropriate examination and/or evaluation , counseling and educating the patient/family/caregiver, ordering medications, tests, or procedures, referring and communicating with other health care professionals , documenting information in the medical record, independently interpreting results and communicating that information with the patient/family/caregiver, and care coordination.    This is an acute or chronic illness that poses a threat to life or bodily function. The above treatment plan involves a high risk of complications and/or mortality of patient management.    The patient was seen and examined. Work by the provider also included review and/or ordering of lab tests, review and/or ordering of radiology tests, review and/or ordering of medicine tests, discussion with other physicians or providers, independent review of data, obtaining  old records, review/summation of old records, and/or other review.    I have reviewed the family history, social history, and past medical history for this patient. Previous information and data has been reviewed and updated as needed. I have reviewed and verified the chief complaint, history, and other documentation. The patient was interviewed and examined in the clinic and the chart reviewed. The previous observations, recommendations, and conclusions were reviewed including those of other providers.     The plan was discussed with the patient and/or family. The patient was given time to ask questions and these questions were answered. At the conclusion of their visit they had no additional questions or concerns and all questions were answered to their satisfaction.    Patient was given instructions and counseling regarding her condition or for health maintenance advice. Please see specific information pulled into the AVS if appropriate.

## 2025-01-02 ENCOUNTER — OFFICE VISIT (OUTPATIENT)
Dept: ONCOLOGY | Facility: HOSPITAL | Age: 89
End: 2025-01-02
Payer: MEDICARE

## 2025-01-02 VITALS
HEIGHT: 59 IN | DIASTOLIC BLOOD PRESSURE: 66 MMHG | TEMPERATURE: 98.2 F | BODY MASS INDEX: 31.12 KG/M2 | WEIGHT: 154.4 LBS | OXYGEN SATURATION: 95 % | SYSTOLIC BLOOD PRESSURE: 150 MMHG | HEART RATE: 61 BPM | RESPIRATION RATE: 18 BRPM

## 2025-01-02 DIAGNOSIS — D46.9 MDS (MYELODYSPLASTIC SYNDROME): ICD-10-CM

## 2025-01-02 DIAGNOSIS — Z12.31 ENCOUNTER FOR SCREENING MAMMOGRAM FOR MALIGNANT NEOPLASM OF BREAST: ICD-10-CM

## 2025-01-02 DIAGNOSIS — D50.9 IRON DEFICIENCY ANEMIA, UNSPECIFIED IRON DEFICIENCY ANEMIA TYPE: ICD-10-CM

## 2025-01-02 DIAGNOSIS — C34.90 PRIMARY ADENOCARCINOMA OF LUNG, UNSPECIFIED LATERALITY: ICD-10-CM

## 2025-01-02 DIAGNOSIS — C50.912 MALIGNANT NEOPLASM OF LEFT FEMALE BREAST, UNSPECIFIED ESTROGEN RECEPTOR STATUS, UNSPECIFIED SITE OF BREAST: Primary | ICD-10-CM

## 2025-01-02 PROCEDURE — G0463 HOSPITAL OUTPT CLINIC VISIT: HCPCS | Performed by: INTERNAL MEDICINE

## 2025-01-04 NOTE — ASSESSMENT & PLAN NOTE
She had generator change in November, and normal device interrogation during recent hospitalization.  Continue home about monitoring.

## 2025-01-04 NOTE — ASSESSMENT & PLAN NOTE
Most recent LDL moderately improved at 94.  She is complaining of significant leg aches at this time, recommend she hold the rosuvastatin for the next 2 weeks, if this improves her symptoms she can let us know we can consider alternative, if it does not make any change, she may resume.  She can continue current dose Zetia.

## 2025-01-04 NOTE — ASSESSMENT & PLAN NOTE
She previous carotid artery stenting, did reach out and discussed with Dr. Romano, since we have added anticoagulation with Eliquis considering her advanced age increased risk of bleeding we will discontinue Plavix and only take aspirin.

## 2025-01-04 NOTE — ASSESSMENT & PLAN NOTE
She is currently stable from a volume standpoint.  Continue alternating days of diuretics as currently daily, 40 mg in the a.m., with further management in 1 day, and 40 mg twice daily the office today.  At baseline she has chronic kidney disease, we will recheck her electrolyte and renal function.  Continue current dose Bystolic, spironolactone, unable to add ACE or ARB due to her renal status.

## 2025-01-04 NOTE — ASSESSMENT & PLAN NOTE
she is stable without symptoms of angina.  Plavix being discontinued, will have her take aspirin and beta-blocker.

## 2025-01-04 NOTE — ASSESSMENT & PLAN NOTE
Blood pressure is reasonably well-controlled for her age range.  Continue current medications including amlodipine which was, low-dose amlodipine which was added during recent hospitalization.

## 2025-01-07 ENCOUNTER — TELEPHONE (OUTPATIENT)
Dept: CARDIOLOGY | Facility: CLINIC | Age: 89
End: 2025-01-07
Payer: MEDICARE

## 2025-01-07 NOTE — TELEPHONE ENCOUNTER
Hub staff attempted to follow warm transfer process and was unsuccessful     Caller: Yelena Sanchez    Relationship to patient: Self    Best call back number: [phone]     Patient is needing: PATIENT HAS MORE INFORMATION FOR RAMIN TO HAVE BEFORE DISCUSSING WITH GRETCHEN LUNA OR TEAM.  PATIENT ONLY WISHES TO DISCUSS WITH COLTON. PLEASE CALL PATIENT. THANK YOU!

## 2025-01-07 NOTE — TELEPHONE ENCOUNTER
WALTER patient. Went over results. Patient denied increase of SOA and/or Swelling. Patient  did ask about cholesterol level since stopping the crestor. Patient did experience some relief after stopping crestor but not complete relief.     Please advise.

## 2025-01-07 NOTE — TELEPHONE ENCOUNTER
----- Message from Mariana Mitchell sent at 1/3/2025  7:44 PM EST -----  Her labs show her BNP level has went back up a little, but it is not nearly as elevated previously.  Please check and see if she is having any new or worsening shortness of breath or swelling, as long as her symptoms have not changed she can continue her current medications.

## 2025-01-07 NOTE — TELEPHONE ENCOUNTER
Patient's  called stating patient has been taking the zetia, and will start right away. Patient  also mentioned his wife was having a flare up with gout, not sure if it was gout that caused the pain or the crestor. Patients gout treatment has been increased and the pain has decreased some, it all happened around the same time that we stopped the crestor. Patient  just wanted to make sure we had all of the information.

## 2025-01-08 NOTE — TELEPHONE ENCOUNTER
Pain may have been more related to her gout then, recommend restarting Crestor and see if she tolerates this okay.

## 2025-01-08 NOTE — TELEPHONE ENCOUNTER
SW patient and , went over recommendations to restart medication. Patient verbalized understanding and will restart crestor tonight.

## 2025-01-23 ENCOUNTER — HOSPITAL ENCOUNTER (OUTPATIENT)
Dept: RESPIRATORY THERAPY | Facility: HOSPITAL | Age: 89
Discharge: HOME | End: 2025-01-23
Payer: MEDICARE

## 2025-01-23 ENCOUNTER — HOSPITAL ENCOUNTER (OUTPATIENT)
Dept: CT IMAGING | Facility: HOSPITAL | Age: 89
Discharge: HOME | End: 2025-01-23
Payer: MEDICARE

## 2025-01-23 DIAGNOSIS — I25.10 CORONARY ARTERY DISEASE INVOLVING NATIVE CORONARY ARTERY OF NATIVE HEART WITHOUT ANGINA PECTORIS: ICD-10-CM

## 2025-01-23 DIAGNOSIS — J43.9 PULMONARY EMPHYSEMA, UNSPECIFIED EMPHYSEMA TYPE: ICD-10-CM

## 2025-01-23 DIAGNOSIS — R06.00 DYSPNEA, UNSPECIFIED TYPE: ICD-10-CM

## 2025-01-23 DIAGNOSIS — Z85.118 HISTORY OF LUNG CANCER: ICD-10-CM

## 2025-01-23 DIAGNOSIS — J44.9 CHRONIC OBSTRUCTIVE PULMONARY DISEASE, UNSPECIFIED COPD TYPE: ICD-10-CM

## 2025-01-23 PROCEDURE — 94060 EVALUATION OF WHEEZING: CPT

## 2025-01-23 PROCEDURE — 94726 PLETHYSMOGRAPHY LUNG VOLUMES: CPT

## 2025-01-23 PROCEDURE — 94618 PULMONARY STRESS TESTING: CPT

## 2025-01-23 PROCEDURE — 71250 CT THORAX DX C-: CPT

## 2025-01-23 PROCEDURE — 94729 DIFFUSING CAPACITY: CPT

## 2025-01-23 RX ORDER — ALBUTEROL SULFATE 0.83 MG/ML
2.5 SOLUTION RESPIRATORY (INHALATION) ONCE
Status: COMPLETED | OUTPATIENT
Start: 2025-01-23 | End: 2025-01-23

## 2025-01-23 RX ADMIN — ALBUTEROL SULFATE 2.5 MG: 2.5 SOLUTION RESPIRATORY (INHALATION) at 12:08

## 2025-01-23 NOTE — PROGRESS NOTES
Exercise Oximetry    Patient Name:Yelena Sanchez   MRN: [id number]   Date: 01/23/25             ROOM AIR BASELINE   SpO2% 95   Heart Rate 68        EXERCISE ON ROOM AIR SpO2% EXERCISE ON O2 @  LPM SpO2%   1 MINUTE 94 1 MINUTE    2 MINUTES 92 2 MINUTES    3 MINUTES 92 3 MINUTES    4 MINUTES 90 4 MINUTES    5 MINUTES 91 5 MINUTES    6 MINUTES 92 6 MINUTES               Distance Walked  800 feet Distance Walked   Dyspnea (Nidia Scale)  5 Dyspnea (Nidia Scale)   Fatigue (Nidia Scale)  4 Fatigue (Nidia Scale)   SpO2% Post Exercise  94 SpO2% Post Exercise   HR Post Exercise  78 HR Post Exercise   Time to Recovery   Time to Recovery     Comments:

## 2025-02-04 ENCOUNTER — OFFICE VISIT (OUTPATIENT)
Dept: ORTHOPEDIC SURGERY | Facility: CLINIC | Age: 89
End: 2025-02-04
Payer: MEDICARE

## 2025-02-04 VITALS
HEIGHT: 59 IN | WEIGHT: 157.63 LBS | OXYGEN SATURATION: 90 % | SYSTOLIC BLOOD PRESSURE: 128 MMHG | BODY MASS INDEX: 31.78 KG/M2 | DIASTOLIC BLOOD PRESSURE: 72 MMHG | HEART RATE: 64 BPM

## 2025-02-04 DIAGNOSIS — M25.562 PAIN IN BOTH KNEES, UNSPECIFIED CHRONICITY: ICD-10-CM

## 2025-02-04 DIAGNOSIS — M25.561 PAIN IN BOTH KNEES, UNSPECIFIED CHRONICITY: ICD-10-CM

## 2025-02-04 DIAGNOSIS — M17.0 PRIMARY OSTEOARTHRITIS OF KNEES, BILATERAL: Primary | ICD-10-CM

## 2025-02-04 RX ORDER — FLUTICASONE PROPIONATE 50 MCG
SPRAY, SUSPENSION (ML) NASAL
COMMUNITY
Start: 2025-01-14

## 2025-02-04 RX ORDER — AMITRIPTYLINE HYDROCHLORIDE 10 MG/1
TABLET ORAL
COMMUNITY
Start: 2025-01-14

## 2025-02-04 RX ORDER — CHLORPHENIRAMINE MALEATE 4 MG/1
TABLET ORAL
COMMUNITY
Start: 2025-01-14

## 2025-02-04 RX ORDER — LIDOCAINE HYDROCHLORIDE 10 MG/ML
5 INJECTION, SOLUTION INFILTRATION; PERINEURAL
Status: COMPLETED | OUTPATIENT
Start: 2025-02-04 | End: 2025-02-04

## 2025-02-04 RX ADMIN — LIDOCAINE HYDROCHLORIDE 5 ML: 10 INJECTION, SOLUTION INFILTRATION; PERINEURAL at 14:42

## 2025-02-04 NOTE — PROGRESS NOTES
Chief Complaint  Pain and Follow-up of the Left Knee and Pain and Follow-up of the Right Knee     Subjective      Yelena Sanchez is a 89 y.o. female who presents to [facility] for bilateral knees.  She has bilateral knee pain and osteoarthritis that we have been managing intermittently with injections.  Patient was last seen in the office on 6/25/2024 and was given bilateral knee Zilretta injections.  She has also had gel injections in the past, last of which were administered 5/14/2024.    Patient presents today with complaints of increased pain and is hoping for injections to calm things down.  Patient states her pain recurred prior to Thanksgiving but she had some heart issues and pneumonia and had to be admitted to the hospital for pacemaker battery exchange and IV diuretics for her heart failure as well as walking pneumonia.  Now she is recovering and hoping to get injections.  We will do steroid injections today and they would like to come back in 6 weeks with viscosupplementation approval for gel.  Both steroids and gel historically worked well for her.    Of note, patient reports her cardiologist has recommended no further surgeries with her cardiac issues.    No Known Allergies     Social History     Socioeconomic History    Marital status:    Tobacco Use    Smoking status: Never     Passive exposure: Never    Smokeless tobacco: Never   Vaping Use    Vaping status: Never Used   Substance and Sexual Activity    Alcohol use: Never    Drug use: Never    Sexual activity: Defer        Tobacco Use: Low Risk  (2/4/2025)    Patient History     Smoking Tobacco Use: Never     Smokeless Tobacco Use: Never     Passive Exposure: Never     Patient reports that they are a nonsmoker; cessation education not applicable.     I reviewed the patient's chief complaint, history of present illness, review of systems, past medical history, surgical history, family history, social history,  "medications, and allergy list.     Review of Systems     Constitutional: Denies fevers, chills, weight loss  Cardiovascular: Denies chest pain, shortness of breath  Skin: Denies rashes, acute skin changes  Neurologic: Denies headache, loss of consciousness    Vital Signs:   /72   Pulse 64   Ht 149.9 cm (59\")   Wt 71.5 kg (157 lb 10.1 oz)   SpO2 90%   BMI 31.84 kg/m²          Physical Exam  General: Alert. No acute distress    Ortho Exam      General: Alert, no acute distress  Bilateral lower extremity: Mild knee effusion.  No tenderness to medial or lateral joint line.  1 15-1 20 degrees of flexion.  -3 to -5 degrees extension. Knee extensor mechanism intact. Knee stable to varus and valgus stress. Calf soft, nontender. Demonstrates active ankle plantarflexion and dorsiflexion. Sensation intact over the dorsal and plantar foot.  Palpable pedal pulses.             Large Joint Arthrocentesis: R knee  Date/Time: 2/4/2025 2:42 PM  Consent given by: patient  Site marked: site marked  Timeout: Immediately prior to procedure a time out was called to verify the correct patient, procedure, equipment, support staff and site/side marked as required   Supporting Documentation  Indications: pain   Procedure Details  Location: knee - R knee  Preparation: Patient was prepped and draped in the usual sterile fashion  Needle gauge: 21 G.  Approach: lateral  Medications administered: 5 mL lidocaine 1 %; 32 mg Triamcinolone Acetonide 32 MG  Patient tolerance: patient tolerated the procedure well with no immediate complications      Large Joint Arthrocentesis: L knee  Date/Time: 2/4/2025 2:42 PM  Consent given by: patient  Site marked: site marked  Timeout: Immediately prior to procedure a time out was called to verify the correct patient, procedure, equipment, support staff and site/side marked as required   Supporting Documentation  Indications: pain   Procedure Details  Location: knee - L knee  Preparation: Patient was " prepped and draped in the usual sterile fashion  Needle gauge: 21 G.  Approach: lateral  Medications administered: 5 mL lidocaine 1 %; 32 mg Triamcinolone Acetonide 32 MG  Patient tolerance: patient tolerated the procedure well with no immediate complications        This injection documentation was Scribed for An Booker PA-C by Meredith Sommers.  02/04/25   14:43 EST      Imaging Results (Most Recent)       None                 Assessment and Plan     Diagnoses and all orders for this visit:    1. Primary osteoarthritis of knees, bilateral (Primary)    2. Pain in both knees, unspecified chronicity    Other orders  -     Large Joint Arthrocentesis: R knee  -     Large Joint Arthrocentesis: L knee        Zilretta Steroid injection administered in office today and tolerated the procedure well without complications.  Patient advised on 3 months duration between injections.     Advised to monitor blood glucose levels the next 24 to 48 hours if diabetic    Discussed the risk, benefits and alternatives of injection.  Discussed potential complications such as increased pain, swelling and tenderness at the injection site.  Possibility of reaction.  Possibility that injection may not improve pain. Risk of infection.  Possibility of worsening pain after injection. Steroid injections can increase blood glucose levels and may be damaged bone if given over prolonged period of time.  Patient verbalized understanding and gives verbal consent to proceed.      Follow-up in 6 weeks for gel injection.  Office is busy today and has been states he will just call for their follow-up  Call with questions, concerns or worsening symptoms.     Follow Up   There are no Patient Instructions on file for this visit.        Patient was given instructions and counseling regarding her condition or for health maintenance advice. Please see specific information pulled into the AVS if appropriate.     An Booker PA-C   02/04/2025  12:59  EST    Dictated Utilizing Dragon Dictation. Please note that portions of this note were completed with a voice recognition program. Part of this note may be an electronic transcription/translation of spoken language to printed text using the Dragon Dictation System.

## 2025-03-07 ENCOUNTER — APPOINTMENT (OUTPATIENT)
Facility: HOSPITAL | Age: 89
End: 2025-03-07
Payer: MEDICARE

## 2025-03-07 ENCOUNTER — HOSPITAL ENCOUNTER (EMERGENCY)
Facility: HOSPITAL | Age: 89
Discharge: HOME | End: 2025-03-07
Attending: EMERGENCY MEDICINE
Payer: MEDICARE

## 2025-03-07 ENCOUNTER — TELEPHONE (OUTPATIENT)
Dept: CARDIOLOGY | Facility: CLINIC | Age: 89
End: 2025-03-07
Payer: MEDICARE

## 2025-03-07 VITALS
WEIGHT: 151.24 LBS | RESPIRATION RATE: 16 BRPM | SYSTOLIC BLOOD PRESSURE: 144 MMHG | HEART RATE: 62 BPM | TEMPERATURE: 97.6 F | OXYGEN SATURATION: 94 % | HEIGHT: 59 IN | BODY MASS INDEX: 30.49 KG/M2 | DIASTOLIC BLOOD PRESSURE: 57 MMHG

## 2025-03-07 DIAGNOSIS — L03.116 LEFT LEG CELLULITIS: Primary | ICD-10-CM

## 2025-03-07 LAB
ALBUMIN SERPL-MCNC: 3.3 G/DL (ref 3.5–5.2)
ALBUMIN/GLOB SERPL: 0.9 G/DL
ALP SERPL-CCNC: 90 U/L (ref 39–117)
ALT SERPL W P-5'-P-CCNC: 12 U/L (ref 1–33)
ANION GAP SERPL CALCULATED.3IONS-SCNC: 11.9 MMOL/L (ref 5–15)
AST SERPL-CCNC: 16 U/L (ref 1–32)
BASOPHILS # BLD AUTO: 0.03 10*3/MM3 (ref 0–0.2)
BASOPHILS NFR BLD AUTO: 0.3 % (ref 0–1.5)
BH CV LOWER VASCULAR LEFT COMMON FEMORAL AUGMENT: NORMAL
BH CV LOWER VASCULAR LEFT COMMON FEMORAL COMPETENT: NORMAL
BH CV LOWER VASCULAR LEFT COMMON FEMORAL COMPRESS: NORMAL
BH CV LOWER VASCULAR LEFT COMMON FEMORAL PHASIC: NORMAL
BH CV LOWER VASCULAR LEFT COMMON FEMORAL SPONT: NORMAL
BH CV LOWER VASCULAR LEFT DISTAL FEMORAL COMPRESS: NORMAL
BH CV LOWER VASCULAR LEFT GASTRONEMIUS COMPRESS: NORMAL
BH CV LOWER VASCULAR LEFT GREATER SAPH AK COMPRESS: NORMAL
BH CV LOWER VASCULAR LEFT GREATER SAPH BK COMPRESS: NORMAL
BH CV LOWER VASCULAR LEFT LESSER SAPH COMPRESS: NORMAL
BH CV LOWER VASCULAR LEFT MID FEMORAL AUGMENT: NORMAL
BH CV LOWER VASCULAR LEFT MID FEMORAL COMPETENT: NORMAL
BH CV LOWER VASCULAR LEFT MID FEMORAL COMPRESS: NORMAL
BH CV LOWER VASCULAR LEFT MID FEMORAL PHASIC: NORMAL
BH CV LOWER VASCULAR LEFT MID FEMORAL SPONT: NORMAL
BH CV LOWER VASCULAR LEFT PERONEAL COMPRESS: NORMAL
BH CV LOWER VASCULAR LEFT POPLITEAL AUGMENT: NORMAL
BH CV LOWER VASCULAR LEFT POPLITEAL COMPETENT: NORMAL
BH CV LOWER VASCULAR LEFT POPLITEAL COMPRESS: NORMAL
BH CV LOWER VASCULAR LEFT POPLITEAL PHASIC: NORMAL
BH CV LOWER VASCULAR LEFT POPLITEAL SPONT: NORMAL
BH CV LOWER VASCULAR LEFT POSTERIOR TIBIAL COMPRESS: NORMAL
BH CV LOWER VASCULAR LEFT PROFUNDA FEMORAL COMPRESS: NORMAL
BH CV LOWER VASCULAR LEFT PROXIMAL FEMORAL COMPRESS: NORMAL
BH CV LOWER VASCULAR RIGHT COMMON FEMORAL AUGMENT: NORMAL
BH CV LOWER VASCULAR RIGHT COMMON FEMORAL COMPETENT: NORMAL
BH CV LOWER VASCULAR RIGHT COMMON FEMORAL COMPRESS: NORMAL
BH CV LOWER VASCULAR RIGHT COMMON FEMORAL PHASIC: NORMAL
BH CV LOWER VASCULAR RIGHT COMMON FEMORAL SPONT: NORMAL
BH CV VAS PRELIMINARY FINDINGS SCRIPTING: 1
BILIRUB SERPL-MCNC: 0.2 MG/DL (ref 0–1.2)
BUN SERPL-MCNC: 48 MG/DL (ref 8–23)
BUN/CREAT SERPL: 20.4 (ref 7–25)
CALCIUM SPEC-SCNC: 9.7 MG/DL (ref 8.6–10.5)
CHLORIDE SERPL-SCNC: 104 MMOL/L (ref 98–107)
CO2 SERPL-SCNC: 19.1 MMOL/L (ref 22–29)
CREAT SERPL-MCNC: 2.35 MG/DL (ref 0.57–1)
CRP SERPL-MCNC: 8.22 MG/DL (ref 0–0.5)
DEPRECATED RDW RBC AUTO: 56.8 FL (ref 37–54)
EGFRCR SERPLBLD CKD-EPI 2021: 19.5 ML/MIN/1.73
EOSINOPHIL # BLD AUTO: 0.15 10*3/MM3 (ref 0–0.4)
EOSINOPHIL NFR BLD AUTO: 1.5 % (ref 0.3–6.2)
ERYTHROCYTE [DISTWIDTH] IN BLOOD BY AUTOMATED COUNT: 15.6 % (ref 12.3–15.4)
ERYTHROCYTE [SEDIMENTATION RATE] IN BLOOD: 65 MM/HR (ref 0–30)
GLOBULIN UR ELPH-MCNC: 3.7 GM/DL
GLUCOSE SERPL-MCNC: 99 MG/DL (ref 65–99)
HCT VFR BLD AUTO: 38.2 % (ref 34–46.6)
HGB BLD-MCNC: 11.5 G/DL (ref 12–15.9)
HOLD SPECIMEN: NORMAL
HOLD SPECIMEN: NORMAL
IMM GRANULOCYTES # BLD AUTO: 0.05 10*3/MM3 (ref 0–0.05)
IMM GRANULOCYTES NFR BLD AUTO: 0.5 % (ref 0–0.5)
LYMPHOCYTES # BLD AUTO: 0.45 10*3/MM3 (ref 0.7–3.1)
LYMPHOCYTES NFR BLD AUTO: 4.6 % (ref 19.6–45.3)
MCH RBC QN AUTO: 29.9 PG (ref 26.6–33)
MCHC RBC AUTO-ENTMCNC: 30.1 G/DL (ref 31.5–35.7)
MCV RBC AUTO: 99.2 FL (ref 79–97)
MONOCYTES # BLD AUTO: 1.48 10*3/MM3 (ref 0.1–0.9)
MONOCYTES NFR BLD AUTO: 15.2 % (ref 5–12)
NEUTROPHILS NFR BLD AUTO: 7.56 10*3/MM3 (ref 1.7–7)
NEUTROPHILS NFR BLD AUTO: 77.9 % (ref 42.7–76)
NRBC BLD AUTO-RTO: 0 /100 WBC (ref 0–0.2)
PLATELET # BLD AUTO: 241 10*3/MM3 (ref 140–450)
PMV BLD AUTO: 10.5 FL (ref 6–12)
POTASSIUM SERPL-SCNC: 4.9 MMOL/L (ref 3.5–5.2)
PROT SERPL-MCNC: 7 G/DL (ref 6–8.5)
RBC # BLD AUTO: 3.85 10*6/MM3 (ref 3.77–5.28)
SODIUM SERPL-SCNC: 135 MMOL/L (ref 136–145)
WBC NRBC COR # BLD AUTO: 9.72 10*3/MM3 (ref 3.4–10.8)
WHOLE BLOOD HOLD COAG: NORMAL
WHOLE BLOOD HOLD SPECIMEN: NORMAL

## 2025-03-07 PROCEDURE — 93971 EXTREMITY STUDY: CPT

## 2025-03-07 PROCEDURE — 99284 EMERGENCY DEPT VISIT MOD MDM: CPT

## 2025-03-07 PROCEDURE — 85652 RBC SED RATE AUTOMATED: CPT

## 2025-03-07 PROCEDURE — 80053 COMPREHEN METABOLIC PANEL: CPT | Performed by: EMERGENCY MEDICINE

## 2025-03-07 PROCEDURE — 86140 C-REACTIVE PROTEIN: CPT

## 2025-03-07 PROCEDURE — 93971 EXTREMITY STUDY: CPT | Performed by: SURGERY

## 2025-03-07 PROCEDURE — 85025 COMPLETE CBC W/AUTO DIFF WBC: CPT | Performed by: EMERGENCY MEDICINE

## 2025-03-07 RX ORDER — DOXYCYCLINE 100 MG/1
100 CAPSULE ORAL ONCE
Status: COMPLETED | OUTPATIENT
Start: 2025-03-07 | End: 2025-03-07

## 2025-03-07 RX ORDER — DOXYCYCLINE 100 MG/1
100 CAPSULE ORAL 2 TIMES DAILY
Qty: 14 CAPSULE | Refills: 0 | Status: SHIPPED | OUTPATIENT
Start: 2025-03-07 | End: 2025-03-14

## 2025-03-07 RX ADMIN — DOXYCYCLINE 100 MG: 100 CAPSULE ORAL at 15:21

## 2025-03-07 NOTE — ED TRIAGE NOTES
"Patient arrives to ED with complaints of left leg pain.. patient states its hot and \"burns\".. patient went to urgent care and was sent here for possible DVT or infection.. patient states it came up a few days ago and was small but that it keeps getting bigger and the pain is spreading upwards to her thigh    Patient is on eliquis   "

## 2025-03-07 NOTE — TELEPHONE ENCOUNTER
This encounter was created in error.  Please ignore    Electronically signed by Simon Talbot MD, 03/07/25, 3:39 PM EST.

## 2025-03-07 NOTE — PROGRESS NOTES
Echocardiogram showed normal heart function.  There is mild mitral valve prolapse with mild regurgitation/leaking of the valve.  These findings are unchanged from previous study done in 2017 and 2021.    We will follow-up next year as scheduled earlier.  Please call with any new concerns or problems in between.      Electronically signed by Simon Talbot MD, 03/07/25, 3:39 PM EST.

## 2025-03-07 NOTE — ED PROVIDER NOTES
Time: 12:25 PM EST  Date of encounter:  3/7/2025  Independent Historian/Clinical History and Information was obtained by:   Patient and Family    History is limited by: N/A    Chief Complaint   Patient presents with    Leg Swelling         History of Present Illness:  Patient is a 89 y.o. year old female who presents to the emergency department for evaluation of left leg pain and swelling ongoing for two days.  Patient reports she did hit the back of her left thigh around 1 week ago.  Reports she was sent from urgent care for further evaluation in the ED to rule out blood clots and infection of the left leg.  Patient reports burning pain of the left lower extremity.  Denies any fevers, chills, body aches.    Patient Care Team  Primary Care Provider: Kaelyn Eugene MD    Past Medical History:     No Known Allergies  Past Medical History:   Diagnosis Date    Anemia     Arthritis     Asthma     Bicipitoradial bursitis     Breast cancer     CHF (congestive heart failure)     Congestive heart failure     COPD (chronic obstructive pulmonary disease)     Diverticulosis 11/26/2014    GERD (gastroesophageal reflux disease)     History of tobacco abuse     HTN (hypertension)     Hyperlipemia     Hyperthyroidism     ICD (implantable cardioverter-defibrillator), biventricular, in situ     Neck mass     Seasonal allergies     SOB (shortness of breath)     TIA (transient ischemic attack)     YRS AGO, NO RESIDUALS     Past Surgical History:   Procedure Laterality Date    COLONOSCOPY  2014    ENDOSCOPY  2014    EYE SURGERY      Implant; yes     ICD GENERATOR REPLACEMENT N/A 11/14/2024    Procedure: ICD Generator Change - Bi-Ventricular -[employer];  Surgeon: JANETTE Gautam MD;  Location: [facility] INVASIVE LOC[facility];  Service: Cardiovascular;  Laterality: N/A;    LUNG LOBECTOMY Left     Left lower lobe    LUNG SURGERY      LUNG SURGERY Left     LEFT UPPER LOBE (2004)    MASTECTOMY      Left     MASTECTOMY Left      OTHER SURGICAL HISTORY      Joint Surgery    PACEMAKER IMPLANTATION       Family History   Problem Relation Age of Onset    Colon cancer Mother         60s    Lung cancer Sister        Home Medications:  Prior to Admission medications    Medication Sig Start Date End Date Taking? Authorizing Provider   Aller-Chlor 4 MG tablet  1/14/25   Sorin Younger MD   allopurinol (ZYLOPRIM) 100 MG tablet Take 1 tablet by mouth 2 (Two) Times a Day. 8/4/21   Sorin Younger MD   amLODIPine (NORVASC) 2.5 MG tablet Take 1 tablet by mouth Daily. 12/18/24   Mariana Mitchell APRN   apixaban (ELIQUIS) 2.5 MG tablet tablet Take 1 tablet by mouth Every 12 (Twelve) Hours. Indications: Atrial Fibrillation 12/18/24   Mariana Mitchell APRN   budesonide-formoterol (Symbicort) 160-4.5 MCG/ACT inhaler Inhale 2 puffs 2 (Two) Times a Day. 12/5/24   Zac Souza PA   cholecalciferol (VITAMIN D3) 25 MCG (1000 UT) tablet Take 2 tablets by mouth 2 (Two) Times a Day.    Sorin Younger MD   docusate sodium (COLACE) 100 MG capsule Take 1 capsule by mouth Daily.    Sorin Younger MD   ezetimibe (Zetia) 10 MG tablet Take 1 tablet by mouth Daily. 12/18/24   Mariana Mitchell APRN   ferrous sulfate 325 (65 FE) MG tablet Take 1 tablet by mouth 2 (Two) Times a Day.    Sorin Younger MD   fluticasone (FLONASE) 50 MCG/ACT nasal spray  1/14/25   Sorin Younger MD   furosemide (LASIX) 40 MG tablet Take 1 tablet by mouth 2 (Two) Times a Day. 12/18/24   Mariana Mitchell APRN   HYDROcodone-acetaminophen (NORCO) 5-325 MG per tablet Take 1 tablet by mouth Every 6 (Six) Hours As Needed. 4/17/24   Sorin Younger MD   ketoconazole (NIZORAL) 2 % cream Apply 1 Application topically to the appropriate area as directed Daily. 4/16/24   Sorin Younger MD   ketoconazole (NIZORAL) 2 % shampoo Apply 1 Application topically to the appropriate area as directed 1 (One) Time Per Week. 8/12/24   Sorin Younger MD    Lactobacillus Acid-Pectin (Acidophilus/Citrus Pectin) tablet tablet Take 1 tablet by mouth Daily. 4/17/24   Sorin Younger MD   lansoprazole (PREVACID) 30 MG capsule Take 1 capsule by mouth Daily. 4/16/24   Sorin Younger MD   levothyroxine (SYNTHROID, LEVOTHROID) 100 MCG tablet Take 1 tablet by mouth Daily.    Sorin Younger MD   multivitamin with minerals tablet tablet Take 1 tablet by mouth Daily.    Sorin Younger MD   nebivolol (Bystolic) 10 MG tablet Take 1 tablet by mouth Daily. 12/18/24   Maraina Mitchell APRN   rosuvastatin (Crestor) 10 MG tablet Take 1 tablet by mouth Every Night. 8/8/24   Simon Talbot MD   spironolactone (ALDACTONE) 25 MG tablet Take 0.5 tablets by mouth Daily. 12/18/24   Mariana Mitchell APRN   amitriptyline (ELAVIL) 10 MG tablet  1/14/25 3/7/25  Sorin Younger MD   multivitamin with minerals (PRESERVISION AREDS PO) Take 1 tablet by mouth 2 (Two) Times a Day.  3/7/25  Sorin Younger MD   zolpidem (AMBIEN) 5 MG tablet Take 1 tablet by mouth At Night As Needed. 4/17/24 3/7/25  Sorin Younger MD        Social History:   Social History     Tobacco Use    Smoking status: Never     Passive exposure: Never    Smokeless tobacco: Never   Vaping Use    Vaping status: Never Used   Substance Use Topics    Alcohol use: Never    Drug use: Never         Review of Systems:  Review of Systems   Constitutional:  Negative for chills and fever.   HENT:  Negative for congestion, rhinorrhea and sore throat.    Eyes:  Negative for pain and visual disturbance.   Respiratory:  Negative for apnea, cough, chest tightness and shortness of breath.    Cardiovascular:  Positive for leg swelling. Negative for chest pain and palpitations.   Gastrointestinal:  Negative for abdominal pain, diarrhea, nausea and vomiting.   Genitourinary:  Negative for difficulty urinating and dysuria.   Musculoskeletal:  Negative for joint swelling and myalgias.   Skin:  Positive for  "color change.   Neurological:  Negative for seizures and headaches.   Psychiatric/Behavioral: Negative.     All other systems reviewed and are negative.       Physical Exam:  /57 (Patient Position: Lying)   Pulse 62   Temp 97.6 °F (36.4 °C) (Oral)   Resp 16   Ht 149 cm (58.66\")   Wt 68.6 kg (151 lb 3.8 oz)   SpO2 94%   BMI 30.90 kg/m²         Physical Exam  Vitals and nursing note reviewed.   Constitutional:       General: She is not in acute distress.     Appearance: Normal appearance. She is not toxic-appearing.   HENT:      Head: Normocephalic and atraumatic.      Jaw: There is normal jaw occlusion.      Mouth/Throat:      Mouth: Mucous membranes are moist.   Eyes:      General: Lids are normal.      Extraocular Movements: Extraocular movements intact.      Conjunctiva/sclera: Conjunctivae normal.      Pupils: Pupils are equal, round, and reactive to light.   Cardiovascular:      Rate and Rhythm: Normal rate and regular rhythm.      Pulses: Normal pulses.      Heart sounds: Normal heart sounds.   Pulmonary:      Effort: Pulmonary effort is normal. No respiratory distress.      Breath sounds: Normal breath sounds. No wheezing or rhonchi.   Abdominal:      General: Abdomen is flat. There is no distension.      Palpations: Abdomen is soft.      Tenderness: There is no abdominal tenderness. There is no guarding or rebound.   Musculoskeletal:         General: Normal range of motion.      Cervical back: Normal range of motion and neck supple.      Right lower leg: No edema.      Left lower leg: Edema present.   Skin:     General: Skin is warm and dry.      Findings: Erythema present.   Neurological:      General: No focal deficit present.      Mental Status: She is alert and oriented to person, place, and time. Mental status is at baseline.   Psychiatric:         Mood and Affect: Mood normal.         Behavior: Behavior normal.                            Medical Decision Making:      Comorbidities that " affect care:    Hypertension, hyperlipidemia, CAD, CHF    External Notes reviewed:    Previous Clinic Note: Nephrology office visit for CKD management      The following orders were placed and all results were independently analyzed by me:  Orders Placed This Encounter   Procedures    Comprehensive Metabolic Panel    [place] Draw    CBC Auto Differential    Sedimentation Rate    C-reactive Protein    CBC & Differential    Green Top (Gel)    Lavender Top    Gold Top - SST    Light Blue Top       Medications Given in the Emergency Department:  Medications   doxycycline (MONODOX) capsule 100 mg (100 mg Oral Given 3/7/25 1521)        ED Course:    The patient was initially evaluated in the triage area where orders were placed. The patient was later dispositioned by Refugio Pina MD.      The patient was advised to stay for completion of workup which includes but is not limited to communication of labs and radiological results, reassessment and plan. The patient was advised that leaving prior to disposition by a provider could result in critical findings that are not communicated to the patient.     ED Course as of 03/07/25 2132   Fri Mar 07, 2025   1221   --- PROVIDER IN TRIAGE NOTE ---    The patient was evaluated by Crystal nolen in triage. Orders were placed and the patient is currently awaiting disposition.      [CE]      ED Course User Index  [CE] Crystal Vizcarra, LUZ MARIA       Labs:    Lab Results (last 24 hours)       Procedure Component Value Units Date/Time    CBC & Differential [[id number]]  (Abnormal) Collected: 03/07/25 1211    Specimen: Blood Updated: 03/07/25 1218    Narrative:      The following orders were created for panel order CBC & Differential.  Procedure                               Abnormality         Status                     ---------                               -----------         ------                     CBC Auto Differential[[id number]]        Abnormal            Final result                  Please view results for these tests on the individual orders.    Comprehensive Metabolic Panel [[id number]]  (Abnormal) Collected: 03/07/25 1211    Specimen: Blood Updated: 03/07/25 1248     Glucose 99 mg/dL      BUN 48 mg/dL      Creatinine 2.35 mg/dL      Sodium 135 mmol/L      Potassium 4.9 mmol/L      Comment: Slight hemolysis detected by analyzer. Result may be falsely elevated.        Chloride 104 mmol/L      CO2 19.1 mmol/L      Calcium 9.7 mg/dL      Total Protein 7.0 g/dL      Albumin 3.3 g/dL      ALT (SGPT) 12 U/L      AST (SGOT) 16 U/L      Comment: Slight hemolysis detected by analyzer. Result may be falsely elevated.        Alkaline Phosphatase 90 U/L      Total Bilirubin 0.2 mg/dL      Globulin 3.7 gm/dL      A/G Ratio 0.9 g/dL      BUN/Creatinine Ratio 20.4     Anion Gap 11.9 mmol/L      eGFR 19.5 mL/min/1.73     Narrative:      GFR Categories in Chronic Kidney Disease (CKD)      GFR Category          GFR (mL/min/1.73)    Interpretation  G1                     90 or greater         Normal or high (1)  G2                      60-89                Mild decrease (1)  G3a                   45-59                Mild to moderate decrease  G3b                   30-44                Moderate to severe decrease  G4                    15-29                Severe decrease  G5                    14 or less           Kidney failure          (1)In the absence of evidence of kidney disease, neither GFR category G1 or G2 fulfill the criteria for CKD.    eGFR calculation 2021 CKD-EPI creatinine equation, which does not include race as a factor    CBC Auto Differential [[id number]]  (Abnormal) Collected: 03/07/25 1211    Specimen: Blood Updated: 03/07/25 1218     WBC 9.72 10*3/mm3      RBC 3.85 10*6/mm3      Hemoglobin 11.5 g/dL      Hematocrit 38.2 %      MCV 99.2 fL      MCH 29.9 pg      MCHC 30.1 g/dL      RDW 15.6 %      RDW-SD 56.8 fl      MPV 10.5 fL      Platelets 241 10*3/mm3      Neutrophil % 77.9 %       Lymphocyte % 4.6 %      Monocyte % 15.2 %      Eosinophil % 1.5 %      Basophil % 0.3 %      Immature Grans % 0.5 %      Neutrophils, Absolute 7.56 10*3/mm3      Lymphocytes, Absolute 0.45 10*3/mm3      Monocytes, Absolute 1.48 10*3/mm3      Eosinophils, Absolute 0.15 10*3/mm3      Basophils, Absolute 0.03 10*3/mm3      Immature Grans, Absolute 0.05 10*3/mm3      nRBC 0.0 /100 WBC     Sedimentation Rate [[id number]]  (Abnormal) Collected: 03/07/25 1211    Specimen: Blood Updated: 03/07/25 1314     Sed Rate 65 mm/hr     C-reactive Protein [[id number]]  (Abnormal) Collected: 03/07/25 1211    Specimen: Blood Updated: 03/07/25 1248     C-Reactive Protein 8.22 mg/dL              Imaging:    Duplex Venous Lower Extremity - Left CAR    Result Date: 3/7/2025    Normal left lower extremity venous duplex scan.        Differential Diagnosis and Discussion:      Extremity Pain: Differential diagnosis includes but is not limited to soft tissue sprain, tendonitis, tendon injury, dislocation, fracture, deep vein thrombosis, arterial insufficiency, osteoarthritis, bursitis, and ligamentous damage.    PROCEDURES:    Labs were collected in the emergency department and all labs were reviewed and interpreted by me.  Ultrasound was performed in the emergency department and the ultrasound impression was interpreted by me.     No orders to display        Procedures    MDM  Number of Diagnoses or Management Options  Left leg cellulitis  Diagnosis management comments: In summary this is an 89-year-old female who presents to the Emergency Department for evaluation of left lower extremity pain swelling and discoloration.  CBC independently reviewed and interpreted by me and shows no critical abnormalities.  CMP independently reviewed and interpreted by me and shows no critical abnormalities.  Doppler ultrasound reviewed by me is unremarkable negative for acute pathology.  Patient will be treated with oral antibiotics for cellulitis of the leg.   Very strict return to ER and follow-up instructions have been provided to the patient.         Amount and/or Complexity of Data Reviewed  Decide to obtain previous medical records or to obtain history from someone other than the patient: yes                     Patient Care Considerations:    CT EXTREMITY: I considered ordering an extremity CT, however no signs of vascular compromise      Consultants/Shared Management Plan:    None    Social Determinants of Health:    Patient is independent, reliable, and has access to care.       Disposition and Care Coordination:    Discharged: The patient is suitable and stable for discharge with no need for consideration of admission.    I have explained the patient´s condition, diagnoses and treatment plan based on the information available to me at this time. I have answered questions and addressed any concerns. The patient has a good  understanding of the patient´s diagnosis, condition, and treatment plan as can be expected at this point. The vital signs have been stable. The patient´s condition is stable and appropriate for discharge from the emergency department.      The patient will pursue further outpatient evaluation with the primary care physician or other designated or consulting physician as outlined in the discharge instructions. They are agreeable to this plan of care and follow-up instructions have been explained in detail. The patient has received these instructions in written format and has expressed an understanding of the discharge instructions. The patient is aware that any significant change in condition or worsening of symptoms should prompt an immediate return to this or the closest emergency department or call to 911.  I have explained discharge medications and the need for follow up with the patient/caretakers. This was also printed in the discharge instructions. Patient was discharged with the following medications and follow up:      Medication List         New Prescriptions      doxycycline 100 MG capsule  Commonly known as: MONODOX  Take 1 capsule by mouth 2 (Two) Times a Day for 7 days.               Where to Get Your Medications        These medications were sent to [id number] - [place], [place] - [address] - [phone]  - [phone] FX  [address] [facility] [place] [address]      Phone: [phone]   doxycycline 100 MG capsule      Kaelyn Eugene MD  [address] [place] [address] [phone]    In 1 week         Final diagnoses:   Left leg cellulitis        ED Disposition       ED Disposition   Discharge    Condition   Stable    Comment   --               This medical record created using voice recognition software.             Refugio Pina MD  03/07/25 4302

## 2025-03-13 ENCOUNTER — OFFICE VISIT (OUTPATIENT)
Dept: PULMONOLOGY | Facility: CLINIC | Age: 89
End: 2025-03-13
Payer: MEDICARE

## 2025-03-13 VITALS
TEMPERATURE: 97.4 F | OXYGEN SATURATION: 95 % | BODY MASS INDEX: 30.44 KG/M2 | DIASTOLIC BLOOD PRESSURE: 82 MMHG | WEIGHT: 151 LBS | HEIGHT: 59 IN | HEART RATE: 75 BPM | RESPIRATION RATE: 18 BRPM | SYSTOLIC BLOOD PRESSURE: 139 MMHG

## 2025-03-13 DIAGNOSIS — J44.1 COPD WITH ACUTE EXACERBATION: ICD-10-CM

## 2025-03-13 DIAGNOSIS — I50.43 ACUTE ON CHRONIC COMBINED SYSTOLIC AND DIASTOLIC CONGESTIVE HEART FAILURE: ICD-10-CM

## 2025-03-13 DIAGNOSIS — G47.34 NOCTURNAL HYPOXIA: ICD-10-CM

## 2025-03-13 DIAGNOSIS — Z85.118 HISTORY OF LUNG CANCER: ICD-10-CM

## 2025-03-13 DIAGNOSIS — J96.22 ACUTE ON CHRONIC RESPIRATORY FAILURE WITH HYPERCAPNIA: ICD-10-CM

## 2025-03-13 DIAGNOSIS — I50.42 CHRONIC COMBINED SYSTOLIC AND DIASTOLIC CONGESTIVE HEART FAILURE: Primary | ICD-10-CM

## 2025-03-13 PROCEDURE — 1160F RVW MEDS BY RX/DR IN RCRD: CPT | Performed by: INTERNAL MEDICINE

## 2025-03-13 PROCEDURE — 1159F MED LIST DOCD IN RCRD: CPT | Performed by: INTERNAL MEDICINE

## 2025-03-13 PROCEDURE — 99214 OFFICE O/P EST MOD 30 MIN: CPT | Performed by: INTERNAL MEDICINE

## 2025-03-13 RX ORDER — LEVALBUTEROL TARTRATE 45 UG/1
1-2 AEROSOL, METERED ORAL EVERY 4 HOURS PRN
COMMUNITY

## 2025-03-13 RX ORDER — ALBUTEROL SULFATE 90 UG/1
2 INHALANT RESPIRATORY (INHALATION) EVERY 4 HOURS PRN
COMMUNITY

## 2025-03-13 NOTE — PROGRESS NOTES
Primary Care Provider  Kaelyn Eugene MD     Referring Provider  No ref. provider found     Chief Complaint  Follow-up (2 month), Results, and Shortness of Breath    Subjective          Yelena Sanchez presents to [facility]  History of Present Illness  Yelena Sanchez is a 89 y.o. female patient   History of Present Illness  The patient is an 89-year-old female with a history of lung cancer with left lower lobe lobectomy in 2004, left breast cancer status post left mastectomy in 2011, myelodysplastic syndrome (MDS), coronary artery disease status post ICD, lifelong non-smoker, recent hospitalization for diastolic congestive heart failure, and possible COPD and emphysema.    She reports a recent hospitalization due to fluid accumulation in her lungs, necessitating the removal of 9 pounds of fluid. She also mentions a delay in the replacement of her ICD battery, which led to complications. She has been monitoring her weight daily and uses a home pulse oximeter to check her oxygen levels, which remain in the 90s. She has not required supplemental oxygen since her last hospital discharge. She experiences pain during ambulation but can walk. She uses a rescue inhaler as needed and Symbicort twice daily. She does not require refills for her inhalers at this time. She reports difficulty breathing when in pain, which resolves upon sitting. She has been consuming a boiled egg each morning.    She developed a wound on the posterior aspect of her left leg, which subsequently progressed to cellulitis. Despite treatment, the condition has not improved. Her primary care physician, Dr. Mckeon, recommended a consultation for potential antibiotic therapy. She was evaluated in the emergency room last Friday, where an ultrasound ruled out the presence of a blood clot. She has been on doxycycline for a duration of 7 days. She reports no known allergies to  antibiotics.    SOCIAL HISTORY  She is a lifelong non-smoker.    ALLERGIES  The patient has no known allergies.    MEDICATIONS  Symbicort, doxycycline    Review of Systems     General:  No Fatigue, No Fever No weight loss or loss of appetite  HEENT: No dysphagia, No Visual Changes, no rhinorrhea  Respiratory:  + cough,+Dyspnea, mucoid phlegm, No Pleuritic Pain, no wheezing, no hemoptysis.  Cardiovascular: Denies chest pain, denies palpitations,+ENNIS, No Chest Pressure  Gastrointestinal:  No Abdominal Pain, No Nausea, No Vomiting, No Diarrhea  Genitourinary:  No Dysuria, No Frequency, No Hesitancy  Musculoskeletal: No muscle pain or swelling  Endocrine:  No Heat Intolerance, No Cold Intolerance  Hematologic:  No Bleeding, No Bruising  Psychiatric:  No Anxiety, No Depression  Neurologic:  No Confusion, no Dysarthria, No Headaches  Skin:  No Rash, No Open Wounds    Family History   Problem Relation Age of Onset    Colon cancer Mother         60s    Lung cancer Sister         Social History     Socioeconomic History    Marital status:    Tobacco Use    Smoking status: Never     Passive exposure: Never    Smokeless tobacco: Never   Vaping Use    Vaping status: Never Used   Substance and Sexual Activity    Alcohol use: Never    Drug use: Never    Sexual activity: Defer        Past Medical History:   Diagnosis Date    Anemia     Arthritis     Asthma     Bicipitoradial bursitis     Breast cancer     CHF (congestive heart failure)     Congestive heart failure     COPD (chronic obstructive pulmonary disease)     Diverticulosis 11/26/2014    GERD (gastroesophageal reflux disease)     History of tobacco abuse     HTN (hypertension)     Hyperlipemia     Hyperthyroidism     ICD (implantable cardioverter-defibrillator), biventricular, in situ     Neck mass     Seasonal allergies     SOB (shortness of breath)     TIA (transient ischemic attack)     YRS AGO, NO RESIDUALS        Immunization History   Administered Date(s)  Administered    Fluzone High-Dose 65+YRS 09/16/2020, 09/16/2020, 10/12/2021, 10/12/2021, 10/30/2024    Fluzone High-Dose 65+yrs 10/06/2022, 10/06/2022    Influenza Seasonal Injectable 10/03/2016, 09/01/2019    Influenza Split Preservative Free ID 09/02/2020    Influenza, Unspecified 09/03/2021    PPD Test 05/09/2023, 05/16/2023    Pneumococcal Conjugate 20-Valent (PCV20) 10/30/2024         No Known Allergies       Current Outpatient Medications:     albuterol sulfate  (90 Base) MCG/ACT inhaler, Inhale 2 puffs Every 4 (Four) Hours As Needed for Wheezing., Disp: , Rfl:     Aller-Chlor 4 MG tablet, , Disp: , Rfl:     allopurinol (ZYLOPRIM) 100 MG tablet, Take 1 tablet by mouth 2 (Two) Times a Day., Disp: , Rfl:     amLODIPine (NORVASC) 2.5 MG tablet, Take 1 tablet by mouth Daily., Disp: 90 tablet, Rfl: 3    apixaban (ELIQUIS) 2.5 MG tablet tablet, Take 1 tablet by mouth Every 12 (Twelve) Hours. Indications: Atrial Fibrillation, Disp: 180 tablet, Rfl: 3    budesonide-formoterol (Symbicort) 160-4.5 MCG/ACT inhaler, Inhale 2 puffs 2 (Two) Times a Day., Disp: 6 g, Rfl: 12    cholecalciferol (VITAMIN D3) 25 MCG (1000 UT) tablet, Take 2 tablets by mouth 2 (Two) Times a Day., Disp: , Rfl:     docusate sodium (COLACE) 100 MG capsule, Take 1 capsule by mouth Daily., Disp: , Rfl:     doxycycline (MONODOX) 100 MG capsule, Take 1 capsule by mouth 2 (Two) Times a Day for 7 days., Disp: 14 capsule, Rfl: 0    ezetimibe (Zetia) 10 MG tablet, Take 1 tablet by mouth Daily., Disp: 90 tablet, Rfl: 3    ferrous sulfate 325 (65 FE) MG tablet, Take 1 tablet by mouth 2 (Two) Times a Day., Disp: , Rfl:     fluticasone (FLONASE) 50 MCG/ACT nasal spray, , Disp: , Rfl:     furosemide (LASIX) 40 MG tablet, Take 1 tablet by mouth 2 (Two) Times a Day., Disp: 180 tablet, Rfl: 3    HYDROcodone-acetaminophen (NORCO) 5-325 MG per tablet, Take 1 tablet by mouth Every 6 (Six) Hours As Needed., Disp: , Rfl:     ketoconazole (NIZORAL) 2 % cream,  "Apply 1 Application topically to the appropriate area as directed Daily., Disp: , Rfl:     ketoconazole (NIZORAL) 2 % shampoo, Apply 1 Application topically to the appropriate area as directed 1 (One) Time Per Week., Disp: , Rfl:     Lactobacillus Acid-Pectin (Acidophilus/Citrus Pectin) tablet tablet, Take 1 tablet by mouth Daily., Disp: , Rfl:     lansoprazole (PREVACID) 30 MG capsule, Take 1 capsule by mouth Daily., Disp: , Rfl:     levalbuterol (XOPENEX HFA) 45 MCG/ACT inhaler, Inhale 1-2 puffs Every 4 (Four) Hours As Needed for Wheezing., Disp: , Rfl:     levothyroxine (SYNTHROID, LEVOTHROID) 100 MCG tablet, Take 1 tablet by mouth Daily., Disp: , Rfl:     multivitamin with minerals tablet tablet, Take 1 tablet by mouth Daily., Disp: , Rfl:     nebivolol (Bystolic) 10 MG tablet, Take 1 tablet by mouth Daily., Disp: 90 tablet, Rfl: 3    rosuvastatin (Crestor) 10 MG tablet, Take 1 tablet by mouth Every Night., Disp: 90 tablet, Rfl: 3    spironolactone (ALDACTONE) 25 MG tablet, Take 0.5 tablets by mouth Daily., Disp: 45 tablet, Rfl: 3    cephalexin (KEFLEX) 250 MG capsule, Take 1 capsule by mouth 4 (Four) Times a Day., Disp: 30 capsule, Rfl: 0     Objective   Physical Exam  Physical Exam      Vital Signs:   /82 (BP Location: Right arm, Patient Position: Sitting, Cuff Size: Adult)   Pulse 75   Temp 97.4 °F (36.3 °C) (Tympanic)   Resp 18   Ht 149 cm (58.66\")   Wt 68.5 kg (151 lb)   SpO2 95% Comment: room air  BMI 30.85 kg/m²       Vital Signs Reviewed  Elderly female, in mild distress, has conversational dyspnea  HEENT:  PERRL, EOMI.  OP, nares clear, no sinus tenderness  Mallampatti classification : 1  Neck:  Supple, no JVD, no thyromegaly  Lymph: no axillary, cervical, supraclavicular lymphadenopathy noted bilaterally  Chest: Poor aeration, bilateral diminished breath sounds, no wheezing, crackles or rhonchi, resonant to percussion b/l  CV: RRR, no MGR, pulses 2+, equal.  Abd:  Soft, NT, ND, + BS, no " HSM  EXT:  no clubbing, no cyanosis, No BLE edema  Neuro:  A&Ox3, CN grossly intact, no focal deficits.  Skin: No rashes or lesions noted     Result Review :   The following data was reviewed by: Theodore Marquez MD on 03/13/2025:  Common labs          12/5/2024    05:35 12/31/2024    09:04 3/7/2025    12:11   Common Labs   Glucose 99  86  99    BUN 98  44  48    Creatinine 2.67  1.90  2.35    Sodium 138  139  135    Potassium 3.6  4.7  4.9    Chloride 101  106  104    Calcium 9.8  9.9  9.7    Albumin 3.3   3.3    Total Bilirubin 0.4   0.2    Alkaline Phosphatase 89   90    AST (SGOT) 10  18  16    ALT (SGPT) 9  11  12    WBC 10.83  5.68  9.72    Hemoglobin 12.8  11.8  11.5    Hematocrit 40.6  36.4  38.2    Platelets 212  317  241    Total Cholesterol  263     Triglycerides  121     HDL Cholesterol  51     LDL Cholesterol   190     Microalbumin, Urine  56.1     Uric Acid  5.4       CMP          12/5/2024    05:35 12/31/2024    09:04 3/7/2025    12:11   CMP   Glucose 99  86  99    BUN 98  44  48    Creatinine 2.67  1.90  2.35    EGFR 16.7  25.1  19.5    Sodium 138  139  135    Potassium 3.6  4.7  4.9    Chloride 101  106  104    Calcium 9.8  9.9  9.7    Total Protein 6.3   7.0    Albumin 3.3   3.3    Globulin   3.7    Total Bilirubin 0.4   0.2    Alkaline Phosphatase 89   90    AST (SGOT) 10  18  16    ALT (SGPT) 9  11  12    Albumin/Globulin Ratio   0.9    BUN/Creatinine Ratio 36.7  23.2  20.4    Anion Gap 15.9  8.7  11.9      CBC          12/5/2024    05:35 12/31/2024    09:04 3/7/2025    12:11   CBC   WBC 10.83  5.68  9.72    RBC 4.32  3.83  3.85    Hemoglobin 12.8  11.8  11.5    Hematocrit 40.6  36.4  38.2    MCV 94.0  95.0  99.2    MCH 29.6  30.8  29.9    MCHC 31.5  32.4  30.1    RDW 15.2  14.5  15.6    Platelets 212  317  241      CBC w/diff          12/5/2024    05:35 12/31/2024    09:04 3/7/2025    12:11   CBC w/Diff   WBC 10.83  5.68  9.72    RBC 4.32  3.83  3.85    Hemoglobin 12.8  11.8  11.5    Hematocrit  40.6  36.4  38.2    MCV 94.0  95.0  99.2    MCH 29.6  30.8  29.9    MCHC 31.5  32.4  30.1    RDW 15.2  14.5  15.6    Platelets 212  317  241    Neutrophil Rel % 75.5  66.4  77.9    Immature Granulocyte Rel % 1.3  0.7  0.5    Lymphocyte Rel % 5.1  10.2  4.6    Monocyte Rel % 17.0  19.9  15.2    Eosinophil Rel % 0.8  2.3  1.5    Basophil Rel % 0.3  0.5  0.3      Data reviewed : Radiologic studies Previous imaging reviewed.     Results  Imaging  Ultrasound of left leg showed no clot.    Testing  Lung function test shows one third of lung is not working and diffusion capacity is two third not working.             Assessment and Plan    Diagnoses and all orders for this visit:    1. Chronic combined systolic and diastolic congestive heart failure (Primary)  -     Overnight Sleep Oximetry Study; Future  -     cephalexin (KEFLEX) 250 MG capsule; Take 1 capsule by mouth 4 (Four) Times a Day.  Dispense: 30 capsule; Refill: 0  -     Oxygen Therapy    2. Acute on chronic combined systolic and diastolic congestive heart failure  -     Overnight Sleep Oximetry Study; Future  -     cephalexin (KEFLEX) 250 MG capsule; Take 1 capsule by mouth 4 (Four) Times a Day.  Dispense: 30 capsule; Refill: 0    3. History of lung cancer  -     Overnight Sleep Oximetry Study; Future  -     cephalexin (KEFLEX) 250 MG capsule; Take 1 capsule by mouth 4 (Four) Times a Day.  Dispense: 30 capsule; Refill: 0    4. COPD with acute exacerbation  -     Overnight Sleep Oximetry Study; Future  -     cephalexin (KEFLEX) 250 MG capsule; Take 1 capsule by mouth 4 (Four) Times a Day.  Dispense: 30 capsule; Refill: 0  -     Oxygen Therapy    5. Acute on chronic respiratory failure with hypercapnia  -     Overnight Sleep Oximetry Study; Future  -     cephalexin (KEFLEX) 250 MG capsule; Take 1 capsule by mouth 4 (Four) Times a Day.  Dispense: 30 capsule; Refill: 0    6. Nocturnal hypoxia  -     Overnight Sleep Oximetry Study; Future  -     cephalexin (KEFLEX)  250 MG capsule; Take 1 capsule by mouth 4 (Four) Times a Day.  Dispense: 30 capsule; Refill: 0  -     Oxygen Therapy      Assessment & Plan  1. Chronic Obstructive Pulmonary Disease (COPD).  Her lung function test indicates that only one-third of her lung is functioning, with a diffusion capacity of two-thirds. This suggests that her oxygen levels could easily drop during exertion. She was advised to monitor her oxygen levels during exertion and to use supplemental oxygen if her levels fall below 89. She was also advised to use her oxygen machine at night due to concerns about potential drops in her oxygen levels during sleep. A walking test will be conducted to assess her need for supplemental oxygen. She uses Symbicort twice a day and a rescue inhaler as needed. No refills are required at this time.    2. Cellulitis.  She has been on doxycycline for a week, but the infection has not completely resolved. A prescription for cephalexin has been sent to her pharmacy ([facility]) to cover both strep and staph infections. She was informed that antibiotics can sometimes cause gastrointestinal upset and rash. She was advised to eat before taking the antibiotic to minimize stomach issues.    PROCEDURE  Left lower lobe lobectomy in 2004.  Left mastectomy in 2011.  ICD placement.    Chronic hypoxic respiratory failure: Patient had walk test in clinic with significant desaturation to 70% on room air.  Was placed on 2 L oxygen via nasal cannula.  She will need a portable oxygen concentrator with activities.  She has oxygen concentrator with sleep.  I have ordered portable oxygen concentrator today.    Follow Up   Return in about 4 months (around 7/13/2025).  Patient was given instructions and counseling regarding her condition or for health maintenance advice. Please see specific information pulled into the AVS if appropriate.     Patient or patient representative verbalized consent for the use of Ambient Listening during the  visit with  Theodore Marquez MD for chart documentation. 3/13/2025  14:39 EDT    Electronically signed by Theodore Marquez MD, 3/13/2025, 15:13 EDT.

## 2025-03-14 ENCOUNTER — TELEPHONE (OUTPATIENT)
Dept: ONCOLOGY | Facility: HOSPITAL | Age: 89
End: 2025-03-14
Payer: MEDICARE

## 2025-03-25 ENCOUNTER — OFFICE VISIT (OUTPATIENT)
Dept: ORTHOPEDIC SURGERY | Facility: CLINIC | Age: 89
End: 2025-03-25
Payer: MEDICARE

## 2025-03-25 VITALS
HEART RATE: 65 BPM | BODY MASS INDEX: 31.7 KG/M2 | WEIGHT: 151 LBS | DIASTOLIC BLOOD PRESSURE: 70 MMHG | OXYGEN SATURATION: 97 % | SYSTOLIC BLOOD PRESSURE: 137 MMHG | HEIGHT: 58 IN

## 2025-03-25 DIAGNOSIS — M17.0 PRIMARY OSTEOARTHRITIS OF KNEES, BILATERAL: ICD-10-CM

## 2025-03-25 DIAGNOSIS — M25.561 PAIN IN BOTH KNEES, UNSPECIFIED CHRONICITY: Primary | ICD-10-CM

## 2025-03-25 DIAGNOSIS — M25.562 PAIN IN BOTH KNEES, UNSPECIFIED CHRONICITY: Primary | ICD-10-CM

## 2025-03-25 PROCEDURE — 1159F MED LIST DOCD IN RCRD: CPT | Performed by: PHYSICIAN ASSISTANT

## 2025-03-25 PROCEDURE — 1160F RVW MEDS BY RX/DR IN RCRD: CPT | Performed by: PHYSICIAN ASSISTANT

## 2025-03-25 PROCEDURE — 20610 DRAIN/INJ JOINT/BURSA W/O US: CPT | Performed by: PHYSICIAN ASSISTANT

## 2025-03-25 NOTE — PROGRESS NOTES
"Chief Complaint  Follow-up of the Right Knee and Follow-up of the Left Knee    Subjective      Yelena Sanchez presents to [facility] for follow-up of bilateral knee pain and osteoarthritis we have managing intermittently with injections.  At her last visit on 2/4/2025 she was given bilateral knee Zilretta injections.  Patient states they worked just fine and they lasted until now.  She reports that her pain recurred this morning.  The right knee is worse than the left knee.  She has had gel in the past and states they lasted at least 6 months she thinks.    Objective   No Known Allergies    Vital Signs:   /70   Pulse 65   Ht 147.3 cm (58\")   Wt 68.5 kg (151 lb)   SpO2 97%   BMI 31.56 kg/m²       Physical Exam    Constitutional: Awake, alert. Well nourished appearance.    Integumentary: Warm, dry, intact. No obvious rashes.    HENT: Atraumatic, normocephalic.   Respiratory: Non labored respirations .   Cardiovascular: Intact peripheral pulses.    Psychiatric: Normal mood and affect. A&O X3    Ortho Exam  General: Alert, no acute distress  Bilateral lower extremity: Trace to mild knee effusion.  15 degrees of flexion.  -3 to -5.  Degrees extension. Knee extensor mechanism intact. Knee stable to varus and valgus stress. Calf soft, nontender. Demonstrates active ankle plantarflexion and dorsiflexion. Sensation intact over the dorsal and plantar foot.  Palpable pedal pulses.               Large Joint Arthrocentesis: R knee  Date/Time: 3/25/2025 11:36 AM  Consent given by: patient  Site marked: site marked  Timeout: Immediately prior to procedure a time out was called to verify the correct patient, procedure, equipment, support staff and site/side marked as required   Supporting Documentation  Indications: pain   Procedure Details  Location: knee - R knee  Preparation: Patient was prepped and draped in the usual sterile fashion  Needle gauge: 21 G.  Approach: lateral  Medications " administered: 48 mg hylan 48 MG/6ML  Patient tolerance: patient tolerated the procedure well with no immediate complications      Large Joint Arthrocentesis: L knee  Date/Time: 3/25/2025 11:36 AM  Consent given by: patient  Site marked: site marked  Timeout: Immediately prior to procedure a time out was called to verify the correct patient, procedure, equipment, support staff and site/side marked as required   Supporting Documentation  Indications: pain   Procedure Details  Location: knee - L knee  Preparation: Patient was prepped and draped in the usual sterile fashion  Needle gauge: 21 G.  Approach: lateral  Medications administered: 48 mg hylan 48 MG/6ML  Patient tolerance: patient tolerated the procedure well with no immediate complications         This injection documentation was Scribed for An Booker PA-C by Meredith Sommers.  03/25/25   11:37 EDT       Assessment and Plan   Problem List Items Addressed This Visit    None  Visit Diagnoses         Primary osteoarthritis of knees, bilateral    -  Primary      Pain in both knees, unspecified chronicity              Patient received synvisc injection today in office in bilateral knees and tolerated the procedure well without complication.  Counseled that these injections can be given every 6 months and 1 day with insurance approval. Pt can also receive steroid injections intermittently between these doses, with at least 6 weeks between the two.  Steroid injections can be given every 3 months.    Discussed the risk, benefits and alternatives of injection.  Discussed potential complications such as increased pain, swelling and tenderness at the injection site.  Possibility of reaction.  Possibility that injection may not improve pain. Risk of infection.  Possibility of worsening pain after injection.  Patient verbalized understanding and gives verbal consent to proceed.     Follow up 6 months and 1 day for repeat gel injections.  Advised that she can have  repeat steroid injections in 6 weeks if she needs.  Call with questions, concerns, or worsening symptoms.      Follow Up   No follow-ups on file.    Tobacco Use: Low Risk  (3/25/2025)    Patient History     Smoking Tobacco Use: Never     Smokeless Tobacco Use: Never     Passive Exposure: Never       Educated on risk of smoking. Discussed options for smoking cessation.    There are no Patient Instructions on file for this visit.  Patient was given instructions and counseling regarding her condition or for health maintenance advice. Please see specific information pulled into the AVS if appropriate.

## 2025-04-01 ENCOUNTER — LAB (OUTPATIENT)
Facility: HOSPITAL | Age: 89
End: 2025-04-01
Payer: MEDICARE

## 2025-04-01 ENCOUNTER — TRANSCRIBE ORDERS (OUTPATIENT)
Dept: ADMINISTRATIVE | Facility: HOSPITAL | Age: 89
End: 2025-04-01
Payer: MEDICARE

## 2025-04-01 DIAGNOSIS — E78.5 HYPERLIPIDEMIA, UNSPECIFIED HYPERLIPIDEMIA TYPE: ICD-10-CM

## 2025-04-01 DIAGNOSIS — E03.9 HYPOTHYROIDISM, UNSPECIFIED TYPE: ICD-10-CM

## 2025-04-01 DIAGNOSIS — Z79.899 ENCOUNTER FOR LONG-TERM (CURRENT) USE OF MEDICATIONS: ICD-10-CM

## 2025-04-01 DIAGNOSIS — M06.9 RHEUMATOID ARTHRITIS, INVOLVING UNSPECIFIED SITE, UNSPECIFIED WHETHER RHEUMATOID FACTOR PRESENT: ICD-10-CM

## 2025-04-01 DIAGNOSIS — N19 RENAL FAILURE, UNSPECIFIED CHRONICITY: ICD-10-CM

## 2025-04-01 DIAGNOSIS — E61.1 IRON DEFICIENCY: ICD-10-CM

## 2025-04-01 DIAGNOSIS — R73.09 OTHER ABNORMAL GLUCOSE: ICD-10-CM

## 2025-04-01 DIAGNOSIS — I10 ESSENTIAL HYPERTENSION, MALIGNANT: ICD-10-CM

## 2025-04-01 DIAGNOSIS — D64.9 ANEMIA, UNSPECIFIED TYPE: ICD-10-CM

## 2025-04-01 DIAGNOSIS — E55.9 VITAMIN D DEFICIENCY: ICD-10-CM

## 2025-04-01 DIAGNOSIS — I50.9 HEART FAILURE, UNSPECIFIED HF CHRONICITY, UNSPECIFIED HEART FAILURE TYPE: ICD-10-CM

## 2025-04-01 DIAGNOSIS — C50.912 MALIGNANT NEOPLASM OF LEFT FEMALE BREAST, UNSPECIFIED ESTROGEN RECEPTOR STATUS, UNSPECIFIED SITE OF BREAST: ICD-10-CM

## 2025-04-01 DIAGNOSIS — D46.9 MDS (MYELODYSPLASTIC SYNDROME): ICD-10-CM

## 2025-04-01 DIAGNOSIS — M10.9 GOUT, UNSPECIFIED CAUSE, UNSPECIFIED CHRONICITY, UNSPECIFIED SITE: ICD-10-CM

## 2025-04-01 DIAGNOSIS — M10.9 GOUT, UNSPECIFIED CAUSE, UNSPECIFIED CHRONICITY, UNSPECIFIED SITE: Primary | ICD-10-CM

## 2025-04-01 LAB
25(OH)D3 SERPL-MCNC: 53.2 NG/ML (ref 30–100)
ALBUMIN SERPL-MCNC: 4.2 G/DL (ref 3.5–5.2)
ALBUMIN UR-MCNC: 21.2 MG/DL
ALBUMIN/GLOB SERPL: 1.4 G/DL
ALP SERPL-CCNC: 85 U/L (ref 39–117)
ALT SERPL W P-5'-P-CCNC: 11 U/L (ref 1–33)
ANION GAP SERPL CALCULATED.3IONS-SCNC: 13.8 MMOL/L (ref 5–15)
AST SERPL-CCNC: 14 U/L (ref 1–32)
BACTERIA UR QL AUTO: NORMAL /HPF
BASOPHILS # BLD AUTO: 0.04 10*3/MM3 (ref 0–0.2)
BASOPHILS NFR BLD AUTO: 0.5 % (ref 0–1.5)
BILIRUB SERPL-MCNC: 0.2 MG/DL (ref 0–1.2)
BILIRUB UR QL STRIP: NEGATIVE
BUN SERPL-MCNC: 69 MG/DL (ref 8–23)
BUN/CREAT SERPL: 28.9 (ref 7–25)
CALCIUM SPEC-SCNC: 9.3 MG/DL (ref 8.6–10.5)
CHLORIDE SERPL-SCNC: 105 MMOL/L (ref 98–107)
CHOLEST SERPL-MCNC: 138 MG/DL (ref 0–200)
CLARITY UR: CLEAR
CO2 SERPL-SCNC: 21.2 MMOL/L (ref 22–29)
COLOR UR: YELLOW
CREAT SERPL-MCNC: 2.39 MG/DL (ref 0.57–1)
CRP SERPL-MCNC: 0.93 MG/DL (ref 0–0.5)
DEPRECATED RDW RBC AUTO: 57.2 FL (ref 37–54)
EGFRCR SERPLBLD CKD-EPI 2021: 19.1 ML/MIN/1.73
EOSINOPHIL # BLD AUTO: 0.12 10*3/MM3 (ref 0–0.4)
EOSINOPHIL NFR BLD AUTO: 1.5 % (ref 0.3–6.2)
ERYTHROCYTE [DISTWIDTH] IN BLOOD BY AUTOMATED COUNT: 16.3 % (ref 12.3–15.4)
FERRITIN SERPL-MCNC: 542.5 NG/ML (ref 13–150)
FOLATE SERPL-MCNC: >20 NG/ML (ref 4.78–24.2)
GLOBULIN UR ELPH-MCNC: 3 GM/DL
GLUCOSE SERPL-MCNC: 102 MG/DL (ref 65–99)
GLUCOSE UR STRIP-MCNC: NEGATIVE MG/DL
HCT VFR BLD AUTO: 36.5 % (ref 34–46.6)
HDLC SERPL-MCNC: 53 MG/DL (ref 40–60)
HGB BLD-MCNC: 11.4 G/DL (ref 12–15.9)
HGB UR QL STRIP.AUTO: NEGATIVE
HYALINE CASTS UR QL AUTO: NORMAL /LPF
IMM GRANULOCYTES # BLD AUTO: 0.02 10*3/MM3 (ref 0–0.05)
IMM GRANULOCYTES NFR BLD AUTO: 0.3 % (ref 0–0.5)
IRON 24H UR-MRATE: 47 MCG/DL (ref 37–145)
IRON SATN MFR SERPL: 15 % (ref 20–50)
KETONES UR QL STRIP: NEGATIVE
LDLC SERPL CALC-MCNC: 75 MG/DL (ref 0–100)
LDLC/HDLC SERPL: 1.44 {RATIO}
LEUKOCYTE ESTERASE UR QL STRIP.AUTO: NEGATIVE
LYMPHOCYTES # BLD AUTO: 0.86 10*3/MM3 (ref 0.7–3.1)
LYMPHOCYTES NFR BLD AUTO: 11.1 % (ref 19.6–45.3)
MAGNESIUM SERPL-MCNC: 2.7 MG/DL (ref 1.6–2.4)
MCH RBC QN AUTO: 30.2 PG (ref 26.6–33)
MCHC RBC AUTO-ENTMCNC: 31.2 G/DL (ref 31.5–35.7)
MCV RBC AUTO: 96.6 FL (ref 79–97)
MONOCYTES # BLD AUTO: 1.12 10*3/MM3 (ref 0.1–0.9)
MONOCYTES NFR BLD AUTO: 14.5 % (ref 5–12)
NEUTROPHILS NFR BLD AUTO: 5.59 10*3/MM3 (ref 1.7–7)
NEUTROPHILS NFR BLD AUTO: 72.1 % (ref 42.7–76)
NITRITE UR QL STRIP: NEGATIVE
NRBC BLD AUTO-RTO: 0 /100 WBC (ref 0–0.2)
PH UR STRIP.AUTO: 5.5 [PH] (ref 5–8)
PLATELET # BLD AUTO: 264 10*3/MM3 (ref 140–450)
PMV BLD AUTO: 10.7 FL (ref 6–12)
POTASSIUM SERPL-SCNC: 4.9 MMOL/L (ref 3.5–5.2)
PROT SERPL-MCNC: 7.2 G/DL (ref 6–8.5)
PROT UR QL STRIP: ABNORMAL
RBC # BLD AUTO: 3.78 10*6/MM3 (ref 3.77–5.28)
RBC # UR STRIP: NORMAL /HPF
REF LAB TEST METHOD: NORMAL
SODIUM SERPL-SCNC: 140 MMOL/L (ref 136–145)
SP GR UR STRIP: 1.01 (ref 1–1.03)
SQUAMOUS #/AREA URNS HPF: NORMAL /HPF
T4 FREE SERPL-MCNC: 1.36 NG/DL (ref 0.92–1.68)
TIBC SERPL-MCNC: 319 MCG/DL (ref 298–536)
TRANSFERRIN SERPL-MCNC: 214 MG/DL (ref 200–360)
TRIGL SERPL-MCNC: 44 MG/DL (ref 0–150)
TSH SERPL DL<=0.05 MIU/L-ACNC: 0.14 UIU/ML (ref 0.27–4.2)
UROBILINOGEN UR QL STRIP: ABNORMAL
VIT B12 BLD-MCNC: 1470 PG/ML (ref 211–946)
VLDLC SERPL-MCNC: 10 MG/DL (ref 5–40)
WBC # UR STRIP: NORMAL /HPF
WBC NRBC COR # BLD AUTO: 7.75 10*3/MM3 (ref 3.4–10.8)

## 2025-04-01 PROCEDURE — 86140 C-REACTIVE PROTEIN: CPT

## 2025-04-01 PROCEDURE — 84443 ASSAY THYROID STIM HORMONE: CPT

## 2025-04-01 PROCEDURE — 80061 LIPID PANEL: CPT

## 2025-04-01 PROCEDURE — 84466 ASSAY OF TRANSFERRIN: CPT

## 2025-04-01 PROCEDURE — 83735 ASSAY OF MAGNESIUM: CPT

## 2025-04-01 PROCEDURE — 82607 VITAMIN B-12: CPT

## 2025-04-01 PROCEDURE — 82043 UR ALBUMIN QUANTITATIVE: CPT

## 2025-04-01 PROCEDURE — 81001 URINALYSIS AUTO W/SCOPE: CPT

## 2025-04-01 PROCEDURE — 82306 VITAMIN D 25 HYDROXY: CPT

## 2025-04-01 PROCEDURE — 80053 COMPREHEN METABOLIC PANEL: CPT

## 2025-04-01 PROCEDURE — 36415 COLL VENOUS BLD VENIPUNCTURE: CPT

## 2025-04-01 PROCEDURE — 82728 ASSAY OF FERRITIN: CPT

## 2025-04-01 PROCEDURE — 85025 COMPLETE CBC W/AUTO DIFF WBC: CPT

## 2025-04-01 PROCEDURE — 82746 ASSAY OF FOLIC ACID SERUM: CPT

## 2025-04-01 PROCEDURE — 84439 ASSAY OF FREE THYROXINE: CPT

## 2025-04-01 PROCEDURE — 83540 ASSAY OF IRON: CPT

## 2025-04-02 NOTE — PROGRESS NOTES
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 Noel and pt transitioned care to Dr. Wagner on 12/19/2023.   Lung Cancer- Stage I, diagnosed 2004, s/p lobectomy by Dr. West, pt denies receiving chemotherapy or radiation treatment    Left Breast Cancer- diagnosed 6/2011, s/p left mastectomy on 7/2011, pt denied receiving chemotherapy or RT, received 7-8 years of adjuvant endocrine therapy, reports stopping by 2020, currently under active surveillance    MDS with multilineage dysplasia    Yelena Sanchez is a 89 y.o. female who presents to [facility]EMATOLOGY [facility] ONCOLOGY for follow up regarding lung cancer, breast cancer history, and MDS.  Patient currently active surveillance for both lung cancer and breast cancer.  Patient denies any breast lesions or concerns today.  Regarding MDS patient reports last Epogen injection was in October 2023, reports she has been receiving this injections periodically for the past few years.  Ported fever, chills, night sweats, unintentional weight loss or new lymphadenopathy.    FH: Sister with Ovarian cancer    Interval history: Patient here with her  to follow-up on labs for MDS and breast cancer.  Patient here to see if she requires Epogen injection.  Patient reports she increased her iron pill twice a day, pt reports continued fatigue, difficulty completing her daily activities.  She has gout and is on allopurinol prescribed by Dr. Leach, she denies any alcohol intake, but does report intake of beef.  No report of blood  "loss or melena.     Review of Systems   Constitutional:  Positive for fatigue. Negative for appetite change, diaphoresis, fever, unexpected weight gain and unexpected weight loss.   HENT:  Positive for ear pain (RIGHT EAR). Negative for hearing loss, mouth sores, sore throat, swollen glands, trouble swallowing and voice change.    Eyes:  Negative for blurred vision.   Respiratory:  Positive for choking. Negative for cough, shortness of breath and wheezing.    Cardiovascular:  Negative for chest pain and palpitations.   Gastrointestinal:  Negative for abdominal pain, blood in stool, constipation, diarrhea, nausea and vomiting.   Endocrine: Negative for cold intolerance and heat intolerance.   Genitourinary:  Negative for difficulty urinating, dysuria, frequency, hematuria and urinary incontinence.   Musculoskeletal:  Positive for arthralgias. Negative for back pain and myalgias.   Skin:  Negative for rash, skin lesions and wound.   Neurological:  Negative for dizziness, seizures, weakness, numbness and headache.   Hematological:  Does not bruise/bleed easily.   Psychiatric/Behavioral:  Negative for depressed mood. The patient is not nervous/anxious.    All other systems reviewed and are negative.       Oncology/Hematology History   MDS (myelodysplastic syndrome)   10/30/2024 Initial Diagnosis    MDS (myelodysplastic syndrome)     4/9/2025 -  Chemotherapy    OP SUPPORTIVE Ferumoxytol 510 mg         Objective     Vitals:    04/04/25 1048   BP: 115/55   Pulse: 73   Resp: 22   Temp: 97.9 °F (36.6 °C)   TempSrc: Temporal   SpO2: 91%   Weight: 68.7 kg (151 lb 7.3 oz)   Height: 149.9 cm (59.02\")   PainSc: 6                ECOG score: 0         PHQ-9 Total Score:         Physical Exam  Vitals reviewed. Exam conducted with a chaperone present.   Constitutional:       General: She is not in acute distress.     Appearance: Normal appearance.   HENT:      Head: Normocephalic and atraumatic.      Right Ear: There is impacted " cerumen.      Left Ear: Tympanic membrane normal. There is no impacted cerumen.      Ears:      Comments: Right ear canal was unable to be visualized secondary to cerumen obstructing view  Eyes:      Extraocular Movements: Extraocular movements intact.      Conjunctiva/sclera: Conjunctivae normal.   Pulmonary:      Effort: Pulmonary effort is normal.   Musculoskeletal:      Cervical back: Normal range of motion and neck supple.   Skin:     General: Skin is warm and dry.      Findings: No bruising.      Comments: Candidal intertrigo under right breast   Neurological:      Mental Status: She is oriented to person, place, and time.            Past Medical History     Past Medical History:   Diagnosis Date    Anemia     Arthritis     Asthma     Bicipitoradial bursitis     Breast cancer     CHF (congestive heart failure)     Congestive heart failure     COPD (chronic obstructive pulmonary disease)     Diverticulosis 11/26/2014    GERD (gastroesophageal reflux disease)     History of tobacco abuse     HTN (hypertension)     Hyperlipemia     Hyperthyroidism     ICD (implantable cardioverter-defibrillator), biventricular, in situ     Neck mass     Seasonal allergies     SOB (shortness of breath)     TIA (transient ischemic attack)     YRS AGO, NO RESIDUALS     Current Outpatient Medications on File Prior to Visit   Medication Sig Dispense Refill    albuterol sulfate  (90 Base) MCG/ACT inhaler Inhale 2 puffs Every 4 (Four) Hours As Needed for Wheezing.      Aller-Chlor 4 MG tablet       allopurinol (ZYLOPRIM) 100 MG tablet Take 1 tablet by mouth 2 (Two) Times a Day.      amLODIPine (NORVASC) 2.5 MG tablet Take 1 tablet by mouth Daily. 90 tablet 3    apixaban (ELIQUIS) 2.5 MG tablet tablet Take 1 tablet by mouth Every 12 (Twelve) Hours. Indications: Atrial Fibrillation 180 tablet 3    budesonide-formoterol (Symbicort) 160-4.5 MCG/ACT inhaler Inhale 2 puffs 2 (Two) Times a Day. 6 g 12    cephalexin (KEFLEX) 250 MG  capsule Take 1 capsule by mouth 4 (Four) Times a Day. 30 capsule 0    cholecalciferol (VITAMIN D3) 25 MCG (1000 UT) tablet Take 2 tablets by mouth 2 (Two) Times a Day.      docusate sodium (COLACE) 100 MG capsule Take 1 capsule by mouth Daily.      ezetimibe (Zetia) 10 MG tablet Take 1 tablet by mouth Daily. 90 tablet 3    ferrous sulfate 325 (65 FE) MG tablet Take 1 tablet by mouth 2 (Two) Times a Day.      fluticasone (FLONASE) 50 MCG/ACT nasal spray       furosemide (LASIX) 40 MG tablet Take 1 tablet by mouth 2 (Two) Times a Day. 180 tablet 3    HYDROcodone-acetaminophen (NORCO) 5-325 MG per tablet Take 1 tablet by mouth Every 6 (Six) Hours As Needed.      ketoconazole (NIZORAL) 2 % cream Apply 1 Application topically to the appropriate area as directed Daily.      ketoconazole (NIZORAL) 2 % shampoo Apply 1 Application topically to the appropriate area as directed 1 (One) Time Per Week.      Lactobacillus Acid-Pectin (Acidophilus/Citrus Pectin) tablet tablet Take 1 tablet by mouth Daily.      lansoprazole (PREVACID) 30 MG capsule Take 1 capsule by mouth Daily.      levalbuterol (XOPENEX HFA) 45 MCG/ACT inhaler Inhale 1-2 puffs Every 4 (Four) Hours As Needed for Wheezing.      levothyroxine (SYNTHROID, LEVOTHROID) 100 MCG tablet Take 1 tablet by mouth Daily.      multivitamin with minerals tablet tablet Take 1 tablet by mouth Daily.      nebivolol (Bystolic) 10 MG tablet Take 1 tablet by mouth Daily. 90 tablet 3    rosuvastatin (Crestor) 10 MG tablet Take 1 tablet by mouth Every Night. 90 tablet 3    spironolactone (ALDACTONE) 25 MG tablet Take 0.5 tablets by mouth Daily. 45 tablet 3     No current facility-administered medications on file prior to visit.      No Known Allergies  Past Surgical History:   Procedure Laterality Date    COLONOSCOPY  2014    ENDOSCOPY  2014    EYE SURGERY      Implant; yes     ICD GENERATOR REPLACEMENT N/A 11/14/2024    Procedure: ICD Generator Change - Bi-Ventricular -[employer];  Surgeon: JANETTE Gautam MD;  Location: [facility] INVASIVE LOCATION;  Service: Cardiovascular;  Laterality: N/A;    LUNG LOBECTOMY Left     Left lower lobe    LUNG SURGERY      LUNG SURGERY Left     LEFT UPPER LOBE (2004)    MASTECTOMY      Left     MASTECTOMY Left     OTHER SURGICAL HISTORY      Joint Surgery    PACEMAKER IMPLANTATION       Social History     Socioeconomic History    Marital status:    Tobacco Use    Smoking status: Never     Passive exposure: Never    Smokeless tobacco: Never   Vaping Use    Vaping status: Never Used   Substance and Sexual Activity    Alcohol use: Never    Drug use: Never    Sexual activity: Defer     Family History   Problem Relation Age of Onset    Colon cancer Mother         60s    Lung cancer Sister        Results     Result Review   The following data was reviewed by: Enma Wagner MD on 12/19/2023:  Lab Results   Component Value Date    HGB 11.4 (L) 04/01/2025    HCT 36.5 04/01/2025    MCV 96.6 04/01/2025     04/01/2025    WBC 7.75 04/01/2025    NEUTROABS 5.59 04/01/2025    LYMPHSABS 0.86 04/01/2025    MONOSABS 1.12 (H) 04/01/2025    EOSABS 0.12 04/01/2025    BASOSABS 0.04 04/01/2025     Lab Results   Component Value Date    GLUCOSE 102 (H) 04/01/2025    BUN 69 (H) 04/01/2025    CREATININE 2.39 (H) 04/01/2025     04/01/2025    K 4.9 04/01/2025     04/01/2025    CO2 21.2 (L) 04/01/2025    CALCIUM 9.3 04/01/2025    PROTEINTOT 7.2 04/01/2025    ALBUMIN 4.2 04/01/2025    BILITOT 0.2 04/01/2025    ALKPHOS 85 04/01/2025    AST 14 04/01/2025    ALT 11 04/01/2025     Lab Results   Component Value Date    MG 2.7 (H) 04/01/2025    PHOS 5.2 (H) 12/05/2024    FREET4 1.36 04/01/2025    TSH 0.139 (L) 04/01/2025           No radiology results for the last day       Assessment & Plan     Diagnoses and all orders for this visit:    1. MDS (myelodysplastic syndrome) (Primary)  -     CBC & Differential; Future  -     Comprehensive Metabolic Panel;  Future    2. Iron deficiency anemia, unspecified iron deficiency anemia type  -     Ferritin; Future  -     Iron Profile; Future    3. Intestinal malabsorption, unspecified type        Yelena Sanchez is a 89 y.o. female who presents to [facility] HEMATOLOGY & ONCOLOGY for follow-up regarding lung cancer, breast cancer, and MDS.    Lung Cancer- Stage I, diagnosed 2004, s/p lobectomy by Dr. West, pt denies receiving chemotherapy or radiation treatment, since patient is almost 20 years since her initial diagnosis and treatment, pt remains asymptomatic, pt currently receiving annual imaging scans due in 1/2026    Left Breast Cancer- diagnosed 6/2011, s/p left mastectomy on 7/2011, pt denied receiving chemotherapy or RT, received 7-8 years of adjuvant endocrine therapy, reports stopping by 2020, currently under active surveillance, mammogram from 12/29/2024 was BI-RADS 1 negative, repeat mammogram due in January 2026.    MDS with multilineage dysplasia-seen per bone marrow biopsy from 3/31/2022, discussed diagnosis and prognosis today with patient , patient receiving Epogen injections periodically if hemoglobin is less than 10, will repeat labs and assess for need for Epogen injection  -Discussed results of most recent CBC, CMP, iron profile, ferritin, B12, folate levels  -pt with continued iron saturation despite taking oral iron concern for malabsorption, pt symptomatic from her iron deficiency as well, will order IV Iron infusion with [facility].  - plan to recheck in 3 months.      Candidal intertrigo  -Since patient has not had improvement with rash with nystatin powder, or nystatin cream today and recommend evaluation by her dermatologist Dr. Mcgregor    Plan for patient follow-up in 2 months to recheck labs CBC, CMP, iron profile, ferritin.    Please note that portions of this note were completed with a voice recognition program.      Electronically signed by Enma Wagner MD, 04/04/25,  1:16 PM EDT.      Follow Up     I spent 30 minutes caring for Yelena on this date of service. This time includes time spent by me in the following activities:preparing for the visit, reviewing tests, obtaining and/or reviewing a separately obtained history, performing a medically appropriate examination and/or evaluation , counseling and educating the patient/family/caregiver, ordering medications, tests, or procedures, referring and communicating with other health care professionals , documenting information in the medical record, independently interpreting results and communicating that information with the patient/family/caregiver, and care coordination.    This is an acute or chronic illness that poses a threat to life or bodily function. The above treatment plan involves a high risk of complications and/or mortality of patient management.    The patient was seen and examined. Work by the provider also included review and/or ordering of lab tests, review and/or ordering of radiology tests, review and/or ordering of medicine tests, discussion with other physicians or providers, independent review of data, obtaining old records, review/summation of old records, and/or other review.    I have reviewed the family history, social history, and past medical history for this patient. Previous information and data has been reviewed and updated as needed. I have reviewed and verified the chief complaint, history, and other documentation. The patient was interviewed and examined in the clinic and the chart reviewed. The previous observations, recommendations, and conclusions were reviewed including those of other providers.     The plan was discussed with the patient and/or family. The patient was given time to ask questions and these questions were answered. At the conclusion of their visit they had no additional questions or concerns and all questions were answered to their satisfaction.    Patient was given  instructions and counseling regarding her condition or for health maintenance advice. Please see specific information pulled into the AVS if appropriate.

## 2025-04-04 ENCOUNTER — OFFICE VISIT (OUTPATIENT)
Dept: ONCOLOGY | Facility: HOSPITAL | Age: 89
End: 2025-04-04
Payer: MEDICARE

## 2025-04-04 VITALS
RESPIRATION RATE: 22 BRPM | WEIGHT: 151.46 LBS | HEART RATE: 73 BPM | SYSTOLIC BLOOD PRESSURE: 115 MMHG | OXYGEN SATURATION: 91 % | DIASTOLIC BLOOD PRESSURE: 55 MMHG | HEIGHT: 59 IN | TEMPERATURE: 97.9 F | BODY MASS INDEX: 30.53 KG/M2

## 2025-04-04 DIAGNOSIS — D46.9 MDS (MYELODYSPLASTIC SYNDROME): Primary | ICD-10-CM

## 2025-04-04 DIAGNOSIS — D50.9 IRON DEFICIENCY ANEMIA, UNSPECIFIED IRON DEFICIENCY ANEMIA TYPE: ICD-10-CM

## 2025-04-04 DIAGNOSIS — K90.9 INTESTINAL MALABSORPTION, UNSPECIFIED TYPE: ICD-10-CM

## 2025-04-04 PROBLEM — E61.1 IRON DEFICIENCY: Status: ACTIVE | Noted: 2025-04-04

## 2025-04-04 PROCEDURE — G0463 HOSPITAL OUTPT CLINIC VISIT: HCPCS | Performed by: INTERNAL MEDICINE

## 2025-04-13 PROBLEM — U07.1 COVID: Status: ACTIVE | Noted: 2025-01-01

## 2025-04-13 NOTE — PLAN OF CARE
Goal Outcome Evaluation:  Plan of Care Reviewed With: patient        Progress: improving  Outcome Evaluation: Pt remains A&Ox4. VSS. Maintaining SpO2 >90% on 3L NC. Pt admitted to 4MTU from ED. Oriented to unit. ABX therapy continued. Continuing plan of care at this time.

## 2025-04-13 NOTE — NURSING NOTE
Patient Yelena Sanchez admitted to 4MTU from the ED. Patient is alert and oriented to person, place, time, and situation. Patient oriented to unit, call light system and bed alarm use, teaching effective. Patient agreed to bed alarm use. Candida Auris screening revealed patient will NOT need tested, contact isolation and bleach cleaning due to no risk factors. Patient currently is not a concern for an active CDIFF infection.    4 eyes skin assessment completed with Renetta Whitman LPN. Per policy, the following skin interventions were put in place as a result of the skin assessment below: Pillow supprt for offloading pressure, floating heels or padding for bony prominences    Skin Assessments (most recent)      Flowsheet Row Most Recent Value   Skin WDL .WDL except  [bilateral bruises to both arms. Pt reports she takes blood thinners]   Sensory Perception 4-->no impairment   Moisture 3-->occasionally moist   Activity 2-->chairfast   Mobility 3-->slightly limited   Nutrition 3-->adequate   Friction and Shear 2-->potential problem   Alexey Score 17            Fall assessment completed. Per policy, the patient was determined be a Low fall risk due to Luna Yinka score 14 or less (only used within the first 24 hours of admission). Patient assessed to need the following mobility assistance: 2 Assist with the following assistive devices: None, and will be using a purewick for toileting. Per policy, the following fall interventions were put in place: Bed Alarm.     Patient's belongings that were sent with family: None  Patient's belongings that were sent to security: None  Patient's belongings locked in safe box in room: None  Patient's belongings that were sent to pharmacy: None  Patient's belongings kept at bedside per patient: Other Jewelry

## 2025-04-13 NOTE — H&P
[facility]  [facility]   Patient Name: Yelena Sanchez  : 1935  MRN: [id number]  Primary Care Physician:  Kaelyn Eugene MD  Date of admission: 2025    Subjective shortness of breath  Subjective     Chief Complaint: Shortness of breath    HPI: Patient is an 89-year-old female who presents to the emergency room with shortness of breath that is progressively gotten worse over the last couple of days.  Patient is producing a significant amount of phlegm.  She normally wears 3 L of oxygen because she has COPD.  But she was in respiratory distress when EMS arrived.  Patient received albuterol and Solu-Medrol.    On arrival to the ED, patient's temperature 98.8, pulse of 91, respiratory rate of 18, blood pressure 123/82, and she is on 3 L of oxygen saturating 93%.      Chest x-ray shows cardiomegaly and pulmonary vascular congestion with bibasilar hazy opacities which could represent pulmonary edema or atypical/viral infection.      Patient's initial troponin was 54-second 1 was 50.  Delta T was -4.  proBNP is 3610.  Sodium is 137.  Bicarb is 19.9.  Anion gap is 16.1.  Creatinine is 2.38.  Glucose is 105.  Procalcitonin is 0.15.  Patient's white blood cell count is 5.50.  Hemoglobin is 12.2.  Lymphocytes are 7.5.  She is positive for COVID.  Personal History     Past Medical History:  Past Medical History:   Diagnosis Date    Anemia     Arthritis     Asthma     Bicipitoradial bursitis     Breast cancer     CHF (congestive heart failure)     Congestive heart failure     COPD (chronic obstructive pulmonary disease)     Diverticulosis 2014    GERD (gastroesophageal reflux disease)     History of tobacco abuse     HTN (hypertension)     Hyperlipemia     Hyperthyroidism     ICD (implantable cardioverter-defibrillator), biventricular, in situ     Neck mass     Seasonal allergies     SOB (shortness of breath)     TIA (transient ischemic attack)     YRS  AGO, NO RESIDUALS       Past Surgical History:  Past Surgical History:   Procedure Laterality Date    COLONOSCOPY  2014    ENDOSCOPY  2014    EYE SURGERY      Implant; yes     ICD GENERATOR REPLACEMENT N/A 11/14/2024    Procedure: ICD Generator Change - Bi-Ventricular -[employer];  Surgeon: JANETTE Gautam MD;  Location: [facility] INVASIVE LOCATION;  Service: Cardiovascular;  Laterality: N/A;    LUNG LOBECTOMY Left     Left lower lobe    LUNG SURGERY      LUNG SURGERY Left     LEFT UPPER LOBE (2004)    MASTECTOMY      Left     MASTECTOMY Left     OTHER SURGICAL HISTORY      Joint Surgery    PACEMAKER IMPLANTATION         Family History:   Family History   Problem Relation Age of Onset    Colon cancer Mother         60s    Lung cancer Sister        Social History:   Social History     Socioeconomic History    Marital status:    Tobacco Use    Smoking status: Never     Passive exposure: Never    Smokeless tobacco: Never   Vaping Use    Vaping status: Never Used   Substance and Sexual Activity    Alcohol use: Never    Drug use: Never    Sexual activity: Defer       Home Medications:  Acidophilus/Citrus Pectin, HYDROcodone-acetaminophen, albuterol sulfate HFA, allopurinol, amLODIPine, apixaban, budesonide-formoterol, cephalexin, chlorpheniramine, cholecalciferol, docusate sodium, ezetimibe, ferrous sulfate, fluticasone, furosemide, ketoconazole, lansoprazole, levalbuterol, levothyroxine, multivitamin with minerals, nebivolol, rosuvastatin, and spironolactone    Allergies:  No Known Allergies    Review of Systems   All systems were reviewed and negative except for: Cough and shortness of breath    Objective   Objective     Vitals:   Temp:  [98.8 °F (37.1 °C)] 98.8 °F (37.1 °C)  Heart Rate:  [66-91] 79  Resp:  [16-20] 16  BP: (111-128)/(49-82) 111/54  Flow (L/min) (Oxygen Therapy):  [3] 3    Physical Exam    Constitutional: Awake, alert, no acute distress   Eyes: Pupils equal, sclerae anicteric, no  conjunctival injection   HENT: NCAT, mucous membranes moist   Neck: Supple, no thyromegaly, no lymphadenopathy, trachea midline   Respiratory: Clear to auscultation bilaterally, nonlabored respirations    Cardiovascular: RRR, no murmurs, rubs, or gallops, palpable pedal pulses bilaterally   Gastrointestinal: Positive bowel sounds, soft, nontender, nondistended   Musculoskeletal: No bilateral ankle edema, no clubbing or cyanosis to extremities   Psychiatric: Appropriate affect, cooperative   Neurologic: Oriented x 3, strength symmetric in all extremities, Cranial Nerves grossly intact to confrontation, speech clear   Skin: No rashes     Result Review    Result Review:  I have personally reviewed the results from the time of this admission to 4/13/2025 16:37 EDT and agree with these findings:  [x]  Laboratory  [x]  Microbiology  [x]  Radiology  [x]  EKG/Telemetry   [x]  Cardiology/Vascular   [x]  Pathology  [x]  Old records  [x]  Other:      Assessment & Plan   Assessment / Plan   #1 COVID-pneumonia  -Complete respiratory panel ordered to look for secondary sources of pneumonia.  -Started on azithromycin, Decadron, RT for bronchopulmonary hygiene.    #2 COPD exacerbation  -Continue steroids, DuoNeb, Brovana, Pulmicort, Mucinex.    #3 possible systolic CHF exacerbation-EF is 26-30 %  -40 of IV Lasix.  Can spot dose tomorrow.  - GDMT: Patient on Lasix, patient has a BiV    #4 JOSIE on CKD stage IV  -Baseline creatinine between 1.3-1.6  -Will consult nephrology.    #5 anemia  -Patient has iron deficiency anemia continue iron supplementation.  Patient follows with Dr. Wagner.    #6 hypothyroidism continue Synthroid    #7 gout continue allopurinol    #8 history of lung cancer with left lower lobe lobectomy in 22,004, history of left breast cancer in 2011 status post left mastectomy, history of MDS history of CAD status post ICD placed.    #9 patient on DOAC per cardiology    Will reconcile the rest of the patient's  medications once they are verified.    VTE Prophylaxis:  Mechanical & pharmacologic VTE prophylaxis orders are signed & held.         CODE STATUS:    Code Status (Patient has no pulse and is not breathing): CPR (Attempt to Resuscitate)  Medical Interventions (Patient has pulse or is breathing): Full Support  Level Of Support Discussed With: Patient      Admission Status:  I believe this patient meets inpatient status.    Electronically signed by Miguel Hay DO, 04/13/25, 4:21 PM EDT.

## 2025-04-13 NOTE — ED PROVIDER NOTES
Time: 12:51 PM EDT  Date of encounter:  4/13/2025  Independent Historian/Clinical History and Information was obtained by:   Patient and Family    History is limited by: N/A    Chief Complaint: Shortness of breath      History of Present Illness:  Patient is a 89 y.o. year old female who presents to the emergency department for evaluation of shortness of breath.  This patient presents to the emerged part with worsening shortness of breath over the last several days.  She has had a cough with some white sputum production.  She has had no fever or chest pain.  She has had no vomiting or diarrhea.  Normal the patient is on 3 L of oxygen via nasal cannula however oxygen saturations were only into the mid to high 80 percentile on her normal 3 L.  She was in respiratory distress when EMS arrived to her house.  The patient received albuterol nebulizer along with Solu-Medrol and Zofran and feels mildly improved at this time.      Patient Care Team  Primary Care Provider: Kaelyn Eugene MD    Past Medical History:     No Known Allergies  Past Medical History:   Diagnosis Date    Anemia     Arthritis     Asthma     Bicipitoradial bursitis     Breast cancer     CHF (congestive heart failure)     Congestive heart failure     COPD (chronic obstructive pulmonary disease)     Diverticulosis 11/26/2014    GERD (gastroesophageal reflux disease)     History of tobacco abuse     HTN (hypertension)     Hyperlipemia     Hyperthyroidism     ICD (implantable cardioverter-defibrillator), biventricular, in situ     Neck mass     Seasonal allergies     SOB (shortness of breath)     TIA (transient ischemic attack)     YRS AGO, NO RESIDUALS     Past Surgical History:   Procedure Laterality Date    COLONOSCOPY  2014    ENDOSCOPY  2014    EYE SURGERY      Implant; yes     ICD GENERATOR REPLACEMENT N/A 11/14/2024    Procedure: ICD Generator Change - Bi-Ventricular -[employer];  Surgeon: JANETTE Gautam MD;  Location: [facility]  INVASIVE LOCATION;  Service: Cardiovascular;  Laterality: N/A;    LUNG LOBECTOMY Left     Left lower lobe    LUNG SURGERY      LUNG SURGERY Left     LEFT UPPER LOBE (2004)    MASTECTOMY      Left     MASTECTOMY Left     OTHER SURGICAL HISTORY      Joint Surgery    PACEMAKER IMPLANTATION       Family History   Problem Relation Age of Onset    Colon cancer Mother         60s    Lung cancer Sister        Home Medications:  Prior to Admission medications    Medication Sig Start Date End Date Taking? Authorizing Provider   albuterol sulfate  (90 Base) MCG/ACT inhaler Inhale 2 puffs Every 4 (Four) Hours As Needed for Wheezing.    Sorin Younger MD   Aller-Chlor 4 MG tablet  1/14/25   Sorin Younger MD   allopurinol (ZYLOPRIM) 100 MG tablet Take 1 tablet by mouth 2 (Two) Times a Day. 8/4/21   Sorin Younger MD   amLODIPine (NORVASC) 2.5 MG tablet Take 1 tablet by mouth Daily. 12/18/24   Mariana Mitchell APRN   apixaban (ELIQUIS) 2.5 MG tablet tablet Take 1 tablet by mouth Every 12 (Twelve) Hours. Indications: Atrial Fibrillation 12/18/24   Mariana Mitchell APRN   budesonide-formoterol (Symbicort) 160-4.5 MCG/ACT inhaler Inhale 2 puffs 2 (Two) Times a Day. 12/5/24   Zac Souza PA   cephalexin (KEFLEX) 250 MG capsule Take 1 capsule by mouth 4 (Four) Times a Day. 3/13/25   Theodore Marquez MD   cholecalciferol (VITAMIN D3) 25 MCG (1000 UT) tablet Take 2 tablets by mouth 2 (Two) Times a Day.    Sorin Younger MD   docusate sodium (COLACE) 100 MG capsule Take 1 capsule by mouth Daily.    Sorin Younger MD   ezetimibe (Zetia) 10 MG tablet Take 1 tablet by mouth Daily. 12/18/24   Mariana Mitchell APRN   ferrous sulfate 325 (65 FE) MG tablet Take 1 tablet by mouth 2 (Two) Times a Day.    Sorin Younger MD   fluticasone (FLONASE) 50 MCG/ACT nasal spray  1/14/25   Sorin Younger MD   furosemide (LASIX) 40 MG tablet Take 1 tablet by mouth 2 (Two) Times a Day. 12/18/24    Mariana Mitchell APRN   HYDROcodone-acetaminophen (NORCO) 5-325 MG per tablet Take 1 tablet by mouth Every 6 (Six) Hours As Needed. 4/17/24   Sorin Younger MD   ketoconazole (NIZORAL) 2 % cream Apply 1 Application topically to the appropriate area as directed Daily. 4/16/24   Sorin Younger MD   ketoconazole (NIZORAL) 2 % shampoo Apply 1 Application topically to the appropriate area as directed 1 (One) Time Per Week. 8/12/24   Sorin Younger MD   Lactobacillus Acid-Pectin (Acidophilus/Citrus Pectin) tablet tablet Take 1 tablet by mouth Daily. 4/17/24   Sorin Younger MD   lansoprazole (PREVACID) 30 MG capsule Take 1 capsule by mouth Daily. 4/16/24   Sorin Younger MD   levalbuterol (XOPENEX HFA) 45 MCG/ACT inhaler Inhale 1-2 puffs Every 4 (Four) Hours As Needed for Wheezing.    Sorin Younger MD   levothyroxine (SYNTHROID, LEVOTHROID) 100 MCG tablet Take 1 tablet by mouth Daily.    Sorin Younger MD   multivitamin with minerals tablet tablet Take 1 tablet by mouth Daily.    Sorin Younger MD   nebivolol (Bystolic) 10 MG tablet Take 1 tablet by mouth Daily. 12/18/24   Mariana Mitchell APRN   rosuvastatin (Crestor) 10 MG tablet Take 1 tablet by mouth Every Night. 8/8/24   Simon Talbot MD   spironolactone (ALDACTONE) 25 MG tablet Take 0.5 tablets by mouth Daily. 12/18/24   Mariana Mitchell APRN        Social History:   Social History     Tobacco Use    Smoking status: Never     Passive exposure: Never    Smokeless tobacco: Never   Vaping Use    Vaping status: Never Used   Substance Use Topics    Alcohol use: Never    Drug use: Never         Review of Systems:  Review of Systems   Constitutional:  Negative for chills and fever.   HENT:  Negative for congestion, ear pain and sore throat.    Eyes:  Negative for pain.   Respiratory:  Positive for cough and shortness of breath. Negative for chest tightness.    Cardiovascular:  Positive for leg swelling. Negative  "for chest pain.   Gastrointestinal:  Negative for abdominal pain, diarrhea, nausea and vomiting.   Genitourinary:  Negative for flank pain and hematuria.   Musculoskeletal:  Negative for joint swelling.   Skin:  Negative for pallor.   Neurological:  Negative for seizures and headaches.   All other systems reviewed and are negative.       Physical Exam:  /49   Pulse 87   Temp 98.8 °F (37.1 °C) (Oral)   Resp 20   Ht 149.9 cm (59\")   Wt 67 kg (147 lb 11.3 oz)   SpO2 93%   BMI 29.83 kg/m²     Physical Exam  Vitals and nursing note reviewed.   Constitutional:       General: She is in acute distress.      Appearance: Normal appearance. She is not toxic-appearing.   HENT:      Head: Normocephalic and atraumatic.      Mouth/Throat:      Mouth: Mucous membranes are moist.   Eyes:      General: No scleral icterus.  Cardiovascular:      Rate and Rhythm: Normal rate and regular rhythm.      Pulses: Normal pulses.      Heart sounds: Normal heart sounds.   Pulmonary:      Effort: Pulmonary effort is normal.      Breath sounds: Examination of the right-middle field reveals rhonchi. Examination of the left-middle field reveals rhonchi. Rhonchi present.   Abdominal:      General: Abdomen is flat.      Palpations: Abdomen is soft.      Tenderness: There is no abdominal tenderness.   Musculoskeletal:         General: Normal range of motion.      Cervical back: Normal range of motion and neck supple.      Right lower leg: Edema present.      Left lower leg: Edema present.   Skin:     General: Skin is warm and dry.      Capillary Refill: Capillary refill takes less than 2 seconds.   Neurological:      General: No focal deficit present.      Mental Status: She is alert and oriented to person, place, and time. Mental status is at baseline.                    Medical Decision Making:      Comorbidities that affect care:    Congestive heart failure, COPD, hypertension,    External Notes reviewed:    Previous Clinic Note: Office " visit with oncology for myelodysplastic syndrome      The following orders were placed and all results were independently analyzed by me:  Orders Placed This Encounter   Procedures    COVID-19, FLU A/B, RSV PCR 1 HR TAT - Swab, Nasopharynx    XR Chest 1 View    [place] Draw    Comprehensive Metabolic Panel    BNP    High Sensitivity Troponin T    CBC Auto Differential    Scan Slide    High Sensitivity Troponin T 1Hr    NPO Diet NPO Type: Strict NPO    Undress & Gown    Continuous Pulse Oximetry    Vital Signs    Hospitalist (on-call MD unless specified)    Oxygen Therapy- Nasal Cannula; Titrate 1-6 LPM Per SpO2; 90 - 95%    ECG 12 Lead ED Triage Standing Order; SOA    Insert Peripheral IV    CBC & Differential    Green Top (Gel)    Lavender Top    Gold Top - SST    Light Blue Top       Medications Given in the Emergency Department:  Medications   sodium chloride 0.9 % flush 10 mL (has no administration in time range)   ipratropium-albuterol (DUO-NEB) nebulizer solution 3 mL (3 mL Nebulization Given 4/13/25 1335)   furosemide (LASIX) injection 80 mg (80 mg Intravenous Given 4/13/25 1404)        ED Course:         EKG: Paced rhythm with a rate of 74 bpm  Right axis DV  No acute ischemic changes are noted.      Labs:    Lab Results (last 24 hours)       Procedure Component Value Units Date/Time    CBC & Differential [[id number]]  (Abnormal) Collected: 04/13/25 1136    Specimen: Blood from Arm, Right Updated: 04/13/25 1200    Narrative:      The following orders were created for panel order CBC & Differential.  Procedure                               Abnormality         Status                     ---------                               -----------         ------                     CBC Auto Differential[[id number]]        Abnormal            Final result               Scan Slide[[id number]]                                       Final result                 Please view results for these tests on the individual orders.     Comprehensive Metabolic Panel [[id number]]  (Abnormal) Collected: 04/13/25 1136    Specimen: Blood from Arm, Right Updated: 04/13/25 1205     Glucose 105 mg/dL      BUN 69 mg/dL      Creatinine 2.38 mg/dL      Sodium 137 mmol/L      Potassium 4.2 mmol/L      Chloride 101 mmol/L      CO2 19.9 mmol/L      Calcium 9.1 mg/dL      Total Protein 6.7 g/dL      Albumin 3.6 g/dL      ALT (SGPT) 10 U/L      AST (SGOT) 19 U/L      Alkaline Phosphatase 88 U/L      Total Bilirubin 0.3 mg/dL      Globulin 3.1 gm/dL      A/G Ratio 1.2 g/dL      BUN/Creatinine Ratio 29.0     Anion Gap 16.1 mmol/L      eGFR 19.2 mL/min/1.73     Narrative:      GFR Categories in Chronic Kidney Disease (CKD)      GFR Category          GFR (mL/min/1.73)    Interpretation  G1                     90 or greater         Normal or high (1)  G2                      60-89                Mild decrease (1)  G3a                   45-59                Mild to moderate decrease  G3b                   30-44                Moderate to severe decrease  G4                    15-29                Severe decrease  G5                    14 or less           Kidney failure          (1)In the absence of evidence of kidney disease, neither GFR category G1 or G2 fulfill the criteria for CKD.    eGFR calculation 2021 CKD-EPI creatinine equation, which does not include race as a factor    BNP [[id number]]  (Abnormal) Collected: 04/13/25 1136    Specimen: Blood from Arm, Right Updated: 04/13/25 1201     proBNP 3,610.0 pg/mL     Narrative:      This assay is used as an aid in the diagnosis of individuals suspected of having heart failure. It can be used as an aid in the diagnosis of acute decompensated heart failure (ADHF) in patients presenting with signs and symptoms of ADHF to the emergency department (ED). In addition, NT-proBNP of <300 pg/mL indicates ADHF is not likely.    Age Range Result Interpretation  NT-proBNP Concentration (pg/mL:      <50             Positive             >450                   Gray                 300-450                    Negative             <300    50-75           Positive            >900                  Gray                300-900                  Negative            <300      >75             Positive            >1800                  Gray                300-1800                  Negative            <300    High Sensitivity Troponin T [[id number]]  (Abnormal) Collected: 04/13/25 1136    Specimen: Blood from Arm, Right Updated: 04/13/25 1228     HS Troponin T 54 ng/L     Narrative:      High Sensitive Troponin T Reference Range:  <14.0 ng/L- Negative Female for AMI  <22.0 ng/L- Negative Male for AMI  >=14 - Abnormal Female indicating possible myocardial injury.  >=22 - Abnormal Male indicating possible myocardial injury.   Clinicians would have to utilize clinical acumen, EKG, Troponin, and serial changes to determine if it is an Acute Myocardial Infarction or myocardial injury due to an underlying chronic condition.         CBC Auto Differential [[id number]]  (Abnormal) Collected: 04/13/25 1136    Specimen: Blood from Arm, Right Updated: 04/13/25 1200     WBC 5.50 10*3/mm3      RBC 4.06 10*6/mm3      Hemoglobin 12.2 g/dL      Hematocrit 39.4 %      MCV 97.0 fL      MCH 30.0 pg      MCHC 31.0 g/dL      RDW 16.1 %      RDW-SD 57.2 fl      MPV 11.3 fL      Platelets 259 10*3/mm3      Neutrophil % 65.4 %      Lymphocyte % 7.5 %      Monocyte % 26.0 %      Eosinophil % 0.2 %      Basophil % 0.2 %      Immature Grans % 0.7 %      Neutrophils, Absolute 3.60 10*3/mm3      Lymphocytes, Absolute 0.41 10*3/mm3      Monocytes, Absolute 1.43 10*3/mm3      Eosinophils, Absolute 0.01 10*3/mm3      Basophils, Absolute 0.01 10*3/mm3      Immature Grans, Absolute 0.04 10*3/mm3      nRBC 0.0 /100 WBC     Scan Slide [[id number]] Collected: 04/13/25 1136    Specimen: Blood from Arm, Right Updated: 04/13/25 1200     Anisocytosis Slight/1+     WBC Morphology Normal     Platelet  Estimate Adequate     Clumped Platelets Present    High Sensitivity Troponin T 1Hr [[id number]]  (Abnormal) Collected: 04/13/25 1304    Specimen: Blood from Hand, Right Updated: 04/13/25 1333     HS Troponin T 50 ng/L      Troponin T Numeric Delta -4 ng/L      Troponin T % Delta -7    Narrative:      High Sensitive Troponin T Reference Range:  <14.0 ng/L- Negative Female for AMI  <22.0 ng/L- Negative Male for AMI  >=14 - Abnormal Female indicating possible myocardial injury.  >=22 - Abnormal Male indicating possible myocardial injury.   Clinicians would have to utilize clinical acumen, EKG, Troponin, and serial changes to determine if it is an Acute Myocardial Infarction or myocardial injury due to an underlying chronic condition.         COVID-19, FLU A/B, RSV PCR 1 HR TAT - Swab, Nasopharynx [[id number]]  (Abnormal) Collected: 04/13/25 1306    Specimen: Swab from Nasopharynx Updated: 04/13/25 1404     COVID19 Detected     Influenza A PCR Not Detected     Influenza B PCR Not Detected     RSV, PCR Not Detected    Narrative:      Fact sheet for providers: https://www.fda.gov/media/450900/download    Fact sheet for patients: https://www.fda.gov/media/619321/download    Test performed by PCR.             Imaging:    XR Chest 1 View  Result Date: 4/13/2025  XR CHEST 1 VW Date of Exam: 4/13/2025 12:08 PM EDT Indication: SOA Triage Protocol Comparison: Chest radiograph 11/29/2024. Chest CT 1/23/2025. Findings: Stable appearing left chest wall ICD. Mediastinum: Cardiac silhouette appears unchanged and enlarged Lungs: Interval increased hazy bibasal opacities. Mildly prominent central pulmonary vasculature. Pleura: No visible pleural effusion or pneumothorax. Bones and soft tissues: No acute, displaced fracture seen.     Impression: Cardiomegaly and pulmonary vascular congestion with bibasilar hazy opacities which could represent pulmonary edema or atypical/viral infection. Electronically Signed: Edward Waldrop  4/13/2025  12:22 PM EDT  Workstation ID: [id number]        Differential Diagnosis and Discussion:    Dyspnea: Differential diagnosis includes but is not limited to metabolic acidosis, neurological disorders, psychogenic, asthma, pneumothorax, upper airway obstruction, COPD, pneumonia, noncardiogenic pulmonary edema, interstitial lung disease, anemia, congestive heart failure, and pulmonary embolism    PROCEDURES:    Labs were collected in the emergency department and all labs were reviewed and interpreted by me.  X-ray were performed in the emergency department and all X-ray impressions were independently interpreted by me.  An EKG was performed and the EKG was interpreted by me.    ECG 12 Lead ED Triage Standing Order; SOA   Preliminary Result   HEART RATE=74  bpm   RR Uzovrgvm=531  ms   MN Bgizjqrf=334  ms   P Horizontal Axis=-46  deg   P Front Axis=68  deg   QRSD Xgzxwajn=630  ms   QT Ufqyvefm=027  ms   GCiM=038  ms   QRS Axis=217  deg   T Wave Axis=67  deg   - ABNORMAL ECG -   Atrial-sensed ventricular-paced rhythm   No further analysis attempted due to paced rhythm   Date and Time of Study:2025-04-13 11:19:01          Procedures    MDM     Amount and/or Complexity of Data Reviewed  Clinical lab tests: reviewed  Tests in the radiology section of CPT®: reviewed  Tests in the medicine section of CPT®: reviewed             Total Critical Care time of 45 minutes. Total critical care time documented does not include time spent on separately billed procedures for services of nurses or physician assistants. I personally saw and examined the patient. I have reviewed all diagnostic interpretations and treatment plans as written. I was present for the key portions of any procedures performed and the inclusive time noted in any critical care statement. Critical care time includes patient management by me, time spent at the patients bedside,  time to review lab and imaging results, discussing patient care, documentation in the medical  record, and time spent with family or caregiver.          Patient Care Considerations:    SEPSIS was considered but is NOT present in the emergency department as SIRS criteria is not present.      Consultants/Shared Management Plan:    Hospitalist: I have discussed the case with hospitalist who agrees to accept the patient for admission.    Social Determinants of Health:    Patient is unable to carry out activities of daily life. Escalation of care is necessary.       Disposition and Care Coordination:    Admit:   Through independent evaluation of the patient's history, physical, and imperical data, the patient meets criteria for inpatient admission to the hospital.        Final diagnoses:   Acute congestive heart failure, unspecified heart failure type   COPD exacerbation   Hypoxia   Acute on chronic respiratory failure with hypoxia   Coronavirus infection        ED Disposition       ED Disposition   Intended Admit    Condition   --    Comment   --               This medical record created using voice recognition software.             Ruben Perry,   04/13/25 1443       Ruben Perry,   04/13/25 1443       Ruben Perry,   04/13/25 1545

## 2025-04-14 NOTE — THERAPY EVALUATION
Respiratory Therapist Broncho-Pulmonary Hygiene Progress Note      Patient Name:  Yelena Sanchez  YOB: 1935    Yelena Sanchez meets the qualification for Level 1 of the Bronco-Pulmonary Hygiene Protocol. This was based on my daily patient assessment and includes review of chest x-ray results, cough ability and quality, oxygenation, secretions or risk for secretion development and patient mobility.     Broncho-Pulmonary Hygiene Assessment:    Level of Movement: Actively changing positions without assistance  Alert/ oriented/ cooperative    Breath Sounds: Clear to slightly diminished    Cough: Strong, effective and/or frequent    Chest X-Ray: Possible signs of consolidation and/or atelectasis or clear.     Sputum Productions: None or small amount of thin or watery secretions with effective cough    History and Physical: New onset of bronchitis or existing chronic pulmonary conditions.  **(not in an exacerbation)    SpO2 to Oxygen Need: greater than 92% on room air or  less than 3L nasal canula    Current SpO2 is: 96  3L on 3L      Based on this information, I have completed the following interventions: Aerobika with bronchodialtor medication or TID      Electronically signed by Danyelle Fry RRT, 04/13/25, 9:32 PM EDT.

## 2025-04-14 NOTE — PLAN OF CARE
Goal Outcome Evaluation:              Outcome Evaluation: pt alert and oriented. pt on 3L NC. managed blood glucose per order. will continue with pt plan of care.

## 2025-04-14 NOTE — CONSULTS
[facility]   Nephrology Consult Note      Patient Name: Yelena Sanchez  : 1935  MRN: [id number]  Primary Care Physician:  Kaelyn Eugene MD  Referring Physician: No ref. provider found  Date of admission: 2025    Subjective   Subjective     Reason for Consult/ Chief Complaint: Advanced kidney disease    HPI:  Yelena Sanchez is a 89 y.o. female with history of congestive heart failure, hypertension and CKD stage IV, baseline creatinine around 2.0-2.5 who was admitted with worsening shortness of breath.  She has been ill for about a week with severe diffuse body aches and worsening shortness of breath and severe sore throat.  Her  called the ambulance and she was brought to the ER where she tested positive for COVID.  She is able to tolerate p.o.  No lower urinary symptoms.    Records reviewed, last office visit 2025.    Review of Systems   All systems were reviewed and negative except for: What is mentioned above.    Personal History     Past Medical History:   Diagnosis Date    Anemia     Arthritis     Asthma     Bicipitoradial bursitis     Breast cancer     CHF (congestive heart failure)     Congestive heart failure     COPD (chronic obstructive pulmonary disease)     Diverticulosis 2014    GERD (gastroesophageal reflux disease)     History of tobacco abuse     HTN (hypertension)     Hyperlipemia     Hyperthyroidism     ICD (implantable cardioverter-defibrillator), biventricular, in situ     Neck mass     Seasonal allergies     SOB (shortness of breath)     TIA (transient ischemic attack)     YRS AGO, NO RESIDUALS       Past Surgical History:   Procedure Laterality Date    COLONOSCOPY      ENDOSCOPY      EYE SURGERY      Implant; yes     ICD GENERATOR REPLACEMENT N/A 2024    Procedure: ICD Generator Change - Bi-Ventricular -[employer];  Surgeon: JANETTE Gautam MD;  Location: [facility] INVASIVE LOCATION;  Service: Cardiovascular;  Laterality:  N/A;    LUNG LOBECTOMY Left     Left lower lobe    LUNG SURGERY      LUNG SURGERY Left     LEFT UPPER LOBE (2004)    MASTECTOMY      Left     MASTECTOMY Left     OTHER SURGICAL HISTORY      Joint Surgery    PACEMAKER IMPLANTATION         Family History: family history includes Colon cancer in her mother; Lung cancer in her sister. Otherwise pertinent FHx was reviewed and not pertinent to current issue.    Social History:  reports that she has never smoked. She has never been exposed to tobacco smoke. She has never used smokeless tobacco. She reports that she does not drink alcohol and does not use drugs.    Home Medications:  Acidophilus/Citrus Pectin, HYDROcodone-acetaminophen, albuterol sulfate HFA, allopurinol, amLODIPine, apixaban, budesonide-formoterol, cholecalciferol, docusate sodium, ezetimibe, ferrous sulfate, fluticasone, furosemide, ketoconazole, lansoprazole, levalbuterol, levothyroxine, multivitamin with minerals, nebivolol, rosuvastatin, and spironolactone    Allergies:  No Known Allergies    Objective    Objective     Vitals:   Temp:  [97.3 °F (36.3 °C)-98.4 °F (36.9 °C)] 97.5 °F (36.4 °C)  Heart Rate:  [69-90] 79  Resp:  [14-20] 20  BP: (100-146)/(51-72) 119/51  Flow (L/min) (Oxygen Therapy):  [3] 3     Physical Exam    Constitutional: Awake, alert, no distress, hard of hearing.   Eyes: sclerae anicteric, no conjunctival injection   HENT: mucous membranes not examined.  Complaining of sore throat.   Neck: Supple, no thyromegaly, no lymphadenopathy, trachea midline, + JVD   Respiratory: Clear to auscultation bilaterally, nonlabored respirations    Cardiovascular: RRR, no murmurs, rubs, or gallops.   Gastrointestinal: Positive bowel sounds, soft, nontender, nondistended   Musculoskeletal: No edema, no clubbing or cyanosis, legs are tender to the touch.   Psychiatric: Appropriate affect, cooperative   Neurologic: Oriented x 3, moving all extremities, Cranial Nerves grossly intact, speech clear   Skin:  warm and dry, no rashes     Result Review    Result Reviewed:  I have personally reviewed the results from the time of this admission to 4/14/2025 16:56 EDT and agree with these findings:  [x]  Laboratory  []  Microbiology  [x]  Radiology  []  EKG/Telemetry   []  Cardiology/Vascular   []  Pathology  [x]  Old records  []  Other:  LAB RESULTS:    LAB RESULTS:        Lab 04/14/25  0452 04/13/25  1304 04/13/25  1136   SODIUM 138  --  137   POTASSIUM 4.1  --  4.2   CHLORIDE 102  --  101   CO2 20.4*  --  19.9*   BUN 72*  --  69*   CREATININE 2.49*  --  2.38*   GLUCOSE 185*  --  105*   EGFR 18.2*  --  19.2*   ANION GAP 15.6*  --  16.1*   MAGNESIUM 2.3 2.3  --    PHOSPHORUS 4.9* 3.6  --            Most notable findings include: As above.    Chest x-ray report reviewed.    Assessment & Plan   Assessment / Plan     Brief Patient Summary:  Yelena Sanchez is a 89 y.o. female who has advanced kidney disease admitted with COVID.    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  She is off ARB.  Creatinine is not far from baseline at this time but she has possibly increased volume.  Start Lasix 40 mg IV twice daily and monitor, transition to p.o. dosing soon.  She has known proteinuria.  - COVID-pneumonia, per primary.  - Congestive heart failure, diuretics as above.  Spironolactone is on hold, resume soon.  - Hypertension, blood pressure is acceptable for now.  Monitor.  - Anemia, mild.  Monitor.  - Hypothyroidism, treated, per primary.    Will follow,    Thank you very much for this consult!    Electronically signed by Otilia Orosco MD, 4/14/2025, 16:56 EDT.

## 2025-04-14 NOTE — PROGRESS NOTES
[facility]   Hospitalist Progress Note  Date: 2025  Patient Name: Yelena Sanchez  : 1935  MRN: [id number]  Date of admission: 2025      Subjective   Subjective     Chief Complaint: Follow-up shortness of breath    Summary:Yelena Sanchez is a 89 y.o. female history of CHF reduced ejection fraction 26 to 30%, COPD on 3 L nasal cannula chronically, CKD 4 baseline creatinine around 2.4 presents to the emergency room with shortness of breath that is progressively gotten worse over the last couple of days.  Patient is producing a significant amount of phlegm.  She normally wears 3 L of oxygen because she has COPD.  She was in respiratory distress when EMS arrived.  Patient received albuterol and Solu-Medrol. On arrival to the ED, patient's temperature 98.8, pulse of 91, respiratory rate of 18, blood pressure 123/82, and she is on 3 L of oxygen saturating 93%.  Chest x-ray shows cardiomegaly and pulmonary vascular congestion with bibasilar hazy opacities which could represent pulmonary edema or atypical/viral infection. Patient's initial troponin was 54 trending down to 50.  proBNP is 3610.  Bicarb is 19.9.  Anion gap is 16.1.  Creatinine is 2.38 which appears to be around patient's baseline.  Procalcitonin within normal limits considering patient's renal function.  Patient's white blood cell count within normal limits.  She tested positive for COVID.  Patient admitted for further evaluation and treatment of COPD exacerbation due to COVID-19 infection with concern for underlying systolic CHF exacerbation with cardiogenic pulmonary edema.  Started on dexamethasone, inhaled bronchodilators as well as IV diuretics.  Nephrology consulted.  Continue IV diuretics can likely transition to p.o. diuretics in the coming days.  PT OT evals pending.    Interval Followup: Patient lying in bed appears to be resting fairly comfortable.  Patient indicates shortness of breath is improving.  Endorses mostly  nonproductive cough.  Afebrile overnight.  V-paced 60s to 90s on telemetry review.,  Blood pressure low normal limits.  Satting well on 3 L nasal cannula.  Serum creatinine trending up slightly.  Bicarb remains slightly low.  Blood sugars trending up.  Lymphocytes trending down.  Hemoglobin trending down slightly.  No issues per nursing.    Review of Systems  Constitutional: Negative for fatigue and fever.   HENT: Negative for sore throat and trouble swallowing.    Eyes: Negative for pain and discharge.   Respiratory: Positive for cough and shortness of breath.    Cardiovascular: Negative for chest pain and palpitations.   Gastrointestinal: Negative for abdominal pain, nausea and vomiting.   Endocrine: Negative for cold intolerance and heat intolerance.   Genitourinary: Negative for difficulty urinating and dysuria.   Musculoskeletal: Negative for back pain and neck stiffness.   Skin: Negative for color change and rash.   Neurological: Negative for syncope and headaches.   Hematological: Negative for adenopathy.   Psychiatric/Behavioral: Negative for confusion and hallucinations.    Objective   Objective     Vitals:   Temp:  [97.3 °F (36.3 °C)-98.4 °F (36.9 °C)] 98.1 °F (36.7 °C)  Heart Rate:  [69-90] 90  Resp:  [14-20] 20  BP: (100-146)/(51-72) 101/53  Flow (L/min) (Oxygen Therapy):  [3] 3  Physical Exam   General: well-developed appearing stated age in no acute distress  HEENT: Normocephalic atraumatic moist membranes pupils equal round reactive light, no scleral icterus no conjunctival injection  Cardiovascular: Irregularly irregular, trace bilateral lower extremity edema appreciated  Pulmonary: Crackles bilateral bases with scant wheezes no rhonchi symmetric chest expansion, unlabored, no conversational dyspnea appreciated  Gastrointestinal: Soft nontender nondistended positive bowel sounds all 4 quadrants no rebound or guarding  Musculoskeletal: No clubbing cyanosis, warm and well-perfused, calves soft  symmetric nontender bilaterally  Skin: Clean dry without rashes  Neuro: Cranial nerves II through XII intact grossly no sensorimotor deficits appreciated bilateral upper and lower extremities  Psych: Patient is calm cooperative and appropriate with exam not responding to internal stimuli  : No Velázquez catheter no bladder distention no suprapubic tenderness    Result Review    Result Review:  I have personally reviewed these results and agree with these findings:  [x]  Laboratory  LAB RESULTS:      Lab 04/14/25  0452 04/13/25  1304 04/13/25  1136   WBC 2.61*  --  5.50   HEMOGLOBIN 11.5*  --  12.2   HEMATOCRIT 36.0  --  39.4   PLATELETS 232  --  259   NEUTROS ABS 2.08  --  3.60   IMMATURE GRANS (ABS) 0.03  --  0.04   LYMPHS ABS 0.18*  --  0.41*   MONOS ABS 0.32  --  1.43*   EOS ABS 0.00  --  0.01   MCV 93.8  --  97.0   PROCALCITONIN  --  0.15  --          Lab 04/14/25  0452 04/13/25  1304 04/13/25  1136   SODIUM 138  --  137   POTASSIUM 4.1  --  4.2   CHLORIDE 102  --  101   CO2 20.4*  --  19.9*   ANION GAP 15.6*  --  16.1*   BUN 72*  --  69*   CREATININE 2.49*  --  2.38*   EGFR 18.2*  --  19.2*   GLUCOSE 185*  --  105*   CALCIUM 8.8  --  9.1   MAGNESIUM 2.3 2.3  --    PHOSPHORUS 4.9* 3.6  --    HEMOGLOBIN A1C 5.40  --   --          Lab 04/13/25  1136   TOTAL PROTEIN 6.7   ALBUMIN 3.6   GLOBULIN 3.1   ALT (SGPT) 10   AST (SGOT) 19   BILIRUBIN 0.3   ALK PHOS 88         Lab 04/13/25  1304 04/13/25  1136   PROBNP  --  3,610.0*   HSTROP T 50* 54*                 Brief Urine Lab Results  (Last result in the past 365 days)        Color   Clarity   Blood   Leuk Est   Nitrite   Protein   CREAT   Urine HCG        04/01/25 0715 Yellow   Clear   Negative   Negative   Negative   30 mg/dL (1+)                 Microbiology Results (last 10 days)       Procedure Component Value - Date/Time    Legionella Antigen, Urine - Urine, Urine, Clean Catch [[id number]]  (Normal) Collected: 04/14/25 0104    Lab Status: Final result Specimen:  Urine, Clean Catch Updated: 04/14/25 0123     LEGIONELLA ANTIGEN, URINE Negative    S. Pneumo Ag Urine or CSF - Urine, Urine, Clean Catch [[id number]]  (Normal) Collected: 04/14/25 0104    Lab Status: Final result Specimen: Urine, Clean Catch Updated: 04/14/25 0124     Strep Pneumo Ag Negative    COVID-19, FLU A/B, RSV PCR 1 HR TAT - Swab, Nasopharynx [[id number]]  (Abnormal) Collected: 04/13/25 1306    Lab Status: Final result Specimen: Swab from Nasopharynx Updated: 04/13/25 1404     COVID19 Detected     Influenza A PCR Not Detected     Influenza B PCR Not Detected     RSV, PCR Not Detected    Narrative:      Fact sheet for providers: https://www.fda.gov/media/569195/download    Fact sheet for patients: https://www.fda.gov/media/614892/download    Test performed by PCR.            [x]  Microbiology  [x]  Radiology  XR Chest 1 View  Result Date: 4/13/2025  Impression: Cardiomegaly and pulmonary vascular congestion with bibasilar hazy opacities which could represent pulmonary edema or atypical/viral infection. Electronically Signed: Edward Waldrop  4/13/2025 12:22 PM EDT  Workstation ID: [id number]      [x]  EKG/Telemetry   []  Cardiology/Vascular   []  Pathology  [x]  Old records transthoracic echocardiogram from 12/20/2024 revealing left ventricular ejection fraction of 26 to 30%  [x]  Other:  Scheduled Meds:allopurinol, 100 mg, Oral, BID  apixaban, 2.5 mg, Oral, Q12H  arformoterol, 15 mcg, Nebulization, BID - RT  ascorbic acid, 500 mg, Oral, Daily  azithromycin, 500 mg, Oral, Q24H  budesonide, 0.5 mg, Nebulization, BID - RT  dexAMETHasone, 6 mg, Intravenous, Daily  ferrous sulfate, 325 mg, Oral, BID With Meals  furosemide, 40 mg, Intravenous, Q12H  guaiFENesin, 1,200 mg, Oral, Q12H  insulin lispro, 2-7 Units, Subcutaneous, 4x Daily AC & at Bedtime  ipratropium-albuterol, 3 mL, Nebulization, Q4H While Awake - RT  levothyroxine, 100 mcg, Oral, QAM AC  pantoprazole, 40 mg, Oral, Q AM  rosuvastatin, 10 mg, Oral,  Nightly  sodium chloride, 10 mL, Intravenous, Q12H      Continuous Infusions:   PRN Meds:.  acetaminophen **OR** acetaminophen **OR** acetaminophen    albuterol    senna-docusate sodium **AND** polyethylene glycol **AND** bisacodyl **AND** bisacodyl    dextrose    dextrose    glucagon (human recombinant)    HYDROcodone-acetaminophen    melatonin    ondansetron ODT **OR** ondansetron    sodium chloride    sodium chloride    sodium chloride      Assessment & Plan   Assessment / Plan     Assessment/Plan:  Dyspnea  COVID-pneumonia  COPD with acute exacerbation  Acute on chronic systolic congestive heart failure EF 26 to 30% history of implanted ICD  Chronic hypoxic respiratory failure on 3 L nasal cannula at baseline  CKD 4  Hypothyroidism  History of lung cancer status post left lower lobe lobectomy  History of breast cancer  Hyperglycemia likely due to steroids        Patient admitted for further evaluation and treatment  Nephrology consulted thank you for assistance  Continue dexamethasone 6 mg daily to complete a 10-day course  Continue to monitor leukopenia  Continue Brovana, Pulmicort and DuoNebs scheduled  Continue albuterol nebs as needed  Continue bronchopulmonary hygiene protocol  Complete 3-day course of azithromycin  Continue Lasix 40 mg IV twice daily  Continue strict I's and O's and daily weights  Continue monitor renal function electrolytes closely during diuresis  Continue supplemental oxygen and titrate as needed to keep sats between 90 and 96%  Continue home levothyroxine  Start sliding scale insulin ACHS  PT OT consults evaluate and treat  Further inpatient orders recommendations pending clinical course         Discussed plan with bedside RN as well as Dr. Orosco nephrology attending.    Disposition: PT OT eval's pending    VTE Prophylaxis:  Pharmacologic & mechanical VTE prophylaxis orders are present.        CODE STATUS:   Code Status (Patient has no pulse and is not breathing): CPR (Attempt to  Resuscitate)  Medical Interventions (Patient has pulse or is breathing): Full Support  Level Of Support Discussed With: Patient

## 2025-04-15 NOTE — THERAPY EVALUATION
Patient Name: Yelena Sanchez  : 1935    MRN: [id number]                              Today's Date: 4/15/2025       Admit Date: 2025    Visit Dx:     ICD-10-CM ICD-9-CM   1. Acute congestive heart failure, unspecified heart failure type  I50.9 428.0   2. COPD exacerbation  J44.1 491.21   3. Hypoxia  R09.02 799.02   4. Acute on chronic respiratory failure with hypoxia  J96.21 518.84     799.02   5. Coronavirus infection  B34.2 079.89   6. Impaired mobility and ADLs  Z74.09 V49.89    Z78.9      Patient Active Problem List   Diagnosis    Iron deficiency anemia    Primary osteoarthritis of left knee    Chronic combined systolic and diastolic congestive heart failure    Presence of biventricular implantable cardioverter-defibrillator (ICD)    Coronary artery disease involving native coronary artery of native heart without angina pectoris    Mixed hyperlipidemia    Essential hypertension    Trochanteric bursitis of right hip    Sciatica of right side    Carotid artery disease    Acute on chronic systolic heart failure    Closed fracture of neck of right humerus    HTN (hypertension)    HLD (hyperlipidemia)    Hypothyroidism    Effusion of right elbow    Renal insufficiency    Musculoskeletal pain    Osteoarthritis of left knee    Fracture    ICD (implantable cardioverter-defibrillator) battery depletion    COPD with acute exacerbation    History of lung cancer    Dyspnea    Need for vaccination with 20-polyvalent pneumococcal conjugate vaccine    Nocturnal hypoxia    MDS (myelodysplastic syndrome)    History of breast cancer    Acute on chronic respiratory failure with hypercapnia    Acute on chronic congestive heart failure    Iron deficiency    COVID     Past Medical History:   Diagnosis Date    Anemia     Arthritis     Asthma     Bicipitoradial bursitis     Breast cancer     CHF (congestive heart failure)     Congestive heart failure     COPD (chronic obstructive pulmonary disease)     Diverticulosis  11/26/2014    GERD (gastroesophageal reflux disease)     History of tobacco abuse     HTN (hypertension)     Hyperlipemia     Hyperthyroidism     ICD (implantable cardioverter-defibrillator), biventricular, in situ     Neck mass     Seasonal allergies     SOB (shortness of breath)     TIA (transient ischemic attack)     YRS AGO, NO RESIDUALS     Past Surgical History:   Procedure Laterality Date    COLONOSCOPY  2014    ENDOSCOPY  2014    EYE SURGERY      Implant; yes     ICD GENERATOR REPLACEMENT N/A 11/14/2024    Procedure: ICD Generator Change - Bi-Ventricular -[employer];  Surgeon: JANETTE Gautam MD;  Location: [facility] CATH [facility]VASIVE LOCATION;  Service: Cardiovascular;  Laterality: N/A;    LUNG LOBECTOMY Left     Left lower lobe    LUNG SURGERY      LUNG SURGERY Left     LEFT UPPER LOBE (2004)    MASTECTOMY      Left     MASTECTOMY Left     OTHER SURGICAL HISTORY      Joint Surgery    PACEMAKER IMPLANTATION        General Information       Row Name 04/15/25 2766          OT Time and Intention    Document Type evaluation  -AC     Mode of Treatment individual therapy;occupational therapy  -AC     Patient Effort good  -AC       Row Name 04/15/25 1349          General Information    Patient Profile Reviewed yes  -AC     Prior Level of Function --  patient very [place]. States she wears O2 at home, (I) with adls and has a walker and BSC.  -AC     Existing Precautions/Restrictions fall;oxygen therapy device and L/min  -AC     Barriers to Rehab none identified  -AC       Row Name 04/15/25 0990          Occupational Profile    Reason for Services/Referral (Occupational Profile) Pt. is a 89year old female admitted for the above diagnosis. Pt. referred to OT services to assess independence with ADLs and adl transfers/fx'l mobility. No previous OT services for current condition.  -AC       Row Name 04/15/25 7918          Living Environment    Current Living Arrangements home  -AC     People in Home spouse  -AC       Row  Name 04/15/25 1341          Cognition    Orientation Status (Cognition) oriented x 3  -       Row Name 04/15/25 1341          Safety Issues/Impairments Affecting Functional Mobility    Impairments Affecting Function (Mobility) balance;endurance/activity tolerance;shortness of breath;strength  -               User Key  (r) = Recorded By, (t) = Taken By, (c) = Cosigned By      Initials Name Provider Type     An Topete OT Occupational Therapist                     Mobility/ADL's       Row Name 04/15/25 1343          Bed Mobility    Bed Mobility bed mobility (all) activities  -     All Activities, [place] (Bed Mobility) minimum assist (75% patient effort);verbal cues;1 person assist  -     Bed Mobility, Safety Issues decreased use of legs for bridging/pushing;decreased use of arms for pushing/pulling  -     Assistive Device (Bed Mobility) head of bed elevated;bed rails  -       Row Name 04/15/25 1343          Transfers    Transfers sit-stand transfer  -       Row Name 04/15/25 134          Sit-Stand Transfer    Sit-Stand [place] (Transfers) contact guard;1 person assist  -     Assistive Device (Sit-Stand Transfers) walker, front-wheeled  -       Row Name 04/15/25 1343          Functional Mobility    Functional Mobility- Ind. Level contact guard assist;verbal cues required;1 person  -     Functional Mobility- Device walker, front-wheeled  -AC     Functional Mobility- Comment Patient was contact-guard assist with rolling walker for edge of bed to bathroom, to sink and back to edge of bed.  -       Row Name 04/15/25 1343          Activities of Daily Living    BADL Assessment/Intervention --  Patient is set up for self-feeding, set up for grooming, set up for upper body bathing/dressing, min assist for lower body bathing/dressing, contact-guard assist for toileting.  -               User Key  (r) = Recorded By, (t) = Taken By, (c) = Cosigned By      Initials Name Provider Type    AC  An Topete OT Occupational Therapist                   Obj/Interventions       Row Name 04/15/25 1344          Sensory Assessment (Somatosensory)    Sensory Assessment (Somatosensory) UE sensation intact  -AC       Row Name 04/15/25 1344          Vision Assessment/Intervention    Visual Impairment/Limitations WFL  -AC       Row Name 04/15/25 1344          Range of Motion Comprehensive    General Range of Motion bilateral upper extremity ROM WNL  -AC       Row Name 04/15/25 1344          Motor Skills    Motor Skills coordination;functional endurance  -AC     Coordination WFL  -     Functional Endurance fair for adls  -AC       Row Name 04/15/25 1344          Balance    Balance Assessment standing dynamic balance  -AC     Dynamic Standing Balance contact guard;verbal cues;1-person assist  -AC     Position/Device Used, Standing Balance supported;walker, front-wheeled  -     Balance Interventions standing;sit to stand;supported;dynamic;minimal challenge;occupation based/functional task  -               User Key  (r) = Recorded By, (t) = Taken By, (c) = Cosigned By      Initials Name Provider Type    AC An Topete OT Occupational Therapist                   Goals/Plan       Row Name 04/15/25 1346          Bed Mobility Goal 1 (OT)    Activity/Assistive Device (Bed Mobility Goal 1, OT) bed mobility activities, all  -AC     [place] Level/Cues Needed (Bed Mobility Goal 1, OT) modified independence  -AC     Time Frame (Bed Mobility Goal 1, OT) long term goal (LTG);10 days  -AC       Row Name 04/15/25 1346          Transfer Goal 1 (OT)    Activity/Assistive Device (Transfer Goal 1, OT) transfers, all  -AC     [place] Level/Cues Needed (Transfer Goal 1, OT) modified independence  -AC     Time Frame (Transfer Goal 1, OT) long term goal (LTG);10 days  -AC       Row Name 04/15/25 1346          Bathing Goal 1 (OT)    Activity/Device (Bathing Goal 1, OT) bathing skills, all  -AC     [place] Level/Cues  Needed (Bathing Goal 1, OT) modified independence  -AC     Time Frame (Bathing Goal 1, OT) long term goal (LTG);10 days  -AC       Row Name 04/15/25 1346          Dressing Goal 1 (OT)    Activity/Device (Dressing Goal 1, OT) dressing skills, all  -AC     [place]/Cues Needed (Dressing Goal 1, OT) modified independence  -AC     Time Frame (Dressing Goal 1, OT) long term goal (LTG);10 days  -AC       Row Name 04/15/25 1346          Toileting Goal 1 (OT)    Activity/Device (Toileting Goal 1, OT) toileting skills, all  -AC     [place] Level/Cues Needed (Toileting Goal 1, OT) modified independence  -AC     Time Frame (Toileting Goal 1, OT) long term goal (LTG);10 days  -AC       Row Name 04/15/25 1346          Grooming Goal 1 (OT)    Activity/Device (Grooming Goal 1, OT) grooming skills, all  -AC     [place] (Grooming Goal 1, OT) modified independence  -AC     Time Frame (Grooming Goal 1, OT) long term goal (LTG);10 days  -AC       Row Name 04/15/25 1346          Strength Goal 1 (OT)    Strength Goal 1 (OT) Patient will demonstrate bilateral upper extremity strength to 5/5 for safe and independent ADLs.  -AC     Time Frame (Strength Goal 1, OT) long term goal (LTG);10 days  -AC       Row Name 04/15/25 1346          Problem Specific Goal 1 (OT)    Problem Specific Goal 1 (OT) Patient will demonstrate good activity tolerance with safe and independent ADLs.  -AC     Time Frame (Problem Specific Goal 1, OT) long term goal (LTG);10 days  -AC       Row Name 04/15/25 1346          Therapy Assessment/Plan (OT)    Planned Therapy Interventions (OT) activity tolerance training;functional balance retraining;occupation/activity based interventions;patient/caregiver education/training;transfer/mobility retraining;ROM/therapeutic exercise;BADL retraining  -AC               User Key  (r) = Recorded By, (t) = Taken By, (c) = Cosigned By      Initials Name Provider Type    An [place]ongo OT Occupational Therapist                    Clinical Impression       Row Name 04/15/25 1340          Pain Assessment    Pretreatment Pain Rating 0/10 - no pain  -AC     Posttreatment Pain Rating 0/10 - no pain  -AC       Row Name 04/15/25 9553          Plan of Care Review    Plan of Care Reviewed With patient  -     Progress no change  -     Outcome Evaluation Patient presents with balance, endurance, strength limitations that impede his/her ability to perform ADLS. The skills of a therapist are necessary to maximize independence with ADLs.  -       Row Name 04/15/25 8187          Therapy Assessment/Plan (OT)    Patient/Family Therapy Goal Statement (OT) Patient would like to maximize independence with adls.  -     Rehab Potential (OT) good  -     Criteria for Skilled Therapeutic Interventions Met (OT) yes;skilled treatment is necessary;meets criteria  -     Therapy Frequency (OT) 5 times/wk  -       Row Name 04/15/25 3818          Therapy Plan Review/Discharge Plan (OT)    Equipment Needs Upon Discharge (OT) walker, rolling;commode chair  -AC     Anticipated Discharge Disposition (OT) inpatient rehabilitation facility  -       Row Name 04/15/25 0317          Positioning and Restraints    Pre-Treatment Position in bed  -AC     Post Treatment Position bed  -AC     In Bed fowlers;call light within reach;encouraged to call for assist;exit alarm on  -               User Key  (r) = Recorded By, (t) = Taken By, (c) = Cosigned By      Initials Name Provider Type    AC An Topete, LEAH Occupational Therapist                   Outcome Measures       Row Name 04/15/25 7911          How much help from another is currently needed...    Putting on and taking off regular lower body clothing? 3  -AC     Bathing (including washing, rinsing, and drying) 3  -AC     Toileting (which includes using toilet bed pan or urinal) 3  -AC     Putting on and taking off regular upper body clothing 4  -AC     Taking care of personal grooming (such as brushing  teeth) 4  -AC     Eating meals 4  -AC     AM-PAC 6 Clicks Score (OT) 21  -AC       Row Name 04/15/25 0731          How much help from another person do you currently need...    Turning from your back to your side while in flat bed without using bedrails? 3  -AS     Moving from lying on back to sitting on the side of a flat bed without bedrails? 3  -AS     Moving to and from a bed to a chair (including a wheelchair)? 3  -AS     Standing up from a chair using your arms (e.g., wheelchair, bedside chair)? 3  -AS     Climbing 3-5 steps with a railing? 2  -AS     To walk in hospital room? 3  -AS     AM-PAC 6 Clicks Score (PT) 17  -AS       Row Name 04/15/25 1348          Functional Assessment    Outcome Measure Options Optimal Instrument;AM-PAC 6 Clicks Daily Activity (OT)  -AC       Row Name 04/15/25 1348          Optimal Instrument    Optimal Instrument Optimal - 3  -AC     Bending/Stooping 2  -AC     Standing 2  -AC     Reaching 1  -AC     From the list, choose the 3 activities you would most like to be able to do without any difficulty Bending/stooping;Standing;Reaching  -AC     Total Score Optimal - 3 5  -AC               User Key  (r) = Recorded By, (t) = Taken By, (c) = Cosigned By      Initials Name Provider Type    An [place]elongo OT Occupational Therapist    AS Jacquelyn Evans, RN Registered Nurse                    Occupational Therapy Education       Title: PT OT SLP Therapies (Done)       Topic: Occupational Therapy (Done)       Point: ADL training (Done)       Learning Progress Summary            Patient Acceptance, E, VU by  at 4/15/2025 1349                      Point: Home exercise program (Done)       Learning Progress Summary            Patient Acceptance, E, VU by  at 4/15/2025 1349                      Point: Precautions (Done)       Learning Progress Summary            Patient Acceptance, E, VU by  at 4/15/2025 1349                      Point: Body mechanics (Done)       Learning Progress  Summary            Patient Acceptance, E, VU by  at 4/15/2025 1349                                      User Key       Initials Effective Dates Name Provider Type Discipline     06/16/21 -  An Topete OT Occupational Therapist OT                  OT Recommendation and Plan  Planned Therapy Interventions (OT): activity tolerance training, functional balance retraining, occupation/activity based interventions, patient/caregiver education/training, transfer/mobility retraining, ROM/therapeutic exercise, BADL retraining  Therapy Frequency (OT): 5 times/wk  Plan of Care Review  Plan of Care Reviewed With: patient  Progress: no change  Outcome Evaluation: Patient presents with balance, endurance, strength limitations that impede his/her ability to perform ADLS. The skills of a therapist are necessary to maximize independence with ADLs.     Time Calculation:   Evaluation Complexity (OT)  Review Occupational Profile/Medical/Therapy History Complexity: expanded/moderate complexity  Assessment, Occupational Performance/Identification of Deficit Complexity: 1-3 performance deficits  Clinical Decision Making Complexity (OT): problem focused assessment/low complexity  Overall Complexity of Evaluation (OT): low complexity     Time Calculation- OT       Row Name 04/15/25 1351             Time Calculation- OT    OT Received On 04/15/25  -      OT Goal Re-Cert Due Date 04/24/25  -         Untimed Charges    OT Eval/Re-eval Minutes 32  -AC         Total Minutes    Untimed Charges Total Minutes 32  -AC       Total Minutes 32  -AC                User Key  (r) = Recorded By, (t) = Taken By, (c) = Cosigned By      Initials Name Provider Type     An Topete OT Occupational Therapist                  Therapy Charges for Today       Code Description Service Date Service Provider Modifiers Qty    [id number] HC OT EVAL LOW COMPLEXITY 3 4/15/2025 An Topete OT GO 1                 An Topete OT  4/15/2025

## 2025-04-15 NOTE — PROGRESS NOTES
" LOS: 2 days   Patient Care Team:  Kaelyn Eugene MD as PCP - General (Family Medicine)  Simon Talbot MD as Consulting Physician (Cardiology)  Kealyn Eugene MD (Family Medicine)  Theodore Marquez MD as Consulting Physician (Pulmonary Disease)    Chief Complaint: CKD4    Subjective     Pt comfortable on NC  Still some dyspnea/cough  No new events.    History taken from: patient chart RN    Objective     Vital Sign Min/Max for last 24 hours  Temp  Min: 97.5 °F (36.4 °C)  Max: 98.2 °F (36.8 °C)   BP  Min: 101/62  Max: 119/51   Pulse  Min: 69  Max: 104   Resp  Min: 18  Max: 20   SpO2  Min: 91 %  Max: 98 %   Flow (L/min) (Oxygen Therapy)  Min: 3  Max: 3   Weight  Min: 67.9 kg (149 lb 11.1 oz)  Max: 67.9 kg (149 lb 11.1 oz)     Flowsheet Rows      Flowsheet Row First Filed Value   Admission Height 149.9 cm (59\") Documented at 04/13/2025 1116   Admission Weight 67 kg (147 lb 11.3 oz) Documented at 04/13/2025 1116            No intake/output data recorded.  I/O last 3 completed shifts:  In: -   Out: 600 [Urine:600]    Objective:  General Appearance:  Comfortable.    Vital signs: (most recent): Blood pressure 103/67, pulse 90, temperature 97.7 °F (36.5 °C), temperature source Oral, resp. rate 20, height 149.9 cm (59\"), weight 67.9 kg (149 lb 11.1 oz), SpO2 91%.  Vital signs are normal.    HEENT: Normal HEENT exam.    Lungs:  Normal effort.  There are rhonchi.    Heart: Normal rate.  Regular rhythm.    Abdomen: Abdomen is soft.  Bowel sounds are normal.  Hyperactive bowel sounds.   There is no abdominal tenderness.     Extremities: Normal range of motion.  There is no dependent edema.    Pulses: Distal pulses are intact.    Neurological: Patient is alert and oriented to person, place and time.    Pupils:  Pupils are equal, round, and reactive to light.    Skin:  Warm and dry.                Results Review:     I reviewed the patient's new clinical results.  I reviewed the patient's new imaging " "results and agree with the interpretation.  I reviewed the patient's other test results and agree with the interpretation    WBC WBC   Date Value Ref Range Status   04/15/2025 11.69 (H) 3.40 - 10.80 10*3/mm3 Final   04/14/2025 2.61 (L) 3.40 - 10.80 10*3/mm3 Final   04/13/2025 5.50 3.40 - 10.80 10*3/mm3 Final      HGB Hemoglobin   Date Value Ref Range Status   04/15/2025 11.4 (L) 12.0 - 15.9 g/dL Final   04/14/2025 11.5 (L) 12.0 - 15.9 g/dL Final   04/13/2025 12.2 12.0 - 15.9 g/dL Final      HCT Hematocrit   Date Value Ref Range Status   04/15/2025 35.3 34.0 - 46.6 % Final   04/14/2025 36.0 34.0 - 46.6 % Final   04/13/2025 39.4 34.0 - 46.6 % Final      Platlets No results found for: \"LABPLAT\"   MCV MCV   Date Value Ref Range Status   04/15/2025 92.9 79.0 - 97.0 fL Final   04/14/2025 93.8 79.0 - 97.0 fL Final   04/13/2025 97.0 79.0 - 97.0 fL Final          Sodium Sodium   Date Value Ref Range Status   04/15/2025 135 (L) 136 - 145 mmol/L Final   04/14/2025 138 136 - 145 mmol/L Final   04/13/2025 137 136 - 145 mmol/L Final      Potassium Potassium   Date Value Ref Range Status   04/15/2025 4.3 3.5 - 5.2 mmol/L Final   04/14/2025 4.1 3.5 - 5.2 mmol/L Final   04/13/2025 4.2 3.5 - 5.2 mmol/L Final      Chloride Chloride   Date Value Ref Range Status   04/15/2025 101 98 - 107 mmol/L Final   04/14/2025 102 98 - 107 mmol/L Final   04/13/2025 101 98 - 107 mmol/L Final      CO2 CO2   Date Value Ref Range Status   04/15/2025 20.6 (L) 22.0 - 29.0 mmol/L Final   04/14/2025 20.4 (L) 22.0 - 29.0 mmol/L Final   04/13/2025 19.9 (L) 22.0 - 29.0 mmol/L Final      BUN BUN   Date Value Ref Range Status   04/15/2025 92 (H) 8 - 23 mg/dL Final   04/14/2025 72 (H) 8 - 23 mg/dL Final   04/13/2025 69 (H) 8 - 23 mg/dL Final      Creatinine Creatinine   Date Value Ref Range Status   04/15/2025 2.96 (H) 0.57 - 1.00 mg/dL Final   04/14/2025 2.49 (H) 0.57 - 1.00 mg/dL Final   04/13/2025 2.38 (H) 0.57 - 1.00 mg/dL Final      Calcium Calcium   Date " "Value Ref Range Status   04/15/2025 8.4 (L) 8.6 - 10.5 mg/dL Final   04/14/2025 8.8 8.6 - 10.5 mg/dL Final   04/13/2025 9.1 8.6 - 10.5 mg/dL Final      PO4 No results found for: \"CAPO4\"   Albumin Albumin   Date Value Ref Range Status   04/13/2025 3.6 3.5 - 5.2 g/dL Final      Magnesium Magnesium   Date Value Ref Range Status   04/15/2025 2.4 1.6 - 2.4 mg/dL Final   04/14/2025 2.3 1.6 - 2.4 mg/dL Final   04/13/2025 2.3 1.6 - 2.4 mg/dL Final      Uric Acid No results found for: \"URICACID\"     Medication Review:   allopurinol, 100 mg, Oral, BID  apixaban, 2.5 mg, Oral, Q12H  arformoterol, 15 mcg, Nebulization, BID - RT  ascorbic acid, 500 mg, Oral, Daily  azithromycin, 500 mg, Oral, Q24H  budesonide, 0.5 mg, Nebulization, BID - RT  dexAMETHasone, 6 mg, Intravenous, Daily  ferrous sulfate, 325 mg, Oral, BID With Meals  furosemide, 40 mg, Intravenous, BID Diuretics  guaiFENesin, 1,200 mg, Oral, Q12H  insulin lispro, 2-7 Units, Subcutaneous, 4x Daily AC & at Bedtime  ipratropium-albuterol, 3 mL, Nebulization, Q4H While Awake - RT  levothyroxine, 100 mcg, Oral, QAM AC  pantoprazole, 40 mg, Oral, Q AM  rosuvastatin, 10 mg, Oral, Nightly  sodium chloride, 10 mL, Intravenous, Q12H          Assessment & Plan       COVID      Assessment & Plan  - 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  She is off ARB.  Creatinine is not far from baseline at this time but she has possibly increased volume.  Started Lasix 40 mg IV twice daily, but creatinine up today and bp lower.  Will hold for today and monitor.  She has known proteinuria.    - COVID-pneumonia, per primary.  On iv steroids.    - Congestive heart failure, diuretics as above.  Spironolactone is on hold.    - Hypertension, blood pressure a little low now.    - Anemia, mild.  Monitor.    - Hypothyroidism, treated, per primary.    Oliver Otero MD  04/15/25  07:43 " EDT

## 2025-04-15 NOTE — PROGRESS NOTES
Respiratory Therapist Broncho-Pulmonary Hygiene Progress Note      Patient Name:  Yelena Sanchez  YOB: 1935    Yelena Sanchez meets the qualification for Level 1 of the Bronco-Pulmonary Hygiene Protocol. This was based on my daily patient assessment and includes review of chest x-ray results, cough ability and quality, oxygenation, secretions or risk for secretion development and patient mobility.     Broncho-Pulmonary Hygiene Assessment:    Level of Movement: Actively changing positions without assistance  Alert/ oriented/ cooperative    Breath Sounds: Diminished and/or coarse rhonchi    Cough: Strong, effective    Chest X-Ray: Possible signs of consolidation and/or atelectasis or clear.     Sputum Productions: None or small amount of thin or watery secretions with effective cough    History and Physical: Chronic condition    SpO2 to Oxygen Need: greater than 92% on room air or  less than 3L nasal canula    Current SpO2 is: 97 on 3LNC    Based on this information, I have completed the following interventions: Aerobika with bronchodialtor medication or TID      Electronically signed by Denisse Garcia CRT, 04/14/25, 9:09 PM EDT.

## 2025-04-15 NOTE — PLAN OF CARE
Goal Outcome Evaluation:  Plan of Care Reviewed With: patient        Progress: improving  Outcome Evaluation: Continue airborn and contact isolation precautions d/t covid. 3L oxygen per NC. VSS. Purewick removed and pt taken to toilet with walker. Productive cough noted. Monitor blood glucose achs. RFA PIV patent. Call light in reach. Pt hard of hearing.

## 2025-04-15 NOTE — SIGNIFICANT NOTE
04/15/25 1100   Physical Therapy Time and Intention   Comment, Session Not Performed pt declined due to already ambulating today

## 2025-04-15 NOTE — PROGRESS NOTES
[facility]   Hospitalist Progress Note  Date: 4/15/2025  Patient Name: Yelena Sanchez  : 1935  MRN: [id number]  Date of admission: 2025      Subjective   Subjective     Chief Complaint: Follow up for shortness of breath    Summary: 89-year-old female with HFrEF, CKD stage IV, COPD, chronic oxygen use 3 L, history of lung cancer status post left lower lobe lobectomy who presented with shortness of breath.  Found to have COVID-pneumonia.  On baseline oxygen requirement.  Complaining of 10 days of dexamethasone.  Nephrology following given advanced CKD.  PT/OT eval pending    Interval Followup:   Feeling fine.  On baseline 3 L oxygen.  Denies worsening dyspnea or cough.  No acute complaint    Objective   Objective     Vitals:   Temp:  [97.5 °F (36.4 °C)-98.2 °F (36.8 °C)] 97.7 °F (36.5 °C)  Heart Rate:  [] 93  Resp:  [20] 20  BP: (101-129)/(51-71) 129/71  Flow (L/min) (Oxygen Therapy):  [3] 3  Physical Exam    Constitutional: Elderly female, conversant, NAD   Respiratory:  nonlabored respirations    Cardiovascular:  RRR, no edema   Gastrointestinal: soft, nondistended   Neurologic: Alert, speech clear   Skin: Extremities warm    Result Review    Result Review:  I have personally reviewed the following over the last 24 hours (07:00 to 07:00) and agree with the following findings  [x]  Laboratory  CBC          2025    11:36 2025    04:52 4/15/2025    05:15   CBC   WBC 5.50  2.61  11.69    RBC 4.06  3.84  3.80    Hemoglobin 12.2  11.5  11.4    Hematocrit 39.4  36.0  35.3    MCV 97.0  93.8  92.9    MCH 30.0  29.9  30.0    MCHC 31.0  31.9  32.3    RDW 16.1  15.9  15.9    Platelets 259  232  258      CMP          2025    11:36 2025    04:52 4/15/2025    05:15   CMP   Glucose 105  185  167    BUN 69  72  92    Creatinine 2.38  2.49  2.96    EGFR 19.2  18.2  14.8    Sodium 137  138  135    Potassium 4.2  4.1  4.3    Chloride 101  102  101    Calcium 9.1  8.8  8.4    Total Protein  6.7      Albumin 3.6      Globulin 3.1      Total Bilirubin 0.3      Alkaline Phosphatase 88      AST (SGOT) 19      ALT (SGPT) 10      Albumin/Globulin Ratio 1.2      BUN/Creatinine Ratio 29.0  28.9  31.1    Anion Gap 16.1  15.6  13.4      [x]  Microbiology  [x]  Radiology  XR Chest 1 View  Result Date: 4/13/2025  XR CHEST 1 VW Date of Exam: 4/13/2025 12:08 PM EDT Indication: SOA Triage Protocol Comparison: Chest radiograph 11/29/2024. Chest CT 1/23/2025. Findings: Stable appearing left chest wall ICD. Mediastinum: Cardiac silhouette appears unchanged and enlarged Lungs: Interval increased hazy bibasal opacities. Mildly prominent central pulmonary vasculature. Pleura: No visible pleural effusion or pneumothorax. Bones and soft tissues: No acute, displaced fracture seen.     Impression: Cardiomegaly and pulmonary vascular congestion with bibasilar hazy opacities which could represent pulmonary edema or atypical/viral infection. Electronically Signed: Edward Waldrop  4/13/2025 12:22 PM EDT  Workstation ID: [id number]    [x]  EKG/Telemetry monitor reviewed and personally interpreted: V-paced rhythm  []  Cardiology/Vascular   []  Pathology  []  Old records  []  Other:    Assessment & Plan   Assessment / Plan     Assessment/Plan:  COPD with acute exacerbation  Pneumonia secondary to COVID-19  Chronic hypoxic respiratory failure  Acute on chronic systolic congestive heart failure  History of AICD  A-fib on Eliquis  CKD stage IV  Essential hypertension  Hypothyroidism  History of lung cancer status post left lower lobe lobectomy        Continue scheduled nebulizers  Continue dexamethasone 6 mg daily  Continue Mucinex twice daily  Continue oxygen, baseline 3 L nasal cannula  Nephrology following; Lasix held due to rising creatinine.  Continue to hold spironolactone.  Avoid nephrotoxic agents.  Continue Synthroid  BP/heart rate controlled.  Amlodipine and Nebivolol on hold  Continue Eliquis 2.5 mg every 12 hours for stroke  prevention  Continue home Crestor  PT/OT evaluation pending  CBC, BMP in a.m.     Discussed plan with RN.    VTE Prophylaxis:  Pharmacologic & mechanical VTE prophylaxis orders are present.        CODE STATUS:   Code Status (Patient has no pulse and is not breathing): CPR (Attempt to Resuscitate)  Medical Interventions (Patient has pulse or is breathing): Full Support  Level Of Support Discussed With: Patient    Electronically signed by Jose M Patterson DO, 04/15/25, 9:16 AM EDT.

## 2025-04-15 NOTE — PLAN OF CARE
Goal Outcome Evaluation:  Plan of Care Reviewed With: patient        Progress: no change  Outcome Evaluation: Patient presents with balance, endurance, strength limitations that impede his/her ability to perform ADLS. The skills of a therapist are necessary to maximize independence with ADLs.    Anticipated Discharge Disposition (OT): inpatient rehabilitation facility

## 2025-04-15 NOTE — PLAN OF CARE
Goal Outcome Evaluation:  Plan of Care Reviewed With: patient        Progress: improving  Outcome Evaluation: Patient is AO x4, able to make needs known. Patient is currently on 3L NC, no signs of respiratory distress noted. Dexamethasone given during shift. Blood glucose monitored, treated per MAR. Lasix held per nephrology. No complaints of pain or discomfort at this time. No acute changes throughout shift. VSS. Continue with current plan of care.

## 2025-04-16 NOTE — PLAN OF CARE
Goal Outcome Evaluation:           Progress: no change  Outcome Evaluation: pt is A&O x4. pt is on 3l nc, pt has congested cough but no signs of respiratory distress. pt c.o constipation this shift. given prn senna. No other changes this shift. continue plan of care. Deirdre Hutchinson RN                               Detail Level: Detailed Depth Of Biopsy: dermis Was A Bandage Applied: Yes Size Of Lesion In Cm: 0 Biopsy Type: H and E Biopsy Method: double edge Personna blade Anesthesia Type: 1% lidocaine with epinephrine Anesthesia Volume In Cc (Will Not Render If 0): 0.5 Hemostasis: Drysol Wound Care: Petrolatum Dressing: bandage Destruction After The Procedure: No Type Of Destruction Used: Curettage Curettage Text: The wound bed was treated with curettage after the biopsy was performed. Cryotherapy Text: The wound bed was treated with cryotherapy after the biopsy was performed. Electrodesiccation Text: The wound bed was treated with electrodesiccation after the biopsy was performed. Electrodesiccation And Curettage Text: The wound bed was treated with electrodesiccation and curettage after the biopsy was performed. Silver Nitrate Text: The wound bed was treated with silver nitrate after the biopsy was performed. Lab: [id number] Lab Facility: [id number] Consent: Written consent was obtained and risks were reviewed including but not limited to scarring, infection, bleeding, scabbing, incomplete removal, nerve damage and allergy to anesthesia. Post-Care Instructions: I reviewed with the patient in detail post-care instructions. Patient is to keep the biopsy site dry overnight, and then apply bacitracin twice daily until healed. Patient may apply hydrogen peroxide soaks to remove any crusting. Notification Instructions: Patient will be notified of biopsy results. However, patient instructed to call the office if not contacted within 2 weeks. Billing Type: Third-Party Bill Information: Selecting Yes will display possible errors in your note based on the variables you have selected. This validation is only offered as a suggestion for you. PLEASE NOTE THAT THE VALIDATION TEXT WILL BE REMOVED WHEN YOU FINALIZE YOUR NOTE. IF YOU WANT TO FAX A PRELIMINARY NOTE YOU WILL NEED TO TOGGLE THIS TO 'NO' IF YOU DO NOT WANT IT IN YOUR FAXED NOTE.

## 2025-04-16 NOTE — THERAPY EVALUATION
Acute Care - Physical Therapy Initial Evaluation  SHAYNA Brenner     Patient Name: Yelena Sanchez  : 1935  MRN: [id number]  Today's Date: 2025      Visit Dx:     ICD-10-CM ICD-9-CM   1. Acute congestive heart failure, unspecified heart failure type  I50.9 428.0   2. COPD exacerbation  J44.1 491.21   3. Hypoxia  R09.02 799.02   4. Acute on chronic respiratory failure with hypoxia  J96.21 518.84     799.02   5. Coronavirus infection  B34.2 079.89   6. Impaired mobility and ADLs  Z74.09 V49.89    Z78.9    7. Difficulty walking  R26.2 719.7     Patient Active Problem List   Diagnosis    Iron deficiency anemia    Primary osteoarthritis of left knee    Chronic combined systolic and diastolic congestive heart failure    Presence of biventricular implantable cardioverter-defibrillator (ICD)    Coronary artery disease involving native coronary artery of native heart without angina pectoris    Mixed hyperlipidemia    Essential hypertension    Trochanteric bursitis of right hip    Sciatica of right side    Carotid artery disease    Acute on chronic systolic heart failure    Closed fracture of neck of right humerus    HTN (hypertension)    HLD (hyperlipidemia)    Hypothyroidism    Effusion of right elbow    Renal insufficiency    Musculoskeletal pain    Osteoarthritis of left knee    Fracture    ICD (implantable cardioverter-defibrillator) battery depletion    COPD with acute exacerbation    History of lung cancer    Dyspnea    Need for vaccination with 20-polyvalent pneumococcal conjugate vaccine    Nocturnal hypoxia    MDS (myelodysplastic syndrome)    History of breast cancer    Acute on chronic respiratory failure with hypercapnia    Acute on chronic congestive heart failure    Iron deficiency    COVID     Past Medical History:   Diagnosis Date    Anemia     Arthritis     Asthma     Bicipitoradial bursitis     Breast cancer     CHF (congestive heart failure)     Congestive heart failure     COPD (chronic  obstructive pulmonary disease)     Diverticulosis 11/26/2014    GERD (gastroesophageal reflux disease)     History of tobacco abuse     HTN (hypertension)     Hyperlipemia     Hyperthyroidism     ICD (implantable cardioverter-defibrillator), biventricular, in situ     Neck mass     Seasonal allergies     SOB (shortness of breath)     TIA (transient ischemic attack)     YRS AGO, NO RESIDUALS     Past Surgical History:   Procedure Laterality Date    COLONOSCOPY  2014    ENDOSCOPY  2014    EYE SURGERY      Implant; yes     ICD GENERATOR REPLACEMENT N/A 11/14/2024    Procedure: ICD Generator Change - Bi-Ventricular -[employer];  Surgeon: JANETTE Gautam MD;  Location: [facility] INVASIVE LOC[facility];  Service: Cardiovascular;  Laterality: N/A;    LUNG LOBECTOMY Left     Left lower lobe    LUNG SURGERY      LUNG SURGERY Left     LEFT UPPER LOBE (2004)    MASTECTOMY      Left     MASTECTOMY Left     OTHER SURGICAL HISTORY      Joint Surgery    PACEMAKER IMPLANTATION       PT Assessment (Last 12 Hours)       PT Evaluation and Treatment       Row Name 04/16/25 1106          Physical Therapy Time and Intention    Subjective Information complains of  pressure in her ears; trouble hearing  -DP     Document Type evaluation  -DP     Mode of Treatment individual therapy;physical therapy  -DP     Patient Effort good  -DP       Row Name 04/16/25 1106          General Information    Patient Profile Reviewed yes  -DP     Patient Observations alert;cooperative;agree to therapy  -DP     Prior Level of Function independent:;ADL's;bed mobility;all household mobility  -DP     Equipment Currently Used at Home none  -DP     Existing Precautions/Restrictions fall;oxygen therapy device and L/min  -DP       Row Name 04/16/25 110          Living Environment    Current Living Arrangements home  -DP     Home Accessibility stairs within home  -DP     People in Home spouse  -DP     Primary Care Provided by self  -DP       Row Name 04/16/25  1106          Stairs Within Home, Primary    Number of Stairs, Within Home, Primary twelve  -DP       Row Name 04/16/25 1106          Home Use of Assistive/Adaptive Equipment    Equipment Currently Used at Home --  Pt was unclear regarding her equipment use at home. She was easily distracted when asked subjective questions.  -DP       Row Name 04/16/25 1106          Cognition    Orientation Status (Cognition) oriented x 3  -DP       Row Name 04/16/25 1106          Range of Motion (ROM)    Range of Motion ROM is WFL  -DP       Row Name 04/16/25 1106          Strength (Manual Muscle Testing)    Strength (Manual Muscle Testing) strength is WFL  -DP       Row Name 04/16/25 1106          Bed Mobility    Bed Mobility supine-sit;sit-supine  -DP     Supine-Sit [place] (Bed Mobility) contact guard  -DP     Sit-Supine [place] (Bed Mobility) contact guard  required 2 person assist to move towards HOB at end of session with drawsheet  -DP       Row Name 04/16/25 1106          Transfers    Transfers sit-stand transfer;stand-sit transfer  -DP       Row Name 04/16/25 1106          Sit-Stand Transfer    Sit-Stand [place] (Transfers) contact guard  -DP     Assistive Device (Sit-Stand Transfers) walker, front-wheeled  -DP       Row Name 04/16/25 1106          Stand-Sit Transfer    Stand-Sit [place] (Transfers) contact guard  -DP     Assistive Device (Stand-Sit Transfers) walker, front-wheeled  -DP       Row Name 04/16/25 1106          Gait/Stairs (Locomotion)    Gait/Stairs Locomotion gait/ambulation assistive device;gait/ambulation independence  pt completed the first 20 ft with the front wheeled walker and the remaining 20 ft independently with CGA  -DP     [place] Level (Gait) contact guard  -DP     Assistive Device (Gait) walker, front-wheeled  -DP     Patient was able to Ambulate yes  -DP     Distance in Feet (Gait) 40  -DP       Row Name 04/16/25 1106          Balance    Balance Assessment standing  dynamic balance  -DP     Dynamic Standing Balance contact guard  -DP       Row Name 04/16/25 1106          Plan of Care Review    Outcome Evaluation Patient presents with limitations in endurance and functional mobility. She requires contact guard assist for safety with bed mobility, transfers, and ambulation. She does not have the endurance for ambulation at her baseline. She would continue to benefit from skilled PT services to address these deficits.  -DP       Row Name 04/16/25 1106          Therapy Assessment/Plan (PT)    Rehab Potential (PT) good  -DP     Criteria for Skilled Interventions Met (PT) yes;meets criteria  -DP     Therapy Frequency (PT) daily  -DP     Predicted Duration of Therapy Intervention (PT) 10 days  -DP     Problem List (PT) problems related to;mobility  -DP     Activity Limitations Related to Problem List (PT) unable to ambulate safely;unable to transfer safely  -DP       Row Name 04/16/25 1106          PT Evaluation Complexity    History, PT Evaluation Complexity no personal factors and/or comorbidities  -DP     Examination of Body Systems (PT Eval Complexity) total of 4 or more elements  -DP     Clinical Presentation (PT Evaluation Complexity) stable  -DP     Clinical Decision Making (PT Evaluation Complexity) low complexity  -DP     Overall Complexity (PT Evaluation Complexity) low complexity  -DP       Row Name 04/16/25 1106          Physical Therapy Goals    Bed Mobility Goal Selection (PT) bed mobility, PT goal 1  -DP     Transfer Goal Selection (PT) transfer, PT goal 1  -DP     Gait Training Goal Selection (PT) gait training, PT goal 1  -DP       Row Name 04/16/25 1106          Bed Mobility Goal 1 (PT)    Activity/Assistive Device (Bed Mobility Goal 1, PT) sit to supine/supine to sit  -DP     [place] Level/Cues Needed (Bed Mobility Goal 1, PT) independent  -DP     Time Frame (Bed Mobility Goal 1, PT) 10 days  -DP       Row Name 04/16/25 1106          Transfer Goal 1 (PT)     Activity/Assistive Device (Transfer Goal 1, PT) sit-to-stand/stand-to-sit  -DP     [place] Level/Cues Needed (Transfer Goal 1, PT) independent  -DP     Time Frame (Transfer Goal 1, PT) 10 days  -DP       Row Name 04/16/25 1106          Gait Training Goal 1 (PT)    Activity/Assistive Device (Gait Training Goal 1, PT) gait (walking locomotion);assistive device use;walker, rolling  -DP     [place] Level (Gait Training Goal 1, PT) independent  -DP     Distance (Gait Training Goal 1, PT) 100 ft  -DP     Time Frame (Gait Training Goal 1, PT) 10 days  -DP               User Key  (r) = Recorded By, (t) = Taken By, (c) = Cosigned By      Initials Name Provider Type    DP Angelina Hay, PT Physical Therapist                      PT Recommendation and Plan  Anticipated Discharge Disposition (PT): sub acute care setting  Planned Therapy Interventions (PT): balance training, bed mobility training, gait training, strengthening, transfer training  Therapy Frequency (PT): daily  Outcome Evaluation: Patient presents with limitations in endurance and functional mobility. She requires contact guard assist for safety with bed mobility, transfers, and ambulation. She does not have the endurance for ambulation at her baseline. She would continue to benefit from skilled PT services to address these deficits.   Outcome Measures       Row Name 04/16/25 1200             How much help from another person do you currently need...    Turning from your back to your side while in flat bed without using bedrails? 3  -DP      Moving from lying on back to sitting on the side of a flat bed without bedrails? 3  -DP      Moving to and from a bed to a chair (including a wheelchair)? 3  -DP      Standing up from a chair using your arms (e.g., wheelchair, bedside chair)? 3  -DP      Climbing 3-5 steps with a railing? 3  -DP      To walk in hospital room? 3  -DP      AM-PAC 6 Clicks Score (PT) 18  -DP                User Key  (r) = Recorded By,  (t) = Taken By, (c) = Cosigned By      Initials Name Provider Type    Angelina Rangel, PT Physical Therapist                     Time Calculation:    PT Charges       Row Name 04/16/25 1252             Time Calculation    PT Received On 04/16/25  -DP      PT Goal Re-Cert Due Date 04/25/25  -DP         Untimed Charges    PT Eval/Re-eval Minutes 25  -DP         Total Minutes    Untimed Charges Total Minutes 25  -DP       Total Minutes 25  -DP                User Key  (r) = Recorded By, (t) = Taken By, (c) = Cosigned By      Initials Name Provider Type    Angelina Rangel, PT Physical Therapist                      PT G-Codes  Outcome Measure Options: Optimal Instrument, AM-PAC 6 Clicks Daily Activity (OT)  AM-PAC 6 Clicks Score (PT): 18  AM-PAC 6 Clicks Score (OT): 21    Angelina Hay, PT  4/16/2025

## 2025-04-16 NOTE — PLAN OF CARE
Goal Outcome Evaluation:              Outcome Evaluation: Patient presents with limitations in endurance and functional mobility. She requires contact guard assist for safety with bed mobility, transfers, and ambulation. She does not have the endurance for ambulation at her baseline. She would continue to benefit from skilled PT services to address these deficits.    Anticipated Discharge Disposition (PT): sub acute care setting

## 2025-04-16 NOTE — PROGRESS NOTES
[facility]   Hospitalist Progress Note  Date: 2025  Patient Name: Yelena Sanchez  : 1935  MRN: [id number]  Date of admission: 2025      Subjective   Subjective     Chief Complaint: Follow up for shortness of breath    Summary: 89-year-old female with HFrEF, CKD stage IV, COPD, chronic oxygen use 3 L, history of lung cancer status post left lower lobe lobectomy who presented with shortness of breath.  Found to have COVID-pneumonia.  On baseline oxygen requirement.  Complaining of 10 days of dexamethasone.  Nephrology following given advanced CKD.  Rehab planned.    Interval Followup:   Afebrile, blood pressure controlled, on 3 L nasal cannula  Denies worsening dyspnea  Complaining of sinus pressure and nasal congestion  Chest congestion not much better  No fever or chills      Objective   Objective     Vitals:   Temp:  [97.5 °F (36.4 °C)-98.4 °F (36.9 °C)] 97.5 °F (36.4 °C)  Heart Rate:  [] 88  Resp:  [18-24] 20  BP: (111-136)/(54-74) 128/68  Flow (L/min) (Oxygen Therapy):  [3-10] 3  Physical Exam    Constitutional: Elderly female, conversant, NAD   Respiratory:  nonlabored respirations    Cardiovascular:  RRR, no edema   Gastrointestinal: soft, nondistended   Neurologic: Alert, speech clear   Skin: Extremities warm    Result Review    Result Review:  I have personally reviewed the following over the last 24 hours (07:00 to 07:00) and agree with the following findings  [x]  Laboratory  CBC          2025    04:52 4/15/2025    05:15 2025    05:29   CBC   WBC 2.61  11.69  13.64    RBC 3.84  3.80  3.73    Hemoglobin 11.5  11.4  11.0    Hematocrit 36.0  35.3  34.8    MCV 93.8  92.9  93.3    MCH 29.9  30.0  29.5    MCHC 31.9  32.3  31.6    RDW 15.9  15.9  15.8    Platelets 232  258  260      CMP          2025    04:52 4/15/2025    05:15 2025    05:29   CMP   Glucose 185  167  138    BUN 72  92  103    Creatinine 2.49  2.96  2.70    EGFR 18.2  14.8  16.5    Sodium 138  135   135    Potassium 4.1  4.3  4.6    Chloride 102  101  103    Calcium 8.8  8.4  8.6    BUN/Creatinine Ratio 28.9  31.1  38.1    Anion Gap 15.6  13.4  12.0      [x]  Microbiology  [x]  Radiology  XR Chest 1 View  Result Date: 4/13/2025  XR CHEST 1 VW Date of Exam: 4/13/2025 12:08 PM EDT Indication: SOA Triage Protocol Comparison: Chest radiograph 11/29/2024. Chest CT 1/23/2025. Findings: Stable appearing left chest wall ICD. Mediastinum: Cardiac silhouette appears unchanged and enlarged Lungs: Interval increased hazy bibasal opacities. Mildly prominent central pulmonary vasculature. Pleura: No visible pleural effusion or pneumothorax. Bones and soft tissues: No acute, displaced fracture seen.     Impression: Cardiomegaly and pulmonary vascular congestion with bibasilar hazy opacities which could represent pulmonary edema or atypical/viral infection. Electronically Signed: Edward Waldrop  4/13/2025 12:22 PM EDT  Workstation ID: [id number]    [x]  EKG/Telemetry monitor reviewed and personally interpreted: V-paced rhythm  []  Cardiology/Vascular   []  Pathology  []  Old records  []  Other:    Assessment & Plan   Assessment / Plan     Assessment/Plan:  COPD with acute exacerbation  Pneumonia secondary to COVID-19  Chronic hypoxic respiratory failure  Acute on chronic systolic congestive heart failure  History of AICD  A-fib on Eliquis  CKD stage IV  Essential hypertension  Hypothyroidism  History of lung cancer status post left lower lobe lobectomy        Continue scheduled nebulizers  Continue dexamethasone 6 mg daily to complete 10 days  Continue Mucinex twice daily.Add Flonase Bronchopulmonary hygiene protocol, OPEP  Wean oxygen, SpO2 goal above 90%.  Baseline 3 L  Nephrology following; Lasix held due to rising creatinine.  Creatinine improved today.  Continue to hold spironolactone.  Avoid nephrotoxic agents.  Continue Synthroid  BP/heart rate controlled.  Amlodipine and Nebivolol on hold  Continue Eliquis 2.5 mg every  12 hours for stroke prevention  Continue home Crestor  Continue PT/OT. Inpatient rehab recommended  Discontinue telemetry. Transfer to med/surg bed  MAYA LEYVA in a.m.     Discussed plan with RN.        CODE STATUS:   Code Status (Patient has no pulse and is not breathing): CPR (Attempt to Resuscitate)  Medical Interventions (Patient has pulse or is breathing): Full Support  Level Of Support Discussed With: Patient    Electronically signed by Jose M Patterson DO, 04/16/25, 11:36 AM EDT.

## 2025-04-16 NOTE — PLAN OF CARE
Goal Outcome Evaluation:  Plan of Care Reviewed With: patient        Progress: improving  Outcome Evaluation: Patient a transfer from 4MTU, no complaints of pain or discomfort since arriving to floor, +1 assist.

## 2025-04-16 NOTE — PROGRESS NOTES
" LOS: 3 days   Patient Care Team:  Kaelyn Eugene MD as PCP - General (Family Medicine)  Simon Talbot MD as Consulting Physician (Cardiology)  Kaelyn Eugene MD (Family Medicine)  Theodore Marquez MD as Consulting Physician (Pulmonary Disease)    Chief Complaint: CKD4    Subjective     Patient is complaining of hurting all over.    Still some dyspnea/cough and sore throat  No new events.  Tolerating p.o.  Poor appetite.    History taken from: patient chart RN    Objective     Vital Sign Min/Max for last 24 hours  Temp  Min: 97.5 °F (36.4 °C)  Max: 98.4 °F (36.9 °C)   BP  Min: 122/69  Max: 131/74   Pulse  Min: 77  Max: 102   Resp  Min: 18  Max: 24   SpO2  Min: 90 %  Max: 99 %   Flow (L/min) (Oxygen Therapy)  Min: 3  Max: 10   No data recorded     Flowsheet Rows      Flowsheet Row First Filed Value   Admission Height 149.9 cm (59\") Documented at 04/13/2025 1116   Admission Weight 67 kg (147 lb 11.3 oz) Documented at 04/13/2025 1116            No intake/output data recorded.  I/O last 3 completed shifts:  In: -   Out: 300 [Urine:300]    Objective:  General Appearance:  Comfortable, in no acute distress and in pain.    Vital signs: (most recent): Blood pressure 129/62, pulse 77, temperature 98.2 °F (36.8 °C), temperature source Oral, resp. rate 20, height 149.9 cm (59\"), weight 67.9 kg (149 lb 11.1 oz), SpO2 97%.  Vital signs are normal.  No fever.    Output: Producing urine.    HEENT: Normal HEENT exam.    Lungs:  Normal effort.  There are rhonchi.    Heart: Normal rate.  Regular rhythm.    Abdomen: Abdomen is soft.  Bowel sounds are normal.  Hyperactive bowel sounds.   There is no abdominal tenderness.     Extremities: Normal range of motion.  There is no dependent edema.    Pulses: Distal pulses are intact.    Neurological: Patient is alert and oriented to person, place and time.    Pupils:  Pupils are equal, round, and reactive to light.    Skin:  Warm and dry.                Results Review: " "    I reviewed the patient's new clinical results.  I reviewed the patient's new imaging results and agree with the interpretation.  I reviewed the patient's other test results and agree with the interpretation    WBC WBC   Date Value Ref Range Status   04/16/2025 13.64 (H) 3.40 - 10.80 10*3/mm3 Final   04/15/2025 11.69 (H) 3.40 - 10.80 10*3/mm3 Final   04/14/2025 2.61 (L) 3.40 - 10.80 10*3/mm3 Final      HGB Hemoglobin   Date Value Ref Range Status   04/16/2025 11.0 (L) 12.0 - 15.9 g/dL Final   04/15/2025 11.4 (L) 12.0 - 15.9 g/dL Final   04/14/2025 11.5 (L) 12.0 - 15.9 g/dL Final      HCT Hematocrit   Date Value Ref Range Status   04/16/2025 34.8 34.0 - 46.6 % Final   04/15/2025 35.3 34.0 - 46.6 % Final   04/14/2025 36.0 34.0 - 46.6 % Final      Platlets No results found for: \"LABPLAT\"   MCV MCV   Date Value Ref Range Status   04/16/2025 93.3 79.0 - 97.0 fL Final   04/15/2025 92.9 79.0 - 97.0 fL Final   04/14/2025 93.8 79.0 - 97.0 fL Final          Sodium Sodium   Date Value Ref Range Status   04/16/2025 135 (L) 136 - 145 mmol/L Final   04/15/2025 135 (L) 136 - 145 mmol/L Final   04/14/2025 138 136 - 145 mmol/L Final      Potassium Potassium   Date Value Ref Range Status   04/16/2025 4.6 3.5 - 5.2 mmol/L Final   04/15/2025 4.3 3.5 - 5.2 mmol/L Final   04/14/2025 4.1 3.5 - 5.2 mmol/L Final      Chloride Chloride   Date Value Ref Range Status   04/16/2025 103 98 - 107 mmol/L Final   04/15/2025 101 98 - 107 mmol/L Final   04/14/2025 102 98 - 107 mmol/L Final      CO2 CO2   Date Value Ref Range Status   04/16/2025 20.0 (L) 22.0 - 29.0 mmol/L Final   04/15/2025 20.6 (L) 22.0 - 29.0 mmol/L Final   04/14/2025 20.4 (L) 22.0 - 29.0 mmol/L Final      BUN BUN   Date Value Ref Range Status   04/16/2025 103 (H) 8 - 23 mg/dL Final   04/15/2025 92 (H) 8 - 23 mg/dL Final   04/14/2025 72 (H) 8 - 23 mg/dL Final      Creatinine Creatinine   Date Value Ref Range Status   04/16/2025 2.70 (H) 0.57 - 1.00 mg/dL Final   04/15/2025 2.96 " "(H) 0.57 - 1.00 mg/dL Final   04/14/2025 2.49 (H) 0.57 - 1.00 mg/dL Final      Calcium Calcium   Date Value Ref Range Status   04/16/2025 8.6 8.6 - 10.5 mg/dL Final   04/15/2025 8.4 (L) 8.6 - 10.5 mg/dL Final   04/14/2025 8.8 8.6 - 10.5 mg/dL Final      PO4 No results found for: \"CAPO4\"   Albumin No results found for: \"ALBUMIN\"     Magnesium Magnesium   Date Value Ref Range Status   04/15/2025 2.4 1.6 - 2.4 mg/dL Final   04/14/2025 2.3 1.6 - 2.4 mg/dL Final      Uric Acid No results found for: \"URICACID\"     Medication Review:   allopurinol, 100 mg, Oral, BID  apixaban, 2.5 mg, Oral, Q12H  arformoterol, 15 mcg, Nebulization, BID - RT  ascorbic acid, 500 mg, Oral, Daily  budesonide, 0.5 mg, Nebulization, BID - RT  dexAMETHasone, 6 mg, Oral, Daily With Breakfast  ferrous sulfate, 325 mg, Oral, BID With Meals  fluticasone, 2 spray, Each Nare, Daily  [Held by provider] furosemide, 40 mg, Intravenous, BID Diuretics  guaiFENesin, 1,200 mg, Oral, Q12H  insulin lispro, 2-7 Units, Subcutaneous, 4x Daily AC & at Bedtime  levothyroxine, 100 mcg, Oral, QAM AC  pantoprazole, 40 mg, Oral, Q AM  rosuvastatin, 10 mg, Oral, Nightly  sodium chloride, 10 mL, Intravenous, Q12H          Assessment & Plan       COVID      Assessment & Plan  - 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  She is off ARB.  Creatinine is not far from baseline at this time.  Creatinine is better with Lasix on hold.  Monitor closely.   She has known proteinuria.    - COVID-pneumonia, per primary.  On iv steroids.    - Congestive heart failure, Lasix and spironolactone are on hold as above.     - Hypertension, blood pressure a little low now.    - Anemia, mild.  Monitor.    - Hypothyroidism, treated, per primary.    Will follow.    Otilia Orosco MD  04/16/25  16:53 EDT          "

## 2025-04-17 NOTE — PLAN OF CARE
Goal Outcome Evaluation:  Plan of Care Reviewed With: patient        Progress: improving  Outcome Evaluation: pt is A&O x4. on 3L NC. Medicated per MAR. Medicated for pain. Bed alarm on. Call light within reach. Ambulated to bedside commode with assistance. No other concerns at this time. Continue plan of care.

## 2025-04-17 NOTE — PLAN OF CARE
Goal Outcome Evaluation:  Plan of Care Reviewed With: patient        Progress: no change  Outcome Evaluation: alert and oriented x 4. up with x 1 assistance/walker this shift. pt ambulated in room this shift. currently on 3L nasal cannula. blood glucose monitored. medicated for pain.

## 2025-04-17 NOTE — PROGRESS NOTES
[facility]   Hospitalist Progress Note  Date: 2025  Patient Name: Yelena Sanchez  : 1935  MRN: [id number]  Date of admission: 2025      Subjective   Subjective     Chief Complaint: Follow up for shortness of breath    Summary: 89-year-old female with HFrEF, CKD stage IV, COPD, chronic oxygen use 3 L, history of lung cancer status post left lower lobe lobectomy who presented with shortness of breath.  Found to have COVID-pneumonia.  On baseline oxygen requirement.  Complaining of 10 days of dexamethasone.  Nephrology following given advanced CKD.  Rehab planned.    Interval Followup:   Afebrile, blood pressure controlled, on 3 L nasal cannula  Not much change in congestion  Dyspnea about the same  Complaining of left-sided chest pain and indigestion  No palpitations  No fever or chills    Objective   Objective     Vitals:   Temp:  [97.3 °F (36.3 °C)-98.7 °F (37.1 °C)] 97.3 °F (36.3 °C)  Heart Rate:  [70-89] 86  Resp:  [15-20] 15  BP: (115-140)/(60-69) 115/69  Flow (L/min) (Oxygen Therapy):  [3] 3  Physical Exam    Constitutional: Elderly female, conversant, NAD   Respiratory:  nonlabored respirations    Cardiovascular:  RRR, no edema   Gastrointestinal: soft, nd, nt   Neurologic: Alert, speech clear   Skin: Extremities warm    Result Review    Result Review:  I have personally reviewed the following over the last 24 hours (07:00 to 07:00) and agree with the following findings  [x]  Laboratory  CBC          2025    04:52 4/15/2025    05:15 2025    05:29   CBC   WBC 2.61  11.69  13.64    RBC 3.84  3.80  3.73    Hemoglobin 11.5  11.4  11.0    Hematocrit 36.0  35.3  34.8    MCV 93.8  92.9  93.3    MCH 29.9  30.0  29.5    MCHC 31.9  32.3  31.6    RDW 15.9  15.9  15.8    Platelets 232  258  260      CMP          4/15/2025    05:15 2025    05:29 2025    04:43   CMP   Glucose 167  138  132    BUN 92  103  94    Creatinine 2.96  2.70  2.39    EGFR 14.8  16.5  19.1    Sodium 135  135   140    Potassium 4.3  4.6  5.2    Chloride 101  103  106    Calcium 8.4  8.6  9.6    BUN/Creatinine Ratio 31.1  38.1  39.3    Anion Gap 13.4  12.0  12.7      [x]  Microbiology  [x]  Radiology  XR Chest 1 View  Result Date: 4/13/2025  XR CHEST 1 VW Date of Exam: 4/13/2025 12:08 PM EDT Indication: SOA Triage Protocol Comparison: Chest radiograph 11/29/2024. Chest CT 1/23/2025. Findings: Stable appearing left chest wall ICD. Mediastinum: Cardiac silhouette appears unchanged and enlarged Lungs: Interval increased hazy bibasal opacities. Mildly prominent central pulmonary vasculature. Pleura: No visible pleural effusion or pneumothorax. Bones and soft tissues: No acute, displaced fracture seen.     Impression: Cardiomegaly and pulmonary vascular congestion with bibasilar hazy opacities which could represent pulmonary edema or atypical/viral infection. Electronically Signed: Edward Waldrop  4/13/2025 12:22 PM EDT  Workstation ID: [id number]    []  EKG/Telemetry   []  Cardiology/Vascular   []  Pathology  []  Old records  []  Other:    Assessment & Plan   Assessment / Plan     Assessment/Plan:  COPD with acute exacerbation  Chest pain  Pneumonia secondary to COVID-19  Chronic hypoxic respiratory failure  Acute on chronic systolic congestive heart failure  History of AICD  A-fib on Eliquis  CKD stage IV  Essential hypertension  Hypothyroidism  History of lung cancer status post left lower lobe lobectomy        Repeat chest x-ray, BNP  Check EKG and troponin  Continue scheduled nebulizers  Continue dexamethasone 6 mg daily to complete 10 days  Continue Mucinex twice daily, flonase, Bronchopulmonary hygiene protocol  Wean oxygen, SpO2 goal above 90%.  Baseline 3 L/min  Nephrology following; Lasix/spironolactone on hold.  Trend renal function and electrolytes. Avoid nephrotoxic agents.  Continue Synthroid  BP/heart rate controlled.  Amlodipine and Nebivolol on hold  Continue Eliquis 2.5 mg every 12 hours for stroke  prevention  Continue home Crestor  Continue PPI.  Add Tums 3 times daily as needed  Continue PT/OT. Inpatient rehab recommended  CBC, BMP in a.m.     Discussed plan with RN.      CODE STATUS:   Code Status (Patient has no pulse and is not breathing): CPR (Attempt to Resuscitate)  Medical Interventions (Patient has pulse or is breathing): Full Support  Level Of Support Discussed With: Patient    Electronically signed by Jose M Patterson DO, 04/17/25, 1:34 PM EDT.

## 2025-04-17 NOTE — PROGRESS NOTES
" LOS: 4 days   Patient Care Team:  Kaelyn Eugene MD as PCP - General (Family Medicine)  Simon Talbot MD as Consulting Physician (Cardiology)  Kaelyn Eugene MD (Family Medicine)  Theodore Marquez MD as Consulting Physician (Pulmonary Disease)    Chief Complaint: CKD4    Subjective     Still some dyspnea/cough and sore throat  No new events.  Tolerating p.o.  Poor appetite.    History taken from: patient chart RN    Objective     Vital Sign Min/Max for last 24 hours  Temp  Min: 97.3 °F (36.3 °C)  Max: 98.7 °F (37.1 °C)   BP  Min: 115/69  Max: 140/61   Pulse  Min: 70  Max: 89   Resp  Min: 15  Max: 20   SpO2  Min: 90 %  Max: 98 %   Flow (L/min) (Oxygen Therapy)  Min: 3  Max: 3   Weight  Min: 65.7 kg (144 lb 13.5 oz)  Max: 65.7 kg (144 lb 13.5 oz)     Flowsheet Rows      Flowsheet Row First Filed Value   Admission Height 149.9 cm (59\") Documented at 04/13/2025 1116   Admission Weight 67 kg (147 lb 11.3 oz) Documented at 04/13/2025 1116            No intake/output data recorded.  I/O last 3 completed shifts:  In: -   Out: 325 [Urine:325]    Objective:  General Appearance:  Comfortable, in no acute distress and in pain.    Vital signs: (most recent): Blood pressure 115/69, pulse 86, temperature 97.3 °F (36.3 °C), temperature source Oral, resp. rate 15, height 149.9 cm (59\"), weight 65.7 kg (144 lb 13.5 oz), SpO2 90%.  Vital signs are normal.  No fever.    Output: Producing urine.    HEENT: Normal HEENT exam.    Lungs:  Normal effort.  There are rhonchi.    Heart: Normal rate.  Regular rhythm.    Abdomen: Abdomen is soft.  Bowel sounds are normal.  Hyperactive bowel sounds.   There is no abdominal tenderness.     Extremities: Normal range of motion.  There is no dependent edema.    Pulses: Distal pulses are intact.    Neurological: Patient is alert and oriented to person, place and time.    Pupils:  Pupils are equal, round, and reactive to light.    Skin:  Warm and dry.                Results " "Review:     I reviewed the patient's new clinical results.  I reviewed the patient's new imaging results and agree with the interpretation.  I reviewed the patient's other test results and agree with the interpretation    WBC WBC   Date Value Ref Range Status   04/16/2025 13.64 (H) 3.40 - 10.80 10*3/mm3 Final   04/15/2025 11.69 (H) 3.40 - 10.80 10*3/mm3 Final      HGB Hemoglobin   Date Value Ref Range Status   04/16/2025 11.0 (L) 12.0 - 15.9 g/dL Final   04/15/2025 11.4 (L) 12.0 - 15.9 g/dL Final      HCT Hematocrit   Date Value Ref Range Status   04/16/2025 34.8 34.0 - 46.6 % Final   04/15/2025 35.3 34.0 - 46.6 % Final      Platlets No results found for: \"LABPLAT\"   MCV MCV   Date Value Ref Range Status   04/16/2025 93.3 79.0 - 97.0 fL Final   04/15/2025 92.9 79.0 - 97.0 fL Final          Sodium Sodium   Date Value Ref Range Status   04/17/2025 140 136 - 145 mmol/L Final   04/16/2025 135 (L) 136 - 145 mmol/L Final   04/15/2025 135 (L) 136 - 145 mmol/L Final      Potassium Potassium   Date Value Ref Range Status   04/17/2025 5.2 3.5 - 5.2 mmol/L Final   04/16/2025 4.6 3.5 - 5.2 mmol/L Final   04/15/2025 4.3 3.5 - 5.2 mmol/L Final      Chloride Chloride   Date Value Ref Range Status   04/17/2025 106 98 - 107 mmol/L Final   04/16/2025 103 98 - 107 mmol/L Final   04/15/2025 101 98 - 107 mmol/L Final      CO2 CO2   Date Value Ref Range Status   04/17/2025 21.3 (L) 22.0 - 29.0 mmol/L Final   04/16/2025 20.0 (L) 22.0 - 29.0 mmol/L Final   04/15/2025 20.6 (L) 22.0 - 29.0 mmol/L Final      BUN BUN   Date Value Ref Range Status   04/17/2025 94 (H) 8 - 23 mg/dL Final   04/16/2025 103 (H) 8 - 23 mg/dL Final   04/15/2025 92 (H) 8 - 23 mg/dL Final      Creatinine Creatinine   Date Value Ref Range Status   04/17/2025 2.39 (H) 0.57 - 1.00 mg/dL Final   04/16/2025 2.70 (H) 0.57 - 1.00 mg/dL Final   04/15/2025 2.96 (H) 0.57 - 1.00 mg/dL Final      Calcium Calcium   Date Value Ref Range Status   04/17/2025 9.6 8.6 - 10.5 mg/dL Final " "  04/16/2025 8.6 8.6 - 10.5 mg/dL Final   04/15/2025 8.4 (L) 8.6 - 10.5 mg/dL Final      PO4 No results found for: \"CAPO4\"   Albumin No results found for: \"ALBUMIN\"     Magnesium Magnesium   Date Value Ref Range Status   04/15/2025 2.4 1.6 - 2.4 mg/dL Final      Uric Acid No results found for: \"URICACID\"     Medication Review:   allopurinol, 100 mg, Oral, BID  apixaban, 2.5 mg, Oral, Q12H  arformoterol, 15 mcg, Nebulization, BID - RT  ascorbic acid, 500 mg, Oral, Daily  budesonide, 0.5 mg, Nebulization, BID - RT  dexAMETHasone, 6 mg, Oral, Daily With Breakfast  ferrous sulfate, 325 mg, Oral, BID With Meals  fluticasone, 2 spray, Each Nare, Daily  [Held by provider] furosemide, 40 mg, Intravenous, BID Diuretics  guaiFENesin, 1,200 mg, Oral, Q12H  insulin lispro, 2-7 Units, Subcutaneous, 4x Daily AC & at Bedtime  levothyroxine, 100 mcg, Oral, QAM AC  pantoprazole, 40 mg, Oral, Q AM  polyethylene glycol, 17 g, Oral, Once  rosuvastatin, 10 mg, Oral, Nightly  senna-docusate sodium, 1 tablet, Oral, BID  sodium chloride, 10 mL, Intravenous, Q12H          Assessment & Plan       COVID      Assessment & Plan  - 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  She is off ARB.  Creatinine back to baseline.  Creatinine is better with Lasix on hold.  Monitor closely.   She has known proteinuria.    - COVID-pneumonia, per primary.  On iv steroids.    - Congestive heart failure, Lasix and spironolactone are on hold as above.     - Hypertension, blood pressure a little low now.    - Anemia, mild.  Monitor.    - Hypothyroidism, treated, per primary.    Oliver Otero MD  04/17/25  08:44 EDT          "

## 2025-04-18 NOTE — THERAPY EVALUATION
Acute Care - Speech Language Pathology   Swallow Initial Evaluation SHAYNA Brenner     Patient Name: Yelena Sanchez  : 1935  MRN: [id number]  Today's Date: 2025               Admit Date: 2025    Visit Dx:     ICD-10-CM ICD-9-CM   1. Acute congestive heart failure, unspecified heart failure type  I50.9 428.0   2. COPD exacerbation  J44.1 491.21   3. Hypoxia  R09.02 799.02   4. Acute on chronic respiratory failure with hypoxia  J96.21 518.84     799.02   5. Coronavirus infection  B34.2 079.89   6. Impaired mobility and ADLs  Z74.09 V49.89    Z78.9    7. Difficulty walking  R26.2 719.7   8. Oropharyngeal dysphagia  R13.12 787.22     Patient Active Problem List   Diagnosis    Iron deficiency anemia    Primary osteoarthritis of left knee    Chronic combined systolic and diastolic congestive heart failure    Presence of biventricular implantable cardioverter-defibrillator (ICD)    Coronary artery disease involving native coronary artery of native heart without angina pectoris    Mixed hyperlipidemia    Essential hypertension    Trochanteric bursitis of right hip    Sciatica of right side    Carotid artery disease    Acute on chronic systolic heart failure    Closed fracture of neck of right humerus    HTN (hypertension)    HLD (hyperlipidemia)    Hypothyroidism    Effusion of right elbow    Renal insufficiency    Musculoskeletal pain    Osteoarthritis of left knee    Fracture    ICD (implantable cardioverter-defibrillator) battery depletion    COPD with acute exacerbation    History of lung cancer    Dyspnea    Need for vaccination with 20-polyvalent pneumococcal conjugate vaccine    Nocturnal hypoxia    MDS (myelodysplastic syndrome)    History of breast cancer    Acute on chronic respiratory failure with hypercapnia    Acute on chronic congestive heart failure    Iron deficiency    COVID     Past Medical History:   Diagnosis Date    Anemia     Arthritis     Asthma     Bicipitoradial bursitis     Breast  cancer     CHF (congestive heart failure)     Congestive heart failure     COPD (chronic obstructive pulmonary disease)     Diverticulosis 11/26/2014    GERD (gastroesophageal reflux disease)     History of tobacco abuse     HTN (hypertension)     Hyperlipemia     Hyperthyroidism     ICD (implantable cardioverter-defibrillator), biventricular, in situ     Neck mass     Seasonal allergies     SOB (shortness of breath)     TIA (transient ischemic attack)     YRS AGO, NO RESIDUALS     Past Surgical History:   Procedure Laterality Date    COLONOSCOPY  2014    ENDOSCOPY  2014    EYE SURGERY      Implant; yes     ICD GENERATOR REPLACEMENT N/A 11/14/2024    Procedure: ICD Generator Change - Bi-Ventricular -[employer];  Surgeon: JANETTE Gautam MD;  Location: [facility] INVASIVE LOCATION;  Service: Cardiovascular;  Laterality: N/A;    LUNG LOBECTOMY Left     Left lower lobe    LUNG SURGERY      LUNG SURGERY Left     LEFT UPPER LOBE (2004)    MASTECTOMY      Left     MASTECTOMY Left     OTHER SURGICAL HISTORY      Joint Surgery    PACEMAKER IMPLANTATION               Inpatient Speech Pathology Dysphagia Evaluation        PAIN SCALE: None indicated.    PRECAUTIONS/CONTRAINDICATIONS: Standard    SUSPECTED ABUSE/NEGLECT/EXPLOITATION: None indicated.    SOCIAL/PSYCHOLOGICAL NEEDS/BARRIERS: None indicated.    PAST SOCIAL HISTORY: 89-year-old female lives at home with her spouse    PRIOR FUNCTION: On regular diet    PATIENT GOALS/EXPECTATIONS: Continue eating orally, regular diet    HISTORY: 89-year-old female with the above diagnosis referred for speech therapy evaluation to assess for swallowing.  No previous speech pathology services are reported this incident.  Patient stating complaint of dry mouth.    CURRENT DIET LEVEL: Regular, thin    OBJECTIVE:    TEST ADMINISTERED: Clinical dysphagia evaluation    COGNITION/SAFETY AWARENESS: Patient follow directions and responded to questions    BEHAVIORAL OBSERVATIONS: Alert  and cooperative    ORAL MOTOR EXAM: Grossly within limits.  Poor dentition, does not wear dentures    VOICE QUALITY: Adequate    REFLEX EXAM: Deferred    POSTURE: Assisted sitting upright in bed    FEEDING/SWALLOWING FUNCTION: Assessed with  thin liquids, puréed solids, crunchy solid.    CLINICAL OBSERVATIONS:  Thin liquid by cup and by straw with minimal delay, vocal quality remaining clear to cervical auscultation.  Purée solid with swallow completed with laryngeal elevation noted to palpation.  Regular solid with extended chewing followed by swallow completed clearing the oral cavity.    DYSPHAGIA CRITERIA: Risk of aspiration    FUNCTIONAL ASSESSMENT INSTRUMENT: Patient currently scored a level 6 of 7 on Functional Communication Measures for swallowing indicating a 1-19% limitation in function.    ASSESSMENT/ PLAN OF CARE:  Pt presents with limitations, noted below, that impede her ability to swallow safely and maintain nutrition. The skills of a therapist will be required to safely and effectively implement the following treatment plan to restore maximal level of function.    PROBLEMS:  1.  Swallow delay, risk of aspiration                       LTG 1: 30 days: Patient will tolerate least restrictive diet utilizing appropriate positioning and strategies with minimal assistance.                       STG 1a: 14 days: Patient will tolerate diet of regular solids cut small and thin liquids with minimal assistance for strategies.                       STG 1b: 14 days:  Patient/family education.                       TREATMENT: Dysphagia therapy to address swallow function through exercises and education of strategies.     FREQUENCY/DURATION: Once daily 5 times per week    REHAB POTENTIAL:  Pt has fair to good rehab potential.  The following limitations may influence improvement/ length of tx: Medical status.    RECOMMENDATIONS:   1.   DIET: Regular solids cut small additional moisture, thin liquid.    2.  POSITION:  Positioning fully upright for all p.o. intake and 30 minutes following.    3.  COMPENSATORY STRATEGIES: Alternate small bites and small sips of solids and liquids at a slow rate.    Pt/responsible party agrees with plan of care and has been informed of all alternatives, risks and benefits.                            Anticipated Discharge Disposition (SLP): skilled nursing facility (04/18/25 1350)                                                               EDUCATION  The patient has been educated in the following areas:   Modified Diet Instruction.                Time Calculation:    Time Calculation- SLP       Row Name 04/18/25 1350             Time Calculation- SLP    SLP Start Time 1305  -TB      SLP Stop Time 1350  -TB      SLP Time Calculation (min) 45 min  -TB      SLP Received On 04/18/25  -TB         Untimed Charges    17048-HE Eval Oral Pharyng Swallow Minutes 45  -TB         Total Minutes    Untimed Charges Total Minutes 45  -TB       Total Minutes 45  -TB                User Key  (r) = Recorded By, (t) = Taken By, (c) = Cosigned By      Initials Name Provider Type    TB Leana Hu SLP Speech and Language Pathologist                    Therapy Charges for Today       Code Description Service Date Service Provider Modifiers Qty    [id number] HC ST EVAL ORAL PHARYNG SWALLOW 3 4/18/2025 Leana Hu SLP GN 1                 JEMAL Lackey  4/18/2025

## 2025-04-18 NOTE — PROGRESS NOTES
RT EQUIPMENT DEVICE RELATED - SKIN ASSESSMENT    Respiratory Therapist Broncho-Pulmonary Hygiene Progress Note      Patient Name:  Yelena Sanchez  YOB: 1935    Yelena Sanchez meets the qualification for Level 1 of the Bronco-Pulmonary Hygiene Protocol. This was based on my daily patient assessment and includes review of chest x-ray results, cough ability and quality, oxygenation, secretions or risk for secretion development and patient mobility.     Broncho-Pulmonary Hygiene Assessment:    Level of Movement: Actively changing positions without assistance  Alert/ oriented/ cooperative    Breath Sounds: Diminished and/or coarse rhonchi    Cough: Ineffective, weak, frequent    Chest X-Ray: Mild consolidation and/or atelectasis.    Sputum Productions: None or small amount of thin or watery secretions with effective cough    History and Physical: Chronic condition    SpO2 to Oxygen Need: greater than 92% on 4-6L nasal canula    Current SpO2 is: 95 on 4L nc    Based on this information, I have completed the following interventions: Aerobika with bronchodialtor medication or TID      Electronically signed by Zulema Mosqueda RRT, 04/18/25, 7:18 PM EDT.      Zulema Mosqueda RRT

## 2025-04-18 NOTE — PROGRESS NOTES
[facility]   Hospitalist Progress Note  Date: 2025  Patient Name: Yelena Sanchez  : 1935  MRN: [id number]  Date of admission: 2025      Subjective   Subjective     Chief Complaint: Follow up for shortness of breath    Summary: 89-year-old female with HFrEF, CKD stage IV, COPD, chronic oxygen use 3 L, MDS, history of lung cancer status post left lower lobe lobectomy who presented with shortness of breath.  Chest x-ray bilateral infiltrates.  Initial WBC 5.5. Found to have COVID-pneumonia.  On nebs, dexamethasone. Nephrology following given advanced CKD.  Rehab planned.    Interval Followup:   No fevers overnight.  Blood pressure stable.  Oxygen up to 5 L nasal cannula  Feels more fatigued, persistent chest congestion, complaining of myalgias all over, had some coughing after water/pills    Objective   Objective     Vitals:   Temp:  [97.5 °F (36.4 °C)-98.9 °F (37.2 °C)] 98.9 °F (37.2 °C)  Heart Rate:  [80-97] 86  Resp:  [16-18] 18  BP: (120-150)/(62-77) 127/77  Flow (L/min) (Oxygen Therapy):  [3-8] 8  Physical Exam    Constitutional: Elderly female, conversant   Respiratory:  nonlabored respirations    Cardiovascular:  RRR, no edema   Gastrointestinal: soft, nd, nt   Neurologic: Alert, speech clear   Skin: Extremities warm, no rashes    Result Review    Result Review:  I have personally reviewed the following over the last 24 hours (07:00 to 07:00) and agree with the following findings  [x]  Laboratory  CBC          2025    04:52 4/15/2025    05:15 2025    05:29   CBC   WBC 2.61  11.69  13.64    RBC 3.84  3.80  3.73    Hemoglobin 11.5  11.4  11.0    Hematocrit 36.0  35.3  34.8    MCV 93.8  92.9  93.3    MCH 29.9  30.0  29.5    MCHC 31.9  32.3  31.6    RDW 15.9  15.9  15.8    Platelets 232  258  260      CMP          4/15/2025    05:15 2025    05:29 2025    04:43   CMP   Glucose 167  138  132    BUN 92  103  94    Creatinine 2.96  2.70  2.39    EGFR 14.8  16.5  19.1    Sodium  135  135  140    Potassium 4.3  4.6  5.2    Chloride 101  103  106    Calcium 8.4  8.6  9.6    BUN/Creatinine Ratio 31.1  38.1  39.3    Anion Gap 13.4  12.0  12.7      [x]  Microbiology  [x]  Radiology  XR Chest 1 View  Result Date: 4/13/2025  XR CHEST 1 VW Date of Exam: 4/13/2025 12:08 PM EDT Indication: SOA Triage Protocol Comparison: Chest radiograph 11/29/2024. Chest CT 1/23/2025. Findings: Stable appearing left chest wall ICD. Mediastinum: Cardiac silhouette appears unchanged and enlarged Lungs: Interval increased hazy bibasal opacities. Mildly prominent central pulmonary vasculature. Pleura: No visible pleural effusion or pneumothorax. Bones and soft tissues: No acute, displaced fracture seen.     Impression: Cardiomegaly and pulmonary vascular congestion with bibasilar hazy opacities which could represent pulmonary edema or atypical/viral infection. Electronically Signed: Edward Waldrop  4/13/2025 12:22 PM EDT  Workstation ID: [id number]    []  EKG/Telemetry   []  Cardiology/Vascular   []  Pathology  []  Old records  [x]  Other:    Intake/Output Summary (Last 24 hours) at 4/18/2025 1523  Last data filed at 4/18/2025 0511  Gross per 24 hour   Intake 360 ml   Output 380 ml   Net -20 ml         Assessment & Plan   Assessment / Plan     Assessment/Plan:  COPD with acute exacerbation  Chest pain, atypical  Pneumonia secondary to COVID-19  Chronic hypoxic respiratory failure  Acute on chronic systolic congestive heart failure  History of AICD  A-fib on Eliquis  CKD stage IV  Essential hypertension  Hypothyroidism  History of MDS  History of lung cancer status post left lower lobe lobectomy        Low-grade fever, white count up to 18,000, CXR with persistent bilateral infiltrates,  proBNP up to 14,000,   Obtain CT chest without contrast  IV Lasix 40 mg x 1  Start IV Unasyn  Repeat Pro-Tejas  Continue scheduled nebulizers  Continue dexamethasone 6 mg daily to complete 10 days  Continue Mucinex twice daily, Flonase,  Bronchopulmonary hygiene protocol, OPEP  Wean oxygen, SpO2 goal above 90%.  Baseline 3 L/min  Creatinine near baseline. Nephrology following; appreciate recommendation. Trend renal function and electrolytes. Avoid nephrotoxic agents.  Continue Synthroid  BP/heart rate controlled.  Amlodipine and Nebivolol on hold  Continue Eliquis 2.5 mg every 12 hours for stroke prevention  Continue home Crestor  Continue PPI.  Continue Tums 3 times daily as needed  SLP consult: Regular solids/thin liquids  PT/OT. Inpatient rehab recommended  CBC, BMP in a.m.     Discussed plan with RN.    I spent greater than 50 minutes minutes of time for evaluation and management of this patient including review of chart, labs pertinent imaging available as well as medical decision making and discussion with physicians, staff and patient.       CODE STATUS:   Code Status (Patient has no pulse and is not breathing): CPR (Attempt to Resuscitate)  Medical Interventions (Patient has pulse or is breathing): Full Support  Level Of Support Discussed With: Patient    Electronically signed by Jose M Patterson DO, 04/18/25, 3:24 PM EDT.

## 2025-04-18 NOTE — PLAN OF CARE
Goal Outcome Evaluation:  Plan of Care Reviewed With: patient        Progress: no change  Outcome Evaluation: A&O x4. On 3L NC. Medicated per MAR. Fleet enema given this shift and pt tolerated it well. Had a BM after. Ambulated to bedside commode. Bed alarm on. Call light within reach. No other concerns at this time. Continue plan of care.

## 2025-04-18 NOTE — PLAN OF CARE
Goal Outcome Evaluation:  Plan of Care Reviewed With: patient      ASSESSMENT/ PLAN OF CARE:  Pt presents with limitations, noted below, that impede her ability to swallow safely and maintain nutrition. The skills of a therapist will be required to safely and effectively implement the following treatment plan to restore maximal level of function.    PROBLEMS:  1.  Swallow delay, risk of aspiration                       LTG 1: 30 days: Patient will tolerate least restrictive diet utilizing appropriate positioning and strategies with minimal assistance.                       STG 1a: 14 days: Patient will tolerate diet of regular solids cut small and thin liquids with minimal assistance for strategies.                       STG 1b: 14 days:  Patient/family education.                       TREATMENT: Dysphagia therapy to address swallow function through exercises and education of strategies.     FREQUENCY/DURATION: Once daily 5 times per week    REHAB POTENTIAL:  Pt has fair to good rehab potential.  The following limitations may influence improvement/ length of tx: Medical status.    RECOMMENDATIONS:   1.   DIET: Regular solids cut small additional moisture, thin liquid.    2.  POSITION: Positioning fully upright for all p.o. intake and 30 minutes following.    3.  COMPENSATORY STRATEGIES: Alternate small bites and small sips of solids and liquids at a slow rate.            Anticipated Discharge Disposition (SLP): skilled nursing facility

## 2025-04-18 NOTE — PROGRESS NOTES
" LOS: 5 days   Patient Care Team:  Kaelyn Eugene MD as PCP - General (Family Medicine)  Simon Talbot MD as Consulting Physician (Cardiology)  Kaelyn Eugene MD (Family Medicine)  Theodore Marquez MD as Consulting Physician (Pulmonary Disease)    Chief Complaint: CKD4    Subjective     Still some dyspnea/cough but feeling better overall.    Labs were late to be drawn today.  Results reviewed.  Tolerating p.o.  Poor appetite.    History taken from: patient chart RN    Objective     Vital Sign Min/Max for last 24 hours  Temp  Min: 97.6 °F (36.4 °C)  Max: 100.1 °F (37.8 °C)   BP  Min: 122/62  Max: 144/66   Pulse  Min: 81  Max: 97   Resp  Min: 16  Max: 18   SpO2  Min: 85 %  Max: 97 %   Flow (L/min) (Oxygen Therapy)  Min: 3  Max: 8   Weight  Min: 67.9 kg (149 lb 11.1 oz)  Max: 67.9 kg (149 lb 11.1 oz)     Flowsheet Rows      Flowsheet Row First Filed Value   Admission Height 149.9 cm (59\") Documented at 04/13/2025 1116   Admission Weight 67 kg (147 lb 11.3 oz) Documented at 04/13/2025 1116            No intake/output data recorded.  I/O last 3 completed shifts:  In: 600 [P.O.:600]  Out: 705 [Urine:705]    Objective:  General Appearance:  Comfortable, in no acute distress and not in pain.    Vital signs: (most recent): Blood pressure 135/71, pulse 85, temperature 100.1 °F (37.8 °C), temperature source Oral, resp. rate 18, height 149.9 cm (59\"), weight 67.9 kg (149 lb 11.1 oz), SpO2 93%.  Vital signs are normal.  No fever.    Output: Producing urine.    HEENT: Normal HEENT exam.    Lungs:  Normal effort.  There are rhonchi.    Heart: Normal rate.  Regular rhythm.    Abdomen: Abdomen is soft.  Bowel sounds are normal.  Hyperactive bowel sounds.   There is no abdominal tenderness.     Extremities: Normal range of motion.  There is dependent edema.    Pulses: Distal pulses are intact.    Neurological: Patient is alert and oriented to person, place and time.    Skin:  Warm and dry.  " "              Results Review:     I reviewed the patient's new clinical results.  I reviewed the patient's new imaging results and agree with the interpretation.  I reviewed the patient's other test results and agree with the interpretation    WBC WBC   Date Value Ref Range Status   04/18/2025 18.70 (H) 3.40 - 10.80 10*3/mm3 Final   04/16/2025 13.64 (H) 3.40 - 10.80 10*3/mm3 Final      HGB Hemoglobin   Date Value Ref Range Status   04/18/2025 11.6 (L) 12.0 - 15.9 g/dL Final   04/16/2025 11.0 (L) 12.0 - 15.9 g/dL Final      HCT Hematocrit   Date Value Ref Range Status   04/18/2025 37.2 34.0 - 46.6 % Final   04/16/2025 34.8 34.0 - 46.6 % Final      Platlets No results found for: \"LABPLAT\"   MCV MCV   Date Value Ref Range Status   04/18/2025 94.9 79.0 - 97.0 fL Final   04/16/2025 93.3 79.0 - 97.0 fL Final          Sodium Sodium   Date Value Ref Range Status   04/18/2025 136 136 - 145 mmol/L Final   04/17/2025 140 136 - 145 mmol/L Final   04/16/2025 135 (L) 136 - 145 mmol/L Final      Potassium Potassium   Date Value Ref Range Status   04/18/2025 5.4 (H) 3.5 - 5.2 mmol/L Final     Comment:     Slight hemolysis detected by analyzer. Result may be falsely elevated.   04/17/2025 5.2 3.5 - 5.2 mmol/L Final   04/16/2025 4.6 3.5 - 5.2 mmol/L Final      Chloride Chloride   Date Value Ref Range Status   04/18/2025 104 98 - 107 mmol/L Final   04/17/2025 106 98 - 107 mmol/L Final   04/16/2025 103 98 - 107 mmol/L Final      CO2 CO2   Date Value Ref Range Status   04/18/2025 18.5 (L) 22.0 - 29.0 mmol/L Final   04/17/2025 21.3 (L) 22.0 - 29.0 mmol/L Final   04/16/2025 20.0 (L) 22.0 - 29.0 mmol/L Final      BUN BUN   Date Value Ref Range Status   04/18/2025 75 (H) 8 - 23 mg/dL Final   04/17/2025 94 (H) 8 - 23 mg/dL Final   04/16/2025 103 (H) 8 - 23 mg/dL Final      Creatinine Creatinine   Date Value Ref Range Status   04/18/2025 2.13 (H) 0.57 - 1.00 mg/dL Final   04/17/2025 2.39 (H) 0.57 - 1.00 mg/dL Final   04/16/2025 2.70 (H) 0.57 " "- 1.00 mg/dL Final      Calcium Calcium   Date Value Ref Range Status   04/18/2025 9.8 8.6 - 10.5 mg/dL Final   04/17/2025 9.6 8.6 - 10.5 mg/dL Final   04/16/2025 8.6 8.6 - 10.5 mg/dL Final      PO4 No results found for: \"CAPO4\"   Albumin No results found for: \"ALBUMIN\"     Magnesium No results found for: \"MG\"     Uric Acid No results found for: \"URICACID\"     Medication Review:   allopurinol, 100 mg, Oral, BID  ampicillin-sulbactam, 3 g, Intravenous, Q12H  apixaban, 2.5 mg, Oral, Q12H  arformoterol, 15 mcg, Nebulization, BID - RT  ascorbic acid, 500 mg, Oral, Daily  budesonide, 0.5 mg, Nebulization, BID - RT  dexAMETHasone, 6 mg, Oral, Daily With Breakfast  ferrous sulfate, 325 mg, Oral, BID With Meals  fluticasone, 2 spray, Each Nare, Daily  furosemide, 40 mg, Oral, BID Diuretics  guaiFENesin, 1,200 mg, Oral, Q12H  insulin lispro, 2-7 Units, Subcutaneous, 4x Daily AC & at Bedtime  ipratropium-albuterol, 3 mL, Nebulization, Q6H While Awake - RT  levothyroxine, 100 mcg, Oral, QAM AC  pantoprazole, 40 mg, Oral, Q AM  rosuvastatin, 10 mg, Oral, Nightly  senna-docusate sodium, 1 tablet, Oral, BID  sodium chloride, 10 mL, Intravenous, Q12H          Assessment & Plan       COVID      Assessment & Plan  - 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  She is off ARB.  Creatinine back to baseline.  Monitor closely.   She has known proteinuria.    - COVID-pneumonia, per primary.  On iv steroids.    - Congestive heart failure, with evidence of mild volume overload.  Resume home dose of Lasix 40 mg p.o. twice daily.  Status post 1 dose of IV Lasix earlier today per primary.  Spironolactone on hold.      - Hypokalemia, mild, should improve with loop diuretics.  Recheck in AM.    - Hypertension, blood pressure is acceptable.    - Anemia, mild.  Monitor.    - Hypothyroidism, treated, per primary.    Will follow.    Otilia Pratt" MD Kwesi  04/18/25  16:11 EDT

## 2025-04-19 NOTE — PLAN OF CARE
Goal Outcome Evaluation:  Plan of Care Reviewed With: patient        Progress: no change  Outcome Evaluation: alert and oriented to person, place, and time. up with x 1 assistance/walker. blood glucose monitored. medicated for pain. pt currently on 5L high flow nasal cannula.

## 2025-04-19 NOTE — PLAN OF CARE
Goal Outcome Evaluation:  Plan of Care Reviewed With: patient        Progress: no change  Outcome Evaluation: Patient is alert and oriented x3. PRN Tylenol administered x1 dose this shift for temp of 100.6 and slight body aches and it was effective per patient. Patient is currently on 5L/NC and SATS ranging from 90-93%. Patient has frequent productive cough and desats when coughing. VSS. Plan of care reviewed with charge nurseKaleigh.

## 2025-04-19 NOTE — PROGRESS NOTES
[facility]   Hospitalist Progress Note  Date: 2025  Patient Name: Yelena Sanchez  : 1935  MRN: [id number]  Date of admission: 2025      Subjective   Subjective     Chief Complaint: Follow up for shortness of breath    Summary: 89-year-old female with HFrEF, CKD stage IV, COPD, chronic oxygen use 3 L, MDS, history of lung cancer status post left lower lobe lobectomy who presented with shortness of breath.  Chest x-ray bilateral infiltrates.  Initial WBC 5.5. Found to have COVID-pneumonia.  On nebs, dexamethasone. Nephrology following given advanced CKD.  Rehab planned.    Interval Followup:   Tmax 100.6 this morning  BP controlled.  On 4 L nasal cannula  Feeling better, not as short of breath, denies worsening cough, no chest pain or palpitations, got up to chair with assistance    Objective   Objective     Vitals:   Temp:  [97.9 °F (36.6 °C)-100.6 °F (38.1 °C)] 98.4 °F (36.9 °C)  Heart Rate:  [] 100  Resp:  [16-22] 18  BP: (102-143)/(60-75) 102/74  Flow (L/min) (Oxygen Therapy):  [4-6] 6  Physical Exam    Constitutional: Elderly female, conversant, NAD   Respiratory:  nonlabored respirations    Cardiovascular:  RRR, no edema   Gastrointestinal: soft, nd, nt   Neurologic: Alert, speech clear   Skin: Extremities warm, no rashes    Result Review    Result Review:  I have personally reviewed the following over the last 24 hours (07:00 to 07:00) and agree with the following findings  [x]  Laboratory  CBC          2025    05:29 2025    13:54 2025    04:24   CBC   WBC 13.64  18.70  12.23    RBC 3.73  3.92  3.94    Hemoglobin 11.0  11.6  11.8    Hematocrit 34.8  37.2  38.6    MCV 93.3  94.9  98.0    MCH 29.5  29.6  29.9    MCHC 31.6  31.2  30.6    RDW 15.8  16.3  16.3    Platelets 260  249  243      CMP          2025    04:43 2025    13:54 2025    04:24   CMP   Glucose 132  155  107    BUN 94  75  75    Creatinine 2.39  2.13  2.20    EGFR 19.1  21.9  21.1    Sodium  140  136  135    Potassium 5.2  5.4  5.1    Chloride 106  104  103    Calcium 9.6  9.8  9.5    BUN/Creatinine Ratio 39.3  35.2  34.1    Anion Gap 12.7  13.5  15.8      [x]  Microbiology  [x]  Radiology  XR Chest 1 View  Result Date: 4/13/2025  XR CHEST 1 VW Date of Exam: 4/13/2025 12:08 PM EDT Indication: SOA Triage Protocol Comparison: Chest radiograph 11/29/2024. Chest CT 1/23/2025. Findings: Stable appearing left chest wall ICD. Mediastinum: Cardiac silhouette appears unchanged and enlarged Lungs: Interval increased hazy bibasal opacities. Mildly prominent central pulmonary vasculature. Pleura: No visible pleural effusion or pneumothorax. Bones and soft tissues: No acute, displaced fracture seen.     Impression: Cardiomegaly and pulmonary vascular congestion with bibasilar hazy opacities which could represent pulmonary edema or atypical/viral infection. Electronically Signed: Edward Waldrop  4/13/2025 12:22 PM EDT  Workstation ID: [id number]    []  EKG/Telemetry   []  Cardiology/Vascular   []  Pathology  []  Old records  [x]  Other:    Intake/Output Summary (Last 24 hours) at 4/19/2025 1401  Last data filed at 4/19/2025 1249  Gross per 24 hour   Intake 600 ml   Output 550 ml   Net 50 ml         Assessment & Plan   Assessment / Plan     Assessment/Plan:  COPD with acute exacerbation  Chest pain, atypical  Pneumonia secondary to COVID-19  Chronic hypoxic respiratory failure  Acute on chronic systolic congestive heart failure  History of AICD  A-fib on Eliquis  CKD stage IV  Hyperkalemia  Essential hypertension  Hypothyroidism  History of MDS  History of lung cancer status post left lower lobe lobectomy          Low-grade fever, 18,000 -> 12.2, CXR with persistent bilateral infiltrates,  proBNP 14,000  CT chest without contrast multifocal infiltrates.  Strep and Legionella urine antigen negative. Initial Pro-Tejas 0.15 with repeat 0.26  Continue IV Unasyn, day 2  Continue scheduled nebulizers  Continue dexamethasone 6  mg daily to complete 10 days  Continue Mucinex twice daily, Flonase, Bronchopulmonary hygiene protocol, OPEP  Wean oxygen, SpO2 goal above 90%.  Baseline 3 L/min  Nephrology following; appreciate recommendation.  Continue p.o. Lasix 40 mg two times daily. Trend renal function and electrolytes  Continue Synthroid  BP/heart rate controlled.  Amlodipine and Nebivolol on hold  Continue Eliquis 2.5 mg every 12 hours for stroke prevention  Continue home Crestor  Continue PPI.  Continue Tums 3 times daily as needed  SLP consult: Regular solids/thin liquids  PT/OT. Inpatient rehab recommended  CBC, BMP in a.m.     Discussed plan with RN.    CODE STATUS:   Code Status (Patient has no pulse and is not breathing): CPR (Attempt to Resuscitate)  Medical Interventions (Patient has pulse or is breathing): Full Support  Level Of Support Discussed With: Patient    Electronically signed by Jose M Patterson DO, 04/19/25, 2:05 PM EDT.

## 2025-04-19 NOTE — PROGRESS NOTES
" LOS: 6 days   Patient Care Team:  Kaelyn Eugene MD as PCP - General (Family Medicine)  Simon Talbot MD as Consulting Physician (Cardiology)  Kaelyn Eugene MD (Family Medicine)  Theodore Marquez MD as Consulting Physician (Pulmonary Disease)    Chief Complaint: CKD4    Subjective     Still some dyspnea/cough but feeling better overall.    Tolerating p.o.  Poor appetite.  She tells me that her  is being admitted through the ER.    History taken from: patient chart RN    Objective     Vital Sign Min/Max for last 24 hours  Temp  Min: 97.9 °F (36.6 °C)  Max: 100.6 °F (38.1 °C)   BP  Min: 102/74  Max: 143/75   Pulse  Min: 77  Max: 117   Resp  Min: 16  Max: 22   SpO2  Min: 91 %  Max: 97 %   Flow (L/min) (Oxygen Therapy)  Min: 4  Max: 6   No data recorded     Flowsheet Rows      Flowsheet Row First Filed Value   Admission Height 149.9 cm (59\") Documented at 04/13/2025 1116   Admission Weight 67 kg (147 lb 11.3 oz) Documented at 04/13/2025 1116            I/O this shift:  In: 240 [P.O.:240]  Out: -   I/O last 3 completed shifts:  In: 360 [P.O.:360]  Out: 930 [Urine:930]    Objective:  General Appearance:  Comfortable, in no acute distress and not in pain.    Vital signs: (most recent): Blood pressure 102/74, pulse 100, temperature 98.4 °F (36.9 °C), temperature source Oral, resp. rate 18, height 149.9 cm (59\"), weight 67.9 kg (149 lb 11.1 oz), SpO2 95%.  Vital signs are normal.  No fever.    Output: Producing urine.    HEENT: Normal HEENT exam.    Lungs:  Normal effort.  There are rhonchi.    Heart: Normal rate.  Regular rhythm.    Abdomen: Abdomen is soft.  Bowel sounds are normal.  Hyperactive bowel sounds.   There is no abdominal tenderness.     Extremities: Normal range of motion.  There is dependent edema.    Pulses: Distal pulses are intact.    Neurological: Patient is alert and oriented to person, place and time.    Skin:  Warm and dry.                Results Review:     I reviewed " "the patient's new clinical results.  I reviewed the patient's new imaging results and agree with the interpretation.  I reviewed the patient's other test results and agree with the interpretation    WBC WBC   Date Value Ref Range Status   04/19/2025 12.23 (H) 3.40 - 10.80 10*3/mm3 Final   04/18/2025 18.70 (H) 3.40 - 10.80 10*3/mm3 Final      HGB Hemoglobin   Date Value Ref Range Status   04/19/2025 11.8 (L) 12.0 - 15.9 g/dL Final   04/18/2025 11.6 (L) 12.0 - 15.9 g/dL Final      HCT Hematocrit   Date Value Ref Range Status   04/19/2025 38.6 34.0 - 46.6 % Final   04/18/2025 37.2 34.0 - 46.6 % Final      Platlets No results found for: \"LABPLAT\"   MCV MCV   Date Value Ref Range Status   04/19/2025 98.0 (H) 79.0 - 97.0 fL Final   04/18/2025 94.9 79.0 - 97.0 fL Final          Sodium Sodium   Date Value Ref Range Status   04/19/2025 135 (L) 136 - 145 mmol/L Final   04/18/2025 136 136 - 145 mmol/L Final   04/17/2025 140 136 - 145 mmol/L Final      Potassium Potassium   Date Value Ref Range Status   04/19/2025 5.1 3.5 - 5.2 mmol/L Final   04/18/2025 5.4 (H) 3.5 - 5.2 mmol/L Final     Comment:     Slight hemolysis detected by analyzer. Result may be falsely elevated.   04/17/2025 5.2 3.5 - 5.2 mmol/L Final      Chloride Chloride   Date Value Ref Range Status   04/19/2025 103 98 - 107 mmol/L Final   04/18/2025 104 98 - 107 mmol/L Final   04/17/2025 106 98 - 107 mmol/L Final      CO2 CO2   Date Value Ref Range Status   04/19/2025 16.2 (L) 22.0 - 29.0 mmol/L Final   04/18/2025 18.5 (L) 22.0 - 29.0 mmol/L Final   04/17/2025 21.3 (L) 22.0 - 29.0 mmol/L Final      BUN BUN   Date Value Ref Range Status   04/19/2025 75 (H) 8 - 23 mg/dL Final   04/18/2025 75 (H) 8 - 23 mg/dL Final   04/17/2025 94 (H) 8 - 23 mg/dL Final      Creatinine Creatinine   Date Value Ref Range Status   04/19/2025 2.20 (H) 0.57 - 1.00 mg/dL Final   04/18/2025 2.13 (H) 0.57 - 1.00 mg/dL Final   04/17/2025 2.39 (H) 0.57 - 1.00 mg/dL Final      Calcium Calcium " "  Date Value Ref Range Status   04/19/2025 9.5 8.6 - 10.5 mg/dL Final   04/18/2025 9.8 8.6 - 10.5 mg/dL Final   04/17/2025 9.6 8.6 - 10.5 mg/dL Final      PO4 No results found for: \"CAPO4\"   Albumin No results found for: \"ALBUMIN\"     Magnesium Magnesium   Date Value Ref Range Status   04/19/2025 2.5 (H) 1.6 - 2.4 mg/dL Final        Uric Acid No results found for: \"URICACID\"     Medication Review:   allopurinol, 100 mg, Oral, BID  ampicillin-sulbactam, 3 g, Intravenous, Q12H  apixaban, 2.5 mg, Oral, Q12H  arformoterol, 15 mcg, Nebulization, BID - RT  ascorbic acid, 500 mg, Oral, Daily  budesonide, 0.5 mg, Nebulization, BID - RT  dexAMETHasone, 6 mg, Oral, Daily With Breakfast  ferrous sulfate, 325 mg, Oral, BID With Meals  fluticasone, 2 spray, Each Nare, Daily  furosemide, 40 mg, Oral, BID Diuretics  guaiFENesin, 1,200 mg, Oral, Q12H  insulin lispro, 2-7 Units, Subcutaneous, 4x Daily AC & at Bedtime  ipratropium-albuterol, 3 mL, Nebulization, Q6H While Awake - RT  levothyroxine, 100 mcg, Oral, QAM AC  pantoprazole, 40 mg, Oral, Q AM  rosuvastatin, 10 mg, Oral, Nightly  senna-docusate sodium, 1 tablet, Oral, BID  sodium bicarbonate, 1,300 mg, Oral, TID  sodium chloride, 10 mL, Intravenous, Q12H          Assessment & Plan       COVID      Assessment & Plan  - 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  She is off ARB.  Creatinine back to baseline.  Monitor closely.   She has known proteinuria.    - COVID-pneumonia, per primary.  On iv steroids.    - Congestive heart failure, with evidence of mild volume overload.  Continue home dose of Lasix 40 mg p.o. twice daily.  Spironolactone on hold.      - Hyperkalemia, mild, should improve with loop diuretics.  Recheck in AM.    - Hypertension, blood pressure is acceptable.    - Anemia, mild.  Monitor.    - Hypothyroidism, treated, per primary.    - Metabolic acidosis, " add p.o. sodium bicarb and monitor.    Will follow.    Otilia Orosco MD  04/19/25  14:26 EDT

## 2025-04-20 NOTE — PROGRESS NOTES
" LOS: 7 days   Patient Care Team:  Kaelyn Eugene MD as PCP - General (Family Medicine)  Simon Talbot MD as Consulting Physician (Cardiology)  Kaelyn Eugene MD (Family Medicine)  Theodore Marquez MD as Consulting Physician (Pulmonary Disease)    Chief Complaint: CKD4    Subjective     Still some dyspnea/cough but feeling ok overall.    Tolerating p.o.  Poor appetite.  Reportedly hypoxic, requiring higher O2 rate.    History taken from: patient chart RN    Objective     Vital Sign Min/Max for last 24 hours  Temp  Min: 97.9 °F (36.6 °C)  Max: 100.6 °F (38.1 °C)   BP  Min: 103/55  Max: 151/85   Pulse  Min: 84  Max: 132   Resp  Min: 20  Max: 24   SpO2  Min: 87 %  Max: 97 %   Flow (L/min) (Oxygen Therapy)  Min: 4  Max: 11   Weight  Min: 67.3 kg (148 lb 5.9 oz)  Max: 67.3 kg (148 lb 5.9 oz)     Flowsheet Rows      Flowsheet Row First Filed Value   Admission Height 149.9 cm (59\") Documented at 04/13/2025 1116   Admission Weight 67 kg (147 lb 11.3 oz) Documented at 04/13/2025 1116            No intake/output data recorded.  I/O last 3 completed shifts:  In: 1645 [P.O.:1645]  Out: 550 [Urine:550]    Objective:  General Appearance:  Comfortable, in no acute distress and not in pain.    Vital signs: (most recent): Blood pressure 135/57, pulse 84, temperature 98.2 °F (36.8 °C), temperature source Oral, resp. rate 20, height 149.9 cm (59\"), weight 67.3 kg (148 lb 5.9 oz), SpO2 95%.  Vital signs are normal.  No fever.    Output: Producing urine.    HEENT: Normal HEENT exam.    Lungs:  Normal effort.  There are rhonchi.    Heart: Normal rate.  Regular rhythm.    Abdomen: Abdomen is soft.  Bowel sounds are normal.  Hyperactive bowel sounds.   There is no abdominal tenderness.     Extremities: Normal range of motion.  There is no dependent edema.    Pulses: Distal pulses are intact.    Neurological: Patient is alert and oriented to person, place and time.    Skin:  Warm and dry.            Velázquez in " "place.    Results Review:     I reviewed the patient's new clinical results.  I reviewed the patient's new imaging results and agree with the interpretation.  I reviewed the patient's other test results and agree with the interpretation    WBC WBC   Date Value Ref Range Status   04/19/2025 12.23 (H) 3.40 - 10.80 10*3/mm3 Final   04/18/2025 18.70 (H) 3.40 - 10.80 10*3/mm3 Final      HGB Hemoglobin   Date Value Ref Range Status   04/19/2025 11.8 (L) 12.0 - 15.9 g/dL Final   04/18/2025 11.6 (L) 12.0 - 15.9 g/dL Final      HCT Hematocrit   Date Value Ref Range Status   04/19/2025 38.6 34.0 - 46.6 % Final   04/18/2025 37.2 34.0 - 46.6 % Final      Platlets No results found for: \"LABPLAT\"   MCV MCV   Date Value Ref Range Status   04/19/2025 98.0 (H) 79.0 - 97.0 fL Final   04/18/2025 94.9 79.0 - 97.0 fL Final          Sodium Sodium   Date Value Ref Range Status   04/20/2025 136 136 - 145 mmol/L Final   04/19/2025 135 (L) 136 - 145 mmol/L Final   04/18/2025 136 136 - 145 mmol/L Final      Potassium Potassium   Date Value Ref Range Status   04/20/2025 4.9 3.5 - 5.2 mmol/L Final     Comment:     Slight hemolysis detected by analyzer. Result may be falsely elevated.   04/19/2025 5.1 3.5 - 5.2 mmol/L Final   04/18/2025 5.4 (H) 3.5 - 5.2 mmol/L Final     Comment:     Slight hemolysis detected by analyzer. Result may be falsely elevated.      Chloride Chloride   Date Value Ref Range Status   04/20/2025 101 98 - 107 mmol/L Final   04/19/2025 103 98 - 107 mmol/L Final   04/18/2025 104 98 - 107 mmol/L Final      CO2 CO2   Date Value Ref Range Status   04/20/2025 15.9 (L) 22.0 - 29.0 mmol/L Final   04/19/2025 16.2 (L) 22.0 - 29.0 mmol/L Final   04/18/2025 18.5 (L) 22.0 - 29.0 mmol/L Final      BUN BUN   Date Value Ref Range Status   04/20/2025 75 (H) 8 - 23 mg/dL Final   04/19/2025 75 (H) 8 - 23 mg/dL Final   04/18/2025 75 (H) 8 - 23 mg/dL Final      Creatinine Creatinine   Date Value Ref Range Status   04/20/2025 2.08 (H) 0.57 - " "1.00 mg/dL Final   04/19/2025 2.20 (H) 0.57 - 1.00 mg/dL Final   04/18/2025 2.13 (H) 0.57 - 1.00 mg/dL Final      Calcium Calcium   Date Value Ref Range Status   04/20/2025 9.6 8.6 - 10.5 mg/dL Final   04/19/2025 9.5 8.6 - 10.5 mg/dL Final   04/18/2025 9.8 8.6 - 10.5 mg/dL Final      PO4 No results found for: \"CAPO4\"   Albumin Albumin   Date Value Ref Range Status   04/20/2025 2.8 (L) 3.5 - 5.2 g/dL Final        Magnesium Magnesium   Date Value Ref Range Status   04/20/2025 2.5 (H) 1.6 - 2.4 mg/dL Final   04/19/2025 2.5 (H) 1.6 - 2.4 mg/dL Final        Uric Acid No results found for: \"URICACID\"     Medication Review:   allopurinol, 100 mg, Oral, BID  ampicillin-sulbactam, 3 g, Intravenous, Q12H  apixaban, 2.5 mg, Oral, Q12H  arformoterol, 15 mcg, Nebulization, BID - RT  ascorbic acid, 500 mg, Oral, Daily  budesonide, 0.5 mg, Nebulization, BID - RT  dexAMETHasone, 6 mg, Oral, Daily With Breakfast  ferrous sulfate, 325 mg, Oral, BID With Meals  fluticasone, 2 spray, Each Nare, Daily  furosemide, 40 mg, Oral, BID Diuretics  guaiFENesin, 1,200 mg, Oral, Q12H  insulin lispro, 2-7 Units, Subcutaneous, 4x Daily AC & at Bedtime  ipratropium-albuterol, 3 mL, Nebulization, Q6H While Awake - RT  levothyroxine, 100 mcg, Oral, QAM AC  metoprolol tartrate, 12.5 mg, Oral, Q12H  pantoprazole, 40 mg, Oral, Q AM  rosuvastatin, 10 mg, Oral, Nightly  senna-docusate sodium, 1 tablet, Oral, BID  sodium bicarbonate, 1,300 mg, Oral, TID  sodium chloride, 10 mL, Intravenous, Q12H          Assessment & Plan       COVID      Assessment & Plan  - 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  She is off ARB.  Creatinine back to baseline.  Monitor closely.   She has known proteinuria.    - COVID-pneumonia, per primary.  On iv steroids.    - Congestive heart failure, volume status is adequate. Continue Lasix 40 mg p.o. twice daily.  " Spironolactone on hold.      - Hyperkalemia, mild, improved with loop diuretics.  Recheck in AM.    - Hypertension, blood pressure is acceptable.    - Anemia, mild.  Monitor.    - Hypothyroidism, treated, per primary.    - Metabolic acidosis, with mild anion gap, continue p.o. sodium bicarb and monitor.  Check acetone level.     D/w primary.     Will follow.    Otilia Orosco MD  04/20/25  16:01 EDT

## 2025-04-20 NOTE — SIGNIFICANT NOTE
04/20/25 0836   Physical Therapy Time and Intention   Session Not Performed unable to treat, medical status change   Comment, Session Not Performed Pt with decreased medical status, transferring rooms shortly.

## 2025-04-20 NOTE — PROGRESS NOTES
[facility]   Hospitalist Progress Note  Date: 2025  Patient Name: Yelena Sanchez  : 1935  MRN: [id number]  Date of admission: 2025      Subjective   Subjective     Chief Complaint: Follow up for shortness of breath    Summary: 89-year-old female with HFrEF, CKD stage IV, COPD, chronic oxygen use 3 L, MDS, history of lung cancer status post left lower lobe lobectomy who presented with shortness of breath.  Chest x-ray bilateral infiltrates.  Initial WBC 5.5. Found to have COVID-pneumonia.  On nebs, dexamethasone. Nephrology following given advanced CKD.  Rehab planned.    Interval Followup:   Febrile this morning 100.6 F  Increasing oxygen requirement 10 L high flow nasal cannula  Remains alert and conversant  Dyspneic primarily with exertion  Feels chest congestion is improving  Actually states she feels better compared to yesterday    Objective   Objective     Vitals:   Temp:  [97.7 °F (36.5 °C)-100.6 °F (38.1 °C)] 100.6 °F (38.1 °C)  Heart Rate:  [] 106  Resp:  [18-24] 24  BP: ()/(58-85) 151/85  Flow (L/min) (Oxygen Therapy):  [4-11] 11  Physical Exam    Constitutional: Elderly female, conversant, NAD   Respiratory:  nonlabored respirations    Cardiovascular:  IR/IR, trace edema   Gastrointestinal: soft, nd, nt   Neurologic: Alert, speech clear   Skin: Extremities warm, no rashes    Result Review    Result Review:  I have personally reviewed the following over the last 24 hours (07:00 to 07:00) and agree with the following findings  [x]  Laboratory  CBC          2025    05:29 2025    13:54 2025    04:24   CBC   WBC 13.64  18.70  12.23    RBC 3.73  3.92  3.94    Hemoglobin 11.0  11.6  11.8    Hematocrit 34.8  37.2  38.6    MCV 93.3  94.9  98.0    MCH 29.5  29.6  29.9    MCHC 31.6  31.2  30.6    RDW 15.8  16.3  16.3    Platelets 260  249  243      CMP          2025    04:43 2025    13:54 2025    04:24   CMP   Glucose 132  155  107    BUN 94  75  75     Creatinine 2.39  2.13  2.20    EGFR 19.1  21.9  21.1    Sodium 140  136  135    Potassium 5.2  5.4  5.1    Chloride 106  104  103    Calcium 9.6  9.8  9.5    BUN/Creatinine Ratio 39.3  35.2  34.1    Anion Gap 12.7  13.5  15.8      [x]  Microbiology  [x]  Radiology  XR Chest 1 View  Result Date: 4/13/2025  XR CHEST 1 VW Date of Exam: 4/13/2025 12:08 PM EDT Indication: SOA Triage Protocol Comparison: Chest radiograph 11/29/2024. Chest CT 1/23/2025. Findings: Stable appearing left chest wall ICD. Mediastinum: Cardiac silhouette appears unchanged and enlarged Lungs: Interval increased hazy bibasal opacities. Mildly prominent central pulmonary vasculature. Pleura: No visible pleural effusion or pneumothorax. Bones and soft tissues: No acute, displaced fracture seen.     Impression: Cardiomegaly and pulmonary vascular congestion with bibasilar hazy opacities which could represent pulmonary edema or atypical/viral infection. Electronically Signed: Edward Waldrop  4/13/2025 12:22 PM EDT  Workstation ID: [id number]    []  EKG/Telemetry   []  Cardiology/Vascular   []  Pathology  []  Old records  [x]  Other:    Intake/Output Summary (Last 24 hours) at 4/20/2025 0806  Last data filed at 4/20/2025 0620  Gross per 24 hour   Intake 1285 ml   Output --   Net 1285 ml         Assessment & Plan   Assessment / Plan     Assessment/Plan:  COPD with acute exacerbation  Chest pain, atypical  Multifocal pneumonia secondary to COVID-19 and aspiration  Chronic hypoxic respiratory failure  Acute on chronic systolic congestive heart failure  History of AICD  A-fib on Eliquis  CKD stage IV  Metabolic acidosis  Hyperkalemia  Essential hypertension  Hypothyroidism  History of MDS  History of lung cancer status post left lower lobe lobectomy        Transfer to telemetry bed  Low-grade fever, 18,000 -> 12.2, CT chest without contrast multifocal infiltrates.  Strep and Legionella urine antigen negative. Initial Pro-Tejas 0.15 with repeat 0.26. proBNP  14,000  Continue IV Unasyn, day 3  Continue scheduled nebulizers  Continue dexamethasone 6 mg daily to complete 10 days  Continue Mucinex twice daily, Flonase, Bronchopulmonary hygiene protocol, OPEP  Wean oxygen, SpO2 goal above 90%.  Baseline 3 L/min  Nephrology following; appreciate recommendation.  Discussed with Dr. Orosco. Continue p.o. Lasix 40 mg two times daily. Trend renal function and electrolytes  Continue Synthroid  Blood pressure stable.  Continue to hold amlodipine  Heart rate increasing, in A-fib.  Restart beta-blocker, switch to metoprolol tartrate 12.5 mg every 12 hours  Continue Eliquis 2.5 mg every 12 hours for stroke prevention  Continue home Crestor  Continue PPI.  Continue Tums 3 times daily as needed  SLP consult: Regular solids/thin liquids  PT/OT. Inpatient rehab recommended  CBC, BMP in a.m.     Discussed plan with RN.    I spent greater than 50 minutes minutes of time for evaluation and management of this patient including review of chart, labs pertinent imaging available as well as medical decision making and discussion with physicians, staff and patient.       CODE STATUS:   Code Status (Patient has no pulse and is not breathing): CPR (Attempt to Resuscitate)  Medical Interventions (Patient has pulse or is breathing): Full Support  Level Of Support Discussed With: Patient    Electronically signed by Jose M Patterson DO, 04/20/25, 2:13 PM EDT.

## 2025-04-20 NOTE — PLAN OF CARE
Goal Outcome Evaluation:  Plan of Care Reviewed With: patient        Progress: improving  Outcome Evaluation: Pt AAOx4, VSS and no complaints of pain. Remains on 10L HFNC. Purewick placed for urgency and dribbling and pt asking for brief. Agreed to try purewick instead of brief.

## 2025-04-21 NOTE — PROGRESS NOTES
" LOS: 8 days   Patient Care Team:  Kaelyn Eugene MD as PCP - General (Family Medicine)  Simon Talbot MD as Consulting Physician (Cardiology)  Kaelyn Eugene MD (Family Medicine)  Theodore Marquez MD as Consulting Physician (Pulmonary Disease)    Chief Complaint: CKD4    Subjective     Events noted.  Transferred to ICU due to hypoxia.  Currently on BiPAP.  Feeling okay.  Lasix resumed.    Currently NPO.    History taken from: patient chart RN    Objective     Vital Sign Min/Max for last 24 hours  Temp  Min: 97.5 °F (36.4 °C)  Max: 101.5 °F (38.6 °C)   BP  Min: 94/66  Max: 148/85   Pulse  Min: 80  Max: 126   Resp  Min: 18  Max: 32   SpO2  Min: 85 %  Max: 97 %   Flow (L/min) (Oxygen Therapy)  Min: 10  Max: 60   Weight  Min: 68.6 kg (151 lb 3.8 oz)  Max: 68.6 kg (151 lb 3.8 oz)     Flowsheet Rows      Flowsheet Row First Filed Value   Admission Height 149.9 cm (59\") Documented at 04/13/2025 1116   Admission Weight 67 kg (147 lb 11.3 oz) Documented at 04/13/2025 1116            No intake/output data recorded.  I/O last 3 completed shifts:  In: 2245 [P.O.:2245]  Out: 600 [Urine:600]    Objective:  General Appearance:  Comfortable, in no acute distress and not in pain (Hard of hearing.).    Vital signs: (most recent): Blood pressure 116/66, pulse 97, temperature 98.1 °F (36.7 °C), resp. rate 19, height 149.9 cm (59\"), weight 68.6 kg (151 lb 3.8 oz), SpO2 90%.  Vital signs are normal.  No fever.    Output: Producing urine.    HEENT: Normal HEENT exam.    Lungs:  Normal effort.  There are rhonchi.    Heart: Normal rate.  Regular rhythm.    Abdomen: Abdomen is soft.  Bowel sounds are normal.  Hyperactive bowel sounds.   There is no abdominal tenderness.     Extremities: Normal range of motion.  There is no dependent edema.    Pulses: Distal pulses are intact.    Neurological: Patient is alert and oriented to person, place and time.    Skin:  Warm and dry.            Velázquez in place.    Results Review: " "    I reviewed the patient's new clinical results.  I reviewed the patient's new imaging results and agree with the interpretation.  I reviewed the patient's other test results and agree with the interpretation    WBC WBC   Date Value Ref Range Status   04/21/2025 10.19 3.40 - 10.80 10*3/mm3 Final   04/19/2025 12.23 (H) 3.40 - 10.80 10*3/mm3 Final      HGB Hemoglobin   Date Value Ref Range Status   04/21/2025 10.7 (L) 12.0 - 15.9 g/dL Final   04/19/2025 11.8 (L) 12.0 - 15.9 g/dL Final      HCT Hematocrit   Date Value Ref Range Status   04/21/2025 34.8 34.0 - 46.6 % Final   04/19/2025 38.6 34.0 - 46.6 % Final      Platlets No results found for: \"LABPLAT\"   MCV MCV   Date Value Ref Range Status   04/21/2025 96.1 79.0 - 97.0 fL Final   04/19/2025 98.0 (H) 79.0 - 97.0 fL Final          Sodium Sodium   Date Value Ref Range Status   04/21/2025 141 136 - 145 mmol/L Final   04/20/2025 136 136 - 145 mmol/L Final   04/19/2025 135 (L) 136 - 145 mmol/L Final      Potassium Potassium   Date Value Ref Range Status   04/21/2025 4.2 3.5 - 5.2 mmol/L Final   04/20/2025 4.9 3.5 - 5.2 mmol/L Final     Comment:     Slight hemolysis detected by analyzer. Result may be falsely elevated.   04/19/2025 5.1 3.5 - 5.2 mmol/L Final      Chloride Chloride   Date Value Ref Range Status   04/21/2025 103 98 - 107 mmol/L Final   04/20/2025 101 98 - 107 mmol/L Final   04/19/2025 103 98 - 107 mmol/L Final      CO2 CO2   Date Value Ref Range Status   04/21/2025 23.5 22.0 - 29.0 mmol/L Final   04/20/2025 15.9 (L) 22.0 - 29.0 mmol/L Final   04/19/2025 16.2 (L) 22.0 - 29.0 mmol/L Final      BUN BUN   Date Value Ref Range Status   04/21/2025 78 (H) 8 - 23 mg/dL Final   04/20/2025 75 (H) 8 - 23 mg/dL Final   04/19/2025 75 (H) 8 - 23 mg/dL Final      Creatinine Creatinine   Date Value Ref Range Status   04/21/2025 2.33 (H) 0.57 - 1.00 mg/dL Final   04/20/2025 2.08 (H) 0.57 - 1.00 mg/dL Final   04/19/2025 2.20 (H) 0.57 - 1.00 mg/dL Final      Calcium Calcium " "  Date Value Ref Range Status   04/21/2025 9.2 8.6 - 10.5 mg/dL Final   04/20/2025 9.6 8.6 - 10.5 mg/dL Final   04/19/2025 9.5 8.6 - 10.5 mg/dL Final      PO4 No results found for: \"CAPO4\"   Albumin Albumin   Date Value Ref Range Status   04/21/2025 2.8 (L) 3.5 - 5.2 g/dL Final   04/20/2025 2.8 (L) 3.5 - 5.2 g/dL Final        Magnesium Magnesium   Date Value Ref Range Status   04/20/2025 2.5 (H) 1.6 - 2.4 mg/dL Final   04/19/2025 2.5 (H) 1.6 - 2.4 mg/dL Final        Uric Acid No results found for: \"URICACID\"     Medication Review:   allopurinol, 100 mg, Oral, BID  ampicillin-sulbactam, 3 g, Intravenous, Q12H  apixaban, 2.5 mg, Oral, Q12H  arformoterol, 15 mcg, Nebulization, BID - RT  ascorbic acid, 500 mg, Oral, Daily  budesonide, 0.5 mg, Nebulization, BID - RT  ferrous sulfate, 325 mg, Oral, BID With Meals  fluticasone, 2 spray, Each Nare, Daily  furosemide, 40 mg, Intravenous, Q12H  guaiFENesin, 1,200 mg, Oral, Q12H  insulin lispro, 2-7 Units, Subcutaneous, 4x Daily AC & at Bedtime  ipratropium-albuterol, 3 mL, Nebulization, Q6H While Awake - RT  levothyroxine, 100 mcg, Oral, QAM AC  metoprolol tartrate, 12.5 mg, Oral, Q12H  mupirocin, 1 Application, Each Nare, BID  pantoprazole, 40 mg, Oral, Q AM  rosuvastatin, 10 mg, Oral, Nightly  senna-docusate sodium, 1 tablet, Oral, BID  sodium bicarbonate, 1,300 mg, Oral, TID  sodium chloride, 10 mL, Intravenous, Q12H          Assessment & Plan       COVID      Assessment & Plan  - 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  She is off ARB.  Creatinine back to baseline.  Monitor closely.   She has known proteinuria.    - COVID-pneumonia, with hypoxia, per primary and pulmonology.  On iv steroids.    - Congestive heart failure, volume status is adequate.  Lasix was on hold yesterday, changed to IV now she is in the ICU.  Spironolactone on hold.      - Hyperkalemia, mild, " improved with loop diuretics.  Recheck in AM.    - Hypertension, blood pressure is acceptable.    - Anemia, mild.  Monitor.    - Hypothyroidism, treated, per primary.    - Metabolic acidosis, with mild anion gap, continue p.o. sodium bicarb and monitor.  Check acetone level.     D/w ICU team.    Will follow.    Otilia Orosco MD  04/21/25  15:41 EDT

## 2025-04-21 NOTE — SIGNIFICANT NOTE
04/21/25 0821   OTHER   Discipline occupational therapist   Rehab Time/Intention   Session Not Performed patient unavailable for treatment  (with nurse)

## 2025-04-21 NOTE — CONSULTS
"   04/21/25 1044   Coping/Psychosocial   Additional Documentation Spiritual Care (Group)   Spiritual Care   Spiritual Care Visit Type other (see comments)  (pt is emotionally distraught. yelling out for water. stating that she is uncomfortable and ready to die.)   Spiritual Care Source  initiative   Receptivity to Spiritual Care visit welcomed  (\"yes, please. I need someone to sit with me. I need water\")   Spiritual Care Request coping/stress of illness support;spiritual/moral support;decision-making support  (Erica shares with me that she has a lot of medical issues on top of having covid and of how uncomfortable she is. She states that she is \"a baby bird that is dying.\" She is considering making herself Comfort Measures only.)   Spiritual Care Interventions supportive conversation provided   Response to Spiritual Care receptive of support;laughter  (Erica has a dark sense of humor and enjoys making dark jokes about death to cope with her pain.)   Use of Spiritual Resources spirituality for coping, indicated strong use of   Spiritual Care Follow-Up will follow closely  (She shares with me that her  has also been very sick and was recently in our emergnecy room.)     "

## 2025-04-21 NOTE — PLAN OF CARE
Problem: Adult Inpatient Plan of Care  Goal: Plan of Care Review  Outcome: Progressing  Flowsheets (Taken 4/21/2025 0523)  Progress: no change  Goal: Patient-Specific Goal (Individualized)  Outcome: Progressing  Goal: Absence of Hospital-Acquired Illness or Injury  Outcome: Progressing  Intervention: Identify and Manage Fall Risk  Recent Flowsheet Documentation  Taken 4/21/2025 0300 by Luci Vick RN  Safety Promotion/Fall Prevention:   activity supervised   assistive device/personal items within reach   fall prevention program maintained   lighting adjusted   nonskid shoes/slippers when out of bed   room organization consistent   safety round/check completed  Taken 4/21/2025 0100 by Luci Vick RN  Safety Promotion/Fall Prevention:   nonskid shoes/slippers when out of bed   safety round/check completed  Taken 4/20/2025 2300 by Luci Vick RN  Safety Promotion/Fall Prevention:   activity supervised   assistive device/personal items within reach   fall prevention program maintained   lighting adjusted   nonskid shoes/slippers when out of bed   room organization consistent   safety round/check completed  Taken 4/20/2025 1953 by Luci Vick RN  Safety Promotion/Fall Prevention:   activity supervised   safety round/check completed   clutter free environment maintained   nonskid shoes/slippers when out of bed   fall prevention program maintained  Intervention: Prevent Skin Injury  Recent Flowsheet Documentation  Taken 4/21/2025 0300 by Luci Vick RN  Body Position:   legs elevated   other (see comments)  Taken 4/21/2025 0100 by Luci Vick RN  Body Position: position changed independently  Taken 4/20/2025 2300 by Luci Vick RN  Body Position:   legs elevated   other (see comments)  Taken 4/20/2025 1953 by Luci Vick RN  Body Position: position changed independently  Intervention: Prevent Infection  Recent Flowsheet Documentation  Taken 4/21/2025 0300 by Nicanor  PEBBLES Verma  Infection Prevention: environmental surveillance performed  Taken 4/21/2025 0100 by Luci Vick RN  Infection Prevention:   rest/sleep promoted   hand hygiene promoted  Goal: Optimal Comfort and Wellbeing  Outcome: Progressing  Goal: Readiness for Transition of Care  Outcome: Progressing     Problem: Skin Injury Risk Increased  Goal: Skin Health and Integrity  Outcome: Progressing  Intervention: Optimize Skin Protection  Recent Flowsheet Documentation  Taken 4/21/2025 0300 by Luic Vick RN  Activity Management: bedrest  Taken 4/21/2025 0100 by Luci Vick RN  Activity Management: bedrest  Head of Bed (HOB) Positioning: HOB elevated  Taken 4/20/2025 2300 by Luci Vick RN  Activity Management: bedrest  Taken 4/20/2025 1953 by Luci Vick RN  Activity Management: bedrest  Head of Bed (HOB) Positioning: HOB at 30 degrees     Problem: Comorbidity Management  Goal: Maintenance of COPD Symptom Control  Outcome: Progressing  Goal: Maintenance of Heart Failure Symptom Control  Outcome: Progressing     Problem: Fall Injury Risk  Goal: Absence of Fall and Fall-Related Injury  Outcome: Progressing  Intervention: Promote Injury-Free Environment  Recent Flowsheet Documentation  Taken 4/21/2025 0300 by Luci Vick RN  Safety Promotion/Fall Prevention:   activity supervised   assistive device/personal items within reach   fall prevention program maintained   lighting adjusted   nonskid shoes/slippers when out of bed   room organization consistent   safety round/check completed  Taken 4/21/2025 0100 by Luci Vick RN  Safety Promotion/Fall Prevention:   nonskid shoes/slippers when out of bed   safety round/check completed  Taken 4/20/2025 2300 by Luci Vick RN  Safety Promotion/Fall Prevention:   activity supervised   assistive device/personal items within reach   fall prevention program maintained   lighting adjusted   nonskid shoes/slippers when out of bed   room  organization consistent   safety round/check completed  Taken 4/20/2025 1953 by Luci Vick RN  Safety Promotion/Fall Prevention:   activity supervised   safety round/check completed   clutter free environment maintained   nonskid shoes/slippers when out of bed   fall prevention program maintained     Problem: Sepsis/Septic Shock  Goal: Optimal Coping  Outcome: Progressing  Goal: Absence of Bleeding  Outcome: Progressing  Goal: Blood Glucose Level Within Target Range  Outcome: Progressing  Goal: Absence of Infection Signs and Symptoms  Outcome: Progressing  Intervention: Initiate Sepsis Management  Recent Flowsheet Documentation  Taken 4/21/2025 0300 by Luci Vick RN  Infection Prevention: environmental surveillance performed  Isolation Precautions:   precautions maintained   droplet  Taken 4/21/2025 0100 by Luci Vick RN  Infection Prevention:   rest/sleep promoted   hand hygiene promoted  Isolation Precautions: precautions maintained  Taken 4/20/2025 2300 by Luci Vick RN  Isolation Precautions:   precautions maintained   droplet  Taken 4/20/2025 1953 by Luci Vick RN  Isolation Precautions:   precautions maintained   airborne   droplet   contact  Intervention: Promote Recovery  Recent Flowsheet Documentation  Taken 4/21/2025 0300 by Luci Vick RN  Activity Management: bedrest  Taken 4/21/2025 0100 by Luci Vick, RN  Activity Management: bedrest  Taken 4/20/2025 2300 by Luci Vick RN  Activity Management: bedrest  Taken 4/20/2025 1953 by Luci Vick, RN  Activity Management: bedrest  Goal: Optimal Nutrition Delivery  Outcome: Progressing   Goal Outcome Evaluation:           Progress: no change

## 2025-04-21 NOTE — PLAN OF CARE
Goal Outcome Evaluation:  Plan of Care Reviewed With: patient        Progress: no change  Outcome Evaluation: Pt alert and oriented with intermittion confusion. Pt transfer from 4 MTU today due to worseneing hypoxia. Pt currenlty on airvo 60L 100%. VSS. Continue plan of care

## 2025-04-21 NOTE — CONSULTS
[facility] Music Therapy    General:    Referral Source: Other: rounds    Reason for Referral: Agitation/Anxiety    Length of Session: 35 minutes    Session Type: Inpatient individual    Previous Music Therapy Contact: No    Patient Music History: Listener    Meeting Location: Bedside    Music Preferences: Classical, Country, Rock, and Other: opera    Participant(s): Patient    Others Present: No    Assessment:     Patient Positioning Prior to Session: In Bed and Supine    Initial Patient Behavior State: Alert, Anxious, and Other: Pt stated she wanted someone to listen to her and was restless/anxious while lying in bed.    Initial Family/Caregiver Behavior State: no family present at the bedside    Treatment Objectives: Anxiety management, Enhance quality of life, and Modify/enhance mood    Co-Treatment: no    Interventions: Cognitive/Emotional processing, Life review, Rapport building, Reminiscing, and Supportive Listening    Post Session Assessment:     Ending Patient Behavior State: Alert, Bright, Engaged, Pleasant, and Positive. Pt continued to repeatedly state that she needed water but is not able to have water at this time.     Tolerance to Treatment:  Tolerated treatment well     Patient engagement: Expression of thoughts and feelings, Increased facial expression, Eye contact, and Visual Tracking     Session Progress: Exhibited stabilized/improved emotional state and Exhibited stabilized/improved coping    Intervention Plan: Continue to follow up

## 2025-04-21 NOTE — CONSULTS
Reason for consultation critical care management      HPI  89-year-old female critically ill in the intensive care unit  Patient mated to the hospital on 4/13/2025  Progressive worsening hypoxia  Transferred to the ICU  Currently maxed out on Airvo and then transition to BiPAP on 100% FiO2  Oxygen saturation still in the low 90s to high 80s  She is very confused due to her dementia      Vitals:    04/21/25 1200   BP: 138/69   Pulse: 117   Resp: 27   Temp:    SpO2: 97%     Confused  On continuous BiPAP  Breath sounds difficult to hear due to continuous BiPAP  Extremities.  Cool    Assessment  Acute hypoxic respiratory failure  COVID-19  Concern for superimposed bacterial pneumonia with gram-positive gram-negative organisms  Chronic systolic heart failure with reduced ejection fraction  Moderate mixed obstructive and restrictive defect/COPD with acute COPD exacerbation  Altered mental status    Plan  Patient very altered  Discontinue Decadron  Chest x-ray from today reviewed consistent with pulmonary edema  We will reinitiate IV Lasix  May need inotrope  Continue LABA LAMA and corticosteroid  Continue BiPAP at current setting  Discussion had with family they need to make decision about her CODE STATUS  She is too altered to make intubation decisions    Continue Unasyn  Has completed azithromycin    Overall prognosis grim    Total critical care time spent 35 minutes    Electronically signed by Cameron Anglin DO, 04/21/25, 12:44 PM EDT.

## 2025-04-21 NOTE — NURSING NOTE
Patient 02 sats in the 70's on 10 LPM on nasal canula.  Patient talking excessively, worrying about her Synthroid, instructed patient to try not talking, however, she kept talking. Respiratory called, 02 increased to 15 Liters, sats in the low 80's. Patient was then placed on Airvo then Bipap. Sats are now 95-98%. Orders received to transfer to the Unit. Report called to PEBBLES Poole.

## 2025-04-21 NOTE — PROGRESS NOTES
[facility]   Hospitalist Progress Note  Date: 2025  Patient Name: Yelena Sanchez  : 1935  MRN: [id number]  Date of admission: 2025      Subjective   Subjective     Chief Complaint: Follow up for shortness of breath    Summary: 89-year-old female with HFrEF, CKD stage IV, COPD, chronic oxygen use 3 L, MDS, history of lung cancer status post left lower lobe lobectomy who presented with shortness of breath.  Chest x-ray bilateral infiltrates.  White count normal.  On baseline 3 L nasal cannula.  Admitted with COVID-pneumonia, completed dexamethasone Nephrology following given advanced CKD.  Decompensated from respiratory standpoint requiring Airvo, transferred to ICU .    Interval Followup:   Had to be escalated to Airvo this morning  Spiking fevers and tachycardia  Remains alert and conversant      Objective   Objective     Vitals:   Temp:  [97.5 °F (36.4 °C)-98.6 °F (37 °C)] 97.5 °F (36.4 °C)  Heart Rate:  [] 94  Resp:  [18-24] 22  BP: ()/(55-75) 94/66  Flow (L/min) (Oxygen Therapy):  [10-11] 10  Physical Exam    Constitutional: Elderly female, alert, conversant, increased work of breathing   Respiratory: Diminished bilaterally   Cardiovascular:  IR/IR, trace edema   Gastrointestinal: soft, nd, nt   Neurologic: Alert, speech clear   Skin: Extremities warm, no rashes    Result Review    Result Review:  I have personally reviewed the following over the last 24 hours (07:00 to 07:00) and agree with the following findings  [x]  Laboratory  CBC          2025    13:54 2025    04:24 2025    05:10   CBC   WBC 18.70  12.23  10.19    RBC 3.92  3.94  3.62    Hemoglobin 11.6  11.8  10.7    Hematocrit 37.2  38.6  34.8    MCV 94.9  98.0  96.1    MCH 29.6  29.9  29.6    MCHC 31.2  30.6  30.7    RDW 16.3  16.3  16.2    Platelets 249  243  211      CMP          2025    04:24 2025    08:16 2025    05:10   CMP   Glucose 107  82  103    BUN 75  75  78    Creatinine  2.20  2.08  2.33    EGFR 21.1  22.5  19.7    Sodium 135  136  141    Potassium 5.1  4.9  4.2    Chloride 103  101  103    Calcium 9.5  9.6  9.2    Albumin  2.8  2.8    BUN/Creatinine Ratio 34.1  36.1  33.5    Anion Gap 15.8  19.1  14.5      [x]  Microbiology  [x]  Radiology  XR Chest 1 View  Result Date: 4/13/2025  XR CHEST 1 VW Date of Exam: 4/13/2025 12:08 PM EDT Indication: SOA Triage Protocol Comparison: Chest radiograph 11/29/2024. Chest CT 1/23/2025. Findings: Stable appearing left chest wall ICD. Mediastinum: Cardiac silhouette appears unchanged and enlarged Lungs: Interval increased hazy bibasal opacities. Mildly prominent central pulmonary vasculature. Pleura: No visible pleural effusion or pneumothorax. Bones and soft tissues: No acute, displaced fracture seen.     Impression: Cardiomegaly and pulmonary vascular congestion with bibasilar hazy opacities which could represent pulmonary edema or atypical/viral infection. Electronically Signed: Edward Waldrop  4/13/2025 12:22 PM EDT  Workstation ID: [id number]    []  EKG/Telemetry   []  Cardiology/Vascular   []  Pathology  []  Old records  [x]  Other:    Intake/Output Summary (Last 24 hours) at 4/21/2025 0759  Last data filed at 4/21/2025 0500  Gross per 24 hour   Intake 1440 ml   Output 600 ml   Net 840 ml         Assessment & Plan   Assessment / Plan     Assessment/Plan:  COPD with acute exacerbation  Chest pain, atypical  Multifocal pneumonia secondary to COVID-19 and aspiration  Chronic hypoxic respiratory failure  Acute on chronic systolic congestive heart failure  History of AICD  A-fib on Eliquis  CKD stage IV  Metabolic acidosis  Hyperkalemia  Essential hypertension  Hypothyroidism  History of MDS  History of lung cancer status post left lower lobe lobectomy        Transfer to ICU.  Intensivist consulted  ABG pH 7.3, pCO2 40, PO2 51% on 100% FiO2. Chest x-ray with worsening pulmonary edema  Switched to IV Lasix 40 mg every 12 hours  Continue Airvo/BiPAP  to maintain SpO2 above 90%  Continue IV Unasyn  Continue scheduled nebulizers  Continue metoprolol to tartrate 12.5 mg every 12  Continue Eliquis 2.5 mg every 2 hours for stroke prevention    Goals of care discussed including high likelihood of needing intubation.  She is intermittently confused/forgetful and I do not feel she is competent to make decision about intubation.  Discussed with patient's spouse/HCS who stated he would not want her to be on mechanical ventilator and to comply with advanced directive which states Do Not Resuscitate.  Otherwise continue aggressive treatment at this time.    Discussed with ICU team    I spent greater than 50 minutes minutes of time for evaluation and management of this patient including review of chart, labs pertinent imaging available as well as medical decision making and discussion with physicians, staff and patient.       CODE STATUS:   Code Status (Patient has no pulse and is not breathing): CPR (Attempt to Resuscitate)  Medical Interventions (Patient has pulse or is breathing): Full Support  Level Of Support Discussed With: Patient    Electronically signed by Jose M Patterson DO, 04/21/25, 7:59 AM EDT.

## 2025-04-22 NOTE — PROGRESS NOTES
RT EQUIPMENT DEVICE RELATED - SKIN ASSESSMENT    RT Medical Equipment/Device:     High Flow Nasal Cannula:Airvo    Skin Assessment:      Cheek:  Intact  Ears:  Intact  Nares:  Intact    Device Skin Pressure Protection:  Pressure points protected    Nurse Notification:  Vero Martínez, RRT

## 2025-04-22 NOTE — CONSULTS
25 1023   Coping/Psychosocial   Additional Documentation Spiritual Care (Group)   Spiritual Care   Spiritual Care Visit Type follow-up   Spiritual Care Source  initiative   Receptivity to Spiritual Care visit welcomed   Spiritual Care Request coping/stress of illness support;spiritual/moral support;decision-making support   Spiritual Care Interventions end-of-life care assistance provided;supportive conversation provided   Response to Spiritual Care receptive of support   Use of Spiritual Resources spirituality for coping, indicated strong use of   Spiritual Care Follow-Up other (see comments)  (ignacio's  and son are set to arrive later today to discuss CMO with her. nurse will notify  when they arrive.)     At time of visit Ignacio is sitting in her bed with the airvo on. She is happy that the nurses were able to assist her to the bedside commode. She had stated that she was perfectly fine if she  while on the bedside commode because she did not want to use a bed baron.    Ignacio shares with Jane (PC nurse) and MAX about her life, her , and her son. She was a small child in [place] when WW2 began. She shares about the bombs dropping.    She continues to say over and over how good of a life she has lived. She shares that she is not afraid of death. She knows that her time is nearing and she is ok with taking the O2 off.     She shares that the Way was born the same year she was and she is excited to die the same yr that her has. When asked if she would like to see a  for anointing of the Sick, she declines.     She would like to visit with her  and her son before she transitions to CMO.      will continue to follow and support.

## 2025-04-22 NOTE — DISCHARGE SUMMARY
Brief Hospital Course:   89 y.o. HFrEF, CKD stage IV, COPD, chronic oxygen use 3 L, MDS, history of lung cancer status post left lower lobe lobectomy who presented with shortness of breath.  Chest x-ray bilateral infiltrates.  White count normal.  On baseline 3 L nasal cannula.  Admitted with COVID-pneumonia, completed dexamethasone Nephrology following given advanced CKD.  Decompensated from respiratory standpoint requiring Airvo, transferred to ICU 4/21 -had continued decline with worsening hypoxia despite maximal settings and after further goal of care transition to comfort measures only and passed away shortly thereafter.     Final Diagnosis:   Acute on chronic hypoxemic respiratory failure requiring Airvo  Acute on chronic systolic congestive heart failure  Multifocal pneumonia secondary to COVID-19 and aspiration  COPD with acute exacerbation  Chest pain, atypical  History of AICD  A-fib on Eliquis  CKD stage IV  Metabolic acidosis  Hyperkalemia  Essential hypertension  Hypothyroidism  History of MDS  History of lung cancer status post left lower lobe lobectomy    Time of Death:   4/22/2025 at 1500

## 2025-04-22 NOTE — PROGRESS NOTES
RT EQUIPMENT DEVICE RELATED - SKIN ASSESSMENT    RT Medical Equipment/Device:     High Flow Nasal Cannula:Airvo    Skin Assessment:      Cheek:  Intact  Nares:  Intact  Neck:  Intact  Nose:  Intact    Device Skin Pressure Protection:  Pressure points protected    Nurse Notification:  No    Denisse Garcia, CRT     Patient states that she is experiencing some nasal soreness from the airvo wearing.

## 2025-04-22 NOTE — SIGNIFICANT NOTE
04/22/25 1455   OTHER   Discipline occupational therapist   Rehab Time/Intention   Session Not Performed unable to treat, medical status change  (Plan to discharge services. Pt has transitioned to comfort care.)

## 2025-04-22 NOTE — THERAPY TREATMENT NOTE
Acute Care - Speech Language Pathology   Swallow Treatment Note SHAYNA Brenner     Patient Name: Yelena Sanchez  : 1935  MRN: [id number]  Today's Date: 2025               Admit Date: 2025    Visit Dx:     ICD-10-CM ICD-9-CM   1. Acute congestive heart failure, unspecified heart failure type  I50.9 428.0   2. COPD exacerbation  J44.1 491.21   3. Hypoxia  R09.02 799.02   4. Acute on chronic respiratory failure with hypoxia  J96.21 518.84     799.02   5. Coronavirus infection  B34.2 079.89   6. Impaired mobility and ADLs  Z74.09 V49.89    Z78.9    7. Difficulty walking  R26.2 719.7   8. Oropharyngeal dysphagia  R13.12 787.22     Patient Active Problem List   Diagnosis    Iron deficiency anemia    Primary osteoarthritis of left knee    Chronic combined systolic and diastolic congestive heart failure    Presence of biventricular implantable cardioverter-defibrillator (ICD)    Coronary artery disease involving native coronary artery of native heart without angina pectoris    Mixed hyperlipidemia    Essential hypertension    Trochanteric bursitis of right hip    Sciatica of right side    Carotid artery disease    Acute on chronic systolic heart failure    Closed fracture of neck of right humerus    HTN (hypertension)    HLD (hyperlipidemia)    Hypothyroidism    Effusion of right elbow    Renal insufficiency    Musculoskeletal pain    Osteoarthritis of left knee    Fracture    ICD (implantable cardioverter-defibrillator) battery depletion    COPD with acute exacerbation    History of lung cancer    Dyspnea    Need for vaccination with 20-polyvalent pneumococcal conjugate vaccine    Nocturnal hypoxia    MDS (myelodysplastic syndrome)    History of breast cancer    Acute on chronic respiratory failure with hypercapnia    Acute on chronic congestive heart failure    Iron deficiency    COVID     Past Medical History:   Diagnosis Date    Anemia     Arthritis     Asthma     Bicipitoradial bursitis     Breast cancer      CHF (congestive heart failure)     Congestive heart failure     COPD (chronic obstructive pulmonary disease)     Diverticulosis 11/26/2014    GERD (gastroesophageal reflux disease)     History of tobacco abuse     HTN (hypertension)     Hyperlipemia     Hyperthyroidism     ICD (implantable cardioverter-defibrillator), biventricular, in situ     Neck mass     Seasonal allergies     SOB (shortness of breath)     TIA (transient ischemic attack)     YRS AGO, NO RESIDUALS     Past Surgical History:   Procedure Laterality Date    COLONOSCOPY  2014    ENDOSCOPY  2014    EYE SURGERY      Implant; yes     ICD GENERATOR REPLACEMENT N/A 11/14/2024    Procedure: ICD Generator Change - Bi-Ventricular -[employer];  Surgeon: JANETTE Gautam MD;  Location: [facility] INVASIVE LOCATION;  Service: Cardiovascular;  Laterality: N/A;    LUNG LOBECTOMY Left     Left lower lobe    LUNG SURGERY      LUNG SURGERY Left     LEFT UPPER LOBE (2004)    MASTECTOMY      Left     MASTECTOMY Left     OTHER SURGICAL HISTORY      Joint Surgery    PACEMAKER IMPLANTATION           SPEECH PATHOLOGY DYSPHAGIA TREATMENT    Subjective/Behavioral Observations: Alert and cooperative      Day/time of Treatment: 4/22/2025      Current Diet: Regular, thin      Current Strategies:Alternate small bites and small sips of solids and liquids at a slow rate.       Treatment received: Dysphagia therapy to address swallow function through exercises and education of strategies.      Results of treatment: P.o. intake with bites of a.m. meal.  Patient demonstrating extended chewing with swallows completed.  Patient requiring additional time to clear minimal residue for oral cavity.  Sips of thin liquid by straw with no overt clinical signs or symptoms of aspiration noted.  Patient took medications several at a time with thin liquid with no overt clinical signs or symptoms of aspiration noted.      Progress toward goals: Adequate      Barriers to Achieving goals:  Medical status      Plan of care:/changes in plan: Continue per current plan.  Regular solids cut small, thin liquid.                                                                                                    EDUCATION  The patient has been educated in the following areas:   Modified Diet Instruction.                Time Calculation:    Time Calculation- SLP       Row Name 04/22/25 0908             Time Calculation- SLP    SLP Stop Time 0845  -TB      SLP Received On 04/22/25  -TB         Untimed Charges    01756-VZ Treatment Swallow Minutes 40  -TB         Total Minutes    Untimed Charges Total Minutes 40  -TB       Total Minutes 40  -TB                User Key  (r) = Recorded By, (t) = Taken By, (c) = Cosigned By      Initials Name Provider Type    TB Leana Hu SLP Speech and Language Pathologist                    Therapy Charges for Today       Code Description Service Date Service Provider Modifiers Qty    [id number]  ST TREATMENT SWALLOW 3 4/22/2025 Leana Hu SLP GN 1                 JEMAL Lackey  4/22/2025

## 2025-04-22 NOTE — SIGNIFICANT NOTE
04/22/25 1239   Physical Therapy Time and Intention   Session Not Performed unable to treat, medical status change   Comment, Session Not Performed Pt has spoken with palliative RN, wishing to go comfort measures.  Awaiting family visit this evening to make definitive plans.

## 2025-04-22 NOTE — NURSING NOTE
"At 0500 Patient oxygen saturation had still not recovered greater than 85%, patient is continuing to refuse BiPap. Triny Calabrese notified. RN informed to attempt a NRB mask if patient was amenable. At this time patient is not wishing to wear anything else. She states, \"I would rather die than wear a mask again\".     Multiple educations provided for patient, her wishes remain the same.      "

## 2025-04-22 NOTE — CONSULTS
Palliative care consult for comfort measures only. At time of visit patient is alert and oriented to person, place, time and situation. Introduced self and explained role and purpose of visit. Allowed space for patient to participate in life reflection- emotional support provided. Discussed patients current condition. Patient shares she does not want any escalation of care. Discussed care options including education on comfort measures only/withdraw of interventions. Patient v/u and reports she wishes to be comfortable. Patients spouse and son visiting today, patient requests time for them to visit before initiating withdraw from airvo- updated primary rn and provider. Discussed with patients spouse. Palliative care will continue to follow up.    Jane MCGOVERN, RN, CHPN  Palliative Care

## 2025-04-22 NOTE — NURSING NOTE
RN contact patient  Yonathan to let him know current status. Patient remains adamant that she will not wear a mask for increasing her oxygen.  says that he understands her wishes and that he will call her to speak. RN encouraged  to come to the bedside as soon as he was able to. Patient son still driving in from [place] and will be arriving around noon.

## 2025-04-22 NOTE — PLAN OF CARE
Patient is currently maxed out on the Airvo with 60L and 100%. Patient also stated that she will not wear the Bipap that is currently on standby in her room. Patient is Covid positive and is able to clear her secretions. Continue to encourage Bipap usage and wean and titrate as tolerated.    Problem: Noninvasive Ventilation Acute  Goal: Effective Unassisted Ventilation and Oxygenation  Outcome: Not Progressing   Goal Outcome Evaluation:

## 2025-04-22 NOTE — NURSING NOTE
Met with patients  and son at bedside. Provided further education on transition to comfort measures only/withdraw of interventions. Patient reports she is ready and wants to focus on her comfort/end of life care. Emotional support provided. Orders placed appropriately- primary rn aware. Updated . Palliative care will continue to follow up.    Jane MCGOVERN, RN, [facility]  Palliative Care

## 2025-04-22 NOTE — CONSULTS
04/22/25 1512   Spiritual Care   Spiritual Care Visit Type other (see comments)  (CMO)   Spiritual Care Source nurse referral;family request   Receptivity to Spiritual Care visit welcomed   Spiritual Care Request family support;end-of-life issue(s) support   Spiritual Care Interventions end-of-life care assistance provided;prayer support provided   Use of Spiritual Resources spirituality for coping, indicated strong use of   Spiritual Care Follow-Up other (see comments)  (Yelena was able to say her final goodbyes to family before transitioning to comfort.  and son sat by her side for a while but emotionally could not stay. They asked the  to sit with her as she passed. I did. She passed at 1500.)     It was an honor to get to know Erica and hear her stories of [place], WW2, immigration to the [place], the birth of her son, and the life that she built with her  of 63yrs.    May she rest in peace.     -  Rev. LIBORIO Cohn., Mdiv, BCC.

## 2025-04-22 NOTE — PROGRESS NOTES
[facility]   Hospitalist Progress Note    Date of admission: 4/13/2025  Patient Name: Yelena Sanchez  1935  Date: 4/22/2025      Subjective     Chief Complaint   Patient presents with    Shortness of Breath     Pt arrived via EMS with c/o SOA x2 weeks. Pt wears 3L O2 nasal cannual continuously at home. EMS reports pt was stating 87% on 3L upon arrival. EMS gave 125mg of solumedrol, douneb, and 4 mg zofran.       Interval Followup: Progressively worsening hypoxia despite Airvo and more advanced measures.  Lethargic and unable to answer ROS for me.  Spoke with palliative care team earlier and does ultimately switch to comfort measures later after family is able to visit but otherwise continue Airvo for now.      Objective     Vitals:   Temp:  [96.5 °F (35.8 °C)-101.5 °F (38.6 °C)] 98.1 °F (36.7 °C)  Heart Rate:  [] 95  Resp:  [16-31] 18  BP: (100-137)/(49-92) 112/71  Flow (L/min) (Oxygen Therapy):  [60] 60    Physical Exam  Tired and lethargic in bed  Tachypnea, labored breathing, diminished some in bases bilaterally, on Airvo satting in low 80s  Elevated rate regular rhythm no lower extremity pitting edema   Abdomen nondistended    Result Review:  Vital signs, labs and recent relevant imaging reviewed.      CBC          4/19/2025    04:24 4/21/2025    05:10 4/22/2025    02:23   CBC   WBC 12.23  10.19  7.41    RBC 3.94  3.62  3.11    Hemoglobin 11.8  10.7  9.2    Hematocrit 38.6  34.8  29.3    MCV 98.0  96.1  94.2    MCH 29.9  29.6  29.6    MCHC 30.6  30.7  31.4    RDW 16.3  16.2  16.2    Platelets 243  211  181      CMP          4/20/2025    08:16 4/21/2025    05:10 4/22/2025    02:23   CMP   Glucose 82  103  74    BUN 75  78  79    Creatinine 2.08  2.33  2.45    EGFR 22.5  19.7  18.5    Sodium 136  141  143    Potassium 4.9  4.2  4.6    Chloride 101  103  107    Calcium 9.6  9.2  8.7    Albumin 2.8  2.8     BUN/Creatinine Ratio 36.1  33.5  32.2    Anion Gap 19.1  14.5  9.8          acetaminophen     acetaminophen **OR** acetaminophen **OR** acetaminophen    albuterol    benzonatate    senna-docusate sodium **AND** polyethylene glycol **AND** bisacodyl **AND** bisacodyl    calcium carbonate    dextrose    dextrose    dimethicone    glucagon (human recombinant)    guaiFENesin-dextromethorphan    HYDROcodone-acetaminophen    melatonin    ondansetron ODT **OR** ondansetron    sodium chloride    sodium chloride    sodium chloride    allopurinol, 100 mg, Oral, BID  ampicillin-sulbactam, 3 g, Intravenous, Q12H  apixaban, 2.5 mg, Oral, Q12H  arformoterol, 15 mcg, Nebulization, BID - RT  ascorbic acid, 500 mg, Oral, Daily  budesonide, 0.5 mg, Nebulization, BID - RT  ferrous sulfate, 325 mg, Oral, BID With Meals  fluticasone, 2 spray, Each Nare, Daily  furosemide, 40 mg, Intravenous, Q12H  guaiFENesin, 1,200 mg, Oral, Q12H  insulin lispro, 2-7 Units, Subcutaneous, 4x Daily AC & at Bedtime  ipratropium-albuterol, 3 mL, Nebulization, Q6H While Awake - RT  levothyroxine, 100 mcg, Oral, QAM AC  metoprolol tartrate, 12.5 mg, Oral, Q12H  mupirocin, 1 Application, Each Nare, BID  pantoprazole, 40 mg, Oral, Q AM  rosuvastatin, 10 mg, Oral, Nightly  senna-docusate sodium, 1 tablet, Oral, BID  sodium bicarbonate, 1,300 mg, Oral, TID  sodium chloride, 10 mL, Intravenous, Q12H        XR Chest 1 View  Result Date: 4/21/2025  Impression: Diffusely increased interstitial and airspace opacities throughout both lungs, although the left lung is mostly obscured by large cardiac silhouette, which may be due to pulmonary edema and/or multifocal pneumonia. Electronically Signed: Nadia Hooker MD  4/21/2025 11:11 AM EDT  Workstation ID: [id number]    CT Chest Without Contrast Diagnostic  Result Date: 4/19/2025  New bilateral infiltrates are seen since the 1/23/2025 CT study, which are most suggestive of infectious multifocal pneumonia.    Portions of this note were completed with a voice recognition program.  4/19/2025 4:17 AM by Harjit Booker,  MD on Workstation: [id number]      XR Chest 1 View  Result Date: 4/17/2025  1.Slight increase in right upper lobe infiltrates. Slight improvement in right basilar infiltrates. 2.Stable left mid to lower lung infiltrates with a possible small left effusion. Electronically Signed: Bartolo Garcia MD  4/17/2025 9:02 PM EDT  Workstation ID: [id number]      Assessment / Plan     Summary: 89 y.o. HFrEF, CKD stage IV, COPD, chronic oxygen use 3 L, MDS, history of lung cancer status post left lower lobe lobectomy who presented with shortness of breath.  Chest x-ray bilateral infiltrates.  White count normal.  On baseline 3 L nasal cannula.  Admitted with COVID-pneumonia, completed dexamethasone Nephrology following given advanced CKD.  Decompensated from respiratory standpoint requiring Airvo, transferred to ICU 4/21.     Assessment/Plan (clinically significant if listed here)  COPD with acute exacerbation  Chest pain, atypical  Multifocal pneumonia secondary to COVID-19 and aspiration  Acute on chronic hypoxemic respiratory failure requiring Airvo  Acute on chronic systolic congestive heart failure  History of AICD  A-fib on Eliquis  CKD stage IV  Metabolic acidosis  Hyperkalemia  Essential hypertension  Hypothyroidism  History of MDS  History of lung cancer status post left lower lobe lobectomy    Continue Airvo and current treatments for now ultimately transitioning to comfort measures after family is able to visit later today.  DNR/DNI at this time does not want BiPAP  Continue nebulizers and respiratory hygiene  Continue Unasyn course for now  Continue IV diuretics  DC SSI  Steroids and glucose reasonable at this point anyways and will minimize extra lab checks at this point  Will minimize some of her medications and discontinue further after fully transition to comfort measures, will stop statin iron vitamin C and similar meds for now  Continue Eliquis and metoprolol  Continue allopurinol  Continue Synthroid  Continue  PPI  Discussed with palliative care team appreciate assistance  Discussed with pulmonology critical care appreciate assistance  Patient critically/terminally ill despite aggressive interventions thus far.    Dispo: Hospice/comfort measures suspect will pass fairly quickly after full withdrawal of care.  Leave in the ICU for now can transfer to regular bed once transitioning to comfort measures.  Will update after family comes by with additional comfort meds/treatments at that time.    VTE Prophylaxis:  Pharmacologic & mechanical VTE prophylaxis orders are present.      Code Status (Patient has no pulse and is not breathing): No CPR (Do Not Attempt to Resuscitate)  Medical Interventions (Patient has pulse or is breathing): Limited Support  Medical Intervention Limits: No intubation (DNI)

## 2025-04-22 NOTE — PLAN OF CARE
Goal Outcome Evaluation:  Plan of Care Reviewed With: patient        Progress: no change  Outcome Evaluation: Patient refuses bipap. Unit on standby in the room. Currently on airvo 60L 100% and NRB at 15L. Satting low to mid 80s, RN and Vivienne aware. Tolerated breathing treatment this morning.

## 2025-04-22 NOTE — PROGRESS NOTES
" LOS: 9 days   Patient Care Team:  Kaelyn Eugene MD as PCP - General (Family Medicine)  Simon Talbot MD as Consulting Physician (Cardiology)  Kaelyn Eugene MD (Family Medicine)  Theodore Marquez MD as Consulting Physician (Pulmonary Disease)    Chief Complaint: CKD4    Subjective     Transferred to ICU due to hypoxia.    Currently on high flow oxygen.  Feeling okay.       History taken from: patient chart RN    Objective     Vital Sign Min/Max for last 24 hours  Temp  Min: 96.5 °F (35.8 °C)  Max: 101.5 °F (38.6 °C)   BP  Min: 100/89  Max: 148/85   Pulse  Min: 71  Max: 126   Resp  Min: 16  Max: 32   SpO2  Min: 79 %  Max: 97 %   Flow (L/min) (Oxygen Therapy)  Min: 60  Max: 60   No data recorded     Flowsheet Rows      Flowsheet Row First Filed Value   Admission Height 149.9 cm (59\") Documented at 04/13/2025 1116   Admission Weight 67 kg (147 lb 11.3 oz) Documented at 04/13/2025 1116            No intake/output data recorded.  I/O last 3 completed shifts:  In: 1281 [P.O.:1080; I.V.:201]  Out: 1275 [Urine:1275]    Objective:  General Appearance:  Comfortable, in no acute distress and not in pain (Hard of hearing.).    Vital signs: (most recent): Blood pressure 129/92, pulse 95, temperature 97.8 °F (36.6 °C), temperature source Rectal, resp. rate 20, height 149.9 cm (59\"), weight 68.6 kg (151 lb 3.8 oz), SpO2 (!) 78%.  Vital signs are normal.  No fever.    Output: Producing urine.    HEENT: Normal HEENT exam.    Lungs:  Normal effort.  There are rhonchi.    Heart: Normal rate.  Regular rhythm.    Abdomen: Abdomen is soft.  Bowel sounds are normal.  Hyperactive bowel sounds.   There is no abdominal tenderness.     Extremities: Normal range of motion.  There is no dependent edema.    Pulses: Distal pulses are intact.    Neurological: Patient is alert and oriented to person, place and time.    Skin:  Warm and dry.            Velázquez in place.    Results Review:     I reviewed the patient's new " "clinical results.  I reviewed the patient's new imaging results and agree with the interpretation.  I reviewed the patient's other test results and agree with the interpretation    WBC WBC   Date Value Ref Range Status   04/22/2025 7.41 3.40 - 10.80 10*3/mm3 Final   04/21/2025 10.19 3.40 - 10.80 10*3/mm3 Final      HGB Hemoglobin   Date Value Ref Range Status   04/22/2025 9.2 (L) 12.0 - 15.9 g/dL Final   04/21/2025 10.7 (L) 12.0 - 15.9 g/dL Final      HCT Hematocrit   Date Value Ref Range Status   04/22/2025 29.3 (L) 34.0 - 46.6 % Final   04/21/2025 34.8 34.0 - 46.6 % Final      Platlets No results found for: \"LABPLAT\"   MCV MCV   Date Value Ref Range Status   04/22/2025 94.2 79.0 - 97.0 fL Final   04/21/2025 96.1 79.0 - 97.0 fL Final          Sodium Sodium   Date Value Ref Range Status   04/22/2025 143 136 - 145 mmol/L Final   04/21/2025 141 136 - 145 mmol/L Final   04/20/2025 136 136 - 145 mmol/L Final      Potassium Potassium   Date Value Ref Range Status   04/22/2025 4.6 3.5 - 5.2 mmol/L Final   04/21/2025 4.2 3.5 - 5.2 mmol/L Final   04/20/2025 4.9 3.5 - 5.2 mmol/L Final     Comment:     Slight hemolysis detected by analyzer. Result may be falsely elevated.      Chloride Chloride   Date Value Ref Range Status   04/22/2025 107 98 - 107 mmol/L Final   04/21/2025 103 98 - 107 mmol/L Final   04/20/2025 101 98 - 107 mmol/L Final      CO2 CO2   Date Value Ref Range Status   04/22/2025 26.2 22.0 - 29.0 mmol/L Final   04/21/2025 23.5 22.0 - 29.0 mmol/L Final   04/20/2025 15.9 (L) 22.0 - 29.0 mmol/L Final      BUN BUN   Date Value Ref Range Status   04/22/2025 79 (H) 8 - 23 mg/dL Final   04/21/2025 78 (H) 8 - 23 mg/dL Final   04/20/2025 75 (H) 8 - 23 mg/dL Final      Creatinine Creatinine   Date Value Ref Range Status   04/22/2025 2.45 (H) 0.57 - 1.00 mg/dL Final   04/21/2025 2.33 (H) 0.57 - 1.00 mg/dL Final   04/20/2025 2.08 (H) 0.57 - 1.00 mg/dL Final      Calcium Calcium   Date Value Ref Range Status   04/22/2025 8.7 " "8.6 - 10.5 mg/dL Final   04/21/2025 9.2 8.6 - 10.5 mg/dL Final   04/20/2025 9.6 8.6 - 10.5 mg/dL Final      PO4 No results found for: \"CAPO4\"   Albumin Albumin   Date Value Ref Range Status   04/21/2025 2.8 (L) 3.5 - 5.2 g/dL Final   04/20/2025 2.8 (L) 3.5 - 5.2 g/dL Final        Magnesium Magnesium   Date Value Ref Range Status   04/22/2025 2.5 (H) 1.6 - 2.4 mg/dL Final   04/20/2025 2.5 (H) 1.6 - 2.4 mg/dL Final        Uric Acid No results found for: \"URICACID\"     Medication Review:   allopurinol, 100 mg, Oral, BID  ampicillin-sulbactam, 3 g, Intravenous, Q12H  apixaban, 2.5 mg, Oral, Q12H  arformoterol, 15 mcg, Nebulization, BID - RT  ascorbic acid, 500 mg, Oral, Daily  budesonide, 0.5 mg, Nebulization, BID - RT  ferrous sulfate, 325 mg, Oral, BID With Meals  fluticasone, 2 spray, Each Nare, Daily  furosemide, 40 mg, Intravenous, Q12H  guaiFENesin, 1,200 mg, Oral, Q12H  insulin lispro, 2-7 Units, Subcutaneous, 4x Daily AC & at Bedtime  ipratropium-albuterol, 3 mL, Nebulization, Q6H While Awake - RT  levothyroxine, 100 mcg, Oral, QAM AC  metoprolol tartrate, 12.5 mg, Oral, Q12H  mupirocin, 1 Application, Each Nare, BID  pantoprazole, 40 mg, Oral, Q AM  rosuvastatin, 10 mg, Oral, Nightly  senna-docusate sodium, 1 tablet, Oral, BID  sodium bicarbonate, 1,300 mg, Oral, TID  sodium chloride, 10 mL, Intravenous, Q12H          Assessment & Plan       COVID      Assessment & Plan  - 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  She is off ARB.  Creatinine back to baseline.  Monitor closely.  She has known proteinuria.    - COVID-pneumonia, with hypoxia, per primary and pulmonology.  On iv steroids.    - Congestive heart failure, volume status is adequate.  Lasix was on hold yesterday, changed to IV now she is in the ICU.  Spironolactone on hold.      - Hyperkalemia, mild, improved with loop diuretics.  Recheck in " AM.    - Hypertension, blood pressure is acceptable.    - Anemia, mild.  Monitor.    - Hypothyroidism, treated, per primary.    - Metabolic acidosis, with mild anion gap, continue p.o. sodium bicarb and monitor.    Oliver Otero MD  04/22/25  08:36 EDT

## 2025-04-24 LAB
QT INTERVAL: 398 MS
QTC INTERVAL: 533 MS

## 2025-04-25 LAB
QT INTERVAL: 444 MS
QTC INTERVAL: 497 MS

## 2025-07-22 ENCOUNTER — TELEPHONE (OUTPATIENT)
Dept: PASTORAL CARE | Facility: HOSPITAL | Age: 89
End: 2025-07-22
Payer: MEDICARE

## 2025-07-22 NOTE — TELEPHONE ENCOUNTER
"The  office was called by Yelena's . He is having a hard time with his wife's death and would like to speak to the  (me) that we with his wife when she passed away.    He states he will come to the hospital today to \"ask me some questions.\"  He would not elaborate of what questions he wants to ask.   "

## (undated) DEVICE — SUT VIC 2/0 SH 27IN

## (undated) DEVICE — YANKAUER,BULB TIP,W/O VENT,RIGID,STERILE: Brand: MEDLINE

## (undated) DEVICE — SUT VIC 3/0 SH 27IN J416H

## (undated) DEVICE — 8 FOOT DISPOSABLE EXTENSION CABLE WITH SAFE CONNECT / ALLIGATOR CLIP

## (undated) DEVICE — ADHS SKIN PREMIERPRO EXOFIN TOPICAL HI/VISC .5ML

## (undated) DEVICE — PLASMABLADE PS200-040 4.0: Brand: PLASMABLADE™